# Patient Record
Sex: MALE | Race: WHITE | NOT HISPANIC OR LATINO | Employment: OTHER | URBAN - METROPOLITAN AREA
[De-identification: names, ages, dates, MRNs, and addresses within clinical notes are randomized per-mention and may not be internally consistent; named-entity substitution may affect disease eponyms.]

---

## 2017-02-13 NOTE — PROGRESS NOTES
Pre-Operative Orthopedic Assessment      Patient: Riky Moon    YOB: 1940      Age/Gender: 76 y.o. male  Medical Record Number: [id number]  Surgical Procedure Planned: RT TOTAL HIP ARTHROPLASTY     Surgeon: Riky Fernando MD    Date of Surgery Planned: 2/23/2017    Question Value Score    Patient Score   1. What is your age group? 50-65 years  66-75 years  >75 years =2  =1  =0 0   2. Gender? Male  Female =2  =1 2   3. How far on average can you walk? (a block is 200 meters) Two blocks or more (+\- rest)  1-2 blocks (+\- rest)  Housebound (most of time) =2  =1  =0 1   4. Which gait aid to you use? (more often than not) None  Single-Point Stick  Crutches/Frame =2  =1  =0 2   5. Do you use community  supports? (home help, meals on wheels, district nursing) None or one per week  Two or more per week =1  =0 1   6. Will you live with someone who can care for you after your operation? Yes  No =3  =0 0      Your Score (out of 12)  6     Key Destination at Discharge from acute care predicted by score   Scores < 6  -- extended inpatient rehabilitation   Scores 6-9 -- additional intervention to discharge directly home (Rehabilitation in home)   Scores > 9 -- directly home         Discharge Disposition/Planning:     Patient Response   Discussed the Predicted discharge disposition needed based on RAPT Assessment with the patient.    yes   Patient [facility] discharge disposition[facility]   [facility]   Out Patient Rehabilitation Facility of Choice:    n/a   Home Health Services Preferred:   undecided   Has the patient completed or been scheduled to complete pre-operative testing? Scheduled for 2/14/17   Post-Operative Care Giver Name and Phone Number:    Chitra Moon (wife) [phone]     Subacute Inpatient Rehabilitation:  Complete this section only if planning inpatient services at a Subacute Facility     Patient Response   Subacute Facility Preferred (Please list 2  facilities:   [facility] (pre registered)   Requires pre-certification for inpatient rehabilitation services?      No (Medicare primary)   Planned source of transportation to inpatient rehabilitation facility?   son    If choosing inpatient services at an Acute or Subacute Facility please list a subsequent back-up plan (in case patient fails to qualify for inpatient rehabilitation). Back-up plans should include caregiver (family member or friend) for first 24-48 post- -operatively.    yes   Home Equipment Patient Response   Does patient have a walker for home use?    yes   Does patient have a 3 in 1 commode for home use?    yes   Does patient have a shower chair for home use?    Has a walk in shower   Does patient have an elevated commode seat for home use? Have higher commodes installed   Does patient have a cane for home use?    no   Is there any other medical equipment in the home? If so,  List in comment section below Stair lift to 2nd level of their home   Pre-Operative Class Attendance Patient Response   Attended or scheduled to attend the pre-operative class within 1 year of total joint replacement? Wasn't aware of class, Provided info, plans to attend 2/14/17   Not planning to attend the pre-operative class    n/a   Has attended the pre-operative class > 1 year ago    n/a   Does not want to attend the class    n/a   Was misinformed that did not need to attend the class    n/a   Class time is not convenient    n/a   Other reasons for refusing to attend the pre-operative class (if yes, see comments below)   n/a   Patient Education  Completed   Expected time of discharge discussed yes   Encouraged to attend Pre-Operative Class    yes   Education re: Back-up plan for patients planning discharge to subacute facilities yes   Education re: Predicted Discharge Disposition based on RAPT score    no   Patient receptive and voiced understanding of information given    yes                                                                                                             Comments:    Spoke with patient Riky Moon at [phone].  Verified home address to be correct of [address] [place] [address].   Medicare primary.  He lives with his wife Chitra in a 2 level house.  Bedrooms  are upstairs.  He informs that they have a stair lift that goes to the 2nd level of their home that his wife uses- she has MS.  He stated that he has also been using the stair lift.   He stated that there are 3 steps to the entry of their home, also a ramp has been installed due to his wife having MS. He stated that she is able to manage on her own, but he has arranged for rehab for himself at [place] for once he is ready for discharge from the hospital as she wouldn't be able to help him.  He isn't currently having to use any assistive devices.  Drives.  Patient current plan is to go to [facility] at discharge (pre registered), and informs that his son may be able to provide transportation to the rehab facility.  Yu Abbasi RN                                           Signature: Yu Abbasi RN    Date:  2/13/2017

## 2017-02-14 ENCOUNTER — APPOINTMENT (OUTPATIENT)
Dept: PREADMISSION TESTING | Facility: HOSPITAL | Age: 77
End: 2017-02-14

## 2017-02-14 ENCOUNTER — HOSPITAL ENCOUNTER (OUTPATIENT)
Dept: GENERAL RADIOLOGY | Facility: HOSPITAL | Age: 77
Discharge: HOME | End: 2017-02-14
Admitting: ORTHOPAEDIC SURGERY

## 2017-02-14 ENCOUNTER — HOSPITAL ENCOUNTER (OUTPATIENT)
Dept: GENERAL RADIOLOGY | Facility: HOSPITAL | Age: 77
Discharge: HOME | End: 2017-02-14

## 2017-02-14 VITALS
WEIGHT: 315 LBS | BODY MASS INDEX: 40.43 KG/M2 | HEART RATE: 82 BPM | HEIGHT: 74 IN | SYSTOLIC BLOOD PRESSURE: 153 MMHG | OXYGEN SATURATION: 95 % | RESPIRATION RATE: 16 BRPM | TEMPERATURE: 97 F | DIASTOLIC BLOOD PRESSURE: 67 MMHG

## 2017-02-14 DIAGNOSIS — M25.551 RIGHT HIP PAIN: Primary | ICD-10-CM

## 2017-02-14 LAB
ABO GROUP BLD: NORMAL
ALBUMIN SERPL-MCNC: 4.3 G/DL (ref 3.5–5.2)
ALBUMIN/GLOB SERPL: 1.7 G/DL
ALP SERPL-CCNC: 49 U/L (ref 39–117)
ALT SERPL W P-5'-P-CCNC: 18 U/L (ref 1–41)
ANION GAP SERPL CALCULATED.3IONS-SCNC: 10.1 MMOL/L
AST SERPL-CCNC: 16 U/L (ref 1–40)
BILIRUB SERPL-MCNC: 0.4 MG/DL (ref 0.1–1.2)
BILIRUB UR QL STRIP: NEGATIVE
BLD GP AB SCN SERPL QL: NEGATIVE
BUN BLD-MCNC: 21 MG/DL (ref 8–23)
BUN/CREAT SERPL: 21.6 (ref 7–25)
CALCIUM SPEC-SCNC: 8.8 MG/DL (ref 8.6–10.5)
CHLORIDE SERPL-SCNC: 104 MMOL/L (ref 98–107)
CLARITY UR: CLEAR
CO2 SERPL-SCNC: 26.9 MMOL/L (ref 22–29)
COLOR UR: YELLOW
CREAT BLD-MCNC: 0.97 MG/DL (ref 0.76–1.27)
DEPRECATED RDW RBC AUTO: 65.1 FL (ref 37–54)
ERYTHROCYTE [DISTWIDTH] IN BLOOD BY AUTOMATED COUNT: 16.4 % (ref 11.5–14.5)
GFR SERPL CREATININE-BSD FRML MDRD: 75 ML/MIN/1.73
GLOBULIN UR ELPH-MCNC: 2.6 GM/DL
GLUCOSE BLD-MCNC: 124 MG/DL (ref 65–99)
GLUCOSE UR STRIP-MCNC: NEGATIVE MG/DL
HCT VFR BLD AUTO: 34.9 % (ref 40.4–52.2)
HGB BLD-MCNC: 11.5 G/DL (ref 13.7–17.6)
HGB UR QL STRIP.AUTO: NEGATIVE
KETONES UR QL STRIP: NEGATIVE
LEUKOCYTE ESTERASE UR QL STRIP.AUTO: NEGATIVE
MCH RBC QN AUTO: 35.8 PG (ref 27–32.7)
MCHC RBC AUTO-ENTMCNC: 33 G/DL (ref 32.6–36.4)
MCV RBC AUTO: 108.7 FL (ref 79.8–96.2)
NITRITE UR QL STRIP: NEGATIVE
PH UR STRIP.AUTO: 5.5 [PH] (ref 5–8)
PLATELET # BLD AUTO: 254 10*3/MM3 (ref 140–500)
PMV BLD AUTO: 11.2 FL (ref 6–12)
POTASSIUM BLD-SCNC: 4.4 MMOL/L (ref 3.5–5.2)
PROT SERPL-MCNC: 6.9 G/DL (ref 6–8.5)
PROT UR QL STRIP: NEGATIVE
RBC # BLD AUTO: 3.21 10*6/MM3 (ref 4.6–6)
RH BLD: NEGATIVE
SODIUM BLD-SCNC: 141 MMOL/L (ref 136–145)
SP GR UR STRIP: 1.02 (ref 1–1.03)
UROBILINOGEN UR QL STRIP: NORMAL
WBC NRBC COR # BLD: 7.78 10*3/MM3 (ref 4.5–10.7)

## 2017-02-14 PROCEDURE — 80053 COMPREHEN METABOLIC PANEL: CPT | Performed by: ORTHOPAEDIC SURGERY

## 2017-02-14 PROCEDURE — 93010 ELECTROCARDIOGRAM REPORT: CPT | Performed by: INTERNAL MEDICINE

## 2017-02-14 PROCEDURE — 85027 COMPLETE CBC AUTOMATED: CPT | Performed by: ORTHOPAEDIC SURGERY

## 2017-02-14 PROCEDURE — G8979 MOBILITY GOAL STATUS: HCPCS

## 2017-02-14 PROCEDURE — 73502 X-RAY EXAM HIP UNI 2-3 VIEWS: CPT

## 2017-02-14 PROCEDURE — 93005 ELECTROCARDIOGRAM TRACING: CPT

## 2017-02-14 PROCEDURE — G8978 MOBILITY CURRENT STATUS: HCPCS

## 2017-02-14 PROCEDURE — 36415 COLL VENOUS BLD VENIPUNCTURE: CPT

## 2017-02-14 PROCEDURE — 86901 BLOOD TYPING SEROLOGIC RH(D): CPT

## 2017-02-14 PROCEDURE — 71020 HC CHEST PA AND LATERAL: CPT

## 2017-02-14 PROCEDURE — G8980 MOBILITY D/C STATUS: HCPCS

## 2017-02-14 PROCEDURE — 97161 PT EVAL LOW COMPLEX 20 MIN: CPT

## 2017-02-14 PROCEDURE — 86850 RBC ANTIBODY SCREEN: CPT

## 2017-02-14 PROCEDURE — 81003 URINALYSIS AUTO W/O SCOPE: CPT | Performed by: ORTHOPAEDIC SURGERY

## 2017-02-14 PROCEDURE — 86900 BLOOD TYPING SEROLOGIC ABO: CPT

## 2017-02-14 RX ORDER — DICLOFENAC SODIUM 75 MG/1
75 TABLET, DELAYED RELEASE ORAL 2 TIMES DAILY
COMMUNITY
End: 2017-09-02 | Stop reason: SDUPTHER

## 2017-02-14 RX ORDER — MULTIVITAMIN
1 TABLET ORAL 2 TIMES DAILY
COMMUNITY
End: 2017-09-02 | Stop reason: SDUPTHER

## 2017-02-14 RX ORDER — HYDROCODONE BITARTRATE AND ACETAMINOPHEN 7.5; 325 MG/1; MG/1
1 TABLET ORAL EVERY 6 HOURS PRN
COMMUNITY
Start: 2016-08-30 | End: 2018-05-29

## 2017-02-14 RX ORDER — OMEPRAZOLE 20 MG/1
20 CAPSULE, DELAYED RELEASE ORAL 2 TIMES DAILY
COMMUNITY
End: 2017-09-02 | Stop reason: SDUPTHER

## 2017-02-14 RX ORDER — ZOLPIDEM TARTRATE 10 MG/1
10 TABLET ORAL NIGHTLY PRN
COMMUNITY
End: 2017-09-02 | Stop reason: SDUPTHER

## 2017-02-14 RX ORDER — FUROSEMIDE 20 MG/1
20 TABLET ORAL DAILY
COMMUNITY
End: 2017-09-02 | Stop reason: SDUPTHER

## 2017-02-14 RX ORDER — PREGABALIN 100 MG/1
150 CAPSULE ORAL 2 TIMES DAILY
COMMUNITY
End: 2017-09-02 | Stop reason: SDUPTHER

## 2017-02-14 RX ORDER — OXYBUTYNIN CHLORIDE 15 MG/1
15 TABLET, EXTENDED RELEASE ORAL DAILY
COMMUNITY
End: 2017-09-02 | Stop reason: SDUPTHER

## 2017-02-14 RX ORDER — LEVOTHYROXINE SODIUM 0.07 MG/1
75 TABLET ORAL DAILY
COMMUNITY
End: 2017-09-02 | Stop reason: SDUPTHER

## 2017-02-14 RX ORDER — PRAVASTATIN SODIUM 40 MG
40 TABLET ORAL DAILY
COMMUNITY
End: 2017-09-02 | Stop reason: SDUPTHER

## 2017-02-14 RX ORDER — CHLORHEXIDINE GLUCONATE 500 MG/1
1 CLOTH TOPICAL 2 TIMES DAILY
COMMUNITY
End: 2017-11-12 | Stop reason: HOSPADM

## 2017-02-14 RX ORDER — TRAMADOL HYDROCHLORIDE 50 MG/1
50 TABLET ORAL EVERY 6 HOURS PRN
COMMUNITY
End: 2017-10-03 | Stop reason: ALTCHOICE

## 2017-02-14 RX ORDER — LOSARTAN POTASSIUM 50 MG/1
100 TABLET ORAL DAILY
COMMUNITY
End: 2017-09-02 | Stop reason: SDUPTHER

## 2017-02-14 NOTE — PROGRESS NOTES
Physical Therapy Outpatient Preoperative Total Joint Evaluation     Patient Name: Riky Moon  : 1940  MRN: [id number]  Today's Date: 2017         Surgery Date: 17    There is no problem list on file for this patient.       Past Medical History   Diagnosis Date   • Acid reflux    • Arthritis      OSTEO   • COPD (chronic obstructive pulmonary disease)    • Diabetes mellitus    • Disease of thyroid gland    • High cholesterol    • Hypertension    • Neuropathy      BOTH FEET   • Obesity, Class III, BMI 40-49.9 (morbid obesity)    • Poor circulation of extremity      LEFT LEG   • Sleep apnea    • Vitamin D deficiency         Past Surgical History   Procedure Laterality Date   • Interstim placement Left      BLADDER CONTROL   • Cholecystectomy     • Appendectomy     • Knee arthroplasty Right    • Rotator cuff repair Right    • Nose surgery       REMOVED BLOCKAGE    • Colonoscopy w/ polypectomy       BENIGN   • Cataract extraction w/ intraocular lens implant Bilateral               TOTAL JOINT PREOP EVAL (last 72 hours)      Total Joint Preop Evaluation       17 0822                Preoperative Evaluation    Surgery THR: Right  -TV        Surgery Date 17  -TV        Previous Total Joint Yes  -TV        What type: r tkr 8 years ago  -TV        Attended Class yes  -TV        Medical History HTN;DM  -TV        Medical History Comment neuropathy in both feet; sleep apnea; copd  -TV        Prior Activity Status    All Household independent  -TV        All Community independent  -TV        Gait independent  -TV        Transfers independent  -TV         Spouse   wife has MS  -TV        DC Plans Sub Acute   [facility]  -TV        Assist at home Spouse  -TV        Home Environment 2 story  -TV        Exterior steps --   ramp  -TV        Interior steps 1 rail   14; and chair lift  -TV        Pain 0-10    Pain Level 2  -TV        Location hip and back  -TV        LE Measurements - ROM     Hip Flexion - Right --   significant swelling  in left lower leg; obesity limits hip movement bilaterally; pain with attempted hip flex, abd and rotation; strength generally 3-/5 in bith le's  -TV        UE ROM/Strength    UE ROM/Strength adequate for walker use  -TV        Other Measurements    Sensation/Circulation intact  -TV        Gait Observation no device  -TV        Equipment    Equipment Patient Has Walker;Cane;Bedside commode  -TV        ASSESSMENT    Goal Patient demonstrates good understanding of post-op P.T.;Exercises;Surgical Procedure  -TV        Goal Met? Yes  -TV        Anticipated Progress fair  -TV        Patient agrees with Plan of Treatment? Yes  -TV        Plan Will see for post op TJR protocol  -TV          User Key  (r) = Recorded By, (t) = Taken By, (c) = Cosigned By    Initials Name Effective Dates    TV Modesta Cash, PT 01/07/16 -              Time Calculation:          Therapy Charges for Today     Code Description Service Date Service Provider Modifiers Qty    39542843003 HC PT MOBILITY CURRENT 2/14/2017 Modesta Cash, PT GP, CI 1    91909035882 HC PT MOBILITY PROJECTED 2/14/2017 Modesta Cash, PT GP, CI 1    57987960005 HC PT MOBILITY DISCHARGE 2/14/2017 Modesta Cash, PT GP, CI 1    06021188987 HC PAT-TOTAL JOINT CLASS 2/14/2017 Modesta Cash, PT  1    04392014376 HC PT EVAL LOW COMPLEXITY 1 2/14/2017 Modesta Cash, PT GP 1            PT G-Codes  PT Professional Judgement Used?: Yes  Functional Limitation: Mobility: Walking and moving around  Mobility: Walking and Moving Around Current Status (): At least 1 percent but less than 20 percent impaired, limited or restricted  Mobility: Walking and Moving Around Goal Status (): At least 1 percent but less than 20 percent impaired, limited or restricted  Mobility: Walking and Moving Around Discharge Status (): At least 1 percent but less than 20 percent impaired, limited or restricted      Modesta Cash, PT  2/14/2017

## 2017-02-14 NOTE — DISCHARGE INSTRUCTIONS
Take the following medications the morning of surgery with a small sip of water.    SPIRIVA  LYRICA  OMEPRAZOLE  LOSARTAN  HYDROCODONE IF NEEDED    General Instructions:  • Do not eat or drink after midnight: includes water, mints, or gum. You may brush your teeth.  • Do not smoke, chew tobacco, or drink alcohol.  • Bring medications in original bottles, any inhalers and if applicable your C-PAP/ BI-PAP machine.  • Bring any papers given to you in the doctor’s office.  • Wear clean comfortable clothes and socks.  • Do not wear contact lenses or make-up.  Bring a case for your glasses if applicable.   • Bring crutches or walker if applicable.  • Leave all other valuables and jewelry at home.    You were given a blood bank ID arm band remember to bring it with you the day of surgery.    Preventing a Surgical Site Infection:  Shower on the morning of surgery using a fresh bar of anti-bacterial soap (such as Dial) and clean washcloth.  Dry with a clean towel and dress in clean clothing.  For 2 to 3 days before surgery, avoid shaving with a razor near where you will have surgery because the razor can irritate skin and make it easier to develop an infection  Ask your surgeon if you will be receiving antibiotics prior to surgery  Make sure you, your family, and all healthcare providers clean their hands with soap and water or an alcohol based hand  before caring for you or your wound  If at all possible, quit smoking as many days before surgery as you can.    Day of surgery: 02- arrive @ 0730 am REPORT TO THE MAIN OR   Upon arrival, a Pre-op nurse and Anesthesiologist will review your health history, obtain vital signs, and answer questions you may have.  The only belongings needed at this time will be your home medications and if applicable your C-PAP/BI-PAP machine.  If you are staying overnight your family can leave the rest of your belongings in the car and bring them to your room later.  A Pre-op nurse  will start an IV and you may receive medication in preparation for surgery, including something to help you relax.  Your family will be able to see you in the Pre-op area.  While you are in surgery your family should notify the waiting room  if they leave the waiting room area and provide a contact phone number.    Please be aware that surgery does come with discomfort.  We want to make every effort to control your discomfort so please discuss any uncontrolled symptoms with your nurse.   Your doctor will most likely have prescribed pain medications.      If you are going home after surgery you will receive individualized written care instructions before being discharged.  A responsible adult must drive you to and from the hospital on the day of your surgery and stay with you for 24 hours.    If you are staying overnight following surgery, you will be transported to your hospital room following the recovery period.  [facility] has all private rooms.    If you have any questions please call Pre-Admission Testing at [phone].  Deductibles and co-payments are collected on the day of service. Please be prepared to pay the required co-pay, deductible or deposit on the day of service as defined by your plan.    2% CHLORAHEXIDINE GLUCONATE* CLOTH  Preparing or “prepping” skin before surgery can reduce the risk of infection at the surgical site. To make the process easier, [facility] has chosen disposable cloths moistened with a rinse-free, 2% Chlorhexidine Gluconate (CHG) antiseptic solution. The steps below outline the prepping process and should be carefully followed.        Use the prep cloth on the area that is circled in the diagram             Directions Night before Surgery 02- PM PRIOR TO SURGERY (RIGHT HIP AREA)  1) Shower using a fresh bar of anti-bacterial soap (such as Dial) and clean washcloth.  Use a clean towel to completely dry your skin.  2) Do not use any  lotions, oils or creams on your skin.  3) Open the package and remove 1 cloth, wipe your skin for 30 seconds in a circular motion.  Allow to dry for 3 minutes.  4) Repeat #3 with second cloth.  5) Do not touch your eyes, ears, or mouth with the prep cloth.  6) Allow the wet prep solution to air dry.  7) Discard the prep cloth and wash your hands with soap and water.   8) Dress in clean bed clothes and sleep on fresh clean bed sheets.   9) You may experience some temporary itching after the prep.    Directions Day of Surgery 02- AM PRIOR TO SURGERY (RIGHT HIP AREA)  1) Repeat steps 1,2,3,4,5,6,7, and 9.   2) Dress in clean clothes before coming to the hospital.    BACTROBAN NASAL OINTMENT  There are many germs normally in your nose. Bactroban is an ointment that will help reduce these germs. Please follow these instructions for Bactroban use:    ____The day before surgery in the morning  [id number]_______    ____The day before surgery in the evening              [id number]_______    ____The day of surgery in the morning    [id number]_____    **Squirt ½ package of Bactroban Ointment onto a cotton applicator and apply to inside of 1st nostril.  Squirt the remaining Bactroban and apply to the inside of the other nostril.

## 2017-02-23 ENCOUNTER — ANESTHESIA EVENT (OUTPATIENT)
Dept: PERIOP | Facility: HOSPITAL | Age: 77
End: 2017-02-23

## 2017-02-23 ENCOUNTER — ANESTHESIA (OUTPATIENT)
Dept: PERIOP | Facility: HOSPITAL | Age: 77
End: 2017-02-23

## 2017-02-23 ENCOUNTER — HOSPITAL ENCOUNTER (OUTPATIENT)
Facility: HOSPITAL | Age: 77
Setting detail: SURGERY ADMIT
Discharge: HOME | End: 2017-02-23
Attending: ORTHOPAEDIC SURGERY | Admitting: ORTHOPAEDIC SURGERY

## 2017-02-23 VITALS
SYSTOLIC BLOOD PRESSURE: 145 MMHG | TEMPERATURE: 97.8 F | OXYGEN SATURATION: 95 % | DIASTOLIC BLOOD PRESSURE: 71 MMHG | WEIGHT: 315 LBS | HEIGHT: 73 IN | HEART RATE: 86 BPM | BODY MASS INDEX: 41.75 KG/M2 | RESPIRATION RATE: 16 BRPM

## 2017-02-23 PROBLEM — M16.9 OA (OSTEOARTHRITIS) OF HIP: Status: ACTIVE | Noted: 2017-02-23

## 2017-02-23 LAB — GLUCOSE BLDC GLUCOMTR-MCNC: 127 MG/DL (ref 70–130)

## 2017-02-23 PROCEDURE — 82962 GLUCOSE BLOOD TEST: CPT

## 2017-02-23 RX ORDER — MIDAZOLAM HYDROCHLORIDE 1 MG/ML
1 INJECTION INTRAMUSCULAR; INTRAVENOUS
Status: DISCONTINUED | OUTPATIENT
Start: 2017-02-23 | End: 2017-02-23 | Stop reason: HOSPADM

## 2017-02-23 RX ORDER — HYDROMORPHONE HYDROCHLORIDE 1 MG/ML
0.5 INJECTION, SOLUTION INTRAMUSCULAR; INTRAVENOUS; SUBCUTANEOUS
Status: CANCELLED | OUTPATIENT
Start: 2017-02-23

## 2017-02-23 RX ORDER — FLUMAZENIL 0.1 MG/ML
0.2 INJECTION INTRAVENOUS AS NEEDED
Status: CANCELLED | OUTPATIENT
Start: 2017-02-23

## 2017-02-23 RX ORDER — PROMETHAZINE HYDROCHLORIDE 25 MG/ML
12.5 INJECTION, SOLUTION INTRAMUSCULAR; INTRAVENOUS ONCE AS NEEDED
Status: CANCELLED | OUTPATIENT
Start: 2017-02-23

## 2017-02-23 RX ORDER — MIDAZOLAM HYDROCHLORIDE 1 MG/ML
2 INJECTION INTRAMUSCULAR; INTRAVENOUS
Status: DISCONTINUED | OUTPATIENT
Start: 2017-02-23 | End: 2017-02-23 | Stop reason: HOSPADM

## 2017-02-23 RX ORDER — PROMETHAZINE HYDROCHLORIDE 25 MG/1
12.5 TABLET ORAL ONCE AS NEEDED
Status: CANCELLED | OUTPATIENT
Start: 2017-02-23 | End: 2017-02-24

## 2017-02-23 RX ORDER — SODIUM CHLORIDE 0.9 % (FLUSH) 0.9 %
1-10 SYRINGE (ML) INJECTION AS NEEDED
Status: DISCONTINUED | OUTPATIENT
Start: 2017-02-23 | End: 2017-02-23 | Stop reason: HOSPADM

## 2017-02-23 RX ORDER — FENTANYL CITRATE 50 UG/ML
50 INJECTION, SOLUTION INTRAMUSCULAR; INTRAVENOUS
Status: CANCELLED | OUTPATIENT
Start: 2017-02-23

## 2017-02-23 RX ORDER — SODIUM CHLORIDE, SODIUM LACTATE, POTASSIUM CHLORIDE, CALCIUM CHLORIDE 600; 310; 30; 20 MG/100ML; MG/100ML; MG/100ML; MG/100ML
9 INJECTION, SOLUTION INTRAVENOUS CONTINUOUS
Status: DISCONTINUED | OUTPATIENT
Start: 2017-02-23 | End: 2017-02-23 | Stop reason: HOSPADM

## 2017-02-23 RX ORDER — CELECOXIB 200 MG/1
200 CAPSULE ORAL ONCE
Status: DISCONTINUED | OUTPATIENT
Start: 2017-02-23 | End: 2017-02-23 | Stop reason: HOSPADM

## 2017-02-23 RX ORDER — DIPHENHYDRAMINE HYDROCHLORIDE 50 MG/ML
12.5 INJECTION INTRAMUSCULAR; INTRAVENOUS
Status: CANCELLED | OUTPATIENT
Start: 2017-02-23

## 2017-02-23 RX ORDER — HYDRALAZINE HYDROCHLORIDE 20 MG/ML
5 INJECTION INTRAMUSCULAR; INTRAVENOUS
Status: CANCELLED | OUTPATIENT
Start: 2017-02-23

## 2017-02-23 RX ORDER — HYDROMORPHONE HYDROCHLORIDE 1 MG/ML
0.25 INJECTION, SOLUTION INTRAMUSCULAR; INTRAVENOUS; SUBCUTANEOUS
Status: CANCELLED | OUTPATIENT
Start: 2017-02-23

## 2017-02-23 RX ORDER — BUPIVACAINE HCL/0.9 % NACL/PF 0.1 %
3 PLASTIC BAG, INJECTION (ML) EPIDURAL ONCE
Status: DISCONTINUED | OUTPATIENT
Start: 2017-02-23 | End: 2017-02-23 | Stop reason: HOSPADM

## 2017-02-23 RX ORDER — NALOXONE HCL 0.4 MG/ML
0.2 VIAL (ML) INJECTION AS NEEDED
Status: CANCELLED | OUTPATIENT
Start: 2017-02-23

## 2017-02-23 RX ORDER — PROMETHAZINE HYDROCHLORIDE 25 MG/1
25 TABLET ORAL ONCE AS NEEDED
Status: CANCELLED | OUTPATIENT
Start: 2017-02-23

## 2017-02-23 RX ORDER — LABETALOL HYDROCHLORIDE 5 MG/ML
5 INJECTION, SOLUTION INTRAVENOUS
Status: CANCELLED | OUTPATIENT
Start: 2017-02-23

## 2017-02-23 RX ORDER — HYDROCODONE BITARTRATE AND ACETAMINOPHEN 7.5; 325 MG/1; MG/1
1 TABLET ORAL ONCE AS NEEDED
Status: CANCELLED | OUTPATIENT
Start: 2017-02-23 | End: 2017-02-24

## 2017-02-23 RX ORDER — FAMOTIDINE 10 MG/ML
20 INJECTION, SOLUTION INTRAVENOUS ONCE
Status: DISCONTINUED | OUTPATIENT
Start: 2017-02-23 | End: 2017-02-23 | Stop reason: HOSPADM

## 2017-02-23 RX ORDER — ONDANSETRON 2 MG/ML
4 INJECTION INTRAMUSCULAR; INTRAVENOUS ONCE AS NEEDED
Status: CANCELLED | OUTPATIENT
Start: 2017-02-23

## 2017-02-23 RX ORDER — ACETAMINOPHEN 500 MG
1000 TABLET ORAL ONCE
Status: DISCONTINUED | OUTPATIENT
Start: 2017-02-23 | End: 2017-02-23 | Stop reason: HOSPADM

## 2017-02-23 RX ORDER — FENTANYL CITRATE 50 UG/ML
50 INJECTION, SOLUTION INTRAMUSCULAR; INTRAVENOUS
Status: DISCONTINUED | OUTPATIENT
Start: 2017-02-23 | End: 2017-02-23 | Stop reason: HOSPADM

## 2017-02-23 RX ORDER — ALBUTEROL SULFATE 90 UG/1
1 AEROSOL, METERED RESPIRATORY (INHALATION) EVERY 4 HOURS PRN
COMMUNITY
End: 2018-06-29 | Stop reason: SDUPTHER

## 2017-02-23 RX ORDER — OXYCODONE AND ACETAMINOPHEN 7.5; 325 MG/1; MG/1
1 TABLET ORAL ONCE AS NEEDED
Status: CANCELLED | OUTPATIENT
Start: 2017-02-23 | End: 2017-02-24

## 2017-02-23 RX ORDER — PROMETHAZINE HYDROCHLORIDE 25 MG/1
25 SUPPOSITORY RECTAL ONCE AS NEEDED
Status: CANCELLED | OUTPATIENT
Start: 2017-02-23

## 2017-02-23 NOTE — ANESTHESIA PREPROCEDURE EVALUATION
Anesthesia Evaluation        Airway   Mallampati: III  Neck ROM: limited  no difficulty expected  Dental    (+) upper dentures and lower dentures    Pulmonary    (+) a smoker Former, sleep apnea on CPAP,   Cardiovascular     ECG reviewed  Rhythm: regular  Rate: normal        Neuro/Psych  GI/Hepatic/Renal/Endo    (+)  diabetes mellitus type 2,     Musculoskeletal     Abdominal    Substance History      OB/GYN          Other                                  Anesthesia Plan    ASA 4     general   (Patient has one natural remaining tooth in lower back left: all else are dentures  Overall very poor health with extensive left leg venous stasis)  intravenous induction   Anesthetic plan and risks discussed with patient.

## 2017-02-23 NOTE — H&P
History and Physical    Patient Name:  Riky Moon  YOB: 1940    Medical Records Number:  [id number]    Date of Admission:  No admission date for patient encounter.    Chief Complaint:  OA (osteoarthritis) of hip [M16.9]    Riky Moon is a 76 y.o. male who presents c/o severe right hip pain.  The pain has been on and off for many years, worsening recently to the point where the pain is becoming disabling. The pain is a constant dull ache with occasional sharp, stabbing pains.  The patient has failed conservative treatment and would like to proceed with total hip arthroplasty.    There were no vitals taken for this visit.    Past Medical History:    Past Medical History   Diagnosis Date   • Acid reflux    • Arthritis      OSTEO   • COPD (chronic obstructive pulmonary disease)    • Diabetes mellitus    • Disease of thyroid gland    • High cholesterol    • Hypertension    • Neuropathy      BOTH FEET   • Obesity, Class III, BMI 40-49.9 (morbid obesity)    • Poor circulation of extremity      LEFT LEG   • Sleep apnea    • Vitamin D deficiency        Social History:    Social History     Social History   • Marital status:      Spouse name: N/A   • Number of children: N/A   • Years of education: N/A     Occupational History   • Not on file.     Social History Main Topics   • Smoking status: Former Smoker     Packs/day: 1.50     Years: 40.00     Types: Cigarettes     Quit date: 1997   • Smokeless tobacco: Not on file   • Alcohol use Yes      Comment: HOLIDAYS    • Drug use: Yes     Special: Hydrocodone   • Sexual activity: Defer     Other Topics Concern   • Not on file     Social History Narrative   • No narrative on file       Family History:  No family history on file.    Current Medications:    Current Facility-Administered Medications:   •  ropivacaine (NAROPIN) 50 mL, Morphine (PF) 5 mg, ketorolac (TORADOL) 30 mg, EPINEPHrine (ADRENALIN) 0.3 mg in sodium chloride 0.9 % 101.8 mL, , Injection,  Once, Riky Fernando MD    Current Outpatient Prescriptions:   •  aspirin 81 MG tablet, Take 81 mg by mouth. HOLD PRIOR TO SURGERY, Disp: , Rfl:   •  Chlorhexidine Gluconate Cloth 2 % pads, Apply  topically. AS DIRECTED: PM DAY BEFORE SURGERY AM DAY OF SURGERY, Disp: , Rfl:   •  Cholecalciferol (VITAMIN D) 1000 UNITS tablet, Take 1,000 Units by mouth Daily. HOLD PRIOR TO SURGERY, Disp: , Rfl:   •  diclofenac (VOLTAREN) 75 MG EC tablet, Take 75 mg by mouth 2 (Two) Times a Day. HOLD PRIOR TO SURGERY, Disp: , Rfl:   •  furosemide (LASIX) 20 MG tablet, Take 20 mg by mouth Daily., Disp: , Rfl:   •  HYDROcodone-acetaminophen (NORCO) 7.5-325 MG per tablet, Take 1 tablet by mouth Every 6 (Six) Hours., Disp: , Rfl:   •  levothyroxine (SYNTHROID, LEVOTHROID) 75 MCG tablet, Take 75 mcg by mouth Daily., Disp: , Rfl:   •  losartan (COZAAR) 50 MG tablet, Take 100 mg by mouth Daily., Disp: , Rfl:   •  metFORMIN (GLUCOPHAGE) 850 MG tablet, Take 850 mg by mouth 2 (Two) Times a Day., Disp: , Rfl:   •  Multiple Vitamin (MULTIVITAMIN) tablet, Take 1 tablet by mouth 2 (Two) Times a Day. HOLD PRIOR TO SURGERY, Disp: , Rfl:   •  mupirocin (BACTROBAN) 2 % nasal ointment, into each nostril 2 (Two) Times a Day. AS DIRECTED: AM & PM DAY BEFORE SURGERY AM DAY OF SURGERY, Disp: , Rfl:   •  omeprazole (priLOSEC) 20 MG capsule, Take 20 mg by mouth 2 (Two) Times a Day., Disp: , Rfl:   •  oxybutynin XL (DITROPAN XL) 15 MG 24 hr tablet, Take 15 mg by mouth Daily., Disp: , Rfl:   •  pravastatin (PRAVACHOL) 40 MG tablet, Take 40 mg by mouth Daily., Disp: , Rfl:   •  pregabalin (LYRICA) 100 MG capsule, Take 150 mg by mouth 2 (Two) Times a Day., Disp: , Rfl:   •  tiotropium (SPIRIVA HANDIHALER) 18 MCG per inhalation capsule, Place 2 puffs into inhaler and inhale Daily., Disp: , Rfl:   •  traMADol (ULTRAM) 50 MG tablet, Take 50 mg by mouth Every 6 (Six) Hours As Needed for moderate pain (4-6)., Disp: , Rfl:   •  zolpidem (AMBIEN) 10 MG tablet, Take 10  mg by mouth At Night As Needed., Disp: , Rfl:     Allergies:  No Known Allergies    Review of Systems:   HEENT: Patient denies any headaches, vision changes, change in hearing, or tinnitus, Patient denies any rhinorrhea,epistaxis, sinus pain, mouth or dental problems, sore throat or hoarseness, or dysphagia  Pulmonary: Patient denies any cough, congestion, SOA, or wheezing  Cardiovascular: Patient denies any chest pain, dyspnea, palpitations, weakness, intolerance of exercise, varicosities, swelling of extremities, known murmur  Gastrointestinal:  Patient denies nausea, vomiting, diarrhea, constipation, loss  of appetite, change in appetite, dysphagia, gas, heartburn, melena, change in bowel habits, use of laxatives or other drugs to alter the function of the gastrointestinal tract.  Genital/Urinary: Patient denies dysuria, change in color of urine, change in frequency of urination, pain with urgency, incontinence, retention, or nocturia.  Musculoskeletal: Patient denies increased warmth; redness; or swelling of joints; limitation of function; deformity; crepitation: pain in a joint or an extremity, the neck, or the back, especially with movement.  Neurological: Patient denies dizziness, tremor, ataxia, difficulty in speaking, change in speech, paresthesia, loss of sensation, seizures, syncope, changes in memory.  Endocrine system: Patient denies tremors, palpitations, intolerance of heat or cold, polyuria, polydipsia, polyphagia, diaphoresis, exophthalmos, or goiter.  Psychological: Patient denies thoughts/plans or harming self or other; depression,  insomnia, night terrors, lala, memory loss, disorientation.  Skin: Patient denies any bruising, rashes, discoloration, pruritus, wounds, ulcers, decubiti, changes in the hair or nails  Hematopoietic: Patient denies history of spontaneous or excessive bleeding, epistaxis, hematuria, melena, fatigue, enlarged or tender lymph nodes, pallor, history of  anemia.        Physical Exam:  Awake, A&O x3, affect normal, no acute distress  Ambulating with a limp due to hip pain  Hip ROM is limited due to pain  No instability  Strength is 4/5 in the quad, hamstring, abduction and adduction  Cap refill is normal, Sensation intact    Card:  RR, HD Stable  Pulm:  Regular breathing, no S.O.A  Abd:  Soft, NT, ND    Lab Results (last 24 hours)     ** No results found for the last 24 hours. **          Xr Chest Pa & Lateral    Result Date: 2/14/2017  Narrative: RIGHT HIP AND PELVIS 5 VIEWS  HISTORY: 76-year-old male preop right hip surgery scheduled for February 23 company of nontraumatic right hip pain. HISTORY includes hypertension diabetes COPD and shortness of breath.  COMPARISON: (none available)  FINDINGS: 1. Prominent degenerative changes right SI joint. 2. Right hip joint space narrowing, bony sclerosis without acute traumatic nor other significant osseous abnormality. 3. Pain device with electrode projects over the right sacrum.      TWO-VIEW CHEST  HISTORY: See above  COMPARISON: 03/01/2013  FINDINGS: 1. Stable negative acute chest. 2. Emphysema spondylosis vascular calcification chronic pulmonary change.  This report was finalized on 2/14/2017 12:21 PM by Dr. Jose Shepard MD.      Xr Hip With Or Without Pelvis 2 - 3 View Right    Result Date: 2/14/2017  Narrative: RIGHT HIP AND PELVIS 5 VIEWS  HISTORY: 76-year-old male preop right hip surgery scheduled for February 23 company of nontraumatic right hip pain. HISTORY includes hypertension diabetes COPD and shortness of breath.  COMPARISON: (none available)  FINDINGS: 1. Prominent degenerative changes right SI joint. 2. Right hip joint space narrowing, bony sclerosis without acute traumatic nor other significant osseous abnormality. 3. Pain device with electrode projects over the right sacrum.      TWO-VIEW CHEST  HISTORY: See above  COMPARISON: 03/01/2013  FINDINGS: 1. Stable negative acute chest. 2. Emphysema  spondylosis vascular calcification chronic pulmonary change.  This report was finalized on 2/14/2017 12:21 PM by Dr. Jose Shepard MD.          Assessment:  End-stage Primary Right Hip Osteoarthritis    Plan:  Patient's pain is becoming disabling, despite extensive conservative treatment.  Radiographs reveal end-stage degenerative changes.  The risks of surgery, including, but not limited to, heart attack, stroke, dying, DVT, arthrofibrosis and infection were discussed.  The alternatives and benefits were also discussed.  All questions answered and the patient wishes to proceed with right total hip arthroplasty.

## 2017-02-23 NOTE — ANESTHESIA POSTPROCEDURE EVALUATION
Patient: Riky Moon    Procedure Summary     Date Anesthesia Start Anesthesia Stop Room / Location    02/23/17    [facility] / [facility]       Procedure Diagnosis Surgeon Provider    RT TOTAL HIP ARTHROPLASTY (Right Hip) No diagnosis on file. Riky Fernando MD No responsible provider of record.          Anesthesia Type: general  Last vitals  BP      Temp      Pulse     Resp      SpO2        Post Anesthesia Care and Evaluation    Patient location during evaluation: PACU  Patient participation: complete - patient participated  Level of consciousness: awake and alert  Pain management: adequate  Airway patency: patent  Anesthetic complications: No anesthetic complications    Cardiovascular status: acceptable  Respiratory status: acceptable  Hydration status: acceptable

## 2017-02-23 NOTE — PERIOPERATIVE NURSING NOTE
"SPOKE WITH DR. BROWER. MADE HIM AWARE OF VERY RED RASH UNDERNEATH ABDOMINAL FOLD RIGHT AND LEFT SIDE AS WELL AS VERY REDDENED AREAS RIGHT AND LEFT LOWER EXTREMITIES LEFT GREATER THAN RIGHT . PT HAS EXTREME SWELLING LEFT FOOT. PT STATES \"IT'S ALWAYS LIKE THAT. POOR CIRCULATION\". DR. BROWER STATED HE WOULD BE OVER TO SEE PATIENT PRIOR TO SURGERY  "

## 2017-06-07 ENCOUNTER — HOSPITAL ENCOUNTER (OUTPATIENT)
Dept: SLEEP MEDICINE | Facility: HOSPITAL | Age: 77
Discharge: HOME | End: 2017-06-07
Attending: INTERNAL MEDICINE | Admitting: INTERNAL MEDICINE

## 2017-06-07 PROCEDURE — 99213 OFFICE O/P EST LOW 20 MIN: CPT | Performed by: INTERNAL MEDICINE

## 2017-06-07 PROCEDURE — G0463 HOSPITAL OUTPT CLINIC VISIT: HCPCS

## 2017-06-11 PROBLEM — G47.33 OSA TREATED WITH BIPAP: Status: ACTIVE | Noted: 2017-06-11

## 2017-06-11 NOTE — PROGRESS NOTES
[facility] Note June 7, 2017      Patient Care Team:  Josue Yung MD as PCP - Family Medicine  Cosmo French MD as Consulting Physician (Sleep Medicine)  Oneyda Quesada Jr., MD as Consulting Physician (Vascular Surgery)    Chief Complaint:  KINDRA     Interval History:   The patient was last seen by me in June 2016.  He is stable and unchanged without new problems.  He goes to bed 11:30 PM and awakens at 8 AM.  [place] Sleepiness Scale is normal at 2.    The patient is having problems with cellulitis and swelling of his right lower extremity greater than his left.  He sees Dr. Oneyda Quesada.    Review of Systems:  Recorded on the Sleep Questionnaire.  Unremarkable except for BLANCHARD, cough, and depression.    Social History:  He does not smoke cigarettes.  No alcohol.  2 caffeinated beverages a day.    Allergies:  No known medical allergies.     Medication Review:  Reviewed.      Vital Signs:  Height 73 inches and weight is 300 pounds and he is morbidly obese with a body mass index of 40.  His weight is down 20 pounds since I last saw him.    Physical Exam:    Constitutional:  Well developed white male and appears in no apparent distress.  Awake & oriented times 3.  Normal mood with normal recent and remote memory and normal judgement.  Eyes:  Conjunctivae normal.  Oropharynx:  moist mucous membranes without exudate and a large tongue and normal size uvula that is broad-based and class III MP airway.      Results Review:  DME is [facility] and he uses a fullface mask.  Downloads between December 9, 2016-June 6, 2017 compliances 100% and average usage is 8 hours and 24 minutes and average AHI is normal without significant leak and he uses BiPAP 18/14.       Impression:   Obstructive sleep apnea adequately treated with BiPAP 16/14 with good compliance and usage and no complaints of hypersomnolence.      Plan:  Good sleep hygiene measures should be maintained.  Weight loss would be  beneficial in this patient who is obese by BMI.    The patient will continue to use his BiPAP.  A new prescription will be sent for a new auto titrating BiPAP with max IPAP of 18 and minimum EPAP 12 and max pressure support 6 and minimal pressure support 2.  All needed supplies will be obtained.    The patient will follow up in 2-3 months.      Cosmo French MD  06/11/17  10:19 AM

## 2017-09-02 ENCOUNTER — APPOINTMENT (OUTPATIENT)
Dept: GENERAL RADIOLOGY | Facility: HOSPITAL | Age: 77
End: 2017-09-02

## 2017-09-02 ENCOUNTER — HOSPITAL ENCOUNTER (OUTPATIENT)
Facility: HOSPITAL | Age: 77
Setting detail: OBSERVATION
Discharge: HOME | End: 2017-09-03
Attending: EMERGENCY MEDICINE | Admitting: INTERNAL MEDICINE

## 2017-09-02 DIAGNOSIS — R07.9 CHEST PAIN, UNSPECIFIED TYPE: Primary | ICD-10-CM

## 2017-09-02 LAB
ALBUMIN SERPL-MCNC: 4.4 G/DL (ref 3.5–5.2)
ALBUMIN/GLOB SERPL: 1.5 G/DL
ALP SERPL-CCNC: 47 U/L (ref 39–117)
ALT SERPL W P-5'-P-CCNC: 21 U/L (ref 1–41)
ANION GAP SERPL CALCULATED.3IONS-SCNC: 13.6 MMOL/L
AST SERPL-CCNC: 19 U/L (ref 1–40)
BASOPHILS # BLD AUTO: 0.04 10*3/MM3 (ref 0–0.2)
BASOPHILS NFR BLD AUTO: 0.4 % (ref 0–1.5)
BILIRUB SERPL-MCNC: 0.6 MG/DL (ref 0.1–1.2)
BUN BLD-MCNC: 17 MG/DL (ref 8–23)
BUN/CREAT SERPL: 19.3 (ref 7–25)
CALCIUM SPEC-SCNC: 9.7 MG/DL (ref 8.6–10.5)
CHLORIDE SERPL-SCNC: 102 MMOL/L (ref 98–107)
CO2 SERPL-SCNC: 23.4 MMOL/L (ref 22–29)
CREAT BLD-MCNC: 0.88 MG/DL (ref 0.76–1.27)
DEPRECATED RDW RBC AUTO: 66.2 FL (ref 37–54)
EOSINOPHIL # BLD AUTO: 0.28 10*3/MM3 (ref 0–0.7)
EOSINOPHIL NFR BLD AUTO: 2.8 % (ref 0.3–6.2)
ERYTHROCYTE [DISTWIDTH] IN BLOOD BY AUTOMATED COUNT: 17.3 % (ref 11.5–14.5)
GFR SERPL CREATININE-BSD FRML MDRD: 84 ML/MIN/1.73
GLOBULIN UR ELPH-MCNC: 3 GM/DL
GLUCOSE BLD-MCNC: 167 MG/DL (ref 65–99)
HCT VFR BLD AUTO: 36.9 % (ref 40.4–52.2)
HGB BLD-MCNC: 12.1 G/DL (ref 13.7–17.6)
HOLD SPECIMEN: NORMAL
HOLD SPECIMEN: NORMAL
IMM GRANULOCYTES # BLD: 0.05 10*3/MM3 (ref 0–0.03)
IMM GRANULOCYTES NFR BLD: 0.5 % (ref 0–0.5)
LYMPHOCYTES # BLD AUTO: 1.86 10*3/MM3 (ref 0.9–4.8)
LYMPHOCYTES NFR BLD AUTO: 18.3 % (ref 19.6–45.3)
MCH RBC QN AUTO: 35 PG (ref 27–32.7)
MCHC RBC AUTO-ENTMCNC: 32.8 G/DL (ref 32.6–36.4)
MCV RBC AUTO: 106.6 FL (ref 79.8–96.2)
MONOCYTES # BLD AUTO: 0.66 10*3/MM3 (ref 0.2–1.2)
MONOCYTES NFR BLD AUTO: 6.5 % (ref 5–12)
NEUTROPHILS # BLD AUTO: 7.29 10*3/MM3 (ref 1.9–8.1)
NEUTROPHILS NFR BLD AUTO: 71.5 % (ref 42.7–76)
NRBC BLD MANUAL-RTO: 0.5 /100 WBC (ref 0–0)
PLAT MORPH BLD: NORMAL
PLATELET # BLD AUTO: 226 10*3/MM3 (ref 140–500)
PMV BLD AUTO: 11.7 FL (ref 6–12)
POTASSIUM BLD-SCNC: 4.3 MMOL/L (ref 3.5–5.2)
PROT SERPL-MCNC: 7.4 G/DL (ref 6–8.5)
RBC # BLD AUTO: 3.46 10*6/MM3 (ref 4.6–6)
RBC MORPH BLD: NORMAL
SODIUM BLD-SCNC: 139 MMOL/L (ref 136–145)
TROPONIN T SERPL-MCNC: <0.01 NG/ML (ref 0–0.03)
WBC MORPH BLD: NORMAL
WBC NRBC COR # BLD: 10.18 10*3/MM3 (ref 4.5–10.7)
WHOLE BLOOD HOLD SPECIMEN: NORMAL
WHOLE BLOOD HOLD SPECIMEN: NORMAL

## 2017-09-02 PROCEDURE — G0378 HOSPITAL OBSERVATION PER HR: HCPCS

## 2017-09-02 PROCEDURE — 80053 COMPREHEN METABOLIC PANEL: CPT | Performed by: EMERGENCY MEDICINE

## 2017-09-02 PROCEDURE — 71020 HC CHEST PA AND LATERAL: CPT

## 2017-09-02 PROCEDURE — 93005 ELECTROCARDIOGRAM TRACING: CPT

## 2017-09-02 PROCEDURE — 84484 ASSAY OF TROPONIN QUANT: CPT | Performed by: EMERGENCY MEDICINE

## 2017-09-02 PROCEDURE — 93010 ELECTROCARDIOGRAM REPORT: CPT | Performed by: INTERNAL MEDICINE

## 2017-09-02 PROCEDURE — 85025 COMPLETE CBC W/AUTO DIFF WBC: CPT | Performed by: EMERGENCY MEDICINE

## 2017-09-02 PROCEDURE — 99284 EMERGENCY DEPT VISIT MOD MDM: CPT

## 2017-09-02 PROCEDURE — 36415 COLL VENOUS BLD VENIPUNCTURE: CPT | Performed by: EMERGENCY MEDICINE

## 2017-09-02 PROCEDURE — 85007 BL SMEAR W/DIFF WBC COUNT: CPT | Performed by: EMERGENCY MEDICINE

## 2017-09-02 PROCEDURE — 84484 ASSAY OF TROPONIN QUANT: CPT | Performed by: INTERNAL MEDICINE

## 2017-09-02 RX ORDER — ASPIRIN 325 MG
325 TABLET ORAL ONCE
Status: COMPLETED | OUTPATIENT
Start: 2017-09-02 | End: 2017-09-02

## 2017-09-02 RX ORDER — LEVOTHYROXINE SODIUM 0.07 MG/1
88 TABLET ORAL DAILY
COMMUNITY
End: 2017-10-03 | Stop reason: ALTCHOICE

## 2017-09-02 RX ORDER — OXYBUTYNIN CHLORIDE 15 MG/1
15 TABLET, EXTENDED RELEASE ORAL NIGHTLY
COMMUNITY

## 2017-09-02 RX ORDER — FUROSEMIDE 40 MG/1
40 TABLET ORAL ONCE
Status: COMPLETED | OUTPATIENT
Start: 2017-09-02 | End: 2017-09-02

## 2017-09-02 RX ORDER — LOSARTAN POTASSIUM 50 MG/1
100 TABLET ORAL EVERY MORNING
COMMUNITY
End: 2018-05-29 | Stop reason: DRUGHIGH

## 2017-09-02 RX ORDER — ZOLPIDEM TARTRATE 10 MG/1
10 TABLET ORAL NIGHTLY PRN
COMMUNITY
End: 2022-07-18

## 2017-09-02 RX ORDER — DICLOFENAC SODIUM 75 MG/1
75 TABLET, DELAYED RELEASE ORAL 2 TIMES DAILY
COMMUNITY
End: 2017-11-12 | Stop reason: HOSPADM

## 2017-09-02 RX ORDER — OMEPRAZOLE 20 MG/1
20 CAPSULE, DELAYED RELEASE ORAL 2 TIMES DAILY
COMMUNITY
End: 2017-09-03 | Stop reason: HOSPADM

## 2017-09-02 RX ORDER — PREGABALIN 100 MG/1
75 CAPSULE ORAL DAILY
COMMUNITY
End: 2017-10-03 | Stop reason: ALTCHOICE

## 2017-09-02 RX ORDER — ALUMINA, MAGNESIA, AND SIMETHICONE 2400; 2400; 240 MG/30ML; MG/30ML; MG/30ML
15 SUSPENSION ORAL EVERY 6 HOURS PRN
Status: DISCONTINUED | OUTPATIENT
Start: 2017-09-02 | End: 2017-09-03 | Stop reason: HOSPADM

## 2017-09-02 RX ORDER — LOSARTAN POTASSIUM 50 MG/1
50 TABLET ORAL ONCE
Status: COMPLETED | OUTPATIENT
Start: 2017-09-02 | End: 2017-09-02

## 2017-09-02 RX ORDER — MULTIVITAMIN
1 TABLET ORAL EVERY MORNING
COMMUNITY

## 2017-09-02 RX ORDER — ASPIRIN 325 MG
325 TABLET ORAL ONCE
Status: DISCONTINUED | OUTPATIENT
Start: 2017-09-02 | End: 2017-09-02

## 2017-09-02 RX ORDER — PRAVASTATIN SODIUM 40 MG
40 TABLET ORAL NIGHTLY
COMMUNITY
End: 2019-11-06 | Stop reason: SDUPTHER

## 2017-09-02 RX ORDER — PANTOPRAZOLE SODIUM 20 MG/1
20 TABLET, DELAYED RELEASE ORAL ONCE
Status: COMPLETED | OUTPATIENT
Start: 2017-09-02 | End: 2017-09-02

## 2017-09-02 RX ORDER — LEVOTHYROXINE SODIUM 88 UG/1
88 TABLET ORAL EVERY MORNING
COMMUNITY
End: 2019-11-06 | Stop reason: SDUPTHER

## 2017-09-02 RX ORDER — ACETAMINOPHEN 325 MG/1
650 TABLET ORAL EVERY 4 HOURS PRN
COMMUNITY
End: 2018-05-29

## 2017-09-02 RX ORDER — ZOLPIDEM TARTRATE 5 MG/1
5 TABLET ORAL ONCE
Status: DISCONTINUED | OUTPATIENT
Start: 2017-09-02 | End: 2017-09-03 | Stop reason: HOSPADM

## 2017-09-02 RX ORDER — SODIUM CHLORIDE 0.9 % (FLUSH) 0.9 %
10 SYRINGE (ML) INJECTION AS NEEDED
Status: DISCONTINUED | OUTPATIENT
Start: 2017-09-02 | End: 2017-09-03 | Stop reason: HOSPADM

## 2017-09-02 RX ORDER — FUROSEMIDE 40 MG/1
TABLET ORAL
COMMUNITY
Start: 2017-02-24 | End: 2017-11-03

## 2017-09-02 RX ADMIN — PANTOPRAZOLE SODIUM 20 MG: 20 TABLET, DELAYED RELEASE ORAL at 19:28

## 2017-09-02 RX ADMIN — ALUMINUM HYDROXIDE, MAGNESIUM HYDROXIDE, AND DIMETHICONE 15 ML: 400; 400; 40 SUSPENSION ORAL at 19:28

## 2017-09-02 RX ADMIN — ASPIRIN 325 MG: 325 TABLET ORAL at 16:26

## 2017-09-02 RX ADMIN — METFORMIN HYDROCHLORIDE 850 MG: 850 TABLET ORAL at 19:28

## 2017-09-02 RX ADMIN — LOSARTAN POTASSIUM 50 MG: 50 TABLET, FILM COATED ORAL at 20:21

## 2017-09-02 RX ADMIN — FUROSEMIDE 40 MG: 40 TABLET ORAL at 19:28

## 2017-09-02 NOTE — ED PROVIDER NOTES
EMERGENCY DEPARTMENT ENCOUNTER    CHIEF COMPLAINT  Chief Complaint: chest pressure   History given by: patient  History limited by: nothing  Room Number: 21/21  PMD: Provider Not In System  Dr. Oneyda Quesada     HPI:  Pt is a 76 y.o. male who presents complaining of dull, lower sternal chest pressure that has been intermittent for 2 days, not associated with cough. The discomfort radiates to his back, and he thinks it is worse when he lies flat. Pt also complains of exertional SOA, productive cough with yellow sputum for 1 month, increased urinary frequency, increased BM, and leg swelling (left worse than right - ongoing for years). He denies dysuria or blood in stool. Pt has an appointment to establish care with Dr. Alarcon later this month.  Pt has a h/o diabetes, high cholesterol, COPD, neuropathy, hypertension, and acid reflux.  Pt is a former smoker. Pt's father passed away from MI in his 70's.     Duration:  2 day  Timing: intermittent   Location: lower sternal chest   Radiation: does not radiate   Quality: dull pressure   Intensity/Severity: moderate   Progression: unchanged   Associated Symptoms: SOA, cough, urinary frequency, increased frequency of BM, leg swelling  Aggravating Factors: chest pain worsened by lying flat and SOA worsened by exertion  Alleviating Factors: none specified   Previous Episodes: none specified   Treatment before arrival: none specified     PAST MEDICAL HISTORY  Active Ambulatory Problems     Diagnosis Date Noted   • OA (osteoarthritis) of hip 02/23/2017   • KINDRA treated with BiPAP 16/14 06/11/2017     Resolved Ambulatory Problems     Diagnosis Date Noted   • No Resolved Ambulatory Problems     Past Medical History:   Diagnosis Date   • Acid reflux    • Arthritis    • COPD (chronic obstructive pulmonary disease)    • Diabetes mellitus    • Disease of thyroid gland    • High cholesterol    • Hypertension    • Neuropathy    • Obesity, Class III, BMI 40-49.9 (morbid obesity)    • Poor  circulation of extremity    • Sleep apnea    • Vitamin D deficiency        PAST SURGICAL HISTORY  Past Surgical History:   Procedure Laterality Date   • APPENDECTOMY     • CATARACT EXTRACTION W/ INTRAOCULAR LENS IMPLANT Bilateral    • CHOLECYSTECTOMY     • COLONOSCOPY W/ POLYPECTOMY      BENIGN   • INTERSTIM PLACEMENT Left     BLADDER CONTROL   • KNEE ARTHROPLASTY Right    • NOSE SURGERY      REMOVED BLOCKAGE    • ROTATOR CUFF REPAIR Right        FAMILY HISTORY  History reviewed. No pertinent family history.    SOCIAL HISTORY  Social History     Social History   • Marital status:      Spouse name: N/A   • Number of children: N/A   • Years of education: N/A     Occupational History   • Not on file.     Social History Main Topics   • Smoking status: Former Smoker     Packs/day: 1.50     Years: 40.00     Types: Cigarettes     Quit date: 1997   • Smokeless tobacco: Not on file   • Alcohol use Yes      Comment: HOLIDAYS    • Drug use: Yes     Special: Hydrocodone   • Sexual activity: Defer     Other Topics Concern   • Not on file     Social History Narrative       ALLERGIES  Review of patient's allergies indicates no known allergies.    REVIEW OF SYSTEMS  Review of Systems   Constitutional: Negative for chills and fever.   HENT: Negative for sore throat and trouble swallowing.    Eyes: Negative for visual disturbance.   Respiratory: Positive for cough (productive with yellow sputum) and shortness of breath (exertional).    Cardiovascular: Positive for chest pain (dull pressure) and leg swelling (left worse than right).   Gastrointestinal: Negative for abdominal pain, blood in stool, diarrhea and vomiting.        Increased frequency of BM    Endocrine: Negative.    Genitourinary: Positive for frequency. Negative for decreased urine volume and dysuria.   Musculoskeletal: Positive for back pain. Negative for neck pain.   Skin: Negative for rash.   Allergic/Immunologic: Negative.    Neurological: Negative for weakness  and numbness.   Hematological: Negative.    Psychiatric/Behavioral: Negative.    All other systems reviewed and are negative.      PHYSICAL EXAM  ED Triage Vitals   Temp Heart Rate Resp BP SpO2   09/02/17 1332 09/02/17 1332 09/02/17 1332 09/02/17 1354 09/02/17 1332   97.4 °F (36.3 °C) 92 16 161/73 97 %      Temp src Heart Rate Source Patient Position BP Location FiO2 (%)   09/02/17 1332 -- 09/02/17 1354 09/02/17 1354 --   Tympanic  Sitting Right arm        Physical Exam   Constitutional: He is oriented to person, place, and time. He appears distressed.   HENT:   Head: Normocephalic and atraumatic.   Eyes: EOM are normal.   Neck: Normal range of motion.   Cardiovascular: Normal rate, regular rhythm and normal heart sounds.    No murmur heard.  Pulses:       Posterior tibial pulses are 2+ on the right side, and 2+ on the left side.   Good arterial flow.    Pulmonary/Chest: Effort normal. No respiratory distress. He has decreased breath sounds in the right lower field and the left lower field. He has no wheezes.   Abdominal: Soft. Bowel sounds are normal. There is no tenderness. There is no rebound and no guarding.   Musculoskeletal: Normal range of motion. He exhibits edema (1+ in RLE and 2+ LLE).   Neurological: He is alert and oriented to person, place, and time.   Skin: Skin is warm and dry. Erythema: bilateral tibial areas.   Psychiatric: Affect normal.   Nursing note and vitals reviewed.      LAB RESULTS  Lab Results (last 24 hours)     Procedure Component Value Units Date/Time    CBC & Differential [[id number]] Collected:  09/02/17 1412    Specimen:  Blood Updated:  09/02/17 1450    Narrative:       The following orders were created for panel order CBC & Differential.  Procedure                               Abnormality         Status                     ---------                               -----------         ------                     Scan Slide[[id number]]                   Normal              Final result                CBC Auto Differential[[id number]]        Abnormal            Final result                 Please view results for these tests on the individual orders.    Comprehensive Metabolic Panel [[id number]]  (Abnormal) Collected:  09/02/17 1412    Specimen:  Blood Updated:  09/02/17 1451     Glucose 167 (H) mg/dL      BUN 17 mg/dL      Creatinine 0.88 mg/dL      Sodium 139 mmol/L      Potassium 4.3 mmol/L      Chloride 102 mmol/L      CO2 23.4 mmol/L      Calcium 9.7 mg/dL      Total Protein 7.4 g/dL      Albumin 4.40 g/dL      ALT (SGPT) 21 U/L      AST (SGOT) 19 U/L      Alkaline Phosphatase 47 U/L      Total Bilirubin 0.6 mg/dL      eGFR Non African Amer 84 mL/min/1.73      Globulin 3.0 gm/dL      A/G Ratio 1.5 g/dL      BUN/Creatinine Ratio 19.3     Anion Gap 13.6 mmol/L     Narrative:       The MDRD GFR formula is only valid for adults with stable renal function between ages 18 and 70.    Troponin [[id number]]  (Normal) Collected:  09/02/17 1412    Specimen:  Blood Updated:  09/02/17 1451     Troponin T <0.010 ng/mL     Narrative:       Troponin T Reference Ranges:  Less than 0.03 ng/mL:    Negative for AMI  0.03 to 0.09 ng/mL:      Indeterminant for AMI  Greater than 0.09 ng/mL: Positive for AMI    CBC Auto Differential [[id number]]  (Abnormal) Collected:  09/02/17 1412    Specimen:  Blood Updated:  09/02/17 1450     WBC 10.18 10*3/mm3      RBC 3.46 (L) 10*6/mm3      Hemoglobin 12.1 (L) g/dL      Hematocrit 36.9 (L) %      .6 (H) fL      MCH 35.0 (H) pg      MCHC 32.8 g/dL      RDW 17.3 (H) %      RDW-SD 66.2 (H) fl      MPV 11.7 fL      Platelets 226 10*3/mm3      Neutrophil % 71.5 %      Lymphocyte % 18.3 (L) %      Monocyte % 6.5 %      Eosinophil % 2.8 %      Basophil % 0.4 %      Immature Grans % 0.5 %      Neutrophils, Absolute 7.29 10*3/mm3      Lymphocytes, Absolute 1.86 10*3/mm3      Monocytes, Absolute 0.66 10*3/mm3      Eosinophils, Absolute 0.28 10*3/mm3      Basophils, Absolute 0.04 10*3/mm3       Immature Grans, Absolute 0.05 (H) 10*3/mm3      nRBC 0.5 (H) /100 WBC     Scan Slide [[id number]]  (Normal) Collected:  09/02/17 1412    Specimen:  Blood Updated:  09/02/17 1450     RBC Morphology Normal     WBC Morphology Normal     Platelet Morphology Normal          I ordered the above labs and reviewed the results    RADIOLOGY  XR Chest 2 View   Final Result   No focal pulmonary consolidation. COPD change. Follow-up as   clinical indications persist.       This report was finalized on 9/2/2017 3:20 PM by Dr. Dipak Dc MD.               I ordered the above noted radiological studies. Interpreted by radiologist. Reviewed by me in PACS.       PROCEDURES  Procedures  EKG           EKG time: 1345  Rhythm/Rate: SR with rate in 70's   P waves and IN: 1st degree AV block  QRS, axis: nonspecific IVCD  ST and T waves: nonspecific ST findings in lateral leads      Interpreted Contemporaneously by me, independently viewed  ST segments are now flattened compared to prior on 02/14/17      PROGRESS AND CONSULTS  ED Course     1401: Ordered CXR, EKG, and labs for further evaluation.     1542: Discussed unremarkable workup thus far, including unchanged EKG and negative initial troponin. I explained that he has a moderate risk heart score, however, and I recommend admission for further evaluation. Pt agrees with this plan.     1604: Discussed pt's case with Dr. Shelton (cardiology), who agrees to admit pt.     MEDICAL DECISION MAKING  Results were reviewed/discussed with the patient and they were also made aware of online access. Pt also made aware that some labs, such as cultures, will not be resulted during ER visit and follow up with PMD is necessary.     MDM  Number of Diagnoses or Management Options     Amount and/or Complexity of Data Reviewed  Clinical lab tests: ordered and reviewed  Tests in the radiology section of CPT®: ordered and reviewed  Tests in the medicine section of CPT®: ordered and reviewed  (EKG time: 1345  Rhythm/Rate: SR with rate in 70's   P waves and OK: 1st degree AV block  QRS, axis: nonspecific IVCD  ST and T waves: nonspecific ST findings in lateral leads      Interpreted Contemporaneously by me, independently viewed  ST segments are now flattened compared to prior on 02/14/17  )  Discuss the patient with other providers: yes    Patient Progress  Patient progress: stable    HEART Score  History: Slightly suspicious (+0)  ECG: Non specific repolarization disturbance (+1)  Age: Greater than or equal to 65 (+2)  Risk Factors: 3 or more risk factors OR history of atherosclerotic disease (+2)  Troponin: Normal limit or lower (+0)  Total: 5        DIAGNOSIS  Final diagnoses:   Chest pain, unspecified type       DISPOSITION  ADMISSION    Discussed treatment plan and reason for admission with pt/family and admitting physician.  Pt/family voiced understanding of the plan for admission for further testing/treatment as needed.     Latest Documented Vital Signs:  As of 4:16 PM  BP- 167/78 HR- 76 Temp- 97.4 °F (36.3 °C) (Tympanic) O2 sat- 96%    --  Documentation assistance provided by dallas Hernandez for Gloria Chance.  Information recorded by the scribe was done at my direction and has been verified and validated by me.       Amina Hernandez  09/02/17 1616       Gloria Chance MD  09/04/17 1126

## 2017-09-02 NOTE — PLAN OF CARE
Problem: Patient Care Overview (Adult)  Goal: Plan of Care Review  Outcome: Ongoing (interventions implemented as appropriate)    09/02/17 3107   Coping/Psychosocial Response Interventions   Plan Of Care Reviewed With patient;spouse   Patient Care Overview   Progress no change   Outcome Evaluation   Outcome Summary/Follow up Plan VSS, new admit to floor, will continue to monitor.

## 2017-09-03 VITALS
BODY MASS INDEX: 40.43 KG/M2 | HEART RATE: 66 BPM | SYSTOLIC BLOOD PRESSURE: 133 MMHG | HEIGHT: 74 IN | RESPIRATION RATE: 18 BRPM | OXYGEN SATURATION: 95 % | DIASTOLIC BLOOD PRESSURE: 66 MMHG | WEIGHT: 315 LBS | TEMPERATURE: 97.7 F

## 2017-09-03 LAB
GLUCOSE BLDC GLUCOMTR-MCNC: 117 MG/DL (ref 70–130)
TROPONIN T SERPL-MCNC: <0.01 NG/ML (ref 0–0.03)
TROPONIN T SERPL-MCNC: <0.01 NG/ML (ref 0–0.03)

## 2017-09-03 PROCEDURE — 93005 ELECTROCARDIOGRAM TRACING: CPT | Performed by: INTERNAL MEDICINE

## 2017-09-03 PROCEDURE — 99235 HOSP IP/OBS SAME DATE MOD 70: CPT | Performed by: INTERNAL MEDICINE

## 2017-09-03 PROCEDURE — G0378 HOSPITAL OBSERVATION PER HR: HCPCS

## 2017-09-03 PROCEDURE — 84484 ASSAY OF TROPONIN QUANT: CPT | Performed by: INTERNAL MEDICINE

## 2017-09-03 PROCEDURE — 93010 ELECTROCARDIOGRAM REPORT: CPT | Performed by: INTERNAL MEDICINE

## 2017-09-03 PROCEDURE — 82962 GLUCOSE BLOOD TEST: CPT

## 2017-09-03 RX ORDER — PANTOPRAZOLE SODIUM 40 MG/1
40 TABLET, DELAYED RELEASE ORAL DAILY
Qty: 30 TABLET | Refills: 1 | Status: SHIPPED | OUTPATIENT
Start: 2017-09-03 | End: 2017-10-03 | Stop reason: SDUPTHER

## 2017-09-03 NOTE — H&P
Patient Name: Riky Moon  :1940  76 y.o.    Date of Admission: 2017  Date of Consultation:  17  Encounter Provider: Christen Ling RN  Place of Service: [facility] CARDIOLOGY  Referring Provider: Ty Shelton MD  Patient Care Team:  Provider Not In System as PCP - General  Cosmo French MD as Consulting Physician (Sleep Medicine)  Oneyda Quesada Jr., MD as Consulting Physician (Vascular Surgery)      Chief complaint: chest pressure    History of Present Illness:    76-year-old with history of hypertension, COPD, diabetes, morbid obesity, acid reflux who presents to emergency room complaining of a dull intermittent chest pressure for 2 days prior to admission.  He also said he had some phlegm in his throat and makes him short of breath.  He was given Maalox in the emergency room with relief of his chest discomfort.  However with his multiple cardiac risk factors it was felt it by the ER physician that he needed to be admitted and ruled out.  Overnight he has done well no further discomfort and clinically is doing well.  Previous testing:  Echo 2015  Conclusions:  LVH with normal systolic LV function  LV relaxation abnormality  Mild pulmonary hypertension    Past Medical History:   Diagnosis Date   • Acid reflux    • Arthritis     OSTEO   • COPD (chronic obstructive pulmonary disease)    • Diabetes mellitus    • Disease of thyroid gland    • High cholesterol    • Hypertension    • Neuropathy     BOTH FEET   • Obesity, Class III, BMI 40-49.9 (morbid obesity)    • Poor circulation of extremity     LEFT LEG   • Sleep apnea    • Vitamin D deficiency        Past Surgical History:   Procedure Laterality Date   • APPENDECTOMY     • CATARACT EXTRACTION W/ INTRAOCULAR LENS IMPLANT Bilateral    • CHOLECYSTECTOMY     • COLONOSCOPY W/ POLYPECTOMY      BENIGN   • INTERSTIM PLACEMENT Left     BLADDER CONTROL   • KNEE ARTHROPLASTY Right    • NOSE SURGERY       REMOVED BLOCKAGE    • ROTATOR CUFF REPAIR Right          Prior to Admission medications    Medication Sig Start Date End Date Taking? Authorizing Provider   acetaminophen (TYLENOL) 325 MG tablet Take 650 mg by mouth Every 4 (Four) Hours As Needed.   Yes Historical Provider, MD   albuterol (PROVENTIL HFA;VENTOLIN HFA) 108 (90 BASE) MCG/ACT inhaler Inhale 1 puff Every 4 (Four) Hours As Needed for Wheezing.   Yes Historical Provider, MD   aspirin 81 MG tablet Take 81 mg by mouth Every Other Day.   Yes Historical Provider, MD   Cholecalciferol (VITAMIN D) 1000 units tablet Take 1,000 Units by mouth.   Yes Historical Provider, MD   furosemide (LASIX) 40 MG tablet TK 1 T PO QD 2/24/17  Yes Historical Provider, MD   HYDROcodone-acetaminophen (NORCO) 7.5-325 MG per tablet Take 1 tablet by mouth Every 6 (Six) Hours. 8/30/16  Yes Historical Provider, MD   levothyroxine (SYNTHROID, LEVOTHROID) 88 MCG tablet Take 88 mcg by mouth Daily.   Yes Historical Provider, MD   losartan (COZAAR) 50 MG tablet Take 100 mg by mouth.   Yes Historical Provider, MD   metFORMIN (GLUCOPHAGE) 850 MG tablet Take 850 mg by mouth 2 (Two) Times a Day With Meals.   Yes Historical Provider, MD   miconazole (MICOTIN) 2 % powder Apply  topically. 3/21/17  Yes Historical Provider, MD   Multiple Vitamin (MULTIVITAMIN) tablet Take 1 tablet by mouth.   Yes Historical Provider, MD   omeprazole (priLOSEC) 20 MG capsule Take 20 mg by mouth 2 (Two) Times a Day.   Yes Historical Provider, MD   oxybutynin XL (DITROPAN XL) 15 MG 24 hr tablet Take 15 mg by mouth Daily.   Yes Historical Provider, MD   pravastatin (PRAVACHOL) 40 MG tablet Take 40 mg by mouth Daily.   Yes Historical Provider, MD   pregabalin (LYRICA) 100 MG capsule Take 75 mg by mouth Daily.   Yes Historical Provider, MD   tiotropium (SPIRIVA) 18 MCG per inhalation capsule Place 18 mcg into inhaler and inhale Daily.   Yes Historical Provider, MD   zolpidem (AMBIEN) 10 MG tablet Take 5 mg by mouth At  "Night As Needed.   Yes Historical Provider, MD   Chlorhexidine Gluconate Cloth 2 % pads Apply  topically. AS DIRECTED:  PM DAY BEFORE SURGERY  AM DAY OF SURGERY    Historical Provider, MD   diclofenac (VOLTAREN) 75 MG EC tablet Take 75 mg by mouth.    Historical Provider, MD   levothyroxine (SYNTHROID, LEVOTHROID) 75 MCG tablet Take 88 mcg by mouth Daily.    Historical Provider, MD   mupirocin (BACTROBAN) 2 % nasal ointment into each nostril 2 (Two) Times a Day. AS DIRECTED:  AM & PM DAY BEFORE SURGERY  AM DAY OF SURGERY    Historical Provider, MD   traMADol (ULTRAM) 50 MG tablet Take 50 mg by mouth Every 6 (Six) Hours As Needed for moderate pain (4-6).    Historical Provider, MD       No Known Allergies    Social History     Social History   • Marital status:      Spouse name: N/A   • Number of children: N/A   • Years of education: N/A     Social History Main Topics   • Smoking status: Former Smoker     Packs/day: 1.50     Years: 40.00     Types: Cigarettes     Quit date: 1997   • Smokeless tobacco: None   • Alcohol use Yes      Comment: HOLIDAYS    • Drug use: Yes     Special: Hydrocodone      Comment: PRN use for pain   • Sexual activity: Defer     Other Topics Concern   • None     Social History Narrative       History reviewed. No pertinent family history.    REVIEW OF SYSTEMS:   All systems reviewed.  Pertinent positives identified in HPI.  All other systems are negative.      Objective:     Vitals:    09/02/17 1656 09/02/17 1928 09/03/17 0001 09/03/17 0710   BP:  130/82 122/49 123/72   BP Location:   Right arm Right arm   Patient Position:   Lying Lying   Pulse:  70 65 75   Resp:   18 18   Temp:   97.8 °F (36.6 °C) 97.9 °F (36.6 °C)   TempSrc:   Oral Oral   SpO2:    95%   Weight: (!) 322 lb (146 kg)      Height: 74\" (188 cm)        Body mass index is 41.34 kg/(m^2).    General Appearance:    Alert, cooperative, in no acute distress   Head:    Normocephalic, without obvious abnormality, atraumatic "   Eyes:            Lids and lashes normal, conjunctivae and sclerae normal, no   icterus, no pallor, corneas clear, PERRLA   Ears:    Ears appear intact with no abnormalities noted   Throat:   No oral lesions, no thrush, oral mucosa moist   Neck:   No adenopathy, supple, trachea midline, no thyromegaly, no   carotid bruit, no JVD   Back:     No kyphosis present, no scoliosis present, no skin lesions, erythema or scars, no tenderness to percussion or palpation, range of motion normal   Lungs:     Clear to auscultation,respirations regular, even and unlabored    Heart:    Regular rhythm and normal rate, normal S1 and S2, no murmur, no gallop, no rub, no click   Chest Wall:    No abnormalities observed   Abdomen:     obese   Extremities:   Left lower extremity with 1+ edema chronic dermatologic changes from stasis dermatitis    Pulses:   Pulses palpable and equal bilaterally. Normal radial, carotid, femoral, dorsalis pedis and posterior tibial pulses bilaterally. Normal abdominal aorta   Skin:  Psychiatric:   No bleeding, bruising or rash    Alert and oriented x 3, normal mood and affect   Lab Review:       Results from last 7 days  Lab Units 09/02/17  1412   SODIUM mmol/L 139   POTASSIUM mmol/L 4.3   CHLORIDE mmol/L 102   CO2 mmol/L 23.4   BUN mg/dL 17   CREATININE mg/dL 0.88   CALCIUM mg/dL 9.7   BILIRUBIN mg/dL 0.6   ALK PHOS U/L 47   ALT (SGPT) U/L 21   AST (SGOT) U/L 19   GLUCOSE mg/dL 167*       Results from last 7 days  Lab Units 09/03/17  0333 09/02/17  2349 09/02/17  1412   TROPONIN T ng/mL <0.010 <0.010 <0.010       Results from last 7 days  Lab Units 09/02/17  1412   WBC 10*3/mm3 10.18   HEMOGLOBIN g/dL 12.1*   HEMATOCRIT % 36.9*   PLATELETS 10*3/mm3 226                                I personally viewed and interpreted the patient's EKG/Telemetry data.        Assessment and Plan:       1.  Atypical chest discomfort consistent with gastroesophageal reflux disease.  EKG shows nonspecific findings and did not  change overnight his troponins are negative.  In light of that I would place him on a PPI and see how her symptoms improved.  He may ultimately need a scope however I would refer him back to his primary care doctor to set that up for GI.  I'm a set him up for an outpatient PET study next week to rule out ischemia.  Patient to be discharged home.    Christen Ling RN  09/03/17  9:45 AM  Ty Shelton MD  [facility]  9/3/2017 2:02 PM

## 2017-09-03 NOTE — PLAN OF CARE
Problem: Patient Care Overview (Adult)  Goal: Plan of Care Review  Outcome: Ongoing (interventions implemented as appropriate)    09/03/17 0510   Coping/Psychosocial Response Interventions   Plan Of Care Reviewed With patient   Patient Care Overview   Progress no change   Outcome Evaluation   Outcome Summary/Follow up Plan No acute changes overnight. Pt denies pain or discomfort. Pt changed to NPO at midnight. Plan is for cardiology to see in the AM. VSS; will continue to monitor.        Goal: Adult Individualization and Mutuality  Outcome: Ongoing (interventions implemented as appropriate)  Goal: Discharge Needs Assessment  Outcome: Ongoing (interventions implemented as appropriate)    Problem: COPD, Chronic Bronchitis/Emphysema (Adult)  Goal: Signs and Symptoms of Listed Potential Problems Will be Absent or Manageable (COPD, Chronic Bronchitis/Emphysema)  Outcome: Ongoing (interventions implemented as appropriate)

## 2017-09-05 DIAGNOSIS — R07.2 PRECORDIAL PAIN: Primary | ICD-10-CM

## 2017-09-11 ENCOUNTER — HOSPITAL ENCOUNTER (OUTPATIENT)
Dept: CARDIOLOGY | Facility: HOSPITAL | Age: 77
Discharge: HOME | End: 2017-09-11
Attending: INTERNAL MEDICINE | Admitting: INTERNAL MEDICINE

## 2017-09-11 DIAGNOSIS — R07.2 PRECORDIAL PAIN: ICD-10-CM

## 2017-09-11 LAB
BH CV NUCLEAR PRIOR STUDY: 3
BH CV STRESS BP STAGE 1: NORMAL
BH CV STRESS BP STAGE 2: NORMAL
BH CV STRESS BP STAGE 3: NORMAL
BH CV STRESS COMMENTS STAGE 1: NORMAL
BH CV STRESS COMMENTS STAGE 2: NORMAL
BH CV STRESS DOSE REGADENOSON STAGE 1: 0.4
BH CV STRESS DURATION MIN STAGE 1: 0
BH CV STRESS DURATION SEC STAGE 1: 15
BH CV STRESS HR STAGE 1: 94
BH CV STRESS PROTOCOL 1: NORMAL
BH CV STRESS RECOVERY BP: NORMAL MMHG
BH CV STRESS RECOVERY HR: 83 BPM
BH CV STRESS STAGE 1: 1
LV EF NUC BP: 71 %
MAXIMAL PREDICTED HEART RATE: 143 BPM
PERCENT MAX PREDICTED HR: 65.73 %
STRESS BASELINE BP: NORMAL MMHG
STRESS BASELINE HR: 77 BPM
STRESS PERCENT HR: 77 %
STRESS POST EXERCISE DUR SEC: 15 SEC
STRESS POST PEAK BP: NORMAL MMHG
STRESS POST PEAK HR: 94 BPM
STRESS TARGET HR: 122 BPM

## 2017-09-11 PROCEDURE — 25010000002 AMINOPHYLLINE PER 250 MG: Performed by: INTERNAL MEDICINE

## 2017-09-11 PROCEDURE — A9555 RB82 RUBIDIUM: HCPCS | Performed by: INTERNAL MEDICINE

## 2017-09-11 PROCEDURE — 78492 MYOCRD IMG PET MLT RST&STRS: CPT | Performed by: INTERNAL MEDICINE

## 2017-09-11 PROCEDURE — 93017 CV STRESS TEST TRACING ONLY: CPT

## 2017-09-11 PROCEDURE — 93016 CV STRESS TEST SUPVJ ONLY: CPT | Performed by: INTERNAL MEDICINE

## 2017-09-11 PROCEDURE — 25010000002 REGADENOSON 0.4 MG/5ML SOLUTION: Performed by: INTERNAL MEDICINE

## 2017-09-11 PROCEDURE — 0 RUBIDIUM CHLORIDE: Performed by: INTERNAL MEDICINE

## 2017-09-11 PROCEDURE — 78492 MYOCRD IMG PET MLT RST&STRS: CPT

## 2017-09-11 PROCEDURE — 93018 CV STRESS TEST I&R ONLY: CPT | Performed by: INTERNAL MEDICINE

## 2017-09-11 RX ORDER — AMINOPHYLLINE DIHYDRATE 25 MG/ML
125 INJECTION, SOLUTION INTRAVENOUS ONCE
Status: COMPLETED | OUTPATIENT
Start: 2017-09-11 | End: 2017-09-11

## 2017-09-11 RX ADMIN — RUBIDIUM CHLORIDE RB-82 1 DOSE: 150 INJECTION, SOLUTION INTRAVENOUS at 09:30

## 2017-09-11 RX ADMIN — REGADENOSON 0.4 MG: 0.08 INJECTION, SOLUTION INTRAVENOUS at 09:30

## 2017-09-11 RX ADMIN — AMINOPHYLLINE 125 MG: 25 INJECTION, SOLUTION INTRAVENOUS at 09:35

## 2017-09-11 RX ADMIN — RUBIDIUM CHLORIDE RB-82 1 DOSE: 150 INJECTION, SOLUTION INTRAVENOUS at 09:20

## 2017-09-12 ENCOUNTER — TELEPHONE (OUTPATIENT)
Dept: CARDIOLOGY | Facility: CLINIC | Age: 77
End: 2017-09-12

## 2017-09-12 NOTE — TELEPHONE ENCOUNTER
09/12/17  Riky Moon  1940    Home Phone [phone]     Mr. Moon calls for results of stress PET performed on 9/11/17.    Nataliya ELIZABETH RN

## 2017-09-14 ENCOUNTER — HOSPITAL ENCOUNTER (OUTPATIENT)
Dept: SLEEP MEDICINE | Facility: HOSPITAL | Age: 77
Discharge: HOME | End: 2017-09-14
Admitting: INTERNAL MEDICINE

## 2017-09-14 PROCEDURE — 99213 OFFICE O/P EST LOW 20 MIN: CPT | Performed by: INTERNAL MEDICINE

## 2017-09-14 PROCEDURE — G0463 HOSPITAL OUTPT CLINIC VISIT: HCPCS

## 2017-09-16 NOTE — PROGRESS NOTES
[facility] Up [facility] Note       Patient Care Team:  Josue Yung MD as PCP - General (Internal Medicine)  Cosmo French MD as Consulting Physician (Sleep Medicine)    Chief Complaint:  KINDRA     Interval History:   The patient was last seen by me in June of this year.  The patient obtained a new auto BiPAP device.  She states he is doing better with the new device.  He does have a dry mouth.  He goes to bed 11:30 PM and awakens at 8 AM.  [place] Sleepiness Scale is normal at 4.    Review of Systems:  Recorded on the Sleep Questionnaire.  Unremarkable except for BLANCHARD and DARLYN.    Social History:  He does not smoke cigarettes.  No alcohol.  2 caffeinated beverages a day.    Allergies:  No known medical allergies.     Medication Review:  His list was reviewed.      Vital Signs:  Height 73 inches and weight 320 and he is morbidly obese with a body mass index of 42-43.    Physical Exam:    Constitutional:  Well developed white male and appears in no apparent distress.  Awake & oriented times 3.  Normal mood with normal recent and remote memory and normal judgement.  Eyes:  Conjunctivae normal.  Oropharynx:  moist mucous membranes without exudate and a large tongue and normal uvula that is broad-based and class III MP airway.      Results Review:  DME is [facility] and he uses a fullface mask.  Downloads between June 28 and September 13, 2017 compliances 100% and average usage is 7 hours and 53 minutes and average AHI is normal without leak and average auto BiPAP pressures: IPAP 15.9 and EPAP 12.4.  Auto BiPAP settings: Max IPAP 18 and minimum EPAP 12 and max pressure support 6 and minimal pressure support 2.       Impression:   Obstructive sleep apnea adequately treated with auto titrating BiPAP with good compliance and usage and no complaints of hypersomnolence.      Plan:  Good sleep hygiene measures should be maintained.  Weight loss would be beneficial in this patient who is obese by BMI.  The  patient is benefiting from the treatment being provided.     The patient will continue auto BiPAP.    The patient will call for any problems and will follow up in one year.      Cosmo French MD  09/16/17  11:20 AM

## 2017-10-03 ENCOUNTER — OFFICE VISIT (OUTPATIENT)
Dept: CARDIOLOGY | Facility: CLINIC | Age: 77
End: 2017-10-03

## 2017-10-03 VITALS
BODY MASS INDEX: 39.53 KG/M2 | DIASTOLIC BLOOD PRESSURE: 70 MMHG | SYSTOLIC BLOOD PRESSURE: 142 MMHG | WEIGHT: 308 LBS | HEART RATE: 91 BPM | OXYGEN SATURATION: 98 % | HEIGHT: 74 IN

## 2017-10-03 DIAGNOSIS — R07.89 OTHER CHEST PAIN: Primary | ICD-10-CM

## 2017-10-03 PROCEDURE — 99213 OFFICE O/P EST LOW 20 MIN: CPT | Performed by: INTERNAL MEDICINE

## 2017-10-03 PROCEDURE — 93000 ELECTROCARDIOGRAM COMPLETE: CPT | Performed by: INTERNAL MEDICINE

## 2017-10-03 RX ORDER — DULOXETIN HYDROCHLORIDE 60 MG/1
60 CAPSULE, DELAYED RELEASE ORAL EVERY MORNING
COMMUNITY
End: 2019-11-06 | Stop reason: SDUPTHER

## 2017-10-03 RX ORDER — PANTOPRAZOLE SODIUM 40 MG/1
40 TABLET, DELAYED RELEASE ORAL DAILY
Qty: 30 TABLET | Refills: 1 | Status: SHIPPED | OUTPATIENT
Start: 2017-10-03 | End: 2017-11-03

## 2017-10-03 NOTE — PROGRESS NOTES
Subjective:     Encounter Date:10/03/2017      Patient ID: Riky Moon is a 76 y.o. male.    Chief Complaint:  Chest Pain    This is a chronic problem. The current episode started more than 1 month ago. The problem occurs intermittently. The problem has been rapidly improving. The pain is present in the substernal region. Associated symptoms include malaise/fatigue and shortness of breath.       76-year-old gentleman who presents today for reevaluation.  Patient was recently hospitalized with chest discomforts.  He underwent a stress test on 9/5/17 that showed no evidence of ischemia and a normal ejection fraction.  He was therefore placed on Protonix and presents today for reevaluation.  All in all he is doing better.  He says he still has occasional discomfort usually last a minute or so.  It has definitely improved since being changed to Protonix.    Review of Systems   Constitution: Positive for malaise/fatigue.   Cardiovascular: Positive for chest pain.   Respiratory: Positive for shortness of breath.    Musculoskeletal: Positive for joint pain.   Psychiatric/Behavioral: Positive for depression.         ECG 12 Lead  Date/Time: 10/3/2017 1:07 PM  Performed by: ALESSANDRO RETANA  Authorized by: ALESSANDRO RETANA   Comparison: compared with previous ECG from 9/3/2017  Similar to previous ECG  Rhythm: sinus rhythm  Clinical impression: normal ECG               Objective:     Physical Exam   Constitutional: He is oriented to person, place, and time. He appears well-developed.   HENT:   Head: Normocephalic.   Eyes: Conjunctivae are normal.   Neck: Normal range of motion.   Cardiovascular: Normal rate, regular rhythm and normal heart sounds.    Pulmonary/Chest: Breath sounds normal.   Abdominal: Soft. Bowel sounds are normal.   Musculoskeletal: Normal range of motion. He exhibits no edema.   Neurological: He is alert and oriented to person, place, and time.   Skin: Skin is warm and dry.   Psychiatric: He has a  normal mood and affect. His behavior is normal.   Vitals reviewed.      Lab Review:       Assessment:         No diagnosis found.       Plan:       1.  Chest discomfort.  Highly consistent with a GI etiology.  His EKG showed no significant changes his symptoms have improved with a proton pump inhibitor.  He still having some symptoms and probably needs to be evaluated by a GI physician.  I refilled his Protonix to bridge him to get back to see his primary care physician.  I told to follow-up in one year sooner if he has worsening issues.

## 2017-10-24 ENCOUNTER — TELEPHONE (OUTPATIENT)
Dept: CARDIOLOGY | Facility: CLINIC | Age: 77
End: 2017-10-24

## 2017-10-24 NOTE — TELEPHONE ENCOUNTER
Pt called and needs surgery clearance for a Rt total hip being done 11-09-17 by Dr. Fernando.  He said he needs to stop his Aspirin 2 wks prior an currently takes it qod.  You last saw him 10-03-17.  Please advise.    Fax to: [phone]      Thanks,  Niru

## 2017-11-03 ENCOUNTER — HOSPITAL ENCOUNTER (OUTPATIENT)
Dept: GENERAL RADIOLOGY | Facility: HOSPITAL | Age: 77
Discharge: HOME | End: 2017-11-03
Admitting: ORTHOPAEDIC SURGERY

## 2017-11-03 ENCOUNTER — HOSPITAL ENCOUNTER (OUTPATIENT)
Dept: GENERAL RADIOLOGY | Facility: HOSPITAL | Age: 77
Discharge: HOME | End: 2017-11-03

## 2017-11-03 ENCOUNTER — APPOINTMENT (OUTPATIENT)
Dept: PREADMISSION TESTING | Facility: HOSPITAL | Age: 77
End: 2017-11-03

## 2017-11-03 VITALS
HEIGHT: 74 IN | BODY MASS INDEX: 39.14 KG/M2 | WEIGHT: 305 LBS | DIASTOLIC BLOOD PRESSURE: 80 MMHG | HEART RATE: 94 BPM | SYSTOLIC BLOOD PRESSURE: 150 MMHG | TEMPERATURE: 97.8 F | OXYGEN SATURATION: 95 % | RESPIRATION RATE: 20 BRPM

## 2017-11-03 LAB
ABO GROUP BLD: NORMAL
ALBUMIN SERPL-MCNC: 4 G/DL (ref 3.5–5.2)
ALBUMIN/GLOB SERPL: 1.3 G/DL
ALP SERPL-CCNC: 45 U/L (ref 39–117)
ALT SERPL W P-5'-P-CCNC: 20 U/L (ref 1–41)
ANION GAP SERPL CALCULATED.3IONS-SCNC: 12.3 MMOL/L
AST SERPL-CCNC: 15 U/L (ref 1–40)
BILIRUB SERPL-MCNC: 0.5 MG/DL (ref 0.1–1.2)
BILIRUB UR QL STRIP: NEGATIVE
BLD GP AB SCN SERPL QL: NEGATIVE
BUN BLD-MCNC: 21 MG/DL (ref 8–23)
BUN/CREAT SERPL: 24.7 (ref 7–25)
CALCIUM SPEC-SCNC: 8.9 MG/DL (ref 8.6–10.5)
CHLORIDE SERPL-SCNC: 105 MMOL/L (ref 98–107)
CLARITY UR: CLEAR
CO2 SERPL-SCNC: 22.7 MMOL/L (ref 22–29)
COLOR UR: YELLOW
CREAT BLD-MCNC: 0.85 MG/DL (ref 0.76–1.27)
DEPRECATED RDW RBC AUTO: 67.6 FL (ref 37–54)
ERYTHROCYTE [DISTWIDTH] IN BLOOD BY AUTOMATED COUNT: 17.3 % (ref 11.5–14.5)
GFR SERPL CREATININE-BSD FRML MDRD: 87 ML/MIN/1.73
GLOBULIN UR ELPH-MCNC: 3 GM/DL
GLUCOSE BLD-MCNC: 153 MG/DL (ref 65–99)
GLUCOSE UR STRIP-MCNC: NEGATIVE MG/DL
HCT VFR BLD AUTO: 32.9 % (ref 40.4–52.2)
HGB BLD-MCNC: 10.9 G/DL (ref 13.7–17.6)
HGB UR QL STRIP.AUTO: NEGATIVE
INR PPP: 0.97 (ref 0.9–1.1)
KETONES UR QL STRIP: NEGATIVE
LEUKOCYTE ESTERASE UR QL STRIP.AUTO: NEGATIVE
MCH RBC QN AUTO: 35.7 PG (ref 27–32.7)
MCHC RBC AUTO-ENTMCNC: 33.1 G/DL (ref 32.6–36.4)
MCV RBC AUTO: 107.9 FL (ref 79.8–96.2)
NITRITE UR QL STRIP: NEGATIVE
PH UR STRIP.AUTO: 5.5 [PH] (ref 5–8)
PLATELET # BLD AUTO: 285 10*3/MM3 (ref 140–500)
PMV BLD AUTO: 10.8 FL (ref 6–12)
POTASSIUM BLD-SCNC: 4.1 MMOL/L (ref 3.5–5.2)
PROT SERPL-MCNC: 7 G/DL (ref 6–8.5)
PROT UR QL STRIP: NEGATIVE
PROTHROMBIN TIME: 12.5 SECONDS (ref 11.7–14.2)
RBC # BLD AUTO: 3.05 10*6/MM3 (ref 4.6–6)
RH BLD: NEGATIVE
SODIUM BLD-SCNC: 140 MMOL/L (ref 136–145)
SP GR UR STRIP: 1.02 (ref 1–1.03)
UROBILINOGEN UR QL STRIP: NORMAL
WBC NRBC COR # BLD: 9.24 10*3/MM3 (ref 4.5–10.7)

## 2017-11-03 PROCEDURE — 86900 BLOOD TYPING SEROLOGIC ABO: CPT | Performed by: ORTHOPAEDIC SURGERY

## 2017-11-03 PROCEDURE — 85027 COMPLETE CBC AUTOMATED: CPT | Performed by: ORTHOPAEDIC SURGERY

## 2017-11-03 PROCEDURE — 85610 PROTHROMBIN TIME: CPT | Performed by: ORTHOPAEDIC SURGERY

## 2017-11-03 PROCEDURE — 71020 HC CHEST PA AND LATERAL: CPT

## 2017-11-03 PROCEDURE — A9270 NON-COVERED ITEM OR SERVICE: HCPCS | Performed by: ORTHOPAEDIC SURGERY

## 2017-11-03 PROCEDURE — 63710000001 MUPIROCIN 2 % OINTMENT 1 G TUBE: Performed by: ORTHOPAEDIC SURGERY

## 2017-11-03 PROCEDURE — 80053 COMPREHEN METABOLIC PANEL: CPT | Performed by: ORTHOPAEDIC SURGERY

## 2017-11-03 PROCEDURE — 86901 BLOOD TYPING SEROLOGIC RH(D): CPT | Performed by: ORTHOPAEDIC SURGERY

## 2017-11-03 PROCEDURE — 81003 URINALYSIS AUTO W/O SCOPE: CPT | Performed by: ORTHOPAEDIC SURGERY

## 2017-11-03 PROCEDURE — 86850 RBC ANTIBODY SCREEN: CPT | Performed by: ORTHOPAEDIC SURGERY

## 2017-11-03 PROCEDURE — 36415 COLL VENOUS BLD VENIPUNCTURE: CPT

## 2017-11-03 PROCEDURE — 73502 X-RAY EXAM HIP UNI 2-3 VIEWS: CPT

## 2017-11-03 RX ORDER — FUROSEMIDE 40 MG/1
40 TABLET ORAL AS NEEDED
COMMUNITY
End: 2018-05-29

## 2017-11-03 RX ORDER — PANTOPRAZOLE SODIUM 40 MG/1
TABLET, DELAYED RELEASE ORAL
COMMUNITY
End: 2019-02-12 | Stop reason: ALTCHOICE

## 2017-11-03 RX ORDER — LANOLIN ALCOHOL/MO/W.PET/CERES
1000 CREAM (GRAM) TOPICAL EVERY MORNING
COMMUNITY

## 2017-11-03 ASSESSMENT — HOOS JR
HOOS JR SCORE: 55.985
HOOS JR SCORE: 11

## 2017-11-03 NOTE — DISCHARGE INSTRUCTIONS
Take the following medications the morning of surgery with a small sip of water:  Losartan, pantoprazole, inhalers        General Instructions:  • Do not eat solid food after midnight the night before surgery.  • You may drink clear liquids day of surgery but must stop at least one hour before your hospital arrival time.  • It is beneficial for you to have a clear drink that contains carbohydrates the day of surgery.  We suggest a 12 to 20 ounce bottle of Gatorade or Powerade for non-diabetic patients or a 12 to 20 ounce bottle of G2 or Powerade Zero for diabetic patients. (Pediatric patients, are not advised to drink a 12 to 20 ounce carbohydrate drink)    Clear liquids are liquids you can see through.  Nothing red in color.     Plain water                               Sports drinks  Sodas                                   Gelatin (Jell-O)  Fruit juices without pulp such as white grape juice and apple juice  Popsicles that contain no fruit or yogurt  Tea or coffee (no cream or milk added)  Gatorade / Powerade  G2 / Powerade Zero    • Infants may have breast milk up to four hours before surgery.  • Infants drinking formula may drink formula up to six hours before surgery.   • Patients who avoid smoking, chewing tobacco and alcohol for 4 weeks prior to surgery have a reduced risk of post-operative complications.  Quit smoking as many days before surgery as you can.  • Do not smoke, use chewing tobacco or drink alcohol the day of surgery.   • If applicable bring your C-PAP/ BI-PAP machine.  • Bring any papers given to you in the doctor’s office.  • Wear clean comfortable clothes and socks.  • Do not wear contact lenses or make-up.  Bring a case for your glasses.   • Bring crutches or walker if applicable.  • Remove all piercings.  Leave jewelry and any other valuables at home.  • The Pre-Admission Testing nurse will instruct you to bring medications if unable to obtain an accurate list in Pre-Admission Testing.         If you were given a blood bank ID arm band remember to bring it with you the day of surgery.    Preventing a Surgical Site Infection:  • For 2 to 3 days before surgery, avoid shaving with a razor because the razor can irritate skin and make it easier to develop an infection.  • The night prior to surgery sleep in a clean bed with clean clothing.  Do not allow pets to sleep with you.  • Shower on the morning of surgery using a fresh bar of anti-bacterial soap (such as Dial) and clean washcloth.  Dry with a clean towel and dress in clean clothing.  • Ask your surgeon if you will be receiving antibiotics prior to surgery.  • Make sure you, your family, and all healthcare providers clean their hands with soap and water or an alcohol based hand  before caring for you or your wound.    Day of surgery:  Upon arrival, a Pre-op nurse and Anesthesiologist will review your health history, obtain vital signs, and answer questions you may have.  The only belongings needed at this time will be your home medications and if applicable your C-PAP/BI-PAP machine.  If you are staying overnight your family can leave the rest of your belongings in the car and bring them to your room later.  A Pre-op nurse will start an IV and you may receive medication in preparation for surgery, including something to help you relax.  Your family will be able to see you in the Pre-op area.  While you are in surgery your family should notify the waiting room  if they leave the waiting room area and provide a contact phone number.    Please be aware that surgery does come with discomfort.  We want to make every effort to control your discomfort so please discuss any uncontrolled symptoms with your nurse.   Your doctor will most likely have prescribed pain medications.      If you are going home after surgery you will receive individualized written care instructions before being discharged.  A responsible adult must drive you to  and from the hospital on the day of your surgery and stay with you for 24 hours.    If you are staying overnight following surgery, you will be transported to your hospital room following the recovery period.  [facility] has all private rooms.    If you have any questions please call Pre-Admission Testing at [phone].  Deductibles and co-payments are collected on the day of service. Please be prepared to pay the required co-pay, deductible or deposit on the day of service as defined by your plan.    2% CHLORAHEXIDINE GLUCONATE* CLOTH  Preparing or “prepping” skin before surgery can reduce the risk of infection at the surgical site. To make the process easier, [facility] has chosen disposable cloths moistened with a rinse-free, 2% Chlorhexidine Gluconate (CHG) antiseptic solution. The steps below outline the prepping process and should be carefully followed.        Use the prep cloth on the area that is circled in the diagram             Directions Night before Surgery  1) Shower using a fresh bar of anti-bacterial soap (such as Dial) and clean washcloth.  Use a clean towel to completely dry your skin.  2) Do not use any lotions, oils or creams on your skin.  3) Open the package and remove 1 cloth, wipe your skin for 30 seconds in a circular motion.  Allow to dry for 3 minutes.  4) Repeat #3 with second cloth.  5) Do not touch your eyes, ears, or mouth with the prep cloth.  6) Allow the wet prep solution to air dry.  7) Discard the prep cloth and wash your hands with soap and water.   8) Dress in clean bed clothes and sleep on fresh clean bed sheets.   9) You may experience some temporary itching after the prep.    Directions Day of Surgery  1) Repeat steps 1,2,3,4,5,6,7, and 9.   2) Dress in clean clothes before coming to the hospital.    BACTROBAN NASAL OINTMENT  There are many germs normally in your nose. Bactroban is an ointment that will help reduce these germs. Please follow these  instructions for Bactroban use:    ____Two days before surgery in the evening Date________    ____The day before surgery in the morning  Date________    ____The day before surgery in the evening              Date________    ____The day of surgery in the morning    Date________    **Squirt ½ package of Bactroban Ointment onto a cotton applicator and apply to inside of 1st nostril.  Squirt the remaining Bactroban and apply to the inside of the other nostril.    PERIDEX- ORAL:  Use only if your surgeon has ordered  Use the night before and morning of surgery - Swish, gargle, and spit - do not swallow.

## 2017-11-09 ENCOUNTER — HOSPITAL ENCOUNTER (INPATIENT)
Facility: HOSPITAL | Age: 77
LOS: 3 days | Discharge: NURSING, REHAB OR LONG-TERM CARE | End: 2017-11-12
Attending: ORTHOPAEDIC SURGERY | Admitting: ORTHOPAEDIC SURGERY

## 2017-11-09 ENCOUNTER — ANESTHESIA (OUTPATIENT)
Dept: PERIOP | Facility: HOSPITAL | Age: 77
End: 2017-11-09

## 2017-11-09 ENCOUNTER — APPOINTMENT (OUTPATIENT)
Dept: GENERAL RADIOLOGY | Facility: HOSPITAL | Age: 77
End: 2017-11-09

## 2017-11-09 ENCOUNTER — ANESTHESIA EVENT (OUTPATIENT)
Dept: PERIOP | Facility: HOSPITAL | Age: 77
End: 2017-11-09

## 2017-11-09 DIAGNOSIS — R26.2 DIFFICULTY WALKING: Primary | ICD-10-CM

## 2017-11-09 LAB — GLUCOSE BLDC GLUCOMTR-MCNC: 125 MG/DL (ref 70–130)

## 2017-11-09 PROCEDURE — 25010000002 HYDROMORPHONE PER 4 MG: Performed by: NURSE ANESTHETIST, CERTIFIED REGISTERED

## 2017-11-09 PROCEDURE — 25010000002 EPINEPHRINE PER 0.1 MG: Performed by: ORTHOPAEDIC SURGERY

## 2017-11-09 PROCEDURE — 25010000002 ROPIVACAINE PER 1 MG: Performed by: ANESTHESIOLOGY

## 2017-11-09 PROCEDURE — 25010000002 ROPIVACAINE PER 1 MG: Performed by: ORTHOPAEDIC SURGERY

## 2017-11-09 PROCEDURE — 73501 X-RAY EXAM HIP UNI 1 VIEW: CPT

## 2017-11-09 PROCEDURE — 82962 GLUCOSE BLOOD TEST: CPT

## 2017-11-09 PROCEDURE — 94799 UNLISTED PULMONARY SVC/PX: CPT

## 2017-11-09 PROCEDURE — 25010000002 ONDANSETRON PER 1 MG: Performed by: NURSE ANESTHETIST, CERTIFIED REGISTERED

## 2017-11-09 PROCEDURE — 0SR9029 REPLACEMENT OF RIGHT HIP JOINT WITH METAL ON POLYETHYLENE SYNTHETIC SUBSTITUTE, CEMENTED, OPEN APPROACH: ICD-10-PCS | Performed by: ORTHOPAEDIC SURGERY

## 2017-11-09 PROCEDURE — 25010000002 NEOSTIGMINE PER 0.5 MG: Performed by: NURSE ANESTHETIST, CERTIFIED REGISTERED

## 2017-11-09 PROCEDURE — 94660 CPAP INITIATION&MGMT: CPT

## 2017-11-09 PROCEDURE — 25010000002 MORPHINE (PF) 10 MG/ML SOLUTION 1 ML VIAL: Performed by: ORTHOPAEDIC SURGERY

## 2017-11-09 PROCEDURE — 25010000002 PROPOFOL 10 MG/ML EMULSION: Performed by: NURSE ANESTHETIST, CERTIFIED REGISTERED

## 2017-11-09 PROCEDURE — 25010000002 FENTANYL CITRATE (PF) 100 MCG/2ML SOLUTION: Performed by: NURSE ANESTHETIST, CERTIFIED REGISTERED

## 2017-11-09 PROCEDURE — 25010000002 MIDAZOLAM PER 1 MG: Performed by: ANESTHESIOLOGY

## 2017-11-09 PROCEDURE — C1776 JOINT DEVICE (IMPLANTABLE): HCPCS | Performed by: ORTHOPAEDIC SURGERY

## 2017-11-09 PROCEDURE — 25010000002 KETOROLAC TROMETHAMINE PER 15 MG: Performed by: ORTHOPAEDIC SURGERY

## 2017-11-09 DEVICE — CAP TOTL HIP PRIMARY MEDIUM DEMAND: Type: IMPLANTABLE DEVICE | Site: ACETABULUM | Status: FUNCTIONAL

## 2017-11-09 DEVICE — IMPLANTABLE DEVICE: Type: IMPLANTABLE DEVICE | Site: TROCHANTER | Status: FUNCTIONAL

## 2017-11-09 DEVICE — SHLL 3 SPK 54MM: Type: IMPLANTABLE DEVICE | Site: ACETABULUM | Status: FUNCTIONAL

## 2017-11-09 DEVICE — LINER ACET HIWALL EPOLY PLS3 SZ24 40: Type: IMPLANTABLE DEVICE | Site: ACETABULUM | Status: FUNCTIONAL

## 2017-11-09 RX ORDER — DIAZEPAM 5 MG/1
5 TABLET ORAL 2 TIMES DAILY PRN
Status: DISCONTINUED | OUTPATIENT
Start: 2017-11-09 | End: 2017-11-12 | Stop reason: HOSPADM

## 2017-11-09 RX ORDER — LIDOCAINE HYDROCHLORIDE 20 MG/ML
INJECTION, SOLUTION INFILTRATION; PERINEURAL AS NEEDED
Status: DISCONTINUED | OUTPATIENT
Start: 2017-11-09 | End: 2017-11-09 | Stop reason: SURG

## 2017-11-09 RX ORDER — CLINDAMYCIN PHOSPHATE 900 MG/50ML
900 INJECTION INTRAVENOUS EVERY 8 HOURS
Status: COMPLETED | OUTPATIENT
Start: 2017-11-09 | End: 2017-11-10

## 2017-11-09 RX ORDER — OXYCODONE HYDROCHLORIDE AND ACETAMINOPHEN 5; 325 MG/1; MG/1
2 TABLET ORAL EVERY 4 HOURS PRN
Status: DISCONTINUED | OUTPATIENT
Start: 2017-11-09 | End: 2017-11-12 | Stop reason: HOSPADM

## 2017-11-09 RX ORDER — ACETAMINOPHEN 500 MG
1000 TABLET ORAL ONCE
Status: COMPLETED | OUTPATIENT
Start: 2017-11-09 | End: 2017-11-09

## 2017-11-09 RX ORDER — LEVOTHYROXINE SODIUM 88 UG/1
88 TABLET ORAL
Status: DISCONTINUED | OUTPATIENT
Start: 2017-11-10 | End: 2017-11-12 | Stop reason: HOSPADM

## 2017-11-09 RX ORDER — DIPHENHYDRAMINE HCL 25 MG
50 CAPSULE ORAL EVERY 6 HOURS PRN
Status: DISCONTINUED | OUTPATIENT
Start: 2017-11-09 | End: 2017-11-12 | Stop reason: HOSPADM

## 2017-11-09 RX ORDER — ROPIVACAINE HYDROCHLORIDE 2 MG/ML
INJECTION, SOLUTION EPIDURAL; INFILTRATION; PERINEURAL AS NEEDED
Status: DISCONTINUED | OUTPATIENT
Start: 2017-11-09 | End: 2017-11-09 | Stop reason: SURG

## 2017-11-09 RX ORDER — FUROSEMIDE 40 MG/1
40 TABLET ORAL DAILY
Status: DISCONTINUED | OUTPATIENT
Start: 2017-11-09 | End: 2017-11-12 | Stop reason: HOSPADM

## 2017-11-09 RX ORDER — DULOXETIN HYDROCHLORIDE 60 MG/1
60 CAPSULE, DELAYED RELEASE ORAL EVERY MORNING
Status: DISCONTINUED | OUTPATIENT
Start: 2017-11-10 | End: 2017-11-12 | Stop reason: HOSPADM

## 2017-11-09 RX ORDER — BISACODYL 10 MG
10 SUPPOSITORY, RECTAL RECTAL DAILY PRN
Status: DISCONTINUED | OUTPATIENT
Start: 2017-11-09 | End: 2017-11-12 | Stop reason: HOSPADM

## 2017-11-09 RX ORDER — FENTANYL CITRATE 50 UG/ML
50 INJECTION, SOLUTION INTRAMUSCULAR; INTRAVENOUS
Status: DISCONTINUED | OUTPATIENT
Start: 2017-11-09 | End: 2017-11-09 | Stop reason: HOSPADM

## 2017-11-09 RX ORDER — KETOROLAC TROMETHAMINE 30 MG/ML
15 INJECTION, SOLUTION INTRAMUSCULAR; INTRAVENOUS EVERY 8 HOURS PRN
Status: DISPENSED | OUTPATIENT
Start: 2017-11-09 | End: 2017-11-11

## 2017-11-09 RX ORDER — UREA 10 %
1 LOTION (ML) TOPICAL NIGHTLY PRN
Status: DISCONTINUED | OUTPATIENT
Start: 2017-11-09 | End: 2017-11-12 | Stop reason: HOSPADM

## 2017-11-09 RX ORDER — CELECOXIB 200 MG/1
200 CAPSULE ORAL ONCE
Status: COMPLETED | OUTPATIENT
Start: 2017-11-09 | End: 2017-11-09

## 2017-11-09 RX ORDER — HYDROMORPHONE HYDROCHLORIDE 1 MG/ML
0.5 INJECTION, SOLUTION INTRAMUSCULAR; INTRAVENOUS; SUBCUTANEOUS
Status: DISCONTINUED | OUTPATIENT
Start: 2017-11-09 | End: 2017-11-09 | Stop reason: HOSPADM

## 2017-11-09 RX ORDER — SENNA AND DOCUSATE SODIUM 50; 8.6 MG/1; MG/1
2 TABLET, FILM COATED ORAL 2 TIMES DAILY
Status: DISCONTINUED | OUTPATIENT
Start: 2017-11-09 | End: 2017-11-12 | Stop reason: HOSPADM

## 2017-11-09 RX ORDER — SODIUM CHLORIDE 0.9 % (FLUSH) 0.9 %
1-10 SYRINGE (ML) INJECTION AS NEEDED
Status: DISCONTINUED | OUTPATIENT
Start: 2017-11-09 | End: 2017-11-12 | Stop reason: HOSPADM

## 2017-11-09 RX ORDER — ALBUTEROL SULFATE 2.5 MG/3ML
2.5 SOLUTION RESPIRATORY (INHALATION) ONCE AS NEEDED
Status: DISCONTINUED | OUTPATIENT
Start: 2017-11-09 | End: 2017-11-09 | Stop reason: HOSPADM

## 2017-11-09 RX ORDER — HYDRALAZINE HYDROCHLORIDE 20 MG/ML
5 INJECTION INTRAMUSCULAR; INTRAVENOUS
Status: DISCONTINUED | OUTPATIENT
Start: 2017-11-09 | End: 2017-11-09 | Stop reason: HOSPADM

## 2017-11-09 RX ORDER — MIDAZOLAM HYDROCHLORIDE 1 MG/ML
2 INJECTION INTRAMUSCULAR; INTRAVENOUS
Status: DISCONTINUED | OUTPATIENT
Start: 2017-11-09 | End: 2017-11-09 | Stop reason: HOSPADM

## 2017-11-09 RX ORDER — FLUMAZENIL 0.1 MG/ML
0.2 INJECTION INTRAVENOUS AS NEEDED
Status: DISCONTINUED | OUTPATIENT
Start: 2017-11-09 | End: 2017-11-09 | Stop reason: HOSPADM

## 2017-11-09 RX ORDER — ATORVASTATIN CALCIUM 10 MG/1
10 TABLET, FILM COATED ORAL DAILY
Status: DISCONTINUED | OUTPATIENT
Start: 2017-11-09 | End: 2017-11-12 | Stop reason: HOSPADM

## 2017-11-09 RX ORDER — OXYCODONE HYDROCHLORIDE AND ACETAMINOPHEN 5; 325 MG/1; MG/1
1 TABLET ORAL EVERY 4 HOURS PRN
Status: DISCONTINUED | OUTPATIENT
Start: 2017-11-09 | End: 2017-11-09

## 2017-11-09 RX ORDER — HYDROMORPHONE HCL 110MG/55ML
PATIENT CONTROLLED ANALGESIA SYRINGE INTRAVENOUS AS NEEDED
Status: DISCONTINUED | OUTPATIENT
Start: 2017-11-09 | End: 2017-11-09 | Stop reason: SURG

## 2017-11-09 RX ORDER — ACETAMINOPHEN 325 MG/1
325 TABLET ORAL EVERY 4 HOURS PRN
Status: DISCONTINUED | OUTPATIENT
Start: 2017-11-09 | End: 2017-11-12 | Stop reason: HOSPADM

## 2017-11-09 RX ORDER — PANTOPRAZOLE SODIUM 40 MG/1
40 TABLET, DELAYED RELEASE ORAL EVERY MORNING
Status: DISCONTINUED | OUTPATIENT
Start: 2017-11-10 | End: 2017-11-12 | Stop reason: HOSPADM

## 2017-11-09 RX ORDER — ONDANSETRON 2 MG/ML
INJECTION INTRAMUSCULAR; INTRAVENOUS AS NEEDED
Status: DISCONTINUED | OUTPATIENT
Start: 2017-11-09 | End: 2017-11-09 | Stop reason: SURG

## 2017-11-09 RX ORDER — SODIUM CHLORIDE 450 MG/100ML
100 INJECTION, SOLUTION INTRAVENOUS CONTINUOUS
Status: DISCONTINUED | OUTPATIENT
Start: 2017-11-09 | End: 2017-11-12 | Stop reason: HOSPADM

## 2017-11-09 RX ORDER — MIDAZOLAM HYDROCHLORIDE 1 MG/ML
1 INJECTION INTRAMUSCULAR; INTRAVENOUS
Status: DISCONTINUED | OUTPATIENT
Start: 2017-11-09 | End: 2017-11-09 | Stop reason: HOSPADM

## 2017-11-09 RX ORDER — ONDANSETRON 2 MG/ML
4 INJECTION INTRAMUSCULAR; INTRAVENOUS ONCE AS NEEDED
Status: DISCONTINUED | OUTPATIENT
Start: 2017-11-09 | End: 2017-11-09 | Stop reason: HOSPADM

## 2017-11-09 RX ORDER — OXYCODONE HYDROCHLORIDE AND ACETAMINOPHEN 5; 325 MG/1; MG/1
1 TABLET ORAL EVERY 4 HOURS PRN
Status: DISCONTINUED | OUTPATIENT
Start: 2017-11-09 | End: 2017-11-12 | Stop reason: HOSPADM

## 2017-11-09 RX ORDER — ACETAMINOPHEN 325 MG/1
650 TABLET ORAL ONCE AS NEEDED
Status: DISCONTINUED | OUTPATIENT
Start: 2017-11-09 | End: 2017-11-09 | Stop reason: HOSPADM

## 2017-11-09 RX ORDER — FAMOTIDINE 10 MG/ML
20 INJECTION, SOLUTION INTRAVENOUS ONCE
Status: COMPLETED | OUTPATIENT
Start: 2017-11-09 | End: 2017-11-09

## 2017-11-09 RX ORDER — PROPOFOL 10 MG/ML
VIAL (ML) INTRAVENOUS AS NEEDED
Status: DISCONTINUED | OUTPATIENT
Start: 2017-11-09 | End: 2017-11-09 | Stop reason: SURG

## 2017-11-09 RX ORDER — CEFAZOLIN SODIUM IN 0.9 % NACL 3 G/100 ML
3 INTRAVENOUS SOLUTION, PIGGYBACK (ML) INTRAVENOUS ONCE
Status: COMPLETED | OUTPATIENT
Start: 2017-11-09 | End: 2017-11-09

## 2017-11-09 RX ORDER — ONDANSETRON 4 MG/1
4 TABLET, FILM COATED ORAL EVERY 6 HOURS PRN
Status: DISCONTINUED | OUTPATIENT
Start: 2017-11-09 | End: 2017-11-12 | Stop reason: HOSPADM

## 2017-11-09 RX ORDER — GLYCOPYRROLATE 0.2 MG/ML
INJECTION INTRAMUSCULAR; INTRAVENOUS AS NEEDED
Status: DISCONTINUED | OUTPATIENT
Start: 2017-11-09 | End: 2017-11-09 | Stop reason: SURG

## 2017-11-09 RX ORDER — ACETAMINOPHEN 325 MG/1
650 TABLET ORAL EVERY 4 HOURS PRN
Status: DISCONTINUED | OUTPATIENT
Start: 2017-11-09 | End: 2017-11-12 | Stop reason: HOSPADM

## 2017-11-09 RX ORDER — CLINDAMYCIN PHOSPHATE 900 MG/50ML
900 INJECTION INTRAVENOUS ONCE
Status: DISCONTINUED | OUTPATIENT
Start: 2017-11-09 | End: 2017-11-09

## 2017-11-09 RX ORDER — NALOXONE HCL 0.4 MG/ML
0.4 VIAL (ML) INJECTION
Status: DISCONTINUED | OUTPATIENT
Start: 2017-11-09 | End: 2017-11-12 | Stop reason: HOSPADM

## 2017-11-09 RX ORDER — OXYCODONE HYDROCHLORIDE AND ACETAMINOPHEN 5; 325 MG/1; MG/1
1 TABLET ORAL ONCE AS NEEDED
Status: DISCONTINUED | OUTPATIENT
Start: 2017-11-09 | End: 2017-11-09 | Stop reason: HOSPADM

## 2017-11-09 RX ORDER — ROCURONIUM BROMIDE 10 MG/ML
INJECTION, SOLUTION INTRAVENOUS AS NEEDED
Status: DISCONTINUED | OUTPATIENT
Start: 2017-11-09 | End: 2017-11-09 | Stop reason: SURG

## 2017-11-09 RX ORDER — ONDANSETRON 4 MG/1
4 TABLET, ORALLY DISINTEGRATING ORAL EVERY 6 HOURS PRN
Status: DISCONTINUED | OUTPATIENT
Start: 2017-11-09 | End: 2017-11-12 | Stop reason: HOSPADM

## 2017-11-09 RX ORDER — LOSARTAN POTASSIUM 100 MG/1
100 TABLET ORAL EVERY MORNING
Status: DISCONTINUED | OUTPATIENT
Start: 2017-11-10 | End: 2017-11-12 | Stop reason: HOSPADM

## 2017-11-09 RX ORDER — DOCUSATE SODIUM 100 MG/1
100 CAPSULE, LIQUID FILLED ORAL 2 TIMES DAILY PRN
Status: DISCONTINUED | OUTPATIENT
Start: 2017-11-09 | End: 2017-11-12 | Stop reason: HOSPADM

## 2017-11-09 RX ORDER — MORPHINE SULFATE 2 MG/ML
6 INJECTION, SOLUTION INTRAMUSCULAR; INTRAVENOUS
Status: DISCONTINUED | OUTPATIENT
Start: 2017-11-09 | End: 2017-11-12 | Stop reason: HOSPADM

## 2017-11-09 RX ORDER — SODIUM CHLORIDE, SODIUM LACTATE, POTASSIUM CHLORIDE, CALCIUM CHLORIDE 600; 310; 30; 20 MG/100ML; MG/100ML; MG/100ML; MG/100ML
9 INJECTION, SOLUTION INTRAVENOUS CONTINUOUS
Status: DISCONTINUED | OUTPATIENT
Start: 2017-11-09 | End: 2017-11-12 | Stop reason: HOSPADM

## 2017-11-09 RX ORDER — ASPIRIN 325 MG
325 TABLET, DELAYED RELEASE (ENTERIC COATED) ORAL 2 TIMES DAILY WITH MEALS
Status: DISCONTINUED | OUTPATIENT
Start: 2017-11-09 | End: 2017-11-12 | Stop reason: HOSPADM

## 2017-11-09 RX ORDER — ZOLPIDEM TARTRATE 5 MG/1
10 TABLET ORAL NIGHTLY PRN
Status: DISCONTINUED | OUTPATIENT
Start: 2017-11-09 | End: 2017-11-12 | Stop reason: HOSPADM

## 2017-11-09 RX ORDER — DIAZEPAM 5 MG/ML
5 INJECTION, SOLUTION INTRAMUSCULAR; INTRAVENOUS 2 TIMES DAILY PRN
Status: ACTIVE | OUTPATIENT
Start: 2017-11-09 | End: 2017-11-10

## 2017-11-09 RX ORDER — FENTANYL CITRATE 50 UG/ML
INJECTION, SOLUTION INTRAMUSCULAR; INTRAVENOUS AS NEEDED
Status: DISCONTINUED | OUTPATIENT
Start: 2017-11-09 | End: 2017-11-09 | Stop reason: SURG

## 2017-11-09 RX ORDER — FERROUS SULFATE 325(65) MG
325 TABLET ORAL
Status: DISCONTINUED | OUTPATIENT
Start: 2017-11-10 | End: 2017-11-12 | Stop reason: HOSPADM

## 2017-11-09 RX ORDER — ALBUTEROL SULFATE 2.5 MG/3ML
2.5 SOLUTION RESPIRATORY (INHALATION) EVERY 4 HOURS PRN
Status: DISCONTINUED | OUTPATIENT
Start: 2017-11-09 | End: 2017-11-12 | Stop reason: HOSPADM

## 2017-11-09 RX ORDER — ONDANSETRON 2 MG/ML
4 INJECTION INTRAMUSCULAR; INTRAVENOUS EVERY 6 HOURS PRN
Status: DISCONTINUED | OUTPATIENT
Start: 2017-11-09 | End: 2017-11-12 | Stop reason: HOSPADM

## 2017-11-09 RX ORDER — EPHEDRINE SULFATE 50 MG/ML
INJECTION, SOLUTION INTRAVENOUS AS NEEDED
Status: DISCONTINUED | OUTPATIENT
Start: 2017-11-09 | End: 2017-11-09 | Stop reason: SURG

## 2017-11-09 RX ORDER — TRANEXAMIC ACID 100 MG/ML
INJECTION, SOLUTION INTRAVENOUS AS NEEDED
Status: DISCONTINUED | OUTPATIENT
Start: 2017-11-09 | End: 2017-11-09 | Stop reason: SURG

## 2017-11-09 RX ORDER — NALOXONE HCL 0.4 MG/ML
0.2 VIAL (ML) INJECTION AS NEEDED
Status: DISCONTINUED | OUTPATIENT
Start: 2017-11-09 | End: 2017-11-09 | Stop reason: HOSPADM

## 2017-11-09 RX ORDER — SODIUM CHLORIDE 0.9 % (FLUSH) 0.9 %
1-10 SYRINGE (ML) INJECTION AS NEEDED
Status: DISCONTINUED | OUTPATIENT
Start: 2017-11-09 | End: 2017-11-09 | Stop reason: HOSPADM

## 2017-11-09 RX ORDER — LABETALOL HYDROCHLORIDE 5 MG/ML
5 INJECTION, SOLUTION INTRAVENOUS
Status: DISCONTINUED | OUTPATIENT
Start: 2017-11-09 | End: 2017-11-09 | Stop reason: HOSPADM

## 2017-11-09 RX ORDER — CHOLECALCIFEROL (VITAMIN D3) 125 MCG
1000 CAPSULE ORAL EVERY MORNING
Status: DISCONTINUED | OUTPATIENT
Start: 2017-11-10 | End: 2017-11-12 | Stop reason: HOSPADM

## 2017-11-09 RX ADMIN — HYDROMORPHONE HYDROCHLORIDE 0.5 MG: 1 INJECTION, SOLUTION INTRAMUSCULAR; INTRAVENOUS; SUBCUTANEOUS at 15:16

## 2017-11-09 RX ADMIN — OXYCODONE HYDROCHLORIDE AND ACETAMINOPHEN 2 TABLET: 5; 325 TABLET ORAL at 18:12

## 2017-11-09 RX ADMIN — CLINDAMYCIN PHOSPHATE 900 MG: 18 INJECTION, SOLUTION INTRAMUSCULAR; INTRAVENOUS at 18:21

## 2017-11-09 RX ADMIN — KETOROLAC TROMETHAMINE 15 MG: 30 INJECTION, SOLUTION INTRAMUSCULAR at 15:23

## 2017-11-09 RX ADMIN — GLYCOPYRROLATE 0.4 MG: 0.2 INJECTION INTRAMUSCULAR; INTRAVENOUS at 14:30

## 2017-11-09 RX ADMIN — SODIUM CHLORIDE 100 ML/HR: 4.5 INJECTION, SOLUTION INTRAVENOUS at 18:18

## 2017-11-09 RX ADMIN — LIDOCAINE HYDROCHLORIDE 60 MG: 20 INJECTION, SOLUTION INFILTRATION; PERINEURAL at 12:17

## 2017-11-09 RX ADMIN — MUPIROCIN: 20 OINTMENT TOPICAL at 18:12

## 2017-11-09 RX ADMIN — MIDAZOLAM 2 MG: 1 INJECTION INTRAMUSCULAR; INTRAVENOUS at 10:31

## 2017-11-09 RX ADMIN — EPHEDRINE SULFATE 10 MG: 50 INJECTION INTRAMUSCULAR; INTRAVENOUS; SUBCUTANEOUS at 14:08

## 2017-11-09 RX ADMIN — CELECOXIB 200 MG: 200 CAPSULE ORAL at 10:30

## 2017-11-09 RX ADMIN — SODIUM CHLORIDE, POTASSIUM CHLORIDE, SODIUM LACTATE AND CALCIUM CHLORIDE 9 ML/HR: 600; 310; 30; 20 INJECTION, SOLUTION INTRAVENOUS at 10:30

## 2017-11-09 RX ADMIN — OXYCODONE HYDROCHLORIDE AND ACETAMINOPHEN 2 TABLET: 5; 325 TABLET ORAL at 22:36

## 2017-11-09 RX ADMIN — METFORMIN HYDROCHLORIDE 850 MG: 850 TABLET ORAL at 18:12

## 2017-11-09 RX ADMIN — NEOSTIGMINE METHYLSULFATE 3 MG: 1 INJECTION INTRAMUSCULAR; INTRAVENOUS; SUBCUTANEOUS at 14:30

## 2017-11-09 RX ADMIN — FENTANYL CITRATE 50 MCG: 50 INJECTION INTRAMUSCULAR; INTRAVENOUS at 15:08

## 2017-11-09 RX ADMIN — EPHEDRINE SULFATE 10 MG: 50 INJECTION INTRAMUSCULAR; INTRAVENOUS; SUBCUTANEOUS at 13:42

## 2017-11-09 RX ADMIN — FENTANYL CITRATE 50 MCG: 50 INJECTION INTRAMUSCULAR; INTRAVENOUS at 15:32

## 2017-11-09 RX ADMIN — ACETAMINOPHEN 1000 MG: 500 TABLET ORAL at 10:30

## 2017-11-09 RX ADMIN — ATORVASTATIN CALCIUM 10 MG: 10 TABLET, FILM COATED ORAL at 21:00

## 2017-11-09 RX ADMIN — FAMOTIDINE 20 MG: 10 INJECTION INTRAVENOUS at 10:32

## 2017-11-09 RX ADMIN — FENTANYL CITRATE 25 MCG: 50 INJECTION INTRAMUSCULAR; INTRAVENOUS at 13:28

## 2017-11-09 RX ADMIN — PROPOFOL 200 MG: 10 INJECTION, EMULSION INTRAVENOUS at 12:17

## 2017-11-09 RX ADMIN — CEFAZOLIN 3 G: 1 INJECTION, POWDER, FOR SOLUTION INTRAMUSCULAR; INTRAVENOUS; PARENTERAL at 12:20

## 2017-11-09 RX ADMIN — FENTANYL CITRATE 75 MCG: 50 INJECTION INTRAMUSCULAR; INTRAVENOUS at 12:15

## 2017-11-09 RX ADMIN — MIDAZOLAM 2 MG: 1 INJECTION INTRAMUSCULAR; INTRAVENOUS at 10:58

## 2017-11-09 RX ADMIN — SODIUM CHLORIDE, POTASSIUM CHLORIDE, SODIUM LACTATE AND CALCIUM CHLORIDE: 600; 310; 30; 20 INJECTION, SOLUTION INTRAVENOUS at 13:05

## 2017-11-09 RX ADMIN — ROCURONIUM BROMIDE 40 MG: 10 INJECTION INTRAVENOUS at 12:17

## 2017-11-09 RX ADMIN — HYDROMORPHONE HYDROCHLORIDE 0.5 MG: 2 INJECTION, SOLUTION INTRAMUSCULAR; INTRAVENOUS; SUBCUTANEOUS at 14:32

## 2017-11-09 RX ADMIN — ASPIRIN 325 MG: 325 TABLET, DELAYED RELEASE ORAL at 18:12

## 2017-11-09 RX ADMIN — EPHEDRINE SULFATE 10 MG: 50 INJECTION INTRAMUSCULAR; INTRAVENOUS; SUBCUTANEOUS at 13:04

## 2017-11-09 RX ADMIN — ROPIVACAINE HYDROCHLORIDE 60 ML: 2 INJECTION, SOLUTION EPIDURAL; INFILTRATION at 10:59

## 2017-11-09 RX ADMIN — DOCUSATE SODIUM -SENNOSIDES 2 TABLET: 50; 8.6 TABLET, COATED ORAL at 18:12

## 2017-11-09 RX ADMIN — ONDANSETRON 4 MG: 2 INJECTION INTRAMUSCULAR; INTRAVENOUS at 14:10

## 2017-11-09 RX ADMIN — TRANEXAMIC ACID 1000 MG: 100 INJECTION, SOLUTION INTRAVENOUS at 12:30

## 2017-11-09 RX ADMIN — CEFAZOLIN 2 G: 1 INJECTION, POWDER, FOR SOLUTION INTRAMUSCULAR; INTRAVENOUS; PARENTERAL at 14:07

## 2017-11-09 RX ADMIN — OXYBUTYNIN CHLORIDE 15 MG: 10 TABLET, EXTENDED RELEASE ORAL at 21:00

## 2017-11-09 RX ADMIN — HYDROMORPHONE HYDROCHLORIDE 0.5 MG: 1 INJECTION, SOLUTION INTRAMUSCULAR; INTRAVENOUS; SUBCUTANEOUS at 15:28

## 2017-11-09 NOTE — PLAN OF CARE
Problem: Patient Care Overview (Adult)  Goal: Plan of Care Review  Outcome: Ongoing (interventions implemented as appropriate)    11/09/17 1004   Coping/Psychosocial Response Interventions   Plan Of Care Reviewed With patient   Patient Care Overview   Progress improving       Goal: Adult Individualization and Mutuality  Outcome: Ongoing (interventions implemented as appropriate)    11/09/17 1004   Individualization   Patient Specific Preferences PT PREFERS TO BE CALLED [facility], HAS SLEEP APNEA-USES CPAP MACHINE, SWELLING PLUS THREE IN LOWER LEGS BILAT       Goal: Discharge Needs Assessment  Outcome: Ongoing (interventions implemented as appropriate)    Problem: Perioperative Period (Adult)  Goal: Signs and Symptoms of Listed Potential Problems Will be Absent or Manageable (Perioperative Period)  Outcome: Ongoing (interventions implemented as appropriate)    11/09/17 1004   Perioperative Period   Problems Assessed (Perioperative Period) pain   Problems Present (Perioperative Period) none

## 2017-11-09 NOTE — ANESTHESIA POSTPROCEDURE EVALUATION
Patient: Riky Moon    Procedure Summary     Date Anesthesia Start Anesthesia Stop Room / Location    11/09/17 1210 1500  [facility] /  [facility]       Procedure Diagnosis Surgeon Provider    RIGHT TOTAL HIP ARTHROPLASTY (Right Hip) No diagnosis on file. MD Marah Melendrez MD          Anesthesia Type: general  Last vitals  BP   143/63 (11/09/17 1530)   Temp   36.7 °C (98.1 °F) (11/09/17 1456)   Pulse   87 (11/09/17 1530)   Resp   14 (11/09/17 1530)     SpO2   96 % (11/09/17 1530)     Post Anesthesia Care and Evaluation    Patient location during evaluation: PACU  Patient participation: complete - patient participated  Level of consciousness: awake and alert  Pain management: adequate  Airway patency: patent  Anesthetic complications: No anesthetic complications    Cardiovascular status: acceptable  Respiratory status: acceptable  Hydration status: acceptable    Comments: --------------------            11/09/17               1530     --------------------   BP:       143/63     Pulse:      87       Resp:       14       Temp:                SpO2:      96%      --------------------

## 2017-11-09 NOTE — H&P
"History and Physical    Patient Name:  Riky Moon  YOB: 1940    Medical Records Number:  [id number]    Date of Admission:  11/9/2017  9:21 AM    Chief Complaint:  OA (osteoarthritis) of hip [M16.9]    Riky Moon is a 77 y.o. male who presents c/o severe right hip pain.  The pain has been on and off for many years, worsening recently to the point where the pain is becoming disabling. The pain is a constant dull ache with occasional sharp, stabbing pains.  The patient has failed conservative treatment and would like to proceed with total hip arthroplasty.    /66  Pulse 81  Temp 97.9 °F (36.6 °C) (Oral)   Resp 16  Ht 74\" (188 cm)  Wt (!) 306 lb 8 oz (139 kg)  SpO2 96%  BMI 39.35 kg/m2    Past Medical History:    Past Medical History:   Diagnosis Date   • Acid reflux    • Arthritis     OSTEO   • Chest discomfort    • COPD (chronic obstructive pulmonary disease)    • Diabetes mellitus     type 2   • Disease of thyroid gland    • High cholesterol    • Hypertension    • Morbid obesity    • Neuropathy     BOTH FEET   • Obesity, Class III, BMI 40-49.9 (morbid obesity)    • Poor circulation of extremity     LEFT LEG   • Sleep apnea    • Vitamin D deficiency        Social History:    Social History     Social History   • Marital status:      Spouse name: N/A   • Number of children: N/A   • Years of education: N/A     Occupational History   • Not on file.     Social History Main Topics   • Smoking status: Former Smoker     Packs/day: 1.50     Years: 40.00     Types: Cigarettes     Quit date: 1997   • Smokeless tobacco: Never Used   • Alcohol use No      Comment: HOLIDAYS    • Drug use: No      Comment: PRN use for pain   • Sexual activity: Defer     Other Topics Concern   • Not on file     Social History Narrative       Family History:    Family History   Problem Relation Age of Onset   • Malig Hyperthermia Neg Hx        Current Medications:    Current Facility-Administered Medications: "   •  CeFAZolin in Sodium Chloride (ANCEF) IVPB solution 3 g, 3 g, Intravenous, Once, Riky Fernando MD  •  fentaNYL citrate (PF) (SUBLIMAZE) injection 50 mcg, 50 mcg, Intravenous, Q10 Min PRN, Marah Cueva MD  •  lactated ringers infusion, 9 mL/hr, Intravenous, Continuous, Marah Cueva MD, Last Rate: 9 mL/hr at 11/09/17 1030, 9 mL/hr at 11/09/17 1030  •  midazolam (VERSED) injection 1 mg, 1 mg, Intravenous, Q5 Min PRN **OR** midazolam (VERSED) injection 2 mg, 2 mg, Intravenous, Q5 Min PRN, Marah Cueva MD, 2 mg at 11/09/17 1058  •  ropivacaine (NAROPIN) 50 mL, Morphine (PF) 5 mg, ketorolac (TORADOL) 30 mg, EPINEPHrine PF (ADRENALIN) 0.3 mg in sodium chloride 0.9 % 101.8 mL, , Injection, Once, Riky Fernando MD  •  sodium chloride 0.9 % flush 1-10 mL, 1-10 mL, Intravenous, PRN, Marah Cueva MD    Facility-Administered Medications Ordered in Other Encounters:   •  ropivacaine (NAROPIN) 0.2 % injection, , , PRN, Belinda Schwarz MD, 60 mL at 11/09/17 1059    Allergies:  No Known Allergies    Review of Systems:   HEENT: Patient denies any headaches, vision changes, change in hearing, or tinnitus, Patient denies any rhinorrhea,epistaxis, sinus pain, mouth or dental problems, sore throat or hoarseness, or dysphagia  Pulmonary: Patient denies any cough, congestion, SOA, or wheezing  Cardiovascular: Patient denies any chest pain, dyspnea, palpitations, weakness, intolerance of exercise, varicosities, swelling of extremities, known murmur  Gastrointestinal:  Patient denies nausea, vomiting, diarrhea, constipation, loss  of appetite, change in appetite, dysphagia, gas, heartburn, melena, change in bowel habits, use of laxatives or other drugs to alter the function of the gastrointestinal tract.  Genital/Urinary: Patient denies dysuria, change in color of urine, change in frequency of urination, pain with urgency, incontinence, retention, or nocturia.  Musculoskeletal:  Patient denies increased warmth; redness; or swelling of joints; limitation of function; deformity; crepitation: pain in a joint or an extremity, the neck, or the back, especially with movement.  Neurological: Patient denies dizziness, tremor, ataxia, difficulty in speaking, change in speech, paresthesia, loss of sensation, seizures, syncope, changes in memory.  Endocrine system: Patient denies tremors, palpitations, intolerance of heat or cold, polyuria, polydipsia, polyphagia, diaphoresis, exophthalmos, or goiter.  Psychological: Patient denies thoughts/plans or harming self or other; depression,  insomnia, night terrors, lala, memory loss, disorientation.  Skin: Patient denies any bruising, rashes, discoloration, pruritus, wounds, ulcers, decubiti, changes in the hair or nails  Hematopoietic: Patient denies history of spontaneous or excessive bleeding, epistaxis, hematuria, melena, fatigue, enlarged or tender lymph nodes, pallor, history of anemia.        Physical Exam:  Awake, A&O x3, affect normal, no acute distress  Ambulating with a limp due to hip pain  Hip ROM is limited due to pain  No instability  Strength is 4/5 in the quad, hamstring, abduction and adduction  Cap refill is normal, Sensation intact    Card:  RR, HD Stable  Pulm:  Regular breathing, no S.O.A  Abd:  Soft, NT, ND    Lab Results (last 24 hours)     Procedure Component Value Units Date/Time    POC Glucose Fingerstick [[id number]]  (Normal) Collected:  11/09/17 0932    Specimen:  Blood Updated:  11/09/17 0934     Glucose 125 mg/dL     Narrative:       Meter: [id number] : [id number] Ophelia VIEIRA          Xr Chest Pa & Lateral    Result Date: 11/3/2017  Narrative: PA AND LATERAL RADIOGRAPHIC VIEWS OF THE CHEST AND A FRONTAL PROJECTION OF THE PELVIS ALONG WITH 2 RADIOGRAPHIC VIEWS OF THE RIGHT HIP.  CLINICAL HISTORY: Preop right hip surgery.  FINDINGS:  PA and lateral radiographic views of the chest demonstrate clear lungs. Cardiomediastinal  silhouette is within normal limits. The lungs are prominently inflated consistent with the history of COPD. The osseous structures are remarkable for multilevel degenerative phenomena within the thoracic spine.  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 Neurostimulator projects over the sacrum. Arthritic changes are incidentally noted within the sacroiliac joints and degenerative phenomena are also incidentally appreciated within the lower lumbar spine. No acute abnormality is identified.  This report was finalized on 11/3/2017 11:02 AM by Dr. Rex Ambriz MD.      Xr Hip With Or Without Pelvis 2 - 3 View Right    Result Date: 11/3/2017  Narrative: PA AND LATERAL RADIOGRAPHIC VIEWS OF THE CHEST AND A FRONTAL PROJECTION OF THE PELVIS ALONG WITH 2 RADIOGRAPHIC VIEWS OF THE RIGHT HIP.  CLINICAL HISTORY: Preop right hip surgery.  FINDINGS:  PA and lateral radiographic views of the chest demonstrate clear lungs. Cardiomediastinal silhouette is within normal limits. The lungs are prominently inflated consistent with the history of COPD. The osseous structures are remarkable for multilevel degenerative phenomena within the thoracic spine.  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 Neurostimulator projects over the sacrum. Arthritic changes are incidentally noted within the sacroiliac joints and degenerative phenomena are also incidentally appreciated within the lower lumbar spine. No acute abnormality is identified.  This report was finalized on 11/3/2017 11:02 AM by Dr. Rex Ambriz MD.          Assessment:  End-stage Primary Right Hip Osteoarthritis    Plan:  Patient's pain is becoming disabling, despite extensive  conservative treatment.  Radiographs reveal end-stage degenerative changes.  The risks of surgery, including, but not limited to, heart attack, stroke, dying, DVT, arthrofibrosis and infection were discussed.  The alternatives and benefits were also discussed.  All questions answered and the patient wishes to proceed with right total hip arthroplasty.

## 2017-11-09 NOTE — ANESTHESIA PROCEDURE NOTES
Airway  Urgency: elective    Airway not difficult    General Information and Staff    Patient location during procedure: OR  Anesthesiologist: LYNNETTE MITCHELL  CRNA: BIA OLIVERA    Indications and Patient Condition  Indications for airway management: airway protection    Preoxygenated: yes (pt pre-O2 with 100% O2)  Mask difficulty assessment: 2 - vent by mask + OA or adjuvant +/- NMBA (easy BMV )    Final Airway Details  Final airway type: endotracheal airway      Successful airway: ETT  Cuffed: yes   Successful intubation technique: direct laryngoscopy  Endotracheal tube insertion site: oral  Blade: Caryl  Blade size: #4  ETT size: 8.0 mm  Cormack-Lehane Classification: grade I - full view of glottis  Placement verified by: chest auscultation and capnometry   Cuff volume (mL): 7  Measured from: lips  ETT to lips (cm): 22  Number of attempts at approach: 1    Additional Comments  ATOETx1. No change in dentition.

## 2017-11-09 NOTE — ANESTHESIA PROCEDURE NOTES
Peripheral Block    Patient location during procedure: holding area  Stop time: 11/9/2017 11:04 AM  Reason for block: at surgeon's request and post-op pain management  Performed by  Anesthesiologist: MANDY BRYANT  Preanesthetic Checklist  Completed: patient identified, site marked, surgical consent, pre-op evaluation, timeout performed, IV checked, risks and benefits discussed and monitors and equipment checked  Prep:  Pt Position: supine  Sterile barriers:gloves  Prep: ChloraPrep  Patient monitoring: continuous pulse oximetry, blood pressure monitoring and EKG  Procedure  Sedation:yes  Performed under: PNB  Guidance:landmark technique  Images:still images not obtained    Laterality:right  Block Type:fascia iliaca compartment  Injection Technique:single-shot  Needle Type:Tuohy  Resistance on Injection: none  Medications  Local Injected:ropivacaine 0.2% Local Amount Injected:60mL  Post Assessment  Injection Assessment: negative aspiration for heme, no paresthesia on injection and incremental injection  Patient Tolerance:comfortable throughout block  Complications:no

## 2017-11-09 NOTE — ANESTHESIA PREPROCEDURE EVALUATION
Anesthesia Evaluation     Patient summary reviewed and Nursing notes reviewed   no history of anesthetic complications:  NPO Solid Status: > 8 hours  NPO Liquid Status: > 2 hours     Airway   Mallampati: II  TM distance: >3 FB  Neck ROM: full  possible difficult intubation  Comment: Large neck  Dental - normal exam   (+) lower dentures and upper dentures    Pulmonary - normal exam   (+) a smoker Former, COPD, sleep apnea,   Cardiovascular - normal exam    ECG reviewed    (+) hypertension, PVD, hyperlipidemia    ROS comment: Rhythm: sinus rhythm  Clinical impression: normal ECG      9/17:  · Myocardial perfusion imaging indicates a normal myocardial perfusion study with no evidence of ischemia.  · Left ventricular ejection fraction is hyperdynamic (Calculated EF > 70%).  · Impressions are consistent with a low risk study.    Neuro/Psych- negative ROS  GI/Hepatic/Renal/Endo    (+) obesity, morbid obesity, GERD, diabetes mellitus,     Musculoskeletal     Abdominal  - normal exam  (+) obese,     Bowel sounds: normal.   Substance History - negative use     OB/GYN negative ob/gyn ROS         Other   (+) arthritis                                     Anesthesia Plan    ASA 3     general     intravenous induction   Anesthetic plan and risks discussed with patient.

## 2017-11-10 LAB
ANION GAP SERPL CALCULATED.3IONS-SCNC: 10.6 MMOL/L
BUN BLD-MCNC: 22 MG/DL (ref 8–23)
BUN/CREAT SERPL: 22.4 (ref 7–25)
CALCIUM SPEC-SCNC: 8.2 MG/DL (ref 8.6–10.5)
CHLORIDE SERPL-SCNC: 101 MMOL/L (ref 98–107)
CO2 SERPL-SCNC: 24.4 MMOL/L (ref 22–29)
CREAT BLD-MCNC: 0.98 MG/DL (ref 0.76–1.27)
GFR SERPL CREATININE-BSD FRML MDRD: 74 ML/MIN/1.73
GLUCOSE BLD-MCNC: 161 MG/DL (ref 65–99)
HCT VFR BLD AUTO: 27.2 % (ref 40.4–52.2)
HGB BLD-MCNC: 8.9 G/DL (ref 13.7–17.6)
POTASSIUM BLD-SCNC: 4.8 MMOL/L (ref 3.5–5.2)
SODIUM BLD-SCNC: 136 MMOL/L (ref 136–145)

## 2017-11-10 PROCEDURE — 85018 HEMOGLOBIN: CPT | Performed by: ORTHOPAEDIC SURGERY

## 2017-11-10 PROCEDURE — 85014 HEMATOCRIT: CPT | Performed by: ORTHOPAEDIC SURGERY

## 2017-11-10 PROCEDURE — 97161 PT EVAL LOW COMPLEX 20 MIN: CPT

## 2017-11-10 PROCEDURE — 97110 THERAPEUTIC EXERCISES: CPT

## 2017-11-10 PROCEDURE — 80048 BASIC METABOLIC PNL TOTAL CA: CPT | Performed by: ORTHOPAEDIC SURGERY

## 2017-11-10 RX ADMIN — CYANOCOBALAMIN TAB 500 MCG 1000 MCG: 500 TAB at 07:01

## 2017-11-10 RX ADMIN — PANTOPRAZOLE SODIUM 40 MG: 40 TABLET, DELAYED RELEASE ORAL at 07:00

## 2017-11-10 RX ADMIN — OXYCODONE HYDROCHLORIDE AND ACETAMINOPHEN 2 TABLET: 5; 325 TABLET ORAL at 17:40

## 2017-11-10 RX ADMIN — FUROSEMIDE 40 MG: 40 TABLET ORAL at 08:58

## 2017-11-10 RX ADMIN — MUPIROCIN: 20 OINTMENT TOPICAL at 20:58

## 2017-11-10 RX ADMIN — ATORVASTATIN CALCIUM 10 MG: 10 TABLET, FILM COATED ORAL at 09:00

## 2017-11-10 RX ADMIN — TIOTROPIUM BROMIDE 1 CAPSULE: 18 CAPSULE ORAL; RESPIRATORY (INHALATION) at 07:00

## 2017-11-10 RX ADMIN — ASPIRIN 325 MG: 325 TABLET, DELAYED RELEASE ORAL at 17:29

## 2017-11-10 RX ADMIN — MUPIROCIN: 20 OINTMENT TOPICAL at 08:59

## 2017-11-10 RX ADMIN — METFORMIN HYDROCHLORIDE 850 MG: 850 TABLET ORAL at 08:58

## 2017-11-10 RX ADMIN — OXYCODONE HYDROCHLORIDE AND ACETAMINOPHEN 2 TABLET: 5; 325 TABLET ORAL at 22:35

## 2017-11-10 RX ADMIN — ASPIRIN 325 MG: 325 TABLET, DELAYED RELEASE ORAL at 08:58

## 2017-11-10 RX ADMIN — OXYCODONE HYDROCHLORIDE AND ACETAMINOPHEN 2 TABLET: 5; 325 TABLET ORAL at 10:18

## 2017-11-10 RX ADMIN — DOCUSATE SODIUM -SENNOSIDES 2 TABLET: 50; 8.6 TABLET, COATED ORAL at 17:29

## 2017-11-10 RX ADMIN — METFORMIN HYDROCHLORIDE 850 MG: 850 TABLET ORAL at 17:29

## 2017-11-10 RX ADMIN — LEVOTHYROXINE SODIUM 88 MCG: 88 TABLET ORAL at 07:00

## 2017-11-10 RX ADMIN — DULOXETINE HYDROCHLORIDE 60 MG: 60 CAPSULE, DELAYED RELEASE ORAL at 06:59

## 2017-11-10 RX ADMIN — FERROUS SULFATE TAB 325 MG (65 MG ELEMENTAL FE) 325 MG: 325 (65 FE) TAB at 08:58

## 2017-11-10 RX ADMIN — CLINDAMYCIN PHOSPHATE 900 MG: 18 INJECTION, SOLUTION INTRAMUSCULAR; INTRAVENOUS at 01:15

## 2017-11-10 RX ADMIN — OXYBUTYNIN CHLORIDE 15 MG: 10 TABLET, EXTENDED RELEASE ORAL at 20:53

## 2017-11-10 RX ADMIN — DOCUSATE SODIUM -SENNOSIDES 2 TABLET: 50; 8.6 TABLET, COATED ORAL at 08:58

## 2017-11-10 NOTE — DISCHARGE PLACEMENT REQUEST
"Rissa Moon (77 y.o. Male)     Date of Birth Social Security Number Address Home Phone MRN    1940  [address] [facility] [id number] [phone] [id number]    Jew Marital Status          Congregation        Admission Date Admission Type Admitting Provider Attending Provider Department, Room/Bed    11/9/17 Elective Rissa Fernando MD Goodin, Robert A, MD [address], P786/1    Discharge Date Discharge Disposition Discharge Destination                      Attending Provider: Rissa Fernando MD     Allergies:  No Known Allergies    Isolation:  None   Infection:  None   Code Status:  FULL    Ht:  74\" (188 cm)   Wt:  306 lb 8 oz (139 kg)    Admission Cmt:  None   Principal Problem:  None                Active Insurance as of 11/9/2017     Primary Coverage     Payor Plan Insurance Group Employer/Plan Group    MEDICARE MEDICARE A & B      Payor Plan Address Payor Plan Phone Number Effective From Effective To    [address] [phone] 10/1/2004     [place] [address]       Subscriber Name Subscriber Birth Date Member ID       RISSA MOON 1940 [id number]           Secondary Coverage     Payor Plan Insurance Group Employer/Plan Group    [id number]     Payor Plan Address Payor Plan Phone Number Effective From Effective To    [address]  12/1/2016     [place] [address]       Subscriber Name Subscriber Birth Date Member ID       RISSA MOON 1940 [id number]                 Emergency Contacts      (Rel.) Home Phone Work Phone Mobile Phone    Tati Moonith (Spouse) [phone] -- [phone]    BobTyesha rincon (Daughter) -- -- [phone]              "

## 2017-11-10 NOTE — DISCHARGE PLACEMENT REQUEST
"Rissa Moon (77 y.o. Male)     Date of Birth Social Security Number Address Home Phone MRN    1940  [address] [facility] [address] [phone] [id number]    Church Marital Status          Mosque        Admission Date Admission Type Admitting Provider Attending Provider Department, Room/Bed    11/9/17 Elective Rissa Fernando MD Goodin, Robert A, MD [address], P786/1    Discharge Date Discharge Disposition Discharge Destination                      Attending Provider: Rissa Fernando MD     Allergies:  No Known Allergies    Isolation:  None   Infection:  None   Code Status:  FULL    Ht:  74\" (188 cm)   Wt:  306 lb 8 oz (139 kg)    Admission Cmt:  None   Principal Problem:  None                Active Insurance as of 11/9/2017     Primary Coverage     Payor Plan Insurance Group Employer/Plan Group    MEDICARE MEDICARE A & B      Payor Plan Address Payor Plan Phone Number Effective From Effective To    [address] [phone] 10/1/2004     [place] [address]       Subscriber Name Subscriber Birth Date Member ID       RISSA MOON 1940 [id number]           Secondary Coverage     Payor Plan Insurance Group Employer/Plan Group    [id number]     Payor Plan Address Payor Plan Phone Number Effective From Effective To    [address]  12/1/2016     [place] [address]       Subscriber Name Subscriber Birth Date Member ID       RISSA MOON 1940 [id number]                 Emergency Contacts      (Rel.) Home Phone Work Phone Mobile Phone    Tati Moonith (Spouse) [phone] -- [phone]    BobTyesha rincon (Daughter) -- -- [phone]              "

## 2017-11-10 NOTE — PROGRESS NOTES
Discharge Planning Assessment  [facility]     Patient Name: Riky Moon  MRN: [id number]  Today's Date: 11/10/2017    Admit Date: 11/9/2017          Discharge Needs Assessment       11/10/17 1122    Living Environment    Lives With spouse    Living Arrangements house    Transportation Available car;family or friend will provide    Living Environment    Quality Of Family Relationships supportive;helpful;involved    Discharge Needs Assessment    Readmission Within The Last 30 Days no previous admission in last 30 days    Equipment Currently Used at Home none    Discharge Facility/Level Of Care Needs nursing facility, skilled            Discharge Plan       11/10/17 1124    Case Management/Social Work Plan    Plan [facility], bed ready Sunday 9/12.     Patient/Family In Agreement With Plan yes    Additional Comments S/w pt verified facesheet and d/c plan. Pt pre-registered / [facility]. Christen/Kalyan notified and can accept on 11/12. Transfer packet in chart.         Discharge Placement     Facility/Agency Request Status Selected? Address Phone Number Fax Number    [facility]    [facility] [address] [facility] [id number] [phone]        Berta Cruz RN 11/10/2017 09:49    11/10/2017   Christen following pre-reg.                              Demographic Summary     None            Functional Status       11/10/17 1123    Functional Status Current    Ambulation 3-->assistive equipment and person    Transferring 3-->assistive equipment and person    Toileting 3-->assistive equipment and person    Bathing 2-->assistive person    Dressing 2-->assistive person    Eating 0-->independent    Communication 0-->understands/communicates without difficulty    Swallowing (if score 2 or more for any item, consult Rehab Services) 0-->swallows foods/liquids without difficulty    Change in Functional Status Since Onset of Current Illness/Injury yes    Functional Status Prior     Ambulation 0-->independent    Transferring 0-->independent    Toileting 0-->independent    Bathing 0-->independent    Dressing 0-->independent    Eating 0-->independent    Communication 0-->understands/communicates without difficulty    Swallowing 0-->swallows foods/liquids without difficulty    IADL    Medications independent    Meal Preparation independent    Housekeeping independent    Laundry independent    Shopping independent    Oral Care independent    Activity Tolerance    Usual Activity Tolerance good    Current Activity Tolerance fair    Cognitive/Perceptual/Developmental    Current Mental Status/Cognitive Functioning no deficits noted    Recent Changes in Mental Status/Cognitive Functioning no changes            Psychosocial     None            Abuse/Neglect     None            Legal     None            Substance Abuse     None            Patient Forms       11/10/17 1123    Patient Forms    Provider Choice List Delivered    Delivered to Patient    Method of delivery Telephone          Berta Cruz RN

## 2017-11-10 NOTE — PLAN OF CARE
Problem: Patient Care Overview (Adult)  Goal: Plan of Care Review  Outcome: Ongoing (interventions implemented as appropriate)    11/10/17 0146   Coping/Psychosocial Response Interventions   Plan Of Care Reviewed With patient   Patient Care Overview   Progress progress toward functional goals as expected   Outcome Evaluation   Outcome Summary/Follow up Plan Pt is a post op of a rt total hip. Dressing is clean dry and intact. Pt has been up to the chair for a while this shift. Pt is still due to void., bladder scanned at 11p with 155ml noted. Pt continues with PO pain meds that provide relief. Pt educated on importance of monitoring blood sugars related to comorbidity of diabetes. Pt voiced understanding. Pt would like to go to rehab, preferably [facility].       Goal: Adult Individualization and Mutuality  Outcome: Ongoing (interventions implemented as appropriate)  Goal: Discharge Needs Assessment  Outcome: Ongoing (interventions implemented as appropriate)    Problem: Perioperative Period (Adult)  Goal: Signs and Symptoms of Listed Potential Problems Will be Absent or Manageable (Perioperative Period)  Outcome: Ongoing (interventions implemented as appropriate)    Problem: Fall Risk (Adult)  Goal: Absence of Falls  Outcome: Ongoing (interventions implemented as appropriate)    Problem: Hip Replacement, Total (Adult)  Goal: Signs and Symptoms of Listed Potential Problems Will be Absent or Manageable (Hip Replacement, Total)  Outcome: Ongoing (interventions implemented as appropriate)

## 2017-11-10 NOTE — PLAN OF CARE
Problem: Patient Care Overview (Adult)  Goal: Plan of Care Review  Outcome: Ongoing (interventions implemented as appropriate)    11/10/17 1128   Coping/Psychosocial Response Interventions   Plan Of Care Reviewed With patient   Patient Care Overview   Progress progress toward functional goals as expected   Outcome Evaluation   Outcome Summary/Follow up Plan Pt is POD# 1 R BEST, aslo with history of obesity & B foot neuropathy. His PLOF is independent w/o AD & he currently presents with pain & generalized post-op weakness affecting his functional mobility. Spouse has MS. Pt will benefit from continued skilled PT to address deficits for return to PLOF - recommend SNF rehab.         Problem: Inpatient Physical Therapy  Goal: Bed Mobility Goal LTG- PT  Outcome: Ongoing (interventions implemented as appropriate)    11/10/17 1128   Bed Mobility PT LTG   Bed Mobility PT LTG, Date Established 11/10/17   Bed Mobility PT LTG, Time to Achieve 3 days   Bed Mobility PT LTG, Activity Type supine to sit/sit to supine   Bed Mobility PT LTG, [place]idge Level minimum assist (75% patient effort)   Bed Mobility PT Goal LTG, Assist Device bed rails       Goal: Transfer Training Goal 1 LTG- PT  Outcome: Ongoing (interventions implemented as appropriate)    11/10/17 1128   Transfer Training PT LTG   Transfer Training PT LTG, Date Established 11/10/17   Transfer Training PT LTG, Time to Achieve 3 days   Transfer Training PT LTG, Activity Type sit to stand/stand to sit   Transfer Training PT LTG, Coleridge Level contact guard assist   Transfer Training PT LTG, Assist Device walker, rolling       Goal: Gait Training Goal LTG- PT  Outcome: Ongoing (interventions implemented as appropriate)    11/10/17 1128   Gait Training PT LTG   Gait Training Goal PT LTG, Date Established 11/10/17   Gait Training Goal PT LTG, Time to Achieve 3 days   Gait Training Goal PT LTG, Coleridge Level contact guard assist   Gait Training Goal PT LTG, Assist  Device walker, rolling   Gait Training Goal PT LTG, Distance to Achieve 100

## 2017-11-10 NOTE — PROGRESS NOTES
"Ortho POD 1    Patients Name:  Riky Moon  YOB: 1940  Medical Records Number:  [id number]    No complaints except pain    /61 (BP Location: Right arm, Patient Position: Lying)  Pulse 93  Temp 97.7 °F (36.5 °C) (Oral)   Resp 18  Ht 74\" (188 cm)  Wt (!) 306 lb 8 oz (139 kg)  SpO2 99%  BMI 39.35 kg/m2    RLE:  NVI, calf nontender, Sensation intact  No signs of DVT    Incision: clean, no infection    Lab Results (last 24 hours)     Procedure Component Value Units Date/Time    POC Glucose Fingerstick [[id number]]  (Normal) Collected:  11/09/17 0932    Specimen:  Blood Updated:  11/09/17 0934     Glucose 125 mg/dL     Narrative:       Meter: [id number] : [id number] Ophelia VIEIRA    Hemoglobin & Hematocrit, Blood [[id number]]  (Abnormal) Collected:  11/10/17 0449    Specimen:  Blood Updated:  11/10/17 0517     Hemoglobin 8.9 (L) g/dL      Hematocrit 27.2 (L) %     Basic Metabolic Panel [[id number]]  (Abnormal) Collected:  11/10/17 0449    Specimen:  Blood Updated:  11/10/17 0535     Glucose 161 (H) mg/dL      BUN 22 mg/dL      Creatinine 0.98 mg/dL      Sodium 136 mmol/L      Potassium 4.8 mmol/L      Chloride 101 mmol/L      CO2 24.4 mmol/L      Calcium 8.2 (L) mg/dL      eGFR Non African Amer 74 mL/min/1.73      BUN/Creatinine Ratio 22.4     Anion Gap 10.6 mmol/L     Narrative:       The MDRD GFR formula is only valid for adults with stable renal function between ages 18 and 70.          Xr Hip 1 View Without Pelvis Right (surgery Only)    Result Date: 11/9/2017  Narrative: XR HIP 1 VIEW WO PELVIS RIGHT-  INDICATIONS: Postoperative evaluation.  TECHNIQUE:     Frontal view of the right hip  COMPARISON: 11/09/2017 1348 hours  FINDINGS:   Intact appearing right hip arthroplasty hardware is seen with adjacent surgical soft tissue gas. No acute fracture is identified.       Impression:  Postsurgical changes.    This report was finalized on 11/9/2017 3:15 PM by Dr. Dipak Dc MD.      Xr " Chest Pa & Lateral    Result Date: 11/3/2017  Narrative: PA AND LATERAL RADIOGRAPHIC VIEWS OF THE CHEST AND A FRONTAL PROJECTION OF THE PELVIS ALONG WITH 2 RADIOGRAPHIC VIEWS OF THE RIGHT HIP.  CLINICAL HISTORY: Preop right hip surgery.  FINDINGS:  PA and lateral radiographic views of the chest demonstrate clear lungs. Cardiomediastinal silhouette is within normal limits. The lungs are prominently inflated consistent with the history of COPD. The osseous structures are remarkable for multilevel degenerative phenomena within the thoracic spine.  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 Neurostimulator projects over the sacrum. Arthritic changes are incidentally noted within the sacroiliac joints and degenerative phenomena are also incidentally appreciated within the lower lumbar spine. No acute abnormality is identified.  This report was finalized on 11/3/2017 11:02 AM by Dr. Rex Ambriz MD.      Xr Hip With Or Without Pelvis 1 View Right    Result Date: 11/9/2017  Narrative: XR HIP W OR WO PELVIS 1 VIEW RIGHT-  INDICATIONS: Intraoperative evaluation.  TECHNIQUE:     Frontal view of the right hip  COMPARISON: 11/03/2017  FINDINGS:  Right hip arthroplasty hardware is seen with adjacent surgical soft tissue gas. No acute fracture is identified.       Impression:  Surgical changes.    This report was finalized on 11/9/2017 2:15 PM by Dr. Dipak Dc MD.      Xr Hip With Or Without Pelvis 2 - 3 View Right    Result Date: 11/3/2017  Narrative: PA AND LATERAL RADIOGRAPHIC VIEWS OF THE CHEST AND A FRONTAL PROJECTION OF THE PELVIS ALONG WITH 2 RADIOGRAPHIC VIEWS OF THE RIGHT HIP.  CLINICAL HISTORY: Preop right hip surgery.  FINDINGS:  PA and lateral radiographic views of the chest demonstrate clear lungs. Cardiomediastinal silhouette is within normal limits. The  lungs are prominently inflated consistent with the history of COPD. The osseous structures are remarkable for multilevel degenerative phenomena within the thoracic spine.  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 Neurostimulator projects over the sacrum. Arthritic changes are incidentally noted within the sacroiliac joints and degenerative phenomena are also incidentally appreciated within the lower lumbar spine. No acute abnormality is identified.  This report was finalized on 11/3/2017 11:02 AM by Dr. Rex Ambriz MD.          S/p Right BEST  WBAT with walker  ASA for DVT prophylaxis

## 2017-11-10 NOTE — PLAN OF CARE
Problem: Patient Care Overview (Adult)  Goal: Plan of Care Review  Outcome: Ongoing (interventions implemented as appropriate)    11/09/17 2122   Coping/Psychosocial Response Interventions   Plan Of Care Reviewed With patient;family   Patient Care Overview   Progress no change   Outcome Evaluation   Outcome Summary/Follow up Plan s/p total hip. pt very sleepy upon arrival to floor, aroused easily. Noted with 4+ edema of left leg Family reports patient has neuropathy in that leg and this is normal. Uses compression pumps at home. Reviewed DM. pt takes oral meds but per family not sure that he monitors sugars. Educated on importance of monitoring and medication compliance       Goal: Adult Individualization and Mutuality  Outcome: Ongoing (interventions implemented as appropriate)  Goal: Discharge Needs Assessment  Outcome: Ongoing (interventions implemented as appropriate)    Problem: Perioperative Period (Adult)  Goal: Signs and Symptoms of Listed Potential Problems Will be Absent or Manageable (Perioperative Period)  Outcome: Ongoing (interventions implemented as appropriate)    Problem: Fall Risk (Adult)  Goal: Identify Related Risk Factors and Signs and Symptoms  Outcome: Outcome(s) achieved Date Met:  11/09/17  Goal: Absence of Falls  Outcome: Ongoing (interventions implemented as appropriate)    Problem: Hip Replacement, Total (Adult)  Goal: Signs and Symptoms of Listed Potential Problems Will be Absent or Manageable (Hip Replacement, Total)  Outcome: Ongoing (interventions implemented as appropriate)

## 2017-11-10 NOTE — SIGNIFICANT NOTE
11/10/17 0927   Rehab Treatment   Discipline physical therapist   Rehab Evaluation   Evaluation Not Performed patient unavailable for evaluation  (currently in recliner, spouse is bathing him. will check back this AM)   Recommendation   PT - Next Appointment 11/10/17

## 2017-11-10 NOTE — THERAPY EVALUATION
Acute Care - Physical Therapy Initial Evaluation  [facility]     Patient Name: Riky Moon  : 1940  MRN: [id number]  Today's Date: 11/10/2017                Admit Date: 2017     Visit Dx:    ICD-10-CM ICD-9-CM   1. Difficulty walking R26.2 719.7     Patient Active Problem List   Diagnosis   • OA (osteoarthritis) of hip   • KINDRA treated with auto BiPAP   • Chest pain     Past Medical History:   Diagnosis Date   • Acid reflux    • Arthritis     OSTEO   • Chest discomfort    • COPD (chronic obstructive pulmonary disease)    • Diabetes mellitus     type 2   • Disease of thyroid gland    • High cholesterol    • Hypertension    • Morbid obesity    • Neuropathy     BOTH FEET   • Obesity, Class III, BMI 40-49.9 (morbid obesity)    • Poor circulation of extremity     LEFT LEG   • Sleep apnea    • Vitamin D deficiency      Past Surgical History:   Procedure Laterality Date   • APPENDECTOMY     • CATARACT EXTRACTION W/ INTRAOCULAR LENS IMPLANT Bilateral    • CHOLECYSTECTOMY     • COLONOSCOPY W/ POLYPECTOMY      BENIGN   • INTERSTIM PLACEMENT Left     BLADDER CONTROL   • KNEE ARTHROPLASTY Right    • NOSE SURGERY      REMOVED BLOCKAGE    • ROTATOR CUFF REPAIR Right    • TOTAL HIP ARTHROPLASTY Right 2017    Procedure: RIGHT TOTAL HIP ARTHROPLASTY;  Surgeon: Riky Fernando MD;  Location: [facility] MAIN OR;  Service:           PT ASSESSMENT (last 72 hours)      PT Evaluation       11/10/17 1122 11/10/17 1013    Rehab Evaluation    Document Type  evaluation  -DM    Subjective Information  agree to therapy;complains of;pain  -DM    Patient Effort, Rehab Treatment  good  -DM    Symptoms Noted During/After Treatment  increased pain  -DM    General Information    Patient Profile Review  yes  -DM    General Observations  in recliner  -DM    Pertinent History Of Current Problem  POD#1 R BEST  -DM    Precautions/Limitations  fall precautions;hip precautions- right  -DM    Equipment Currently Used at Home none  -[facility] none   -DM    Plans/Goals Discussed With  patient;agreed upon  -DM    Living Environment    Lives With spouse  -[facility]     Living Arrangements house  -[facility]     Transportation Available car;family or friend will provide  -[facility]     Living Environment Comment  spouse has MS  -DM    Clinical Impression    Functional Level At Time Of Evaluation  Min/Mod A    -DM    Patient/Family Goals Statement  PLOF  -DM    Criteria for Skilled Therapeutic Interventions Met  yes;treatment indicated  -DM    Pathology/Pathophysiology Noted (Describe Specifically for Each System)  musculoskeletal  -DM    Impairments Found (describe specific impairments)  gait, locomotion, and balance  -DM    Functional Limitations in Following Categories (Describe Specific Limitations)  self-care;home management;work;community/leisure  -DM    Rehab Potential  good, to achieve stated therapy goals  -DM    Predicted Duration of Therapy Intervention (days/wks)  3 days  -DM    Pain Assessment    Pain Assessment  0-10  -DM    Pain Score  8  -DM    Post Pain Score  0  -DM    Pain Type  Acute pain;Surgical pain  -DM    Pain Location  Hip  -DM    Pain Orientation  Right  -DM    Pain Intervention(s)  Medication (See MAR)   nurse in to medicate prior to PT  -DM    Response to Interventions  tolerated  -DM    Vision Assessment/Intervention    Visual Impairment  WFL  -DM    Cognitive Assessment/Intervention    Current Cognitive/Communication Assessment  functional  -DM    Orientation Status  oriented x 4  -DM    Follows Commands/Answers Questions  100% of the time  -DM    Personal Safety  WNL/WFL  -DM    Personal Safety Interventions  fall prevention program maintained;gait belt;muscle strengthening facilitated;nonskid shoes/slippers when out of bed;supervised activity  -DM    ROM (Range of Motion)    General ROM  no range of motion deficits identified  -DM    MMT (Manual Muscle Testing)    General MMT Assessment  no strength deficits identified  -DM    Mobility Assessment/Training  "   Extremity Weight-Bearing Status  right lower extremity  -DM    Right Lower Extremity Weight-Bearing  weight-bearing as tolerated  -DM    Bed Mobility, Assessment/Treatment    Bed Mob, Supine to Sit, [place]  not tested  -DM    Bed Mob, Sit to Supine, [place]  moderate assist (50% patient effort);2 person assist required;verbal cues required;nonverbal cues required (demo/gesture)  -DM    Bed Mobility, Impairments  pain;strength decreased  -DM    Transfer Assessment/Treatment    Transfers, Sit-Stand [place]  minimum assist (75% patient effort);2 person assist required;verbal cues required;nonverbal cues required (demo/gesture)  -DM    Transfers, Stand-Sit [place]  contact guard assist;nonverbal cues required (demo/gesture);verbal cues required  -DM    Transfers, Sit-Stand-Sit, Assist Device  rolling walker  -DM    Transfer, Safety Issues  balance decreased during turns;step length decreased;weight-shifting ability decreased  -DM    Transfer, Impairments  strength decreased;impaired balance  -DM    Transfer, Comment  pt is 6'2\", difficulty standing from low recliner   -DM    Gait Assessment/Treatment    Gait, [place] Level  contact guard assist;1 person + 1 person to manage equipment;verbal cues required  -DM    Gait, Assistive Device  rolling walker  -DM    Gait, Distance (Feet)  20  -DM    Gait, Gait Pattern Analysis  3-point gait  -DM    Gait, Gait Deviations  right:;antalgic;bilateral:;jannet decreased;decreased heel strike;step length decreased;toe-to-floor clearance decreased  -DM    Gait, Safety Issues  balance decreased during turns;step length decreased;weight-shifting ability decreased  -DM    Gait, Impairments  strength decreased;impaired balance;pain  -DM    Motor Skills/Interventions    Additional Documentation  Balance Skills Training (Group)  -DM    Balance Skills Training    Sitting-Level of Assistance  Distant supervision  -DM    Sitting-Balance Support  Feet supported  -DM "    Standing-Level of Assistance  Contact guard  -DM    Static Standing Balance Support  assistive device  -DM    Gait Balance-Level of Assistance  Contact guard;x2  -DM    Gait Balance Support  assistive device  -DM    Therapy Exercises    Exercise Protocols  total hip  -DM    Total Hip Exercises  right:;10 reps;completed protocol;with assist  -DM    Positioning and Restraints    Pre-Treatment Position  sitting in chair/recliner  -DM    Post Treatment Position  bed  -DM    In Bed  notified nsg;fowlers;call light within reach;encouraged to call for assist;exit alarm on;with family/caregiver;RLE elevated   ice  -DM      11/10/17 0927 11/10/17 0146    Rehab Evaluation    Evaluation Not Performed patient unavailable for evaluation   currently in recliner, spouse is bathing him. will check back this AM  -DM     General Information    Equipment Currently Used at Home  none  -JF    Living Environment    Transportation Available  car  -      11/09/17 0955       General Information    Equipment Currently Used at Home none  -PA     Living Environment    Lives With spouse  -PA     Living Arrangements house  -PA     Home Accessibility bed and bath are not on the first floor;ramps present at home;other (see comments)   RAMP FROM INSIDE OUT, HAS CHAIR LIFT FROM 1ST FLOOR TO SECOND.  PT'S WIFE HAS MS  -PA     Stair Railings at Home inside, present on right side   [employer] ON STAIRS  -PA     Type of Financial/Environmental Concern none  -PA     Transportation Available car  -PA       User Key  (r) = Recorded By, (t) = Taken By, (c) = Cosigned By    Initials Name Provider Type    PA Mair Mclean, RN Registered Nurse    JANNA Curran, PT Physical Therapist    [place] Berta Cruz, RN Case Manager     Siri Dunbar, RN Registered Nurse          Physical Therapy Education     Title: PT OT SLP Therapies (Done)     Topic: Physical Therapy (Done)     Point: Mobility training (Done)    Learning Progress Summary    Learner  Readiness Method Response Comment Documented by Status   Patient Acceptance E,TB,D VU,NR  DM 11/10/17 1130 Done               Point: Home exercise program (Done)    Learning Progress Summary    Learner Readiness Method Response Comment Documented by Status   Patient Acceptance E,TB,D VU,NR  DM 11/10/17 1130 Done               Point: Body mechanics (Done)    Learning Progress Summary    Learner Readiness Method Response Comment Documented by Status   Patient Acceptance E,TB,D VU,NR  DM 11/10/17 1130 Done               Point: Precautions (Done)    Learning Progress Summary    Learner Readiness Method Response Comment Documented by Status   Patient Acceptance E,TB,D VU,NR  DM 11/10/17 1130 Done                      User Key     Initials Effective Dates Name Provider Type Discipline     10/06/15 -  Karin Curran, PT Physical Therapist PT                PT Recommendation and Plan  Anticipated Discharge Disposition: skilled nursing facility  Planned Therapy Interventions: balance training, bed mobility training, gait training, home exercise program, patient/family education, strengthening, transfer training  PT Frequency: 2 times/day  Plan of Care Review  Plan Of Care Reviewed With: patient  Progress: progress toward functional goals as expected  Outcome Summary/Follow up Plan: Pt is POD# 1 R BEST, aslo with history of obesity & B foot neuropathy. His PLOF is independent w/o AD & he currently presents with pain & generalized post-op weakness affecting his functional mobility. Spouse has MS. Pt will benefit from continued skilled PT to address deficits for return to PLOF - recommend SNF rehab.          IP PT Goals       11/10/17 1128          Bed Mobility PT LTG    Bed Mobility PT LTG, Date Established 11/10/17  -DM      Bed Mobility PT LTG, Time to Achieve 3 days  -DM      Bed Mobility PT LTG, Activity Type supine to sit/sit to supine  -DM      Bed Mobility PT LTG, [place] minimum assist (75% patient effort)   -DM      Bed Mobility PT Goal  LTG, Assist Device bed rails  -DM      Transfer Training PT LTG    Transfer Training PT LTG, Date Established 11/10/17  -DM      Transfer Training PT LTG, Time to Achieve 3 days  -DM      Transfer Training PT LTG, Activity Type sit to stand/stand to sit  -DM      Transfer Training PT LTG, [place] Level contact guard assist  -DM      Transfer Training PT LTG, Assist Device walker, rolling  -DM      Gait Training PT LTG    Gait Training Goal PT LTG, Date Established 11/10/17  -DM      Gait Training Goal PT LTG, Time to Achieve 3 days  -DM      Gait Training Goal PT LTG, [place] Level contact guard assist  -DM      Gait Training Goal PT LTG, Assist Device walker, rolling  -DM      Gait Training Goal PT LTG, Distance to Achieve 100  -DM        User Key  (r) = Recorded By, (t) = Taken By, (c) = Cosigned By    Initials Name Provider Type    JANNA Curran PT Physical Therapist                Outcome Measures       11/10/17 1100          How much help from another person do you currently need...    Turning from your back to your side while in flat bed without using bedrails? 2  -DM      Moving from lying on back to sitting on the side of a flat bed without bedrails? 2  -DM      Moving to and from a bed to a chair (including a wheelchair)? 3  -DM      Standing up from a chair using your arms (e.g., wheelchair, bedside chair)? 3  -DM      Climbing 3-5 steps with a railing? 1  -DM      To walk in hospital room? 3  -DM      AM-PAC 6 Clicks Score 14  -DM      Functional Assessment    Outcome Measure Options AM-PAC 6 Clicks Basic Mobility (PT)  -DM        User Key  (r) = Recorded By, (t) = Taken By, (c) = Cosigned By    Initials Name Provider Type    JANNA Curran PT Physical Therapist           Time Calculation:         PT Charges       11/10/17 1132 11/10/17 0927       Time Calculation    Start Time 1013  -DM      Stop Time 1026  -DM      Time Calculation (min) 13 min  -DM       PT Received On 11/10/17  -DM      PT - Next Appointment 11/11/17  -DM 11/10/17  -DM     PT Goal Re-Cert Due Date 11/12/17  -DM        User Key  (r) = Recorded By, (t) = Taken By, (c) = Cosigned By    Initials Name Provider Type    DM Karin Curran, PT Physical Therapist          Therapy Charges for Today     Code Description Service Date Service Provider Modifiers Qty    86470411820 HC PT EVAL LOW COMPLEXITY 2 11/10/2017 Karin Cruran, PT GP 1    03196233826 HC PT THER PROC EA 15 MIN 11/10/2017 Karin Curran, PT GP 1    51768289527 HC PT THER SUPP EA 15 MIN 11/10/2017 Karin Curran, PT GP 1          PT G-Codes  Outcome Measure Options: AM-PAC 6 Clicks Basic Mobility (PT)      Karin Curran, PT  11/10/2017

## 2017-11-10 NOTE — PROGRESS NOTES
Noted by RT - patient's nurse for last PM shift gave Spiriva at 0700.This was just discovered by RT( patient requested RT to check back for treatment earlier)-and this was reported.

## 2017-11-11 LAB
HCT VFR BLD AUTO: 27.1 % (ref 40.4–52.2)
HGB BLD-MCNC: 9 G/DL (ref 13.7–17.6)

## 2017-11-11 PROCEDURE — 85018 HEMOGLOBIN: CPT | Performed by: ORTHOPAEDIC SURGERY

## 2017-11-11 PROCEDURE — 97110 THERAPEUTIC EXERCISES: CPT

## 2017-11-11 PROCEDURE — 94799 UNLISTED PULMONARY SVC/PX: CPT

## 2017-11-11 PROCEDURE — 97150 GROUP THERAPEUTIC PROCEDURES: CPT

## 2017-11-11 PROCEDURE — 85014 HEMATOCRIT: CPT | Performed by: ORTHOPAEDIC SURGERY

## 2017-11-11 RX ORDER — OXYCODONE HYDROCHLORIDE AND ACETAMINOPHEN 5; 325 MG/1; MG/1
1-2 TABLET ORAL EVERY 4 HOURS PRN
Qty: 80 TABLET | Refills: 0 | Status: SHIPPED | OUTPATIENT
Start: 2017-11-11 | End: 2018-05-29

## 2017-11-11 RX ORDER — PSEUDOEPHEDRINE HCL 30 MG
100 TABLET ORAL 2 TIMES DAILY PRN
Qty: 40 CAPSULE | Refills: 1 | Status: SHIPPED | OUTPATIENT
Start: 2017-11-11 | End: 2018-09-28

## 2017-11-11 RX ADMIN — TIOTROPIUM BROMIDE 1 CAPSULE: 18 CAPSULE ORAL; RESPIRATORY (INHALATION) at 08:08

## 2017-11-11 RX ADMIN — DOCUSATE SODIUM -SENNOSIDES 2 TABLET: 50; 8.6 TABLET, COATED ORAL at 08:26

## 2017-11-11 RX ADMIN — OXYCODONE HYDROCHLORIDE AND ACETAMINOPHEN 2 TABLET: 5; 325 TABLET ORAL at 13:42

## 2017-11-11 RX ADMIN — CYANOCOBALAMIN TAB 500 MCG 1000 MCG: 500 TAB at 08:25

## 2017-11-11 RX ADMIN — FUROSEMIDE 40 MG: 40 TABLET ORAL at 08:26

## 2017-11-11 RX ADMIN — OXYCODONE HYDROCHLORIDE AND ACETAMINOPHEN 2 TABLET: 5; 325 TABLET ORAL at 17:10

## 2017-11-11 RX ADMIN — ATORVASTATIN CALCIUM 10 MG: 10 TABLET, FILM COATED ORAL at 08:26

## 2017-11-11 RX ADMIN — ASPIRIN 325 MG: 325 TABLET, DELAYED RELEASE ORAL at 08:26

## 2017-11-11 RX ADMIN — FERROUS SULFATE TAB 325 MG (65 MG ELEMENTAL FE) 325 MG: 325 (65 FE) TAB at 08:26

## 2017-11-11 RX ADMIN — PANTOPRAZOLE SODIUM 40 MG: 40 TABLET, DELAYED RELEASE ORAL at 07:32

## 2017-11-11 RX ADMIN — LOSARTAN POTASSIUM 100 MG: 100 TABLET ORAL at 08:26

## 2017-11-11 RX ADMIN — DULOXETINE HYDROCHLORIDE 60 MG: 60 CAPSULE, DELAYED RELEASE ORAL at 08:26

## 2017-11-11 RX ADMIN — METFORMIN HYDROCHLORIDE 850 MG: 850 TABLET ORAL at 08:26

## 2017-11-11 RX ADMIN — DOCUSATE SODIUM -SENNOSIDES 2 TABLET: 50; 8.6 TABLET, COATED ORAL at 17:12

## 2017-11-11 RX ADMIN — ASPIRIN 325 MG: 325 TABLET, DELAYED RELEASE ORAL at 17:10

## 2017-11-11 RX ADMIN — OXYCODONE HYDROCHLORIDE AND ACETAMINOPHEN 2 TABLET: 5; 325 TABLET ORAL at 04:01

## 2017-11-11 RX ADMIN — METFORMIN HYDROCHLORIDE 850 MG: 850 TABLET ORAL at 17:09

## 2017-11-11 RX ADMIN — LEVOTHYROXINE SODIUM 88 MCG: 88 TABLET ORAL at 07:32

## 2017-11-11 RX ADMIN — OXYBUTYNIN CHLORIDE 15 MG: 10 TABLET, EXTENDED RELEASE ORAL at 22:15

## 2017-11-11 RX ADMIN — OXYCODONE HYDROCHLORIDE AND ACETAMINOPHEN 2 TABLET: 5; 325 TABLET ORAL at 23:16

## 2017-11-11 RX ADMIN — OXYCODONE HYDROCHLORIDE AND ACETAMINOPHEN 2 TABLET: 5; 325 TABLET ORAL at 08:26

## 2017-11-11 NOTE — PLAN OF CARE
Problem: Patient Care Overview (Adult)  Goal: Plan of Care Review  Outcome: Ongoing (interventions implemented as appropriate)    11/11/17 1420   Coping/Psychosocial Response Interventions   Plan Of Care Reviewed With patient   Patient Care Overview   Progress improving   Outcome Evaluation   Outcome Summary/Follow up Plan improved tfers and amb eunice strong req SNU at current level of assist due to wife having MS

## 2017-11-11 NOTE — PLAN OF CARE
Problem: Patient Care Overview (Adult)  Goal: Plan of Care Review  Outcome: Ongoing (interventions implemented as appropriate)    11/11/17 1513   Coping/Psychosocial Response Interventions   Plan Of Care Reviewed With patient   Patient Care Overview   Progress improving   Outcome Evaluation   Outcome Summary/Follow up Plan pt doing well with therapy. vss. dressing c/d/i. vss. discussed with pt the importance of blood pressure monitoring, with a h/o htn; the use of CPAP at HS, discussed with pt the importance of the use of IS with h/o COPD. pain is controlled with PO pain medication. pt to be d/c'd to rehab in AM.        Goal: Adult Individualization and Mutuality  Outcome: Ongoing (interventions implemented as appropriate)  Goal: Discharge Needs Assessment  Outcome: Ongoing (interventions implemented as appropriate)    Problem: Fall Risk (Adult)  Goal: Identify Related Risk Factors and Signs and Symptoms  Outcome: Ongoing (interventions implemented as appropriate)  Goal: Absence of Falls  Outcome: Ongoing (interventions implemented as appropriate)    Problem: Hip Replacement, Total (Adult)  Goal: Signs and Symptoms of Listed Potential Problems Will be Absent or Manageable (Hip Replacement, Total)  Outcome: Ongoing (interventions implemented as appropriate)

## 2017-11-11 NOTE — THERAPY TREATMENT NOTE
Acute Care - Physical Therapy Treatment Note  [facility]     Patient Name: Riky Moon  : 1940  MRN: [id number]  Today's Date: 2017             Admit Date: 2017    Visit Dx:    ICD-10-CM ICD-9-CM   1. Difficulty walking R26.2 719.7     Patient Active Problem List   Diagnosis   • OA (osteoarthritis) of hip   • KINDRA treated with auto BiPAP   • Chest pain               Adult Rehabilitation Note       17 0935          Rehab Assessment/Intervention    Discipline physical therapy assistant  -      Document Type therapy note (daily note)  -      Subjective Information agree to therapy;complains of;fatigue;pain  -      Precautions/Limitations fall precautions;hip precautions- right  -      Recorded by [] Yu Harrison PTA      Pain Assessment    Pain Assessment 0-10  -      Pain Score 3  -      Pain Type Surgical pain  -      Pain Location Hip  -      Pain Orientation Right  -      Pain Intervention(s) Medication (See MAR);Repositioned  -      Response to Interventions jeff  -JM      Recorded by [] Yu Harrison PTA      Bed Mobility, Assessment/Treatment    Bed Mobility, Comment in chair  -      Recorded by [JM] Yu Harrison PTA      Transfer Assessment/Treatment    Transfers, Sit-Stand [place] minimum assist (75% patient effort);moderate assist (50% patient effort)  -      Transfers, Stand-Sit [place] contact guard assist;verbal cues required  -      Transfers, Sit-Stand-Sit, Assist Device rolling walker  -      Transfer, Impairments strength decreased  -JM      Recorded by [JM] Yu Harrison PTA      Gait Assessment/Treatment    Gait, [place] Level contact guard assist  -      Gait, Assistive Device rolling walker  -      Gait, Distance (Feet) 45  -      Recorded by [] Yu Harrison PTA      Therapy Exercises    Exercise Protocols total hip  -      Total Hip Exercises right:;15 reps;20 reps;completed protocol;with  assist;hip abduction  -JM      Recorded by [ANDRES] Yu Harrison PTA      Positioning and Restraints    Pre-Treatment Position sitting in chair/recliner  -      Post Treatment Position chair  -JM      In Chair reclined;call light within reach;encouraged to call for assist;notified nsg  -JM      Recorded by [ANDRES] Yu Harrison PTA        User Key  (r) = Recorded By, (t) = Taken By, (c) = Cosigned By    Initials Name Effective Dates    ANDRES Harrison PTA 02/18/16 -                 IP PT Goals       11/10/17 1128          Bed Mobility PT LTG    Bed Mobility PT LTG, Date Established 11/10/17  -DM      Bed Mobility PT LTG, Time to Achieve 3 days  -DM      Bed Mobility PT LTG, Activity Type supine to sit/sit to supine  -DM      Bed Mobility PT LTG, [place] Level minimum assist (75% patient effort)  -DM      Bed Mobility PT Goal  LTG, Assist Device bed rails  -DM      Transfer Training PT LTG    Transfer Training PT LTG, Date Established 11/10/17  -DM      Transfer Training PT LTG, Time to Achieve 3 days  -DM      Transfer Training PT LTG, Activity Type sit to stand/stand to sit  -DM      Transfer Training PT LTG, [place] Level contact guard assist  -DM      Transfer Training PT LTG, Assist Device walker, rolling  -DM      Gait Training PT LTG    Gait Training Goal PT LTG, Date Established 11/10/17  -DM      Gait Training Goal PT LTG, Time to Achieve 3 days  -DM      Gait Training Goal PT LTG, [place] Level contact guard assist  -DM      Gait Training Goal PT LTG, Assist Device walker, rolling  -DM      Gait Training Goal PT LTG, Distance to Achieve 100  -DM        User Key  (r) = Recorded By, (t) = Taken By, (c) = Cosigned By    Initials Name Provider Type    DM Karin Curran PT Physical Therapist          Physical Therapy Education     Title: PT OT SLP Therapies (Done)     Topic: Physical Therapy (Done)     Point: Mobility training (Done)    Learning Progress Summary    Learner Readiness Method  Response Comment Documented by Status   Patient Eager E,TB,D VU   11/11/17 1420 Done    Acceptance TB NR  KJ 11/11/17 1407 Active    Acceptance E,TB,D VU,NR   11/10/17 1130 Done               Point: Home exercise program (Done)    Learning Progress Summary    Learner Readiness Method Response Comment Documented by Status   Patient Eager E,TB,D VU   11/11/17 1420 Done    Acceptance TB NR  KJ 11/11/17 1407 Active    Acceptance E,TB,D VU,NR   11/10/17 1130 Done               Point: Body mechanics (Done)    Learning Progress Summary    Learner Readiness Method Response Comment Documented by Status   Patient Eager E,TB,D VU   11/11/17 1420 Done    Acceptance TB NR  KJ 11/11/17 1407 Active    Acceptance E,TB,D VU,NR   11/10/17 1130 Done               Point: Precautions (Done)    Learning Progress Summary    Learner Readiness Method Response Comment Documented by Status   Patient Eager E,TB,D VU   11/11/17 1420 Done    Acceptance TB NR  KJ 11/11/17 1407 Active    Acceptance E,TB,D VU,NR   11/10/17 1130 Done                      User Key     Initials Effective Dates Name Provider Type Discipline     10/06/15 -  Karin Curran, PT Physical Therapist PT     06/16/16 -  Maki Waddell, RN Registered Nurse Nurse     02/18/16 -  Yu Harrison PTA Physical Therapy Assistant PT                    PT Recommendation and Plan  Anticipated Discharge Disposition: skilled nursing facility  Planned Therapy Interventions: balance training, bed mobility training, gait training, home exercise program, patient/family education, strengthening, transfer training  PT Frequency: 2 times/day  Plan of Care Review  Plan Of Care Reviewed With: patient  Progress: improving  Outcome Summary/Follow up Plan: improved tfers and amb dist, eunice req SNU at current level of assist due to wife having MS          Outcome Measures       11/10/17 1100          How much help from another person do you currently need...    Turning from  your back to your side while in flat bed without using bedrails? 2  -DM      Moving from lying on back to sitting on the side of a flat bed without bedrails? 2  -DM      Moving to and from a bed to a chair (including a wheelchair)? 3  -DM      Standing up from a chair using your arms (e.g., wheelchair, bedside chair)? 3  -DM      Climbing 3-5 steps with a railing? 1  -DM      To walk in hospital room? 3  -DM      AM-PAC 6 Clicks Score 14  -DM      Functional Assessment    Outcome Measure Options AM-PAC 6 Clicks Basic Mobility (PT)  -DM        User Key  (r) = Recorded By, (t) = Taken By, (c) = Cosigned By    Initials Name Provider Type    JANNA Curran PT Physical Therapist           Time Calculation:         PT Charges       11/11/17 1429          Time Calculation    Start Time 0935  -      Stop Time 1018  -      Time Calculation (min) 43 min  -ANDRES      PT Received On 11/11/17  -ANDRES      PT - Next Appointment 11/11/17  -ANDRES        User Key  (r) = Recorded By, (t) = Taken By, (c) = Cosigned By    Initials Name Provider Type    ANDRES Harrison PTA Physical Therapy Assistant          Therapy Charges for Today     Code Description Service Date Service Provider Modifiers Qty    73773018329 HC PT THER PROC EA 15 MIN 11/11/2017 Yu Harrison PTA GP 1    56138337149 HC PT THER PROC GROUP 11/11/2017 Yu Harrison PTA GP 1          PT G-Codes  Outcome Measure Options: AM-PAC 6 Clicks Basic Mobility (PT)    Yu Harrison PTA  11/11/2017

## 2017-11-11 NOTE — PLAN OF CARE
Problem: Patient Care Overview (Adult)  Goal: Plan of Care Review  Outcome: Ongoing (interventions implemented as appropriate)    11/10/17 1800   Coping/Psychosocial Response Interventions   Plan Of Care Reviewed With patient   Patient Care Overview   Progress progress toward functional goals as expected   Outcome Evaluation   Outcome Summary/Follow up Plan Neurovascular checks intact. In and out of nasal oxygen. Pain being relieved with PO pain meds. IV fluids infusing. Up with assist of 2 and use of gait belt. Encouraged to get up in chair at intervals. Voiding without difficulty. Discussed the importance of low carb diet due to history of diabetes. Also discussed the importance of monitoring B/P. due to history of hypertenstion. Plans to go to [facility] when discharged.         Problem: Fall Risk (Adult)  Goal: Absence of Falls  Outcome: Ongoing (interventions implemented as appropriate)    Problem: Hip Replacement, Total (Adult)  Goal: Signs and Symptoms of Listed Potential Problems Will be Absent or Manageable (Hip Replacement, Total)  Outcome: Ongoing (interventions implemented as appropriate)

## 2017-11-11 NOTE — DISCHARGE SUMMARY
Discharge Summary    Patient Name:  Riky Moon  YOB: 1940  Medical Records Number:  [id number]    Date of Admission:  11/9/2017  Date of Discharge:  11/12/2017    Primary Discharge Diagnosis:  OA (osteoarthritis) of hip [M16.9]    Secondary Discharge Diagnosis:    Problem List Items Addressed This Visit     None          Procedures Performed:  Right Total Hip Arthroplasty      Hospital Course:    Riky Moon is a 77 y.o.  male who underwent successful right dina on 11/9/2017.  Riky Moon was started on Aspirin 325mg po twice daily immediately post-operatively for DVT prophylaxis.  On post-op day 1 the patients dressing was changed and their incision was clean, with no signs of infection and their calf was soft, with no signs of DVT.  The patient progressed well with physical therapy and the patients hemoglobin remained stable. On post-operative day 3 the patient was felt ready for discharge.      Total Hip Replacement Discharge Instructions:    I. ACTIVITIES:  1. Exercises:  ? Complete exercise program as taught by the hospital physical therapist 2 times per day  ? Exercise program will be advanced by the physical therapist  ? During the day be up ambulating every 2 hours (while awake) for short distances  ? Complete the ankle pump exercises at least 10 times per hour (while awake)  ? Elevate legs when in bed and for at least 30 minutes during the day.Use cold packs 20-30 minutes approximately 5 times per day. This should be done before and after completing your exercises and at any time you are experiencing pain/ stiffness in your operative extremity.      2. Activities of Daily Living:  ? No tub baths, hot tubs, or swimming pools for 4 weeks  ? May shower and let water run over the incision on post-operative day #5 if no drainage. Do not scrub or rub the incision. Simply let the water run over the incision and pat dry.    II. Restrictions  ? Continue hip precautions as taught at the  hospital  ? Your surgeon will discuss with you when you will be able to drive again.  ? Weight bearing is as tolerated  ? First week stay inside on even terrain. May go up and down stairs one stair at a time utilizing the hand rail once cleared by physical therapy to do so.  ? After one week, you may venture outside (if cleared to do so by physical therapist).    III. Precautions:  ? Everyone that comes near you should wash their hands  ? No elective dental, genital-urinary, or colon procedures or surgical procedures for 12 weeks after surgery unless absolutely necessary.  ?  If dental work or surgical procedure is deemed absolutely necessary, you will need to contact your surgeon as you will need to take antibiotics 1 hour prior to any dental work (including teeth cleanings).  ? Please discuss with your surgeon prophylactic antibiotics as the length of time this intervention will be necessary for you varies with each patient’s health history and situation.  ? Avoid sick people. If you must be around someone who is ill, they should wear a mask.  ? Avoid visits to the Emergency Room or Urgent Care unless you are having a life threatening event.   ? If ordered stockings are to be placed on in the morning and removed at night. Monitor the stockings to ensure that any swelling is not causing the stockings to become too tight. In this case, remove stockings immediately.    IV. INCISION CARE:  ? Wash your hands prior to dressing changes  ? Change the dressing as needed to keep incision clean and dry. Utilize dry gauze and paper tape. Avoid touching the side of the gauze that goes against the incision with your hands.  ? No creams or ointments to the incision  ? May remove dressing once the incision is free of drainage  ? Do not touch or pick at the incision  ? Check incision every day and notify surgeon immediately if any of the following signs or symptoms are noted:  o Increase in redness  o Increase in swelling around  the incision and of the entire extremity  o Increase in pain  o Drainage oozing from the incision  o Pulling apart of the edges of the incision  o Increase in overall body temperature (greater than 100.5 degrees)  ? Your surgeon will instruct you regarding suture or staple removal    V. Medications:   1. Anticoagulants: You will be discharged on an anticoagulant. This is a prophylactic medication that helps prevent blood clots during your post-operative period. The type and length of dosage varies based on your individual needs, procedure performed, and surgeon’s preference.  ? While taking the anticoagulant, you should avoid taking any additional aspirin, ibuprofen (Advil or Motrin), Aleve (Naprosyn) or other non-steroidal anti-inflammatory medications.   ? Notify surgeon immediately if any stephanie bleeding is noted in the urine, stool, emesis, or from the nose or the incision. Blood in the stool will often appear as black rather than red. Blood in urine may appear as pink. Blood in emesis may appear as brown/black like coffee grounds.  ? You will need to apply pressure for longer periods of time to any cuts or abrasions to stop bleeding  ? Avoid alcohol while taking anticoagulants    2. Stool Softeners: You will be at greater risk of constipation after surgery due to being less mobile and the pain medications.   ? Take stool softeners as instructed by your surgeon while on pain medications. Over the counter Colace 100 mg 1-2 capsules twice daily.   ? If stools become too loose or too frequent, please decreases the dosage or stop the stool softener.  ? If constipation occurs despite use of stool softeners, you are to continue the stool softeners and add a laxative (Milk of Magnesia 1 ounce daily as needed)  ? Drink plenty of fluids, and eat fruits and vegetables during your recovery time    3. Pain Medications utilized after surgery are narcotics and the law requires that the following information be given to all  patients that are prescribed narcotics:  ? CLASSIFICATION: Pain medications are called Opioids and are narcotics  ? LEGALITIES: It is illegal to share narcotics with others and to drive within 24 hours of taking narcotics  ? POTENTIAL SIDE EFFECTS: Potential side effects of opioids include: nausea, vomiting, itching, dizziness, drowsiness, dry mouth, constipation, and difficulty urinating.  ? POTENTIAL ADVERSE EFFECTS:   o Opioid tolerance can develop with use of pain medications and this simply means that it requires more and more of the medication to control pain; however, this is seen more in patients that use opioids for longer periods of time.  o Opioid dependence can develop with use of Opioids and this simply means that to stop the medication can cause withdrawal symptoms; however, this is seen with patients that use Opioids for longer periods of time.  o Opioid addiction can develop with use of Opioids and the incidence of this is very unlikely in patients who take the medications as ordered and stop the medications as instructed.  o Opioid overdose can be dangerous, but is unlikely when the medication is taken as ordered and stopped when ordered. It is important not to mix opioids with alcohol or with and type of sedative such as Benadryl as this can lead to over sedation and respiratory difficulty.  ? DOSAGE:   o Pain medications will need to be taken consistently for the first week to decrease pain and promote adequate pain relief and participation in physical therapy.  o After the initial surgical pain begins to resolve, you may begin to decrease the pain medication. By the end of 6-8 weeks, you should be off of pain medications.  o Refills will not be given by the office during evening hours, on weekends, or after 6-8 weeks post-op.  o To seek refills on pain medications during the initial 6 week post-operative period, you must call the office 48 hours in advance to request the refill. The office will  then notify you when to  the prescription. DO NOT wait until you are out of the medication to request a refill.    V. FOLLOW-UP VISITS:  ? You will need to follow up in the office with your surgeon in 3 weeks. Please call this number [phone]  to schedule this appointment.  If you have any concerns or suspected complications prior to your follow up visit, please call your surgeons office. Do not wait until your appointment time if you suspect complications. These will need to be addressed in the office promptly.    Discharge Medications:    1) Percocet 5/325 mg 1-2 po q 4-6 hours prn pain  2)  Enteric Coated Aspirin 325 mg po twice daily for 2 weeks, then one daily for 4 weeks    CC:Josue Yung MD:Riky Fernando MD

## 2017-11-11 NOTE — THERAPY TREATMENT NOTE
Acute Care - Physical Therapy Treatment Note  [facility]     Patient Name: Riky Moon  : 1940  MRN: [id number]  Today's Date: 2017             Admit Date: 2017    Visit Dx:    ICD-10-CM ICD-9-CM   1. Difficulty walking R26.2 719.7     Patient Active Problem List   Diagnosis   • OA (osteoarthritis) of hip   • KINDRA treated with auto BiPAP   • Chest pain               Adult Rehabilitation Note       17 1438 17 0935       Rehab Assessment/Intervention    Discipline physical therapy assistant  - physical therapy assistant  -     Document Type therapy note (daily note)  - therapy note (daily note)  -     Subjective Information agree to therapy;complains of;weakness;fatigue;pain  - agree to therapy;complains of;fatigue;pain  -JM     Precautions/Limitations fall precautions;hip precautions- right  - fall precautions;hip precautions- right  -     Recorded by [ANDRES] Yu Harrison PTA [JM] Yu Harrison PTA     Pain Assessment    Pain Assessment 0-10  -JM 0-10  -JM     Pain Score 5  -JM 3  -JM     Pain Type Surgical pain  -JM Surgical pain  -     Pain Location Hip  -JM Hip  -JM     Pain Orientation Right  -JM Right  -JM     Pain Intervention(s) Medication (See MAR);Repositioned;Ambulation/increased activity  - Medication (See MAR);Repositioned  -     Response to Interventions jeff  -JM jeff  -JM     Recorded by [ANDRES] Yu Harrison PTA [JM] Yu Harrison PTA     Bed Mobility, Assessment/Treatment    Bed Mobility, Comment in chair  -JM in chair  -JM     Recorded by [ANDRES] Yu Harrison PTA [JM] Yu Harrison PTA     Transfer Assessment/Treatment    Transfers, Sit-Stand [place] moderate assist (50% patient effort);maximum assist (25% patient effort)  - minimum assist (75% patient effort);moderate assist (50% patient effort)  -     Transfers, Stand-Sit [place] contact guard assist;verbal cues required  - contact guard assist;verbal cues required   -JM     Transfers, Sit-Stand-Sit, Assist Device rolling walker  - rolling walker  -     Transfer, Impairments strength decreased  - strength decreased  -     Transfer, Comment more pain req incr assist to stand  -      Recorded by [ANDRES] Yu Harrison PTA [JM] Yu Harrison PTA     Gait Assessment/Treatment    Gait, [place] Level contact guard assist  - contact guard assist  -     Gait, Assistive Device rolling walker  - rolling walker  -     Gait, Distance (Feet) 55  -JM 45  -JM     Gait, Gait Deviations antalgic;jannet decreased;step length decreased;forward flexed posture  -      Gait, Safety Issues step length decreased  - step length decreased;weight-shifting ability decreased  -     Gait, Impairments strength decreased  - strength decreased  -     Gait, Comment shaky, fatigued quickly  -      Recorded by [ANDRES] Yu Harrison PTA [JM] Yu Harrison PTA     Therapy Exercises    Exercise Protocols total hip  - total hip  -     Total Hip Exercises right:;completed protocol;with assist;hip abduction;25 reps  - right:;15 reps;20 reps;completed protocol;with assist;hip abduction  -     Recorded by [ANDRES] Yu Harrison PTA [JM] Yu Harrison PTA     Positioning and Restraints    Pre-Treatment Position sitting in chair/recliner  - sitting in chair/recliner  -     Post Treatment Position  chair  -JM     In Chair reclined;call light within reach;encouraged to call for assist;with family/caregiver;notified nsg  - reclined;call light within reach;encouraged to call for assist;notified nsg  -     Recorded by [ANDRES] Yu Harrison PTA [JM] Yu Harrison PTA       User Key  (r) = Recorded By, (t) = Taken By, (c) = Cosigned By    Initials Name Effective Dates    ANDRES Harrison PTA 02/18/16 -                 IP PT Goals       11/10/17 1128          Bed Mobility PT LTG    Bed Mobility PT LTG, Date Established 11/10/17  -DM      Bed Mobility PT LTG, Time to  Achieve 3 days  -DM      Bed Mobility PT LTG, Activity Type supine to sit/sit to supine  -DM      Bed Mobility PT LTG, [place] Level minimum assist (75% patient effort)  -DM      Bed Mobility PT Goal  LTG, Assist Device bed rails  -DM      Transfer Training PT LTG    Transfer Training PT LTG, Date Established 11/10/17  -DM      Transfer Training PT LTG, Time to Achieve 3 days  -DM      Transfer Training PT LTG, Activity Type sit to stand/stand to sit  -DM      Transfer Training PT LTG, [place] Level contact guard assist  -DM      Transfer Training PT LTG, Assist Device walker, rolling  -DM      Gait Training PT LTG    Gait Training Goal PT LTG, Date Established 11/10/17  -DM      Gait Training Goal PT LTG, Time to Achieve 3 days  -DM      Gait Training Goal PT LTG, [place] Level contact guard assist  -DM      Gait Training Goal PT LTG, Assist Device walker, rolling  -DM      Gait Training Goal PT LTG, Distance to Achieve 100  -DM        User Key  (r) = Recorded By, (t) = Taken By, (c) = Cosigned By    Initials Name Provider Type    JANNA Curran, PT Physical Therapist          Physical Therapy Education     Title: PT OT SLP Therapies (Done)     Topic: Physical Therapy (Done)     Point: Mobility training (Done)    Learning Progress Summary    Learner Readiness Method Response Comment Documented by Status   Patient Eager JULIAN MOLINA D VU JM 11/11/17 1420 Done    Acceptance TB NR  KJ 11/11/17 1407 Active    Acceptance JULIAN MOLINA D VU,NR  DM 11/10/17 1130 Done               Point: Home exercise program (Done)    Learning Progress Summary    Learner Readiness Method Response Comment Documented by Status   Patient Eager JULIAN MOLINA D VU JM 11/11/17 1420 Done    Acceptance TB NR  KJ 11/11/17 1407 Active    Acceptance JULIAN MOLINA D VU,NR  DM 11/10/17 1130 Done               Point: Body mechanics (Done)    Learning Progress Summary    Learner Readiness Method Response Comment Documented by Status   Patient Eager JULIAN MOLINA D VU JM  11/11/17 1420 Done    Acceptance TB NR  KJ 11/11/17 1407 Active    Acceptance E,TB,D VU,NR  DM 11/10/17 1130 Done               Point: Precautions (Done)    Learning Progress Summary    Learner Readiness Method Response Comment Documented by Status   Patient Eager E,TB,D VU   11/11/17 1420 Done    Acceptance TB NR  KJ 11/11/17 1407 Active    Acceptance E,TB,D VU,NR   11/10/17 1130 Done                      User Key     Initials Effective Dates Name Provider Type Discipline     10/06/15 -  Karin Curran, PT Physical Therapist PT     06/16/16 -  Maki Waddell, RN Registered Nurse Nurse     02/18/16 -  Yu Harrison, HEAVEN Physical Therapy Assistant PT                    PT Recommendation and Plan  Anticipated Discharge Disposition: skilled nursing facility  Planned Therapy Interventions: balance training, bed mobility training, gait training, home exercise program, patient/family education, strengthening, transfer training  PT Frequency: 2 times/day  Plan of Care Review  Plan Of Care Reviewed With: patient  Progress: improving  Outcome Summary/Follow up Plan: improved tfers and amb eunice strong [facility] at current level of assist due to wife having MS          Outcome Measures       11/10/17 1100          How much help from another person do you currently need...    Turning from your back to your side while in flat bed without using bedrails? 2  -DM      Moving from lying on back to sitting on the side of a flat bed without bedrails? 2  -DM      Moving to and from a bed to a chair (including a wheelchair)? 3  -DM      Standing up from a chair using your arms (e.g., wheelchair, bedside chair)? 3  -DM      Climbing 3-5 steps with a railing? 1  -DM      To walk in hospital room? 3  -DM      AM-PAC 6 Clicks Score 14  -DM      Functional Assessment    Outcome Measure Options AM-PAC 6 Clicks Basic Mobility (PT)  -DM        User Key  (r) = Recorded By, (t) = Taken By, (c) = Cosigned By    Initials Name Provider  Type    DM Karin Curran, PT Physical Therapist           Time Calculation:         PT Charges       11/11/17 1448 11/11/17 1429       Time Calculation    Start Time 1408  -ANDRES 0935  -     Stop Time 1437  -ANDRES 1018  -ANDRES     Time Calculation (min) 29 min  -ANDRES 43 min  -ANDRES     PT Received On 11/11/17  -ANDRES 11/11/17  -ANDRES     PT - Next Appointment 11/12/17  -ANDRES 11/11/17  -ANDRES       User Key  (r) = Recorded By, (t) = Taken By, (c) = Cosigned By    Initials Name Provider Type    ANDRES Harrison PTA Physical Therapy Assistant          Therapy Charges for Today     Code Description Service Date Service Provider Modifiers Qty    04805085737 HC PT THER PROC EA 15 MIN 11/11/2017 Yu Harrison PTA GP 1    84659853477 HC PT THER PROC GROUP 11/11/2017 Yu Harrison PTA GP 1    05003156351 HC PT THER PROC EA 15 MIN 11/11/2017 Yu Harrison PTA GP 1    79669515599 HC PT THER PROC GROUP 11/11/2017 Yu Harrison PTA GP 1          PT G-Codes  Outcome Measure Options: AM-PAC 6 Clicks Basic Mobility (PT)    Yu Harrison PTA  11/11/2017

## 2017-11-11 NOTE — PROGRESS NOTES
"Ortho POD 2    Patients Name:  Riky Moon  YOB: 1940  Medical Records Number:  [id number]    No complaints except pain    /66 (BP Location: Right arm, Patient Position: Lying)  Pulse 75  Temp 97.1 °F (36.2 °C) (Oral)   Resp 16  Ht 74\" (188 cm)  Wt (!) 306 lb 8 oz (139 kg)  SpO2 95%  BMI 39.35 kg/m2    RLE:  NVI, calf nontender, sensation intact  No signs of DVT    Incision: clean, no infection    Lab Results (last 24 hours)     Procedure Component Value Units Date/Time    Hemoglobin & Hematocrit, Blood [[id number]]  (Abnormal) Collected:  11/11/17 0548    Specimen:  Blood Updated:  11/11/17 0622     Hemoglobin 9.0 (L) g/dL      Hematocrit 27.1 (L) %           S/p Right BEST  WBAT with walker  ASA for DVT prophylaxis  Rehab tomorrow  "

## 2017-11-11 NOTE — PLAN OF CARE
Problem: Patient Care Overview (Adult)  Goal: Plan of Care Review  Outcome: Ongoing (interventions implemented as appropriate)    11/11/17 0637   Coping/Psychosocial Response Interventions   Plan Of Care Reviewed With patient   Patient Care Overview   Progress improving   Outcome Evaluation   Outcome Summary/Follow up Plan VSS, pain minimal, BRP, RA, SL, encouraged IS, educated on BP monitoring and CPAP at , plans for rehab on SUnday       Goal: Adult Individualization and Mutuality  Outcome: Ongoing (interventions implemented as appropriate)  Goal: Discharge Needs Assessment  Outcome: Ongoing (interventions implemented as appropriate)    Problem: Fall Risk (Adult)  Goal: Absence of Falls  Outcome: Ongoing (interventions implemented as appropriate)    Problem: Hip Replacement, Total (Adult)  Goal: Signs and Symptoms of Listed Potential Problems Will be Absent or Manageable (Hip Replacement, Total)  Outcome: Ongoing (interventions implemented as appropriate)

## 2017-11-12 VITALS
RESPIRATION RATE: 18 BRPM | SYSTOLIC BLOOD PRESSURE: 108 MMHG | BODY MASS INDEX: 39.34 KG/M2 | HEART RATE: 83 BPM | WEIGHT: 306.5 LBS | DIASTOLIC BLOOD PRESSURE: 57 MMHG | HEIGHT: 74 IN | TEMPERATURE: 98.2 F | OXYGEN SATURATION: 95 %

## 2017-11-12 LAB
HCT VFR BLD AUTO: 25.8 % (ref 40.4–52.2)
HGB BLD-MCNC: 8.4 G/DL (ref 13.7–17.6)

## 2017-11-12 PROCEDURE — 94799 UNLISTED PULMONARY SVC/PX: CPT

## 2017-11-12 PROCEDURE — 97110 THERAPEUTIC EXERCISES: CPT

## 2017-11-12 PROCEDURE — 97150 GROUP THERAPEUTIC PROCEDURES: CPT

## 2017-11-12 PROCEDURE — 85018 HEMOGLOBIN: CPT | Performed by: ORTHOPAEDIC SURGERY

## 2017-11-12 PROCEDURE — 85014 HEMATOCRIT: CPT | Performed by: ORTHOPAEDIC SURGERY

## 2017-11-12 RX ADMIN — DOCUSATE SODIUM -SENNOSIDES 2 TABLET: 50; 8.6 TABLET, COATED ORAL at 08:25

## 2017-11-12 RX ADMIN — LOSARTAN POTASSIUM 100 MG: 100 TABLET ORAL at 08:26

## 2017-11-12 RX ADMIN — FERROUS SULFATE TAB 325 MG (65 MG ELEMENTAL FE) 325 MG: 325 (65 FE) TAB at 08:26

## 2017-11-12 RX ADMIN — OXYCODONE HYDROCHLORIDE AND ACETAMINOPHEN 1 TABLET: 5; 325 TABLET ORAL at 08:24

## 2017-11-12 RX ADMIN — CYANOCOBALAMIN TAB 500 MCG 1000 MCG: 500 TAB at 08:26

## 2017-11-12 RX ADMIN — PANTOPRAZOLE SODIUM 40 MG: 40 TABLET, DELAYED RELEASE ORAL at 06:44

## 2017-11-12 RX ADMIN — TIOTROPIUM BROMIDE 1 CAPSULE: 18 CAPSULE ORAL; RESPIRATORY (INHALATION) at 08:00

## 2017-11-12 RX ADMIN — METFORMIN HYDROCHLORIDE 850 MG: 850 TABLET ORAL at 08:26

## 2017-11-12 RX ADMIN — ASPIRIN 325 MG: 325 TABLET, DELAYED RELEASE ORAL at 08:25

## 2017-11-12 RX ADMIN — LEVOTHYROXINE SODIUM 88 MCG: 88 TABLET ORAL at 06:44

## 2017-11-12 RX ADMIN — ATORVASTATIN CALCIUM 10 MG: 10 TABLET, FILM COATED ORAL at 09:49

## 2017-11-12 RX ADMIN — FUROSEMIDE 40 MG: 40 TABLET ORAL at 08:25

## 2017-11-12 RX ADMIN — OXYCODONE HYDROCHLORIDE AND ACETAMINOPHEN 1 TABLET: 5; 325 TABLET ORAL at 14:02

## 2017-11-12 RX ADMIN — DULOXETINE HYDROCHLORIDE 60 MG: 60 CAPSULE, DELAYED RELEASE ORAL at 08:25

## 2017-11-12 RX ADMIN — OXYCODONE HYDROCHLORIDE AND ACETAMINOPHEN 2 TABLET: 5; 325 TABLET ORAL at 04:48

## 2017-11-12 NOTE — PLAN OF CARE
Problem: Patient Care Overview (Adult)  Goal: Plan of Care Review  Outcome: Ongoing (interventions implemented as appropriate)    11/12/17 1411   Coping/Psychosocial Response Interventions   Plan Of Care Reviewed With patient   Patient Care Overview   Progress improving   Outcome Evaluation   Outcome Summary/Follow up Plan VSS. Minimal pain noted, controlled with PO pain med. Up to chair and ambulating with assist x1. Discussed blood glucose monitoring and med r/t DM. Discharging to rehab today via ambulance.          Problem: Fall Risk (Adult)  Goal: Absence of Falls  Outcome: Ongoing (interventions implemented as appropriate)    Problem: Hip Replacement, Total (Adult)  Goal: Signs and Symptoms of Listed Potential Problems Will be Absent or Manageable (Hip Replacement, Total)  Outcome: Ongoing (interventions implemented as appropriate)

## 2017-11-12 NOTE — PROGRESS NOTES
"Ortho POD 3    Patients Name:  Riky Moon  YOB: 1940  Medical Records Number:  [id number]    No complaints except post-op pain and some difficulty with ambulation    /63 (BP Location: Left arm, Patient Position: Lying)  Pulse 78  Temp 97.2 °F (36.2 °C) (Oral)   Resp 18  Ht 74\" (188 cm)  Wt (!) 306 lb 8 oz (139 kg)  SpO2 95%  BMI 39.35 kg/m2    RLE:  NVI, calf nontender, sensation intact  No signs of DVT  Incision: c/d/i      Lab Results (last 24 hours)     Procedure Component Value Units Date/Time    Hemoglobin & Hematocrit, Blood [[id number]]  (Abnormal) Collected:  11/12/17 0426    Specimen:  Blood Updated:  11/12/17 0500     Hemoglobin 8.4 (L) g/dL      Hematocrit 25.8 (L) %           S/p Right BEST  WBAT with walker  ASA for DVT prophylaxis  D/c  To Rehab today  Follow up with Dr. Fernando per d/c summary    I have examined this patient with VAISHNAVI Wells.   I have reviewed the labs, done the physical examination.  I agree with above evaluation and plan and  Note.    "

## 2017-11-12 NOTE — THERAPY TREATMENT NOTE
Acute Care - Physical Therapy Treatment Note  [facility]     Patient Name: Riky Moon  : 1940  MRN: [id number]  Today's Date: 2017             Admit Date: 2017    Visit Dx:    ICD-10-CM ICD-9-CM   1. Difficulty walking R26.2 719.7     Patient Active Problem List   Diagnosis   • OA (osteoarthritis) of hip   • KINDRA treated with auto BiPAP   • Chest pain               Adult Rehabilitation Note       17 1041 17 1438 17 0935    Rehab Assessment/Intervention    Discipline physical therapy assistant  -ANDRES physical therapy assistant  -ANDRES physical therapy assistant  -    Document Type therapy note (daily note)  -ANDRES therapy note (daily note)  - therapy note (daily note)  -    Subjective Information agree to therapy;complains of;fatigue;pain  - agree to therapy;complains of;weakness;fatigue;pain  - agree to therapy;complains of;fatigue;pain  -JM    Precautions/Limitations fall precautions;hip precautions- right  - fall precautions;hip precautions- right  - fall precautions;hip precautions- right  -    Recorded by [JM] Yu Harrison PTA [JM] Yu Harrison PTA [JM] Yu Harrison PTA    Pain Assessment    Pain Assessment 0-10  -JM 0-10  -JM 0-10  -JM    Pain Score 3  -JM 5  -JM 3  -JM    Pain Type Surgical pain  -JM Surgical pain  -JM Surgical pain  -JM    Pain Location Hip  -JM Hip  -JM Hip  -JM    Pain Orientation Right  -JM Right  -JM Right  -JM    Pain Intervention(s) Medication (See MAR);Repositioned  -JM Medication (See MAR);Repositioned;Ambulation/increased activity  - Medication (See MAR);Repositioned  -JM    Response to Interventions jeff  -JM jeff  -JM jeff  -JM    Recorded by [ANDRES] Yu Harrison PTA [JM] Yu Harrison PTA [JM] Yu Harrison PTA    Bed Mobility, Assessment/Treatment    Bed Mobility, Comment in chair  -JM in chair  -JM in chair  -JM    Recorded by [ANDRES] Yu Harrison PTA [JM] Yu Harrison PTA [JM] Yu Harrison PTA     Transfer Assessment/Treatment    Transfers, Sit-Stand Ford moderate assist (50% patient effort);minimum assist (75% patient effort)  - moderate assist (50% patient effort);maximum assist (25% patient effort)  - minimum assist (75% patient effort);moderate assist (50% patient effort)  -    Transfers, Stand-Sit Ford verbal cues required;minimum assist (75% patient effort)  - contact guard assist;verbal cues required  - contact guard assist;verbal cues required  -    Transfers, Sit-Stand-Sit, Assist Device rolling walker  -JM rolling walker  -JM rolling walker  -    Transfer, Impairments strength decreased  - strength decreased  - strength decreased  -    Transfer, Comment improved , but req 2 attempts  - more pain req incr assist to stand  -     Recorded by [JM] Yu Harrison PTA [JM] Yu Harrison PTA [JM] Yu Harrison PTA    Gait Assessment/Treatment    Gait, Ford Level contact guard assist  - contact guard assist  - contact guard assist  -    Gait, Assistive Device rolling walker  -JM rolling walker  -JM rolling walker  -    Gait, Distance (Feet) 60  -JM 55  -JM 45  -JM    Gait, Gait Deviations jannet decreased;step length decreased  - antalgic;jannet decreased;step length decreased;forward flexed posture  -     Gait, Safety Issues step length decreased  - step length decreased  - step length decreased;weight-shifting ability decreased  -    Gait, Impairments strength decreased  - strength decreased  - strength decreased  -    Gait, Comment fatigued quickly, but not as shaky today  - shaky, fatigued quickly  -     Recorded by [JM] Yu Harrison PTA [JM] Yu Harrison PTA [JM] Yu Harrison PTA    Therapy Exercises    Exercise Protocols total hip  - total hip  - total hip  -    Total Hip Exercises right:;completed protocol;30 reps  -JM right:;completed protocol;with assist;hip abduction;25 reps  -JM right:;15  reps;20 reps;completed protocol;with assist;hip abduction  -JM    Recorded by [ANDRES] Yu Harrison PTA [ANDRES] Yu Harrison PTA [ANDRES] Yu Harrison PTA    Positioning and Restraints    Pre-Treatment Position sitting in chair/recliner  -JM sitting in chair/recliner  -JM sitting in chair/recliner  -JM    Post Treatment Position   chair  -JM    In Chair reclined;call light within reach;encouraged to call for assist;notified nsg  -JM reclined;call light within reach;encouraged to call for assist;with family/caregiver;notified nsg  -JM reclined;call light within reach;encouraged to call for assist;notified nsg  -JM    Recorded by [ANDRES] Yu Harrison PTA [ANDRES] Yu Harrison PTA [ANDRES] Yu Harrison PTA      User Key  (r) = Recorded By, (t) = Taken By, (c) = Cosigned By    Initials Name Effective Dates    ANDRES Harrison PTA 02/18/16 -                 IP PT Goals       11/10/17 1128          Bed Mobility PT LTG    Bed Mobility PT LTG, Date Established 11/10/17  -DM      Bed Mobility PT LTG, Time to Achieve 3 days  -DM      Bed Mobility PT LTG, Activity Type supine to sit/sit to supine  -DM      Bed Mobility PT LTG, Webb Level minimum assist (75% patient effort)  -DM      Bed Mobility PT Goal  LTG, Assist Device bed rails  -DM      Transfer Training PT LTG    Transfer Training PT LTG, Date Established 11/10/17  -DM      Transfer Training PT LTG, Time to Achieve 3 days  -DM      Transfer Training PT LTG, Activity Type sit to stand/stand to sit  -DM      Transfer Training PT LTG, Webb Level contact guard assist  -DM      Transfer Training PT LTG, Assist Device walker, rolling  -DM      Gait Training PT LTG    Gait Training Goal PT LTG, Date Established 11/10/17  -DM      Gait Training Goal PT LTG, Time to Achieve 3 days  -DM      Gait Training Goal PT LTG, Webb Level contact guard assist  -DM      Gait Training Goal PT LTG, Assist Device walker, rolling  -DM      Gait Training Goal PT LTG,  Distance to Achieve 100  -DM        User Key  (r) = Recorded By, (t) = Taken By, (c) = Cosigned By    Initials Name Provider Type    DM Karin Curran, PT Physical Therapist          Physical Therapy Education     Title: PT OT SLP Therapies (Done)     Topic: Physical Therapy (Done)     Point: Mobility training (Done)    Learning Progress Summary    Learner Readiness Method Response Comment Documented by Status   Patient Acceptance E,TB VU hip prec educ  11/12/17 1044 Done    Eager E,TB,D VU   11/11/17 1420 Done    Acceptance TB NR  KJ 11/11/17 1407 Active    Acceptance E,TB,D VU,NR   11/10/17 1130 Done               Point: Home exercise program (Done)    Learning Progress Summary    Learner Readiness Method Response Comment Documented by Status   Patient Acceptance E,TB VU hip prec educ  11/12/17 1044 Done    Eager E,TB,D VU   11/11/17 1420 Done    Acceptance TB NR  KJ 11/11/17 1407 Active    Acceptance E,TB,D VU,NR   11/10/17 1130 Done               Point: Body mechanics (Done)    Learning Progress Summary    Learner Readiness Method Response Comment Documented by Status   Patient Acceptance E,TB VU hip prec educ  11/12/17 1044 Done    Eager E,TB,D VU   11/11/17 1420 Done    Acceptance TB NR  KJ 11/11/17 1407 Active    Acceptance E,TB,D VU,NR   11/10/17 1130 Done               Point: Precautions (Done)    Learning Progress Summary    Learner Readiness Method Response Comment Documented by Status   Patient Acceptance E,TB VU hip prec educ  11/12/17 1044 Done    Eager E,TB,D VU   11/11/17 1420 Done    Acceptance TB NR  KJ 11/11/17 1407 Active    Acceptance E,TB,D VU,NR   11/10/17 1130 Done                      User Key     Initials Effective Dates Name Provider Type Discipline     10/06/15 -  Karin Curran, PT Physical Therapist PT     06/16/16 -  Maki Waddell, RN Registered Nurse Nurse     02/18/16 -  Yu Harrison PTA Physical Therapy Assistant PT                    PT  Recommendation and Plan  Anticipated Discharge Disposition: skilled nursing facility  Planned Therapy Interventions: balance training, bed mobility training, gait training, home exercise program, patient/family education, strengthening, transfer training  PT Frequency: 2 times/day  Plan of Care Review  Plan Of Care Reviewed With: patient  Progress: improving  Outcome Summary/Follow up Plan: improved tfers and [facility] at current level of assist due to wife having MS          Outcome Measures       11/12/17 1000 11/11/17 1100 11/10/17 1100    How much help from another person do you currently need...    Turning from your back to your side while in flat bed without using bedrails? 2  -JM 2  -JM 2  -DM    Moving from lying on back to sitting on the side of a flat bed without bedrails? 2  -JM 2  -JM 2  -DM    Moving to and from a bed to a chair (including a wheelchair)? 3  -JM 3  -JM 3  -DM    Standing up from a chair using your arms (e.g., wheelchair, bedside chair)? 2  -JM 2  -JM 3  -DM    Climbing 3-5 steps with a railing? 1  -JM 1  -JM 1  -DM    To walk in hospital room? 3  -JM 3  -JM 3  -DM    AM-PAC 6 Clicks Score 13  -JM 13  -JM 14  -DM    Functional Assessment    Outcome Measure Options   AM-PAC 6 Clicks Basic Mobility (PT)  -DM      User Key  (r) = Recorded By, (t) = Taken By, (c) = Cosigned By    Initials Name Provider Type    JANNA Curran, PT Physical Therapist    ANDRES Harrison PTA Physical Therapy Assistant           Time Calculation:         PT Charges       11/12/17 1046          Time Calculation    Start Time 0910  -      Stop Time 1000  -JM      Time Calculation (min) 50 min  -JM      PT Received On 11/12/17  -        User Key  (r) = Recorded By, (t) = Taken By, (c) = Cosigned By    Initials Name Provider Type    ANDRES Harrison PTA Physical Therapy Assistant          Therapy Charges for Today     Code Description Service Date Service Provider Modifiers Qty    60523214988  HC PT THER PROC EA 15 MIN 11/11/2017 Yu Harrison, PTA GP 1    67293471789 HC PT THER PROC GROUP 11/11/2017 Yu Harrison, PTA GP 1    27204632339 HC PT THER PROC EA 15 MIN 11/11/2017 Yu Harrison, PTA GP 1    46370621961 HC PT THER PROC GROUP 11/11/2017 Yu Harrison, PTA GP 1    41224682043 HC PT THER PROC EA 15 MIN 11/12/2017 Yu Harrison, PTA GP 1    79756334914 HC PT THER PROC GROUP 11/12/2017 Yu Harrison, PTA GP 1          PT G-Codes  Outcome Measure Options: AM-PAC 6 Clicks Basic Mobility (PT)    Yu Harrison PTA  11/12/2017

## 2017-11-12 NOTE — PLAN OF CARE
Problem: Patient Care Overview (Adult)  Goal: Plan of Care Review  Outcome: Ongoing (interventions implemented as appropriate)    11/12/17 0556   Coping/Psychosocial Response Interventions   Plan Of Care Reviewed With patient   Patient Care Overview   Progress improving   Outcome Evaluation   Outcome Summary/Follow up Plan VSS, pain minimal, voiding per urinal, up with assist, RA, SL, educated on CPAP at HS, rehab today        Goal: Adult Individualization and Mutuality  Outcome: Ongoing (interventions implemented as appropriate)  Goal: Discharge Needs Assessment  Outcome: Ongoing (interventions implemented as appropriate)    Problem: Fall Risk (Adult)  Goal: Identify Related Risk Factors and Signs and Symptoms  Outcome: Outcome(s) achieved Date Met:  11/12/17  Goal: Absence of Falls  Outcome: Ongoing (interventions implemented as appropriate)    Problem: Hip Replacement, Total (Adult)  Goal: Signs and Symptoms of Listed Potential Problems Will be Absent or Manageable (Hip Replacement, Total)  Outcome: Ongoing (interventions implemented as appropriate)

## 2018-05-29 ENCOUNTER — OFFICE VISIT (OUTPATIENT)
Dept: INTERNAL MEDICINE | Facility: CLINIC | Age: 78
End: 2018-05-29

## 2018-05-29 ENCOUNTER — RESULTS ENCOUNTER (OUTPATIENT)
Dept: INTERNAL MEDICINE | Facility: CLINIC | Age: 78
End: 2018-05-29

## 2018-05-29 VITALS
HEART RATE: 87 BPM | WEIGHT: 315 LBS | BODY MASS INDEX: 40.43 KG/M2 | SYSTOLIC BLOOD PRESSURE: 124 MMHG | HEIGHT: 74 IN | DIASTOLIC BLOOD PRESSURE: 50 MMHG | OXYGEN SATURATION: 95 %

## 2018-05-29 DIAGNOSIS — E03.9 HYPOTHYROIDISM, UNSPECIFIED TYPE: ICD-10-CM

## 2018-05-29 DIAGNOSIS — G62.9 NEUROPATHY: ICD-10-CM

## 2018-05-29 DIAGNOSIS — I10 ESSENTIAL HYPERTENSION: ICD-10-CM

## 2018-05-29 DIAGNOSIS — E78.5 HYPERLIPIDEMIA, UNSPECIFIED HYPERLIPIDEMIA TYPE: ICD-10-CM

## 2018-05-29 DIAGNOSIS — E11.8 TYPE 2 DIABETES MELLITUS WITH COMPLICATION, WITHOUT LONG-TERM CURRENT USE OF INSULIN (HCC): Primary | ICD-10-CM

## 2018-05-29 DIAGNOSIS — E11.8 TYPE 2 DIABETES MELLITUS WITH COMPLICATION, WITHOUT LONG-TERM CURRENT USE OF INSULIN (HCC): ICD-10-CM

## 2018-05-29 PROBLEM — E11.9 DIABETES MELLITUS (HCC): Status: ACTIVE | Noted: 2018-05-29

## 2018-05-29 PROCEDURE — 99214 OFFICE O/P EST MOD 30 MIN: CPT | Performed by: FAMILY MEDICINE

## 2018-05-29 RX ORDER — LOSARTAN POTASSIUM 100 MG/1
1 TABLET ORAL DAILY
COMMUNITY
End: 2019-11-06 | Stop reason: SDUPTHER

## 2018-05-29 RX ORDER — ASPIRIN 81 MG/1
1 TABLET ORAL EVERY OTHER DAY
COMMUNITY
End: 2022-06-27 | Stop reason: ALTCHOICE

## 2018-05-29 RX ORDER — ASPIRIN 81 MG
1 TABLET,CHEWABLE ORAL DAILY
Qty: 1 TUBE | Refills: 3 | Status: SHIPPED | OUTPATIENT
Start: 2018-05-29 | End: 2022-06-27

## 2018-05-29 NOTE — PROGRESS NOTES
Subjective   Riky Moon is a 77 y.o. male.     Chief Complaint   Patient presents with   • New Patient Visit   • COPD   • Peripheral Neuropathy         History of Present Illness     Patient presents today with a past medical history of hyperlipidemia.  The patient is currently taken pravastatin 40 mg daily.  Patient states that he does not have any side effects of the medication.  Since the medication seems to work well for him.    Patient has a past medical history essential hypertension.  He currently takes losartan 100 mg daily.  His blood pressure today's office visit is 124/50.  Denies any side effects of the medication, states the medication seems to work well for him.    Patient has a past medical history for hypothyroidism.  Patient states that his last office visit he was on 75 µg of levothyroxine, and then bumped up to 88 µg of levothyroxine.    Patient also notes being type II diabetic.  He cannot recall his most recent hemoglobin A1c.  He states that he's currently taken metformin 850 mg twice a day.    Patient also notes that he has some neuropathy in both of his legs.  He says of the neuropathy is most likely related to his diabetes.  Patient states the neuropathy at times can make it difficult for him to walk.  He is currently taking Cymbalta    The following portions of the patient's history were reviewed and updated as appropriate: allergies, current medications, past family history, past medical history, past social history, past surgical history and problem list.    Review of Systems   Constitutional: Negative for chills and fever.   HENT: Negative for congestion, rhinorrhea, sinus pain and sore throat.    Eyes: Negative for photophobia and visual disturbance.   Respiratory: Negative for cough, chest tightness and shortness of breath.    Cardiovascular: Negative for chest pain and palpitations.   Gastrointestinal: Negative for diarrhea, nausea and vomiting.   Genitourinary: Negative for  dysuria, frequency and urgency.   Skin: Negative for rash and wound.   Neurological: Negative for dizziness and syncope.   Psychiatric/Behavioral: Negative for behavioral problems and confusion.       Objective   Physical Exam   Constitutional: He is oriented to person, place, and time. He appears well-developed and well-nourished.   HENT:   Head: Normocephalic and atraumatic.   Right Ear: External ear normal.   Left Ear: External ear normal.   Mouth/Throat: Oropharynx is clear and moist.   Eyes: EOM are normal.   Neck: Normal range of motion. Neck supple.   Cardiovascular: Normal rate, regular rhythm and normal heart sounds.    Pulmonary/Chest: Effort normal and breath sounds normal. No respiratory distress.   Musculoskeletal: Normal range of motion.   Lymphadenopathy:     He has no cervical adenopathy.   Neurological: He is alert and oriented to person, place, and time.   Skin: Skin is warm.   Psychiatric: He has a normal mood and affect. His behavior is normal.   Nursing note and vitals reviewed.      Assessment/Plan   Riky was seen today for new patient visit, copd and peripheral neuropathy.    Diagnoses and all orders for this visit:    Type 2 diabetes mellitus with complication, without long-term current use of insulin  -     Comprehensive Metabolic Panel; Future  -     Hemoglobin A1c; Future  -     Patient can continue his metformin 850 mg twice a day.    Essential hypertension  -     Comprehensive Metabolic Panel; Future  -     We will continue patient on current medicine of losartan 100 mg daily.    Hyperlipidemia, unspecified hyperlipidemia type  -     Lipid Panel With LDL / HDL Ratio; Future  -     Continue current medication of pravastatin 40 mg daily.    Hypothyroidism, unspecified type  -     Thyroid Panel With TSH; Future  -     Continue 88 µg of levothyroxine.  We will check TSH panel at today's office visit and adjust medication accordingly.    Neuropathy  -     Capsaicin 0.1 % cream; Apply 1  application topically Daily.  -     May apply capsaicin ointment on his feet to help with neuropathy.  He should continue the use of Cymbalta.          No Follow-up on file.    Dictated utilizing Dragon Voice Recognition Software

## 2018-05-31 LAB
ALBUMIN SERPL-MCNC: 4.2 G/DL (ref 3.5–5.2)
ALBUMIN/GLOB SERPL: 1.6 G/DL
ALP SERPL-CCNC: 52 U/L (ref 39–117)
ALT SERPL-CCNC: 15 U/L (ref 1–41)
AST SERPL-CCNC: 15 U/L (ref 1–40)
BILIRUB SERPL-MCNC: 0.5 MG/DL (ref 0.1–1.2)
BUN SERPL-MCNC: 18 MG/DL (ref 8–23)
BUN/CREAT SERPL: 19.1 (ref 7–25)
CALCIUM SERPL-MCNC: 9.4 MG/DL (ref 8.6–10.5)
CHLORIDE SERPL-SCNC: 103 MMOL/L (ref 98–107)
CHOLEST SERPL-MCNC: 123 MG/DL (ref 0–200)
CO2 SERPL-SCNC: 28 MMOL/L (ref 22–29)
CREAT SERPL-MCNC: 0.94 MG/DL (ref 0.76–1.27)
FT4I SERPL CALC-MCNC: 1.7 (ref 1.2–4.9)
GFR SERPLBLD CREATININE-BSD FMLA CKD-EPI: 78 ML/MIN/1.73
GFR SERPLBLD CREATININE-BSD FMLA CKD-EPI: 94 ML/MIN/1.73
GLOBULIN SER CALC-MCNC: 2.6 GM/DL
GLUCOSE SERPL-MCNC: 125 MG/DL (ref 65–99)
HBA1C MFR BLD: 6.24 % (ref 4.8–5.6)
HDLC SERPL-MCNC: 41 MG/DL (ref 40–60)
LDLC SERPL CALC-MCNC: 66 MG/DL (ref 0–100)
LDLC/HDLC SERPL: 1.61 {RATIO}
POTASSIUM SERPL-SCNC: 4.9 MMOL/L (ref 3.5–5.2)
PROT SERPL-MCNC: 6.8 G/DL (ref 6–8.5)
SODIUM SERPL-SCNC: 144 MMOL/L (ref 136–145)
T3RU NFR SERPL: 25 % (ref 24–39)
T4 SERPL-MCNC: 6.6 UG/DL (ref 4.5–12)
TRIGL SERPL-MCNC: 80 MG/DL (ref 0–150)
TSH SERPL DL<=0.005 MIU/L-ACNC: 3.22 UIU/ML (ref 0.45–4.5)
VLDLC SERPL CALC-MCNC: 16 MG/DL (ref 5–40)

## 2018-06-01 ENCOUNTER — TELEPHONE (OUTPATIENT)
Dept: INTERNAL MEDICINE | Facility: CLINIC | Age: 78
End: 2018-06-01

## 2018-06-01 NOTE — TELEPHONE ENCOUNTER
----- Message from Marco Carrillo MD sent at 6/1/2018 10:03 AM EDT -----  The labs were reviewed. Please inform patient that labs were normal.

## 2018-06-29 ENCOUNTER — OFFICE VISIT (OUTPATIENT)
Dept: INTERNAL MEDICINE | Facility: CLINIC | Age: 78
End: 2018-06-29

## 2018-06-29 VITALS
WEIGHT: 311.8 LBS | DIASTOLIC BLOOD PRESSURE: 50 MMHG | OXYGEN SATURATION: 97 % | BODY MASS INDEX: 40.02 KG/M2 | SYSTOLIC BLOOD PRESSURE: 122 MMHG | HEIGHT: 74 IN | HEART RATE: 83 BPM

## 2018-06-29 DIAGNOSIS — E11.8 TYPE 2 DIABETES MELLITUS WITH COMPLICATION, WITHOUT LONG-TERM CURRENT USE OF INSULIN (HCC): ICD-10-CM

## 2018-06-29 DIAGNOSIS — J45.909 ASTHMA, UNSPECIFIED ASTHMA SEVERITY, UNSPECIFIED WHETHER COMPLICATED, UNSPECIFIED WHETHER PERSISTENT: ICD-10-CM

## 2018-06-29 DIAGNOSIS — K59.00 CONSTIPATION, UNSPECIFIED CONSTIPATION TYPE: Primary | ICD-10-CM

## 2018-06-29 PROBLEM — K21.9 GASTROESOPHAGEAL REFLUX DISEASE: Status: ACTIVE | Noted: 2018-06-29

## 2018-06-29 PROBLEM — N32.81 OVERACTIVE BLADDER: Status: ACTIVE | Noted: 2018-06-29

## 2018-06-29 PROBLEM — J44.9 COPD (CHRONIC OBSTRUCTIVE PULMONARY DISEASE): Status: ACTIVE | Noted: 2017-03-19

## 2018-06-29 PROBLEM — L03.116 CELLULITIS OF LEFT LOWER EXTREMITY: Status: ACTIVE | Noted: 2017-03-19

## 2018-06-29 PROBLEM — E78.5 DYSLIPIDEMIA: Status: ACTIVE | Noted: 2018-06-29

## 2018-06-29 PROBLEM — G62.9 PERIPHERAL NERVE DISEASE: Status: ACTIVE | Noted: 2018-06-29

## 2018-06-29 PROCEDURE — 99214 OFFICE O/P EST MOD 30 MIN: CPT | Performed by: FAMILY MEDICINE

## 2018-06-29 RX ORDER — ALBUTEROL SULFATE 90 UG/1
1 AEROSOL, METERED RESPIRATORY (INHALATION) EVERY 4 HOURS PRN
Qty: 1 INHALER | Refills: 3 | Status: SHIPPED | OUTPATIENT
Start: 2018-06-29 | End: 2018-07-30 | Stop reason: SDUPTHER

## 2018-06-29 NOTE — PROGRESS NOTES
Subjective   Riky Moon is a 77 y.o. male.     Chief Complaint   Patient presents with   • Diabetes   • Hypertension   • Hyperlipidemia   • Hypothyroidism   • Cough   • Constipation         History of Present Illness     At today's office visit patient's concerned about constipation.  Patient states that he currently takes 2 different stool softeners daily.  Patient states that is not really helping him.  Patient notes that his constipation is daily.    Patient also notes to have diabetes type 2 for quite some time.  Patient notes that he has peripheral neuropathy associated to his diabetes type 2.  Patient's currently taken metformin 850 mg twice a day.  Patient's last hemoglobin A1c was 6.24.    Patient also notes to have underlying asthma and COPD.  Patient notes that he would like to have his albuterol refill.    The following portions of the patient's history were reviewed and updated as appropriate: allergies, current medications, past family history, past medical history, past social history, past surgical history and problem list.    Review of Systems   Constitutional: Negative for chills and fever.   HENT: Negative for congestion, rhinorrhea, sinus pain and sore throat.    Eyes: Negative for photophobia and visual disturbance.   Respiratory: Positive for cough. Negative for chest tightness and shortness of breath.    Cardiovascular: Negative for chest pain and palpitations.   Gastrointestinal: Negative for diarrhea, nausea and vomiting.   Genitourinary: Negative for dysuria, frequency and urgency.   Skin: Negative for rash and wound.   Neurological: Negative for dizziness and syncope.   Psychiatric/Behavioral: Negative for behavioral problems and confusion.       Objective   Physical Exam   Constitutional: He is oriented to person, place, and time. He appears well-developed and well-nourished.   HENT:   Head: Normocephalic and atraumatic.   Right Ear: External ear normal.   Left Ear: External ear  normal.   Mouth/Throat: Oropharynx is clear and moist.   Eyes: EOM are normal.   Neck: Normal range of motion. Neck supple.   Cardiovascular: Normal rate, regular rhythm and normal heart sounds.    Pulmonary/Chest: Effort normal and breath sounds normal. No respiratory distress.   Musculoskeletal: Normal range of motion.   Lymphadenopathy:     He has no cervical adenopathy.   Neurological: He is alert and oriented to person, place, and time.   Skin: Skin is warm.   Psychiatric: He has a normal mood and affect. His behavior is normal.   Nursing note and vitals reviewed.      Assessment/Plan   Riky was seen today for diabetes, hypertension, hyperlipidemia, hypothyroidism, cough and constipation.    Diagnoses and all orders for this visit:    Constipation, unspecified constipation type  -     linaclotide (LINZESS) 72 MCG capsule capsule; Take 1 capsule by mouth Every Morning Before Breakfast.  -     Advised the patient to stop the stool softeners.  We'll start patient on Linzess.    Type 2 diabetes mellitus with complication, without long-term current use of insulin  -     metFORMIN (GLUCOPHAGE) 850 MG tablet; Take 1 tablet by mouth 2 (Two) Times a Day With Meals.  -     exenatide er (BYDUREON BCISE) 2 MG/0.85ML auto-injector injection; Inject 0.85 mL under the skin 1 (One) Time Per Week.  -     Advised the patient that for better glycemic control we will add glp1 agonist bydureon bcise once weekly.  I discussed the patient the contraindications and side effects associated with the medication.  At this time does not appear the patient have any contraindications for this medication.  Patient states that he would like to try the medication.    Asthma, unspecified asthma severity, unspecified whether complicated, unspecified whether persistent  -     albuterol (PROVENTIL HFA;VENTOLIN HFA) 108 (90 Base) MCG/ACT inhaler; Inhale 1 puff Every 4 (Four) Hours As Needed for Wheezing.          No Follow-up on file.    Dictated  utilizing Dragon Voice Recognition Software

## 2018-07-09 RX ORDER — LANCETS
EACH MISCELLANEOUS
Qty: 100 EACH | Refills: 1 | Status: SHIPPED | OUTPATIENT
Start: 2018-07-09

## 2018-07-30 ENCOUNTER — OFFICE VISIT (OUTPATIENT)
Dept: INTERNAL MEDICINE | Facility: CLINIC | Age: 78
End: 2018-07-30

## 2018-07-30 VITALS
HEIGHT: 74 IN | BODY MASS INDEX: 40.43 KG/M2 | HEART RATE: 79 BPM | DIASTOLIC BLOOD PRESSURE: 52 MMHG | WEIGHT: 315 LBS | SYSTOLIC BLOOD PRESSURE: 116 MMHG | OXYGEN SATURATION: 97 %

## 2018-07-30 DIAGNOSIS — E11.8 TYPE 2 DIABETES MELLITUS WITH COMPLICATION, WITHOUT LONG-TERM CURRENT USE OF INSULIN (HCC): ICD-10-CM

## 2018-07-30 DIAGNOSIS — J45.909 ASTHMA, UNSPECIFIED ASTHMA SEVERITY, UNSPECIFIED WHETHER COMPLICATED, UNSPECIFIED WHETHER PERSISTENT: ICD-10-CM

## 2018-07-30 DIAGNOSIS — K59.00 CONSTIPATION, UNSPECIFIED CONSTIPATION TYPE: Primary | ICD-10-CM

## 2018-07-30 PROCEDURE — 99213 OFFICE O/P EST LOW 20 MIN: CPT | Performed by: FAMILY MEDICINE

## 2018-07-30 RX ORDER — ALBUTEROL SULFATE 90 UG/1
1 AEROSOL, METERED RESPIRATORY (INHALATION) EVERY 4 HOURS PRN
Qty: 1 INHALER | Refills: 3 | Status: SHIPPED | OUTPATIENT
Start: 2018-07-30 | End: 2021-01-19 | Stop reason: SDUPTHER

## 2018-07-30 RX ORDER — ALBUTEROL SULFATE 90 UG/1
1 AEROSOL, METERED RESPIRATORY (INHALATION) EVERY 4 HOURS PRN
Qty: 1 INHALER | Refills: 3 | Status: SHIPPED | OUTPATIENT
Start: 2018-07-30 | End: 2018-07-30 | Stop reason: SDUPTHER

## 2018-07-30 RX ORDER — LUBIPROSTONE 24 UG/1
24 CAPSULE ORAL 2 TIMES DAILY WITH MEALS
Qty: 60 CAPSULE | Refills: 3 | Status: SHIPPED | OUTPATIENT
Start: 2018-07-30 | End: 2018-10-03

## 2018-07-30 NOTE — PROGRESS NOTES
Subjective   Riky Moon is a 77 y.o. male.     Chief Complaint   Patient presents with   • Diabetes   • Constipation     follow up         History of Present Illness     Patient has been doing bydureon since the last visit. He has had 3 injections so far. No side effects of the injections. Patient is also taking metformin 850mg bid. His blood sugar this morning was 125. He denies any other side effects of the medication.    Patient is currently taking linzess 72mcg for constipation. He notes its helping, but still feels constipated.He's currently not taking any other stool softeners.       The following portions of the patient's history were reviewed and updated as appropriate: allergies, current medications, past family history, past medical history, past social history, past surgical history and problem list.    Review of Systems   Constitutional: Negative.    HENT: Negative.    Respiratory: Negative.    Cardiovascular: Negative.    Gastrointestinal: Positive for constipation. Negative for abdominal distention, abdominal pain and diarrhea.   Psychiatric/Behavioral: Negative.        Objective   Physical Exam   Constitutional: He is oriented to person, place, and time. He appears well-developed and well-nourished.   HENT:   Head: Normocephalic and atraumatic.   Eyes: EOM are normal.   Neck: Normal range of motion. Neck supple.   Cardiovascular: Normal rate, regular rhythm and normal heart sounds.    Pulmonary/Chest: Effort normal and breath sounds normal. No respiratory distress.   Neurological: He is alert and oriented to person, place, and time.   Psychiatric: He has a normal mood and affect. His behavior is normal.   Nursing note and vitals reviewed.      Assessment/Plan   Riky was seen today for diabetes and constipation.    Diagnoses and all orders for this visit:    Constipation, unspecified constipation type  -     lubiprostone (AMITIZA) 24 MCG capsule; Take 1 capsule by mouth 2 (Two) Times a Day With  Meals.    Type 2 diabetes mellitus with complication, without long-term current use of insulin (CMS/[facility])        -     We'll continue metformin 850 mg twice a day.  Patient should continue bydureon injections.           No Follow-up on file.    Dictated utilizing Dragon Voice Recognition Software

## 2018-09-12 ENCOUNTER — OFFICE VISIT (OUTPATIENT)
Dept: SLEEP MEDICINE | Facility: HOSPITAL | Age: 78
End: 2018-09-12
Attending: INTERNAL MEDICINE

## 2018-09-12 VITALS — WEIGHT: 310.2 LBS | HEIGHT: 74 IN | BODY MASS INDEX: 39.81 KG/M2

## 2018-09-12 DIAGNOSIS — G47.33 OSA TREATED WITH BIPAP: Primary | ICD-10-CM

## 2018-09-12 PROCEDURE — 99213 OFFICE O/P EST LOW 20 MIN: CPT | Performed by: INTERNAL MEDICINE

## 2018-09-12 PROCEDURE — G0463 HOSPITAL OUTPT CLINIC VISIT: HCPCS

## 2018-09-12 NOTE — PROGRESS NOTES
"[facility] Note     Chief Complaint:  KINDRA     Primary Care Physician: Marco Carrillo MD    Interval History:   The patient was last seen by me in September 2017.  He is stable and unchanged.  He goes to bed 11:30 PM and awakens at 9 AM.  [place] Sleepiness Scale normal at 3.    Review of Systems:  Recorded on the Sleep Questionnaire.  Unremarkable except for BLANCHARD and occasional cough    Social History:  2 caffeinated beverages a day  Social History     Social History   • Marital status:      Social History Main Topics   • Smoking status: Former Smoker     Packs/day: 1.00     Types: Cigarettes     Quit date: 1997   • Smokeless tobacco: Never Used      Comment: from age [age]   • Alcohol use Yes      Comment: 1/month   • Drug use: No   • Sexual activity: No     Other Topics Concern   • Not on file       Allergies:  Patient has no known allergies.     Medication Review:  His list was reviewed.      Vital Signs:    Vitals:    09/12/18 0959   Weight: (!) 141 kg (310 lb 3.2 oz)   Height: 188 cm (74\")     Body mass index is 39.83 kg/m².    Physical Exam:    Constitutional:  Well developed white male and appears in no apparent distress.  Awake & oriented times 3.  Normal mood with normal recent and remote memory and normal judgement.  Eyes:  Conjunctivae normal.  Oropharynx:  moist mucous membranes without exudate and a large tongue and normal uvula that is broad-based and class III MP airway      Results Review:  DME is [facility] and he uses a fullface mask.  Downloads between June 13 and September 10, 2018 compliance 100%.  Average usage is 9 hours and 14 minutes.  Average AHI is normal without leak.  Average AutoBiPAP pressure is IPAP of 15.7 and EPAP of 12.5.  Auto BiPAP settings: Max IPAP 18 minimum EPAP 12 and max pressure support 6 minimum pressure support 2       Impression:   Obstructive sleep apnea adequately treated with auto titrating BiPAP with good compliance and usage and no " complaints of hypersomnolence      Plan:  Good sleep hygiene measures should be maintained.  Weight loss would be beneficial in this patient who is obese by BMI.  The patient is benefiting from the treatment being provided.     The patient will continue auto BiPAP.  A new prescription will be sent to his DME.    The patient will call for any problems and will follow up in one year.      Cosmo French MD  Sleep Medicine  09/12/18  10:34 AM

## 2018-09-28 ENCOUNTER — OFFICE VISIT (OUTPATIENT)
Dept: INTERNAL MEDICINE | Facility: CLINIC | Age: 78
End: 2018-09-28

## 2018-09-28 VITALS
OXYGEN SATURATION: 97 % | DIASTOLIC BLOOD PRESSURE: 58 MMHG | WEIGHT: 313.9 LBS | HEART RATE: 73 BPM | BODY MASS INDEX: 40.28 KG/M2 | HEIGHT: 74 IN | SYSTOLIC BLOOD PRESSURE: 126 MMHG

## 2018-09-28 DIAGNOSIS — J44.9 CHRONIC OBSTRUCTIVE PULMONARY DISEASE, UNSPECIFIED COPD TYPE (HCC): ICD-10-CM

## 2018-09-28 DIAGNOSIS — Z23 NEED FOR INFLUENZA VACCINATION: ICD-10-CM

## 2018-09-28 DIAGNOSIS — E11.8 TYPE 2 DIABETES MELLITUS WITH COMPLICATION, WITHOUT LONG-TERM CURRENT USE OF INSULIN (HCC): Primary | ICD-10-CM

## 2018-09-28 DIAGNOSIS — K59.00 CONSTIPATION, UNSPECIFIED CONSTIPATION TYPE: ICD-10-CM

## 2018-09-28 LAB
ALBUMIN SERPL-MCNC: 4.4 G/DL (ref 3.5–5.2)
ALBUMIN/GLOB SERPL: 1.9 G/DL
ALP SERPL-CCNC: 47 U/L (ref 39–117)
ALT SERPL-CCNC: 18 U/L (ref 1–41)
AST SERPL-CCNC: 13 U/L (ref 1–40)
BILIRUB SERPL-MCNC: 0.6 MG/DL (ref 0.1–1.2)
BUN SERPL-MCNC: 28 MG/DL (ref 8–23)
BUN/CREAT SERPL: 32.6 (ref 7–25)
CALCIUM SERPL-MCNC: 9.2 MG/DL (ref 8.6–10.5)
CHLORIDE SERPL-SCNC: 109 MMOL/L (ref 98–107)
CO2 SERPL-SCNC: 30.1 MMOL/L (ref 22–29)
CREAT SERPL-MCNC: 0.86 MG/DL (ref 0.76–1.27)
GLOBULIN SER CALC-MCNC: 2.3 GM/DL
GLUCOSE SERPL-MCNC: 126 MG/DL (ref 65–99)
HBA1C MFR BLD: 6.02 % (ref 4.8–5.6)
POTASSIUM SERPL-SCNC: 4.9 MMOL/L (ref 3.5–5.2)
PROT SERPL-MCNC: 6.7 G/DL (ref 6–8.5)
SODIUM SERPL-SCNC: 142 MMOL/L (ref 136–145)

## 2018-09-28 PROCEDURE — G0008 ADMIN INFLUENZA VIRUS VAC: HCPCS | Performed by: FAMILY MEDICINE

## 2018-09-28 PROCEDURE — 99214 OFFICE O/P EST MOD 30 MIN: CPT | Performed by: FAMILY MEDICINE

## 2018-09-28 PROCEDURE — 90662 IIV NO PRSV INCREASED AG IM: CPT | Performed by: FAMILY MEDICINE

## 2018-09-28 NOTE — PROGRESS NOTES
Subjective   Riky Moon is a 77 y.o. male.     Chief Complaint   Patient presents with   • Constipation   • Diabetes         History of Present Illness     Patient states that his constipation has greatly improved since he started taking amitiza.  Patient denies any side effects of medication.    The patient's notes that he has COPD, and takes Spiriva and albuterol.  Patient notes that he continues to start coughing in the mornings.  Patient notes that he has a prior smoking history.  Patient notes that his cough could be productive.  He denies any signs symptoms of congestion.    Patient notes to have type 2 diabetes. Patient is currently taking the bydureon.  Is also takes metformin 850 mg twice a day.  Patient states that he notices weight loss since starting this medication.  Patient also states that the medicine seems to help control his blood sugars.  Patient notes that her blood sugars now range in the low 100s.    The following portions of the patient's history were reviewed and updated as appropriate: allergies, current medications, past family history, past medical history, past social history, past surgical history and problem list.    Review of Systems   Constitutional: Negative for chills and fever.   HENT: Negative for congestion, rhinorrhea, sinus pain and sore throat.    Eyes: Negative for photophobia and visual disturbance.   Respiratory: Positive for cough. Negative for chest tightness and shortness of breath.    Cardiovascular: Negative for chest pain and palpitations.   Gastrointestinal: Negative for diarrhea, nausea and vomiting.   Genitourinary: Negative for dysuria, frequency and urgency.   Skin: Negative for rash and wound.   Neurological: Negative for dizziness and syncope.   Psychiatric/Behavioral: Negative for behavioral problems and confusion.       Objective   Physical Exam   Constitutional: He is oriented to person, place, and time. He appears well-developed and well-nourished.    HENT:   Head: Normocephalic and atraumatic.   Right Ear: External ear normal.   Left Ear: External ear normal.   Mouth/Throat: Oropharynx is clear and moist.   Eyes: EOM are normal.   Neck: Normal range of motion. Neck supple.   Cardiovascular: Normal rate, regular rhythm and normal heart sounds.    Pulmonary/Chest: Effort normal and breath sounds normal. No respiratory distress.   Musculoskeletal: Normal range of motion.   Lymphadenopathy:     He has no cervical adenopathy.   Neurological: He is alert and oriented to person, place, and time.   Skin: Skin is warm.   Psychiatric: He has a normal mood and affect. His behavior is normal.   Nursing note and vitals reviewed.      Assessment/Plan   Riky was seen today for constipation and diabetes.    Diagnoses and all orders for this visit:    Type 2 diabetes mellitus with complication, without long-term current use of insulin (CMS/[facility])  -     Hemoglobin A1c  -     Comprehensive Metabolic Panel    Chronic obstructive pulmonary disease, unspecified COPD type (CMS/[facility])  -     umeclidinium-vilanterol (ANORO ELLIPTA) 62.5-25 MCG/INH aerosol powder  inhaler; Inhale 1 puff Daily.    Constipation, unspecified constipation type        -     Continue amitiza.     Need for influenza vaccination  -     Fluzone High Dose =>65Years          No Follow-up on file.    Dictated utilizing Dragon Voice Recognition Software

## 2018-10-02 ENCOUNTER — TELEPHONE (OUTPATIENT)
Dept: INTERNAL MEDICINE | Facility: CLINIC | Age: 78
End: 2018-10-02

## 2018-10-02 NOTE — TELEPHONE ENCOUNTER
Patient notified and voiced understanding. Patient advised to contact office if they have any questions.

## 2018-10-02 NOTE — TELEPHONE ENCOUNTER
----- Message from Marco Carrillo MD sent at 10/1/2018  8:29 AM EDT -----  The labs were reviewed. Please inform patient that labs were normal.

## 2018-10-03 ENCOUNTER — OFFICE VISIT (OUTPATIENT)
Dept: CARDIOLOGY | Facility: CLINIC | Age: 78
End: 2018-10-03

## 2018-10-03 VITALS
SYSTOLIC BLOOD PRESSURE: 132 MMHG | HEIGHT: 74 IN | HEART RATE: 74 BPM | DIASTOLIC BLOOD PRESSURE: 68 MMHG | BODY MASS INDEX: 39.53 KG/M2 | WEIGHT: 308 LBS

## 2018-10-03 DIAGNOSIS — I10 ESSENTIAL HYPERTENSION: Primary | ICD-10-CM

## 2018-10-03 DIAGNOSIS — J44.9 CHRONIC OBSTRUCTIVE PULMONARY DISEASE, UNSPECIFIED COPD TYPE (HCC): ICD-10-CM

## 2018-10-03 DIAGNOSIS — G47.33 OSA TREATED WITH BIPAP: ICD-10-CM

## 2018-10-03 DIAGNOSIS — E66.01 MORBIDLY OBESE (HCC): ICD-10-CM

## 2018-10-03 DIAGNOSIS — I73.9 PVD (PERIPHERAL VASCULAR DISEASE) (HCC): ICD-10-CM

## 2018-10-03 DIAGNOSIS — E78.5 HYPERLIPIDEMIA, UNSPECIFIED HYPERLIPIDEMIA TYPE: ICD-10-CM

## 2018-10-03 PROCEDURE — 93000 ELECTROCARDIOGRAM COMPLETE: CPT | Performed by: NURSE PRACTITIONER

## 2018-10-03 PROCEDURE — 99213 OFFICE O/P EST LOW 20 MIN: CPT | Performed by: NURSE PRACTITIONER

## 2018-10-03 NOTE — PROGRESS NOTES
Date of Office Visit: 10/03/2018  Encounter Provider: EVON Esparza  Place of Service: [facility]  Patient Name: Riky Moon  :1940    Chief Complaint   Patient presents with   • Hypertension   • Hyperlipidemia   • Follow-up   :     HPI: Riky Moon is a 77 y.o. male is a patient of Dr. Shelton. I am seeing him todayand have reviewed his record.     His past medical history is significant of hypertension, hyperlipidemia, diabetes mellitus, hypothyroidism, COPD, and morbid obesity.    In 2017 he had an echocardiogram indicated for edema.  Left ventricular size was normal, there was mild concentric hypertrophy, grade 1 diastolic dysfunction EF was 55% and no significant valvular disease.    In 2017 he complained of precordial pain and had a perfusion study which was normal and no evidence of ischemia.  EF is hyperdynamic greater than 70%, low risk.    Patient presents today for spread of atypical chest pain resolved with treatment with Protonix.  He continues to manage hypertension and diabetes which are both well controlled.  His lipid panel this year was also within normal range on current therapy.  He has shortness of breath with exertion which is his baseline and lower extremity edema which he has had for some time greater on the left with also unchanged.  He previously followed with vascular which she has not seen in a couple years.  He uses compression device occasionally.  He does not perform any structured exercise assays active as much as possible.  He has trouble walking due to neuropathy.  Overall he believes he is doing well      No Known Allergies    Past Medical History:   Diagnosis Date   • Acid reflux    • Arthritis     OSTEO   • Asthma    • Chest discomfort    • Colon polyp    • COPD (chronic obstructive pulmonary disease) (CMS/HCC)    • Depression    • Diabetes mellitus (CMS/HCC)     type 2   • Disease of thyroid gland    • High  "cholesterol    • Hypertension    • Morbid obesity (CMS/HCC)    • Neuropathy     BOTH FEET   • Obesity, Class III, BMI 40-49.9 (morbid obesity) (CMS/HCC)    • PAD (peripheral artery disease) (CMS/HCC)    • Poor circulation of extremity     LEFT LEG   • Sleep apnea    • Vitamin D deficiency        Past Surgical History:   Procedure Laterality Date   • APPENDECTOMY     • CATARACT EXTRACTION W/ INTRAOCULAR LENS IMPLANT Bilateral    • CHOLECYSTECTOMY     • COLONOSCOPY  01/01/2014   • COLONOSCOPY W/ POLYPECTOMY      BENIGN   • INTERSTIM PLACEMENT Left 08/30/2016    BLADDER CONTROL   • KNEE ARTHROPLASTY Right    • MOUTH SURGERY  06/26/2018   • NOSE SURGERY      REMOVED BLOCKAGE    • ROTATOR CUFF REPAIR Right    • TOTAL HIP ARTHROPLASTY Right 11/9/2017    Procedure: RIGHT TOTAL HIP ARTHROPLASTY;  Surgeon: Riky Fernando MD;  Location: [facility];  Service:          Family and social history reviewed.     Review of Systems   Constitution: Positive for malaise/fatigue.   Cardiovascular: Positive for dyspnea on exertion and leg swelling.   Musculoskeletal: Positive for arthritis and joint pain.   Neurological:        Neuropathy     All other systems were reviewed and are negative          Objective:     Vitals:    10/03/18 1007   BP: 132/68   BP Location: Left arm   Patient Position: Sitting   Cuff Size: Large Adult   Pulse: 74   Weight: (!) 140 kg (308 lb)   Height: 188 cm (74\")     Body mass index is 39.54 kg/m².    PHYSICAL EXAM:  Physical Exam   Constitutional: He is oriented to person, place, and time. He appears well-developed and well-nourished. No distress.   obese   HENT:   Head: Normocephalic.   Eyes: Conjunctivae are normal.   glasses   Neck: Normal range of motion. No JVD present.   Cardiovascular: Normal rate, regular rhythm, normal heart sounds and intact distal pulses.    No murmur heard.  Pulses:       Carotid pulses are 2+ on the right side, and 2+ on the left side.       Radial pulses are 2+ on the " right side, and 2+ on the left side.        Posterior tibial pulses are 2+ on the right side, and 2+ on the left side.   Pulmonary/Chest: Effort normal and breath sounds normal. No respiratory distress. He has no wheezes. He has no rhonchi. He has no rales. He exhibits no tenderness.   Abdominal: Soft. Bowel sounds are normal. He exhibits no distension.   Musculoskeletal: Normal range of motion. He exhibits edema (L>R 1+ tibial, and ankles).   Neurological: He is alert and oriented to person, place, and time.   Skin: Skin is warm, dry and intact. No rash noted. He is not diaphoretic. No cyanosis.   Venous skin colore changes BLE   Psychiatric: He has a normal mood and affect. His behavior is normal. Judgment and thought content normal.         ECG 12 Lead  Date/Time: 10/3/2018 10:46 AM  Performed by: LUKE CHATMAN  Authorized by: LUKE CHATMAN   Comparison: compared with previous ECG from 10/3/2017  Similar to previous ECG  Rhythm: sinus rhythm  Ectopy: atrial premature contractions  Rate: normal  Conduction: 1st degree  QRS axis: normal  Clinical impression: abnormal ECG            Current Outpatient Prescriptions   Medication Sig Dispense Refill   • albuterol (PROVENTIL HFA;VENTOLIN HFA) 108 (90 Base) MCG/ACT inhaler Inhale 1 puff Every 4 (Four) Hours As Needed for Wheezing. 1 inhaler 3   • Ascorbic Acid (VITAMIN C PO) Take 1 tablet by mouth Daily.     • aspirin 81 MG EC tablet Take 1 tablet by mouth Every Other Day.     • Capsaicin 0.1 % cream Apply 1 application topically Daily. 1 tube 3   • Cholecalciferol (VITAMIN D) 1000 units tablet Take 1,000 Units by mouth Every Morning.     • DULoxetine (CYMBALTA) 60 MG capsule Take 60 mg by mouth Every Morning.     • exenatide er (BYDUREON BCISE) 2 MG/0.85ML auto-injector injection Inject 0.85 mL under the skin 1 (One) Time Per Week. 4 pen 6   • glucose blood (LAINEY CONTOUR TEST) test strip Use as instructed to test glucose 100 each 1   • levothyroxine (SYNTHROID,  LEVOTHROID) 88 MCG tablet Take 88 mcg by mouth Every Morning.     • losartan (COZAAR) 100 MG tablet Take 1 tablet by mouth Daily.     • metFORMIN (GLUCOPHAGE) 850 MG tablet Take 1 tablet by mouth 2 (Two) Times a Day With Meals. 30 tablet 6   • MICROLET LANCETS misc Use daily as directed to test glucose 100 each 1   • Multiple Vitamin (MULTIVITAMIN) tablet Take 1 tablet by mouth Every Morning.     • oxybutynin XL (DITROPAN XL) 15 MG 24 hr tablet Take 15 mg by mouth Every Night.     • pantoprazole (PROTONIX) 40 MG EC tablet Take 2 tablets by mouth every morning     • pravastatin (PRAVACHOL) 40 MG tablet Take 40 mg by mouth Every Night.     • umeclidinium-vilanterol (ANORO ELLIPTA) 62.5-25 MCG/INH aerosol powder  inhaler Inhale 1 puff Daily. 1 each 6   • vitamin B-12 (CYANOCOBALAMIN) 1000 MCG tablet Take 1,000 mcg by mouth Every Morning.     • zolpidem (AMBIEN) 10 MG tablet Take 10 mg by mouth At Night As Needed.       No current facility-administered medications for this visit.      Assessment:       Diagnosis Plan   1. Essential hypertension     2. Morbidly obese (CMS/[facility])     3. Hyperlipidemia, unspecified hyperlipidemia type     4. KINDRA treated with auto BiPAP     5. Chronic obstructive pulmonary disease, unspecified COPD type (CMS/[facility])     6. PVD (peripheral vascular disease) (CMS/[facility])          Orders Placed This Encounter   Procedures   • ECG 12 Lead     This order was created via procedure documentation         Plan:         1.  Hypertension well controlled continue the same  2.  Hyperlipidemia last lipid panel in May 2018.  Total cholesterol 123, triglycerides 80, HDL 41, LDL 66 on current regimen continue the same  3.  Diabetes mellitus uncontrolled hemoglobin A1c 9/28/2018 6.02.  4.  Obstructive sleep apnea on BiPAP followed by Dr. French  5.  COPD  6.  Morbid obesity advised wieght loss  7.  PVD previously followed by Dr. Oneyda Quesada with vascular  8.  History of atypical chest pain normal perfusion stress  test in 09/2017.  Symptoms resolved with treatment with Protonix.      Follow up in one year or PRN    Patient was instructed to call the office if new symptoms develop or report to nearest ER if heart attack or stroke is suspected.        It has been a pleasure to participate in this patient's care.      Thank you,  EVON Esparza      **Mike Disclaimer:**  Much of this encounter note is an electronic transcription/translation of spoken language to printed text. The electronic translation of spoken language may permit erroneous, or at times, nonsensical words or phrases to be inadvertently transcribed. Although I have reviewed the note for such errors, some may still exist.

## 2018-10-29 ENCOUNTER — OFFICE VISIT (OUTPATIENT)
Dept: INTERNAL MEDICINE | Facility: CLINIC | Age: 78
End: 2018-10-29

## 2018-10-29 VITALS
BODY MASS INDEX: 39.31 KG/M2 | OXYGEN SATURATION: 98 % | HEART RATE: 87 BPM | HEIGHT: 74 IN | WEIGHT: 306.3 LBS | SYSTOLIC BLOOD PRESSURE: 130 MMHG | DIASTOLIC BLOOD PRESSURE: 58 MMHG

## 2018-10-29 DIAGNOSIS — Z00.00 MEDICARE ANNUAL WELLNESS VISIT, SUBSEQUENT: Primary | ICD-10-CM

## 2018-10-29 DIAGNOSIS — E11.8 TYPE 2 DIABETES MELLITUS WITH COMPLICATION, WITHOUT LONG-TERM CURRENT USE OF INSULIN (HCC): ICD-10-CM

## 2018-10-29 DIAGNOSIS — Z23 NEED FOR PNEUMOCOCCAL VACCINATION: ICD-10-CM

## 2018-10-29 PROCEDURE — 90732 PPSV23 VACC 2 YRS+ SUBQ/IM: CPT | Performed by: FAMILY MEDICINE

## 2018-10-29 PROCEDURE — G0009 ADMIN PNEUMOCOCCAL VACCINE: HCPCS | Performed by: FAMILY MEDICINE

## 2018-10-29 PROCEDURE — G0439 PPPS, SUBSEQ VISIT: HCPCS | Performed by: FAMILY MEDICINE

## 2018-10-29 NOTE — PROGRESS NOTES
QUICK REFERENCE INFORMATION:  The ABCs of the Annual Wellness Visit    Subsequent Medicare Wellness Visit    HEALTH RISK ASSESSMENT    1940    Recent Hospitalizations:  No hospitalization(s) within the last year..        Current Medical Providers:  Patient Care Team:  Marco Carrillo MD as PCP - General (Family Medicine)  Josue Yung MD as PCP - Claims Attributed  Cosmo French MD as Consulting Physician (Sleep Medicine)  Ty Shelton MD as Consulting Physician (Cardiology)        Smoking Status:  History   Smoking Status   • Former Smoker   • Packs/day: 1.50   • Years: 40.00   • Types: Cigarettes   • Quit date: 1997   Smokeless Tobacco   • Never Used     Comment: from age [age]       Alcohol Consumption:  History   Alcohol Use No     Comment: HOLIDAYS  caffeine -2 cups coffee daily       Depression Screen:   PHQ-2/PHQ-9 Depression Screening 10/29/2018   Little interest or pleasure in doing things 0   Feeling down, depressed, or hopeless 0   Trouble falling or staying asleep, or sleeping too much 0   Feeling tired or having little energy 1   Poor appetite or overeating 0   Feeling bad about yourself - or that you are a failure or have let yourself or your family down 0   Trouble concentrating on things, such as reading the newspaper or watching television 0   Moving or speaking so slowly that other people could have noticed. Or the opposite - being so fidgety or restless that you have been moving around a lot more than usual 0   Thoughts that you would be better off dead, or of hurting yourself in some way 0   Total Score 1   If you checked off any problems, how difficult have these problems made it for you to do your work, take care of things at home, or get along with other people? Not difficult at all       Health Habits and Functional and Cognitive Screening:  Functional & Cognitive Status 10/29/2018   Do you have difficulty preparing food and eating? No   Do you have difficulty  bathing yourself, getting dressed or grooming yourself? No   Do you have difficulty using the toilet? No   Do you have difficulty moving around from place to place? Yes   Do you have trouble with steps or getting out of a bed or a chair? No   In the past year have you fallen or experienced a near fall? No   Current Diet Well Balanced Diet   Dental Exam Up to date   Eye Exam Up to date   Exercise (times per week) 4 times per week   Current Exercise Activities Include Housecleaning   Do you need help using the phone?  No   Are you deaf or do you have serious difficulty hearing?  No   Do you need help with transportation? No   Do you need help shopping? No   Do you need help preparing meals?  No   Do you need help with housework?  No   Do you need help with laundry? No   Do you need help taking your medications? No   Do you need help managing money? No   Do you ever drive or ride in a car without wearing a seat belt? No   Have you felt unusual stress, anger or loneliness in the last month? No   Who do you live with? Spouse   If you need help, do you have trouble finding someone available to you? No   Have you been bothered in the last four weeks by sexual problems? No   Do you have difficulty concentrating, remembering or making decisions? No           Does the patient have evidence of cognitive impairment? No    Aspirin use counseling: Taking ASA appropriately as indicated      Recent Lab Results:  CMP:  Lab Results   Component Value Date     (H) 09/28/2018    BUN 28 (H) 09/28/2018    CREATININE 0.86 09/28/2018    EGFRIFNONA 86 09/28/2018    EGFRIFAFRI 104 09/28/2018    BCR 32.6 (H) 09/28/2018     09/28/2018    K 4.9 09/28/2018    CO2 30.1 (H) 09/28/2018    CALCIUM 9.2 09/28/2018    PROTENTOTREF 6.7 09/28/2018    ALBUMIN 4.40 09/28/2018    LABGLOBREF 2.3 09/28/2018    LABIL2 1.9 09/28/2018    BILITOT 0.6 09/28/2018    ALKPHOS 47 09/28/2018    AST 13 09/28/2018    ALT 18 09/28/2018     Lipid Panel:  Lab  Results   Component Value Date    TRIG 80 05/30/2018    HDL 41 05/30/2018    VLDL 16 05/30/2018    LDLHDL 1.61 05/30/2018     HbA1c:  Lab Results   Component Value Date    HGBA1C 6.02 (H) 09/28/2018       Visual Acuity:  No exam data present    Age-appropriate Screening Schedule:  Refer to the list below for future screening recommendations based on patient's age, sex and/or medical conditions. Orders for these recommended tests are listed in the plan section. The patient has been provided with a written plan.    Health Maintenance   Topic Date Due   • URINE MICROALBUMIN  1940   • TDAP/TD VACCINES (1 - Tdap) 10/06/1958   • ZOSTER VACCINE (2 of 3) 02/26/2013   • PNEUMOCOCCAL VACCINES (65+ LOW/MEDIUM RISK) (2 of 2 - PPSV23) 01/25/2017   • HEMOGLOBIN A1C  03/28/2019   • LIPID PANEL  05/30/2019   • INFLUENZA VACCINE  Completed        Subjective   History of Present Illness    Riky Moon is a 78 y.o. male who presents for an Subsequent Wellness Visit.    The following portions of the patient's history were reviewed and updated as appropriate: allergies, current medications, past family history, past medical history, past social history, past surgical history and problem list.    Outpatient Medications Prior to Visit   Medication Sig Dispense Refill   • albuterol (PROVENTIL HFA;VENTOLIN HFA) 108 (90 Base) MCG/ACT inhaler Inhale 1 puff Every 4 (Four) Hours As Needed for Wheezing. 1 inhaler 3   • Ascorbic Acid (VITAMIN C PO) Take 1 tablet by mouth Daily.     • aspirin 81 MG EC tablet Take 1 tablet by mouth Every Other Day.     • Capsaicin 0.1 % cream Apply 1 application topically Daily. 1 tube 3   • Cholecalciferol (VITAMIN D) 1000 units tablet Take 1,000 Units by mouth Every Morning.     • DULoxetine (CYMBALTA) 60 MG capsule Take 60 mg by mouth Every Morning.     • exenatide er (BYDUREON BCISE) 2 MG/0.85ML auto-injector injection Inject 0.85 mL under the skin 1 (One) Time Per Week. 4 pen 6   • glucose blood (LAINEY  CONTOUR TEST) test strip Use as instructed to test glucose 100 each 1   • levothyroxine (SYNTHROID, LEVOTHROID) 88 MCG tablet Take 88 mcg by mouth Every Morning.     • losartan (COZAAR) 100 MG tablet Take 1 tablet by mouth Daily.     • metFORMIN (GLUCOPHAGE) 850 MG tablet Take 1 tablet by mouth 2 (Two) Times a Day With Meals. 30 tablet 6   • MICROLET LANCETS misc Use daily as directed to test glucose 100 each 1   • Multiple Vitamin (MULTIVITAMIN) tablet Take 1 tablet by mouth Every Morning.     • oxybutynin XL (DITROPAN XL) 15 MG 24 hr tablet Take 15 mg by mouth Every Night.     • pantoprazole (PROTONIX) 40 MG EC tablet Take 2 tablets by mouth every morning     • pravastatin (PRAVACHOL) 40 MG tablet Take 40 mg by mouth Every Night.     • umeclidinium-vilanterol (ANORO ELLIPTA) 62.5-25 MCG/INH aerosol powder  inhaler Inhale 1 puff Daily. 1 each 6   • vitamin B-12 (CYANOCOBALAMIN) 1000 MCG tablet Take 1,000 mcg by mouth Every Morning.     • zolpidem (AMBIEN) 10 MG tablet Take 10 mg by mouth At Night As Needed.       No facility-administered medications prior to visit.        Patient Active Problem List   Diagnosis   • OA (osteoarthritis) of hip   • KINDRA treated with auto BiPAP   • Chest pain   • Type 2 diabetes mellitus (CMS/HCC)   • Hypertension   • Hyperlipidemia   • Hypothyroidism   • Asthma   • Overactive bladder   • CAP (community acquired pneumonia)   • Cellulitis of left lower extremity   • COPD (chronic obstructive pulmonary disease) (CMS/HCC)   • Dyslipidemia   • Gastroesophageal reflux disease   • Leukocytosis   • Morbidly obese (CMS/HCC)   • Peripheral nerve disease   • Constipation       Advance Care Planning:  has an advance directive - a copy has been provided and is in file    Identification of Risk Factors:  Risk factors include: weight , unhealthy diet, inactivity and polypharmacy.    Review of Systems    Compared to one year ago, the patient feels his physical health is the same.  Compared to one year  "ago, the patient feels his mental health is the same.    Objective     Physical Exam    Vitals:    10/29/18 1332   BP: 130/58   Pulse: 87   SpO2: 98%   Weight: (!) 139 kg (306 lb 4.8 oz)   Height: 188 cm (74\")   PainSc: 0-No pain       Patient's Body mass index is 39.33 kg/m². BMI is above normal parameters. Recommendations include: educational material, exercise counseling and nutrition counseling.      Assessment/Plan   Patient Self-Management and Personalized Health Advice  The patient has been provided with information about: diet, exercise, weight management, prevention of cardiac or vascular disease and fall prevention and preventive services including:   · Diabetes screening, see lab orders, Exercise counseling provided, Fall Risk assessment done, Pneumococcal vaccine , TdaP vaccine.    Visit Diagnoses:    ICD-10-CM ICD-9-CM   1. Medicare annual wellness visit, subsequent Z00.00 V70.0   2. Need for pneumococcal vaccination Z23 V03.82   3. Type 2 diabetes mellitus with complication, without long-term current use of insulin (CMS/[facility]) E11.8 250.90       No orders of the defined types were placed in this encounter.      Outpatient Encounter Prescriptions as of 10/29/2018   Medication Sig Dispense Refill   • albuterol (PROVENTIL HFA;VENTOLIN HFA) 108 (90 Base) MCG/ACT inhaler Inhale 1 puff Every 4 (Four) Hours As Needed for Wheezing. 1 inhaler 3   • Ascorbic Acid (VITAMIN C PO) Take 1 tablet by mouth Daily.     • aspirin 81 MG EC tablet Take 1 tablet by mouth Every Other Day.     • Capsaicin 0.1 % cream Apply 1 application topically Daily. 1 tube 3   • Cholecalciferol (VITAMIN D) 1000 units tablet Take 1,000 Units by mouth Every Morning.     • DULoxetine (CYMBALTA) 60 MG capsule Take 60 mg by mouth Every Morning.     • exenatide er (BYDUREON BCISE) 2 MG/0.85ML auto-injector injection Inject 0.85 mL under the skin 1 (One) Time Per Week. 4 pen 6   • glucose blood (LAINEY CONTOUR TEST) test strip Use as instructed to " test glucose 100 each 1   • levothyroxine (SYNTHROID, LEVOTHROID) 88 MCG tablet Take 88 mcg by mouth Every Morning.     • losartan (COZAAR) 100 MG tablet Take 1 tablet by mouth Daily.     • metFORMIN (GLUCOPHAGE) 850 MG tablet Take 1 tablet by mouth 2 (Two) Times a Day With Meals. 30 tablet 6   • MICROLET LANCETS misc Use daily as directed to test glucose 100 each 1   • Multiple Vitamin (MULTIVITAMIN) tablet Take 1 tablet by mouth Every Morning.     • oxybutynin XL (DITROPAN XL) 15 MG 24 hr tablet Take 15 mg by mouth Every Night.     • pantoprazole (PROTONIX) 40 MG EC tablet Take 2 tablets by mouth every morning     • pravastatin (PRAVACHOL) 40 MG tablet Take 40 mg by mouth Every Night.     • umeclidinium-vilanterol (ANORO ELLIPTA) 62.5-25 MCG/INH aerosol powder  inhaler Inhale 1 puff Daily. 1 each 6   • vitamin B-12 (CYANOCOBALAMIN) 1000 MCG tablet Take 1,000 mcg by mouth Every Morning.     • zolpidem (AMBIEN) 10 MG tablet Take 10 mg by mouth At Night As Needed.       No facility-administered encounter medications on file as of 10/29/2018.        Reviewed use of high risk medication in the elderly: yes  Reviewed for potential of harmful drug interactions in the elderly: yes    Follow Up:  No Follow-up on file.     An After Visit Summary and PPPS with all of these plans were given to the patient.

## 2018-10-30 LAB
ALBUMIN SERPL-MCNC: 4.3 G/DL (ref 3.5–5.2)
ALBUMIN/CREAT UR: 24.9 MG/G CREAT (ref 0–30)
ALBUMIN/GLOB SERPL: 1.7 G/DL
ALP SERPL-CCNC: 57 U/L (ref 39–117)
ALT SERPL-CCNC: 22 U/L (ref 1–41)
AST SERPL-CCNC: 15 U/L (ref 1–40)
BASOPHILS # BLD AUTO: 0.04 10*3/MM3 (ref 0–0.2)
BASOPHILS NFR BLD AUTO: 0.3 % (ref 0–1.5)
BILIRUB SERPL-MCNC: 0.5 MG/DL (ref 0.1–1.2)
BUN SERPL-MCNC: 23 MG/DL (ref 8–23)
BUN/CREAT SERPL: 24.2 (ref 7–25)
CALCIUM SERPL-MCNC: 9.2 MG/DL (ref 8.6–10.5)
CHLORIDE SERPL-SCNC: 102 MMOL/L (ref 98–107)
CO2 SERPL-SCNC: 30 MMOL/L (ref 22–29)
CREAT SERPL-MCNC: 0.95 MG/DL (ref 0.76–1.27)
CREAT UR-MCNC: 140.9 MG/DL
DIFFERENTIAL COMMENT: NORMAL
EOSINOPHIL # BLD AUTO: 0.16 10*3/MM3 (ref 0–0.7)
EOSINOPHIL NFR BLD AUTO: 1.2 % (ref 0.3–6.2)
ERYTHROCYTE [DISTWIDTH] IN BLOOD BY AUTOMATED COUNT: 17.8 % (ref 11.5–14.5)
GLOBULIN SER CALC-MCNC: 2.5 GM/DL
GLUCOSE SERPL-MCNC: 135 MG/DL (ref 65–99)
HBA1C MFR BLD: 5.83 % (ref 4.8–5.6)
HCT VFR BLD AUTO: 38.5 % (ref 40.4–52.2)
HGB BLD-MCNC: 12.8 G/DL (ref 13.7–17.6)
IMM GRANULOCYTES # BLD: 0.06 10*3/MM3 (ref 0–0.03)
IMM GRANULOCYTES NFR BLD: 0.4 % (ref 0–0.5)
LYMPHOCYTES # BLD AUTO: 2.01 10*3/MM3 (ref 0.9–4.8)
LYMPHOCYTES NFR BLD AUTO: 15 % (ref 19.6–45.3)
MCH RBC QN AUTO: 36.3 PG (ref 27–32.7)
MCHC RBC AUTO-ENTMCNC: 33.2 G/DL (ref 32.6–36.4)
MCV RBC AUTO: 109.1 FL (ref 79.8–96.2)
MICROALBUMIN UR-MCNC: 35.1 UG/ML
MONOCYTES # BLD AUTO: 0.94 10*3/MM3 (ref 0.2–1.2)
MONOCYTES NFR BLD AUTO: 7 % (ref 5–12)
NEUTROPHILS # BLD AUTO: 10.28 10*3/MM3 (ref 1.9–8.1)
NEUTROPHILS NFR BLD AUTO: 76.5 % (ref 42.7–76)
NRBC BLD AUTO-RTO: 0.4 /100 WBC (ref 0–0)
PLATELET # BLD AUTO: 264 10*3/MM3 (ref 140–500)
PLATELET BLD QL SMEAR: NORMAL
POTASSIUM SERPL-SCNC: 4.7 MMOL/L (ref 3.5–5.2)
PROT SERPL-MCNC: 6.8 G/DL (ref 6–8.5)
RBC # BLD AUTO: 3.53 10*6/MM3 (ref 4.6–6)
RBC MORPH BLD: NORMAL
SODIUM SERPL-SCNC: 143 MMOL/L (ref 136–145)
WBC # BLD AUTO: 13.43 10*3/MM3 (ref 4.5–10.7)

## 2018-11-20 ENCOUNTER — TELEPHONE (OUTPATIENT)
Dept: INTERNAL MEDICINE | Facility: CLINIC | Age: 78
End: 2018-11-20

## 2018-11-20 NOTE — TELEPHONE ENCOUNTER
----- Message from Marco Carrillo MD sent at 11/15/2018  5:09 PM EST -----  Please inform the patient of the following abnormal results.  Improving hba1c. Continue current tx.

## 2019-02-08 ENCOUNTER — TELEPHONE (OUTPATIENT)
Dept: INTERNAL MEDICINE | Facility: CLINIC | Age: 79
End: 2019-02-08

## 2019-02-08 NOTE — TELEPHONE ENCOUNTER
Sarika faxed our office stating that Bydureon BCise is on back order and they need a prescription for an alternative medication. Please advise.

## 2019-02-11 NOTE — TELEPHONE ENCOUNTER
Pharmacy notified to change to regular Bydureon pens since they are available per Dr. Carrillo. LVM- patient notified that pens will look different this month.

## 2019-02-12 ENCOUNTER — OFFICE VISIT (OUTPATIENT)
Dept: INTERNAL MEDICINE | Facility: CLINIC | Age: 79
End: 2019-02-12

## 2019-02-12 VITALS
DIASTOLIC BLOOD PRESSURE: 64 MMHG | OXYGEN SATURATION: 94 % | SYSTOLIC BLOOD PRESSURE: 120 MMHG | HEART RATE: 83 BPM | BODY MASS INDEX: 40.43 KG/M2 | WEIGHT: 315 LBS | HEIGHT: 74 IN

## 2019-02-12 DIAGNOSIS — E11.8 TYPE 2 DIABETES MELLITUS WITH COMPLICATION, WITHOUT LONG-TERM CURRENT USE OF INSULIN (HCC): ICD-10-CM

## 2019-02-12 DIAGNOSIS — J44.9 CHRONIC OBSTRUCTIVE PULMONARY DISEASE, UNSPECIFIED COPD TYPE (HCC): ICD-10-CM

## 2019-02-12 DIAGNOSIS — R01.1 NEWLY RECOGNIZED HEART MURMUR: ICD-10-CM

## 2019-02-12 DIAGNOSIS — R13.12 OROPHARYNGEAL DYSPHAGIA: Primary | ICD-10-CM

## 2019-02-12 PROCEDURE — 99214 OFFICE O/P EST MOD 30 MIN: CPT | Performed by: FAMILY MEDICINE

## 2019-02-12 RX ORDER — OMEPRAZOLE 40 MG/1
40 CAPSULE, DELAYED RELEASE ORAL 2 TIMES DAILY
COMMUNITY
End: 2022-07-18

## 2019-02-12 NOTE — PROGRESS NOTES
Subjective   Riky Moon is a 78 y.o. male.     Chief Complaint   Patient presents with   • Diabetes   • Hypertension   • Hyperlipidemia   • Hypothyroidism         History of Present Illness     Patient notes that he feels as if he has gained some weight after he is not taking the Bydureon for a few weeks, as he has noted that the medication has been on back order.  Patient hoping to obtain some samples.  Patient does have type 2 diabetes, and notes that he takes metformin 850 mg twice daily as well as Bydureon weekly.    Patient also notes to have had an episode of oropharyngeal dysphasia.  Patient notes that there was a piece of chicken that was stuck last week, and EMS was called.  Patient did end up coughing the piece of chicken up.  Patient states that he has had multiple episodes where he feels as of the esophagus has slow motility when swallowing foods.    Patient also notes that he does have a history of COPD, and notes that his oxygen level is down to 92 on ambulation.  Patient is currently taking an Anoro inhaler.  Patient notes that he is unable to take this inhaler due to [facility].    [facility] notes [facility], and noted that he was told that he had a new onset murmur.  Patient states that he would like to get this evaluated.    The following portions of the patient's history were reviewed and updated as appropriate: allergies, current medications, past family history, past medical history, past social history, past surgical history and problem list.    Review of Systems   Constitutional: Negative for chills and fever.   HENT: Negative for congestion, rhinorrhea, sinus pain and sore throat.    Eyes: Negative for photophobia and visual disturbance.   Respiratory: Negative for cough, chest tightness and shortness of breath.    Cardiovascular: Negative for chest pain and palpitations.   Gastrointestinal: Negative for diarrhea, nausea and vomiting.    Genitourinary: Negative for dysuria, frequency and urgency.   Skin: Negative for rash and wound.   Neurological: Negative for dizziness and syncope.   Psychiatric/Behavioral: Negative for behavioral problems and confusion.       Objective   Physical Exam   Constitutional: He is oriented to person, place, and time. He appears well-developed and well-nourished.   HENT:   Head: Normocephalic and atraumatic.   Right Ear: External ear normal.   Left Ear: External ear normal.   Mouth/Throat: Oropharynx is clear and moist.   Eyes: EOM are normal.   Neck: Normal range of motion. Neck supple.   Cardiovascular: Normal rate, regular rhythm and normal heart sounds.   Pulmonary/Chest: Effort normal and breath sounds normal. No respiratory distress.   Musculoskeletal: Normal range of motion.   Lymphadenopathy:     He has no cervical adenopathy.   Neurological: He is alert and oriented to person, place, and time.   Skin: Skin is warm.   Psychiatric: He has a normal mood and affect. His behavior is normal.   Nursing note and vitals reviewed.      Assessment/Plan   Riky was seen today for diabetes, hypertension, hyperlipidemia and hypothyroidism.    Diagnoses and all orders for this visit:    Oropharyngeal dysphagia  -     Ambulatory Referral to Speech Therapy    Chronic obstructive pulmonary disease, unspecified COPD type (CMS/[facility])  -     tiotropium bromide-olodaterol (STIOLTO RESPIMAT) 2.5-2.5 MCG/ACT aerosol solution inhaler; Inhale 2 puffs Daily.  -     We will switch anoro to stioloto.     Type 2 diabetes mellitus with complication, without long-term current use of insulin (CMS/[facility])  -     Hemoglobin A1c  -     Comprehensive Metabolic Panel  -     Patient with Bydureon samples at today's office visit.  Patient is to continue Metformin.    Newly recognized heart murmur  -     Adult Transthoracic Echo Complete W/ Cont if Necessary Per Protocol  -     At today's office visit I did not hear a murmur on physical exam.  However  patient states that he has had been told about a new onset murmur.  Patient would like to get an echo to get this further evaluated.          No Follow-up on file.    Dictated utilizing Dragon Voice Recognition Software

## 2019-02-12 NOTE — PROGRESS NOTES
Patient was instructed and demonstrated how to successfully perform a Bydureon injection. Patient given samples and information regarding medication storage and injection techniques. Patient voiced understanding.

## 2019-02-13 LAB
ALBUMIN SERPL-MCNC: 4.1 G/DL (ref 3.5–4.8)
ALBUMIN/GLOB SERPL: 1.7 {RATIO} (ref 1.2–2.2)
ALP SERPL-CCNC: 52 IU/L (ref 39–117)
ALT SERPL-CCNC: 16 IU/L (ref 0–44)
AST SERPL-CCNC: 16 IU/L (ref 0–40)
BILIRUB SERPL-MCNC: 0.5 MG/DL (ref 0–1.2)
BUN SERPL-MCNC: 22 MG/DL (ref 8–27)
BUN/CREAT SERPL: 21 (ref 10–24)
CALCIUM SERPL-MCNC: 8.9 MG/DL (ref 8.6–10.2)
CHLORIDE SERPL-SCNC: 106 MMOL/L (ref 96–106)
CO2 SERPL-SCNC: 23 MMOL/L (ref 20–29)
CREAT SERPL-MCNC: 1.04 MG/DL (ref 0.76–1.27)
GLOBULIN SER CALC-MCNC: 2.4 G/DL (ref 1.5–4.5)
GLUCOSE SERPL-MCNC: 127 MG/DL (ref 65–99)
HBA1C MFR BLD: 6 % (ref 4.8–5.6)
POTASSIUM SERPL-SCNC: 4.8 MMOL/L (ref 3.5–5.2)
PROT SERPL-MCNC: 6.5 G/DL (ref 6–8.5)
SODIUM SERPL-SCNC: 144 MMOL/L (ref 134–144)

## 2019-02-15 DIAGNOSIS — R13.12 OROPHARYNGEAL DYSPHAGIA: Primary | ICD-10-CM

## 2019-02-19 ENCOUNTER — APPOINTMENT (OUTPATIENT)
Dept: CARDIOLOGY | Facility: HOSPITAL | Age: 79
End: 2019-02-19

## 2019-02-22 ENCOUNTER — TELEPHONE (OUTPATIENT)
Dept: INTERNAL MEDICINE | Facility: CLINIC | Age: 79
End: 2019-02-22

## 2019-02-22 NOTE — TELEPHONE ENCOUNTER
----- Message from Marco Carrillo MD sent at 2/13/2019  9:01 AM EST -----  The labs were reviewed. Please inform patient that labs were normal.

## 2019-03-07 ENCOUNTER — HOSPITAL ENCOUNTER (OUTPATIENT)
Dept: GENERAL RADIOLOGY | Facility: HOSPITAL | Age: 79
Discharge: HOME | End: 2019-03-07
Admitting: FAMILY MEDICINE

## 2019-03-07 DIAGNOSIS — R13.12 OROPHARYNGEAL DYSPHAGIA: ICD-10-CM

## 2019-03-07 PROCEDURE — 74230 X-RAY XM SWLNG FUNCJ C+: CPT

## 2019-03-07 PROCEDURE — 63710000001 BARIUM SULFATE 40 % SUSPENSION: Performed by: FAMILY MEDICINE

## 2019-03-07 PROCEDURE — A9270 NON-COVERED ITEM OR SERVICE: HCPCS | Performed by: FAMILY MEDICINE

## 2019-03-07 PROCEDURE — 92611 MOTION FLUOROSCOPY/SWALLOW: CPT

## 2019-03-07 PROCEDURE — 63710000001 BARIUM SULFATE 98 % RECONSTITUTED SUSPENSION: Performed by: FAMILY MEDICINE

## 2019-03-07 PROCEDURE — 63710000001 BARIUM SULFATE 96 % RECONSTITUTED SUSPENSION: Performed by: FAMILY MEDICINE

## 2019-03-07 RX ADMIN — BARIUM SULFATE 50 ML: 400 SUSPENSION ORAL at 14:04

## 2019-03-07 RX ADMIN — BARIUM SULFATE 65 ML: 960 POWDER, FOR SUSPENSION ORAL at 14:04

## 2019-03-07 RX ADMIN — BARIUM SULFATE 4 ML: 980 POWDER, FOR SUSPENSION ORAL at 14:04

## 2019-03-07 NOTE — MBS/VFSS/FEES
Outpatient Speech Language Pathology   Adult Swallow Initial Evaluation  [facility]     Patient Name: Riky Moon  : 1940  MRN: [id number]  Today's Date: 3/7/2019         Visit Date: 2019   Patient Active Problem List   Diagnosis   • OA (osteoarthritis) of hip   • KINDRA treated with auto BiPAP   • Chest pain   • Diabetes mellitus (CMS/HCC)   • Hypertension   • Hyperlipidemia   • Hypothyroidism   • Asthma   • Overactive bladder   • CAP (community acquired pneumonia)   • Cellulitis of left lower extremity   • COPD (chronic obstructive pulmonary disease) (CMS/[facility])   • Dyslipidemia   • Gastroesophageal reflux disease   • Leukocytosis   • Morbidly obese (CMS/[facility])   • Peripheral nerve disease   • Constipation   • Oropharyngeal dysphagia        Past Medical History:   Diagnosis Date   • Acid reflux    • Arthritis     OSTEO   • Asthma    • Chest discomfort    • Colon polyp    • COPD (chronic obstructive pulmonary disease) (CMS/HCC)    • Depression    • Diabetes mellitus (CMS/HCC)     type 2   • Disease of thyroid gland    • High cholesterol    • Hypertension    • Morbid obesity (CMS/[facility])    • Neuropathy     BOTH FEET   • Obesity, Class III, BMI 40-49.9 (morbid obesity) (CMS/HCC)    • PAD (peripheral artery disease) (CMS/[facility])    • Poor circulation of extremity     LEFT LEG   • Sleep apnea    • Vitamin D deficiency         Past Surgical History:   Procedure Laterality Date   • APPENDECTOMY     • CATARACT EXTRACTION W/ INTRAOCULAR LENS IMPLANT Bilateral    • CHOLECYSTECTOMY     • COLONOSCOPY  2014   • COLONOSCOPY W/ POLYPECTOMY      BENIGN   • INTERSTIM PLACEMENT Left 2016    BLADDER CONTROL   • KNEE ARTHROPLASTY Right    • MOUTH SURGERY  2018   • NOSE SURGERY      REMOVED BLOCKAGE    • ROTATOR CUFF REPAIR Right    • TOTAL HIP ARTHROPLASTY Right 2017    Procedure: RIGHT TOTAL HIP ARTHROPLASTY;  Surgeon: Riky Fernando MD;  Location: [facility] OR;  Service:          Visit Dx:      "ICD-10-CM ICD-9-CM   1. Oropharyngeal dysphagia R13.12 787.22           SLP Adult Swallow Evaluation - 03/07/19 1300        Rehab Evaluation    Document Type  evaluation   -    Subjective Information  no complaints   -    Patient Observations  alert;cooperative   -    Patient Effort  excellent   -       General Information    Patient Profile Reviewed  yes   -    Pertinent History Of Current Problem  \"Trouble swallowing sometimes with rice or cornbread\", \"Choked on chicken\"-pt pointed to larynx, and \"I keep clearing my throat\".   -    Current Method of Nutrition  regular textures;thin liquids   -    Precautions/Limitations, Vision  WFL with corrective lenses   -    Precautions/Limitations, Hearing  WFL;for purposes of eval   -    Prior Level of Function-Communication  WFL   -    Prior Level of Function-Swallowing  no diet consistency restrictions   -    Plans/Goals Discussed with  patient;agreed upon   -    Barriers to Rehab  none identified   -       Pain Assessment    Additional Documentation  Pain Scale: Numbers Pre/Post-Treatment (Group)   -       Pain Scale: Numbers Pre/Post-Treatment    Pain Scale: Numbers, Pretreatment  0/10 - no pain   -    Pain Scale: Numbers, Post-Treatment  0/10 - no pain   -       Oral Motor and Function    Dentition Assessment  upper dentures/partial in place;lower dentures/partial in place   -       Oral Musculature and Cranial Nerve Assessment    Oral Motor General Assessment  WFL   -       MBS/VFSS Interpretation    VFSS Summary  Pt presents with min to mild oropharyngeal dysphagia characterized by swallow delay, lingual weakness and reduced pharyngeal stripping wave. Osteophytes observed and appeared to impact pharyngeal stripping wave and upper esophageal sphincter opening. Spill to valleculae before the swallow with nectar via spoon without penetration. Normal swallow initiation with nectar via straw without penetration. Normal swallow initiation " with thins via cup without penetration during the swallow. The pt's shoulders obstructed SLP's view of the larynx at rest. Pt often produced a throat clear with trace-mild residue present at pyriforms, SLP suspects posterior penetration. No aspiration observed. Spill to pyriforms before the swallow with thins via straw with deep, transient penetration during the swallow. Spill to valleculae before the swallow with puree. Mild pharyngeal residue post swallow, patient cleared with spontaneous second swallow. Spill to pyriforms before the swallow with mixed mech soft without penetration. Moderate pharyngeal residue after the initial swallow with regular despite liquid wash. With second swallow, patient exhibited mild residue at valleculae and posterior pharyngeal wall. Pt did not sensate when asked, but produced a third swallow and cleared residue to trace at pyriforms. With a cued liquid wash, patient cleared the remaining residue. Esophageal scan revealed distal backflow, but eventually cleared.   [facility]       SLP Communication to Radiology    Summary Statement  Pt presents with mild oropharyngeal dysphagia characterized by swallow delay, lingual weakness and reduced pharyngeal stripping wave. Transient penetration with thins. No penetration with nectar, puree, or mech soft. Pharyngeal residue with regular, but patient able to clear with cues.    -       Clinical Impression    SLP Swallowing Diagnosis  mild;oral dysfunction;pharyngeal dysfunction   -    Functional Impact  risk of aspiration/pneumonia   -    Rehab Potential/Prognosis, Swallowing  good, to achieve stated therapy goals   -    Swallow Criteria for Skilled Therapeutic Interventions Met  demonstrates skilled criteria   -       Recommendations    Therapy Frequency (Swallow)  evaluation only   -    Predicted Duration Therapy Intervention (Days)  other (see comments) HEP   -    SLP Diet Recommendation  regular textures;thin liquids;other (see  comments) AVOID DRY, STICKY FOODS. SELECT SLICK FOODS.   -    Recommended Precautions and Strategies  upright posture during/after eating;small bites of food and sips of liquid;no straw;multiple swallows per bite of food;multiple swallows per sip of liquid   -    SLP Rec. for Method of Medication Administration  meds whole;with thin liquids;with pudding or applesauce;as tolerated   -    Monitor for Signs of Aspiration  yes;notify SLP if any concerns   -    Anticipated Dischage Disposition  home;other (see comments) WITH HOME EXERCISE PROGRAM    -      User Key  (r) = Recorded By, (t) = Taken By, (c) = Cosigned By    Initials Name Provider Type     Lissy Swan MS SHARONA CCC-SLP Speech and Language Pathologist                        OP SLP Education     Row Name 03/07/19 2289       Education    Barriers to Learning  No barriers identified  -    Education Provided  Described results of evaluation  -    Assessed  Learning motivation;Learning preferences;Learning readiness  -    Learning Motivation  Strong  -    Learning Method  Explanation;Demonstration;Written materials  -    Teaching Response  Verbalized understanding  -      User Key  (r) = Recorded By, (t) = Taken By, (c) = Cosigned By    Initials Name Effective Dates     Sosa Lissy MS SHARONA CCC-SLP 03/07/18 -               OP SLP Assessment/Plan - 03/07/19 1639        SLP Assessment    Functional Problems  Swallowing   -    Clinical Impression: Swallowing  Minimal:;Mild:   -    Prognosis  Good (comment)   -    Patient/caregiver participated in establishment of treatment plan and goals  Yes   -    Patient would benefit from skilled therapy intervention  Yes   -       SLP Plan    Plan Comments  SLP provided patient with home exercise program to be complete 3 times a day for 6-8 weeks. If improvement noted, patient was instructed to complete exercises once a day. If no improvement noted, patient was instructed to contact MD.    -       User Key  (r) = Recorded By, (t) = Taken By, (c) = Cosigned By    Initials Name Provider Type    Lissy Redmond MS CCC-SLP Speech and Language Pathologist              SLP Outcome Measures (last 72 hours)      SLP Outcome Measures     Row Name 03/07/19 1600             SLP Outcome Measures    Outcome Measure Used?  Adult NOMS  -         Adult FCM Scores    FCM Chosen  Swallowing  -      Swallowing FCM Score  6  -SH        User Key  (r) = Recorded By, (t) = Taken By, (c) = Cosigned By    Initials Name Effective Dates    Lissy Redmond MS CCC-SLP 03/07/18 -                Time Calculation:   SLP Start Time: 1300    Therapy Charges for Today     Code Description Service Date Service Provider Modifiers Qty    [id number] HC ST MOTION FLUORO EVAL SWALLOW 4 3/7/2019 Lissy Swan MS CCC-SLP GN 1                   Lissy Swan MS CCC-SLP  3/7/2019

## 2019-03-12 ENCOUNTER — HOSPITAL ENCOUNTER (OUTPATIENT)
Dept: CARDIOLOGY | Facility: HOSPITAL | Age: 79
Discharge: HOME | End: 2019-03-12
Admitting: FAMILY MEDICINE

## 2019-03-12 VITALS — HEART RATE: 81 BPM | BODY MASS INDEX: 39.78 KG/M2 | WEIGHT: 310 LBS | HEIGHT: 74 IN

## 2019-03-12 LAB
BH CV ECHO MEAS - ACS: 2 CM
BH CV ECHO MEAS - AO MAX PG (FULL): 15.2 MMHG
BH CV ECHO MEAS - AO MAX PG: 20.3 MMHG
BH CV ECHO MEAS - AO MEAN PG (FULL): 10 MMHG
BH CV ECHO MEAS - AO MEAN PG: 13 MMHG
BH CV ECHO MEAS - AO ROOT AREA (BSA CORRECTED): 1.4
BH CV ECHO MEAS - AO ROOT AREA: 10.2 CM^2
BH CV ECHO MEAS - AO ROOT DIAM: 3.6 CM
BH CV ECHO MEAS - AO V2 MAX: 225 CM/SEC
BH CV ECHO MEAS - AO V2 MEAN: 173 CM/SEC
BH CV ECHO MEAS - AO V2 VTI: 50.5 CM
BH CV ECHO MEAS - AVA(I,A): 1.8 CM^2
BH CV ECHO MEAS - AVA(I,D): 1.8 CM^2
BH CV ECHO MEAS - AVA(V,A): 1.7 CM^2
BH CV ECHO MEAS - AVA(V,D): 1.7 CM^2
BH CV ECHO MEAS - BSA(HAYCOCK): 2.8 M^2
BH CV ECHO MEAS - BSA: 2.6 M^2
BH CV ECHO MEAS - BZI_BMI: 39.8 KILOGRAMS/M^2
BH CV ECHO MEAS - BZI_METRIC_HEIGHT: 188 CM
BH CV ECHO MEAS - BZI_METRIC_WEIGHT: 140.6 KG
BH CV ECHO MEAS - CI(CUBED): 4.7 L/MIN/M^2
BH CV ECHO MEAS - CI(MOD-SP2): 1.3 L/MIN/M^2
BH CV ECHO MEAS - CI(MOD-SP4): 1.5 L/MIN/M^2
BH CV ECHO MEAS - CI(TEICH): 3.2 L/MIN/M^2
BH CV ECHO MEAS - CO(CUBED): 12.2 L/MIN
BH CV ECHO MEAS - CO(MOD-SP2): 3.5 L/MIN
BH CV ECHO MEAS - CO(MOD-SP4): 4.1 L/MIN
BH CV ECHO MEAS - CO(TEICH): 8.4 L/MIN
BH CV ECHO MEAS - EDV(MOD-SP2): 76.9 ML
BH CV ECHO MEAS - EDV(MOD-SP4): 93.4 ML
BH CV ECHO MEAS - EDV(TEICH): 201.9 ML
BH CV ECHO MEAS - EF(CUBED): 66.6 %
BH CV ECHO MEAS - EF(MOD-BP): 61 %
BH CV ECHO MEAS - EF(MOD-SP2): 62.3 %
BH CV ECHO MEAS - EF(MOD-SP4): 59.4 %
BH CV ECHO MEAS - EF(TEICH): 57 %
BH CV ECHO MEAS - ESV(MOD-SP2): 29 ML
BH CV ECHO MEAS - ESV(MOD-SP4): 37.9 ML
BH CV ECHO MEAS - ESV(TEICH): 86.8 ML
BH CV ECHO MEAS - FS: 30.6 %
BH CV ECHO MEAS - IVS/LVPW: 1
BH CV ECHO MEAS - IVSD: 1.3 CM
BH CV ECHO MEAS - LAT PEAK E' VEL: 9 CM/SEC
BH CV ECHO MEAS - LV DIASTOLIC VOL/BSA (35-75): 35.7 ML/M^2
BH CV ECHO MEAS - LV MASS(C)D: 396.1 GRAMS
BH CV ECHO MEAS - LV MASS(C)DI: 151.3 GRAMS/M^2
BH CV ECHO MEAS - LV MAX PG: 5 MMHG
BH CV ECHO MEAS - LV MEAN PG: 3 MMHG
BH CV ECHO MEAS - LV SYSTOLIC VOL/BSA (12-30): 14.5 ML/M^2
BH CV ECHO MEAS - LV V1 MAX: 112 CM/SEC
BH CV ECHO MEAS - LV V1 MEAN: 80 CM/SEC
BH CV ECHO MEAS - LV V1 VTI: 25.8 CM
BH CV ECHO MEAS - LVIDD: 6.3 CM
BH CV ECHO MEAS - LVIDS: 4.4 CM
BH CV ECHO MEAS - LVLD AP2: 6.8 CM
BH CV ECHO MEAS - LVLD AP4: 7.4 CM
BH CV ECHO MEAS - LVLS AP2: 5.5 CM
BH CV ECHO MEAS - LVLS AP4: 6.4 CM
BH CV ECHO MEAS - LVOT AREA (M): 3.5 CM^2
BH CV ECHO MEAS - LVOT AREA: 3.5 CM^2
BH CV ECHO MEAS - LVOT DIAM: 2.1 CM
BH CV ECHO MEAS - LVPWD: 1.3 CM
BH CV ECHO MEAS - MED PEAK E' VEL: 8 CM/SEC
BH CV ECHO MEAS - MM HR: 73 BPM
BH CV ECHO MEAS - MM R-R INT: 0.82 SEC
BH CV ECHO MEAS - MV A MAX VEL: 117 CM/SEC
BH CV ECHO MEAS - MV DEC SLOPE: 328 CM/SEC^2
BH CV ECHO MEAS - MV DEC TIME: 0.31 SEC
BH CV ECHO MEAS - MV E MAX VEL: 87 CM/SEC
BH CV ECHO MEAS - MV E/A: 0.74
BH CV ECHO MEAS - MV MAX PG: 5 MMHG
BH CV ECHO MEAS - MV MEAN PG: 3 MMHG
BH CV ECHO MEAS - MV P1/2T MAX VEL: 110 CM/SEC
BH CV ECHO MEAS - MV P1/2T: 98.2 MSEC
BH CV ECHO MEAS - MV V2 MAX: 112 CM/SEC
BH CV ECHO MEAS - MV V2 MEAN: 80.1 CM/SEC
BH CV ECHO MEAS - MV V2 VTI: 35.5 CM
BH CV ECHO MEAS - MVA P1/2T LCG: 2 CM^2
BH CV ECHO MEAS - MVA(P1/2T): 2.2 CM^2
BH CV ECHO MEAS - MVA(VTI): 2.5 CM^2
BH CV ECHO MEAS - PA MAX PG: 9.5 MMHG
BH CV ECHO MEAS - PA V2 MAX: 154 CM/SEC
BH CV ECHO MEAS - PULM A REVS DUR: 0.13 SEC
BH CV ECHO MEAS - PULM A REVS VEL: 32.3 CM/SEC
BH CV ECHO MEAS - PULM DIAS VEL: 34.4 CM/SEC
BH CV ECHO MEAS - PULM S/D: 1.3
BH CV ECHO MEAS - PULM SYS VEL: 44.8 CM/SEC
BH CV ECHO MEAS - RAP SYSTOLE: 3 MMHG
BH CV ECHO MEAS - RVOT AREA: 4.2 CM^2
BH CV ECHO MEAS - RVOT DIAM: 2.3 CM
BH CV ECHO MEAS - RVSP: 36 MMHG
BH CV ECHO MEAS - SI(AO): 196.3 ML/M^2
BH CV ECHO MEAS - SI(CUBED): 63.9 ML/M^2
BH CV ECHO MEAS - SI(LVOT): 34.1 ML/M^2
BH CV ECHO MEAS - SI(MOD-SP2): 18.3 ML/M^2
BH CV ECHO MEAS - SI(MOD-SP4): 21.2 ML/M^2
BH CV ECHO MEAS - SI(TEICH): 44 ML/M^2
BH CV ECHO MEAS - SUP REN AO DIAM: 2.3 CM
BH CV ECHO MEAS - SV(AO): 514 ML
BH CV ECHO MEAS - SV(CUBED): 167.2 ML
BH CV ECHO MEAS - SV(LVOT): 89.4 ML
BH CV ECHO MEAS - SV(MOD-SP2): 47.9 ML
BH CV ECHO MEAS - SV(MOD-SP4): 55.5 ML
BH CV ECHO MEAS - SV(TEICH): 115.2 ML
BH CV ECHO MEAS - TAPSE (>1.6): 2.5 CM2
BH CV ECHO MEAS - TR MAX VEL: 288 CM/SEC
BH CV ECHO MEASUREMENTS AVERAGE E/E' RATIO: 10.24
BH CV XLRA - RV BASE: 3.8 CM
BH CV XLRA - TDI S': 17 CM/SEC
LEFT ATRIUM VOLUME INDEX: 26 ML/M2
LEFT ATRIUM VOLUME: 68 CM3
LV EF 2D ECHO EST: 61 %
MAXIMAL PREDICTED HEART RATE: 141 BPM
STRESS TARGET HR: 120 BPM

## 2019-03-12 PROCEDURE — 93306 TTE W/DOPPLER COMPLETE: CPT | Performed by: INTERNAL MEDICINE

## 2019-03-12 PROCEDURE — 93306 TTE W/DOPPLER COMPLETE: CPT

## 2019-03-14 NOTE — PROGRESS NOTES
Please inform the patient of the following abnormal results.  Patient is to use thicker consistencies.

## 2019-03-19 ENCOUNTER — TELEPHONE (OUTPATIENT)
Dept: INTERNAL MEDICINE | Facility: CLINIC | Age: 79
End: 2019-03-19

## 2019-03-19 NOTE — TELEPHONE ENCOUNTER
----- Message from Marco Carrillo MD sent at 3/13/2019 11:39 PM EDT -----  Please inform the patient of the following abnormal results.  Patient is to use thicker consistencies.

## 2019-03-19 NOTE — TELEPHONE ENCOUNTER
Patient notified and voiced understanding. Patient advised to contact office if they have any questions. Patient was advised to do home exercises by Speech. If patient sees no improvement we will refer back to speech per Dr. Carrillo.

## 2019-03-19 NOTE — TELEPHONE ENCOUNTER
----- Message from Marco Carrillo MD sent at 3/13/2019 11:09 PM EDT -----  Please inform the patient of the following abnormal results.  Has tricuspid regurgitation. Will continue to monitor.

## 2019-04-09 ENCOUNTER — OFFICE VISIT (OUTPATIENT)
Dept: INTERNAL MEDICINE | Facility: CLINIC | Age: 79
End: 2019-04-09

## 2019-04-09 VITALS
HEIGHT: 74 IN | OXYGEN SATURATION: 96 % | DIASTOLIC BLOOD PRESSURE: 54 MMHG | SYSTOLIC BLOOD PRESSURE: 138 MMHG | WEIGHT: 309 LBS | BODY MASS INDEX: 39.66 KG/M2 | HEART RATE: 93 BPM

## 2019-04-09 DIAGNOSIS — J45.909 ASTHMA, UNSPECIFIED ASTHMA SEVERITY, UNSPECIFIED WHETHER COMPLICATED, UNSPECIFIED WHETHER PERSISTENT: Primary | ICD-10-CM

## 2019-04-09 DIAGNOSIS — J40 BRONCHITIS: ICD-10-CM

## 2019-04-09 PROCEDURE — 99214 OFFICE O/P EST MOD 30 MIN: CPT | Performed by: FAMILY MEDICINE

## 2019-04-09 RX ORDER — AMOXICILLIN 500 MG/1
500 CAPSULE ORAL 2 TIMES DAILY
Qty: 14 CAPSULE | Refills: 0 | Status: SHIPPED | OUTPATIENT
Start: 2019-04-09 | End: 2019-04-16

## 2019-04-09 RX ORDER — PREDNISONE 20 MG/1
20 TABLET ORAL DAILY
Qty: 10 TABLET | Refills: 0 | Status: SHIPPED | OUTPATIENT
Start: 2019-04-09 | End: 2019-04-14

## 2019-04-09 NOTE — PROGRESS NOTES
Subjective   Riky Moon is a 78 y.o. male.     Chief Complaint   Patient presents with   • Cough     productive   • URI   • Shortness of Breath         History of Present Illness     Patient with past medical history for asthma.  Patient states that over the last couple weeks he has been short of breath, has had a cough.  Patient notes that he is also been wheezing.  Patient states currently taking albuterol inhaler, and using stiolto.  Patient states that he is not getting any better.  Patient states that his cough keeps him up at night.  Patient also using his albuterol inhaler.  Denies any fevers.    The following portions of the patient's history were reviewed and updated as appropriate: allergies, current medications, past family history, past medical history, past social history, past surgical history and problem list.    Review of Systems   Constitutional: Negative for chills and fever.   HENT: Positive for congestion. Negative for rhinorrhea, sinus pain and sore throat.    Eyes: Negative for photophobia and visual disturbance.   Respiratory: Positive for cough, shortness of breath and wheezing. Negative for chest tightness.    Cardiovascular: Negative for chest pain and palpitations.   Gastrointestinal: Negative for diarrhea, nausea and vomiting.   Genitourinary: Negative for dysuria, frequency and urgency.   Skin: Negative for rash and wound.   Neurological: Negative for dizziness and syncope.   Psychiatric/Behavioral: Negative for behavioral problems and confusion.       Objective   Physical Exam   Constitutional: He is oriented to person, place, and time. He appears well-developed and well-nourished.   HENT:   Head: Normocephalic and atraumatic.   Right Ear: External ear normal.   Left Ear: External ear normal.   Eyes: EOM are normal.   Neck: Normal range of motion. Neck supple.   Cardiovascular: Normal rate, regular rhythm and normal heart sounds.   Pulmonary/Chest: Effort normal and breath sounds  normal. No respiratory distress.   Musculoskeletal: Normal range of motion.   Lymphadenopathy:     He has no cervical adenopathy.   Neurological: He is alert and oriented to person, place, and time.   Skin: Skin is warm.   Psychiatric: He has a normal mood and affect. His behavior is normal.   Nursing note and vitals reviewed.      Assessment/Plan   Riky was seen today for cough, uri and shortness of breath.    Diagnoses and all orders for this visit:    Asthma, unspecified asthma severity, unspecified whether complicated, unspecified whether persistent  -     Fluticasone-Umeclidin-Vilant (TRELEGY ELLIPTA) 100-62.5-25 MCG/INH aerosol powder ; Inhale 1 each Daily.  -     predniSONE (DELTASONE) 20 MG tablet; Take 1 tablet by mouth Daily for 5 days.  -     Patient should continue the use of albuterol inhaler.    Bronchitis  -     Fluticasone-Umeclidin-Vilant (TRELEGY ELLIPTA) 100-62.5-25 MCG/INH aerosol powder ; Inhale 1 each Daily.  -     predniSONE (DELTASONE) 20 MG tablet; Take 1 tablet by mouth Daily for 5 days.  -     Codeine Polt-Chlorphen Polt ER 14.7-2.8 MG/5ML Suspension Extended Release; Take 10 mL by mouth 2 (Two) Times a Day As Needed (COUGH).  -     amoxicillin (AMOXIL) 500 MG capsule; Take 1 capsule by mouth 2 (Two) Times a Day for 7 days.          No Follow-up on file.    Dictated utilizing Dragon Voice Recognition Software

## 2019-04-22 ENCOUNTER — OFFICE VISIT (OUTPATIENT)
Dept: INTERNAL MEDICINE | Facility: CLINIC | Age: 79
End: 2019-04-22

## 2019-04-22 VITALS
SYSTOLIC BLOOD PRESSURE: 140 MMHG | RESPIRATION RATE: 18 BRPM | DIASTOLIC BLOOD PRESSURE: 62 MMHG | WEIGHT: 308.8 LBS | HEART RATE: 80 BPM | OXYGEN SATURATION: 99 % | BODY MASS INDEX: 39.65 KG/M2

## 2019-04-22 DIAGNOSIS — J44.9 CHRONIC OBSTRUCTIVE PULMONARY DISEASE, UNSPECIFIED COPD TYPE (HCC): ICD-10-CM

## 2019-04-22 DIAGNOSIS — E03.9 HYPOTHYROIDISM, UNSPECIFIED TYPE: ICD-10-CM

## 2019-04-22 DIAGNOSIS — E11.8 TYPE 2 DIABETES MELLITUS WITH COMPLICATION, WITHOUT LONG-TERM CURRENT USE OF INSULIN (HCC): Primary | ICD-10-CM

## 2019-04-22 DIAGNOSIS — E66.01 MORBIDLY OBESE (HCC): ICD-10-CM

## 2019-04-22 PROCEDURE — 99214 OFFICE O/P EST MOD 30 MIN: CPT | Performed by: FAMILY MEDICINE

## 2019-04-22 NOTE — PROGRESS NOTES
Subjective   Riky Moon is a 78 y.o. male.     Chief Complaint   Patient presents with   • Follow-up     for asthma/bronchitis   • Follow-up     FOR diabetes         History of Present Illness     Patient past medical history of COPD.  Patient has been currently using his trelogy inhaler which has helped him with bronchitis.  Patient was on anoro inhaler prior to this.  Patient states that he is on much better at this time.    Continues to be morbidly obese.  Patient states that his by bydureon is helping him lose some weight.  He is continuing to monitor his diet and trying to exercise.    Patient's past medical history for hypothyroidism.  Is currently on levothyroxine 88 mcg daily.  He denies any side effects of the medication.  He says that the medicine seems to be working well for him.    Patient also has type 2 diabetes.  Patient has been currently controlled on metformin 850 mg twice daily.  Bydureon weekly.  He states that his fasting blood glucose levels are well controlled.    The following portions of the patient's history were reviewed and updated as appropriate: allergies, current medications, past family history, past medical history, past social history, past surgical history and problem list.    Review of Systems   Constitutional: Negative for chills and fever.   HENT: Negative for congestion, rhinorrhea, sinus pain and sore throat.    Eyes: Negative for photophobia and visual disturbance.   Respiratory: Negative for cough, chest tightness and shortness of breath.    Cardiovascular: Negative for chest pain and palpitations.   Gastrointestinal: Negative for diarrhea, nausea and vomiting.   Genitourinary: Negative for dysuria, frequency and urgency.   Skin: Negative for rash and wound.   Neurological: Negative for dizziness and syncope.   Psychiatric/Behavioral: Negative for behavioral problems and confusion.       Objective   Physical Exam   Constitutional: He is oriented to person, place, and  time. He appears well-developed and well-nourished.   HENT:   Head: Normocephalic and atraumatic.   Right Ear: External ear normal.   Left Ear: External ear normal.   Eyes: EOM are normal.   Neck: Normal range of motion. Neck supple.   Cardiovascular: Normal rate, regular rhythm and normal heart sounds.   Pulmonary/Chest: Effort normal and breath sounds normal. No respiratory distress.   Musculoskeletal: Normal range of motion.   Lymphadenopathy:     He has no cervical adenopathy.   Neurological: He is alert and oriented to person, place, and time.   Skin: Skin is warm.   Psychiatric: He has a normal mood and affect. His behavior is normal.   Nursing note and vitals reviewed.      Assessment/Plan   Riky was seen today for follow-up and follow-up.    Diagnoses and all orders for this visit:    Type 2 diabetes mellitus with complication, without long-term current use of insulin (CMS/[facility])  -     Hemoglobin A1c  -     Comprehensive Metabolic Panel  -     Continue the use of metformin and Bydureon.    Morbidly obese (CMS/[facility])        -     Patient to continue to exercise and diet.    Hypothyroidism, unspecified type  -     Thyroid Panel With TSH  -     Continue levothyroxine 88 mg daily.    Chronic obstructive pulmonary disease, unspecified COPD type (CMS/[facility])  -     umeclidinium-vilanterol (ANORO ELLIPTA) 62.5-25 MCG/INH aerosol powder  inhaler; Inhale 1 puff Daily.  -     We will stop Trelegy.  Restart Anoro.          No Follow-up on file.    Dictated utilizing Dragon Voice Recognition Software

## 2019-04-23 LAB
ALBUMIN SERPL-MCNC: 4.3 G/DL (ref 3.5–5.2)
ALBUMIN/GLOB SERPL: 1.7 G/DL
ALP SERPL-CCNC: 52 U/L (ref 39–117)
ALT SERPL-CCNC: 16 U/L (ref 1–41)
AST SERPL-CCNC: 20 U/L (ref 1–40)
BILIRUB SERPL-MCNC: 0.7 MG/DL (ref 0.2–1.2)
BUN SERPL-MCNC: 19 MG/DL (ref 8–23)
BUN/CREAT SERPL: 19.8 (ref 7–25)
CALCIUM SERPL-MCNC: 9.3 MG/DL (ref 8.6–10.5)
CHLORIDE SERPL-SCNC: 105 MMOL/L (ref 98–107)
CO2 SERPL-SCNC: 23.7 MMOL/L (ref 22–29)
CREAT SERPL-MCNC: 0.96 MG/DL (ref 0.76–1.27)
FT4I SERPL CALC-MCNC: 1.7 (ref 1.2–4.9)
GLOBULIN SER CALC-MCNC: 2.5 GM/DL
GLUCOSE SERPL-MCNC: 112 MG/DL (ref 65–99)
HBA1C MFR BLD: 6 % (ref 4.8–5.6)
POTASSIUM SERPL-SCNC: 4.8 MMOL/L (ref 3.5–5.2)
PROT SERPL-MCNC: 6.8 G/DL (ref 6–8.5)
SODIUM SERPL-SCNC: 142 MMOL/L (ref 136–145)
T3RU NFR SERPL: 24 % (ref 24–39)
T4 SERPL-MCNC: 6.9 UG/DL (ref 4.5–12)
TSH SERPL DL<=0.005 MIU/L-ACNC: 2.71 UIU/ML (ref 0.45–4.5)

## 2019-04-23 NOTE — PROGRESS NOTES
The labs were reviewed. Please inform patient that labs were normal.  Currently stable.  Patient should continue medications at current doses.

## 2019-04-24 ENCOUNTER — TELEPHONE (OUTPATIENT)
Dept: INTERNAL MEDICINE | Facility: CLINIC | Age: 79
End: 2019-04-24

## 2019-04-24 NOTE — TELEPHONE ENCOUNTER
LM that labs were normal and to make no changes in his medicines. Told him to call the office if he had any questions.

## 2019-09-18 ENCOUNTER — APPOINTMENT (OUTPATIENT)
Dept: SLEEP MEDICINE | Facility: HOSPITAL | Age: 79
End: 2019-09-18
Attending: INTERNAL MEDICINE

## 2019-10-04 ENCOUNTER — OFFICE VISIT (OUTPATIENT)
Dept: CARDIOLOGY | Facility: CLINIC | Age: 79
End: 2019-10-04

## 2019-10-04 VITALS
DIASTOLIC BLOOD PRESSURE: 80 MMHG | HEART RATE: 80 BPM | BODY MASS INDEX: 39.66 KG/M2 | WEIGHT: 309 LBS | SYSTOLIC BLOOD PRESSURE: 164 MMHG | HEIGHT: 74 IN

## 2019-10-04 DIAGNOSIS — I10 ESSENTIAL HYPERTENSION: Primary | ICD-10-CM

## 2019-10-04 PROCEDURE — 93000 ELECTROCARDIOGRAM COMPLETE: CPT | Performed by: INTERNAL MEDICINE

## 2019-10-04 PROCEDURE — 99214 OFFICE O/P EST MOD 30 MIN: CPT | Performed by: INTERNAL MEDICINE

## 2019-10-09 NOTE — PROGRESS NOTES
Subjective:     Encounter Date:10/04/2019      Patient ID: Riky Moon is a 79 y.o. male.    Chief Complaint:  Hypertension   This is a chronic problem. The problem is controlled. Associated symptoms include chest pain, malaise/fatigue and shortness of breath.       78-year-old gentleman who presents today for evaluation.  Patient is a history of hypertension whom as well as atypical chest discomfort.  He presents today complaining of some discomfort in his left shoulder as well as a stiff neck.  He says the pain shoots up his neck and is sore all over the area it lasts about a minute.  He can rub his neck and it does help some.  He also says he feels unsteady walking which is relatively new to him.  He does have a bladder stimulator present which was noted on his ECG.    Review of Systems   Constitution: Positive for malaise/fatigue.   Cardiovascular: Positive for chest pain.   Respiratory: Positive for cough and shortness of breath.    Musculoskeletal: Positive for joint pain.   All other systems reviewed and are negative.        ECG 12 Lead  Date/Time: 10/4/2019 11:42 AM  Performed by: Ty Shelton MD  Authorized by: Ty Shelton MD   Comparison: compared with previous ECG from 10/3/2018  Similar to previous ECG  Rhythm: sinus rhythm    Clinical impression: normal ECG               Objective:     Physical Exam   Constitutional: He is oriented to person, place, and time. He appears well-developed.   HENT:   Head: Normocephalic.   Eyes: Conjunctivae are normal.   Neck: Normal range of motion.   Cardiovascular: Normal rate, regular rhythm and normal heart sounds.   Pulmonary/Chest: Breath sounds normal.   Abdominal: Soft. Bowel sounds are normal.   Musculoskeletal: Normal range of motion. He exhibits no edema.   Neurological: He is alert and oriented to person, place, and time.   Skin: Skin is warm and dry.   Psychiatric: He has a normal mood and affect. His behavior is normal.   Vitals  reviewed.      Lab Review:       Assessment:          Diagnosis Plan   1. Essential hypertension            Plan:       1.  Hypertension patient's blood pressure was elevated today he says this is unusual told to continue to follow make sure does remain elevated.  2.  Shoulder and neck discomfort.  To me it sounds classic muscle skeletal follow clinically his ECG today was unremarkable.  3.  Follow-up 6 to 12 months sooner course if he has issues that are new.

## 2019-10-17 ENCOUNTER — OFFICE VISIT (OUTPATIENT)
Dept: SLEEP MEDICINE | Facility: HOSPITAL | Age: 79
End: 2019-10-17
Attending: INTERNAL MEDICINE

## 2019-10-17 VITALS — WEIGHT: 315 LBS | HEIGHT: 74 IN | BODY MASS INDEX: 40.43 KG/M2

## 2019-10-17 DIAGNOSIS — G47.33 OSA TREATED WITH BIPAP: Primary | ICD-10-CM

## 2019-10-17 DIAGNOSIS — E66.01 CLASS 3 SEVERE OBESITY DUE TO EXCESS CALORIES WITH SERIOUS COMORBIDITY AND BODY MASS INDEX (BMI) OF 40.0 TO 44.9 IN ADULT (HCC): ICD-10-CM

## 2019-10-17 PROCEDURE — 99213 OFFICE O/P EST LOW 20 MIN: CPT | Performed by: INTERNAL MEDICINE

## 2019-10-17 PROCEDURE — G0463 HOSPITAL OUTPT CLINIC VISIT: HCPCS

## 2019-10-17 NOTE — PROGRESS NOTES
"Follow Up [facility] Note     Chief Complaint:  KINDRA     Primary Care Physician: Marco Carrillo MD    Interval History:   I last saw the patient in 2018.  He is stable and unchanged without new complaints.  He goes to bed at 11:30 PM and awakens at 9:30 AM.  Occasionally he will use the restroom.  [place] Sleepiness Scale is normal at 3    Review of Systems:    A complete review of systems was done and all were negative with the exception of some BLANCHARD    Social History:    Social History     Socioeconomic History   • Marital status:      Spouse name: Not on file   • Number of children: Not on file   • Years of education: Not on file   • Highest education level: Not on file   Tobacco Use   • Smoking status: Former Smoker     Packs/day: 1.50     Years: 40.00     Pack years: 60.00     Types: Cigarettes     Last attempt to quit:      Years since quittin.8   • Smokeless tobacco: Never Used   • Tobacco comment: from age [age]   Substance and Sexual Activity   • Alcohol use: No     Comment: HOLIDAYS  caffeine -2 cups coffee daily   • Drug use: No     Comment: PRN use for pain   • Sexual activity: Defer       Allergies:  Patient has no known allergies.     Medication Review:  Reviewed.      Vital Signs:    Vitals:    10/17/19 0900   Weight: (!) 145 kg (319 lb)   Height: 188 cm (74\")     Body mass index is 40.96 kg/m².    Physical Exam:    Constitutional:  Well developed 79 y.o. male that appears in no apparent distress.  Awake & oriented times 3.  Normal mood with normal recent and remote memory and normal judgement.  Eyes:  Conjunctivae normal.  Oropharynx:  moist mucous membranes without exudate and a large tongue and normal uvula that is broad-based and class III MP airway     Results Review:  DME is [facility] and he uses a full facemask.  Downloads between  and 10/16/2019 compliance 100%.  Average usage is 9 hours and 10 minutes.  Average AHI is normal with a leak of 1 hour and " 52 minutes.  Average AutoBiPAP pressure is IPAP of 15.1 and EPAP of 12.6 and his auto BiPAP settings: Max IPAP 18 minimum EPAP 12 max pressure support 6 minimum pressure support to.       Impression:   Obstructive sleep apnea adequately treated with auto BiPAP with good compliance and usage and no complaints of hypersomnolence.    Plan:  Good sleep hygiene measures should be maintained.  Weight loss would be beneficial in this patient who is morbidly obese by BMI.  The patient is benefiting from the treatment being provided.     The patient will continue auto BiPAP and a new prescription will be sent to his DME    The patient will call for any problems and will follow up in 1 year.      Cosmo French MD  Sleep Medicine  10/17/19  10:06 AM

## 2019-10-26 PROBLEM — E66.813 CLASS 3 SEVERE OBESITY DUE TO EXCESS CALORIES WITH SERIOUS COMORBIDITY AND BODY MASS INDEX (BMI) OF 40.0 TO 44.9 IN ADULT: Status: ACTIVE | Noted: 2019-10-26

## 2019-10-26 PROBLEM — E66.01 CLASS 3 SEVERE OBESITY DUE TO EXCESS CALORIES WITH SERIOUS COMORBIDITY AND BODY MASS INDEX (BMI) OF 40.0 TO 44.9 IN ADULT (HCC): Status: ACTIVE | Noted: 2019-10-26

## 2019-11-06 ENCOUNTER — OFFICE VISIT (OUTPATIENT)
Dept: INTERNAL MEDICINE | Facility: CLINIC | Age: 79
End: 2019-11-06

## 2019-11-06 ENCOUNTER — APPOINTMENT (OUTPATIENT)
Dept: LAB | Facility: HOSPITAL | Age: 79
End: 2019-11-06

## 2019-11-06 VITALS
BODY MASS INDEX: 40.43 KG/M2 | HEART RATE: 87 BPM | SYSTOLIC BLOOD PRESSURE: 124 MMHG | TEMPERATURE: 98.4 F | HEIGHT: 74 IN | WEIGHT: 315 LBS | RESPIRATION RATE: 15 BRPM | DIASTOLIC BLOOD PRESSURE: 84 MMHG | OXYGEN SATURATION: 99 %

## 2019-11-06 DIAGNOSIS — E11.9 TYPE 2 DIABETES MELLITUS WITHOUT COMPLICATION, WITHOUT LONG-TERM CURRENT USE OF INSULIN (HCC): ICD-10-CM

## 2019-11-06 DIAGNOSIS — E11.8 TYPE 2 DIABETES MELLITUS WITH COMPLICATION, WITHOUT LONG-TERM CURRENT USE OF INSULIN (HCC): ICD-10-CM

## 2019-11-06 DIAGNOSIS — E78.5 HYPERLIPIDEMIA, UNSPECIFIED HYPERLIPIDEMIA TYPE: Primary | ICD-10-CM

## 2019-11-06 DIAGNOSIS — I10 ESSENTIAL HYPERTENSION: Primary | ICD-10-CM

## 2019-11-06 DIAGNOSIS — M79.89 LEFT LEG SWELLING: ICD-10-CM

## 2019-11-06 DIAGNOSIS — E78.5 HYPERLIPIDEMIA, UNSPECIFIED HYPERLIPIDEMIA TYPE: ICD-10-CM

## 2019-11-06 DIAGNOSIS — I10 ESSENTIAL HYPERTENSION: ICD-10-CM

## 2019-11-06 DIAGNOSIS — E03.9 HYPOTHYROIDISM, UNSPECIFIED TYPE: ICD-10-CM

## 2019-11-06 LAB
ALBUMIN SERPL-MCNC: 4.1 G/DL (ref 3.5–5.2)
ALBUMIN/GLOB SERPL: 1.4 G/DL
ALP SERPL-CCNC: 53 U/L (ref 39–117)
ALT SERPL W P-5'-P-CCNC: 12 U/L (ref 1–41)
ANION GAP SERPL CALCULATED.3IONS-SCNC: 12.1 MMOL/L (ref 5–15)
AST SERPL-CCNC: 14 U/L (ref 1–40)
BASOPHILS # BLD AUTO: 0.07 10*3/MM3 (ref 0–0.2)
BASOPHILS NFR BLD AUTO: 0.7 % (ref 0–1.5)
BILIRUB SERPL-MCNC: 0.4 MG/DL (ref 0.2–1.2)
BUN BLD-MCNC: 18 MG/DL (ref 8–23)
BUN/CREAT SERPL: 19.8 (ref 7–25)
CALCIUM SPEC-SCNC: 9.1 MG/DL (ref 8.6–10.5)
CHLORIDE SERPL-SCNC: 101 MMOL/L (ref 98–107)
CHOLEST SERPL-MCNC: 130 MG/DL (ref 0–200)
CO2 SERPL-SCNC: 24.9 MMOL/L (ref 22–29)
CREAT BLD-MCNC: 0.91 MG/DL (ref 0.76–1.27)
DEPRECATED RDW RBC AUTO: 59.7 FL (ref 37–54)
DIFFERENTIAL METHOD BLD: ABNORMAL
EOSINOPHIL # BLD AUTO: 0.21 10*3/MM3 (ref 0–0.4)
EOSINOPHIL NFR BLD AUTO: 2.2 % (ref 0.3–6.2)
ERYTHROCYTE [DISTWIDTH] IN BLOOD BY AUTOMATED COUNT: 15.6 % (ref 12.3–15.4)
GFR SERPL CREATININE-BSD FRML MDRD: 80 ML/MIN/1.73
GLOBULIN UR ELPH-MCNC: 3 GM/DL
GLUCOSE BLD-MCNC: 178 MG/DL (ref 65–99)
HBA1C MFR BLD: 6.6 % (ref 4.8–5.6)
HCT VFR BLD AUTO: 34 % (ref 37.5–51)
HDLC SERPL-MCNC: 38 MG/DL (ref 40–60)
HGB BLD-MCNC: 11.9 G/DL (ref 13–17.7)
IMM GRANULOCYTES # BLD AUTO: 0.05 10*3/MM3 (ref 0–0.05)
IMM GRANULOCYTES NFR BLD AUTO: 0.5 % (ref 0–0.5)
LDLC SERPL CALC-MCNC: 69 MG/DL (ref 0–100)
LDLC/HDLC SERPL: 1.82 {RATIO}
LYMPHOCYTES # BLD AUTO: 1.63 10*3/MM3 (ref 0.7–3.1)
LYMPHOCYTES NFR BLD AUTO: 17.2 % (ref 19.6–45.3)
MCH RBC QN AUTO: 37 PG (ref 26.6–33)
MCHC RBC AUTO-ENTMCNC: 35 G/DL (ref 31.5–35.7)
MCV RBC AUTO: 105.6 FL (ref 79–97)
MONOCYTES # BLD AUTO: 0.71 10*3/MM3 (ref 0.1–0.9)
MONOCYTES NFR BLD AUTO: 7.5 % (ref 5–12)
NEUTROPHILS # BLD AUTO: 6.79 10*3/MM3 (ref 1.7–7)
NEUTROPHILS NFR BLD AUTO: 71.9 % (ref 42.7–76)
NRBC BLD AUTO-RTO: 0.2 /100 WBC (ref 0–0.2)
PLATELET # BLD AUTO: 204 10*3/MM3 (ref 140–450)
PMV BLD AUTO: 11.4 FL (ref 6–12)
POTASSIUM BLD-SCNC: 4.3 MMOL/L (ref 3.5–5.2)
PROT SERPL-MCNC: 7.1 G/DL (ref 6–8.5)
RBC # BLD AUTO: 3.22 10*6/MM3 (ref 4.14–5.8)
SODIUM BLD-SCNC: 138 MMOL/L (ref 136–145)
TRIGL SERPL-MCNC: 114 MG/DL (ref 0–150)
VLDLC SERPL-MCNC: 22.8 MG/DL (ref 5–40)
WBC # BLD AUTO: 9.46 10*3/MM3 (ref 3.4–10.8)
WBC NRBC COR # BLD: 9.46 10*3/MM3 (ref 3.4–10.8)

## 2019-11-06 PROCEDURE — 83036 HEMOGLOBIN GLYCOSYLATED A1C: CPT | Performed by: FAMILY MEDICINE

## 2019-11-06 PROCEDURE — 80061 LIPID PANEL: CPT | Performed by: FAMILY MEDICINE

## 2019-11-06 PROCEDURE — 80053 COMPREHEN METABOLIC PANEL: CPT | Performed by: FAMILY MEDICINE

## 2019-11-06 PROCEDURE — 85025 COMPLETE CBC W/AUTO DIFF WBC: CPT | Performed by: FAMILY MEDICINE

## 2019-11-06 PROCEDURE — 99214 OFFICE O/P EST MOD 30 MIN: CPT | Performed by: FAMILY MEDICINE

## 2019-11-06 PROCEDURE — 36415 COLL VENOUS BLD VENIPUNCTURE: CPT | Performed by: FAMILY MEDICINE

## 2019-11-06 RX ORDER — PRAVASTATIN SODIUM 40 MG
40 TABLET ORAL NIGHTLY
Qty: 90 TABLET | Refills: 3 | Status: SHIPPED | OUTPATIENT
Start: 2019-11-06

## 2019-11-06 RX ORDER — DULOXETIN HYDROCHLORIDE 60 MG/1
60 CAPSULE, DELAYED RELEASE ORAL EVERY MORNING
Qty: 90 CAPSULE | Refills: 3 | Status: SHIPPED | OUTPATIENT
Start: 2019-11-06

## 2019-11-06 RX ORDER — LEVOTHYROXINE SODIUM 88 UG/1
88 TABLET ORAL EVERY MORNING
Qty: 90 TABLET | Refills: 3 | Status: SHIPPED | OUTPATIENT
Start: 2019-11-06 | End: 2021-01-19

## 2019-11-06 RX ORDER — LOSARTAN POTASSIUM 100 MG/1
100 TABLET ORAL DAILY
Qty: 90 TABLET | Refills: 3 | Status: SHIPPED | OUTPATIENT
Start: 2019-11-06

## 2019-11-07 ENCOUNTER — RESULTS ENCOUNTER (OUTPATIENT)
Dept: INTERNAL MEDICINE | Facility: CLINIC | Age: 79
End: 2019-11-07

## 2019-11-07 DIAGNOSIS — E11.8 TYPE 2 DIABETES MELLITUS WITH COMPLICATION, WITHOUT LONG-TERM CURRENT USE OF INSULIN (HCC): ICD-10-CM

## 2019-11-07 DIAGNOSIS — E78.5 HYPERLIPIDEMIA, UNSPECIFIED HYPERLIPIDEMIA TYPE: ICD-10-CM

## 2019-11-07 DIAGNOSIS — I10 ESSENTIAL HYPERTENSION: ICD-10-CM

## 2019-11-07 NOTE — PROGRESS NOTES
Please inform the patient of the following abnormal results.  Worsening hba1c, I have started him on trulicity in the office. Patient needs to see me in 3 mos.

## 2019-11-11 NOTE — PROGRESS NOTES
Subjective   Riky Moon is a 79 y.o. male.     Chief Complaint   Patient presents with   • 6 month f/u Type 2 diabetes mellitus with complication, with   • 6 month f/u Asthma, unspecified asthma severity, unspecified         History of Present Illness     Patient's past medical history for essential hypertension.  His blood pressure today's office is well controlled at 124/84.  He is currently taking losartan 100 mg daily.  He denies any side effects of the medication.    Patient has history of type 2 diabetes.  Patient's current taking Metformin 850 mg twice a day.  He does note that his fasting blood glucose levels are running higher than with it typically have been in the past.  States that sometimes does get up to the 150s.    Patient also notes to have hyperlipidemia.  Is currently taking pravastatin 40 mg daily.  He denies any side effects of the medication.    Has a past medical history of hypothyroidism.  Is currently on levothyroxine 80 mcg daily.  Patient denies any side effects of the medication.    Patient notes that he continues to have left leg swelling.  Patient states that he has tried compression stockings, but notes that it is very difficult to have his put on.  Patient states that he has a history of having lymphedema.    The following portions of the patient's history were reviewed and updated as appropriate: allergies, current medications, past family history, past medical history, past social history, past surgical history and problem list.    Review of Systems   Constitutional: Negative for chills and fever.   HENT: Negative for congestion, rhinorrhea, sinus pain and sore throat.    Eyes: Negative for photophobia and visual disturbance.   Respiratory: Negative for cough, chest tightness and shortness of breath.    Cardiovascular: Positive for leg swelling. Negative for chest pain and palpitations.   Gastrointestinal: Negative for diarrhea, nausea and vomiting.   Genitourinary: Negative  for dysuria, frequency and urgency.   Skin: Negative for rash and wound.   Neurological: Negative for dizziness and syncope.   Psychiatric/Behavioral: Negative for behavioral problems and confusion.       Objective   Physical Exam   Constitutional: He is oriented to person, place, and time. He appears well-developed and well-nourished.   HENT:   Head: Normocephalic and atraumatic.   Right Ear: External ear normal.   Left Ear: External ear normal.   Eyes: EOM are normal.   Neck: Normal range of motion. Neck supple.   Cardiovascular: Normal rate, regular rhythm and normal heart sounds.   Pulmonary/Chest: Effort normal and breath sounds normal. No respiratory distress.   Musculoskeletal: Normal range of motion.   Lymphadenopathy:     He has no cervical adenopathy.   Neurological: He is alert and oriented to person, place, and time.   Skin: Skin is warm.   Left leg swelling.  Not erythematic.  No pain.   Psychiatric: He has a normal mood and affect. His behavior is normal.   Nursing note and vitals reviewed.      Assessment/Plan   Riky was seen today for 6 month f/u type 2 diabetes mellitus with complication, with and 6 month f/u asthma, unspecified asthma severity, unspecified.    Diagnoses and all orders for this visit:    Essential hypertension  -     Comprehensive Metabolic Panel  -     CBC & Differential  -     losartan (COZAAR) 100 MG tablet; Take 1 tablet by mouth Daily.  -     CBC Auto Differential  -     Manual Differential; Future  -     Continue losartan 100 mg daily.    Type 2 diabetes mellitus without complication, without long-term current use of insulin (CMS/[facility])  -     Hemoglobin A1c    Type 2 diabetes mellitus with complication, without long-term current use of insulin (CMS/[facility])  -     metFORMIN (GLUCOPHAGE) 850 MG tablet; Take 1 tablet by mouth 2 (Two) Times a Day With Meals.  -     Dulaglutide (TRULICITY) 0.75 MG/0.5ML solution pen-injector; Inject 0.75 mg under the skin into the appropriate area as  directed 1 (One) Time Per Week.  -     We will continue metformin, will start patient on Trulicity 0.75 mg weekly.    Hyperlipidemia, unspecified hyperlipidemia type  -     pravastatin (PRAVACHOL) 40 MG tablet; Take 1 tablet by mouth Every Night.  -     Lipid Panel With LDL / HDL Ratio  -     Continue patient on pravastatin 40 mg daily.    Left leg swelling  -     Ambulatory Referral to Lymphedema Clinic  -     Comprehensive Metabolic Panel  -     CBC & Differential  -     CBC Auto Differential  -     Manual Differential; Future  -     I discussed with patient at today's office visit that his left leg is most likely swollen due to lymphedema.  Patient was strongly benefit from wearing compression stockings.  However his physical habitus makes it difficult for him to be able to do this, and also difficult for his wife.  I have recommended him going to the lymphedema clinic to see if this would help him.    Hypothyroidism, unspecified type  -     levothyroxine (SYNTHROID, LEVOTHROID) 88 MCG tablet; Take 1 tablet by mouth Every Morning.  -     Stable, will continue patient on levothyroxine 80 mcg daily.    Other orders  -     DULoxetine (CYMBALTA) 60 MG capsule; Take 1 capsule by mouth Every Morning.          No Follow-up on file.    Dictated utilizing Dragon Voice Recognition Software

## 2019-11-21 ENCOUNTER — HOSPITAL ENCOUNTER (OUTPATIENT)
Dept: PHYSICAL THERAPY | Facility: HOSPITAL | Age: 79
Setting detail: THERAPIES SERIES
Discharge: HOME | End: 2019-11-21

## 2019-11-21 DIAGNOSIS — I89.0 LYMPHEDEMA: Primary | ICD-10-CM

## 2019-11-21 PROCEDURE — 97162 PT EVAL MOD COMPLEX 30 MIN: CPT

## 2019-11-21 NOTE — THERAPY EVALUATION
Physical Therapy Lymphedema Initial Evaluation  [facility]     Patient Name: Riky Moon  : 1940  MRN: [id number]  Today's Date: 2019      Visit Date: 2019    Visit Dx:    ICD-10-CM ICD-9-CM   1. Lymphedema I89.0 457.1       Patient Active Problem List   Diagnosis   • OA (osteoarthritis) of hip   • KINDRA treated with auto BiPAP   • Chest pain   • Diabetes mellitus (CMS/[facility])   • Hypertension   • Hyperlipidemia   • Hypothyroidism   • Asthma   • Overactive bladder   • CAP (community acquired pneumonia)   • Cellulitis of left lower extremity   • COPD (chronic obstructive pulmonary disease) (CMS/[facility])   • Dyslipidemia   • Gastroesophageal reflux disease   • Leukocytosis   • Peripheral nerve disease   • Constipation   • Oropharyngeal dysphagia   • Bronchitis   • Class 3 severe obesity due to excess calories with serious comorbidity and body mass index (BMI) of 40.0 to 44.9 in adult (CMS/[facility])   • Left leg swelling        Past Medical History:   Diagnosis Date   • Acid reflux    • Arthritis     OSTEO   • Asthma    • Chest discomfort    • Colon polyp    • COPD (chronic obstructive pulmonary disease) (CMS/[facility])    • Depression    • Diabetes mellitus (CMS/[facility])     type 2   • Disease of thyroid gland    • High cholesterol    • Hypertension    • Morbid obesity (CMS/HCC)    • Neuropathy     BOTH FEET   • Obesity, Class III, BMI 40-49.9 (morbid obesity) (CMS/HCC)    • PAD (peripheral artery disease) (CMS/[facility])    • Poor circulation of extremity     LEFT LEG   • Sleep apnea    • Vitamin D deficiency         Past Surgical History:   Procedure Laterality Date   • APPENDECTOMY     • CATARACT EXTRACTION W/ INTRAOCULAR LENS IMPLANT Bilateral    • CHOLECYSTECTOMY     • COLONOSCOPY  2014   • COLONOSCOPY W/ POLYPECTOMY      BENIGN   • INTERSTIM PLACEMENT Left 2016    BLADDER CONTROL   • KNEE ARTHROPLASTY Right    • MOUTH SURGERY  2018   • NOSE SURGERY      REMOVED BLOCKAGE    • ROTATOR CUFF REPAIR  Right    • TOTAL HIP ARTHROPLASTY Right 11/9/2017    Procedure: RIGHT TOTAL HIP ARTHROPLASTY;  Surgeon: Riky Fernando MD;  Location: [facility] OR;  Service:        Visit Dx:    ICD-10-CM ICD-9-CM   1. Lymphedema I89.0 457.1       Patient History     Row Name 11/21/19 1300             History    Chief Complaint  Swelling;Tightness lower legs,vianey left  -KD      Date Current Problem(s) Began  11/07/19 worse recently  -KD      Brief Description of Current Complaint  States he's had some swelling in legs,vianey left for several years. Saw a vascular surgeon a few years ago who told him he has valve issues in the veins in his legs. He has a compression pum which he has not used recently. Also has a velcro device at home.  -KD      Patient/Caregiver Goals  Decrease swelling  -KD      How has patient tried to help current problem?  Has worn velcro compression and used compression pump in the past.  -KD         Pain     Pain Location  Foot feet--neuropathy  -KD      Pain at Present  3 feet  -KD      Pain at Best  0  -KD      Pain at Worst  3  -KD      Difficulties with ADL's?  Quite a bit of difficulty with prolonged walking, going up and down stairs, prolonged standing; moderate difficulty with his usual hobbies, getting shoes and socks on, picking something up off the floor, getting in and out of car; min difficulty with home tasks, prolonged sitting, and rolling over in bed.  -KD         Fall Risk Assessment    Any falls in the past year:  Yes  -KD      Number of falls reported in the last 12 months  1  -KD      Factors that contributed to the fall:  Tripped  -KD         Services    Are you currently receiving Home Health services  No  -KD         Daily Activities    Primary Language  English  -KD      How does patient learn best?  Listening  -KD      Teaching needs identified  Home Exercise Program;Management of Condition  -KD      Patient is concerned about/has problems with  Difficulty with self care (i.e. bathing,  dressing, toileting:;Performing home management (household chores, shopping, care of dependents);Walking  -KD      Does patient have problems with the following?  None  -KD      Barriers to learning  None  -KD      Pt Participated in POC and Goals  Yes  -KD         Safety    Are you being hurt, hit, or frightened by anyone at home or in your life?  No  -KD      Are you being neglected by a caregiver  No  -KD        User Key  (r) = Recorded By, (t) = Taken By, (c) = Cosigned By    Initials Name Provider Type    KD Jessica Horta, PT Physical Therapist          Lymphedema     Row Name 11/21/19 1400             Subjective Pain    Able to rate subjective pain?  yes  -KD      Pre-Treatment Pain Level  3  -KD      Post-Treatment Pain Level  3  -KD      Subjective Pain Comment  burning sensation bottoms of feet--neuropathy  -KD         Subjective Comments    Subjective Comments  Pt states he lives with wife who is able to help with bandaging, has increased swelling in legs (vianey left) with prolonged standing/walking, States he sees a podriatist regularly to have toenails trimmed. States he sleeps with legs at level of heart. Has some difficulty reaching feet due to back issues.  -KD         Lymphedema Assessment    Lymphedema Classification  RLE:;LLE:;stage 2 (Spontaneously Irreversible)  -KD      Infections or Cellulitis?  yes 2016  -KD      Infection/Cellulitis Treatment  hospitalization, had legs wrapped at home for a while (had open areas)  -KD         Posture/Observations    Observations  Edema;Erythema B LE's  L>R  -KD      Posture/Observations Comments  Amb independently  -KD         General ROM    GENERAL ROM COMMENTS  Gen LE WFL  -KD         MMT (Manual Muscle Testing)    General MMT Comments  Gen LE at least 4/5  -KD         Lymphedema Edema Assessment    Ptting Edema Category  By grade out of 4  -KD      Pitting Edema  -- R LE min 1+/4, L LE mod 2+/4  -KD         Skin Changes/Observations    Location/Assessment   Lower Extremity  -KD      Lower Extremity Conditions  bilateral:;intact;clean;dry;scaly  -KD      Lower Extremity Color/Pigment  bilateral:;erythema  -KD         Lymphedema Sensation    Lymphedema Sensation Reports  RLE:;LLE:;tingling plantar aspects of feet burn  -KD      Lymphedema Sensation Tests  light touch  -KD      Lymphedema Light Touch  WNL  -KD         Lymphedema Measurements    Measurement Type(s)  Circumferential  -KD      Circumferential Areas  Lower extremities  -KD         BLE Circumferential (cm)    Measurement Location 1  Knee (popliteal crease)  -KD      Left 1  43.5 cm  -KD      Right 1  45 cm  -KD      Measurement Location 2  10cm below knee  -KD      Left 2  42.7 cm  -KD      Right 2  42.5 cm  -KD      Measurement Location 3  20cm below knee  -KD      Left 3  39 cm  -KD      Right 3  37 cm  -KD      Measurement Location 4  30cm below knee  -KD      Left 4  33 cm  -KD      Right 4  28.5 cm  -KD      Measurement Location 5  Ankle  -KD      Left 5  34 cm  -KD      Right 5  30 cm  -KD      Measurement Location 6  Midfoot  -KD      Left 6  26.7 cm  -KD      Right 6  25.5 cm  -KD      LLE Circumferential Total  218.9 cm  -KD      RLE Circumferential Total  208.5 cm  -KD        User Key  (r) = Recorded By, (t) = Taken By, (c) = Cosigned By    Initials Name Provider Type    Jessica Tamayo, PT Physical Therapist                          Therapy Education  Education Details: Reviewed Healthy Habits for mPatients at Risk for Lymphedema; condition and treatment protocol  Given: Symptoms/condition management  Program: New  How Provided: Verbal, Written  Provided to: Patient, Caregiver(wife)  Level of Understanding: Verbalized      OP Exercises     Row Name 11/21/19 1400             Subjective Comments    Subjective Comments  Pt states he lives with wife who is able to help with bandaging, has increased swelling in legs (vianey left) with prolonged standing/walking, States he sees a podriatist regularly to  have toenails trimmed. States he sleeps with legs at level of heart. Has some difficulty reaching feet due to back issues.  -KD         Subjective Pain    Able to rate subjective pain?  yes  -KD      Pre-Treatment Pain Level  3  -KD      Post-Treatment Pain Level  3  -KD      Subjective Pain Comment  burning sensation bottoms of feet--neuropathy  -KD        User Key  (r) = Recorded By, (t) = Taken By, (c) = Cosigned By    Initials Name Provider Type    Jessica Tamayo, PT Physical Therapist                      PT OP Goals     Row Name 11/21/19 1400          PT Short Term Goals    STG Date to Achieve  12/05/19  -KD     STG 1  Pt to demonstrate awareness of condition and precautions for improved prevention, management, care of symptoms, and ease of transition to self-care of condition.  -KD     STG 1 Progress  New  -KD     STG 2  Patient/family independent with self-wrapping techniques of compression bandages as indicated for improved self-management of condition.  -KD     STG 2 Progress  New  -KD     STG 3  Patient demonstrate decreased net edema of  B LE's >/=5-10cm for decreased edema symptoms, decreased risk of infection, and improved skin care.  -KD     STG 3 Progress  New  -KD        Long Term Goals    LTG Date to Achieve  12/21/19  -KD     LTG 1  Patient/family independent with self-care techniques for self-management of condition.  -KD     LTG 1 Progress  New  -KD     LTG 2  Patient demonstrate decreased net edema of L LE >/=10-20cm for decreased edema symptoms, decreased risk of infection, and improved skin care.  -KD     LTG 2 Progress  New  -KD     LTG 3  Patient/family independent with compression garments as indicated for self-management of condition.  -KD     LTG 3 Progress  New  -KD        Time Calculation    PT Goal Re-Cert Due Date  02/19/20  -KD       User Key  (r) = Recorded By, (t) = Taken By, (c) = Cosigned By    Initials Name Provider Type    Jessica Tamayo, CJ Physical Therapist           PT Assessment/Plan     Row Name 11/21/19 1430          PT Assessment    Functional Limitations  Limitation in home management;Performance in leisure activities;Performance in self-care ADL  -KD     Impairments  Impaired lymphatic circulation;Impaired venous circulation;Edema  -KD     Assessment Comments  Pt presented to the Lymphedema Clinic with mild 1+ edema of the right lower leg/ft and mod 2+ edema of the left lower leg/ft. Both lower legs are discolored, dry/scaly, intact,clean. He reports a burning sensation on bottoms of both feet due to neuropathy, otherwise sensation is intact. Gen LE ROM/strength is functional, some limitation in functional mobility due to body habitus and back pain. He has difficulty with some daily activites and home management tasks. Lives with wife who states she's able to help with compression bandaging at home. He has a velcro compression device for his left leg and a compression pump. Feel that he would be a good candidate for Complete Decompressive Therapy to reduce the size of his leg(s), improve skin condition, reduce risk of infection (vianey since he has had one episode of cellulitis), and teach pt/family home management skills.  -KD     Please refer to paper survey for additional self-reported information  Yes  -KD     Rehab Potential  Good  -KD     Patient/caregiver participated in establishment of treatment plan and goals  Yes  -KD     Patient would benefit from skilled therapy intervention  Yes  -KD        PT Plan    PT Frequency  5x/week  -KD     Predicted Duration of Therapy Intervention (Therapy Eval)  4 weeks  -KD     Planned CPT's?  PT EVAL MOD COMPLELITY: 70875;PT RE-EVAL: 46066;PT MANUAL THERAPY EA 15 MIN: 94172;PT SELF CARE/HOME MGMT/TRAIN EA 15: 92444  -KD     Physical Therapy Interventions (Optional Details)  bandaging;home exercise program;manual lymphatic drainage;patient/family education;other (see comments) skin care  -KD     PT Plan Comments  See pt per PT  Plan.  -JAROCHO       User Key  (r) = Recorded By, (t) = Taken By, (c) = Cosigned By    Initials Name Provider Type    Jessica Tamayo, PT Physical Therapist                       Time Calculation:   Start Time: 1300  Stop Time: 1343  Time Calculation (min): 43 min   Therapy Charges for Today     Code Description Service Date Service Provider Modifiers Qty    [id number]  PT EVAL MOD COMPLEXITY 3 11/21/2019 Jessica Horta, PT GP 1                    Jessica Horta, PT  11/21/2019

## 2020-01-10 LAB
ALBUMIN SERPL-MCNC: 4 G/DL (ref 3.5–5.2)
ALBUMIN/GLOB SERPL: 1.7 G/DL
ALP SERPL-CCNC: 47 U/L (ref 39–117)
ALT SERPL-CCNC: 14 U/L (ref 1–41)
AST SERPL-CCNC: 16 U/L (ref 1–40)
BASOPHILS # BLD AUTO: ABNORMAL 10*3/UL
BILIRUB SERPL-MCNC: 0.5 MG/DL (ref 0.2–1.2)
BUN SERPL-MCNC: 18 MG/DL (ref 8–23)
BUN/CREAT SERPL: 17.8 (ref 7–25)
CALCIUM SERPL-MCNC: 8.6 MG/DL (ref 8.6–10.5)
CHLORIDE SERPL-SCNC: 105 MMOL/L (ref 98–107)
CHOLEST SERPL-MCNC: 100 MG/DL (ref 0–200)
CO2 SERPL-SCNC: 28.4 MMOL/L (ref 22–29)
CREAT SERPL-MCNC: 1.01 MG/DL (ref 0.76–1.27)
DIFFERENTIAL COMMENT: NORMAL
EOSINOPHIL # BLD AUTO: ABNORMAL 10*3/UL
EOSINOPHIL # BLD MANUAL: 0.28 10*3/MM3 (ref 0–0.4)
EOSINOPHIL NFR BLD AUTO: ABNORMAL %
EOSINOPHIL NFR BLD MANUAL: 4 % (ref 0.3–6.2)
ERYTHROCYTE [DISTWIDTH] IN BLOOD BY AUTOMATED COUNT: 15.9 % (ref 12.3–15.4)
GLOBULIN SER CALC-MCNC: 2.3 GM/DL
GLUCOSE SERPL-MCNC: 109 MG/DL (ref 65–99)
HBA1C MFR BLD: 6.4 % (ref 4.8–5.6)
HCT VFR BLD AUTO: 31.8 % (ref 37.5–51)
HDLC SERPL-MCNC: 42 MG/DL (ref 40–60)
HGB BLD-MCNC: 10.8 G/DL (ref 13–17.7)
LDLC SERPL CALC-MCNC: 49 MG/DL (ref 0–100)
LDLC/HDLC SERPL: 1.17 {RATIO}
LYMPHOCYTES # BLD AUTO: ABNORMAL 10*3/UL
LYMPHOCYTES # BLD MANUAL: 1.49 10*3/MM3 (ref 0.7–3.1)
LYMPHOCYTES NFR BLD AUTO: ABNORMAL %
LYMPHOCYTES NFR BLD MANUAL: 21 % (ref 19.6–45.3)
MCH RBC QN AUTO: 36.2 PG (ref 26.6–33)
MCHC RBC AUTO-ENTMCNC: 34 G/DL (ref 31.5–35.7)
MCV RBC AUTO: 106.7 FL (ref 79–97)
MONOCYTES # BLD MANUAL: 0.5 10*3/MM3 (ref 0.1–0.9)
MONOCYTES NFR BLD AUTO: ABNORMAL %
MONOCYTES NFR BLD MANUAL: 7 % (ref 5–12)
NEUTROPHILS # BLD MANUAL: 4.83 10*3/MM3 (ref 1.7–7)
NEUTROPHILS NFR BLD AUTO: ABNORMAL %
NEUTROPHILS NFR BLD MANUAL: 68 % (ref 42.7–76)
PLATELET # BLD AUTO: 200 10*3/MM3 (ref 140–450)
PLATELET BLD QL SMEAR: NORMAL
POTASSIUM SERPL-SCNC: 4.6 MMOL/L (ref 3.5–5.2)
PROT SERPL-MCNC: 6.3 G/DL (ref 6–8.5)
RBC # BLD AUTO: 2.98 10*6/MM3 (ref 4.14–5.8)
RBC MORPH BLD: NORMAL
SODIUM SERPL-SCNC: 144 MMOL/L (ref 136–145)
TRIGL SERPL-MCNC: 45 MG/DL (ref 0–150)
VLDLC SERPL CALC-MCNC: 9 MG/DL
WBC # BLD AUTO: 7.1 10*3/MM3 (ref 3.4–10.8)

## 2020-01-14 DIAGNOSIS — D53.9 MACROCYTIC ANEMIA: Primary | ICD-10-CM

## 2020-01-15 ENCOUNTER — RESULTS ENCOUNTER (OUTPATIENT)
Dept: INTERNAL MEDICINE | Facility: CLINIC | Age: 80
End: 2020-01-15

## 2020-01-15 DIAGNOSIS — D53.9 MACROCYTIC ANEMIA: ICD-10-CM

## 2020-01-17 ENCOUNTER — OFFICE VISIT (OUTPATIENT)
Dept: INTERNAL MEDICINE | Facility: CLINIC | Age: 80
End: 2020-01-17

## 2020-01-17 ENCOUNTER — RESULTS ENCOUNTER (OUTPATIENT)
Dept: INTERNAL MEDICINE | Facility: CLINIC | Age: 80
End: 2020-01-17

## 2020-01-17 VITALS
HEIGHT: 74 IN | BODY MASS INDEX: 40.03 KG/M2 | OXYGEN SATURATION: 94 % | SYSTOLIC BLOOD PRESSURE: 144 MMHG | WEIGHT: 311.9 LBS | HEART RATE: 88 BPM | DIASTOLIC BLOOD PRESSURE: 50 MMHG

## 2020-01-17 DIAGNOSIS — E11.9 TYPE 2 DIABETES MELLITUS WITHOUT COMPLICATION, WITHOUT LONG-TERM CURRENT USE OF INSULIN (HCC): ICD-10-CM

## 2020-01-17 DIAGNOSIS — Z00.00 MEDICARE ANNUAL WELLNESS VISIT, SUBSEQUENT: Primary | ICD-10-CM

## 2020-01-17 DIAGNOSIS — I10 ESSENTIAL HYPERTENSION: ICD-10-CM

## 2020-01-17 DIAGNOSIS — E78.5 HYPERLIPIDEMIA, UNSPECIFIED HYPERLIPIDEMIA TYPE: ICD-10-CM

## 2020-01-17 DIAGNOSIS — D53.9 MACROCYTIC ANEMIA: ICD-10-CM

## 2020-01-17 PROCEDURE — G0439 PPPS, SUBSEQ VISIT: HCPCS | Performed by: FAMILY MEDICINE

## 2020-01-17 PROCEDURE — 99214 OFFICE O/P EST MOD 30 MIN: CPT | Performed by: FAMILY MEDICINE

## 2020-01-17 RX ORDER — DULOXETIN HYDROCHLORIDE 60 MG/1
CAPSULE, DELAYED RELEASE ORAL
COMMUNITY
End: 2020-01-17

## 2020-01-17 RX ORDER — DICLOFENAC SODIUM 75 MG/1
TABLET, DELAYED RELEASE ORAL
COMMUNITY
End: 2020-01-30

## 2020-01-17 RX ORDER — PANTOPRAZOLE SODIUM 40 MG/1
TABLET, DELAYED RELEASE ORAL
COMMUNITY
End: 2020-01-17 | Stop reason: SDUPTHER

## 2020-01-17 NOTE — PROGRESS NOTES
The ABCs of the Annual Wellness Visit  Subsequent Medicare Wellness Visit    Chief Complaint   Patient presents with   • Medicare Wellness-subsequent       Subjective   History of Present Illness:  Riky Mono is a 79 y.o. male who presents for a Subsequent Medicare Wellness Visit.    Patient has type II diabetic.  Patient is currently taking metformin 850 mg twice a day.  Is also doing Trulicity 0.75 mg weekly.  Patient's most recent hemoglobin A1c has come back at 6.4.  Patient states that he is checking his fasting blood glucose levels, and occasionally his blood glucose levels are elevated at 130.  Typically they usually are below 120.  The patient does not have any side effects of the medication.    Patient has a past medical history of having hyperlipidemia.  He is currently taking Pravachol 40 mg daily.  He denies any side effects of the medication.  His most recent lipid panel was within normal limits.    Patient also has essential hypertension.  His blood pressure at today's office visit is 144/50.  He is currently taking losartan 100 mg daily.  Patient denies any side effects of the medication.    Patient was also found to have macrocytic anemia.  His hemoglobin was 10.8, and his MCV was 106.  I discussed with him that this is considered anemia, and that he will need to have further evaluation done with vitamin B12 and folic acid.    HEALTH RISK ASSESSMENT    Recent Hospitalizations:  No hospitalization(s) within the last year.    Current Medical Providers:  Patient Care Team:  Marco Carrillo MD as PCP - General (Family Medicine)  Marco Carrillo MD as PCP - Claims Attributed  Cosmo French MD as Consulting Physician (Sleep Medicine)  Ty Shelton MD as Consulting Physician (Cardiology)    Smoking Status:  Social History     Tobacco Use   Smoking Status Former Smoker   • Packs/day: 1.50   • Years: 40.00   • Pack years: 60.00   • Types: Cigarettes   • Last attempt to quit: 1997   •  Years since quittin.0   Smokeless Tobacco Never Used   Tobacco Comment    from age [age]       Alcohol Consumption:  Social History     Substance and Sexual Activity   Alcohol Use No    Comment: HOLIDAYS  caffeine -2 cups coffee daily       Depression Screen:   PHQ-2/PHQ-9 Depression Screening 2020   Little interest or pleasure in doing things 0   Feeling down, depressed, or hopeless 1   Trouble falling or staying asleep, or sleeping too much 0   Feeling tired or having little energy 0   Poor appetite or overeating 0   Feeling bad about yourself - or that you are a failure or have let yourself or your family down 0   Trouble concentrating on things, such as reading the newspaper or watching television 0   Moving or speaking so slowly that other people could have noticed. Or the opposite - being so fidgety or restless that you have been moving around a lot more than usual 0   Thoughts that you would be better off dead, or of hurting yourself in some way 0   Total Score 1   If you checked off any problems, how difficult have these problems made it for you to do your work, take care of things at home, or get along with other people? -       Fall Risk Screen:  [facility].    Health Habits and Functional and Cognitive Screening:  Functional & Cognitive Status 2020   Do you have difficulty preparing food and eating? No   Do you have difficulty bathing yourself, getting dressed or grooming yourself? No   Do you have difficulty using the toilet? No   Do you have difficulty moving around from place to place? No   Do you have trouble with steps or getting out of a bed or a chair? No   Current Diet Well Balanced Diet   Dental Exam Up to date   Eye Exam Up to date   Exercise (times per week) 0 times per week   Current Exercise Activities Include None   Do you need help using the phone?  No   Are you deaf or do you have serious difficulty hearing?  No   Do you need help with  transportation? No   Do you need help shopping? No   Do you need help preparing meals?  No   Do you need help with housework?  No   Do you need help with laundry? No   Do you need help taking your medications? No   Do you need help managing money? No   Do you ever drive or ride in a car without wearing a seat belt? No   Have you felt unusual stress, anger or loneliness in the last month? No   Who do you live with? Spouse   If you need help, do you have trouble finding someone available to you? No   Have you been bothered in the last four weeks by sexual problems? No   Do you have difficulty concentrating, remembering or making decisions? No         Does the patient have evidence of cognitive impairment? No    Asprin use counseling:Taking ASA appropriately as indicated    Age-appropriate Screening Schedule:  Refer to the list below for future screening recommendations based on patient's age, sex and/or medical conditions. Orders for these recommended tests are listed in the plan section. The patient has been provided with a written plan.    Health Maintenance   Topic Date Due   • TDAP/TD VACCINES (1 - Tdap) 10/06/1951   • ZOSTER VACCINE (2 of 3) 02/26/2013   • URINE MICROALBUMIN  10/29/2019   • DIABETIC EYE EXAM  05/13/2020   • HEMOGLOBIN A1C  07/09/2020   • LIPID PANEL  01/09/2021   • INFLUENZA VACCINE  Completed          The following portions of the patient's history were reviewed and updated as appropriate: allergies, current medications, past family history, past medical history, past social history, past surgical history and problem list.    Outpatient Medications Prior to Visit   Medication Sig Dispense Refill   • albuterol (PROVENTIL HFA;VENTOLIN HFA) 108 (90 Base) MCG/ACT inhaler Inhale 1 puff Every 4 (Four) Hours As Needed for Wheezing. 1 inhaler 3   • Ascorbic Acid (VITAMIN C PO) Take 1 tablet by mouth Daily.     • aspirin 81 MG EC tablet Take 1 tablet by mouth Every Other Day.     • Capsaicin 0.1 % cream  Apply 1 application topically Daily. 1 tube 3   • Cholecalciferol (VITAMIN D) 1000 units tablet Take 1,000 Units by mouth Every Morning.     • diclofenac (VOLTAREN) 75 MG EC tablet diclofenac sodium 75 mg tablet,delayed release     • Dulaglutide (TRULICITY) 0.75 MG/0.5ML solution pen-injector Inject 0.75 mg under the skin into the appropriate area as directed 1 (One) Time Per Week. 4 pen 9   • DULoxetine (CYMBALTA) 60 MG capsule Take 1 capsule by mouth Every Morning. 90 capsule 3   • glucose blood (LAINEY CONTOUR TEST) test strip Use as instructed to test glucose 100 each 1   • levothyroxine (SYNTHROID, LEVOTHROID) 88 MCG tablet Take 1 tablet by mouth Every Morning. 90 tablet 3   • losartan (COZAAR) 100 MG tablet Take 1 tablet by mouth Daily. 90 tablet 3   • metFORMIN (GLUCOPHAGE) 850 MG tablet Take 1 tablet by mouth 2 (Two) Times a Day With Meals. 30 tablet 6   • MICROLET LANCETS misc Use daily as directed to test glucose 100 each 1   • Multiple Vitamin (MULTIVITAMIN) tablet Take 1 tablet by mouth Every Morning.     • omeprazole (priLOSEC) 40 MG capsule Take 40 mg by mouth 2 (Two) Times a Day.     • oxybutynin XL (DITROPAN XL) 15 MG 24 hr tablet Take 15 mg by mouth Every Night.     • pravastatin (PRAVACHOL) 40 MG tablet Take 1 tablet by mouth Every Night. 90 tablet 3   • Tiotropium Bromide Monohydrate (SPIRIVA RESPIMAT) 1.25 MCG/ACT aerosol solution inhaler Spiriva Respimat   1 puff daily     • vitamin B-12 (CYANOCOBALAMIN) 1000 MCG tablet Take 1,000 mcg by mouth Every Morning.     • zolpidem (AMBIEN) 10 MG tablet Take 10 mg by mouth At Night As Needed.     • ALBUTEROL IN albuterol     • DULoxetine (CYMBALTA) 60 MG capsule duloxetine 60 mg capsule,delayed release     • pantoprazole (PROTONIX) 40 MG EC tablet pantoprazole 40 mg tablet,delayed release     • umeclidinium-vilanterol (ANORO ELLIPTA) 62.5-25 MCG/INH aerosol powder  inhaler Inhale 1 puff Daily. 1 each 9     No facility-administered medications prior to  "visit.        Patient Active Problem List   Diagnosis   • OA (osteoarthritis) of hip   • KINDRA treated with auto BiPAP   • Chest pain   • Diabetes mellitus (CMS/[facility])   • Hypertension   • Hyperlipidemia   • Hypothyroidism   • Asthma   • Overactive bladder   • CAP (community acquired pneumonia)   • Cellulitis of left lower extremity   • COPD (chronic obstructive pulmonary disease) (CMS/[facility])   • Dyslipidemia   • Gastroesophageal reflux disease   • Leukocytosis   • Peripheral nerve disease   • Constipation   • Oropharyngeal dysphagia   • Bronchitis   • Class 3 severe obesity due to excess calories with serious comorbidity and body mass index (BMI) of 40.0 to 44.9 in adult (CMS/[facility])   • Left leg swelling       Advanced Care Planning:  power of  for healthcare on file    Review of Systems   Constitutional: Negative for chills and fever.   HENT: Negative for congestion, rhinorrhea, sinus pain and sore throat.    Eyes: Negative for photophobia and visual disturbance.   Respiratory: Negative for cough, chest tightness and shortness of breath.    Cardiovascular: Negative for chest pain and palpitations.   Gastrointestinal: Negative for diarrhea, nausea and vomiting.   Genitourinary: Negative for dysuria, frequency and urgency.   Skin: Negative for rash and wound.   Neurological: Negative for dizziness and syncope.   Psychiatric/Behavioral: Negative for behavioral problems and confusion.       Compared to one year ago, the patient feels his physical health is the same.  Compared to one year ago, the patient feels his mental health is the same.    Reviewed chart for potential of high risk medication in the elderly: yes  Reviewed chart for potential of harmful drug interactions in the elderly:yes    Objective         Vitals:    01/17/20 1115   BP: 144/50   BP Location: Left arm   Pulse: 88   SpO2: 94%   Weight: (!) 141 kg (311 lb 14.4 oz)   Height: 188 cm (74.02\")       Body mass index is 40.03 kg/m².  Discussed the " patient's BMI with him. The BMI is above average; BMI management plan is completed.    Physical Exam   Constitutional: He is oriented to person, place, and time. He appears well-developed and well-nourished.   HENT:   Head: Normocephalic and atraumatic.   Right Ear: External ear normal.   Left Ear: External ear normal.   Eyes: EOM are normal.   Neck: Normal range of motion. Neck supple.   Cardiovascular: Normal rate, regular rhythm and normal heart sounds.   Pulmonary/Chest: Effort normal and breath sounds normal. No respiratory distress.   Musculoskeletal: Normal range of motion.   Lymphadenopathy:     He has no cervical adenopathy.   Neurological: He is alert and oriented to person, place, and time.   Skin: Skin is warm.   Psychiatric: He has a normal mood and affect. His behavior is normal.   Nursing note and vitals reviewed.      Lab Results   Component Value Date     (H) 01/09/2020    CHLPL 100 01/09/2020    TRIG 45 01/09/2020    TRIG 114 11/06/2019    HDL 42 01/09/2020    HDL 38 (L) 11/06/2019    LDL 49 01/09/2020    LDL 69 11/06/2019    VLDL 9 01/09/2020    VLDL 22.8 11/06/2019    HGBA1C 6.40 (H) 01/09/2020    HGBA1C 6.60 (H) 11/06/2019        Assessment/Plan   Medicare Risks and Personalized Health Plan  CMS Preventative Services Quick Reference  Cardiovascular risk  Diabetic Lab Screening   Fall Risk  Obesity/Overweight     The above risks/problems have been discussed with the patient.  Pertinent information has been shared with the patient in the After Visit Summary.  Follow up plans and orders are seen below in the Assessment/Plan Section.    Diagnoses and all orders for this visit:    1. Medicare annual wellness visit, subsequent (Primary)    2. Macrocytic anemia  -     Methylmalonic Acid, Serum  -     Vitamin B12  -     Folate  -     We will need to follow-up with hematology.  Will check vitamin B12 labs as well as folate levels.    3. Hyperlipidemia, unspecified hyperlipidemia type  -     Lipid  Panel With LDL / HDL Ratio; Future  -     Continue pravastatin 40 mg daily.    4. Essential hypertension  -     CBC & Differential; Future  -     Comprehensive Metabolic Panel; Future  -     Currently stable, continue losartan 100 mg daily.    5. Type 2 diabetes mellitus without complication, without long-term current use of insulin (CMS/[facility])  -     Microalbumin / Creatinine Urine Ratio - Urine, Clean Catch; Future  -     Hemoglobin A1c; Future  -     Continue Metformin and Trulicity.      Follow Up:  No follow-ups on file.     An After Visit Summary and PPPS were given to the patient.

## 2020-01-18 ENCOUNTER — RESULTS ENCOUNTER (OUTPATIENT)
Dept: INTERNAL MEDICINE | Facility: CLINIC | Age: 80
End: 2020-01-18

## 2020-01-18 DIAGNOSIS — E78.5 HYPERLIPIDEMIA, UNSPECIFIED HYPERLIPIDEMIA TYPE: ICD-10-CM

## 2020-01-18 DIAGNOSIS — I10 ESSENTIAL HYPERTENSION: ICD-10-CM

## 2020-01-18 DIAGNOSIS — E11.9 TYPE 2 DIABETES MELLITUS WITHOUT COMPLICATION, WITHOUT LONG-TERM CURRENT USE OF INSULIN (HCC): ICD-10-CM

## 2020-01-23 LAB
FOLATE SERPL-MCNC: >20 NG/ML (ref 4.78–24.2)
Lab: NORMAL
METHYLMALONATE SERPL-SCNC: 207 NMOL/L (ref 0–378)
VIT B12 SERPL-MCNC: 1446 PG/ML (ref 211–946)

## 2020-01-30 ENCOUNTER — LAB (OUTPATIENT)
Dept: LAB | Facility: HOSPITAL | Age: 80
End: 2020-01-30

## 2020-01-30 ENCOUNTER — CONSULT (OUTPATIENT)
Dept: ONCOLOGY | Facility: CLINIC | Age: 80
End: 2020-01-30

## 2020-01-30 ENCOUNTER — APPOINTMENT (OUTPATIENT)
Dept: LAB | Facility: HOSPITAL | Age: 80
End: 2020-01-30

## 2020-01-30 VITALS
OXYGEN SATURATION: 93 % | RESPIRATION RATE: 22 BRPM | HEART RATE: 95 BPM | WEIGHT: 315 LBS | SYSTOLIC BLOOD PRESSURE: 184 MMHG | HEIGHT: 71 IN | BODY MASS INDEX: 44.1 KG/M2 | DIASTOLIC BLOOD PRESSURE: 81 MMHG | TEMPERATURE: 97.9 F

## 2020-01-30 DIAGNOSIS — D53.9 MACROCYTIC ANEMIA: Primary | ICD-10-CM

## 2020-01-30 DIAGNOSIS — D72.829 LEUKOCYTOSIS, UNSPECIFIED TYPE: ICD-10-CM

## 2020-01-30 LAB
BASOPHILS # BLD AUTO: 0.1 10*3/MM3 (ref 0–0.2)
BASOPHILS NFR BLD AUTO: 0.9 % (ref 0–1.5)
DEPRECATED RDW RBC AUTO: 68.1 FL (ref 37–54)
EOSINOPHIL # BLD AUTO: 0.22 10*3/MM3 (ref 0–0.4)
EOSINOPHIL NFR BLD AUTO: 2 % (ref 0.3–6.2)
ERYTHROCYTE [DISTWIDTH] IN BLOOD BY AUTOMATED COUNT: 17.3 % (ref 12.3–15.4)
HCT VFR BLD AUTO: 33.4 % (ref 37.5–51)
HGB BLD-MCNC: 11.6 G/DL (ref 13–17.7)
IMM GRANULOCYTES # BLD AUTO: 0.08 10*3/MM3 (ref 0–0.05)
IMM GRANULOCYTES NFR BLD AUTO: 0.7 % (ref 0–0.5)
LYMPHOCYTES # BLD AUTO: 2.01 10*3/MM3 (ref 0.7–3.1)
LYMPHOCYTES NFR BLD AUTO: 18.1 % (ref 19.6–45.3)
MCH RBC QN AUTO: 37.1 PG (ref 26.6–33)
MCHC RBC AUTO-ENTMCNC: 34.7 G/DL (ref 31.5–35.7)
MCV RBC AUTO: 106.7 FL (ref 79–97)
MONOCYTES # BLD AUTO: 0.79 10*3/MM3 (ref 0.1–0.9)
MONOCYTES NFR BLD AUTO: 7.1 % (ref 5–12)
NEUTROPHILS # BLD AUTO: 7.93 10*3/MM3 (ref 1.7–7)
NEUTROPHILS NFR BLD AUTO: 71.2 % (ref 42.7–76)
NRBC BLD AUTO-RTO: 0.4 /100 WBC (ref 0–0.2)
PLATELET # BLD AUTO: 224 10*3/MM3 (ref 140–450)
PMV BLD AUTO: 10.9 FL (ref 6–12)
RBC # BLD AUTO: 3.13 10*6/MM3 (ref 4.14–5.8)
WBC NRBC COR # BLD: 11.13 10*3/MM3 (ref 3.4–10.8)

## 2020-01-30 PROCEDURE — 85025 COMPLETE CBC W/AUTO DIFF WBC: CPT

## 2020-01-30 PROCEDURE — 83615 LACTATE (LD) (LDH) ENZYME: CPT | Performed by: INTERNAL MEDICINE

## 2020-01-30 PROCEDURE — 36415 COLL VENOUS BLD VENIPUNCTURE: CPT | Performed by: INTERNAL MEDICINE

## 2020-01-30 PROCEDURE — 82728 ASSAY OF FERRITIN: CPT | Performed by: INTERNAL MEDICINE

## 2020-01-30 PROCEDURE — 83540 ASSAY OF IRON: CPT | Performed by: INTERNAL MEDICINE

## 2020-01-30 PROCEDURE — 99204 OFFICE O/P NEW MOD 45 MIN: CPT | Performed by: INTERNAL MEDICINE

## 2020-01-30 PROCEDURE — 36416 COLLJ CAPILLARY BLOOD SPEC: CPT

## 2020-01-30 PROCEDURE — 84466 ASSAY OF TRANSFERRIN: CPT | Performed by: INTERNAL MEDICINE

## 2020-01-30 RX ORDER — PANTOPRAZOLE SODIUM 40 MG/1
40 TABLET, DELAYED RELEASE ORAL DAILY
COMMUNITY
End: 2022-06-21

## 2020-01-30 RX ORDER — PREGABALIN 150 MG/1
150 CAPSULE ORAL 2 TIMES DAILY
COMMUNITY

## 2020-01-30 NOTE — PROGRESS NOTES
Subjective     REASON FOR CONSULTATION:  Macrocystic Anemia  Provide an opinion on any further workup or treatment                             REQUESTING PHYSICIAN:  Marco Carrillo MD    RECORDS OBTAINED:  Records of the patients history including those obtained from the referring provider were reviewed and summarized in detail.    HISTORY OF PRESENT ILLNESS:  The patient is a 79 y.o. year old male who is here for an opinion about the above issue.  He was referred to us by Dr. Carrillo for macrocytic anemia.  She does feel fatigued, which has lasted for more than a year.  He does have shortness of breath and some cough.  He denies any chest pain.  He does have joint pains.  He denies any blood in the stools.    His brother had stomach cancer.      Patient's labs from 01/30/20 are show WBC 11.13, hemoglobin 11.6, .7, and absolute neutrophil 7.93.  More labs ordered today.    Past Medical History:   Diagnosis Date   • Acid reflux    • Arthritis     OSTEO   • Asthma    • Chest discomfort    • Colon polyp    • COPD (chronic obstructive pulmonary disease) (CMS/HCC)    • Depression    • Diabetes mellitus (CMS/HCC)     type 2   • Disease of thyroid gland    • Emphysema lung (CMS/HCC)    • High cholesterol    • Hypertension    • Morbid obesity (CMS/HCC)    • Neuropathy     BOTH FEET   • Obesity, Class III, BMI 40-49.9 (morbid obesity) (CMS/HCC)    • PAD (peripheral artery disease) (CMS/HCC)    • Poor circulation of extremity     LEFT LEG   • Sleep apnea    • Vitamin D deficiency         Past Surgical History:   Procedure Laterality Date   • APPENDECTOMY     • CATARACT EXTRACTION W/ INTRAOCULAR LENS IMPLANT Bilateral    • CHOLECYSTECTOMY     • COLONOSCOPY  01/01/2014   • COLONOSCOPY W/ POLYPECTOMY      BENIGN   • HERNIA REPAIR     • INTERSTIM PLACEMENT Left 08/30/2016    BLADDER CONTROL   • KNEE ARTHROPLASTY Right    • MOUTH SURGERY  06/26/2018   • NOSE SURGERY      REMOVED BLOCKAGE    • ROTATOR CUFF REPAIR Right     • TOTAL HIP ARTHROPLASTY Right 11/9/2017    Procedure: RIGHT TOTAL HIP ARTHROPLASTY;  Surgeon: Riky Fernando MD;  Location[facility] MAIN OR;  Service:         Current Outpatient Medications on File Prior to Visit   Medication Sig Dispense Refill   • albuterol (PROVENTIL HFA;VENTOLIN HFA) 108 (90 Base) MCG/ACT inhaler Inhale 1 puff Every 4 (Four) Hours As Needed for Wheezing. 1 inhaler 3   • Ascorbic Acid (VITAMIN C PO) Take 1 tablet by mouth Daily.     • aspirin 81 MG EC tablet Take 1 tablet by mouth Every Other Day.     • Capsaicin 0.1 % cream Apply 1 application topically Daily. 1 tube 3   • Cholecalciferol (VITAMIN D) 1000 units tablet Take 1,000 Units by mouth Every Morning.     • Dulaglutide (TRULICITY) 0.75 MG/0.5ML solution pen-injector Inject 0.75 mg under the skin into the appropriate area as directed 1 (One) Time Per Week. 4 pen 9   • DULoxetine (CYMBALTA) 60 MG capsule Take 1 capsule by mouth Every Morning. 90 capsule 3   • glucose blood (LAINEY CONTOUR TEST) test strip Use as instructed to test glucose 100 each 1   • levothyroxine (SYNTHROID, LEVOTHROID) 88 MCG tablet Take 1 tablet by mouth Every Morning. 90 tablet 3   • losartan (COZAAR) 100 MG tablet Take 1 tablet by mouth Daily. 90 tablet 3   • metFORMIN (GLUCOPHAGE) 850 MG tablet Take 1 tablet by mouth 2 (Two) Times a Day With Meals. 30 tablet 6   • MICROLET LANCETS misc Use daily as directed to test glucose 100 each 1   • Multiple Vitamin (MULTIVITAMIN) tablet Take 1 tablet by mouth Every Morning.     • omeprazole (priLOSEC) 40 MG capsule Take 40 mg by mouth 2 (Two) Times a Day.     • oxybutynin XL (DITROPAN XL) 15 MG 24 hr tablet Take 15 mg by mouth Every Night.     • pantoprazole (PROTONIX) 40 MG EC tablet Take 40 mg by mouth Daily.     • pravastatin (PRAVACHOL) 40 MG tablet Take 1 tablet by mouth Every Night. 90 tablet 3   • pregabalin (LYRICA) 150 MG capsule Take 150 mg by mouth 2 (Two) Times a Day.     • umeclidinium-vilanterol (ANORO  ELLIPTA) 62.5-25 MCG/INH aerosol powder  inhaler Inhale 1 puff Daily.     • vitamin B-12 (CYANOCOBALAMIN) 1000 MCG tablet Take 1,000 mcg by mouth Every Morning.     • zolpidem (AMBIEN) 10 MG tablet Take 10 mg by mouth At Night As Needed.     • diclofenac (VOLTAREN) 75 MG EC tablet diclofenac sodium 75 mg tablet,delayed release     • Tiotropium Bromide Monohydrate (SPIRIVA RESPIMAT) 1.25 MCG/ACT aerosol solution inhaler Spiriva Respimat   1 puff daily       No current facility-administered medications on file prior to visit.         ALLERGIES:  No Known Allergies     Social History     Socioeconomic History   • Marital status:      Spouse name: Chitra   • Number of children: Not on file   • Years of education: Not on file   • Highest education level: Not on file   Occupational History     Employer: RETIRED   Social Needs   • Financial resource strain: Not hard at all   • Food insecurity:     Worry: Never true     Inability: Never true   • Transportation needs:     Medical: No     Non-medical: No   Tobacco Use   • Smoking status: Former Smoker     Packs/day: 1.50     Years: 40.00     Pack years: 60.00     Types: Cigarettes     Last attempt to quit:      Years since quittin.0   • Smokeless tobacco: Never Used   • Tobacco comment: from age [age]   Substance and Sexual Activity   • Alcohol use: Yes     Comment: HOLIDAYS  caffeine -2 cups coffee daily   • Drug use: No     Comment: PRN use for pain   • Sexual activity: Defer   Lifestyle   • Physical activity:     Days per week: 0 days     Minutes per session: 0 min   • Stress: Only a little   Relationships   • Social connections:     Talks on phone: Patient refused     Gets together: Patient refused     Attends Jainism service: Patient refused     Active member of club or organization: Patient refused     Attends meetings of clubs or organizations: Patient refused     Relationship status: Patient refused        Family History   Problem Relation Age of  "Onset   • Stroke Mother    • Hypertension Mother    • Heart attack Father    • Alcohol abuse Father    • Stomach cancer Brother    • Stroke Brother    • Alcohol abuse Brother    • Hypertension Daughter    • Diabetes Daughter    • Malig Hyperthermia Neg Hx         Review of Systems   Constitutional: Negative for appetite change, chills, diaphoresis, fatigue, fever and unexpected weight change.   HENT: Negative for hearing loss, sore throat and trouble swallowing.    Respiratory: Negative for cough, chest tightness, shortness of breath and wheezing.    Cardiovascular: Negative for chest pain, palpitations and leg swelling.   Gastrointestinal: Negative for abdominal distention, abdominal pain, constipation, diarrhea, nausea and vomiting.   Genitourinary: Negative for dysuria, frequency, hematuria and urgency.   Musculoskeletal: Negative for joint swelling.        No muscle weakness.   Skin: Negative for rash and wound.   Neurological: Negative for seizures, syncope, speech difficulty, weakness, numbness and headaches.   Hematological: Negative for adenopathy. Does not bruise/bleed easily.   Psychiatric/Behavioral: Negative for behavioral problems, confusion and suicidal ideas.        Objective      Vitals:    01/30/20 1557   BP: (!) 184/81   Pulse: 95   Resp: 22   Temp: 97.9 °F (36.6 °C)   TempSrc: Oral   SpO2: 93%   Weight: (!) 144 kg (316 lb 14.4 oz)   Height: 181.5 cm (71.46\")  Comment: New    PainSc: 0-No pain     Current Status 1/30/2020   ECOG score 0       Physical Exam   GENERAL:  Well-developed, well-nourished in no acute distress.   SKIN:  Warm, dry without rashes, purpura or petechiae.  NECK:  Supple with good range of motion; no thyromegaly or masses, no JVD.  LYMPHATICS:  No cervical, supraclavicular, axillary or inguinal adenopathy.  CHEST:  Lungs clear to auscultation. Good airflow.  CARDIAC:  Regular rate and rhythm without murmurs, rubs or gallops. Normal S1,S2.  ABDOMEN:  Soft, nontender with no " hepatosplenomegaly or masses.  EXTREMITIES:  No clubbing, cyanosis.  Bilateral lower extremity edema.  NEUROLOGICAL:  Cranial Nerves II-XII grossly intact.  No focal neurological deficits.  PSYCHIATRIC:  Normal affect and mood.      RECENT LABS:  Hematology WBC   Date Value Ref Range Status   01/30/2020 11.13 (H) 3.40 - 10.80 10*3/mm3 Final   01/09/2020 7.10 3.40 - 10.80 10*3/mm3 Final     RBC   Date Value Ref Range Status   01/30/2020 3.13 (L) 4.14 - 5.80 10*6/mm3 Final   01/09/2020 2.98 (L) 4.14 - 5.80 10*6/mm3 Final     Hemoglobin   Date Value Ref Range Status   01/30/2020 11.6 (L) 13.0 - 17.7 g/dL Final     Hematocrit   Date Value Ref Range Status   01/30/2020 33.4 (L) 37.5 - 51.0 % Final     Platelets   Date Value Ref Range Status   01/30/2020 224 140 - 450 10*3/mm3 Final          Assessment/Plan   1. Macrocytic Anemia, patient has been anemic for quite some time.  He does feel fatigued.  He has had a B12 and a folate level drawn.  They have been normal.  I will review his peripheral smear.  He denies active GI bleeding.  He does not have any renal insufficiency as a cause for his anemia.  Certainly at his age we need to consider whether he has a issue with his bone marrow that is causing him to be anemic like underlying myelodysplasia.  However in the interim we will check his TOBY, C-reactive protein, ESR, SPEP quantitative immunoglobulins and serum immunofixation and flow cytometry.  We will check his iron studies.  And an LDH.  We will also obtain stools for occult blood.    2.  History of leukocytosis in the past for which she was followed by Dr. Dalal at [facility], hematologist who felt that it was related to bronchitis as his white count resolved normally.    PLAN:  1. Patient to go back to lab today for more labs  2. Follow up with NP in 1 week with labs.  Nurse practitioner to discuss with me.  3. We may need to consider bone marrow aspiration and biopsy if all labs are negative.  4. If he is  iron deficient we will need to refer him to GI for colonoscopy EGD.  5. Follow up with [facility] in 6 weeks with labs.    I, Sameera Sims, acted as scribe for Chio Gilliam MD  in documenting the service or procedure. To the best of my knowledge, I recorded what was dictated by MD Chio Alcaraz MD  01/30/20       Cc:  MD Ty Pillai MD Kenneth Anderson, MD   <<-----Click on this checkbox to enter Procedure Ultrasound guidance  10/05/2018    Active  [place]  Retrieval, oocyte, transvaginal approach  10/05/2018    Active  [place]

## 2020-01-31 LAB
ALBUMIN SERPL-MCNC: 3.6 G/DL (ref 2.9–4.4)
ALBUMIN/GLOB SERPL: 1.4 {RATIO} (ref 0.7–1.7)
ALPHA1 GLOB FLD ELPH-MCNC: 0.2 G/DL (ref 0–0.4)
ALPHA2 GLOB SERPL ELPH-MCNC: 0.6 G/DL (ref 0.4–1)
B-GLOBULIN SERPL ELPH-MCNC: 1.1 G/DL (ref 0.7–1.3)
CENTROMERE B AB SER-ACNC: <0.2 AI (ref 0–0.9)
CHROMATIN AB SERPL-ACNC: <0.2 AI (ref 0–0.9)
DSDNA AB SER-ACNC: <1 IU/ML (ref 0–9)
ENA JO1 AB SER-ACNC: <0.2 AI (ref 0–0.9)
ENA RNP AB SER-ACNC: <0.2 AI (ref 0–0.9)
ENA SCL70 AB SER-ACNC: <0.2 AI (ref 0–0.9)
ENA SM AB SER-ACNC: <0.2 AI (ref 0–0.9)
ENA SS-A AB SER-ACNC: <0.2 AI (ref 0–0.9)
ENA SS-B AB SER-ACNC: <0.2 AI (ref 0–0.9)
ERYTHROCYTE [SEDIMENTATION RATE] IN BLOOD BY WESTERGREN METHOD: 28 MM/HR (ref 0–30)
FERRITIN SERPL-MCNC: 246.6 NG/ML (ref 30–400)
FOLATE BLD-MCNC: 455 NG/ML
FOLATE RBC-MCNC: 1338 NG/ML
GAMMA GLOB SERPL ELPH-MCNC: 0.8 G/DL (ref 0.4–1.8)
GLOBULIN SER CALC-MCNC: 2.7 G/DL (ref 2.2–3.9)
HCT VFR BLD AUTO: 34 % (ref 37.5–51)
IGA SERPL-MCNC: 310 MG/DL (ref 61–437)
IGG SERPL-MCNC: 804 MG/DL (ref 700–1600)
IGM SERPL-MCNC: 83 MG/DL (ref 15–143)
INTERPRETATION SERPL IEP-IMP: ABNORMAL
IRON 24H UR-MRATE: 79 MCG/DL (ref 59–158)
IRON SATN MFR SERPL: 25 % (ref 14–48)
KAPPA LC SERPL-MCNC: 24.6 MG/L (ref 3.3–19.4)
KAPPA LC/LAMBDA SER: 0.78 {RATIO} (ref 0.26–1.65)
LAMBDA LC FREE SERPL-MCNC: 31.5 MG/L (ref 5.7–26.3)
LDH SERPL-CCNC: 161 U/L (ref 99–259)
Lab: ABNORMAL
Lab: NORMAL
M-SPIKE: ABNORMAL G/DL
PROT SERPL-MCNC: 6.3 G/DL (ref 6–8.5)
TIBC SERPL-MCNC: 312 MCG/DL (ref 249–505)
TRANSFERRIN SERPL-MCNC: 223 MG/DL (ref 200–360)
VIT B12 SERPL-MCNC: 1346 PG/ML (ref 232–1245)

## 2020-02-04 ENCOUNTER — APPOINTMENT (OUTPATIENT)
Dept: LAB | Facility: HOSPITAL | Age: 80
End: 2020-02-04

## 2020-02-04 LAB
COLLECT DATE SP2 STL: NORMAL
COLLECT DATE SP3 STL: NORMAL
COLLECT DATE STL: NORMAL
HEMOCCULT STL QL: NEGATIVE
Lab: 12
Lab: 12

## 2020-02-04 PROCEDURE — 82272 OCCULT BLD FECES 1-3 TESTS: CPT

## 2020-02-06 ENCOUNTER — LAB (OUTPATIENT)
Dept: LAB | Facility: HOSPITAL | Age: 80
End: 2020-02-06

## 2020-02-06 ENCOUNTER — OFFICE VISIT (OUTPATIENT)
Dept: ONCOLOGY | Facility: CLINIC | Age: 80
End: 2020-02-06

## 2020-02-06 VITALS
HEIGHT: 71 IN | DIASTOLIC BLOOD PRESSURE: 67 MMHG | SYSTOLIC BLOOD PRESSURE: 161 MMHG | RESPIRATION RATE: 16 BRPM | WEIGHT: 314 LBS | HEART RATE: 92 BPM | TEMPERATURE: 98 F | BODY MASS INDEX: 43.96 KG/M2 | OXYGEN SATURATION: 93 %

## 2020-02-06 DIAGNOSIS — D53.9 MACROCYTIC ANEMIA: ICD-10-CM

## 2020-02-06 DIAGNOSIS — D53.9 MACROCYTIC ANEMIA: Primary | ICD-10-CM

## 2020-02-06 LAB
ALBUMIN SERPL-MCNC: 4.1 G/DL (ref 3.5–5.2)
ALBUMIN/GLOB SERPL: 1.6 G/DL (ref 1.1–2.4)
ALP SERPL-CCNC: 50 U/L (ref 38–116)
ALT SERPL W P-5'-P-CCNC: 15 U/L (ref 0–41)
ANION GAP SERPL CALCULATED.3IONS-SCNC: 11.7 MMOL/L (ref 5–15)
AST SERPL-CCNC: 17 U/L (ref 0–40)
BASOPHILS # BLD AUTO: 0.06 10*3/MM3 (ref 0–0.2)
BASOPHILS NFR BLD AUTO: 0.5 % (ref 0–1.5)
BILIRUB SERPL-MCNC: 0.5 MG/DL (ref 0.2–1.2)
BUN BLD-MCNC: 20 MG/DL (ref 6–20)
BUN/CREAT SERPL: 21.1 (ref 7.3–30)
CALCIUM SPEC-SCNC: 9 MG/DL (ref 8.5–10.2)
CHLORIDE SERPL-SCNC: 104 MMOL/L (ref 98–107)
CO2 SERPL-SCNC: 25.3 MMOL/L (ref 22–29)
CREAT BLD-MCNC: 0.95 MG/DL (ref 0.7–1.3)
CRP SERPL-MCNC: 0.45 MG/DL (ref 0–0.5)
DEPRECATED RDW RBC AUTO: 71.1 FL (ref 37–54)
EOSINOPHIL # BLD AUTO: 0.16 10*3/MM3 (ref 0–0.4)
EOSINOPHIL NFR BLD AUTO: 1.4 % (ref 0.3–6.2)
ERYTHROCYTE [DISTWIDTH] IN BLOOD BY AUTOMATED COUNT: 17.3 % (ref 12.3–15.4)
GFR SERPL CREATININE-BSD FRML MDRD: 76 ML/MIN/1.73
GLOBULIN UR ELPH-MCNC: 2.6 GM/DL (ref 1.8–3.5)
GLUCOSE BLD-MCNC: 136 MG/DL (ref 74–124)
HCT VFR BLD AUTO: 34.1 % (ref 37.5–51)
HGB BLD-MCNC: 11.5 G/DL (ref 13–17.7)
IMM GRANULOCYTES # BLD AUTO: 0.06 10*3/MM3 (ref 0–0.05)
IMM GRANULOCYTES NFR BLD AUTO: 0.5 % (ref 0–0.5)
LYMPHOCYTES # BLD AUTO: 1.6 10*3/MM3 (ref 0.7–3.1)
LYMPHOCYTES NFR BLD AUTO: 13.8 % (ref 19.6–45.3)
MCH RBC QN AUTO: 37.6 PG (ref 26.6–33)
MCHC RBC AUTO-ENTMCNC: 33.7 G/DL (ref 31.5–35.7)
MCV RBC AUTO: 111.4 FL (ref 79–97)
MONOCYTES # BLD AUTO: 0.85 10*3/MM3 (ref 0.1–0.9)
MONOCYTES NFR BLD AUTO: 7.3 % (ref 5–12)
NEUTROPHILS # BLD AUTO: 8.9 10*3/MM3 (ref 1.7–7)
NEUTROPHILS NFR BLD AUTO: 76.5 % (ref 42.7–76)
NRBC BLD AUTO-RTO: 0.2 /100 WBC (ref 0–0.2)
PLATELET # BLD AUTO: 220 10*3/MM3 (ref 140–450)
PMV BLD AUTO: 10.6 FL (ref 6–12)
POTASSIUM BLD-SCNC: 4.3 MMOL/L (ref 3.5–4.7)
PROT SERPL-MCNC: 6.7 G/DL (ref 6.3–8)
RBC # BLD AUTO: 3.06 10*6/MM3 (ref 4.14–5.8)
SODIUM BLD-SCNC: 141 MMOL/L (ref 134–145)
WBC NRBC COR # BLD: 11.63 10*3/MM3 (ref 3.4–10.8)

## 2020-02-06 PROCEDURE — 85025 COMPLETE CBC W/AUTO DIFF WBC: CPT

## 2020-02-06 PROCEDURE — 99213 OFFICE O/P EST LOW 20 MIN: CPT | Performed by: NURSE PRACTITIONER

## 2020-02-06 PROCEDURE — 80053 COMPREHEN METABOLIC PANEL: CPT

## 2020-02-06 PROCEDURE — 86140 C-REACTIVE PROTEIN: CPT | Performed by: INTERNAL MEDICINE

## 2020-02-06 PROCEDURE — 36415 COLL VENOUS BLD VENIPUNCTURE: CPT

## 2020-02-06 NOTE — PROGRESS NOTES
Subjective     REASON FOR CONSULTATION:    Macrocystic Anemia      HISTORY OF PRESENT ILLNESS:  The patient is a 79 y.o. year old male gentleman with the above medical history here today for lab review.  He reports feeling well with no new symptoms or concerns.  His CBC is stable.  His white count remains slightly elevated 11.63 and hemoglobin slightly decreased at 11.5.  He denies dark tarry stool.  He has brought 3 occult stool cards since his last visit all of which were negative.  He denies noticeable bleeding or bruising.  His energy level is good.  He denies skin changes, itching, swelling.  Nuys weight loss, fevers or chills.  His vital signs are stable.    Past Medical History:   Diagnosis Date   • Acid reflux    • Anemia    • Arthritis     OSTEO   • Asthma    • Chest discomfort    • Colon polyp    • COPD (chronic obstructive pulmonary disease) (CMS/HCC)    • Depression    • Diabetes mellitus (CMS/HCC)     type 2   • Disease of thyroid gland    • Emphysema lung (CMS/HCC)    • High cholesterol    • Hypertension    • Morbid obesity (CMS/HCC)    • Neuropathy     BOTH FEET   • Obesity, Class III, BMI 40-49.9 (morbid obesity) (CMS/HCC)    • PAD (peripheral artery disease) (CMS/HCC)    • Poor circulation of extremity     LEFT LEG   • Sleep apnea    • Vitamin D deficiency         Past Surgical History:   Procedure Laterality Date   • APPENDECTOMY     • CATARACT EXTRACTION W/ INTRAOCULAR LENS IMPLANT Bilateral    • CHOLECYSTECTOMY     • COLONOSCOPY  01/01/2014   • COLONOSCOPY W/ POLYPECTOMY      BENIGN   • HERNIA REPAIR     • INTERSTIM PLACEMENT Left 08/30/2016    BLADDER CONTROL   • KNEE ARTHROPLASTY Right    • MOUTH SURGERY  06/26/2018   • NOSE SURGERY      REMOVED BLOCKAGE    • ROTATOR CUFF REPAIR Right    • TOTAL HIP ARTHROPLASTY Right 11/9/2017    Procedure: RIGHT TOTAL HIP ARTHROPLASTY;  Surgeon: Riky Fernando MD;  Location: [facility];  Service:         Current Outpatient Medications on File Prior to  Visit   Medication Sig Dispense Refill   • albuterol (PROVENTIL HFA;VENTOLIN HFA) 108 (90 Base) MCG/ACT inhaler Inhale 1 puff Every 4 (Four) Hours As Needed for Wheezing. 1 inhaler 3   • Ascorbic Acid (VITAMIN C PO) Take 1 tablet by mouth Daily.     • aspirin 81 MG EC tablet Take 1 tablet by mouth Every Other Day.     • Capsaicin 0.1 % cream Apply 1 application topically Daily. 1 tube 3   • Cholecalciferol (VITAMIN D) 1000 units tablet Take 1,000 Units by mouth Every Morning.     • Dulaglutide (TRULICITY) 0.75 MG/0.5ML solution pen-injector Inject 0.75 mg under the skin into the appropriate area as directed 1 (One) Time Per Week. 4 pen 9   • DULoxetine (CYMBALTA) 60 MG capsule Take 1 capsule by mouth Every Morning. 90 capsule 3   • glucose blood (LAINEY CONTOUR TEST) test strip Use as instructed to test glucose 100 each 1   • levothyroxine (SYNTHROID, LEVOTHROID) 88 MCG tablet Take 1 tablet by mouth Every Morning. 90 tablet 3   • losartan (COZAAR) 100 MG tablet Take 1 tablet by mouth Daily. 90 tablet 3   • metFORMIN (GLUCOPHAGE) 850 MG tablet Take 1 tablet by mouth 2 (Two) Times a Day With Meals. 30 tablet 6   • MICROLET LANCETS misc Use daily as directed to test glucose 100 each 1   • Multiple Vitamin (MULTIVITAMIN) tablet Take 1 tablet by mouth Every Morning.     • omeprazole (priLOSEC) 40 MG capsule Take 40 mg by mouth 2 (Two) Times a Day.     • oxybutynin XL (DITROPAN XL) 15 MG 24 hr tablet Take 15 mg by mouth Every Night.     • pantoprazole (PROTONIX) 40 MG EC tablet Take 40 mg by mouth Daily.     • pravastatin (PRAVACHOL) 40 MG tablet Take 1 tablet by mouth Every Night. 90 tablet 3   • pregabalin (LYRICA) 150 MG capsule Take 150 mg by mouth 2 (Two) Times a Day.     • Tiotropium Bromide Monohydrate (SPIRIVA RESPIMAT) 1.25 MCG/ACT aerosol solution inhaler Spiriva Respimat   1 puff daily     • umeclidinium-vilanterol (ANORO ELLIPTA) 62.5-25 MCG/INH aerosol powder  inhaler Inhale 1 puff Daily.     • vitamin B-12  (CYANOCOBALAMIN) 1000 MCG tablet Take 1,000 mcg by mouth Every Morning.     • zolpidem (AMBIEN) 10 MG tablet Take 10 mg by mouth At Night As Needed.       No current facility-administered medications on file prior to visit.         ALLERGIES:  No Known Allergies     Social History     Socioeconomic History   • Marital status:      Spouse name: Chitra   • Number of children: Not on file   • Years of education: high school   • Highest education level: Not on file   Occupational History     Employer: RETIRED   Social Needs   • Financial resource strain: Not hard at all   • Food insecurity:     Worry: Never true     Inability: Never true   • Transportation needs:     Medical: No     Non-medical: No   Tobacco Use   • Smoking status: Former Smoker     Packs/day: 1.50     Years: 40.00     Pack years: 60.00     Types: Cigarettes     Last attempt to quit:      Years since quittin.1   • Smokeless tobacco: Never Used   • Tobacco comment: from age [age]   Substance and Sexual Activity   • Alcohol use: Yes     Comment: HOLIDAYS  caffeine -2 cups coffee daily   • Drug use: No     Comment: PRN use for pain   • Sexual activity: Defer   Lifestyle   • Physical activity:     Days per week: 0 days     Minutes per session: 0 min   • Stress: Only a little   Relationships   • Social connections:     Talks on phone: Patient refused     Gets together: Patient refused     Attends Congregational service: Patient refused     Active member of club or organization: Patient refused     Attends meetings of clubs or organizations: Patient refused     Relationship status: Patient refused        Family History   Problem Relation Age of Onset   • Stroke Mother    • Hypertension Mother    • Heart attack Father    • Alcohol abuse Father    • Heart disease Father    • Stomach cancer Brother    • Stroke Brother    • Alcohol abuse Brother    • Hypertension Daughter    • Diabetes Daughter    • Diabetes Sister    • Malig Hyperthermia Neg Hx          Review of Systems   Constitutional: Negative for appetite change, chills, diaphoresis, fatigue, fever and unexpected weight change.   HENT: Negative for hearing loss, sore throat and trouble swallowing.    Respiratory: Negative for cough, chest tightness, shortness of breath and wheezing.    Cardiovascular: Negative for chest pain, palpitations and leg swelling.   Gastrointestinal: Negative for abdominal distention, abdominal pain, constipation, diarrhea, nausea and vomiting.   Genitourinary: Negative for dysuria, frequency, hematuria and urgency.   Musculoskeletal: Negative for joint swelling.   Skin: Negative for rash and wound.   Neurological: Negative for seizures, syncope, speech difficulty, weakness, numbness and headaches.   Hematological: Negative for adenopathy. Does not bruise/bleed easily.   Psychiatric/Behavioral: Negative for behavioral problems, confusion and suicidal ideas.        Objective      There were no vitals filed for this visit.  Current Status 1/30/2020   ECOG score 0       Physical Exam   GENERAL:  Well-developed, well-nourished in no acute distress.   SKIN:  Warm, dry without rashes, purpura or petechiae.  NECK:  Supple with good range of motion; no thyromegaly or masses, no JVD.  LYMPHATICS:  No cervical, supraclavicular, axillary or inguinal adenopathy.  CHEST:  Lungs clear to auscultation. Good airflow.  CARDIAC:  Regular rate and rhythm without murmurs, rubs or gallops. Normal S1,S2.  ABDOMEN:  Soft, nontender with no hepatosplenomegaly or masses.  EXTREMITIES:  No clubbing, cyanosis.  Bilateral lower extremity edema.  NEUROLOGICAL:  Cranial Nerves II-XII grossly intact.  No focal neurological deficits.  PSYCHIATRIC:  Normal affect and mood.      RECENT LABS:  Hematology WBC   Date Value Ref Range Status   01/30/2020 11.13 (H) 3.40 - 10.80 10*3/mm3 Final   01/09/2020 7.10 3.40 - 10.80 10*3/mm3 Final     RBC   Date Value Ref Range Status   01/30/2020 3.13 (L) 4.14 - 5.80 10*6/mm3  Final   01/09/2020 2.98 (L) 4.14 - 5.80 10*6/mm3 Final     Hemoglobin   Date Value Ref Range Status   01/30/2020 11.6 (L) 13.0 - 17.7 g/dL Final     Hematocrit   Date Value Ref Range Status   01/30/2020 34.0 (L) 37.5 - 51.0 % Final   01/30/2020 33.4 (L) 37.5 - 51.0 % Final     Platelets   Date Value Ref Range Status   01/30/2020 224 140 - 450 10*3/mm3 Final          Assessment/Plan   1. Macrocytic Anemia.  Hemoglobin is stable in the office today.  His iron profile and ferritin were within normal limits.  His occult stool cards were negative.        PLAN:  I have reviewed the patient's labs with troponin today.  She would like to go forward with Delmar 2, BCR able, and flow cytometry.  The patient would like to return to the office in the next several weeks to obtain this lab work as he has already been stuck once today.  I told him this would be fine as Dr. Gilliam wishes to see him back in 4 months.  I have asked the patient to call the office with any new or worsening symptoms before the scheduled visits.

## 2020-02-07 LAB — REF LAB TEST METHOD: NORMAL

## 2020-03-10 ENCOUNTER — LAB (OUTPATIENT)
Dept: LAB | Facility: HOSPITAL | Age: 80
End: 2020-03-10

## 2020-03-10 DIAGNOSIS — D53.9 MACROCYTIC ANEMIA: ICD-10-CM

## 2020-03-10 LAB
ALBUMIN SERPL-MCNC: 4.2 G/DL (ref 3.5–5.2)
ALBUMIN/GLOB SERPL: 1.6 G/DL (ref 1.1–2.4)
ALP SERPL-CCNC: 48 U/L (ref 38–116)
ALT SERPL W P-5'-P-CCNC: 12 U/L (ref 0–41)
ANION GAP SERPL CALCULATED.3IONS-SCNC: 12.9 MMOL/L (ref 5–15)
AST SERPL-CCNC: 15 U/L (ref 0–40)
BASOPHILS # BLD AUTO: 0.07 10*3/MM3 (ref 0–0.2)
BASOPHILS NFR BLD AUTO: 0.8 % (ref 0–1.5)
BILIRUB SERPL-MCNC: 0.7 MG/DL (ref 0.2–1.2)
BUN BLD-MCNC: 16 MG/DL (ref 6–20)
BUN/CREAT SERPL: 18 (ref 7.3–30)
CALCIUM SPEC-SCNC: 9 MG/DL (ref 8.5–10.2)
CHLORIDE SERPL-SCNC: 104 MMOL/L (ref 98–107)
CO2 SERPL-SCNC: 24.1 MMOL/L (ref 22–29)
CREAT BLD-MCNC: 0.89 MG/DL (ref 0.7–1.3)
DEPRECATED RDW RBC AUTO: 67.1 FL (ref 37–54)
EOSINOPHIL # BLD AUTO: 0.14 10*3/MM3 (ref 0–0.4)
EOSINOPHIL NFR BLD AUTO: 1.5 % (ref 0.3–6.2)
ERYTHROCYTE [DISTWIDTH] IN BLOOD BY AUTOMATED COUNT: 16.5 % (ref 12.3–15.4)
GFR SERPL CREATININE-BSD FRML MDRD: 82 ML/MIN/1.73
GLOBULIN UR ELPH-MCNC: 2.6 GM/DL (ref 1.8–3.5)
GLUCOSE BLD-MCNC: 136 MG/DL (ref 74–124)
HCT VFR BLD AUTO: 34.9 % (ref 37.5–51)
HGB BLD-MCNC: 11.9 G/DL (ref 13–17.7)
IMM GRANULOCYTES # BLD AUTO: 0.02 10*3/MM3 (ref 0–0.05)
IMM GRANULOCYTES NFR BLD AUTO: 0.2 % (ref 0–0.5)
LYMPHOCYTES # BLD AUTO: 1.68 10*3/MM3 (ref 0.7–3.1)
LYMPHOCYTES NFR BLD AUTO: 18.5 % (ref 19.6–45.3)
MCH RBC QN AUTO: 37.4 PG (ref 26.6–33)
MCHC RBC AUTO-ENTMCNC: 34.1 G/DL (ref 31.5–35.7)
MCV RBC AUTO: 109.7 FL (ref 79–97)
MONOCYTES # BLD AUTO: 0.73 10*3/MM3 (ref 0.1–0.9)
MONOCYTES NFR BLD AUTO: 8 % (ref 5–12)
NEUTROPHILS # BLD AUTO: 6.43 10*3/MM3 (ref 1.7–7)
NEUTROPHILS NFR BLD AUTO: 71 % (ref 42.7–76)
NRBC BLD AUTO-RTO: 0 /100 WBC (ref 0–0.2)
PLATELET # BLD AUTO: 230 10*3/MM3 (ref 140–450)
PMV BLD AUTO: 10.6 FL (ref 6–12)
POTASSIUM BLD-SCNC: 4.2 MMOL/L (ref 3.5–4.7)
PROT SERPL-MCNC: 6.8 G/DL (ref 6.3–8)
RBC # BLD AUTO: 3.18 10*6/MM3 (ref 4.14–5.8)
SODIUM BLD-SCNC: 141 MMOL/L (ref 134–145)
WBC NRBC COR # BLD: 9.07 10*3/MM3 (ref 3.4–10.8)

## 2020-03-10 PROCEDURE — 88182 CELL MARKER STUDY: CPT

## 2020-03-10 PROCEDURE — 36415 COLL VENOUS BLD VENIPUNCTURE: CPT

## 2020-03-10 PROCEDURE — 80053 COMPREHEN METABOLIC PANEL: CPT

## 2020-03-10 PROCEDURE — 88185 FLOWCYTOMETRY/TC ADD-ON: CPT

## 2020-03-10 PROCEDURE — 85025 COMPLETE CBC W/AUTO DIFF WBC: CPT

## 2020-03-10 PROCEDURE — 88184 FLOWCYTOMETRY/ TC 1 MARKER: CPT

## 2020-03-16 LAB
REF LAB TEST METHOD: NORMAL
REF LAB TEST METHOD: NORMAL

## 2020-03-18 LAB — REF LAB TEST METHOD: NORMAL

## 2020-05-21 ENCOUNTER — APPOINTMENT (OUTPATIENT)
Dept: LAB | Facility: HOSPITAL | Age: 80
End: 2020-05-21

## 2020-05-21 DIAGNOSIS — E78.5 DYSLIPIDEMIA: ICD-10-CM

## 2020-05-21 DIAGNOSIS — D53.9 MACROCYTIC ANEMIA: ICD-10-CM

## 2020-05-21 DIAGNOSIS — E78.5 HYPERLIPIDEMIA, UNSPECIFIED HYPERLIPIDEMIA TYPE: Primary | ICD-10-CM

## 2020-05-21 DIAGNOSIS — E11.9 TYPE 2 DIABETES MELLITUS WITHOUT COMPLICATION, WITHOUT LONG-TERM CURRENT USE OF INSULIN (HCC): ICD-10-CM

## 2020-05-21 LAB
ALBUMIN SERPL-MCNC: 4.2 G/DL (ref 3.5–5.2)
ALBUMIN/GLOB SERPL: 1.8 G/DL
ALP SERPL-CCNC: 41 U/L (ref 39–117)
ALT SERPL W P-5'-P-CCNC: 12 U/L (ref 1–41)
ANION GAP SERPL CALCULATED.3IONS-SCNC: 10.9 MMOL/L (ref 5–15)
AST SERPL-CCNC: 12 U/L (ref 1–40)
BILIRUB SERPL-MCNC: 0.5 MG/DL (ref 0.2–1.2)
BUN BLD-MCNC: 22 MG/DL (ref 8–23)
BUN/CREAT SERPL: 21.6 (ref 7–25)
CALCIUM SPEC-SCNC: 8.9 MG/DL (ref 8.6–10.5)
CHLORIDE SERPL-SCNC: 104 MMOL/L (ref 98–107)
CHOLEST SERPL-MCNC: 111 MG/DL (ref 0–200)
CO2 SERPL-SCNC: 25.1 MMOL/L (ref 22–29)
CREAT BLD-MCNC: 1.02 MG/DL (ref 0.76–1.27)
DEPRECATED RDW RBC AUTO: 59.8 FL (ref 37–54)
ERYTHROCYTE [DISTWIDTH] IN BLOOD BY AUTOMATED COUNT: 15.6 % (ref 12.3–15.4)
GFR SERPL CREATININE-BSD FRML MDRD: 70 ML/MIN/1.73
GLOBULIN UR ELPH-MCNC: 2.3 GM/DL
GLUCOSE BLD-MCNC: 146 MG/DL (ref 65–99)
HBA1C MFR BLD: 5.5 % (ref 4.8–5.6)
HCT VFR BLD AUTO: 34.2 % (ref 37.5–51)
HDLC SERPL QL: 2.92
HDLC SERPL-MCNC: 38 MG/DL (ref 40–60)
HGB BLD-MCNC: 11.6 G/DL (ref 13–17.7)
LDLC SERPL CALC-MCNC: 60 MG/DL (ref 0–100)
MCH RBC QN AUTO: 36.1 PG (ref 26.6–33)
MCHC RBC AUTO-ENTMCNC: 33.9 G/DL (ref 31.5–35.7)
MCV RBC AUTO: 106.5 FL (ref 79–97)
PLATELET # BLD AUTO: 195 10*3/MM3 (ref 140–450)
PMV BLD AUTO: 11.3 FL (ref 6–12)
POTASSIUM BLD-SCNC: 4.7 MMOL/L (ref 3.5–5.2)
PROT SERPL-MCNC: 6.5 G/DL (ref 6–8.5)
RBC # BLD AUTO: 3.21 10*6/MM3 (ref 4.14–5.8)
SODIUM BLD-SCNC: 140 MMOL/L (ref 136–145)
TRIGL SERPL-MCNC: 64 MG/DL (ref 0–150)
VLDLC SERPL-MCNC: 12.8 MG/DL (ref 5–40)
WBC NRBC COR # BLD: 7.53 10*3/MM3 (ref 3.4–10.8)

## 2020-05-21 PROCEDURE — 85027 COMPLETE CBC AUTOMATED: CPT | Performed by: FAMILY MEDICINE

## 2020-05-21 PROCEDURE — 80053 COMPREHEN METABOLIC PANEL: CPT | Performed by: FAMILY MEDICINE

## 2020-05-21 PROCEDURE — 36415 COLL VENOUS BLD VENIPUNCTURE: CPT | Performed by: FAMILY MEDICINE

## 2020-05-21 PROCEDURE — 80061 LIPID PANEL: CPT | Performed by: FAMILY MEDICINE

## 2020-05-21 PROCEDURE — 83036 HEMOGLOBIN GLYCOSYLATED A1C: CPT | Performed by: FAMILY MEDICINE

## 2020-05-28 ENCOUNTER — LAB (OUTPATIENT)
Dept: LAB | Facility: HOSPITAL | Age: 80
End: 2020-05-28

## 2020-05-28 ENCOUNTER — OFFICE VISIT (OUTPATIENT)
Dept: ONCOLOGY | Facility: CLINIC | Age: 80
End: 2020-05-28

## 2020-05-28 VITALS
RESPIRATION RATE: 18 BRPM | DIASTOLIC BLOOD PRESSURE: 64 MMHG | WEIGHT: 315 LBS | TEMPERATURE: 97.1 F | BODY MASS INDEX: 44.1 KG/M2 | SYSTOLIC BLOOD PRESSURE: 154 MMHG | HEIGHT: 71 IN | OXYGEN SATURATION: 92 % | HEART RATE: 92 BPM

## 2020-05-28 DIAGNOSIS — D53.9 MACROCYTIC ANEMIA: Primary | ICD-10-CM

## 2020-05-28 DIAGNOSIS — D64.9 ANEMIA, UNSPECIFIED TYPE: Primary | ICD-10-CM

## 2020-05-28 LAB
BASOPHILS # BLD AUTO: 0.09 10*3/MM3 (ref 0–0.2)
BASOPHILS NFR BLD AUTO: 0.9 % (ref 0–1.5)
DEPRECATED RDW RBC AUTO: 65 FL (ref 37–54)
EOSINOPHIL # BLD AUTO: 0.22 10*3/MM3 (ref 0–0.4)
EOSINOPHIL NFR BLD AUTO: 2.3 % (ref 0.3–6.2)
ERYTHROCYTE [DISTWIDTH] IN BLOOD BY AUTOMATED COUNT: 16.8 % (ref 12.3–15.4)
HCT VFR BLD AUTO: 35.3 % (ref 37.5–51)
HGB BLD-MCNC: 12.2 G/DL (ref 13–17.7)
IMM GRANULOCYTES # BLD AUTO: 0.07 10*3/MM3 (ref 0–0.05)
IMM GRANULOCYTES NFR BLD AUTO: 0.7 % (ref 0–0.5)
LYMPHOCYTES # BLD AUTO: 1.68 10*3/MM3 (ref 0.7–3.1)
LYMPHOCYTES NFR BLD AUTO: 17.4 % (ref 19.6–45.3)
MCH RBC QN AUTO: 37.2 PG (ref 26.6–33)
MCHC RBC AUTO-ENTMCNC: 34.6 G/DL (ref 31.5–35.7)
MCV RBC AUTO: 107.6 FL (ref 79–97)
MONOCYTES # BLD AUTO: 0.8 10*3/MM3 (ref 0.1–0.9)
MONOCYTES NFR BLD AUTO: 8.3 % (ref 5–12)
NEUTROPHILS # BLD AUTO: 6.78 10*3/MM3 (ref 1.7–7)
NEUTROPHILS NFR BLD AUTO: 70.4 % (ref 42.7–76)
NRBC BLD AUTO-RTO: 0.3 /100 WBC (ref 0–0.2)
PLATELET # BLD AUTO: 179 10*3/MM3 (ref 140–450)
PMV BLD AUTO: 11.8 FL (ref 6–12)
RBC # BLD AUTO: 3.28 10*6/MM3 (ref 4.14–5.8)
WBC NRBC COR # BLD: 9.64 10*3/MM3 (ref 3.4–10.8)

## 2020-05-28 PROCEDURE — 36415 COLL VENOUS BLD VENIPUNCTURE: CPT

## 2020-05-28 PROCEDURE — 99213 OFFICE O/P EST LOW 20 MIN: CPT | Performed by: INTERNAL MEDICINE

## 2020-05-28 PROCEDURE — 85025 COMPLETE CBC W/AUTO DIFF WBC: CPT

## 2020-05-28 NOTE — PROGRESS NOTES
Subjective     REASON FOR CONSULTATION:    1. Macrocystic Anemia, all of the anemia work-up is negative.  He does have an elevated MCV at 107.  Denies use of much alcohol.  B12 and RBC folate are normal.  All of the anemia work-up has been negative as evidenced on the hematological history.  · Discussed that in future if his hemoglobin were to drop consistently low then we could consider doing a bone marrow.  But since he has been stable and his hemoglobin has actually improved, patient will follow up with his primary care physician      HISTORY OF PRESENT ILLNESS: Patient is 79-year-old gentleman with history of chronic mild anemia with MCV of 107.  We did a complete anemia work-up and he is negative.  He denies use of much alcohol and he has no evidence of vitamin B12 or folate deficiency.  At his age certainly myelodysplasia is a consideration but we would not worry about treating it unless his hemoglobin drops significantly and he requires transfusions.  He does not have any anemia of chronic kidney disease as his creatinine is normal.  Patient is not too keen to get a bone marrow aspiration and biopsy at this time and certainly we would not do any kind of treatment options based on that at this time.    He otherwise feels well.  He tells me he sees his primary care physician Dr. Carrillo every 3 months and gets labs.  If it drops significantly below 10 consistently he could be referred and we can consider bone marrow at that point.  But he is very stable at this time.  He has history of hypothyroidism and his MCV may be elevated secondary to that as well.          Hematology history:  The patient is a 79 y.o. year old male gentleman with the above medical history here today for lab review.  He reports feeling well with no new symptoms or concerns.  His CBC is stable.  His white count remains slightly elevated 11.63 and hemoglobin slightly decreased at 11.5.  He denies dark tarry stool.  He has brought 3 occult  stool cards since his last visit all of which were negative.  He denies noticeable bleeding or bruising.  His energy level is good.  He denies skin changes, itching, swelling.  Nuys weight loss, fevers or chills.  His vital signs are stable.     Patient had a complete anemia work-up.  His serum protein electrophoresis was negative.  The kappa lambda ratio was normal at 0.78.His flow cytometry done January 30, 2020 was negative.  Ferritin was 246 with iron profile showing a percent saturation of 25 with TIBC of 312, , B12 of 1346, ESR of 28, RBC folate was normal, TOBY negative.  Stool occult blood x3 was negative.  C-reactive protein was normal.    He also had elevated white count and a Delmar 2 mutation was done which was negative and BCR able was negative.    Patient underwent video esophagogram March 2019 by his primary care physician which showed that he has got mild oropharyngeal dysphagia with delayed swallowing and there is transient penetration of liquids.    Past Medical History:   Diagnosis Date   • Acid reflux    • Anemia    • Arthritis     OSTEO   • Asthma    • Chest discomfort    • Colon polyp    • COPD (chronic obstructive pulmonary disease) (CMS/HCC)    • Depression    • Diabetes mellitus (CMS/HCC)     type 2   • Disease of thyroid gland    • Emphysema lung (CMS/HCC)    • High cholesterol    • Hypertension    • Morbid obesity (CMS/HCC)    • Neuropathy     BOTH FEET   • Obesity, Class III, BMI 40-49.9 (morbid obesity) (CMS/HCC)    • PAD (peripheral artery disease) (CMS/HCC)    • Poor circulation of extremity     LEFT LEG   • Sleep apnea    • Vitamin D deficiency         Past Surgical History:   Procedure Laterality Date   • APPENDECTOMY     • CATARACT EXTRACTION W/ INTRAOCULAR LENS IMPLANT Bilateral    • CHOLECYSTECTOMY     • COLONOSCOPY  01/01/2014   • COLONOSCOPY W/ POLYPECTOMY      BENIGN   • HERNIA REPAIR     • INTERSTIM PLACEMENT Left 08/30/2016    BLADDER CONTROL   • KNEE ARTHROPLASTY Right     • MOUTH SURGERY  06/26/2018   • NOSE SURGERY      REMOVED BLOCKAGE    • ROTATOR CUFF REPAIR Right    • TOTAL HIP ARTHROPLASTY Right 11/9/2017    Procedure: RIGHT TOTAL HIP ARTHROPLASTY;  Surgeon: Riky Fernando MD;  Location: [facility];  Service:         Current Outpatient Medications on File Prior to Visit   Medication Sig Dispense Refill   • albuterol (PROVENTIL HFA;VENTOLIN HFA) 108 (90 Base) MCG/ACT inhaler Inhale 1 puff Every 4 (Four) Hours As Needed for Wheezing. 1 inhaler 3   • Ascorbic Acid (VITAMIN C PO) Take 1 tablet by mouth Daily.     • aspirin 81 MG EC tablet Take 1 tablet by mouth Every Other Day.     • Capsaicin 0.1 % cream Apply 1 application topically Daily. 1 tube 3   • Cholecalciferol (VITAMIN D) 1000 units tablet Take 1,000 Units by mouth Every Morning.     • Dulaglutide (TRULICITY) 0.75 MG/0.5ML solution pen-injector Inject 0.75 mg under the skin into the appropriate area as directed 1 (One) Time Per Week. 4 pen 9   • DULoxetine (CYMBALTA) 60 MG capsule Take 1 capsule by mouth Every Morning. 90 capsule 3   • glucose blood (LAINEY CONTOUR TEST) test strip Use as instructed to test glucose 100 each 1   • levothyroxine (SYNTHROID, LEVOTHROID) 88 MCG tablet Take 1 tablet by mouth Every Morning. 90 tablet 3   • losartan (COZAAR) 100 MG tablet Take 1 tablet by mouth Daily. 90 tablet 3   • metFORMIN (GLUCOPHAGE) 850 MG tablet Take 1 tablet by mouth 2 (Two) Times a Day With Meals. 30 tablet 6   • MICROLET LANCETS misc Use daily as directed to test glucose 100 each 1   • Multiple Vitamin (MULTIVITAMIN) tablet Take 1 tablet by mouth Every Morning.     • omeprazole (priLOSEC) 40 MG capsule Take 40 mg by mouth 2 (Two) Times a Day.     • oxybutynin XL (DITROPAN XL) 15 MG 24 hr tablet Take 15 mg by mouth Every Night.     • pantoprazole (PROTONIX) 40 MG EC tablet Take 40 mg by mouth Daily.     • pravastatin (PRAVACHOL) 40 MG tablet Take 1 tablet by mouth Every Night. 90 tablet 3   • pregabalin (LYRICA)  150 MG capsule Take 150 mg by mouth 2 (Two) Times a Day.     • Tiotropium Bromide Monohydrate (SPIRIVA RESPIMAT) 1.25 MCG/ACT aerosol solution inhaler Spiriva Respimat   1 puff daily     • umeclidinium-vilanterol (ANORO ELLIPTA) 62.5-25 MCG/INH aerosol powder  inhaler Inhale 1 puff Daily.     • vitamin B-12 (CYANOCOBALAMIN) 1000 MCG tablet Take 1,000 mcg by mouth Every Morning.     • zolpidem (AMBIEN) 10 MG tablet Take 10 mg by mouth At Night As Needed.       No current facility-administered medications on file prior to visit.         ALLERGIES:  No Known Allergies     Social History     Socioeconomic History   • Marital status:      Spouse name: Chitra   • Number of children: Not on file   • Years of education: high school   • Highest education level: Not on file   Occupational History     Employer: RETIRED   Social Needs   • Financial resource strain: Not hard at all   • Food insecurity:     Worry: Never true     Inability: Never true   • Transportation needs:     Medical: No     Non-medical: No   Tobacco Use   • Smoking status: Former Smoker     Packs/day: 1.50     Years: 40.00     Pack years: 60.00     Types: Cigarettes     Last attempt to quit:      Years since quittin.4   • Smokeless tobacco: Never Used   • Tobacco comment: from age [age]   Substance and Sexual Activity   • Alcohol use: Yes     Comment: HOLIDAYS  caffeine -2 cups coffee daily   • Drug use: No     Comment: PRN use for pain   • Sexual activity: Defer   Lifestyle   • Physical activity:     Days per week: 0 days     Minutes per session: 0 min   • Stress: Only a little   Relationships   • Social connections:     Talks on phone: Patient refused     Gets together: Patient refused     Attends Mandaeism service: Patient refused     Active member of club or organization: Patient refused     Attends meetings of clubs or organizations: Patient refused     Relationship status: Patient refused        Family History   Problem Relation Age of  "Onset   • Stroke Mother    • Hypertension Mother    • Heart attack Father    • Alcohol abuse Father    • Heart disease Father    • Stomach cancer Brother    • Stroke Brother    • Alcohol abuse Brother    • Hypertension Daughter    • Diabetes Daughter    • Diabetes Sister    • Malig Hyperthermia Neg Hx         Review of Systems   Constitutional: Positive for fatigue (05/28/20-New). Negative for appetite change, chills, diaphoresis, fever and unexpected weight change.   HENT: Negative for hearing loss, sore throat and trouble swallowing.    Respiratory: Negative for cough, chest tightness, shortness of breath and wheezing.    Cardiovascular: Negative for chest pain, palpitations and leg swelling.   Gastrointestinal: Negative for abdominal distention, abdominal pain, constipation, diarrhea, nausea and vomiting.   Genitourinary: Negative for dysuria, frequency, hematuria and urgency.   Musculoskeletal: Positive for back pain (05/28/20-Unchanged). Negative for joint swelling.        No muscle weakness.   Skin: Negative for rash and wound.   Neurological: Negative for seizures, syncope, speech difficulty, weakness, numbness and headaches.   Hematological: Negative for adenopathy. Does not bruise/bleed easily.   Psychiatric/Behavioral: Negative for behavioral problems, confusion and suicidal ideas.   May 28, 2020, review of system updated and unchanged    Objective      Vitals:    05/28/20 1107   BP: 154/64   Pulse: 92   Resp: 18   Temp: 97.1 °F (36.2 °C)   TempSrc: Oral   SpO2: 92%   Weight: (!) 145 kg (319 lb 1.6 oz)   Height: 181.5 cm (71.46\")   PainSc: 0-No pain     Current Status 5/28/2020   ECOG score 0       Physical Exam     CONSTITUTIONAL:  Vital signs reviewed.  No distress, looks comfortable.  EYES:  Conjunctiva and lids unremarkable.  PERRLA  EARS,NOSE,MOUTH,THROAT:  Ears and nose appear unremarkable.  Lips, teeth, gums appear unremarkable.  RESPIRATORY:  Normal respiratory effort.  Lungs clear to auscultation " bilaterally.  CARDIOVASCULAR:  Normal S1, S2.  No murmurs rubs or gallops.  No significant lower extremity edema.  GASTROINTESTINAL: Abdomen appears unremarkable.  Nontender.  No hepatomegaly.  No splenomegaly.  LYMPHATIC:  No cervical, supraclavicular, axillary lymphadenopathy.  SKIN:  Warm.  No rashes.  PSYCHIATRIC:  Normal judgment and insight.  Normal mood and affect.      RECENT LABS:  Hematology WBC   Date Value Ref Range Status   05/28/2020 9.64 3.40 - 10.80 10*3/mm3 Final   01/09/2020 7.10 3.40 - 10.80 10*3/mm3 Final     RBC   Date Value Ref Range Status   05/28/2020 3.28 (L) 4.14 - 5.80 10*6/mm3 Final   01/09/2020 2.98 (L) 4.14 - 5.80 10*6/mm3 Final     Hemoglobin   Date Value Ref Range Status   05/28/2020 12.2 (L) 13.0 - 17.7 g/dL Final     Hematocrit   Date Value Ref Range Status   05/28/2020 35.3 (L) 37.5 - 51.0 % Final     Platelets   Date Value Ref Range Status   05/28/2020 179 140 - 450 10*3/mm3 Final          Assessment/Plan   1. Macrocytic Anemia.  Hemoglobin is stable in the office today.  His iron profile and ferritin were within normal limits.  His occult stool cards were negative.  All of his anemia work-up is negative.  He does not show evidence of B12 or folate deficiency.  It is possible that this is hypothyroidism may be causing macrocytosis.  However discussed with him that in future if his hemoglobin drops below 10 he can be referred back to us at which point we can consider doing a bone marrow aspiration biopsy.  At this time would not offer any treatment based on a bone marrow aspiration biopsy.        PLAN:  1. Patient has chronic mild anemia, likely anemia of chronic  Disease given his history of diabetes.  However certainly an underlying bone marrow disorder like myelodysplasia is a consideration at this age but we would not treat him based on his hemoglobin of 12.3.  He certainly can be followed by his primary care physician Dr. Reagan  on a three-month basis and if his hemoglobin  drops below 10 he could be referred back at which time we can consider doing a bone marrow aspiration biopsy.  At this time we will follow with observation.  2. Will release him from our office.  3. We will be happy to see him in future if needed.  Certainly can refer back patient if hemoglobin consistently drops below 10    Chio Gilliam MD     Cc: Dr.Suraj Reagan

## 2020-05-29 NOTE — PROGRESS NOTES
Please inform the patient of the following abnormal results.  Continues to have macrocytic anemia. Would like to see the patient In the next 2 weeks.

## 2020-06-02 ENCOUNTER — LAB (OUTPATIENT)
Dept: LAB | Facility: HOSPITAL | Age: 80
End: 2020-06-02

## 2020-06-02 ENCOUNTER — OFFICE VISIT (OUTPATIENT)
Dept: INTERNAL MEDICINE | Facility: CLINIC | Age: 80
End: 2020-06-02

## 2020-06-02 VITALS
OXYGEN SATURATION: 98 % | SYSTOLIC BLOOD PRESSURE: 152 MMHG | WEIGHT: 315 LBS | HEIGHT: 71 IN | RESPIRATION RATE: 18 BRPM | DIASTOLIC BLOOD PRESSURE: 72 MMHG | TEMPERATURE: 97.4 F | BODY MASS INDEX: 44.1 KG/M2 | HEART RATE: 93 BPM

## 2020-06-02 DIAGNOSIS — I10 ESSENTIAL HYPERTENSION: ICD-10-CM

## 2020-06-02 DIAGNOSIS — E03.9 HYPOTHYROIDISM, UNSPECIFIED TYPE: ICD-10-CM

## 2020-06-02 DIAGNOSIS — E11.8 TYPE 2 DIABETES MELLITUS WITH COMPLICATION, WITHOUT LONG-TERM CURRENT USE OF INSULIN (HCC): ICD-10-CM

## 2020-06-02 DIAGNOSIS — D64.9 ANEMIA, UNSPECIFIED TYPE: ICD-10-CM

## 2020-06-02 DIAGNOSIS — I10 ESSENTIAL HYPERTENSION: Primary | ICD-10-CM

## 2020-06-02 LAB
ALBUMIN SERPL-MCNC: 4.2 G/DL (ref 3.5–5.2)
ALBUMIN/GLOB SERPL: 1.7 G/DL
ALP SERPL-CCNC: 45 U/L (ref 39–117)
ALT SERPL W P-5'-P-CCNC: 14 U/L (ref 1–41)
ANION GAP SERPL CALCULATED.3IONS-SCNC: 8.6 MMOL/L (ref 5–15)
AST SERPL-CCNC: 17 U/L (ref 1–40)
BASOPHILS # BLD AUTO: 0.09 10*3/MM3 (ref 0–0.2)
BASOPHILS NFR BLD AUTO: 1.2 % (ref 0–1.5)
BILIRUB SERPL-MCNC: 0.4 MG/DL (ref 0.2–1.2)
BUN BLD-MCNC: 24 MG/DL (ref 8–23)
BUN/CREAT SERPL: 25 (ref 7–25)
CALCIUM SPEC-SCNC: 9.1 MG/DL (ref 8.6–10.5)
CHLORIDE SERPL-SCNC: 104 MMOL/L (ref 98–107)
CO2 SERPL-SCNC: 26.4 MMOL/L (ref 22–29)
CREAT BLD-MCNC: 0.96 MG/DL (ref 0.76–1.27)
DEPRECATED RDW RBC AUTO: 61.3 FL (ref 37–54)
EOSINOPHIL # BLD AUTO: 0.27 10*3/MM3 (ref 0–0.4)
EOSINOPHIL NFR BLD AUTO: 3.6 % (ref 0.3–6.2)
ERYTHROCYTE [DISTWIDTH] IN BLOOD BY AUTOMATED COUNT: 15.8 % (ref 12.3–15.4)
GFR SERPL CREATININE-BSD FRML MDRD: 75 ML/MIN/1.73
GLOBULIN UR ELPH-MCNC: 2.5 GM/DL
GLUCOSE BLD-MCNC: 134 MG/DL (ref 65–99)
HBA1C MFR BLD: 6.98 % (ref 4.8–5.6)
HCT VFR BLD AUTO: 32.4 % (ref 37.5–51)
HGB BLD-MCNC: 11.4 G/DL (ref 13–17.7)
LYMPHOCYTES # BLD AUTO: 1.42 10*3/MM3 (ref 0.7–3.1)
LYMPHOCYTES NFR BLD AUTO: 18.7 % (ref 19.6–45.3)
MCH RBC QN AUTO: 37.6 PG (ref 26.6–33)
MCHC RBC AUTO-ENTMCNC: 35.2 G/DL (ref 31.5–35.7)
MCV RBC AUTO: 106.9 FL (ref 79–97)
MONOCYTES # BLD AUTO: 0.69 10*3/MM3 (ref 0.1–0.9)
MONOCYTES NFR BLD AUTO: 9.1 % (ref 5–12)
NEUTROPHILS # BLD AUTO: 5.07 10*3/MM3 (ref 1.7–7)
NEUTROPHILS NFR BLD AUTO: 66.6 % (ref 42.7–76)
PLATELET # BLD AUTO: 212 10*3/MM3 (ref 140–450)
PMV BLD AUTO: 11.3 FL (ref 6–12)
POTASSIUM BLD-SCNC: 4.7 MMOL/L (ref 3.5–5.2)
PROT SERPL-MCNC: 6.7 G/DL (ref 6–8.5)
RBC # BLD AUTO: 3.03 10*6/MM3 (ref 4.14–5.8)
SODIUM BLD-SCNC: 139 MMOL/L (ref 136–145)
T-UPTAKE NFR SERPL: 1.14 TBI (ref 0.8–1.3)
T4 SERPL-MCNC: 6.46 MCG/DL (ref 4.5–11.7)
TSH SERPL DL<=0.05 MIU/L-ACNC: 3.81 UIU/ML (ref 0.27–4.2)
WBC NRBC COR # BLD: 7.6 10*3/MM3 (ref 3.4–10.8)

## 2020-06-02 PROCEDURE — 99214 OFFICE O/P EST MOD 30 MIN: CPT | Performed by: FAMILY MEDICINE

## 2020-06-02 PROCEDURE — 80053 COMPREHEN METABOLIC PANEL: CPT | Performed by: FAMILY MEDICINE

## 2020-06-02 PROCEDURE — 84479 ASSAY OF THYROID (T3 OR T4): CPT | Performed by: FAMILY MEDICINE

## 2020-06-02 PROCEDURE — 84443 ASSAY THYROID STIM HORMONE: CPT | Performed by: FAMILY MEDICINE

## 2020-06-02 PROCEDURE — 84436 ASSAY OF TOTAL THYROXINE: CPT | Performed by: FAMILY MEDICINE

## 2020-06-02 PROCEDURE — 36415 COLL VENOUS BLD VENIPUNCTURE: CPT

## 2020-06-02 PROCEDURE — 85025 COMPLETE CBC W/AUTO DIFF WBC: CPT | Performed by: FAMILY MEDICINE

## 2020-06-02 PROCEDURE — 83036 HEMOGLOBIN GLYCOSYLATED A1C: CPT | Performed by: FAMILY MEDICINE

## 2020-06-02 RX ORDER — AMLODIPINE BESYLATE 5 MG/1
5 TABLET ORAL DAILY
Qty: 90 TABLET | Refills: 3 | Status: SHIPPED | OUTPATIENT
Start: 2020-06-02 | End: 2021-06-18 | Stop reason: SDUPTHER

## 2020-06-02 NOTE — PROGRESS NOTES
Subjective   Riky Moon is a 79 y.o. male.     Chief Complaint   Patient presents with   • Hypertension         History of Present Illness     Patient continues to have a history of having anemia.  His most recent hemoglobin was over 12.  Patient recently saw a heme-onc, and was determined that he may have some underlying myelodysplasia which has not been ruled out.  However they decided to continue to observe patient, and if he has symptoms and has a hemoglobin of below 10, they may consider getting a biopsy at that time.  At the current time were to continue to monitor patient.  He states that he is doing well, and does not have any side effects.  States that occasionally he feels tired but nothing out of the ordinary.    Patient also has essential hypertension.  He is currently taking losartan 100 mg daily.  His blood pressure at today's office visit 152/72.  He denies any side effects of the medication.    Patient also has type 2 diabetes.  He is currently taking Trulicity 0.5 mg weekly.  He is also on metformin 850 twice daily.  Patient denies any side effects of the medication.  He does state that he had ran out of the Trulicity here recently.  Patient's hemoglobin A1c is within normal limits.    Patient also has hypothyroidism.  Currently levothyroxine 88 mcg daily.  He denies any side effects of the medication.    The following portions of the patient's history were reviewed and updated as appropriate: allergies, current medications, past family history, past medical history, past social history, past surgical history and problem list.    Review of Systems   Constitutional: Negative for chills and fever.   HENT: Negative for congestion, rhinorrhea, sinus pain and sore throat.    Eyes: Negative for photophobia and visual disturbance.   Respiratory: Negative for cough, chest tightness and shortness of breath.    Cardiovascular: Negative for chest pain and palpitations.   Gastrointestinal: Negative for  diarrhea, nausea and vomiting.   Genitourinary: Negative for dysuria, frequency and urgency.   Skin: Negative for rash and wound.   Neurological: Negative for dizziness and syncope.   Psychiatric/Behavioral: Negative for behavioral problems and confusion.       Objective   Physical Exam   Constitutional: He is oriented to person, place, and time. He appears well-developed and well-nourished.   HENT:   Head: Normocephalic and atraumatic.   Right Ear: External ear normal.   Left Ear: External ear normal.   Eyes: EOM are normal.   Neck: Normal range of motion. Neck supple.   Cardiovascular: Normal rate, regular rhythm and normal heart sounds.   Pulmonary/Chest: Effort normal and breath sounds normal. No respiratory distress.   Musculoskeletal: Normal range of motion.   Lymphadenopathy:     He has no cervical adenopathy.   Neurological: He is alert and oriented to person, place, and time.   Skin: Skin is warm.   Psychiatric: He has a normal mood and affect. His behavior is normal.   Nursing note and vitals reviewed.      Vitals:    06/02/20 1110   BP: 152/72   Pulse: 93   Resp: 18   Temp: 97.4 °F (36.3 °C)   SpO2: 98%     Body mass index is 44.1 kg/m².      Assessment/Plan   Riky was seen today for hypertension.    Diagnoses and all orders for this visit:    Essential hypertension  -     amLODIPine (NORVASC) 5 MG tablet; Take 1 tablet by mouth Daily.  -     CBC & Differential; Future  -     Comprehensive Metabolic Panel; Future  -     We will continue losartan 100 mg nightly, take amlodipine 5 mg in the morning.    Anemia, unspecified type  -     CBC & Differential; Future  -     Continue to monitor his CBC every 3 months.  If his signs and symptoms were to worsen, he may need to go back to hematology for further evaluation.    Hypothyroidism, unspecified type  -     Thyroid Panel With TSH  -     Thyroid Panel With TSH; Future  -     Continue levothyroxine 88 mcg daily.    Type 2 diabetes mellitus with complication,  without long-term current use of insulin (CMS/[facility])  -     Dulaglutide (Trulicity) 0.75 MG/0.5ML solution pen-injector; Inject 0.75 mg under the skin into the appropriate area as directed 1 (One) Time Per Week.  -     Hemoglobin A1c; Future  -     Continue metformin and Trulicity.          No follow-ups on file.    Dictated utilizing Dragon Voice Recognition Software

## 2020-06-04 NOTE — PROGRESS NOTES
Please inform the patient of the following abnormal results.  Hemoglobin A1c has jumped point and a half in 2 weeks, I do not believe the labs are accurate for this.  He has stopped taking Trulicity, I do want him to take the Trulicity.  His hemoglobin also dropped one-point and almost a week.  If he has any signs and symptoms of GI bleed he needs to go to the emergency room.  However this could be related to his anemia of chronic disease that he has which he is seeing hematology.  If it falls below 10, he will need to see them.  We will continue to monitor patient.  I would like the patient to see me in 1 month.

## 2020-09-18 ENCOUNTER — LAB (OUTPATIENT)
Dept: LAB | Facility: HOSPITAL | Age: 80
End: 2020-09-18

## 2020-09-18 ENCOUNTER — OFFICE VISIT (OUTPATIENT)
Dept: INTERNAL MEDICINE | Facility: CLINIC | Age: 80
End: 2020-09-18

## 2020-09-18 VITALS
HEIGHT: 71 IN | OXYGEN SATURATION: 93 % | DIASTOLIC BLOOD PRESSURE: 70 MMHG | TEMPERATURE: 97.2 F | BODY MASS INDEX: 44.1 KG/M2 | HEART RATE: 94 BPM | WEIGHT: 315 LBS | RESPIRATION RATE: 16 BRPM | SYSTOLIC BLOOD PRESSURE: 158 MMHG

## 2020-09-18 DIAGNOSIS — I10 ESSENTIAL HYPERTENSION: ICD-10-CM

## 2020-09-18 DIAGNOSIS — E78.5 HYPERLIPIDEMIA, UNSPECIFIED HYPERLIPIDEMIA TYPE: ICD-10-CM

## 2020-09-18 DIAGNOSIS — E11.9 TYPE 2 DIABETES MELLITUS WITHOUT COMPLICATION, WITHOUT LONG-TERM CURRENT USE OF INSULIN (HCC): ICD-10-CM

## 2020-09-18 DIAGNOSIS — E03.9 HYPOTHYROIDISM, UNSPECIFIED TYPE: ICD-10-CM

## 2020-09-18 DIAGNOSIS — R13.10 DYSPHAGIA, UNSPECIFIED TYPE: Primary | ICD-10-CM

## 2020-09-18 DIAGNOSIS — E11.8 TYPE 2 DIABETES MELLITUS WITH COMPLICATION, WITHOUT LONG-TERM CURRENT USE OF INSULIN (HCC): ICD-10-CM

## 2020-09-18 DIAGNOSIS — Z23 FLU VACCINE NEED: ICD-10-CM

## 2020-09-18 LAB
ALBUMIN SERPL-MCNC: 4.2 G/DL (ref 3.5–5.2)
ALBUMIN/GLOB SERPL: 1.6 G/DL
ALP SERPL-CCNC: 50 U/L (ref 39–117)
ALT SERPL W P-5'-P-CCNC: 20 U/L (ref 1–41)
ANION GAP SERPL CALCULATED.3IONS-SCNC: 9.1 MMOL/L (ref 5–15)
ANISOCYTOSIS BLD QL: ABNORMAL
AST SERPL-CCNC: 19 U/L (ref 1–40)
BASOPHILS # BLD MANUAL: 0.18 10*3/MM3 (ref 0–0.2)
BASOPHILS NFR BLD AUTO: 2 % (ref 0–1.5)
BILIRUB SERPL-MCNC: 0.6 MG/DL (ref 0–1.2)
BUN SERPL-MCNC: 17 MG/DL (ref 8–23)
BUN/CREAT SERPL: 17.5 (ref 7–25)
CALCIUM SPEC-SCNC: 9.1 MG/DL (ref 8.6–10.5)
CHLORIDE SERPL-SCNC: 103 MMOL/L (ref 98–107)
CHOLEST SERPL-MCNC: 133 MG/DL (ref 0–200)
CO2 SERPL-SCNC: 27.9 MMOL/L (ref 22–29)
CREAT SERPL-MCNC: 0.97 MG/DL (ref 0.76–1.27)
DEPRECATED RDW RBC AUTO: 65.1 FL (ref 37–54)
EOSINOPHIL # BLD MANUAL: 0.18 10*3/MM3 (ref 0–0.4)
EOSINOPHIL NFR BLD MANUAL: 2 % (ref 0.3–6.2)
ERYTHROCYTE [DISTWIDTH] IN BLOOD BY AUTOMATED COUNT: 16.1 % (ref 12.3–15.4)
GFR SERPL CREATININE-BSD FRML MDRD: 74 ML/MIN/1.73
GLOBULIN UR ELPH-MCNC: 2.6 GM/DL
GLUCOSE SERPL-MCNC: 124 MG/DL (ref 65–99)
HBA1C MFR BLD: 6.89 % (ref 4.8–5.6)
HCT VFR BLD AUTO: 35.4 % (ref 37.5–51)
HDLC SERPL-MCNC: 39 MG/DL (ref 40–60)
HGB BLD-MCNC: 11.7 G/DL (ref 13–17.7)
LDLC SERPL CALC-MCNC: 75 MG/DL (ref 0–100)
LDLC/HDLC SERPL: 1.93 {RATIO}
LYMPHOCYTES # BLD MANUAL: 1.45 10*3/MM3 (ref 0.7–3.1)
LYMPHOCYTES NFR BLD MANUAL: 16.3 % (ref 19.6–45.3)
LYMPHOCYTES NFR BLD MANUAL: 6.1 % (ref 5–12)
MACROCYTES BLD QL SMEAR: ABNORMAL
MCH RBC QN AUTO: 36.6 PG (ref 26.6–33)
MCHC RBC AUTO-ENTMCNC: 33.1 G/DL (ref 31.5–35.7)
MCV RBC AUTO: 110.6 FL (ref 79–97)
MONOCYTES # BLD AUTO: 0.54 10*3/MM3 (ref 0.1–0.9)
NEUTROPHILS # BLD AUTO: 6.52 10*3/MM3 (ref 1.7–7)
NEUTROPHILS NFR BLD MANUAL: 73.5 % (ref 42.7–76)
PLAT MORPH BLD: NORMAL
PLATELET # BLD AUTO: 224 10*3/MM3 (ref 140–450)
PMV BLD AUTO: 11.3 FL (ref 6–12)
POLYCHROMASIA BLD QL SMEAR: ABNORMAL
POTASSIUM SERPL-SCNC: 5.1 MMOL/L (ref 3.5–5.2)
PROT SERPL-MCNC: 6.8 G/DL (ref 6–8.5)
RBC # BLD AUTO: 3.2 10*6/MM3 (ref 4.14–5.8)
SODIUM SERPL-SCNC: 140 MMOL/L (ref 136–145)
TRIGL SERPL-MCNC: 93 MG/DL (ref 0–150)
VLDLC SERPL-MCNC: 18.6 MG/DL (ref 5–40)
WBC # BLD AUTO: 8.87 10*3/MM3 (ref 3.4–10.8)
WBC MORPH BLD: NORMAL

## 2020-09-18 PROCEDURE — 99214 OFFICE O/P EST MOD 30 MIN: CPT | Performed by: FAMILY MEDICINE

## 2020-09-18 PROCEDURE — 85007 BL SMEAR W/DIFF WBC COUNT: CPT | Performed by: FAMILY MEDICINE

## 2020-09-18 PROCEDURE — 84436 ASSAY OF TOTAL THYROXINE: CPT | Performed by: FAMILY MEDICINE

## 2020-09-18 PROCEDURE — 83036 HEMOGLOBIN GLYCOSYLATED A1C: CPT | Performed by: FAMILY MEDICINE

## 2020-09-18 PROCEDURE — G0008 ADMIN INFLUENZA VIRUS VAC: HCPCS | Performed by: FAMILY MEDICINE

## 2020-09-18 PROCEDURE — 84443 ASSAY THYROID STIM HORMONE: CPT | Performed by: FAMILY MEDICINE

## 2020-09-18 PROCEDURE — 80061 LIPID PANEL: CPT | Performed by: FAMILY MEDICINE

## 2020-09-18 PROCEDURE — 90694 VACC AIIV4 NO PRSRV 0.5ML IM: CPT | Performed by: FAMILY MEDICINE

## 2020-09-18 PROCEDURE — 84479 ASSAY OF THYROID (T3 OR T4): CPT | Performed by: FAMILY MEDICINE

## 2020-09-18 PROCEDURE — 80053 COMPREHEN METABOLIC PANEL: CPT | Performed by: FAMILY MEDICINE

## 2020-09-18 PROCEDURE — 85025 COMPLETE CBC W/AUTO DIFF WBC: CPT | Performed by: FAMILY MEDICINE

## 2020-09-18 PROCEDURE — 36415 COLL VENOUS BLD VENIPUNCTURE: CPT | Performed by: FAMILY MEDICINE

## 2020-09-18 RX ORDER — DULAGLUTIDE 0.75 MG/.5ML
1 INJECTION, SOLUTION SUBCUTANEOUS WEEKLY
Qty: 4 PEN | Refills: 9 | Status: SHIPPED | OUTPATIENT
Start: 2020-09-18 | End: 2022-07-18

## 2020-09-18 NOTE — PROGRESS NOTES
Subjective   Riky Moon is a 79 y.o. male.     Chief Complaint   Patient presents with   • Hypertension         History of Present Illness     Patient with past medical history for essential hypertension.  Patient is currently on losartan 100 mg daily.  Patient also taking amlodipine 5 mg daily.  Patient denies any side effects of the medications.    Patient also has hyperlipidemia.  Patient is currently taking pravastatin 40 mg daily.  Patient denies any side effects of the medication.    Patient does have type 2 diabetes.  Patient is currently taking metformin 850 mg twice daily.  Patient was also doing Trulicity 0.75 mg weekly, until he ran out of samples, and due to cost he was unable to get them.  He is currently working on finances to perhaps be able to afford the medication.    Patient also notes that he recently has had some trouble swallowing.  Patient states that he feels as if he is choking.  He states that it can happen with his food or sometimes without the food.  Patient states that he would not mind being evaluated by speech therapy.    Patient also has hypothyroidism.  Patient is currently on levothyroxine 88 mcg daily.  Patient denies any side effects of the medication.    The following portions of the patient's history were reviewed and updated as appropriate: allergies, current medications, past family history, past medical history, past social history, past surgical history and problem list.    Review of Systems   Constitutional: Negative for chills and fever.   HENT: Negative for congestion, rhinorrhea, sinus pain and sore throat.    Eyes: Negative for photophobia and visual disturbance.   Respiratory: Negative for cough, chest tightness and shortness of breath.    Cardiovascular: Negative for chest pain and palpitations.   Gastrointestinal: Negative for diarrhea, nausea and vomiting.   Genitourinary: Negative for dysuria, frequency and urgency.   Skin: Negative for rash and wound.    Neurological: Negative for dizziness and syncope.   Psychiatric/Behavioral: Negative for behavioral problems and confusion.       Objective   Physical Exam  Vitals signs and nursing note reviewed.   Constitutional:       Appearance: He is well-developed.   HENT:      Head: Normocephalic and atraumatic.   Neck:      Musculoskeletal: Normal range of motion and neck supple.   Neurological:      Mental Status: He is alert and oriented to person, place, and time.   Psychiatric:         Behavior: Behavior normal.         Vitals:    09/18/20 1113   BP: 158/70   Pulse: 94   Resp: 16   Temp: 97.2 °F (36.2 °C)   SpO2: 93%     Body mass index is 44.71 kg/m².      Assessment/Plan   Riky was seen today for hypertension.    Diagnoses and all orders for this visit:    Dysphagia, unspecified type  -     Ambulatory Referral to Speech Therapy  -     FL Video Swallow With Speech; Future  -     We will get a swallow study as well as refer to speech.    Hyperlipidemia, unspecified hyperlipidemia type  -     Lipid Panel With LDL / HDL Ratio  -     Continue pravastatin.    Essential hypertension  -     Comprehensive Metabolic Panel  -     CBC & Differential  -     Continue losartan and amlodipine.    Type 2 diabetes mellitus without complication, without long-term current use of insulin (CMS/[facility])  -     Hemoglobin A1c    Flu vaccine need  -     Fluad Quad 65+ yrs ([id number])    Type 2 diabetes mellitus with complication, without long-term current use of insulin (CMS/[facility])  -     Dulaglutide (Trulicity) 0.75 MG/0.5ML solution pen-injector; Inject 0.75 mg under the skin into the appropriate area as directed 1 (One) Time Per Week.  -     metFORMIN (GLUCOPHAGE) 850 MG tablet; Take 1 tablet by mouth 2 (Two) Times a Day With Meals.  -     We will continue metformin and Trulicity.          No follow-ups on file.    Dictated utilizing Dragon Voice Recognition Software

## 2020-09-19 LAB
T-UPTAKE NFR SERPL: 1.19 TBI (ref 0.8–1.3)
T4 SERPL-MCNC: 6.52 MCG/DL (ref 4.5–11.7)
TSH SERPL DL<=0.05 MIU/L-ACNC: 3.79 UIU/ML (ref 0.27–4.2)

## 2020-09-23 DIAGNOSIS — D53.9 MACROCYTIC ANEMIA: ICD-10-CM

## 2020-09-23 DIAGNOSIS — E11.9 TYPE 2 DIABETES MELLITUS WITHOUT COMPLICATION, WITHOUT LONG-TERM CURRENT USE OF INSULIN (HCC): Primary | ICD-10-CM

## 2020-09-23 RX ORDER — EMPAGLIFLOZIN 10 MG/1
1 TABLET, FILM COATED ORAL DAILY
Qty: 30 TABLET | Refills: 11 | Status: SHIPPED | OUTPATIENT
Start: 2020-09-23

## 2020-09-24 ENCOUNTER — TRANSCRIBE ORDERS (OUTPATIENT)
Dept: ADMINISTRATIVE | Facility: HOSPITAL | Age: 80
End: 2020-09-24

## 2020-09-24 DIAGNOSIS — Z01.818 OTHER SPECIFIED PRE-OPERATIVE EXAMINATION: Primary | ICD-10-CM

## 2020-09-24 NOTE — PROGRESS NOTES
Please inform the patient of the following abnormal results.  Macrocytic anemia. Will need to check folate and b12 levels.  Worsening hba1c, start patient on jardiance 10mg daily.

## 2020-09-26 ENCOUNTER — LAB (OUTPATIENT)
Dept: LAB | Facility: HOSPITAL | Age: 80
End: 2020-09-26

## 2020-09-26 DIAGNOSIS — Z01.818 OTHER SPECIFIED PRE-OPERATIVE EXAMINATION: ICD-10-CM

## 2020-09-26 PROCEDURE — C9803 HOPD COVID-19 SPEC COLLECT: HCPCS

## 2020-09-26 PROCEDURE — U0004 COV-19 TEST NON-CDC HGH THRU: HCPCS

## 2020-09-28 LAB — SARS-COV-2 RNA RESP QL NAA+PROBE: NOT DETECTED

## 2020-09-29 ENCOUNTER — HOSPITAL ENCOUNTER (OUTPATIENT)
Dept: GENERAL RADIOLOGY | Facility: HOSPITAL | Age: 80
Discharge: HOME | End: 2020-09-29
Admitting: FAMILY MEDICINE

## 2020-09-29 DIAGNOSIS — R13.10 DYSPHAGIA, UNSPECIFIED TYPE: ICD-10-CM

## 2020-09-29 PROCEDURE — 92611 MOTION FLUOROSCOPY/SWALLOW: CPT

## 2020-09-29 PROCEDURE — 63710000001 BARIUM SULFATE 98 % RECONSTITUTED SUSPENSION: Performed by: FAMILY MEDICINE

## 2020-09-29 PROCEDURE — A9270 NON-COVERED ITEM OR SERVICE: HCPCS | Performed by: FAMILY MEDICINE

## 2020-09-29 PROCEDURE — 63710000001 BARIUM SULFATE 40 % SUSPENSION: Performed by: FAMILY MEDICINE

## 2020-09-29 PROCEDURE — 74230 X-RAY XM SWLNG FUNCJ C+: CPT

## 2020-09-29 PROCEDURE — 63710000001 BARIUM SULFATE 40 % RECONSTITUTED SUSPENSION: Performed by: FAMILY MEDICINE

## 2020-09-29 RX ADMIN — BARIUM SULFATE 4 ML: 980 POWDER, FOR SUSPENSION ORAL at 13:37

## 2020-09-29 RX ADMIN — BARIUM SULFATE 55 ML: 0.81 POWDER, FOR SUSPENSION ORAL at 13:37

## 2020-09-29 RX ADMIN — BARIUM SULFATE 50 ML: 400 SUSPENSION ORAL at 13:37

## 2020-09-29 NOTE — MBS/VFSS/FEES
Speech Language Pathology   MBS FEES / Discharge Summary  [facility]       Patient Name: Riky Moon  : 1940  MRN: [id number]    Today's Date: 2020      Visit Date: 2020     Visit Dx:     ICD-10-CM ICD-9-CM   1. Dysphagia, unspecified type  R13.10 787.20       Patient Active Problem List   Diagnosis   • OA (osteoarthritis) of hip   • KINDRA treated with auto BiPAP   • Chest pain   • Diabetes mellitus (CMS/HCC)   • Hypertension   • Hyperlipidemia   • Hypothyroidism   • Asthma   • Overactive bladder   • CAP (community acquired pneumonia)   • Cellulitis of left lower extremity   • COPD (chronic obstructive pulmonary disease) (CMS/HCC)   • Dyslipidemia   • Gastroesophageal reflux disease   • Leukocytosis   • Peripheral nerve disease   • Constipation   • Oropharyngeal dysphagia   • Bronchitis   • Class 3 severe obesity due to excess calories with serious comorbidity and body mass index (BMI) of 40.0 to 44.9 in adult (CMS/HCC)   • Left leg swelling   • Anemia   • Leukocytosis        Past Medical History:   Diagnosis Date   • Acid reflux    • Anemia    • Arthritis     OSTEO   • Asthma    • Chest discomfort    • Colon polyp    • COPD (chronic obstructive pulmonary disease) (CMS/HCC)    • Depression    • Diabetes mellitus (CMS/HCC)     type 2   • Disease of thyroid gland    • Emphysema lung (CMS/HCC)    • High cholesterol    • Hypertension    • Morbid obesity (CMS/HCC)    • Neuropathy     BOTH FEET   • Obesity, Class III, BMI 40-49.9 (morbid obesity) (CMS/HCC)    • PAD (peripheral artery disease) (CMS/HCC)    • Poor circulation of extremity     LEFT LEG   • Sleep apnea    • Vitamin D deficiency         Past Surgical History:   Procedure Laterality Date   • APPENDECTOMY     • CATARACT EXTRACTION W/ INTRAOCULAR LENS IMPLANT Bilateral    • CHOLECYSTECTOMY     • COLONOSCOPY  2014   • COLONOSCOPY W/ POLYPECTOMY      BENIGN   • HERNIA REPAIR     • INTERSTIM PLACEMENT Left 2016    BLADDER CONTROL    • KNEE ARTHROPLASTY Right    • MOUTH SURGERY  06/26/2018   • NOSE SURGERY      REMOVED BLOCKAGE    • ROTATOR CUFF REPAIR Right    • TOTAL HIP ARTHROPLASTY Right 11/9/2017    Procedure: RIGHT TOTAL HIP ARTHROPLASTY;  Surgeon: Riky Fernando MD;  Location: [facility] OR;  Service:                  SLP Adult Swallow Evaluation     Row Name 09/29/20 1345       Rehab Evaluation    Document Type  evaluation  -OC    Subjective Information  no complaints  -OC    Patient Observations  alert;cooperative;agree to therapy  -OC    Patient Effort  excellent  -OC       General Information    Patient Profile Reviewed  yes  -OC    Pertinent History Of Current Problem  choking intermittently, reports passing out X1 secondary to choking on ericka  -OC    Current Method of Nutrition  regular textures;thin liquids  -OC    Precautions/Limitations, Vision  WFL with corrective lenses  -OC    Precautions/Limitations, Hearing  WFL;for purposes of eval  -OC    Prior Level of Function-Communication  WFL  -OC    Prior Level of Function-Swallowing  no diet consistency restrictions  -OC    Plans/Goals Discussed with  patient;agreed upon  -OC    Barriers to Rehab  none identified  -OC    Patient's Goals for Discharge  patient did not state  -OC       Pain Scale: Numbers Pre/Post-Treatment    Pretreatment Pain Rating  0/10 - no pain  -OC    Posttreatment Pain Rating  0/10 - no pain  -OC       Oral Motor Structure and Function    Dentition Assessment  upper dentures/partial in place;lower dentures/partial in place  -OC       Oral Musculature and Cranial Nerve Assessment    Oral Motor General Assessment  WFL  -OC       MBS/VFSS Interpretation    VFSS Summary  Patient presents with age appropriate/functional swallow. Patient demonstrates tongue pumping, decreased tongue base retraction and pharyngeal constriction, and mildly reduced hyolaryngeal elevation/excursion. Adequate laryngeal vestibule closure. No penetration or aspiration with thin  via cup/straw, nectar thick, puree, mechanical soft, and regular textures. Tongue pumping with dry solid. Mild tongue base/vallecula residue, able to clear with double swallow independently.   -OC       SLP Communication to Radiology    Summary Statement  Patient presents with age appropriate/functional swallow. Patient demonstrates tongue pumping, decreased tongue base retraction and pharyngeal constriction, and mildly reduced hyolaryngeal elevation/excursion. Adequate laryngeal vestibule closure. No penetration or aspiration with thin via cup/straw, nectar thick, puree, mechanical soft, and regular textures. Tongue pumping with dry solid. Mild tongue base/vallecula residue, able to clear with double swallow independently.   -OC       Clinical Impression    SLP Swallowing Diagnosis  functional oral phase;functional pharyngeal phase;other (see comments)  -OC    Functional Impact  risk of aspiration/pneumonia  -OC    Rehab Potential/Prognosis, Swallowing  good, to achieve stated therapy goals  -OC    Swallow Criteria for Skilled Therapeutic Interventions Met  baseline status  -OC       Recommendations    Therapy Frequency (Swallow)  evaluation only  -OC    SLP Diet Recommendation  regular textures;thin liquids  -OC    Recommended Precautions and Strategies  small bites of food and sips of liquid;upright posture during/after eating;alternate between small bites of food and sips of liquid  -OC    SLP Rec. for Method of Medication Administration  meds whole;with thin liquids  -OC    Monitor for Signs of Aspiration  yes;notify SLP if any concerns  -OC    Anticipated Discharge Disposition (SLP)  home  -OC      User Key  (r) = Recorded By, (t) = Taken By, (c) = Cosigned By    Initials Name Provider Type    OC Carl, ADIS Munoz,CCC-SLP Speech and Language Pathologist                         OP SLP Education     Row Name 09/29/20 1400       Education    Barriers to Learning  No barriers identified  -OC    Education Provided   Described results of evaluation;Patient expressed understanding of evaluation  -OC    Assessed  Learning needs;Learning motivation;Learning preferences;Learning readiness  -OC    Learning Motivation  Strong  -OC    Learning Method  Explanation  -OC    Teaching Response  Verbalized understanding  -OC      User Key  (r) = Recorded By, (t) = Taken By, (c) = Cosigned By    Initials Name Effective Dates    Tammy Price MA, CCC-SLP 06/08/18 -               OP SLP Assessment/Plan - 09/29/20 1358        SLP Assessment    Functional Problems  Swallowing   -OC    Clinical Impression: Swallowing  WNL   -OC    Functional Problems Comment  Age appropriate swallow function   -OC    Clinical Impression Comments  Age appropriate swallow function   -OC    Please refer to paper survey for additional self-reported information  No   -OC    Please refer to items scanned into chart for additional diagnostic informaiton and handouts as provided by clinician  No   -OC    SLP Diagnosis  functional/age appropriate swallow function   -OC    Prognosis  Good (comment)   -OC    Patient/caregiver participated in establishment of treatment plan and goals  Yes   -OC    Patient would benefit from skilled therapy intervention  No   -OC       SLP Plan    Frequency  NA   -OC    Duration  NA   -OC    Plan Comments  f/u PCP   -OC      User Key  (r) = Recorded By, (t) = Taken By, (c) = Cosigned By    Initials Name Provider Type    OC Tammy Henry MA, CCC-SLP Speech and Language Pathologist              SLP Outcome Measures (last 72 hours)      SLP Outcome Measures     Row Name 09/29/20 1400             SLP Outcome Measures    Outcome Measure Used?  Adult NOMS  -OC         Adult FCM Scores    FCM Chosen  Swallowing  -OC      Swallowing FCM Score  6  -OC        User Key  (r) = Recorded By, (t) = Taken By, (c) = Cosigned By    Initials Name Effective Dates    Tammy Price MA, CCC-SLP 06/08/18 -                          Time Calculation:        Therapy  Charges for Today     Code Description Service Date Service Provider Modifiers Qty    [id number] HC ST MOTION FLUORO EVAL SWALLOW 3 9/29/2020 Tammy Henry MA,CCC-SLP GN 1                  Tammy Henry MA,CCC-SLP  9/29/2020

## 2020-10-06 ENCOUNTER — LAB (OUTPATIENT)
Dept: LAB | Facility: HOSPITAL | Age: 80
End: 2020-10-06

## 2020-10-06 LAB
FOLATE SERPL-MCNC: >20 NG/ML (ref 4.78–24.2)
VIT B12 BLD-MCNC: 1526 PG/ML (ref 211–946)

## 2020-10-06 PROCEDURE — 82607 VITAMIN B-12: CPT | Performed by: FAMILY MEDICINE

## 2020-10-06 PROCEDURE — 83921 ORGANIC ACID SINGLE QUANT: CPT | Performed by: FAMILY MEDICINE

## 2020-10-06 PROCEDURE — 82746 ASSAY OF FOLIC ACID SERUM: CPT | Performed by: FAMILY MEDICINE

## 2020-10-06 PROCEDURE — 36415 COLL VENOUS BLD VENIPUNCTURE: CPT | Performed by: FAMILY MEDICINE

## 2020-10-09 LAB
Lab: NORMAL
METHYLMALONATE SERPL-SCNC: 250 NMOL/L (ref 0–378)

## 2020-10-22 ENCOUNTER — TELEPHONE (OUTPATIENT)
Dept: SLEEP MEDICINE | Facility: HOSPITAL | Age: 80
End: 2020-10-22

## 2020-10-22 ENCOUNTER — OFFICE VISIT (OUTPATIENT)
Dept: SLEEP MEDICINE | Facility: HOSPITAL | Age: 80
End: 2020-10-22

## 2020-10-22 VITALS — HEIGHT: 72 IN | WEIGHT: 315 LBS | BODY MASS INDEX: 42.66 KG/M2

## 2020-10-22 DIAGNOSIS — G47.33 OSA TREATED WITH BIPAP: Primary | ICD-10-CM

## 2020-10-22 DIAGNOSIS — E66.01 CLASS 3 SEVERE OBESITY DUE TO EXCESS CALORIES WITH SERIOUS COMORBIDITY AND BODY MASS INDEX (BMI) OF 40.0 TO 44.9 IN ADULT (HCC): ICD-10-CM

## 2020-10-22 PROCEDURE — 99213 OFFICE O/P EST LOW 20 MIN: CPT | Performed by: INTERNAL MEDICINE

## 2020-10-22 PROCEDURE — G0463 HOSPITAL OUTPT CLINIC VISIT: HCPCS

## 2020-11-02 ENCOUNTER — DOCUMENTATION (OUTPATIENT)
Dept: PHYSICAL THERAPY | Facility: HOSPITAL | Age: 80
End: 2020-11-02

## 2020-11-02 DIAGNOSIS — I89.0 LYMPHEDEMA: Primary | ICD-10-CM

## 2020-11-02 NOTE — THERAPY DISCHARGE NOTE
Outpatient Physical Therapy Discharge Summary         Patient Name: Riky Moon  : 1940  MRN: [id number]    Today's Date: 2020    Visit Dx:    ICD-10-CM ICD-9-CM   1. Lymphedema  I89.0 457.1       PT OP Goals     Row Name 20 1200          PT Short Term Goals    STG Date to Achieve  19  -KD     STG 1  Pt to demonstrate awareness of condition and precautions for improved prevention, management, care of symptoms, and ease of transition to self-care of condition.  -KD     STG 1 Progress  Not Met  -KD     STG 1 Progress Comments  Pt did not return for therapy.  -KD     STG 2  Patient/family independent with self-wrapping techniques of compression bandages as indicated for improved self-management of condition.  -KD     STG 2 Progress  Not Met  -KD     STG 2 Progress Comments  Pt did not return for therapy.  -KD     STG 3  Patient demonstrate decreased net edema of  B LE's >/=5-10cm for decreased edema symptoms, decreased risk of infection, and improved skin care.  -KD     STG 3 Progress  Not Met  -KD     STG 3 Progress Comments  Pt did not return for therapy.  -KD        Long Term Goals    LTG Date to Achieve  19  -KD     LTG 1  Patient/family independent with self-care techniques for self-management of condition.  -KD     LTG 1 Progress  Not Met  -KD     LTG 1 Progress Comments  Pt did not return for therapy.  -KD     LTG 2  Patient demonstrate decreased net edema of L LE >/=10-20cm for decreased edema symptoms, decreased risk of infection, and improved skin care.  -KD     LTG 2 Progress  Not Met  -KD     LTG 2 Progress Comments  Pt did not return for therapy.  -KD     LTG 3  Patient/family independent with compression garments as indicated for self-management of condition.  -KD     LTG 3 Progress  Not Met  -KD     LTG 3 Progress Comments  Pt did not return for therapy.  -KD       User Key  (r) = Recorded By, (t) = Taken By, (c) = Cosigned By    Initials Name Provider Type    KD  Jessica Horta, PT Physical Therapist          OP PT Discharge Summary  Date of Discharge: 11/02/20(Pt did not return for therapy.)  Reason for Discharge: other (comment)  Outcomes Achieved: Other(Pt did not return for therapy.)  Discharge Destination: Unknown  Discharge Instructions/Additional Comments: Pt did not return for therapy.      Time Calculation:                    Jessica Horta, CJ  11/2/2020

## 2021-01-19 ENCOUNTER — OFFICE VISIT (OUTPATIENT)
Dept: INTERNAL MEDICINE | Facility: CLINIC | Age: 81
End: 2021-01-19

## 2021-01-19 VITALS
HEART RATE: 94 BPM | DIASTOLIC BLOOD PRESSURE: 64 MMHG | RESPIRATION RATE: 16 BRPM | OXYGEN SATURATION: 95 % | SYSTOLIC BLOOD PRESSURE: 130 MMHG | BODY MASS INDEX: 43.47 KG/M2 | TEMPERATURE: 98.6 F | HEIGHT: 71 IN | WEIGHT: 310.5 LBS

## 2021-01-19 DIAGNOSIS — E03.9 HYPOTHYROIDISM, UNSPECIFIED TYPE: ICD-10-CM

## 2021-01-19 DIAGNOSIS — E11.9 TYPE 2 DIABETES MELLITUS WITHOUT COMPLICATION, WITHOUT LONG-TERM CURRENT USE OF INSULIN (HCC): Primary | ICD-10-CM

## 2021-01-19 DIAGNOSIS — E78.5 HYPERLIPIDEMIA, UNSPECIFIED HYPERLIPIDEMIA TYPE: ICD-10-CM

## 2021-01-19 DIAGNOSIS — J45.909 ASTHMA, UNSPECIFIED ASTHMA SEVERITY, UNSPECIFIED WHETHER COMPLICATED, UNSPECIFIED WHETHER PERSISTENT: ICD-10-CM

## 2021-01-19 DIAGNOSIS — I10 ESSENTIAL HYPERTENSION: ICD-10-CM

## 2021-01-19 PROCEDURE — 99214 OFFICE O/P EST MOD 30 MIN: CPT | Performed by: FAMILY MEDICINE

## 2021-01-19 RX ORDER — ALBUTEROL SULFATE 90 UG/1
1 AEROSOL, METERED RESPIRATORY (INHALATION) EVERY 4 HOURS PRN
Qty: 18 G | Refills: 6 | Status: SHIPPED | OUTPATIENT
Start: 2021-01-19

## 2021-01-19 RX ORDER — LEVOTHYROXINE SODIUM 0.1 MG/1
100 TABLET ORAL DAILY
Qty: 90 TABLET | Refills: 3 | Status: SHIPPED | OUTPATIENT
Start: 2021-01-19 | End: 2022-05-23

## 2021-01-19 NOTE — PROGRESS NOTES
"Chief Complaint  Hypertension    Subjective          Riky Moon presents to [facility] for   History of Present Illness     Patient presented today's office visit with a past medical history for hypothyroidism.  Patient states that [facility].  Patient states that he was recently found to have an abnormal thyroid labs, and his 88 mcg of levothyroxine was bumped up to 100 mcg, this occurred about perhaps a week ago.  Patient states that he is still on the 88 mcg dose, as he has not had to 100 mcg dose filled yet.    Patient has a history of having type 2 diabetes.  Patient states that he is still taking Trulicity 0.75 mg weekly along with Jardiance 10 mg daily.  He is also doing Metformin 850 mg twice a day.  Patient denies any side effects of the medication.  He does not recall what his most recent hemoglobin A1c is.    Patient has a past medical history for having hypertension.  Patient is currently taking losartan 100 mg daily along with amlodipine 5 mg daily.  Patient denies any side effects of the medication.    Patient also has a past medical history of having COPD.  Patient states that he is seeing a pulmonologist here in couple weeks.  Patient states that he is out of his albuterol inhaler, and would like to get a refill on this.  He is still using the Anoro inhaler.    Patient has hyperlipidemia.  Patient is currently taking pravastatin 40 mg daily.  Patient denies any side effects of the medication.    Objective   Vital Signs:   /64   Pulse 94   Temp 98.6 °F (37 °C) (Infrared)   Resp 16   Ht 181.6 cm (71.5\")   Wt (!) 141 kg (310 lb 8 oz)   SpO2 95%   BMI 42.71 kg/m²     Physical Exam   Result Review :     Common labs    Common Labsle 5/28/20 6/2/20 6/2/20 6/2/20 9/18/20 9/18/20 9/18/20 9/18/20     1212 1212 1212 1151 1151 1151 1151   BUN    24 (A)    17   Creatinine    0.96    0.97   eGFR Non African Am    75    74   Sodium "    139    140   Potassium    4.7    5.1   Chloride    104    103   Calcium    9.1    9.1   Albumin    4.20    4.20   Total Bilirubin    0.4    0.6   Alkaline Phosphatase    45    50   AST (SGOT)    17    19   ALT (SGPT)    14    20   WBC 9.64  7.60  8.87      Hemoglobin 12.2 (A)  11.4 (A)  11.7 (A)      Hematocrit 35.3 (A)  32.4 (A)  35.4 (A)      Platelets 179  212  224      Triglycerides       93    HDL Cholesterol       39 (A)    LDL Cholesterol        75    Hemoglobin A1C  6.98 (A)    6.89 (A)     (A) Abnormal value                      Assessment and Plan    Problem List Items Addressed This Visit        Cardiac and Vasculature    Hypertension    Relevant Orders    Comprehensive Metabolic Panel    CBC & Differential    Hyperlipidemia    Relevant Orders    Lipid Panel With LDL / HDL Ratio       Endocrine and Metabolic    Diabetes mellitus (CMS/HCC) - Primary    Overview     type 2         Relevant Orders    Hemoglobin A1c    Hypothyroidism    Relevant Medications    levothyroxine (Synthroid) 100 MCG tablet    Other Relevant Orders    Thyroid Panel With TSH       Pulmonary and Pneumonias    Asthma    Relevant Medications    albuterol sulfate  (90 Base) MCG/ACT inhaler        For patient's hypothyroidism.  We will continue him on levothyroxine 100 mcg daily.  Will like to recheck his thyroid panel.  For patient's hyperlipidemia, we will continue patient on pravastatin 40 mg daily.  Patient has been stable on his medication.  For patient's type 2 diabetes, he should still continue taking Trulicity and Jardiance.  He can still do the Metformin 850 twice a day.      Follow Up   No follow-ups on file.  Patient was given instructions and counseling regarding his condition or for health maintenance advice. Please see specific information pulled into the AVS if appropriate.

## 2021-01-29 ENCOUNTER — LAB (OUTPATIENT)
Dept: LAB | Facility: HOSPITAL | Age: 81
End: 2021-01-29

## 2021-01-29 LAB
ALBUMIN SERPL-MCNC: 4 G/DL (ref 3.5–5.2)
ALBUMIN/GLOB SERPL: 1.5 G/DL
ALP SERPL-CCNC: 48 U/L (ref 39–117)
ALT SERPL W P-5'-P-CCNC: 14 U/L (ref 1–41)
ANION GAP SERPL CALCULATED.3IONS-SCNC: 8.7 MMOL/L (ref 5–15)
ANISOCYTOSIS BLD QL: ABNORMAL
AST SERPL-CCNC: 14 U/L (ref 1–40)
BASOPHILS # BLD MANUAL: 0.19 10*3/MM3 (ref 0–0.2)
BASOPHILS NFR BLD AUTO: 2 % (ref 0–1.5)
BILIRUB SERPL-MCNC: 0.4 MG/DL (ref 0–1.2)
BUN SERPL-MCNC: 17 MG/DL (ref 8–23)
BUN/CREAT SERPL: 16.2 (ref 7–25)
CALCIUM SPEC-SCNC: 9.5 MG/DL (ref 8.6–10.5)
CHLORIDE SERPL-SCNC: 105 MMOL/L (ref 98–107)
CHOLEST SERPL-MCNC: 112 MG/DL (ref 0–200)
CO2 SERPL-SCNC: 29.3 MMOL/L (ref 22–29)
CREAT SERPL-MCNC: 1.05 MG/DL (ref 0.76–1.27)
DEPRECATED RDW RBC AUTO: 62.9 FL (ref 37–54)
EOSINOPHIL # BLD MANUAL: 0.1 10*3/MM3 (ref 0–0.4)
EOSINOPHIL NFR BLD MANUAL: 1 % (ref 0.3–6.2)
ERYTHROCYTE [DISTWIDTH] IN BLOOD BY AUTOMATED COUNT: 15.8 % (ref 12.3–15.4)
GFR SERPL CREATININE-BSD FRML MDRD: 68 ML/MIN/1.73
GLOBULIN UR ELPH-MCNC: 2.6 GM/DL
GLUCOSE SERPL-MCNC: 173 MG/DL (ref 65–99)
HBA1C MFR BLD: 6.6 % (ref 4.8–5.6)
HCT VFR BLD AUTO: 36.1 % (ref 37.5–51)
HDLC SERPL-MCNC: 42 MG/DL (ref 40–60)
HGB BLD-MCNC: 12.3 G/DL (ref 13–17.7)
LDLC SERPL CALC-MCNC: 54 MG/DL (ref 0–100)
LDLC/HDLC SERPL: 1.3 {RATIO}
LYMPHOCYTES # BLD MANUAL: 3.3 10*3/MM3 (ref 0.7–3.1)
LYMPHOCYTES NFR BLD MANUAL: 3.1 % (ref 5–12)
LYMPHOCYTES NFR BLD MANUAL: 34.7 % (ref 19.6–45.3)
MCH RBC QN AUTO: 37.6 PG (ref 26.6–33)
MCHC RBC AUTO-ENTMCNC: 34.1 G/DL (ref 31.5–35.7)
MCV RBC AUTO: 110.4 FL (ref 79–97)
MONOCYTES # BLD AUTO: 0.3 10*3/MM3 (ref 0.1–0.9)
NEUTROPHILS # BLD AUTO: 5.64 10*3/MM3 (ref 1.7–7)
NEUTROPHILS NFR BLD MANUAL: 59.2 % (ref 42.7–76)
PLAT MORPH BLD: NORMAL
PLATELET # BLD AUTO: 203 10*3/MM3 (ref 140–450)
PMV BLD AUTO: 11.3 FL (ref 6–12)
POTASSIUM SERPL-SCNC: 4.5 MMOL/L (ref 3.5–5.2)
PROT SERPL-MCNC: 6.6 G/DL (ref 6–8.5)
RBC # BLD AUTO: 3.27 10*6/MM3 (ref 4.14–5.8)
SODIUM SERPL-SCNC: 143 MMOL/L (ref 136–145)
T-UPTAKE NFR SERPL: 1.14 TBI (ref 0.8–1.3)
T4 SERPL-MCNC: 5.92 MCG/DL (ref 4.5–11.7)
TRIGL SERPL-MCNC: 77 MG/DL (ref 0–150)
TSH SERPL DL<=0.05 MIU/L-ACNC: 2.56 UIU/ML (ref 0.27–4.2)
VLDLC SERPL-MCNC: 16 MG/DL (ref 5–40)
WBC # BLD AUTO: 9.52 10*3/MM3 (ref 3.4–10.8)
WBC MORPH BLD: NORMAL

## 2021-01-29 PROCEDURE — 84479 ASSAY OF THYROID (T3 OR T4): CPT | Performed by: FAMILY MEDICINE

## 2021-01-29 PROCEDURE — 36415 COLL VENOUS BLD VENIPUNCTURE: CPT | Performed by: FAMILY MEDICINE

## 2021-01-29 PROCEDURE — 80053 COMPREHEN METABOLIC PANEL: CPT | Performed by: FAMILY MEDICINE

## 2021-01-29 PROCEDURE — 84443 ASSAY THYROID STIM HORMONE: CPT | Performed by: FAMILY MEDICINE

## 2021-01-29 PROCEDURE — 85025 COMPLETE CBC W/AUTO DIFF WBC: CPT | Performed by: FAMILY MEDICINE

## 2021-01-29 PROCEDURE — 85007 BL SMEAR W/DIFF WBC COUNT: CPT | Performed by: FAMILY MEDICINE

## 2021-01-29 PROCEDURE — 80061 LIPID PANEL: CPT | Performed by: FAMILY MEDICINE

## 2021-01-29 PROCEDURE — 84436 ASSAY OF TOTAL THYROXINE: CPT | Performed by: FAMILY MEDICINE

## 2021-01-29 PROCEDURE — 83036 HEMOGLOBIN GLYCOSYLATED A1C: CPT | Performed by: FAMILY MEDICINE

## 2021-04-13 ENCOUNTER — OFFICE VISIT (OUTPATIENT)
Dept: INTERNAL MEDICINE | Facility: CLINIC | Age: 81
End: 2021-04-13

## 2021-04-13 VITALS
HEIGHT: 72 IN | BODY MASS INDEX: 42.66 KG/M2 | SYSTOLIC BLOOD PRESSURE: 148 MMHG | WEIGHT: 315 LBS | DIASTOLIC BLOOD PRESSURE: 66 MMHG | TEMPERATURE: 97.3 F | HEART RATE: 88 BPM | OXYGEN SATURATION: 94 % | RESPIRATION RATE: 20 BRPM

## 2021-04-13 DIAGNOSIS — S91.001A ANKLE WOUND, RIGHT, INITIAL ENCOUNTER: ICD-10-CM

## 2021-04-13 DIAGNOSIS — S41.102A WOUND OF LEFT UPPER EXTREMITY, INITIAL ENCOUNTER: Primary | ICD-10-CM

## 2021-04-13 PROCEDURE — 99214 OFFICE O/P EST MOD 30 MIN: CPT | Performed by: FAMILY MEDICINE

## 2021-04-13 RX ORDER — CEPHALEXIN 500 MG/1
500 CAPSULE ORAL 2 TIMES DAILY
Qty: 14 CAPSULE | Refills: 0 | Status: SHIPPED | OUTPATIENT
Start: 2021-04-13 | End: 2021-04-20

## 2021-04-13 NOTE — PROGRESS NOTES
"Chief Complaint  Arm Pain    Subjective          Riky Moon presents to [facility]  History of Present Illness    Patient presents at today's office visit with some bruises.  His only blood thinner that he is currently taking is baby aspirin.  He does have a history of diabetes.  He states that he was working in the yard, and he had a multiple bumps and scratches along with many branches.  He states his left forearm appears to be have some ecchymosis, and there is an area where a wound had created and it had blistered over.  He states the area did bleed.  He does have some mild ecchymosis on the right arm.  He does have a wound also on the right lower extremity, which seems to have some swelling around the ankles as well.  He does have a chronic history of having lymphedema, but these wounds appear to be slow healing.  This is been going on for almost 2 weeks.    Objective   Vital Signs:   /66   Pulse 88   Temp 97.3 °F (36.3 °C) (Infrared)   Resp 20   Ht 181.6 cm (71.5\")   Wt (!) 143 kg (316 lb)   SpO2 94%   BMI 43.46 kg/m²     Physical Exam  Vitals and nursing note reviewed.   Constitutional:       Appearance: He is well-developed.   HENT:      Head: Normocephalic and atraumatic.   Musculoskeletal:      Cervical back: Normal range of motion and neck supple.   Skin:     Comments: Mild bruise and redness located on left forearm.  Some mild redness located on right ankle.  Each site does have a scabbed over wound.   Neurological:      Mental Status: He is alert and oriented to person, place, and time.   Psychiatric:         Behavior: Behavior normal.        Result Review :                 Assessment and Plan    Diagnoses and all orders for this visit:    1. Wound of left upper extremity, initial encounter (Primary)  -     cephalexin (Keflex) 500 MG capsule; Take 1 capsule by mouth 2 (Two) Times a Day for 7 days.  Dispense: 14 capsule; Refill: 0  -     Ambulatory Referral " to Wound Clinic    2. Ankle wound, right, initial encounter  -     cephalexin (Keflex) 500 MG capsule; Take 1 capsule by mouth 2 (Two) Times a Day for 7 days.  Dispense: 14 capsule; Refill: 0  -     Ambulatory Referral to Wound Clinic      At today's office visit I have discussed with patient that these are most likely normal wounds.  He has a history of having diabetes, so his healing may be little slower than usual.  However his diabetes is under control.  I would like him to continue his meds as normal.  Would like to him to apply bacitracin to some of these wounds, particularly once it open.  He is to apply some gauze on them.  I will also refer him to the wound clinic.  And also start him on Keflex.      Follow Up   No follow-ups on file.  Patient was given instructions and counseling regarding his condition or for health maintenance advice. Please see specific information pulled into the AVS if appropriate.

## 2021-04-21 ENCOUNTER — TELEPHONE (OUTPATIENT)
Dept: CARDIOLOGY | Facility: CLINIC | Age: 81
End: 2021-04-21

## 2021-04-21 NOTE — TELEPHONE ENCOUNTER
I have not seen him recently and he would need to be seen   I suggest [facility]   I wont sign this

## 2021-04-21 NOTE — TELEPHONE ENCOUNTER
[facility] called and will be sending a fax regarding patient who will be undergoing genetic testing in the upcoming future.    Marissa

## 2021-04-21 NOTE — TELEPHONE ENCOUNTER
Fax received from the [facility] saying this pt is requesting your consent to take a hereditary cardiology test.  The fax contains a 2 pg requisition form and a pre-written letter of medical necessity for you to sign.     I have placed this on your desk to review.    ThanksNiru 10/04/2019, no future appt has been scheduled.

## 2021-04-23 NOTE — TELEPHONE ENCOUNTER
"I spoke with pt and let him know per Dr. Shelton, he will not do this and the reason is he just doesn't feel qualified to go over all the genes and interpret what it all means.  Pt said he fully understood and hadn't really decided if he really even wanted to do it-but had received a call from the [facility] [place] [facility].  I told him he could always call the [facility] if he wanted to pursue this and he declined saying \" I'm staying with Dr. Shelton.\"  Pt will call bk on Monday to make f/u appt./prt  "

## 2021-04-28 ENCOUNTER — TELEPHONE (OUTPATIENT)
Dept: CARDIOLOGY | Facility: CLINIC | Age: 81
End: 2021-04-28

## 2021-04-28 NOTE — TELEPHONE ENCOUNTER
[facility] medical facility called regarding a fax on this patient?   I didn't see anything pertaining to this in the chart.    The phone # is [phone].    Thank you,    Analisa Bo, CMA

## 2021-05-25 ENCOUNTER — OFFICE VISIT (OUTPATIENT)
Dept: INTERNAL MEDICINE | Facility: CLINIC | Age: 81
End: 2021-05-25

## 2021-05-25 ENCOUNTER — LAB (OUTPATIENT)
Dept: LAB | Facility: HOSPITAL | Age: 81
End: 2021-05-25

## 2021-05-25 VITALS
RESPIRATION RATE: 16 BRPM | DIASTOLIC BLOOD PRESSURE: 72 MMHG | OXYGEN SATURATION: 93 % | HEART RATE: 85 BPM | BODY MASS INDEX: 42.66 KG/M2 | SYSTOLIC BLOOD PRESSURE: 150 MMHG | TEMPERATURE: 98 F | WEIGHT: 315 LBS | HEIGHT: 72 IN

## 2021-05-25 DIAGNOSIS — I89.0 LYMPHEDEMA: ICD-10-CM

## 2021-05-25 DIAGNOSIS — I10 ESSENTIAL HYPERTENSION: ICD-10-CM

## 2021-05-25 DIAGNOSIS — E03.9 HYPOTHYROIDISM, UNSPECIFIED TYPE: ICD-10-CM

## 2021-05-25 DIAGNOSIS — E78.5 HYPERLIPIDEMIA, UNSPECIFIED HYPERLIPIDEMIA TYPE: ICD-10-CM

## 2021-05-25 DIAGNOSIS — E11.9 TYPE 2 DIABETES MELLITUS WITHOUT COMPLICATION, WITHOUT LONG-TERM CURRENT USE OF INSULIN (HCC): Primary | ICD-10-CM

## 2021-05-25 LAB
ALBUMIN SERPL-MCNC: 4.1 G/DL (ref 3.5–5.2)
ALBUMIN/GLOB SERPL: 1.5 G/DL
ALP SERPL-CCNC: 46 U/L (ref 39–117)
ALT SERPL W P-5'-P-CCNC: 12 U/L (ref 1–41)
ANION GAP SERPL CALCULATED.3IONS-SCNC: 9.2 MMOL/L (ref 5–15)
AST SERPL-CCNC: 20 U/L (ref 1–40)
BASOPHILS # BLD AUTO: 0.09 10*3/MM3 (ref 0–0.2)
BASOPHILS NFR BLD AUTO: 1 % (ref 0–1.5)
BILIRUB SERPL-MCNC: 0.3 MG/DL (ref 0–1.2)
BUN SERPL-MCNC: 16 MG/DL (ref 8–23)
BUN/CREAT SERPL: 17.2 (ref 7–25)
CALCIUM SPEC-SCNC: 9.2 MG/DL (ref 8.6–10.5)
CHLORIDE SERPL-SCNC: 106 MMOL/L (ref 98–107)
CHOLEST SERPL-MCNC: 113 MG/DL (ref 0–200)
CO2 SERPL-SCNC: 24.8 MMOL/L (ref 22–29)
CREAT SERPL-MCNC: 0.93 MG/DL (ref 0.76–1.27)
DEPRECATED RDW RBC AUTO: 60.6 FL (ref 37–54)
EOSINOPHIL # BLD AUTO: 0.23 10*3/MM3 (ref 0–0.4)
EOSINOPHIL NFR BLD AUTO: 2.6 % (ref 0.3–6.2)
ERYTHROCYTE [DISTWIDTH] IN BLOOD BY AUTOMATED COUNT: 15.6 % (ref 12.3–15.4)
GFR SERPL CREATININE-BSD FRML MDRD: 78 ML/MIN/1.73
GLOBULIN UR ELPH-MCNC: 2.7 GM/DL
GLUCOSE SERPL-MCNC: 131 MG/DL (ref 65–99)
HBA1C MFR BLD: 6.63 % (ref 4.8–5.6)
HCT VFR BLD AUTO: 35.6 % (ref 37.5–51)
HDLC SERPL-MCNC: 38 MG/DL (ref 40–60)
HGB BLD-MCNC: 12.4 G/DL (ref 13–17.7)
LDLC SERPL CALC-MCNC: 58 MG/DL (ref 0–100)
LDLC/HDLC SERPL: 1.52 {RATIO}
LYMPHOCYTES # BLD AUTO: 1.5 10*3/MM3 (ref 0.7–3.1)
LYMPHOCYTES NFR BLD AUTO: 17.1 % (ref 19.6–45.3)
MCH RBC QN AUTO: 37.5 PG (ref 26.6–33)
MCHC RBC AUTO-ENTMCNC: 34.8 G/DL (ref 31.5–35.7)
MCV RBC AUTO: 107.6 FL (ref 79–97)
MONOCYTES # BLD AUTO: 0.7 10*3/MM3 (ref 0.1–0.9)
MONOCYTES NFR BLD AUTO: 8 % (ref 5–12)
NEUTROPHILS NFR BLD AUTO: 6.17 10*3/MM3 (ref 1.7–7)
NEUTROPHILS NFR BLD AUTO: 70.6 % (ref 42.7–76)
PLATELET # BLD AUTO: 213 10*3/MM3 (ref 140–450)
PMV BLD AUTO: 11.7 FL (ref 6–12)
POTASSIUM SERPL-SCNC: 4.6 MMOL/L (ref 3.5–5.2)
PROT SERPL-MCNC: 6.8 G/DL (ref 6–8.5)
RBC # BLD AUTO: 3.31 10*6/MM3 (ref 4.14–5.8)
SODIUM SERPL-SCNC: 140 MMOL/L (ref 136–145)
T-UPTAKE NFR SERPL: 1.14 TBI (ref 0.8–1.3)
T4 SERPL-MCNC: 6.25 MCG/DL (ref 4.5–11.7)
TRIGL SERPL-MCNC: 87 MG/DL (ref 0–150)
TSH SERPL DL<=0.05 MIU/L-ACNC: 2.92 UIU/ML (ref 0.27–4.2)
VLDLC SERPL-MCNC: 17 MG/DL (ref 5–40)
WBC # BLD AUTO: 8.75 10*3/MM3 (ref 3.4–10.8)

## 2021-05-25 PROCEDURE — 36415 COLL VENOUS BLD VENIPUNCTURE: CPT | Performed by: FAMILY MEDICINE

## 2021-05-25 PROCEDURE — 99214 OFFICE O/P EST MOD 30 MIN: CPT | Performed by: FAMILY MEDICINE

## 2021-05-25 PROCEDURE — 84443 ASSAY THYROID STIM HORMONE: CPT | Performed by: FAMILY MEDICINE

## 2021-05-25 PROCEDURE — 80053 COMPREHEN METABOLIC PANEL: CPT | Performed by: FAMILY MEDICINE

## 2021-05-25 PROCEDURE — 84436 ASSAY OF TOTAL THYROXINE: CPT | Performed by: FAMILY MEDICINE

## 2021-05-25 PROCEDURE — 84479 ASSAY OF THYROID (T3 OR T4): CPT | Performed by: FAMILY MEDICINE

## 2021-05-25 PROCEDURE — 83036 HEMOGLOBIN GLYCOSYLATED A1C: CPT | Performed by: FAMILY MEDICINE

## 2021-05-25 PROCEDURE — 85025 COMPLETE CBC W/AUTO DIFF WBC: CPT | Performed by: FAMILY MEDICINE

## 2021-05-25 PROCEDURE — 80061 LIPID PANEL: CPT | Performed by: FAMILY MEDICINE

## 2021-05-25 RX ORDER — ATENOLOL 25 MG/1
25 TABLET ORAL DAILY
Qty: 90 TABLET | Refills: 2 | Status: SHIPPED | OUTPATIENT
Start: 2021-05-25 | End: 2021-06-09

## 2021-05-25 NOTE — PROGRESS NOTES
"Chief Complaint  Follow-up    Subjective          Riky Moon presents to [facility]  History of Present Illness    Patient presents at today's office visit with a past medical history of having hypothyroidism.  Is currently taking levothyroxine 100 mcg daily.  He denies any side effects of the medicine.    Patient also has a past medical history of having essential hypertension.  Blood pressure is 150/72.  Patient is is currently taking amlodipine 5 mg daily.  He is also taking losartan 100 mg daily.  He does not have any side effects of the medicine.    Patient also has a history of having hyperlipidemia.  Currently taking pravastatin 40 mg daily.  Patient denies any side effects of the medication.    Patient also has a history of having lymphedema.  Patient states that his swelling of his left leg is got so much he has unable to put the compression stockings on his left leg.  He denies any trauma to the area.  He does keep his legs elevated when he is on his recliner.    Patient also has type 2 diabetes.  Is current taking Trulicity 0.75 mg weekly.  He is also taking Metformin 850 mg twice a day.  Patient is also taking Jardiance 10 mg daily.  He does not have any side effects of the medication.     Objective   Vital Signs:   /72   Pulse 85   Temp 98 °F (36.7 °C)   Resp 16   Ht 181.6 cm (71.5\")   Wt (!) 144 kg (316 lb 9.6 oz)   SpO2 93%   BMI 43.54 kg/m²     Physical Exam  Vitals and nursing note reviewed.   Constitutional:       Appearance: He is well-developed.   HENT:      Head: Normocephalic and atraumatic.   Musculoskeletal:      Cervical back: Normal range of motion and neck supple.   Skin:     Comments: Left leg has some lymphedema by the ankles.  There is about 2+ edema.   Neurological:      Mental Status: He is alert and oriented to person, place, and time.   Psychiatric:         Behavior: Behavior normal.        Result Review :                 Assessment and " Plan    Diagnoses and all orders for this visit:    1. Type 2 diabetes mellitus without complication, without long-term current use of insulin (CMS/[facility]) (Primary)  -     Hemoglobin A1c    2. Essential hypertension  -     CBC & Differential  -     Comprehensive Metabolic Panel    3. Hyperlipidemia, unspecified hyperlipidemia type  -     Lipid Panel With LDL / HDL Ratio    4. Hypothyroidism, unspecified type  -     Thyroid Panel With TSH    5. Lymphedema  -     Ambulatory Referral to Lymphedema Clinic      At todays office visit for his essential hypertension I would like him to continue taking the losartan 100 mg nightly and amlodipine 5 mg in the morning.  I would like to add atenolol 25 mg daily for him to take.  I like to recheck her blood pressure in 2 weeks.  For his lymphedema, I have given him a Unna boot that he can apply when he goes at home.  I discussed with him on how to properly apply this.  He is aware.  And we will also like to refer him to the lymphedema clinic.  For his hyperlipidemia, is currently stable, continue pravastatin 40 mg daily.  For the hypothyroidism, is currently stable, continue levothyroxine 100 mcg daily.  As for his type 2 diabetes, will check his hemoglobin A1c at today's visit.  Continue taking Metformin, Jardiance, and Trulicity.      Follow Up   No follow-ups on file.  Patient was given instructions and counseling regarding his condition or for health maintenance advice. Please see specific information pulled into the AVS if appropriate.

## 2021-05-26 NOTE — PROGRESS NOTES
Please inform the patient of the following abnormal results. Labs are stable, continue current medication.

## 2021-06-09 ENCOUNTER — OFFICE VISIT (OUTPATIENT)
Dept: INTERNAL MEDICINE | Facility: CLINIC | Age: 81
End: 2021-06-09

## 2021-06-09 VITALS
DIASTOLIC BLOOD PRESSURE: 64 MMHG | HEART RATE: 89 BPM | WEIGHT: 315 LBS | RESPIRATION RATE: 18 BRPM | BODY MASS INDEX: 43.6 KG/M2 | SYSTOLIC BLOOD PRESSURE: 148 MMHG | OXYGEN SATURATION: 95 %

## 2021-06-09 DIAGNOSIS — I10 ESSENTIAL HYPERTENSION: ICD-10-CM

## 2021-06-09 DIAGNOSIS — Z00.00 HEALTHCARE MAINTENANCE: ICD-10-CM

## 2021-06-09 DIAGNOSIS — Z00.00 MEDICARE ANNUAL WELLNESS VISIT, SUBSEQUENT: Primary | ICD-10-CM

## 2021-06-09 PROCEDURE — G0439 PPPS, SUBSEQ VISIT: HCPCS | Performed by: FAMILY MEDICINE

## 2021-06-09 PROCEDURE — 99214 OFFICE O/P EST MOD 30 MIN: CPT | Performed by: FAMILY MEDICINE

## 2021-06-09 RX ORDER — ATENOLOL 50 MG/1
50 TABLET ORAL DAILY
Qty: 90 TABLET | Refills: 3 | Status: SHIPPED | OUTPATIENT
Start: 2021-06-09 | End: 2022-06-07

## 2021-06-09 NOTE — PROGRESS NOTES
Chief Complaint  Hypertension    Subjective          Riky Moon presents to [facility]  History of Present Illness    Patient presents at today's office visit with a past medical history of having essential hypertension.  His blood pressure continues to elevated at 148/64.  This is decreased a bit from the last office visit of 150/72.  He is currently taking amlodipine 5 mg in the morning, atenolol 25 mg in the morning, and losartan 100 mg at night.  He denies any side effects of the medicine.  I discussed with him at today's office visit that I would like to increase atenolol to 50 mg daily.    Objective   Vital Signs:   /64   Pulse 89   Resp 18   Wt (!) 144 kg (317 lb)   SpO2 95%   BMI 43.60 kg/m²     Physical Exam  Vitals and nursing note reviewed.   Constitutional:       Appearance: He is well-developed.   HENT:      Head: Normocephalic and atraumatic.   Musculoskeletal:      Cervical back: Normal range of motion and neck supple.   Neurological:      Mental Status: He is alert and oriented to person, place, and time.   Psychiatric:         Behavior: Behavior normal.        Result Review :     Common labs    Common Labsle 9/18/20 9/18/20 9/18/20 9/18/20 1/29/21 1/29/21 1/29/21 1/29/21 5/25/21 5/25/21 5/25/21 5/25/21    1151 1151 1151 1151 1343 1343 1343 1343 0954 0954 0954 0954   Glucose    124 (A)  173 (A)      131 (A)   BUN    17  17      16   Creatinine    0.97  1.05      0.93   eGFR Non  Am    74  68      78   Sodium    140  143      140   Potassium    5.1  4.5      4.6   Chloride    103  105      106   Calcium    9.1  9.5      9.2   Albumin    4.20  4.00      4.10   Total Bilirubin    0.6  0.4      0.3   Alkaline Phosphatase    50  48      46   AST (SGOT)    19  14      20   ALT (SGPT)    20  14      12   WBC 8.87       9.52  8.75     Hemoglobin 11.7 (A)       12.3 (A)  12.4 (A)     Hematocrit 35.4 (A)       36.1 (A)  35.6 (A)     Platelets 224       203   213     Total Cholesterol   133  112      113    Triglycerides   93  77      87    HDL Cholesterol   39 (A)  42      38 (A)    LDL Cholesterol    75  54      58    Hemoglobin A1C  6.89 (A)     6.60 (A)  6.63 (A)      (A) Abnormal value                      Assessment and Plan    Diagnoses and all orders for this visit:    1. Medicare annual wellness visit, subsequent (Primary)    2. Healthcare maintenance  -     CBC & Differential  -     Comprehensive Metabolic Panel  -     Hemoglobin A1c  -     Thyroid Panel With TSH  -     Lipid Panel With LDL / HDL Ratio    3. Essential hypertension  -     atenolol (TENORMIN) 50 MG tablet; Take 1 tablet by mouth Daily.  Dispense: 90 tablet; Refill: 3  -     We will increase atenolol to 50 mg daily.        Follow Up   No follow-ups on file.  Patient was given instructions and counseling regarding his condition or for health maintenance advice. Please see specific information pulled into the AVS if appropriate.

## 2021-06-09 NOTE — PROGRESS NOTES
The ABCs of the Annual Wellness Visit  Subsequent Medicare Wellness Visit    Chief Complaint   Patient presents with   • Hypertension       Subjective   History of Present Illness:  Riky Moon is a 80 y.o. male who presents for a Subsequent Medicare Wellness Visit.    HEALTH RISK ASSESSMENT    Recent Hospitalizations:  No hospitalization(s) within the last year.    Current Medical Providers:  Patient Care Team:  Marco Carrillo MD as PCP - General (Family Medicine)  Cosmo French MD as Consulting Physician (Sleep Medicine)  Ty Shelton MD as Consulting Physician (Cardiology)  Marco Carrillo MD as Referring Physician (Family Medicine)  Chio Gilliam MD as Consulting Physician (Hematology and Oncology)    Smoking Status:  Social History     Tobacco Use   Smoking Status Former Smoker   • Packs/day: 1.50   • Years: 40.00   • Pack years: 60.00   • Types: Cigarettes   • Quit date:    • Years since quittin.4   Smokeless Tobacco Never Used   Tobacco Comment    from age [age]       Alcohol Consumption:  Social History     Substance and Sexual Activity   Alcohol Use Yes    Comment: HOLIDAYS  caffeine -2 cups coffee daily       Depression Screen:   PHQ-2/PHQ-9 Depression Screening 2020   Little interest or pleasure in doing things 0   Feeling down, depressed, or hopeless 1   Trouble falling or staying asleep, or sleeping too much 0   Feeling tired or having little energy 0   Poor appetite or overeating 0   Feeling bad about yourself - or that you are a failure or have let yourself or your family down 0   Trouble concentrating on things, such as reading the newspaper or watching television 0   Moving or speaking so slowly that other people could have noticed. Or the opposite - being so fidgety or restless that you have been moving around a lot more than usual 0   Thoughts that you would be better off dead, or of hurting yourself in some way 0   Total Score 1   If you checked off any  problems, how difficult have these problems made it for you to do your work, take care of things at home, or get along with other people? -       Fall Risk Screen:  ANNA Fall Risk Assessment has not been completed.    Health Habits and Functional and Cognitive Screening:  Functional & Cognitive Status 1/17/2020   Do you have difficulty preparing food and eating? No   Do you have difficulty bathing yourself, getting dressed or grooming yourself? No   Do you have difficulty using the toilet? No   Do you have difficulty moving around from place to place? No   Do you have trouble with steps or getting out of a bed or a chair? No   Current Diet Well Balanced Diet   Dental Exam Up to date   Eye Exam Up to date   Exercise (times per week) 0 times per week   Current Exercise Activities Include None   Do you need help using the phone?  No   Are you deaf or do you have serious difficulty hearing?  No   Do you need help with transportation? No   Do you need help shopping? No   Do you need help preparing meals?  No   Do you need help with housework?  No   Do you need help with laundry? No   Do you need help taking your medications? No   Do you need help managing money? No   Do you ever drive or ride in a car without wearing a seat belt? No   Have you felt unusual stress, anger or loneliness in the last month? No   Who do you live with? Spouse   If you need help, do you have trouble finding someone available to you? No   Have you been bothered in the last four weeks by sexual problems? No   Do you have difficulty concentrating, remembering or making decisions? No         Does the patient have evidence of cognitive impairment? No    Asprin use counseling:Taking ASA appropriately as indicated    Age-appropriate Screening Schedule:  Refer to the list below for future screening recommendations based on patient's age, sex and/or medical conditions. Orders for these recommended tests are listed in the plan section. The patient has  been provided with a written plan.    Health Maintenance   Topic Date Due   • TDAP/TD VACCINES (1 - Tdap) Never done   • URINE MICROALBUMIN  10/29/2019   • ZOSTER VACCINE (3 of 3) 12/11/2019   • DIABETIC EYE EXAM  05/13/2020   • INFLUENZA VACCINE  08/01/2021   • HEMOGLOBIN A1C  11/25/2021   • LIPID PANEL  05/25/2022          The following portions of the patient's history were reviewed and updated as appropriate: allergies, current medications, past family history, past medical history, past social history, past surgical history and problem list.    Outpatient Medications Prior to Visit   Medication Sig Dispense Refill   • albuterol sulfate  (90 Base) MCG/ACT inhaler Inhale 1 puff Every 4 (Four) Hours As Needed for Wheezing. 18 g 6   • amLODIPine (NORVASC) 5 MG tablet Take 1 tablet by mouth Daily. 90 tablet 3   • Ascorbic Acid (VITAMIN C PO) Take 1 tablet by mouth Daily.     • aspirin 81 MG EC tablet Take 1 tablet by mouth Every Other Day.     • Capsaicin 0.1 % cream Apply 1 application topically Daily. 1 tube 3   • Cholecalciferol (VITAMIN D) 1000 units tablet Take 1,000 Units by mouth Every Morning.     • Dulaglutide (Trulicity) 0.75 MG/0.5ML solution pen-injector Inject 0.75 mg under the skin into the appropriate area as directed 1 (One) Time Per Week. 4 pen 9   • DULoxetine (CYMBALTA) 60 MG capsule Take 1 capsule by mouth Every Morning. 90 capsule 3   • Empagliflozin (Jardiance) 10 MG tablet Take 10 mg by mouth Daily. 30 tablet 11   • glucose blood (LAINEY CONTOUR TEST) test strip Use as instructed to test glucose 100 each 1   • levothyroxine (Synthroid) 100 MCG tablet Take 1 tablet by mouth Daily. 90 tablet 3   • losartan (COZAAR) 100 MG tablet Take 1 tablet by mouth Daily. 90 tablet 3   • metFORMIN (GLUCOPHAGE) 850 MG tablet Take 1 tablet by mouth 2 (Two) Times a Day With Meals. 30 tablet 6   • MICROLET LANCETS misc Use daily as directed to test glucose 100 each 1   • Multiple Vitamin (MULTIVITAMIN)  tablet Take 1 tablet by mouth Every Morning.     • omeprazole (priLOSEC) 40 MG capsule Take 40 mg by mouth 2 (Two) Times a Day.     • oxybutynin XL (DITROPAN XL) 15 MG 24 hr tablet Take 15 mg by mouth Every Night.     • pantoprazole (PROTONIX) 40 MG EC tablet Take 40 mg by mouth Daily.     • pravastatin (PRAVACHOL) 40 MG tablet Take 1 tablet by mouth Every Night. 90 tablet 3   • pregabalin (LYRICA) 150 MG capsule Take 150 mg by mouth 2 (Two) Times a Day.     • Tiotropium Bromide Monohydrate (SPIRIVA RESPIMAT) 1.25 MCG/ACT aerosol solution inhaler Spiriva Respimat   1 puff daily     • umeclidinium-vilanterol (ANORO ELLIPTA) 62.5-25 MCG/INH aerosol powder  inhaler Inhale 1 puff Daily.     • vitamin B-12 (CYANOCOBALAMIN) 1000 MCG tablet Take 1,000 mcg by mouth Every Morning.     • zolpidem (AMBIEN) 10 MG tablet Take 10 mg by mouth At Night As Needed.     • atenolol (Tenormin) 25 MG tablet Take 1 tablet by mouth Daily. 90 tablet 2     No facility-administered medications prior to visit.       Patient Active Problem List   Diagnosis   • OA (osteoarthritis) of hip   • KINDRA treated with auto BiPAP   • Chest pain   • Diabetes mellitus (CMS/[facility])   • Hypertension   • Hyperlipidemia   • Hypothyroidism   • Asthma   • Overactive bladder   • CAP (community acquired pneumonia)   • Cellulitis of left lower extremity   • COPD (chronic obstructive pulmonary disease) (CMS/[facility])   • Dyslipidemia   • Gastroesophageal reflux disease   • Leukocytosis   • Peripheral nerve disease   • Constipation   • Oropharyngeal dysphagia   • Bronchitis   • Class 3 severe obesity due to excess calories with serious comorbidity and body mass index (BMI) of 40.0 to 44.9 in adult (CMS/HCC)   • Left leg swelling   • Anemia   • Leukocytosis       Advanced Care Planning:  ACP discussion was held with the patient during this visit. Patient has an advance directive in EMR which is still valid.     Review of Systems    Compared to one year ago, the patient feels  his physical health is the same.  Compared to one year ago, the patient feels his mental health is the same.    Reviewed chart for potential of high risk medication in the elderly: yes  Reviewed chart for potential of harmful drug interactions in the elderly:yes    Objective         Vitals:    06/09/21 1239   BP: 148/64   Pulse: 89   Resp: 18   SpO2: 95%   Weight: (!) 144 kg (317 lb)       Body mass index is 43.6 kg/m².  Discussed the patient's BMI with him. The BMI is above average; BMI management plan is completed.    Physical Exam    Lab Results   Component Value Date    TRIG 87 05/25/2021    HDL 38 (L) 05/25/2021    LDL 58 05/25/2021    VLDL 17 05/25/2021    HGBA1C 6.63 (H) 05/25/2021        Assessment/Plan   Medicare Risks and Personalized Health Plan  CMS Preventative Services Quick Reference  Cardiovascular risk  Obesity/Overweight     The above risks/problems have been discussed with the patient.  Pertinent information has been shared with the patient in the After Visit Summary.  Follow up plans and orders are seen below in the Assessment/Plan Section.    Diagnoses and all orders for this visit:    1. Medicare annual wellness visit, subsequent (Primary)    2. Healthcare maintenance  -     CBC & Differential  -     Comprehensive Metabolic Panel  -     Hemoglobin A1c  -     Thyroid Panel With TSH  -     Lipid Panel With LDL / HDL Ratio    3. Essential hypertension  -     atenolol (TENORMIN) 50 MG tablet; Take 1 tablet by mouth Daily.  Dispense: 90 tablet; Refill: 3      Follow Up:  No follow-ups on file.     An After Visit Summary and PPPS were given to the patient.

## 2021-06-18 DIAGNOSIS — I10 ESSENTIAL HYPERTENSION: ICD-10-CM

## 2021-06-18 RX ORDER — AMLODIPINE BESYLATE 5 MG/1
5 TABLET ORAL DAILY
Qty: 90 TABLET | Refills: 3 | Status: SHIPPED | OUTPATIENT
Start: 2021-06-18 | End: 2022-06-07

## 2021-06-23 ENCOUNTER — HOSPITAL ENCOUNTER (OUTPATIENT)
Dept: GENERAL RADIOLOGY | Facility: HOSPITAL | Age: 81
Discharge: HOME | End: 2021-06-23
Admitting: FAMILY MEDICINE

## 2021-06-23 ENCOUNTER — OFFICE VISIT (OUTPATIENT)
Dept: INTERNAL MEDICINE | Facility: CLINIC | Age: 81
End: 2021-06-23

## 2021-06-23 VITALS
HEART RATE: 88 BPM | DIASTOLIC BLOOD PRESSURE: 60 MMHG | SYSTOLIC BLOOD PRESSURE: 128 MMHG | OXYGEN SATURATION: 96 % | WEIGHT: 315 LBS | BODY MASS INDEX: 44.01 KG/M2 | RESPIRATION RATE: 18 BRPM

## 2021-06-23 DIAGNOSIS — I10 ESSENTIAL HYPERTENSION: Primary | ICD-10-CM

## 2021-06-23 DIAGNOSIS — M25.562 ACUTE PAIN OF LEFT KNEE: ICD-10-CM

## 2021-06-23 PROCEDURE — 73560 X-RAY EXAM OF KNEE 1 OR 2: CPT

## 2021-06-23 PROCEDURE — 99214 OFFICE O/P EST MOD 30 MIN: CPT | Performed by: FAMILY MEDICINE

## 2021-06-24 NOTE — PROGRESS NOTES
Chief Complaint  Diabetes    Subjective          Riky Moon presents to [facility]  History of Present Illness    Patient notes to have some pain left knee has been going on for several days.  Is currently not taking anything for the pain.  He denies any trauma to the area.  He is concerned he may have some underlying arthritis.    Patient also has essential hypertension.  Patient is current taking amlodipine 5 mg daily.  Patient also taking losartan 100 mg daily.  He is also taking atenolol 50 mg daily.  His blood pressure is finally under better control at 120/60.  He states that he does feel better on this regimen.  He denies any side effects of the medicine.    Objective   Vital Signs:   /60   Pulse 88   Resp 18   Wt (!) 145 kg (320 lb)   SpO2 96%   BMI 44.01 kg/m²     Physical Exam  Vitals and nursing note reviewed.   Constitutional:       Appearance: He is well-developed.   HENT:      Head: Normocephalic and atraumatic.   Musculoskeletal:      Cervical back: Normal range of motion and neck supple.   Neurological:      Mental Status: He is alert and oriented to person, place, and time.   Psychiatric:         Behavior: Behavior normal.        Result Review :                 Assessment and Plan    Diagnoses and all orders for this visit:    1. Essential hypertension (Primary)        -      We will continue patient on atenolol, amlodipine, and losartan.  Patient appears to be doing fairly well at today's visit.  Blood pressure is under control.    2. Acute pain of left knee  -     Patient can take OTC Tylenol.  We will get a knee x-ray today's visit.  -     XR Knee 1 or 2 View Left        Follow Up   No follow-ups on file.  Patient was given instructions and counseling regarding his condition or for health maintenance advice. Please see specific information pulled into the AVS if appropriate.

## 2021-06-28 NOTE — PROGRESS NOTES
X-ray of the left knee demonstrate extensive vascular  calcification.  If patient has not seen a vascular surgery, we should refer for consult.  Also there is severe loss of joint space involving the medial  Compartment, indicating osteoarthritic pain.  There is no evidence of fracture or of a suprapatellar effusion.

## 2021-07-09 ENCOUNTER — TELEPHONE (OUTPATIENT)
Dept: INTERNAL MEDICINE | Facility: CLINIC | Age: 81
End: 2021-07-09

## 2021-07-28 ENCOUNTER — HOSPITAL ENCOUNTER (OUTPATIENT)
Dept: PHYSICAL THERAPY | Facility: HOSPITAL | Age: 81
Setting detail: THERAPIES SERIES
Discharge: HOME | End: 2021-07-28

## 2021-07-28 DIAGNOSIS — I89.0 LYMPHEDEMA: Primary | ICD-10-CM

## 2021-07-28 PROCEDURE — 97161 PT EVAL LOW COMPLEX 20 MIN: CPT

## 2021-07-28 NOTE — THERAPY EVALUATION
Physical Therapy Lymphedema Initial Evaluation  [facility]     Patient Name: Riky Moon  : 1940  MRN: [id number]  Today's Date: 2021      Visit Date: 2021    Visit Dx:    ICD-10-CM ICD-9-CM   1. Lymphedema  I89.0 457.1       Patient Active Problem List   Diagnosis   • OA (osteoarthritis) of hip   • KINDRA treated with auto BiPAP   • Chest pain   • Diabetes mellitus (CMS/HCC)   • Hypertension   • Hyperlipidemia   • Hypothyroidism   • Asthma   • Overactive bladder   • CAP (community acquired pneumonia)   • Cellulitis of left lower extremity   • COPD (chronic obstructive pulmonary disease) (CMS/HCC)   • Dyslipidemia   • Gastroesophageal reflux disease   • Leukocytosis   • Peripheral nerve disease   • Constipation   • Oropharyngeal dysphagia   • Bronchitis   • Class 3 severe obesity due to excess calories with serious comorbidity and body mass index (BMI) of 40.0 to 44.9 in adult (CMS/HCC)   • Left leg swelling   • Anemia   • Leukocytosis        Past Medical History:   Diagnosis Date   • Acid reflux    • Anemia    • Arthritis     OSTEO   • Asthma    • Chest discomfort    • Colon polyp    • COPD (chronic obstructive pulmonary disease) (CMS/HCC)    • Depression    • Diabetes mellitus (CMS/HCC)     type 2   • Disease of thyroid gland    • Emphysema lung (CMS/HCC)    • High cholesterol    • Hypertension    • Morbid obesity (CMS/HCC)    • Neuropathy     BOTH FEET   • Obesity, Class III, BMI 40-49.9 (morbid obesity) (CMS/HCC)    • PAD (peripheral artery disease) (CMS/HCC)    • Poor circulation of extremity     LEFT LEG   • Sleep apnea    • Vitamin D deficiency         Past Surgical History:   Procedure Laterality Date   • APPENDECTOMY     • CATARACT EXTRACTION W/ INTRAOCULAR LENS IMPLANT Bilateral    • CHOLECYSTECTOMY     • COLONOSCOPY  2014   • COLONOSCOPY W/ POLYPECTOMY      BENIGN   • HERNIA REPAIR     • INTERSTIM PLACEMENT Left 2016    BLADDER CONTROL   • KNEE ARTHROPLASTY Right    •  MOUTH SURGERY  06/26/2018   • NOSE SURGERY      REMOVED BLOCKAGE    • ROTATOR CUFF REPAIR Right    • TOTAL HIP ARTHROPLASTY Right 11/9/2017    Procedure: RIGHT TOTAL HIP ARTHROPLASTY;  Surgeon: Riky Fernando MD;  Location: [facility];  Service:        Visit Dx:    ICD-10-CM ICD-9-CM   1. Lymphedema  I89.0 457.1       Patient History     Row Name 07/28/21 1400             History    Chief Complaint  Swelling  -PC      Date Current Problem(s) Began  -- approx 2-3 y rs ago  -PC      Brief Description of Current Complaint  States he has had swelling at least 2-3 yrs. Saw a vascular dr several yrs ago who told him he had problems with the valves in his veins and told him to wear compression stockings. States his swelling has graduall worsened since he had hip replacement 3 yrs ago. States he was admitted to hospital once with cellulitis, and recently went to immediate care and got antibiotics for another infection in his leg. The right leg is much worse. He has a compression pump at home which he hasn't used very regularly, but it does help temporarily. He has worn compression sock, but can't get them on now. States his wife has MS and early Alzheimers, so she has difficulty getting the compression socks on as well.   -PC      Patient/Caregiver Goals  Decrease swelling  -PC      How has patient tried to help current problem?  compression pump, compression stockings.  -PC         Pain     Pain Location  Foot  -PC      Pain at Present  0  -PC      Pain at Best  0  -PC      Pain at Worst  8  -PC      What Performance Factors Make the Current Problem(s) WORSE?  walking, being up  -PC      What Performance Factors Make the Current Problem(s) BETTER?  elevation, compression pump temporarily  -PC      Difficulties with ADL's?  difficulty with prolonged walking, stairs, getting socks and shoes on, getting in/out of car  -PC         Fall Risk Assessment    Any falls in the past year:  Yes  -PC      Number of falls reported  in the last 12 months  1  -PC      Factors that contributed to the fall:  Other (comment) fell off a high stool  -PC         Services    Are you currently receiving Home Health services  No  -PC         Daily Activities    Primary Language  English  -PC      How does patient learn best?  Listening  -PC      Teaching needs identified  Management of Condition;Home Exercise Program  -PC      Patient is concerned about/has problems with  Climbing Stairs;Difficulty with self care (i.e. bathing, dressing, toileting:;Performing home management (household chores, shopping, care of dependents);Walking  -PC      Does patient have problems with the following?  None  -PC      Barriers to learning  None  -PC      Functional Status  dressing;mobility issues preventing performance of daily activities  -PC      Pt Participated in POC and Goals  Yes  -PC         Safety    Are you being hurt, hit, or frightened by anyone at home or in your life?  No  -PC      Are you being neglected by a caregiver  No  -PC        User Key  (r) = Recorded By, (t) = Taken By, (c) = Cosigned By    Initials Name Provider Type    Niru Horn, PT Physical Therapist          Lymphedema     Row Name 07/28/21 1800             Subjective Pain    Able to rate subjective pain?  yes  -PC      Pre-Treatment Pain Level  0  -PC      Post-Treatment Pain Level  0  -PC         Subjective Comments    Subjective Comments  States he will start using his compression pump every day if it will help.   -PC         Lymphedema Assessment    Lymphedema Classification  RLE:;LLE:;stage 2 (Spontaneously Irreversible);secondary  -PC      Infections or Cellulitis?  yes  -PC      Infection/Cellulitis Treatment  hospitalized 1 time, recently took antibiotics.   -PC         Physical Concerns    The amount of pain associated with my lymphedema is:  2  -PC      The amount of limb heaviness associated with my lymphedema is:  2  -PC      The amount of skin tightness associated with  "my lymphedema is:  2  -PC      The size of my swollen limb(s) seems:  3  -PC      Lymphedema affects the movement of my swollen limb(s):  3  -PC      The strength in my swollen limb(s) is:  3  -PC         Psychosocial Concerns    Lymphedema affects my body image (i.e., \"how I think I look\").  2  -PC      Lymphedema affects my socializing with others.  2  -PC      Lymphedema affects my intimate relations with spouse or partner (rate 0 if not applicable  2  -PC      Lymphedema \"gets me down\" (i.e., depression, frustration, or anger)  1  -PC      I must rely on others for help due to my lymphedema.  3  -PC      I know what to do to manage my lymphedema  1  -PC         Functional Concerns    Lymphedema affects my ability to perform self-care activities (i.e. eating, dressing, hygiene)  2  -PC      Lymphedema affects my ability to perform routine home or work-related activities.  3  -PC      Lymphedema affects my performance of preferred leisure activities.  3  -PC      Lymphedema affects proper fit of clothing/shoes  3  -PC      Lymphedema affects my sleep  2  -PC         Posture/Observations    Posture/Observations Comments  Ambulates into clinic independently   -PC         General ROM    GENERAL ROM COMMENTS  LE's WFL  -PC         Lymphedema Edema Assessment    Ptting Edema Category  By grade out of 4  -PC      Pitting Edema  + 3/4  -PC      Stemmer Sign  bilateral:;negative  -PC      [place] Hump  left:;positive  -PC      Edema Assessment Comment  Mod edema left foot to knee, min edema right foot, ankle, and calf  -PC         Skin Changes/Observations    Location/Assessment  Lower Extremity  -PC      Skin Observations Comment  Skin is red and dry.  -PC         Lymphedema Sensation    Lymphedema Sensation Reports  RLE:;LLE:;numbness;tingling  -PC      Lymphedema Sensation Tests  light touch  -PC      Lymphedema Light Touch  WNL  -PC         Lymphedema Measurements    Measurement Type(s)  Circumferential  -PC      " Circumferential Areas  Lower extremities  -PC         BLE Circumferential (cm)    Measurement Location 1  Knee (popliteal crease)  -PC      Left 1  43.5 cm  -PC      Right 1  43 cm  -PC      Measurement Location 2  10cm below knee  -PC      Left 2  46.7 cm  -PC      Right 2  43.2 cm  -PC      Measurement Location 3  20cm below knee  -PC      Left 3  49.6 cm  -PC      Right 3  41.5 cm  -PC      Measurement Location 4  30cm below knee  -PC      Left 4  41.5 cm  -PC      Right 4  30 cm  -PC      Measurement Location 5  Ankle  -PC      Left 5  37 cm  -PC      Right 5  26.5 cm  -PC      Measurement Location 6  Midfoot  -PC      Left 6  28.8 cm  -PC      Right 6  27.5 cm  -PC      LLE Circumferential Total  247.1 cm  -PC      RLE Circumferential Total  211.7 cm  -PC         Lymphedema Life Impact Scale Totals    A.  Total Q1 - Q17 (Do not include Q18)  39  -PC      B.  Total number of questions answered (Q1-Q17)  17  -PC      C. Divide A by B  2.29  -PC      D. Multiple C by 25  57.25  -PC        User Key  (r) = Recorded By, (t) = Taken By, (c) = Cosigned By    Initials Name Provider Type    Niru Horn, PT Physical Therapist                          Therapy Education  Education Details: Reviewed lymphedema treatment and plan of care. Advised pt to use compression pump, elevate legs, use lotion, do exercises, and wear compression stockings or velcro device if he still has them and they fit.  Given: Edema management, Symptoms/condition management  Program: New  How Provided: Verbal  Provided to: Patient, Caregiver  Level of Understanding: Verbalized      OP Exercises     Row Name 07/28/21 1800             Subjective Comments    Subjective Comments  States he will start using his compression pump every day if it will help.   -PC         Subjective Pain    Able to rate subjective pain?  yes  -PC      Pre-Treatment Pain Level  0  -PC      Post-Treatment Pain Level  0  -PC        User Key  (r) = Recorded By, (t) = Taken  By, (c) = Cosigned By    Initials Name Provider Type    Niru Horn, PT Physical Therapist                      PT OP Goals     Row Name 07/28/21 1800          PT Short Term Goals    STG Date to Achieve  08/04/21  -PC     STG 1  Pt to demonstrate awareness of condition and precautions for improved prevention, management, care of symptoms, and ease of transition to self-care of condition.  -PC     STG 1 Progress  New  -PC     STG 2  Patient/family independent with self-wrapping techniques of compression bandages as indicated for improved self-management of condition.  -PC     STG 2 Progress  New  -PC     STG 3  Patient demonstrate decreased net edema of  B LE's >/=5-10cm for decreased edema symptoms, decreased risk of infection, and improved skin care.  -PC     STG 3 Progress  New  -PC        Long Term Goals    LTG Date to Achieve  08/27/21  -PC     LTG 1  Patient/family independent with self-care techniques for self-management of condition.  -PC     LTG 1 Progress  New  -PC     LTG 2  Patient demonstrate decreased net edema of L LE >/=10-20cm for decreased edema symptoms, decreased risk of infection, and improved skin care.  -PC     LTG 2 Progress  New  -PC     LTG 3  Patient/family independent with compression garments as indicated for self-management of condition.  -PC     LTG 3 Progress  New  -PC        Time Calculation    PT Goal Re-Cert Due Date  10/28/21  -PC       User Key  (r) = Recorded By, (t) = Taken By, (c) = Cosigned By    Initials Name Provider Type    Niru Horn, PT Physical Therapist          PT Assessment/Plan     Row Name 07/28/21 1819          PT Assessment    Functional Limitations  Limitation in home management;Performance in leisure activities;Performance in self-care ADL  -PC     Impairments  Edema;Endurance;Impaired lymphatic circulation  -PC     Assessment Comments  Pt is an 80 yr old male who presents with mod+ edema left foot to knee and min edema right foot, ankle, and  calf. He has 3+ pitting, red and dry skin, and limited functional activity. He has difficulty with prolonged walking, going up and down stairs, getting shoes and socks on, and getting in/out of car. His wife has MS and early Alzheimers, so she can't help him very much. He may do better with velcro compression instead of stockings for ease of application. He is a good candidate for treatment.  -PC     Please refer to paper survey for additional self-reported information  Yes  -PC     Rehab Potential  Good  -PC     Patient/caregiver participated in establishment of treatment plan and goals  Yes  -PC     Patient would benefit from skilled therapy intervention  Yes  -PC        PT Plan    PT Frequency  5x/week  -PC     Predicted Duration of Therapy Intervention (PT)  4 weeks  -PC     Planned CPT's?  PT EVAL LOW COMPLEXITY: 34937;PT MANUAL THERAPY EA 15 MIN: 00700;PT SELF CARE/HOME MGMT/TRAIN EA 15: 12631  -PC     Physical Therapy Interventions (Optional Details)  bandaging;home exercise program;manual lymphatic drainage;patient/family education  -PC     PT Plan Comments  See pt for complete decongestive therapy.  -PC       User Key  (r) = Recorded By, (t) = Taken By, (c) = Cosigned By    Initials Name Provider Type    PC Niru Draper, PT Physical Therapist                       Time Calculation:   Start Time: 1420  Stop Time: 1515  Time Calculation (min): 55 min  Untimed Charges  PT Eval/Re-eval Minutes: 55  Total Minutes  Untimed Charges Total Minutes: 55   Total Minutes: 55   Therapy Charges for Today     Code Description Service Date Service Provider Modifiers Qty    [id number]  PT EVAL LOW COMPLEXITY 4 7/28/2021 Niru Draper, PT GP 1                    Niru Draper, PT  7/28/2021

## 2021-08-11 ENCOUNTER — APPOINTMENT (OUTPATIENT)
Dept: PHYSICAL THERAPY | Facility: HOSPITAL | Age: 81
End: 2021-08-11

## 2021-08-23 ENCOUNTER — OFFICE VISIT (OUTPATIENT)
Dept: INTERNAL MEDICINE | Facility: CLINIC | Age: 81
End: 2021-08-23

## 2021-08-23 ENCOUNTER — LAB (OUTPATIENT)
Dept: LAB | Facility: HOSPITAL | Age: 81
End: 2021-08-23

## 2021-08-23 VITALS
HEART RATE: 56 BPM | OXYGEN SATURATION: 96 % | WEIGHT: 313.9 LBS | HEIGHT: 72 IN | BODY MASS INDEX: 42.52 KG/M2 | DIASTOLIC BLOOD PRESSURE: 60 MMHG | SYSTOLIC BLOOD PRESSURE: 118 MMHG

## 2021-08-23 DIAGNOSIS — E03.9 HYPOTHYROIDISM, UNSPECIFIED TYPE: ICD-10-CM

## 2021-08-23 DIAGNOSIS — E11.9 TYPE 2 DIABETES MELLITUS WITHOUT COMPLICATION, WITHOUT LONG-TERM CURRENT USE OF INSULIN (HCC): ICD-10-CM

## 2021-08-23 DIAGNOSIS — I10 ESSENTIAL HYPERTENSION: Primary | ICD-10-CM

## 2021-08-23 DIAGNOSIS — E78.5 HYPERLIPIDEMIA, UNSPECIFIED HYPERLIPIDEMIA TYPE: ICD-10-CM

## 2021-08-23 LAB
ALBUMIN SERPL-MCNC: 4.3 G/DL (ref 3.5–5.2)
ALBUMIN/GLOB SERPL: 1.9 G/DL
ALP SERPL-CCNC: 53 U/L (ref 39–117)
ALT SERPL W P-5'-P-CCNC: 15 U/L (ref 1–41)
ANION GAP SERPL CALCULATED.3IONS-SCNC: 9.6 MMOL/L (ref 5–15)
AST SERPL-CCNC: 14 U/L (ref 1–40)
BILIRUB SERPL-MCNC: 0.6 MG/DL (ref 0–1.2)
BUN SERPL-MCNC: 20 MG/DL (ref 8–23)
BUN/CREAT SERPL: 17.7 (ref 7–25)
CALCIUM SPEC-SCNC: 9.1 MG/DL (ref 8.6–10.5)
CHLORIDE SERPL-SCNC: 105 MMOL/L (ref 98–107)
CHOLEST SERPL-MCNC: 118 MG/DL (ref 0–200)
CO2 SERPL-SCNC: 28.4 MMOL/L (ref 22–29)
CREAT SERPL-MCNC: 1.13 MG/DL (ref 0.76–1.27)
GFR SERPL CREATININE-BSD FRML MDRD: 62 ML/MIN/1.73
GLOBULIN UR ELPH-MCNC: 2.3 GM/DL
GLUCOSE SERPL-MCNC: 123 MG/DL (ref 65–99)
HBA1C MFR BLD: 6.2 % (ref 4.8–5.6)
HDLC SERPL-MCNC: 41 MG/DL (ref 40–60)
LDLC SERPL CALC-MCNC: 59 MG/DL (ref 0–100)
LDLC/HDLC SERPL: 1.42 {RATIO}
POTASSIUM SERPL-SCNC: 4.9 MMOL/L (ref 3.5–5.2)
PROT SERPL-MCNC: 6.6 G/DL (ref 6–8.5)
SODIUM SERPL-SCNC: 143 MMOL/L (ref 136–145)
T-UPTAKE NFR SERPL: 1.07 TBI (ref 0.8–1.3)
T4 SERPL-MCNC: 6.8 MCG/DL (ref 4.5–11.7)
TRIGL SERPL-MCNC: 93 MG/DL (ref 0–150)
TSH SERPL DL<=0.05 MIU/L-ACNC: 3.96 UIU/ML (ref 0.27–4.2)
VLDLC SERPL-MCNC: 18 MG/DL (ref 5–40)

## 2021-08-23 PROCEDURE — 85025 COMPLETE CBC W/AUTO DIFF WBC: CPT | Performed by: FAMILY MEDICINE

## 2021-08-23 PROCEDURE — 36415 COLL VENOUS BLD VENIPUNCTURE: CPT | Performed by: FAMILY MEDICINE

## 2021-08-23 PROCEDURE — 84443 ASSAY THYROID STIM HORMONE: CPT | Performed by: FAMILY MEDICINE

## 2021-08-23 PROCEDURE — 99214 OFFICE O/P EST MOD 30 MIN: CPT | Performed by: FAMILY MEDICINE

## 2021-08-23 PROCEDURE — 80053 COMPREHEN METABOLIC PANEL: CPT | Performed by: FAMILY MEDICINE

## 2021-08-23 PROCEDURE — 84436 ASSAY OF TOTAL THYROXINE: CPT | Performed by: FAMILY MEDICINE

## 2021-08-23 PROCEDURE — 80061 LIPID PANEL: CPT | Performed by: FAMILY MEDICINE

## 2021-08-23 PROCEDURE — 83036 HEMOGLOBIN GLYCOSYLATED A1C: CPT | Performed by: FAMILY MEDICINE

## 2021-08-23 PROCEDURE — 84479 ASSAY OF THYROID (T3 OR T4): CPT | Performed by: FAMILY MEDICINE

## 2021-08-23 NOTE — PROGRESS NOTES
"Chief Complaint  follow up to hypertension and follow up to diabetes    Subjective          Riky Moon presents to [facility]  History of Present Illness    Patient presents at today's office visit with a past medical history of having essential hypertension.  Patient's blood pressure at today's office visit is 118/60.  He is currently taking atenolol 50 mg daily.  He is also on amlodipine 5 mg daily.  He is also taking losartan 100 mg daily.  He does not have any side effects of these medications.    Patient also has a past medical history of having hypothyroidism.  He is currently taking levothyroxine 100 mcg daily.  He denies any side effects of the medication.    Patient is a past medical history of having hyperlipidemia.  Is currently on pravastatin 40 mg daily.  Patient denies any side effects of the medicine.    Patient also has type 2 diabetes.  He is taking Jardiance 10 mg daily.  He is also taking Metformin 850 mg twice a day.  He denies any side effects of these medications.  Patient states that he stopped taking the Trulicity about 2 to 3 months ago.    Objective   Vital Signs:   /60 (BP Location: Left arm, Patient Position: Sitting, Cuff Size: Large Adult)   Pulse 56   Ht 181.6 cm (71.5\")   Wt (!) 142 kg (313 lb 14.4 oz)   SpO2 96%   BMI 43.17 kg/m²     Physical Exam  Vitals and nursing note reviewed.   Constitutional:       Appearance: He is well-developed.   HENT:      Head: Normocephalic and atraumatic.   Musculoskeletal:      Cervical back: Normal range of motion and neck supple.   Neurological:      Mental Status: He is alert and oriented to person, place, and time.   Psychiatric:         Behavior: Behavior normal.        Result Review :{Labs  Result Review  Imaging  Med Tab  Media  Procedures :23}                 Assessment and Plan    Diagnoses and all orders for this visit:    1. Essential hypertension (Primary)  -     CBC & Differential  -     " Comprehensive Metabolic Panel    2. Type 2 diabetes mellitus without complication, without long-term current use of insulin (CMS/[facility])  -     Hemoglobin A1c    3. Hypothyroidism, unspecified type  -     Thyroid Panel With TSH    4. Hyperlipidemia, unspecified hyperlipidemia type  -     Lipid Panel With LDL / HDL Ratio      For patient's hyperlipidemia, continue taking pravastatin 40 mg daily.  For his type 2 diabetes, continue taking Jardiance and Metformin.  For the hypothyroidism, continue taking levothyroxine.  And for his essential hypertension is currently stable, continue current blood pressure medicines.  We will check several labs at today's visit.        Follow Up   No follow-ups on file.  Patient was given instructions and counseling regarding his condition or for health maintenance advice. Please see specific information pulled into the AVS if appropriate.

## 2021-08-24 LAB
BASOPHILS # BLD AUTO: 0.1 10*3/MM3 (ref 0–0.2)
BASOPHILS NFR BLD AUTO: 0.9 % (ref 0–1.5)
DEPRECATED RDW RBC AUTO: 61 FL (ref 37–54)
EOSINOPHIL # BLD AUTO: 0.2 10*3/MM3 (ref 0–0.4)
EOSINOPHIL NFR BLD AUTO: 1.7 % (ref 0.3–6.2)
ERYTHROCYTE [DISTWIDTH] IN BLOOD BY AUTOMATED COUNT: 16.1 % (ref 12.3–15.4)
HCT VFR BLD AUTO: 36.2 % (ref 37.5–51)
HGB BLD-MCNC: 12.6 G/DL (ref 13–17.7)
IMM GRANULOCYTES # BLD AUTO: 0.07 10*3/MM3 (ref 0–0.05)
IMM GRANULOCYTES NFR BLD AUTO: 0.6 % (ref 0–0.5)
LYMPHOCYTES # BLD AUTO: 1.54 10*3/MM3 (ref 0.7–3.1)
LYMPHOCYTES NFR BLD AUTO: 13.4 % (ref 19.6–45.3)
MCH RBC QN AUTO: 36.8 PG (ref 26.6–33)
MCHC RBC AUTO-ENTMCNC: 34.8 G/DL (ref 31.5–35.7)
MCV RBC AUTO: 105.8 FL (ref 79–97)
MONOCYTES # BLD AUTO: 0.9 10*3/MM3 (ref 0.1–0.9)
MONOCYTES NFR BLD AUTO: 7.8 % (ref 5–12)
NEUTROPHILS NFR BLD AUTO: 75.6 % (ref 42.7–76)
NEUTROPHILS NFR BLD AUTO: 8.7 10*3/MM3 (ref 1.7–7)
NRBC BLD AUTO-RTO: 0.4 /100 WBC (ref 0–0.2)
PLATELET # BLD AUTO: 187 10*3/MM3 (ref 140–450)
PMV BLD AUTO: 12.3 FL (ref 6–12)
RBC # BLD AUTO: 3.42 10*6/MM3 (ref 4.14–5.8)
WBC # BLD AUTO: 11.51 10*3/MM3 (ref 3.4–10.8)

## 2021-09-10 ENCOUNTER — IMMUNIZATION (OUTPATIENT)
Dept: VACCINE CLINIC | Facility: HOSPITAL | Age: 81
End: 2021-09-10

## 2021-09-10 PROCEDURE — 0001A: CPT | Performed by: INTERNAL MEDICINE

## 2021-09-10 PROCEDURE — 91300 HC SARSCOV02 VAC 30MCG/0.3ML IM: CPT | Performed by: INTERNAL MEDICINE

## 2021-10-21 ENCOUNTER — APPOINTMENT (OUTPATIENT)
Dept: SLEEP MEDICINE | Facility: HOSPITAL | Age: 81
End: 2021-10-21

## 2021-10-26 ENCOUNTER — OFFICE VISIT (OUTPATIENT)
Dept: INTERNAL MEDICINE | Facility: CLINIC | Age: 81
End: 2021-10-26

## 2021-10-26 ENCOUNTER — LAB (OUTPATIENT)
Dept: LAB | Facility: HOSPITAL | Age: 81
End: 2021-10-26

## 2021-10-26 VITALS
BODY MASS INDEX: 42.66 KG/M2 | WEIGHT: 315 LBS | SYSTOLIC BLOOD PRESSURE: 123 MMHG | DIASTOLIC BLOOD PRESSURE: 50 MMHG | HEART RATE: 68 BPM | OXYGEN SATURATION: 97 % | HEIGHT: 72 IN

## 2021-10-26 DIAGNOSIS — I10 ESSENTIAL HYPERTENSION: ICD-10-CM

## 2021-10-26 DIAGNOSIS — E78.5 HYPERLIPIDEMIA, UNSPECIFIED HYPERLIPIDEMIA TYPE: ICD-10-CM

## 2021-10-26 DIAGNOSIS — Z23 NEEDS FLU SHOT: ICD-10-CM

## 2021-10-26 DIAGNOSIS — E11.9 TYPE 2 DIABETES MELLITUS WITHOUT COMPLICATION, WITHOUT LONG-TERM CURRENT USE OF INSULIN (HCC): ICD-10-CM

## 2021-10-26 DIAGNOSIS — E03.9 HYPOTHYROIDISM, UNSPECIFIED TYPE: ICD-10-CM

## 2021-10-26 DIAGNOSIS — E11.9 TYPE 2 DIABETES MELLITUS WITHOUT COMPLICATION, WITHOUT LONG-TERM CURRENT USE OF INSULIN (HCC): Primary | ICD-10-CM

## 2021-10-26 LAB
ALBUMIN SERPL-MCNC: 4.4 G/DL (ref 3.5–5.2)
ALBUMIN UR-MCNC: 2.6 MG/DL
ALBUMIN/GLOB SERPL: 1.6 G/DL
ALP SERPL-CCNC: 54 U/L (ref 39–117)
ALT SERPL W P-5'-P-CCNC: 14 U/L (ref 1–41)
ANION GAP SERPL CALCULATED.3IONS-SCNC: 8.2 MMOL/L (ref 5–15)
AST SERPL-CCNC: 16 U/L (ref 1–40)
BASOPHILS # BLD AUTO: 0.1 10*3/MM3 (ref 0–0.2)
BASOPHILS NFR BLD AUTO: 1 % (ref 0–1.5)
BILIRUB SERPL-MCNC: 0.7 MG/DL (ref 0–1.2)
BUN SERPL-MCNC: 23 MG/DL (ref 8–23)
BUN/CREAT SERPL: 22.8 (ref 7–25)
CALCIUM SPEC-SCNC: 9.4 MG/DL (ref 8.6–10.5)
CHLORIDE SERPL-SCNC: 104 MMOL/L (ref 98–107)
CHOLEST SERPL-MCNC: 136 MG/DL (ref 0–200)
CO2 SERPL-SCNC: 28.8 MMOL/L (ref 22–29)
CREAT SERPL-MCNC: 1.01 MG/DL (ref 0.76–1.27)
CREAT UR-MCNC: 45.8 MG/DL
DEPRECATED RDW RBC AUTO: 73.2 FL (ref 37–54)
EOSINOPHIL # BLD AUTO: 0.19 10*3/MM3 (ref 0–0.4)
EOSINOPHIL NFR BLD AUTO: 1.8 % (ref 0.3–6.2)
ERYTHROCYTE [DISTWIDTH] IN BLOOD BY AUTOMATED COUNT: 17.1 % (ref 12.3–15.4)
GFR SERPL CREATININE-BSD FRML MDRD: 71 ML/MIN/1.73
GLOBULIN UR ELPH-MCNC: 2.7 GM/DL
GLUCOSE SERPL-MCNC: 135 MG/DL (ref 65–99)
HBA1C MFR BLD: 6.52 % (ref 4.8–5.6)
HCT VFR BLD AUTO: 40.2 % (ref 37.5–51)
HDLC SERPL-MCNC: 38 MG/DL (ref 40–60)
HGB BLD-MCNC: 12.9 G/DL (ref 13–17.7)
LDLC SERPL CALC-MCNC: 78 MG/DL (ref 0–100)
LDLC/HDLC SERPL: 2 {RATIO}
LYMPHOCYTES # BLD AUTO: 1.75 10*3/MM3 (ref 0.7–3.1)
LYMPHOCYTES NFR BLD AUTO: 16.7 % (ref 19.6–45.3)
MCH RBC QN AUTO: 36.8 PG (ref 26.6–33)
MCHC RBC AUTO-ENTMCNC: 32.1 G/DL (ref 31.5–35.7)
MCV RBC AUTO: 114.5 FL (ref 79–97)
MICROALBUMIN/CREAT UR: 56.8 MG/G
MONOCYTES # BLD AUTO: 0.88 10*3/MM3 (ref 0.1–0.9)
MONOCYTES NFR BLD AUTO: 8.4 % (ref 5–12)
NEUTROPHILS NFR BLD AUTO: 7.5 10*3/MM3 (ref 1.7–7)
NEUTROPHILS NFR BLD AUTO: 71.6 % (ref 42.7–76)
PLATELET # BLD AUTO: 191 10*3/MM3 (ref 140–450)
PMV BLD AUTO: 11.6 FL (ref 6–12)
POTASSIUM SERPL-SCNC: 4.6 MMOL/L (ref 3.5–5.2)
PROT SERPL-MCNC: 7.1 G/DL (ref 6–8.5)
RBC # BLD AUTO: 3.51 10*6/MM3 (ref 4.14–5.8)
SODIUM SERPL-SCNC: 141 MMOL/L (ref 136–145)
T-UPTAKE NFR SERPL: 1.16 TBI (ref 0.8–1.3)
T4 SERPL-MCNC: 6.51 MCG/DL (ref 4.5–11.7)
TRIGL SERPL-MCNC: 110 MG/DL (ref 0–150)
TSH SERPL DL<=0.05 MIU/L-ACNC: 3.72 UIU/ML (ref 0.27–4.2)
VLDLC SERPL-MCNC: 20 MG/DL (ref 5–40)
WBC # BLD AUTO: 10.47 10*3/MM3 (ref 3.4–10.8)

## 2021-10-26 PROCEDURE — 84479 ASSAY OF THYROID (T3 OR T4): CPT | Performed by: FAMILY MEDICINE

## 2021-10-26 PROCEDURE — 84443 ASSAY THYROID STIM HORMONE: CPT | Performed by: FAMILY MEDICINE

## 2021-10-26 PROCEDURE — 84436 ASSAY OF TOTAL THYROXINE: CPT | Performed by: FAMILY MEDICINE

## 2021-10-26 PROCEDURE — G0008 ADMIN INFLUENZA VIRUS VAC: HCPCS | Performed by: FAMILY MEDICINE

## 2021-10-26 PROCEDURE — 85025 COMPLETE CBC W/AUTO DIFF WBC: CPT | Performed by: FAMILY MEDICINE

## 2021-10-26 PROCEDURE — 99214 OFFICE O/P EST MOD 30 MIN: CPT | Performed by: FAMILY MEDICINE

## 2021-10-26 PROCEDURE — 80053 COMPREHEN METABOLIC PANEL: CPT | Performed by: FAMILY MEDICINE

## 2021-10-26 PROCEDURE — 80061 LIPID PANEL: CPT | Performed by: FAMILY MEDICINE

## 2021-10-26 PROCEDURE — 83036 HEMOGLOBIN GLYCOSYLATED A1C: CPT | Performed by: FAMILY MEDICINE

## 2021-10-26 PROCEDURE — 82746 ASSAY OF FOLIC ACID SERUM: CPT | Performed by: FAMILY MEDICINE

## 2021-10-26 PROCEDURE — 90662 IIV NO PRSV INCREASED AG IM: CPT | Performed by: FAMILY MEDICINE

## 2021-10-26 PROCEDURE — 82570 ASSAY OF URINE CREATININE: CPT

## 2021-10-26 PROCEDURE — 36415 COLL VENOUS BLD VENIPUNCTURE: CPT | Performed by: FAMILY MEDICINE

## 2021-10-26 PROCEDURE — 82607 VITAMIN B-12: CPT | Performed by: FAMILY MEDICINE

## 2021-10-26 PROCEDURE — 82043 UR ALBUMIN QUANTITATIVE: CPT

## 2021-10-26 NOTE — PROGRESS NOTES
"Chief Complaint  follow up to  hypertension    Subjective          Riky Moon presents to [facility]  History of Present Illness    Patient has a history of having type 2 diabetes.  He is currently taking Metformin 850 mg twice a day.  Patient is also taking Trulicity 0.75 mg weekly.  Patient is also taking Jardiance 10 mg daily.  He denies any side effects of the medication.    Patient has history of having hypothyroidism.  Currently on levothyroxine 100 mcg daily.  He denies any side effects of the medication.    Patient has a history of having essential hypertension.  He is current taking amlodipine 5 mg daily.  He is also taking losartan 100 mg daily.  He is also taking atenolol 50 mg daily.  He denies any side effects of the medication.  His blood pressure today's office visit 123/50.    Patient also has a history having hyperlipidemia and is currently taking pravastatin 40 mg daily.  He denies any side effects of the medicine.        Objective   Vital Signs:   /50 (BP Location: Left arm, Patient Position: Sitting, Cuff Size: Large Adult)   Pulse 68   Ht 181.6 cm (71.5\")   Wt (!) 143 kg (315 lb 11.2 oz)   SpO2 97%   BMI 43.42 kg/m²     Physical Exam  Vitals and nursing note reviewed.   Constitutional:       Appearance: He is well-developed.   HENT:      Head: Normocephalic and atraumatic.   Musculoskeletal:      Cervical back: Normal range of motion and neck supple.   Neurological:      Mental Status: He is alert and oriented to person, place, and time.   Psychiatric:         Behavior: Behavior normal.        Result Review :                 Assessment and Plan    Diagnoses and all orders for this visit:    1. Type 2 diabetes mellitus without complication, without long-term current use of insulin ([facility]) (Primary)  -     Microalbumin / Creatinine Urine Ratio - Urine, Clean Catch; Standing  -     Comprehensive Metabolic Panel; Standing  -     Hemoglobin A1c; Standing  -  "    Check labs, continue current diabetic medicines.    2. Hypothyroidism, unspecified type  -     Thyroid Panel With TSH; Standing  -     We will continue levothyroxine 100 mcg daily.    3. Essential hypertension  -     CBC & Differential; Standing  -     Comprehensive Metabolic Panel; Standing  -     Stable, continue current medication.    4. Hyperlipidemia, unspecified hyperlipidemia type  -     Comprehensive Metabolic Panel; Standing  -     Lipid Panel With LDL / HDL Ratio; Standing  -     Stable, will continue pravastatin.        Follow Up   No follow-ups on file.  Patient was given instructions and counseling regarding his condition or for health maintenance advice. Please see specific information pulled into the AVS if appropriate.

## 2021-10-28 ENCOUNTER — OFFICE VISIT (OUTPATIENT)
Dept: SLEEP MEDICINE | Facility: HOSPITAL | Age: 81
End: 2021-10-28

## 2021-10-28 VITALS
OXYGEN SATURATION: 91 % | HEART RATE: 75 BPM | SYSTOLIC BLOOD PRESSURE: 158 MMHG | HEIGHT: 71 IN | DIASTOLIC BLOOD PRESSURE: 78 MMHG | BODY MASS INDEX: 43.96 KG/M2 | WEIGHT: 314 LBS

## 2021-10-28 DIAGNOSIS — D53.9 MACROCYTIC ANEMIA: Primary | ICD-10-CM

## 2021-10-28 DIAGNOSIS — G47.21 CIRCADIAN RHYTHM SLEEP DISORDER, DELAYED SLEEP PHASE TYPE: ICD-10-CM

## 2021-10-28 DIAGNOSIS — F51.04 PSYCHOPHYSIOLOGICAL INSOMNIA: ICD-10-CM

## 2021-10-28 DIAGNOSIS — E66.01 CLASS 3 SEVERE OBESITY DUE TO EXCESS CALORIES WITH SERIOUS COMORBIDITY AND BODY MASS INDEX (BMI) OF 40.0 TO 44.9 IN ADULT (HCC): ICD-10-CM

## 2021-10-28 DIAGNOSIS — G47.33 OSA TREATED WITH BIPAP: Primary | ICD-10-CM

## 2021-10-28 LAB
FOLATE SERPL-MCNC: 19.2 NG/ML (ref 4.78–24.2)
VIT B12 BLD-MCNC: 1413 PG/ML (ref 211–946)

## 2021-10-28 PROCEDURE — G0463 HOSPITAL OUTPT CLINIC VISIT: HCPCS

## 2021-10-28 PROCEDURE — 99214 OFFICE O/P EST MOD 30 MIN: CPT | Performed by: INTERNAL MEDICINE

## 2021-10-28 NOTE — PROGRESS NOTES
"Follow Up Sleep Disorders Center Note     Chief Complaint:  KINDRA     Primary Care Physician: Marco Carrillo MD    Interval History:   The patient is a 81 y.o. male  who I last saw 10/22/2020 and that note was reviewed.  The patient reports that he is no longer taking his Ambien.  [facility]  The patient goes to bed at midnight and awakens at 9:30 AM.  He will use the restroom during that time.    Self-administered Durand Sleepiness Scale test results: 2  0-5 Lower normal daytime sleepiness  6-10 Higher normal daytime sleepiness  11-12 Mild, 13-15 Moderate, & 16-24 Severe excessive daytime sleepiness    Review of Systems:    A complete review of systems was done and all were negative with the exception of some shortness of breath with exertion    Social History:    Social History     Socioeconomic History   • Marital status:      Spouse name: Chitra   • Years of education: high school   Tobacco Use   • Smoking status: Former Smoker     Packs/day: 1.50     Years: 40.00     Pack years: 60.00     Types: Cigarettes     Quit date:      Years since quittin.8   • Smokeless tobacco: Never Used   • Tobacco comment: from age [age]   Substance and Sexual Activity   • Alcohol use: Yes     Comment: HOLIDAYS  caffeine -2 cups coffee daily   • Drug use: No     Comment: PRN use for pain   • Sexual activity: Defer       Allergies:  Patient has no known allergies.     Medication Review:  Reviewed.      Vital Signs:    Vitals:    10/28/21 1104   BP: 158/78   Pulse: 75   SpO2: 91%   Weight: (!) 142 kg (314 lb)   Height: 180.3 cm (71\")     Body mass index is 43.79 kg/m².    Physical Exam:    Constitutional:  Well developed 81 y.o. male that appears in no apparent distress.  Awake & oriented times 3.  Normal mood with normal recent and remote memory and normal judgement.  Eyes:  Conjunctivae normal.  Oropharynx: Previously, moist mucous membranes without exudate and a large " tongue and normal uvula that is broad-based and class III Mallampati airway, patient is wearing a facemask.     Downloaded PAP Data Reviewed For Compliance:  DME is [facility] and he uses a fullface mask.  Downloads between 7/30 and 10/27/2021 compliance 100%.  Average usage is 9 hours and 29 minutes.  Average AHI is normal without leak.  Average auto BiPAP pressure is IPAP of 15 and EPAP of 13 and his auto BiPAP settings: Max IPAP 18 minimum EPAP 12 max pressure support 6 and minimum pressure support of 2.    I have reviewed the above results and compared them with the patient's last downloads and reviewed with the patient.    Impression:   Obstructive sleep apnea adequately treated with auto BiPAP. The patient appears to be at goal with good compliance and usage. The patient has no complaints of hypersomnolence.  Circadian rhythm sleep disorder, delayed sleep phase type  Psychophysiologic insomnia    Plan:  Good sleep hygiene measures should be maintained.  Weight loss would be beneficial in this patient who is morbidly obese by BMI.      After evaluating the patient and assessing results available, the patient is benefiting from the treatment being provided.     The patient will continue auto BiPAP.  After clinical evaluation and review of downloads, I recommend no changes to the patient's pressures.  A new prescription will be sent to the patient's DME.    Guerita recall discussed.  The patient needs to register his device and he does not use an ozone .    Prior to prescribing Ambien to improve use of auto BiPAP, patient has problems with sleep onset that is most likely related to circadian rhythm sleep disorder, delayed sleep phase type.  Therefore, the patient will take melatonin with his evening meal and will titrate up to a maximum dose of 20 mg.  The patient will call me in 3 to 4 weeks and let me know how he is doing and at that time, if no better, consider readding Ambien.  I did review his Watson  report.    I answered all of the patient's questions.  The patient will call for any problems and will follow up in 1 year.      Cosmo French MD  Sleep Medicine  10/28/21  11:27 EDT

## 2021-10-31 PROBLEM — G47.21 CIRCADIAN RHYTHM SLEEP DISORDER, DELAYED SLEEP PHASE TYPE: Status: ACTIVE | Noted: 2021-10-31

## 2022-01-24 ENCOUNTER — TELEPHONE (OUTPATIENT)
Dept: INTERNAL MEDICINE | Facility: CLINIC | Age: 82
End: 2022-01-24

## 2022-01-24 NOTE — TELEPHONE ENCOUNTER
Hub staff attempted to follow warm transfer process and was unsuccessful     Caller: Riky Moon    Relationship to patient: Self    Best call back number:     Patient is needing: PATIENT IS RETURNING A CALL TO THE OFFICE.  I TRIED TO WARM TRANSFER BUT GOT NO ANSWER.

## 2022-01-26 ENCOUNTER — OFFICE VISIT (OUTPATIENT)
Dept: INTERNAL MEDICINE | Facility: CLINIC | Age: 82
End: 2022-01-26

## 2022-01-26 VITALS
HEIGHT: 71 IN | TEMPERATURE: 98 F | OXYGEN SATURATION: 94 % | HEART RATE: 72 BPM | WEIGHT: 309.4 LBS | DIASTOLIC BLOOD PRESSURE: 59 MMHG | RESPIRATION RATE: 16 BRPM | BODY MASS INDEX: 43.31 KG/M2 | SYSTOLIC BLOOD PRESSURE: 145 MMHG

## 2022-01-26 DIAGNOSIS — E78.5 HYPERLIPIDEMIA, UNSPECIFIED HYPERLIPIDEMIA TYPE: ICD-10-CM

## 2022-01-26 DIAGNOSIS — E03.9 HYPOTHYROIDISM, UNSPECIFIED TYPE: ICD-10-CM

## 2022-01-26 DIAGNOSIS — E11.9 TYPE 2 DIABETES MELLITUS WITHOUT COMPLICATION, WITHOUT LONG-TERM CURRENT USE OF INSULIN: Primary | ICD-10-CM

## 2022-01-26 DIAGNOSIS — I10 ESSENTIAL HYPERTENSION: ICD-10-CM

## 2022-01-26 PROCEDURE — 99214 OFFICE O/P EST MOD 30 MIN: CPT | Performed by: FAMILY MEDICINE

## 2022-04-28 ENCOUNTER — DOCUMENTATION (OUTPATIENT)
Dept: PHYSICAL THERAPY | Facility: HOSPITAL | Age: 82
End: 2022-04-28

## 2022-04-28 DIAGNOSIS — I89.0 LYMPHEDEMA: Primary | ICD-10-CM

## 2022-04-28 NOTE — THERAPY DISCHARGE NOTE
Outpatient Physical Therapy Discharge Summary         Patient Name: Riky Moon  : 1940  MRN: [id number]    Today's Date: 2022    Visit Dx:    ICD-10-CM ICD-9-CM   1. Lymphedema  I89.0 457.1        PT OP Goals     Row Name 22 1000          PT Short Term Goals    STG Date to Achieve 21  -KD     STG 1 Pt to demonstrate awareness of condition and precautions for improved prevention, management, care of symptoms, and ease of transition to self-care of condition.  -KD     STG 1 Progress Not Met  -KD     STG 1 Progress Comments Pt did not return for therapy, change in medical status  -KD     STG 2 Patient/family independent with self-wrapping techniques of compression bandages as indicated for improved self-management of condition.  -KD     STG 2 Progress Not Met  -KD     STG 2 Progress Comments Pt did not return for therapy, change in medical status  -KD     STG 3 Patient demonstrate decreased net edema of  B LE's >/=5-10cm for decreased edema symptoms, decreased risk of infection, and improved skin care.  -KD     STG 3 Progress Not Met  -KD     STG 3 Progress Comments Pt did not return for therapy, change in medical status  -KD            Long Term Goals    LTG Date to Achieve 21  -KD     LTG 1 Patient/family independent with self-care techniques for self-management of condition.  -KD     LTG 1 Progress Not Met  -KD     LTG 1 Progress Comments Pt did not return for therapy, change in medical status  -KD     LTG 2 Patient demonstrate decreased net edema of L LE >/=10-20cm for decreased edema symptoms, decreased risk of infection, and improved skin care.  -KD     LTG 2 Progress Not Met  -KD     LTG 2 Progress Comments Pt did not return for therapy, change in medical status  -KD     LTG 3 Patient/family independent with compression garments as indicated for self-management of condition.  -KD     LTG 3 Progress Not Met  -KD     LTG 3 Progress Comments Pt did not return for therapy,  change in medical status  -KD           User Key  (r) = Recorded By, (t) = Taken By, (c) = Cosigned By    Initials Name Provider Type    Jessica Tamayo, PT Physical Therapist                OP PT Discharge Summary  Reason for Discharge: other (comment) (Pt did not return for therapy, change in medical status)  Outcomes Achieved: Other (Pt did not return for therapy, change in medical status)  Discharge Destination: Unknown  Discharge Instructions/Additional Comments: Pt did not return for therapy, change in medical status.      Time Calculation:                    Jessica Horta, CJ  4/28/2022

## 2022-05-11 ENCOUNTER — HOME HEALTH ADMISSION (OUTPATIENT)
Dept: HOME HEALTH SERVICES | Facility: HOME HEALTHCARE | Age: 82
End: 2022-05-11

## 2022-05-11 ENCOUNTER — OFFICE VISIT (OUTPATIENT)
Dept: INTERNAL MEDICINE | Facility: CLINIC | Age: 82
End: 2022-05-11

## 2022-05-11 VITALS
BODY MASS INDEX: 44.1 KG/M2 | WEIGHT: 315 LBS | OXYGEN SATURATION: 91 % | RESPIRATION RATE: 16 BRPM | HEART RATE: 72 BPM | SYSTOLIC BLOOD PRESSURE: 140 MMHG | TEMPERATURE: 98 F | DIASTOLIC BLOOD PRESSURE: 60 MMHG | HEIGHT: 71 IN

## 2022-05-11 DIAGNOSIS — R06.02 SHORTNESS OF BREATH ON EXERTION: ICD-10-CM

## 2022-05-11 DIAGNOSIS — J42 CHRONIC BRONCHITIS, UNSPECIFIED CHRONIC BRONCHITIS TYPE: ICD-10-CM

## 2022-05-11 DIAGNOSIS — M79.89 LEFT LEG SWELLING: Primary | ICD-10-CM

## 2022-05-11 DIAGNOSIS — R09.89 OTHER SPECIFIED SYMPTOMS AND SIGNS INVOLVING THE CIRCULATORY AND RESPIRATORY SYSTEMS: ICD-10-CM

## 2022-05-11 PROCEDURE — 99214 OFFICE O/P EST MOD 30 MIN: CPT | Performed by: FAMILY MEDICINE

## 2022-05-11 NOTE — PROGRESS NOTES
"Chief Complaint  Follow up to diabetes 2  (No strength/ leg pain )    Subjective          Riky Moon presents to [facility]  History of Present Illness    The patient is an 81-year-old male who presents today for follow-up on diabetes mellitus type 2, along with some concern about no strength in his legs.    The patient reports that on 05/08/2022, he was walking up a ramp into a restaurant and felt so short of breath that he almost fell. He states that he has an occasional coughing spell and brings up clear phlegm. He states that he does have COPD and has an inhaler, but has not been using it. When asked why, the patient states that he is more concerned about his wife, who has multiple sclerosis and Alzheimer's disease and her medications are expensive.     The patient reports that his left leg is swollen, and he is concerned that it may be infected. He states that sometime last year he went to an urgent care center for the same issue and was prescribed an antibiotic that cleared it up. He reports that he cannot get compression socks on, and asked if there is a service that would come to his home to put the compression stockings on. The patient states that he has felt like he has not had any strength for the last 2 months, and has gained some weight. He relays that he sits around a lot because he does not have any energy.     The patient reports that he has had constipation.    The patient confirms that he is a .      Review of Systems was performed, and pertinent findings are noted in the HPI.    Objective   Vital Signs:  /60   Pulse 72   Temp 98 °F (36.7 °C)   Resp 16   Ht 180.3 cm (71\")   Wt (!) 148 kg (326 lb)   SpO2 91%   BMI 45.47 kg/m²           Physical Exam  Vitals and nursing note reviewed.   Constitutional:       General: He is not in acute distress.     Appearance: He is well-developed. He is not diaphoretic.   HENT:      Head: Normocephalic and " atraumatic.      Nose: Nose normal.   Eyes:      General:         Right eye: No discharge.         Left eye: No discharge.      Conjunctiva/sclera: Conjunctivae normal.   Neck:      Thyroid: No thyromegaly.      Trachea: No tracheal deviation.   Cardiovascular:      Rate and Rhythm: Normal rate and regular rhythm.      Heart sounds: Normal heart sounds.   Pulmonary:      Effort: Pulmonary effort is normal. No respiratory distress.      Breath sounds: Normal breath sounds. No stridor. No wheezing.   Chest:      Chest wall: No tenderness.   Abdominal:      General: Bowel sounds are normal. There is no distension.      Palpations: Abdomen is soft.      Tenderness: There is no abdominal tenderness.   Musculoskeletal:         General: Normal range of motion.      Cervical back: Normal range of motion.      Comments: Left lower leg edema.   Lymphadenopathy:      Cervical: No cervical adenopathy.   Skin:     General: Skin is warm and dry.   Neurological:      Mental Status: He is alert and oriented to person, place, and time.      Motor: No abnormal muscle tone.      Coordination: Coordination normal.   Psychiatric:         Behavior: Behavior normal.         Thought Content: Thought content normal.         Judgment: Judgment normal.             Assessment and Plan    Diagnoses and all orders for this visit:    1. Left leg swelling (Primary)  -     US Venous Doppler Lower Extremity Left (duplex)  -     Ambulatory Referral to Lymphedema Clinic  -     Ambulatory Referral to Vascular Surgery  -     Ambulatory Referral to Home Health  -     Doppler Ankle Brachial Index Single Level CAR    2. Shortness of breath on exertion  -     Ambulatory Referral to Cardiology  -     XR Chest 2 View    3. Chronic bronchitis, unspecified chronic bronchitis type (HCC)  -     Fluticasone-Umeclidin-Vilant (Trelegy Ellipta) 100-62.5-25 MCG/INH inhaler; Inhale 1 puff Daily.  Dispense: 1 each; Refill: 12    4. Other specified symptoms and signs  involving the circulatory and respiratory systems   -     Doppler Ankle Brachial Index Single Level CAR      1. Left leg swelling  - Home Health referral for compression stocking application.  - Referral to Lymphedema Clinic.  - Referral to Vascular surgery.  - Left leg Doppler Ankle Brachial Index study.    2. Shortness of breath on exertion  - Referral to Cardiology.  - Chest x-ray.    3. Chronic bronchitis  - Trelegy Ellipta 100-62.5-25 mcg/inh inhaler, inhale 1 puff daily. It was stressed that the patient should use this every day.    Return in 3 weeks.       Follow Up   No follow-ups on file.  Patient was given instructions and counseling regarding his condition or for health maintenance advice. Please see specific information pulled into the AVS if appropriate.     Transcribed from ambient dictation for Marco Carrillo MD by Paulina Rosas.  05/11/22   10:18 EDT    Patient verbalized consent to the visit recording.

## 2022-05-16 ENCOUNTER — HOSPITAL ENCOUNTER (OUTPATIENT)
Dept: PHYSICAL THERAPY | Facility: HOSPITAL | Age: 82
Setting detail: THERAPIES SERIES
Discharge: HOME | End: 2022-05-16

## 2022-05-16 DIAGNOSIS — I89.0 LYMPHEDEMA: Primary | ICD-10-CM

## 2022-05-16 PROCEDURE — 97163 PT EVAL HIGH COMPLEX 45 MIN: CPT

## 2022-05-16 NOTE — THERAPY EVALUATION
Physical Therapy Lymphedema Initial Evaluation  [facility]     Patient Name: Riky Moon  : 1940  MRN: [id number]  Today's Date: 2022      Visit Date: 2022    Visit Dx:    ICD-10-CM ICD-9-CM   1. Lymphedema  I89.0 457.1       Patient Active Problem List   Diagnosis   • OA (osteoarthritis) of hip   • KINDRA treated with auto BiPAP   • Chest pain   • Diabetes mellitus ([facility])   • Hypertension   • Hyperlipidemia   • Hypothyroidism   • Asthma   • Overactive bladder   • CAP (community acquired pneumonia)   • Cellulitis of left lower extremity   • COPD (chronic obstructive pulmonary disease) ([facility])   • Dyslipidemia   • Gastroesophageal reflux disease   • Leukocytosis   • Peripheral nerve disease   • Constipation   • Oropharyngeal dysphagia   • Bronchitis   • Class 3 severe obesity due to excess calories with serious comorbidity and body mass index (BMI) of 40.0 to 44.9 in adult ([facility])   • Left leg swelling   • Anemia   • Leukocytosis   • Circadian rhythm sleep disorder, delayed sleep phase type        Past Medical History:   Diagnosis Date   • Acid reflux    • Anemia    • Arthritis     OSTEO   • Asthma    • Chest discomfort    • Colon polyp    • COPD (chronic obstructive pulmonary disease) ([facility])    • Depression    • Diabetes mellitus ([facility])     type 2   • Disease of thyroid gland    • Emphysema lung ([facility])    • High cholesterol    • Hypertension    • Morbid obesity ([facility])    • Neuropathy     BOTH FEET   • Obesity, Class III, BMI 40-49.9 (morbid obesity) ([facility])    • PAD (peripheral artery disease) ([facility])    • Poor circulation of extremity     LEFT LEG   • Sleep apnea    • Vitamin D deficiency         Past Surgical History:   Procedure Laterality Date   • APPENDECTOMY     • CATARACT EXTRACTION W/ INTRAOCULAR LENS IMPLANT Bilateral    • CHOLECYSTECTOMY     • COLONOSCOPY  2014   • COLONOSCOPY W/ POLYPECTOMY      BENIGN   • HERNIA REPAIR     • INTERSTIM PLACEMENT Left 2016    BLADDER CONTROL   •  KNEE ARTHROPLASTY Right    • MOUTH SURGERY  06/26/2018   • NOSE SURGERY      REMOVED BLOCKAGE    • ROTATOR CUFF REPAIR Right    • TOTAL HIP ARTHROPLASTY Right 11/9/2017    Procedure: RIGHT TOTAL HIP ARTHROPLASTY;  Surgeon: Riky Fernando MD;  Location: [facility] OR;  Service:        Visit Dx:    ICD-10-CM ICD-9-CM   1. Lymphedema  I89.0 457.1        Patient History     Row Name 05/16/22 1500             History    Chief Complaint Swelling  -KA      Date Current Problem(s) Began --  3-4 years ago  -KA      Brief Description of Current Complaint Pt reports that he began having trouble with neuropathy in feet as well as pain and swelling after having his R hip replacement 3-4 years ago.  Swelling has gotten progressively worse over time.  He has tried compression stockings but is unable to alex and wife cannot help due to having MS and alzheimer's disease.  His wife helps him put on his socks and shoes, will be getting an epidural in back next month.  Cannot remember when, but he has had cellulitis with resulting open wounds that had to be treated.  Has some form of pump at home, but hasn't used it since Christmas.  2 known episodes of cellulitis, concerned it may be getting infected again.  Is scheduled to get a doppler performed tomorrow.  -KA      Patient/Caregiver Goals Decrease swelling;Relieve pain;Improve mobility  -KA      How has patient tried to help current problem? compression pump, compression stockings.  -KA              Pain     Difficulties with ADL's? Difficulty with dressing, walking, household chores  -KA              Fall Risk Assessment    Any falls in the past year: Yes  -KA      Number of falls reported in the last 12 months 1  -KA      Factors that contributed to the fall: Other (comment)  Missed a step coming off porch  -KA      Does patient have a fear of falling Yes (comment)  -KA              Services    Are you currently receiving Home Health services No  -KA      Do you plan to receive  Home Health services in the near future No  -KA              Daily Activities    Primary Language English  -KA      How does patient learn best? Listening  -KA      Teaching needs identified Management of Condition;Home Exercise Program  -KA      Patient is concerned about/has problems with Climbing Stairs;Difficulty with self care (i.e. bathing, dressing, toileting:;Performing home management (household chores, shopping, care of dependents);Walking  -KA      Does patient have problems with the following? Depression  -KA      Barriers to learning None  -KA      Functional Status dressing;mobility issues preventing performance of daily activities  -KA      Pt Participated in POC and Goals Yes  -KA              Safety    Are you being hurt, hit, or frightened by anyone at home or in your life? No  -KA      Are you being neglected by a caregiver No  -KA      Have you had any of the following issues with Depression  -KA            User Key  (r) = Recorded By, (t) = Taken By, (c) = Cosigned By    Initials Name Provider Type    Svetlana Couch, PT Physical Therapist                 Lymphedema     Row Name 05/16/22 1600             Subjective Pain    Able to rate subjective pain? yes  -KA      Pre-Treatment Pain Level 0  -KA      Post-Treatment Pain Level 0  -KA              Subjective Comments    Subjective Comments Reports swelling has gotten progressively worse, multiple episodes of lymphedema, unable to wear compression garments  -KA              Lymphedema Assessment    Lymphedema Classification RLE:;LLE:;stage 2 (Spontaneously Irreversible);secondary  -KA      Infections or Cellulitis? yes  -KA      Infection/Cellulitis Treatment Twice that he knows of, antibiotics/hospitalization/wound care  -KA      Lymphedema Precautions asthma;other (comment)  Doppler to rule out DVT, check RITO  -KA      Lymphedema Assessment Comments Moderate-severe lymphedema LLE from foot to knee, mild-moderate on RLE from foot to knee   "-KA              LLIS - Physical Concerns    The amount of pain associated with my lymphedema is: 3  -KA      The amount of limb heaviness associated with my lymphedema is: 3  -KA      The amount of skin tightness associated with my lymphedema is: 3  -KA      The size of my swollen limb(s) seems: 3  -KA      Lymphedema affects the movement of my swollen limb(s): 3  -KA      The strength in my swollen limb(s) is: 3  -KA              LLIS - Psychosocial Concerns    Lymphedema affects my body image (i.e., \"how I think I look\"). 4  -KA      Lymphedema affects my socializing with others. 3  -KA      Lymphedema \"gets me down\" (i.e., depression, frustration, or anger) 3  -KA      I must rely on others for help due to my lymphedema. 3  -KA      I know what to do to manage my lymphedema 2  -KA              LLIS - Functional Concerns    Lymphedema affects my ability to perform self-care activities (i.e. eating, dressing, hygiene) 3  -KA      Lymphedema affects my ability to perform routine home or work-related activities. 3  -KA      Lymphedema affects my performance of preferred leisure activities. 3  -KA      Lymphedema affects proper fit of clothing/shoes 3  -KA      Lymphedema affects my sleep 2  -KA              Posture/Observations    Posture/Observations Comments Forward head, rounded shoulders, excessive anterior pelvic tilt; Ambulates into clinic slowly with antalgic pattern, R lateral lean, genu varus L knee  -KA              General ROM    GENERAL ROM COMMENTS BUE ROM WFLs; Limited with L hip flexion, mild limitations with dorsiflexion bilaterally; Can only reach to level of mid calf when trying to bend to reach feet  -KA              MMT (Manual Muscle Testing)    General MMT Comments BLE strength grossly 3-/5 to 3/5  -KA              Lymphedema Edema Assessment    Ptting Edema Category By grade out of 4  -KA      Pitting Edema + 3/4;+ 4/4  -KA      Stemmer Sign bilateral:;positive  -KA      [place] Hump " bilateral:;positive  -KA      Edema Assessment Comment Mild edema R foot to knee, firm in areas but still with some pitting; LLE with moderate to severe pitting edema from foot to knee  -KA              Skin Changes/Observations    Location/Assessment Lower Extremity  -KA      Lower Extremity Conditions bilateral:;dry;shiny;hairless;other (comment)  Mild weeping from scratch L anterior shin, cut on R sole of foot  -KA      Lower Extremity Color/Pigment bilateral:;red  -KA      Skin Observations Comment Pt had redness on B shins and lower calves, scratch on anterior L shin with minimal weeping, but none after leg was cleaned, cut on R sole of foot; no warmth to either leg, saw MD last week who was not concerned about cellulitis.  -KA              Lymphedema Sensation    Lymphedema Sensation Reports RLE:;LLE:;numbness;tingling  -KA      Lymphedema Sensation Tests light touch  -KA      Lymphedema Light Touch RLE:;LLE:;mild impairment  -KA              Lymphedema Measurements    Measurement Type(s) Circumferential  -KA      Circumferential Areas Lower extremities  -KA              BLE Circumferential (cm)    Measurement Location 1 Knee (popliteal crease)  -KA      Left 1 44.8 cm  -KA      Right 1 43.4 cm  -KA      Measurement Location 2 10cm below knee  -KA      Left 2 54.4 cm  -KA      Right 2 49.5 cm  -KA      Measurement Location 3 20cm below knee  -KA      Left 3 47 cm  -KA      Right 3 48.8 cm  -KA      Measurement Location 4 30cm below knee  -KA      Left 4 40.2 cm  -KA      Right 4 27.5 cm  -KA      Measurement Location 5 Ankle  -KA      Left 5 38.2 cm  -KA      Right 5 28.2 cm  -KA      Measurement Location 6 Midfoot  -KA      Left 6 32.2 cm  -KA      Right 6 28 cm  -KA      LLE Circumferential Total 256.8 cm  -KA      RLE Circumferential Total 225.4 cm  -KA              Lymphedema Life Impact Scale Totals    A.  Total Q1 - Q17 (Do not include Q18) 47  -KA      B.  Total number of questions answered (Q1-Q17) 16   -KA      C. Divide A by B 2.94  -KA      D. Multiple C by 25 73.5  -KA            User Key  (r) = Recorded By, (t) = Taken By, (c) = Cosigned By    Initials Name Provider Type    Svetlana Couch, PT Physical Therapist                                Therapy Education  Education Details: Reviewed signs and symptoms of cellulitis, pt advised to monitor scratch on L anterior shin and cut on bottom of R foot and go to urgent care if he has signs and symptoms of cellulitis.  Advised to use Eucerin and aquaphor to keep legs moisturized and to perform ankle pumps and continue to use pump until he can be seen for therapy.  Given: Edema management, Symptoms/condition management  Program: New  How Provided: Verbal  Provided to: Patient  Level of Understanding: Verbalized       OP Exercises     Row Name 05/16/22 1600             Subjective Comments    Subjective Comments Reports swelling has gotten progressively worse, multiple episodes of lymphedema, unable to wear compression garments  -KA              Subjective Pain    Able to rate subjective pain? yes  -KA      Pre-Treatment Pain Level 0  -KA      Post-Treatment Pain Level 0  -KA            User Key  (r) = Recorded By, (t) = Taken By, (c) = Cosigned By    Initials Name Provider Type    Svetlana Couch, PT Physical Therapist                             PT OP Goals     Row Name 05/16/22 1600          PT Short Term Goals    STG Date to Achieve 05/30/22  -KA     STG 1 Patient to demonstrate awareness of condition and precautions for improved prevention, management, care of symptoms, and ease of transition to self care of condition.  -KA     STG 1 Progress New  -KA     STG 2 Patient demonstrates decreased net edema of Left Lower Extremity>/= 10-15 cm for decreased edema symptoms, decreased risk of infection, and improved skin care.  -KA     STG 2 Progress New  -KA     STG 3 Skin on lower extremities to be intact, no weeping, to decrease risk of infection.  -KA     STG  3 Progress New  -            Long Term Goals    LTG Date to Achieve 06/27/22  -     LTG 1 Patient/family independent with self-care techniques for self-management of condition.  -     LTG 1 Progress New  -     LTG 2 Patient demonstrates decreased net edema of Left Lower Extremity>/= 15-30 cm for decreased edema symptoms, decreased risk of infection, and improved skin care.  -     LTG 2 Progress New  -     LTG 3 Patient/family independent with compression garments as indicated for self-management of condition.  -     LTG 3 Progress New  -            Time Calculation    PT Goal Re-Cert Due Date 08/14/22  -           User Key  (r) = Recorded By, (t) = Taken By, (c) = Cosigned By    Initials Name Provider Type    Svetlana Couch, PT Physical Therapist                 PT Assessment/Plan     Row Name 05/16/22 1633          PT Assessment    Functional Limitations Limitation in home management;Performance in leisure activities;Performance in self-care ADL  -     Impairments Balance;Edema;Endurance;Gait;Impaired lymphatic circulation;Joint mobility;Muscle strength;Posture;Sensation  -     Assessment Comments 81 y.o. male who presents with bilateral Lower Extremity lymphedema, significantly worse on LLE.  Presentation is consistent with secondary Stage 2. Lymphedema is present from feet  to knees and is characterized by redness, dry and shiny skin, +Stemmer's sign, + [place] Hump, minimal weeping L anterior shin.  Impairments include gait deviations, decreased mobility, weakness, decreased skin integrity, edema.  Based on presentation today, pt is at high risk for cellulitis and wounds.  Pt is limited with ability to dress, bathe, walk, and perform household chores due to lymphedema.  Score on Lymphedema Life Impact Scale is 73.5.  Patient is a fair candidate for complete decongestive therapy to reduce infection risk, improve skin condition, and promote self-management of condition.  Following  education, patient agrees to participate in Phase I (skilled care) and Phase II (self-management) of CDT.  Potential barriers include poor mobility which will make donning long term devices difficult (will need help from wife) and prevent self-bandaging as well as complicated medical history (will need to have an appropriate RITO, no open wounds, and no DVT to proceed).  -KA     Rehab Potential Fair  -KA     Patient/caregiver participated in establishment of treatment plan and goals Yes  -KA     Patient would benefit from skilled therapy intervention Yes  -KA            PT Plan    PT Frequency 5x/week  -KA     Predicted Duration of Therapy Intervention (PT) 4-6 weeks  -KA     Planned CPT's? PT EVAL HIGH COMPLEXITY: 28853;PT RE-EVAL: 82519;PT THER PROC EA 15 MIN: 02662;PT THER ACT EA 15 MIN: 22105;PT MANUAL THERAPY EA 15 MIN: 32014;PT NEUROMUSC RE-EDUCATION EA 15 MIN: 01363;PT GAIT TRAINING EA 15 MIN: 19398;PT SELF CARE/HOME MGMT/TRAIN EA 15: 47811  -     Physical Therapy Interventions (Optional Details) bandaging;home exercise program;manual lymphatic drainage;manual therapy techniques;patient/family education  -     PT Plan Comments Check results of RITO and doppler, proceed with CDT if/when appropriate.  -           User Key  (r) = Recorded By, (t) = Taken By, (c) = Cosigned By    Initials Name Provider Type    Svetlana Couch, PT Physical Therapist                             Time Calculation:   Start Time: 1525  Stop Time: 1610  Time Calculation (min): 45 min   Therapy Charges for Today     Code Description Service Date Service Provider Modifiers Qty    [id number]  PT EVAL HIGH COMPLEXITY 3 5/16/2022 Svetlana French, PT GP 1                    Svetlana French, PT  5/16/2022

## 2022-05-17 ENCOUNTER — HOSPITAL ENCOUNTER (OUTPATIENT)
Dept: CARDIOLOGY | Facility: HOSPITAL | Age: 82
Discharge: HOME | End: 2022-05-17

## 2022-05-17 ENCOUNTER — LAB (OUTPATIENT)
Dept: LAB | Facility: HOSPITAL | Age: 82
End: 2022-05-17

## 2022-05-17 LAB
ALBUMIN SERPL-MCNC: 4.3 G/DL (ref 3.5–5.2)
ALBUMIN/GLOB SERPL: 2 G/DL
ALP SERPL-CCNC: 58 U/L (ref 39–117)
ALT SERPL W P-5'-P-CCNC: 13 U/L (ref 1–41)
ANION GAP SERPL CALCULATED.3IONS-SCNC: 10 MMOL/L (ref 5–15)
ANISOCYTOSIS BLD QL: ABNORMAL
AST SERPL-CCNC: 17 U/L (ref 1–40)
BASOPHILS # BLD MANUAL: 0.22 10*3/MM3 (ref 0–0.2)
BASOPHILS NFR BLD MANUAL: 2 % (ref 0–1.5)
BH CV LOWER ARTERIAL LEFT ABI RATIO: 1.15
BH CV LOWER ARTERIAL LEFT DORSALIS PEDIS SYS MAX: 136
BH CV LOWER ARTERIAL LEFT GREAT TOE SYS MAX: 109
BH CV LOWER ARTERIAL LEFT POST TIBIAL SYS MAX: 159
BH CV LOWER ARTERIAL LEFT TBI RATIO: 0.79
BH CV LOWER ARTERIAL RIGHT ABI RATIO: 1.25
BH CV LOWER ARTERIAL RIGHT DORSALIS PEDIS SYS MAX: 159
BH CV LOWER ARTERIAL RIGHT GREAT TOE SYS MAX: 89
BH CV LOWER ARTERIAL RIGHT POST TIBIAL SYS MAX: 173
BH CV LOWER ARTERIAL RIGHT TBI RATIO: 0.64
BILIRUB SERPL-MCNC: 0.9 MG/DL (ref 0–1.2)
BUN SERPL-MCNC: 17 MG/DL (ref 8–23)
BUN/CREAT SERPL: 14.7 (ref 7–25)
CALCIUM SPEC-SCNC: 9.6 MG/DL (ref 8.6–10.5)
CHLORIDE SERPL-SCNC: 102 MMOL/L (ref 98–107)
CHOLEST SERPL-MCNC: 117 MG/DL (ref 0–200)
CO2 SERPL-SCNC: 25 MMOL/L (ref 22–29)
CREAT SERPL-MCNC: 1.16 MG/DL (ref 0.76–1.27)
DEPRECATED RDW RBC AUTO: 68.3 FL (ref 37–54)
EGFRCR SERPLBLD CKD-EPI 2021: 62.9 ML/MIN/1.73
ERYTHROCYTE [DISTWIDTH] IN BLOOD BY AUTOMATED COUNT: 16.6 % (ref 12.3–15.4)
GLOBULIN UR ELPH-MCNC: 2.2 GM/DL
GLUCOSE SERPL-MCNC: 129 MG/DL (ref 65–99)
HBA1C MFR BLD: 6.7 % (ref 4.8–5.6)
HCT VFR BLD AUTO: 38.6 % (ref 37.5–51)
HDLC SERPL-MCNC: 35 MG/DL (ref 40–60)
HGB BLD-MCNC: 13.1 G/DL (ref 13–17.7)
LDLC SERPL CALC-MCNC: 65 MG/DL (ref 0–100)
LDLC/HDLC SERPL: 1.87 {RATIO}
LYMPHOCYTES # BLD MANUAL: 1.85 10*3/MM3 (ref 0.7–3.1)
LYMPHOCYTES NFR BLD MANUAL: 3 % (ref 5–12)
MACROCYTES BLD QL SMEAR: ABNORMAL
MAXIMAL PREDICTED HEART RATE: 138 BPM
MCH RBC QN AUTO: 38.2 PG (ref 26.6–33)
MCHC RBC AUTO-ENTMCNC: 33.9 G/DL (ref 31.5–35.7)
MCV RBC AUTO: 112.5 FL (ref 79–97)
MONOCYTES # BLD: 0.33 10*3/MM3 (ref 0.1–0.9)
NEUTROPHILS # BLD AUTO: 8.5 10*3/MM3 (ref 1.7–7)
NEUTROPHILS NFR BLD MANUAL: 78 % (ref 42.7–76)
NRBC BLD AUTO-RTO: 0.5 /100 WBC (ref 0–0.2)
NRBC SPEC MANUAL: 1 /100 WBC (ref 0–0.2)
PLAT MORPH BLD: NORMAL
PLATELET # BLD AUTO: 202 10*3/MM3 (ref 140–450)
PMV BLD AUTO: 11.6 FL (ref 6–12)
POLYCHROMASIA BLD QL SMEAR: ABNORMAL
POTASSIUM SERPL-SCNC: 5 MMOL/L (ref 3.5–5.2)
PROT SERPL-MCNC: 6.5 G/DL (ref 6–8.5)
RBC # BLD AUTO: 3.43 10*6/MM3 (ref 4.14–5.8)
SODIUM SERPL-SCNC: 137 MMOL/L (ref 136–145)
STRESS TARGET HR: 117 BPM
T-UPTAKE NFR SERPL: 1.07 TBI (ref 0.8–1.3)
T4 SERPL-MCNC: 6.88 MCG/DL (ref 4.5–11.7)
TRIGL SERPL-MCNC: 83 MG/DL (ref 0–150)
TSH SERPL DL<=0.05 MIU/L-ACNC: 4.6 UIU/ML (ref 0.27–4.2)
UPPER ARTERIAL LEFT ARM BRACHIAL SYS MAX: 120 MMHG
UPPER ARTERIAL RIGHT ARM BRACHIAL SYS MAX: 138 MMHG
VARIANT LYMPHS NFR BLD MANUAL: 1 % (ref 0–5)
VARIANT LYMPHS NFR BLD MANUAL: 16 % (ref 19.6–45.3)
VLDLC SERPL-MCNC: 17 MG/DL (ref 5–40)
WBC MORPH BLD: NORMAL
WBC NRBC COR # BLD: 10.9 10*3/MM3 (ref 3.4–10.8)

## 2022-05-17 PROCEDURE — 85007 BL SMEAR W/DIFF WBC COUNT: CPT | Performed by: FAMILY MEDICINE

## 2022-05-17 PROCEDURE — 80053 COMPREHEN METABOLIC PANEL: CPT | Performed by: FAMILY MEDICINE

## 2022-05-17 PROCEDURE — 84443 ASSAY THYROID STIM HORMONE: CPT | Performed by: FAMILY MEDICINE

## 2022-05-17 PROCEDURE — 36415 COLL VENOUS BLD VENIPUNCTURE: CPT | Performed by: FAMILY MEDICINE

## 2022-05-17 PROCEDURE — 85025 COMPLETE CBC W/AUTO DIFF WBC: CPT | Performed by: FAMILY MEDICINE

## 2022-05-17 PROCEDURE — 83036 HEMOGLOBIN GLYCOSYLATED A1C: CPT | Performed by: FAMILY MEDICINE

## 2022-05-17 PROCEDURE — 93922 UPR/L XTREMITY ART 2 LEVELS: CPT

## 2022-05-17 PROCEDURE — 80061 LIPID PANEL: CPT | Performed by: FAMILY MEDICINE

## 2022-05-17 PROCEDURE — 84479 ASSAY OF THYROID (T3 OR T4): CPT | Performed by: FAMILY MEDICINE

## 2022-05-17 PROCEDURE — 84436 ASSAY OF TOTAL THYROXINE: CPT | Performed by: FAMILY MEDICINE

## 2022-05-18 ENCOUNTER — HOSPITAL ENCOUNTER (OUTPATIENT)
Dept: GENERAL RADIOLOGY | Facility: HOSPITAL | Age: 82
Discharge: HOME | End: 2022-05-18
Admitting: FAMILY MEDICINE

## 2022-05-18 PROCEDURE — 71046 X-RAY EXAM CHEST 2 VIEWS: CPT

## 2022-05-23 DIAGNOSIS — D53.9 MACROCYTIC ANEMIA: Primary | ICD-10-CM

## 2022-05-23 DIAGNOSIS — R06.02 SHORTNESS OF BREATH ON EXERTION: ICD-10-CM

## 2022-05-23 RX ORDER — LEVOTHYROXINE SODIUM 112 UG/1
112 TABLET ORAL DAILY
Qty: 90 TABLET | Refills: 2 | Status: SHIPPED | OUTPATIENT
Start: 2022-05-23 | End: 2022-08-20

## 2022-05-23 NOTE — PROGRESS NOTES
Please inform the patient of the following abnormal results.     CONCLUSION: Trace bilateral pleural effusions with cardiomegaly.    He would benefit from seeing pulmonology.

## 2022-05-23 NOTE — PROGRESS NOTES
·Right Conclusion: The right RITO is normal. Normal digital pressures.  ·Left Conclusion: The left RITO is normal. Normal digital pressures

## 2022-06-02 ENCOUNTER — HOSPITAL ENCOUNTER (OUTPATIENT)
Dept: PHYSICAL THERAPY | Facility: HOSPITAL | Age: 82
Setting detail: THERAPIES SERIES
Discharge: HOME | End: 2022-06-02

## 2022-06-02 DIAGNOSIS — I89.0 LYMPHEDEMA: Primary | ICD-10-CM

## 2022-06-02 PROCEDURE — 97140 MANUAL THERAPY 1/> REGIONS: CPT

## 2022-06-02 NOTE — THERAPY TREATMENT NOTE
Outpatient Physical Therapy Lymphedema Treatment Note  [facility]     Patient Name: Riky Moon  : 1940  MRN: [id number]  Today's Date: 2022        Visit Date: 2022    Visit Dx:    ICD-10-CM ICD-9-CM   1. Lymphedema  I89.0 457.1       Patient Active Problem List   Diagnosis   • OA (osteoarthritis) of hip   • KINDRA treated with auto BiPAP   • Chest pain   • Diabetes mellitus ([facility])   • Hypertension   • Hyperlipidemia   • Hypothyroidism   • Asthma   • Overactive bladder   • CAP (community acquired pneumonia)   • Cellulitis of left lower extremity   • COPD (chronic obstructive pulmonary disease) ([facility])   • Dyslipidemia   • Gastroesophageal reflux disease   • Leukocytosis   • Peripheral nerve disease   • Constipation   • Oropharyngeal dysphagia   • Bronchitis   • Class 3 severe obesity due to excess calories with serious comorbidity and body mass index (BMI) of 40.0 to 44.9 in adult ([facility])   • Left leg swelling   • Anemia   • Leukocytosis   • Circadian rhythm sleep disorder, delayed sleep phase type         Lymphedema     Row Name 22 1000 22 0900          Subjective Pain    Able to rate subjective pain? -- yes  -KA     Pre-Treatment Pain Level -- 3  -KA            Subjective Comments    Subjective Comments -- Pt reports that since his initial evaluation, he had to go to the emergency room because his legs were weeping.  He reports they gave him antibiotics.  No DVT identified on doppler. RITO normal BLE.  -            Lymphedema Assessment    Lymphedema Classification -- RLE:;LLE:;stage 2 (Spontaneously Irreversible);secondary  -KA     Infections or Cellulitis? -- yes  -     Infection/Cellulitis Treatment -- Went to ER last week, been on antibiotics for past week, twice previously  -KA     Lymphedema Precautions -- asthma  -     Lymphedema Assessment Comments -- Moderate-severe lymphedema LLE from foot to knee, mild-moderate on RLE from foot to knee  -KA            Lymphedema Edema  Assessment    [place] Sign bilateral:;positive  -KA --     [place] Hump bilateral:;positive  -KA --            Skin Changes/Observations    Location/Assessment -- Lower Extremity  -KA     Lower Extremity Conditions -- bilateral:;dry;shiny;hairless;other (comment)  Moderate weeping with small shallow wound L anterior shin; scabbing R anterior shin  -KA     Lower Extremity Color/Pigment -- bilateral:;red  -KA     Skin Observations Comment -- Recently to ER for pain/weeping, currently on antibiotics which pt says has been helping with leg pain  -KA            Lymphedema Measurements    Measurement Type(s) -- Circumferential  -KA     Circumferential Areas -- Lower extremities  -KA            BLE Circumferential (cm)    Measurement Location 1 Knee (popliteal crease)  -KA --     Left 1 44.2 cm  -KA --     Right 1 43.7 cm  -KA --     Measurement Location 2 10cm below knee  -KA --     Left 2 54.5 cm  -KA --     Right 2 49.2 cm  -KA --     Measurement Location 3 20cm below knee  -KA --     Left 3 48 cm  -KA --     Right 3 37.4 cm  -KA --     Measurement Location 4 30cm below knee  -KA --     Left 4 40.8 cm  -KA --     Right 4 26.9 cm  -KA --     Measurement Location 5 Ankle  -KA --     Left 5 39.5 cm  -KA --     Right 5 27.7 cm  -KA --     Measurement Location 6 Midfoot  -KA --     Left 6 34.5 cm  -KA --     Right 6 27.8 cm  -KA --     LLE Circumferential Total 261.5 cm  -KA --     RLE Circumferential Total 212.7 cm  -KA --            Compression/Skin Care    Compression/Skin Care skin care;wrapping location;bandaging  -KA --     Skin Care washed/dried;moisturizing lotion applied  Neosporin to small open wound LLE, Aquaphor to dry shiny anterior shin, Eucerin to remainder of BLE below knees  -KA --     Wrapping Location lower extremity  -KA --     Wrapping Location LE bilateral:;foot to knee  -KA --     Wrapping Comments Prior to bandaging, Neosporin applied to small open area L shin, covered with non-adherent pad then ABD  pad  -KA --     Bandage Layers cotton liner;padding/fluff layer;dense foam;short-stretch bandages (comment size/quantity)  -KA --     Bandaging Comments LLE: TG9, Artiflex x 2, Rosidal soft ankle to knee, Rosidal K 1-8cm, 2-10 cm, 1-12 cm, Darco shoe; RLE: TG9, Artiflex x 2, Rosidal K 1-8 cm, 1-10 cm, 1-12 cm  -JOHNIE --     Bandaging Technique circumferential/spiral;light compression  -JOHNIE --           User Key  (r) = Recorded By, (t) = Taken By, (c) = Cosigned By    Initials Name Provider Type    Svetlana Couch, PT Physical Therapist                               PT Assessment/Plan     Row Name 06/02/22 1330          PT Assessment    Assessment Comments Pt presents for first treatment session following initial evaluation.  Since evaluated, he has had to go to the emergency room for weeping/pain in LLE that sounds like cellulitis, has been on antibiotics for the past week with pain improving, but still has weeping and one small shallow open area of skin on anterior LLE.  Today's session focused on skin care and compression bandaging from foot to knee, will continue this focus until weeping is resolved.  Pt instructed to leave bandages on until appointment tomorrow, can have wife help remove if he has increased pain.  Bandaging applied with light compression, spiral technique, extra padding and one extra short stretch bandage to LLE.  -JOHNIE            PT Plan    PT Plan Comments Assess response to bandaging, modify as needed; continue focus on skin care.  -JOHNIE           User Key  (r) = Recorded By, (t) = Taken By, (c) = Cosigned By    Initials Name Provider Type    Svetlana Couch, PT Physical Therapist                    OP Exercises     Row Name 06/02/22 1386 06/02/22 0900          Subjective Comments    Subjective Comments -- Pt reports that since his initial evaluation, he had to go to the emergency room because his legs were weeping.  He reports they gave him antibiotics.  No DVT identified on doppler. RITO  normal BLE.  -KA            Subjective Pain    Able to rate subjective pain? -- yes  -KA     Pre-Treatment Pain Level -- 3  -KA            Total Minutes    31177 - PT Manual Therapy Minutes 60  -KA --           User Key  (r) = Recorded By, (t) = Taken By, (c) = Cosigned By    Initials Name Provider Type    Svetlana Couch, PT Physical Therapist                        Manual Rx (last 36 hours)     Manual Treatments     Row Name 06/02/22 1334             Total Minutes    10170 - PT Manual Therapy Minutes 60  -KA            User Key  (r) = Recorded By, (t) = Taken By, (c) = Cosigned By    Initials Name Provider Type    Svetlana Couch, PT Physical Therapist                 PT OP Goals     Row Name 06/02/22 1300          PT Short Term Goals    STG Date to Achieve 06/16/22  -KA     STG 1 Patient to demonstrate awareness of condition and precautions for improved prevention, management, care of symptoms, and ease of transition to self care of condition.  -KA     STG 1 Progress New  -KA     STG 2 Patient demonstrates decreased net edema of Left Lower Extremity>/= 10-15 cm for decreased edema symptoms, decreased risk of infection, and improved skin care.  -KA     STG 2 Progress New  -KA     STG 3 Skin on lower extremities to be intact, no weeping, to decrease risk of infection.  -KA     STG 3 Progress New  -KA            Long Term Goals    LTG Date to Achieve 07/14/22  -KA     LTG 1 Patient/family independent with self-care techniques for self-management of condition.  -KA     LTG 1 Progress New  -KA     LTG 2 Patient demonstrates decreased net edema of Left Lower Extremity>/= 15-30 cm for decreased edema symptoms, decreased risk of infection, and improved skin care.  -KA     LTG 2 Progress New  -KA     LTG 3 Patient/family independent with compression garments as indicated for self-management of condition.  -KA     LTG 3 Progress New  -KA            Time Calculation    PT Goal Re-Cert Due Date 08/14/22  -KA            User Key  (r) = Recorded By, (t) = Taken By, (c) = Cosigned By    Initials Name Provider Type    Svetlana Couch, PT Physical Therapist                Therapy Education  Education Details: Discussed bandaging technique and wound care for LLE, will need further review.  Pt instructed to leave bandages intact until next visit.  If he experiences pain, he can remove bandaging but needs to bring all materials back to clinic with him.  Given: Bandaging/dressing change  Program: New  How Provided: Verbal, Demonstration  Provided to: Patient  Level of Understanding: Verbalized              Time Calculation:   Start Time: 0915  Stop Time: 1015  Time Calculation (min): 60 min  Timed Charges  54489 - PT Manual Therapy Minutes: 60  Total Minutes  Timed Charges Total Minutes: 60   Total Minutes: 60   Therapy Charges for Today     Code Description Service Date Service Provider Modifiers Qty    24293673400  PT MANUAL THERAPY EA 15 MIN 6/2/2022 Svetlana French, PT GP 4                    Svetlana French, PT  6/2/2022

## 2022-06-03 ENCOUNTER — HOSPITAL ENCOUNTER (OUTPATIENT)
Dept: PHYSICAL THERAPY | Facility: HOSPITAL | Age: 82
Setting detail: THERAPIES SERIES
Discharge: HOME | End: 2022-06-03

## 2022-06-03 DIAGNOSIS — I89.0 LYMPHEDEMA: Primary | ICD-10-CM

## 2022-06-03 PROCEDURE — 97140 MANUAL THERAPY 1/> REGIONS: CPT

## 2022-06-03 PROCEDURE — 97535 SELF CARE MNGMENT TRAINING: CPT

## 2022-06-03 NOTE — THERAPY TREATMENT NOTE
Outpatient Physical Therapy Lymphedema Treatment Note  [facility]     Patient Name: Riky Moon  : 1940  MRN: [id number]  Today's Date: 6/3/2022        Visit Date: 2022    Visit Dx:    ICD-10-CM ICD-9-CM   1. Lymphedema  I89.0 457.1       Patient Active Problem List   Diagnosis   • OA (osteoarthritis) of hip   • KINDRA treated with auto BiPAP   • Chest pain   • Diabetes mellitus ([facility])   • Hypertension   • Hyperlipidemia   • Hypothyroidism   • Asthma   • Overactive bladder   • CAP (community acquired pneumonia)   • Cellulitis of left lower extremity   • COPD (chronic obstructive pulmonary disease) ([facility])   • Dyslipidemia   • Gastroesophageal reflux disease   • Leukocytosis   • Peripheral nerve disease   • Constipation   • Oropharyngeal dysphagia   • Bronchitis   • Class 3 severe obesity due to excess calories with serious comorbidity and body mass index (BMI) of 40.0 to 44.9 in adult ([facility])   • Left leg swelling   • Anemia   • Leukocytosis   • Circadian rhythm sleep disorder, delayed sleep phase type         Lymphedema     Row Name 22 1300             Subjective Pain    Able to rate subjective pain? yes  -KA      Pre-Treatment Pain Level 0  -KA              Subjective Comments    Subjective Comments Pt reports no issues with bandaging, just that the bulkiness made driving and walking more challenging.  His wife came with him and will come with him to learn bandaging.  -KA              Skin Changes/Observations    Skin Observations Comment Minimal drainage on non-adherent pad, one small shallow wound still present but not as deep as yesterday, one other raw area noted near wound  -KA              Compression/Skin Care    Compression/Skin Care skin care;wrapping location;bandaging;remove bandages  -KA      Skin Care washed/dried;moisturizing lotion applied  -KA      Wrapping Location lower extremity  -KA      Wrapping Location LE bilateral:;foot to knee  -KA      Wrapping Comments Prior to  bandaging, Neosporin applied to small open area L shin, covered with non-adherent pad then ABD pad  -KA      Bandage Layers cotton liner;padding/fluff layer;dense foam;short-stretch bandages (comment size/quantity)  -      Bandaging Comments LLE: TG9, Artiflex x 2, Rosidal soft ankle to knee, Rosidal K 1-8cm, 2-10 cm, 1-12 cm, Darco shoe; RLE: TG9, Artiflex x 2, Rosidal K 1-8 cm, 1-10 cm, 1-12 cm  -JOHNIE      Bandaging Technique circumferential/spiral;light compression  -JOHNIE      Remove Bandages Pt assisted with removal of bandaging.  Showed wife how to remove and re-roll.  -JOHNIE            User Key  (r) = Recorded By, (t) = Taken By, (c) = Cosigned By    Initials Name Provider Type    Svetlana Couch, PT Physical Therapist                               PT Assessment/Plan     Row Name 06/03/22 1319          PT Assessment    Assessment Comments Pt responded well to bandaging, continued to perform skin care and bandaging in same manner due to good response.  Pt's wife educated this visit on how to wash stockinette and remove/apply bandaging; however, she will need further training before attempting at home and will wait until pt no longer has weeping from LLE. He has difficulty tolerating positioning for bandaging, did best today sitting EOB with bed elevated and LLE propped on stool; this may be a barrier to wife bandaging at home even though she is willing to learn techniques. Continue with skin care and bandaging, add MLD when appropriate.  -JOHNIE            PT Plan    PT Plan Comments Continue with bandaging and pt/family education, continue focus on skin care, add MLD when appropriate.  -JOHNIE           User Key  (r) = Recorded By, (t) = Taken By, (c) = Cosigned By    Initials Name Provider Type    Svetlana Couch, PT Physical Therapist                    OP Exercises     Row Name 06/03/22 1323 06/03/22 1300          Subjective Comments    Subjective Comments -- Pt reports no issues with bandaging, just that the  bulkiness made driving and walking more challenging.  His wife came with him and will come with him to learn bandaging.  -KA            Subjective Pain    Able to rate subjective pain? -- yes  -KA     Pre-Treatment Pain Level -- 0  -KA            Total Minutes    81262 - PT Manual Therapy Minutes 40  -KA --           User Key  (r) = Recorded By, (t) = Taken By, (c) = Cosigned By    Initials Name Provider Type    Svetlana Couch, CJ Physical Therapist                        Manual Rx (last 36 hours)     Manual Treatments     Row Name 06/03/22 1323             Total Minutes    76417 - PT Manual Therapy Minutes 40  -KA            User Key  (r) = Recorded By, (t) = Taken By, (c) = Cosigned By    Initials Name Provider Type    Svetlana Couch PT Physical Therapist                    Therapy Education  Education Details: Pt/wife educated on how to hand-wash stockinette and hang to dry.  Wife trained in how to remove and apply compression bandaging, will need further training before attempting at home, will likely not have wife bandage until LLE has ceased to weep.  Given: Bandaging/dressing change  Program: New  How Provided: Verbal, Demonstration  Provided to: Patient, Other (comment) (Wife)  Level of Understanding: Verbalized  05350 - PT Self Care/Mgmt Minutes: 15              Time Calculation:   Start Time: 0920  Stop Time: 1015  Time Calculation (min): 55 min  Timed Charges  28508 - PT Manual Therapy Minutes: 40  27567 - PT Self Care/Mgmt Minutes: 15  Total Minutes  Timed Charges Total Minutes: 40   Total Minutes: 40   Therapy Charges for Today     Code Description Service Date Service Provider Modifiers Qty    33245917834 HC PT MANUAL THERAPY EA 15 MIN 6/3/2022 Svetlana French, PT GP 3    88196460233 HC PT SELF CARE/MGMT/TRAIN EA 15 MIN 6/3/2022 Svetlana French, PT GP 1                    Svetlana French PT  6/3/2022

## 2022-06-06 ENCOUNTER — HOSPITAL ENCOUNTER (OUTPATIENT)
Dept: PHYSICAL THERAPY | Facility: HOSPITAL | Age: 82
Setting detail: THERAPIES SERIES
Discharge: HOME | End: 2022-06-06

## 2022-06-06 DIAGNOSIS — I89.0 LYMPHEDEMA: Primary | ICD-10-CM

## 2022-06-06 PROCEDURE — 97140 MANUAL THERAPY 1/> REGIONS: CPT

## 2022-06-06 NOTE — THERAPY TREATMENT NOTE
Outpatient Physical Therapy Lymphedema Treatment Note  [facility]     Patient Name: Riky Moon  : 1940  MRN: [id number]  Today's Date: 2022        Visit Date: 2022    Visit Dx:    ICD-10-CM ICD-9-CM   1. Lymphedema  I89.0 457.1       Patient Active Problem List   Diagnosis   • OA (osteoarthritis) of hip   • KINDRA treated with auto BiPAP   • Chest pain   • Diabetes mellitus ([facility])   • Hypertension   • Hyperlipidemia   • Hypothyroidism   • Asthma   • Overactive bladder   • CAP (community acquired pneumonia)   • Cellulitis of left lower extremity   • COPD (chronic obstructive pulmonary disease) ([facility])   • Dyslipidemia   • Gastroesophageal reflux disease   • Leukocytosis   • Peripheral nerve disease   • Constipation   • Oropharyngeal dysphagia   • Bronchitis   • Class 3 severe obesity due to excess calories with serious comorbidity and body mass index (BMI) of 40.0 to 44.9 in adult ([facility])   • Left leg swelling   • Anemia   • Leukocytosis   • Circadian rhythm sleep disorder, delayed sleep phase type         Lymphedema     Row Name 22 1100             Subjective Pain    Able to rate subjective pain? yes  -KA      Pre-Treatment Pain Level 0  -KA              Subjective Comments    Subjective Comments Pt reports no pain upon arrival, reports it is already easier to raise L leg.  -KA              Skin Changes/Observations    Skin Observations Comment Moderate weeping L anterior lower leg; no obvious wounds but several raw areas/some maceration where skin is trying to break down  -KA              Lymphedema Measurements    Measurement Type(s) Circumferential  -KA      Circumferential Areas Lower extremities  -KA              BLE Circumferential (cm)    Measurement Location 1 Knee (popliteal crease)  -KA      Left 1 44.7 cm  -KA      Right 1 44.5 cm  -KA      Measurement Location 2 10cm below knee  -KA      Left 2 53.9 cm  -KA      Right 2 49.3 cm  -KA      Measurement Location 3 20cm below knee   -KA      Left 3 47 cm  -KA      Right 3 36.8 cm  -KA      Measurement Location 4 30cm below knee  -KA      Left 4 39.6 cm  -KA      Right 4 25.3 cm  -KA      Measurement Location 5 Ankle  -KA      Left 5 37 cm  -KA      Right 5 26.2 cm  -KA      Measurement Location 6 Midfoot  -KA      Left 6 32.4 cm  -KA      Right 6 26 cm  -KA      LLE Circumferential Total 254.6 cm  -KA      RLE Circumferential Total 208.1 cm  -KA              Compression/Skin Care    Compression/Skin Care skin care;wrapping location;bandaging;remove bandages  -KA      Skin Care washed/dried;moisturizing lotion applied  Aquaphor to raw areas L anterior shin, Eucerin to remainder of LLE and RLE  -KA      Wrapping Location lower extremity  -KA      Wrapping Location LE bilateral:;foot to knee  -KA      Bandage Layers cotton liner;padding/fluff layer;dense foam;short-stretch bandages (comment size/quantity)  -KA      Bandaging Comments LLE: ABD pads x 2, TG9, Artiflex x 2, Rosidal soft ankle to knee, Rosidal K 1-8cm, 2-10 cm, 1-12 cm, Darco shoe; RLE: TG9, Artiflex x 2, Rosidal K 1-8 cm, 1-10 cm, 1-12 cm  -KA      Bandaging Technique circumferential/spiral;light compression  -KA      Remove Bandages Pt assisted with removal of bandages due to concerns about skin breakdown.  -KA            User Key  (r) = Recorded By, (t) = Taken By, (c) = Cosigned By    Initials Name Provider Type    Svetlana Couch, PT Physical Therapist                               PT Assessment/Plan     Row Name 06/06/22 9096          PT Assessment    Assessment Comments Pt responded well to bandaging; however, bandages loosened over the weekend and slid down his legs.  Girth measurements decreased from last week.  Increased weeping noted today with some maceration of skin, will continue to monitor and transfer to wound care clinic if needed.  -KA            PT Plan    PT Plan Comments Continue focus on bandaging, skin care, pt/family education.  Add MLD when appropriate.   -KA           User Key  (r) = Recorded By, (t) = Taken By, (c) = Cosigned By    Initials Name Provider Type    Svetlana Couch, CJ Physical Therapist                    OP Exercises     Row Name 06/06/22 1648 06/06/22 1100          Subjective Comments    Subjective Comments -- Pt reports no pain upon arrival, reports it is already easier to raise L leg.  -KA            Subjective Pain    Able to rate subjective pain? -- yes  -KA     Pre-Treatment Pain Level -- 0  -KA            Total Minutes    18904 - PT Manual Therapy Minutes 55  -KA --           User Key  (r) = Recorded By, (t) = Taken By, (c) = Cosigned By    Initials Name Provider Type    Svetlana Couch PT Physical Therapist                        Manual Rx (last 36 hours)     Manual Treatments     Row Name 06/06/22 1648             Total Minutes    31841 - PT Manual Therapy Minutes 55  -KA            User Key  (r) = Recorded By, (t) = Taken By, (c) = Cosigned By    Initials Name Provider Type    Svetlana Couch PT Physical Therapist                    Therapy Education  Education Details: Pt instructed to continue to wear bandaging into clinic until skin condition improves/weeping stops.  May need to transfer to wound care if weeping persists and causes further skin breakdown.  Provided with washing instructions for short stretch bandaging, bring clean liners to clinic tomorrow, bring clean bandages back on 6/9.  Given: Bandaging/dressing change, Symptoms/condition management  Program: New  How Provided: Verbal  Provided to: Patient, Other (comment) (Wife)  Level of Understanding: Verbalized              Time Calculation:   Start Time: 1130  Stop Time: 1225  Time Calculation (min): 55 min  Timed Charges  91749 - PT Manual Therapy Minutes: 55  Total Minutes  Timed Charges Total Minutes: 55   Total Minutes: 55   Therapy Charges for Today     Code Description Service Date Service Provider Modifiers Qty    [id number] HC PT MANUAL THERAPY EA 15  MIN 6/6/2022 Svetlana French, PT GP 4                    Svetlana French, PT  6/6/2022

## 2022-06-07 ENCOUNTER — HOSPITAL ENCOUNTER (OUTPATIENT)
Dept: PHYSICAL THERAPY | Facility: HOSPITAL | Age: 82
Setting detail: THERAPIES SERIES
Discharge: HOME | End: 2022-06-07

## 2022-06-07 DIAGNOSIS — I89.0 LYMPHEDEMA: Primary | ICD-10-CM

## 2022-06-07 DIAGNOSIS — I10 ESSENTIAL HYPERTENSION: ICD-10-CM

## 2022-06-07 PROCEDURE — 97140 MANUAL THERAPY 1/> REGIONS: CPT

## 2022-06-07 RX ORDER — ATENOLOL 50 MG/1
50 TABLET ORAL DAILY
Qty: 90 TABLET | Refills: 3 | Status: SHIPPED | OUTPATIENT
Start: 2022-06-07 | End: 2022-06-21

## 2022-06-07 RX ORDER — AMLODIPINE BESYLATE 5 MG/1
5 TABLET ORAL DAILY
Qty: 90 TABLET | Refills: 3 | Status: SHIPPED | OUTPATIENT
Start: 2022-06-07

## 2022-06-07 NOTE — THERAPY TREATMENT NOTE
Outpatient Physical Therapy Lymphedema Treatment Note  [facility]     Patient Name: Riky Moon  : 1940  MRN: [id number]  Today's Date: 2022        Visit Date: 2022    Visit Dx:    ICD-10-CM ICD-9-CM   1. Lymphedema  I89.0 457.1       Patient Active Problem List   Diagnosis   • OA (osteoarthritis) of hip   • KINDRA treated with auto BiPAP   • Chest pain   • Diabetes mellitus ([facility])   • Hypertension   • Hyperlipidemia   • Hypothyroidism   • Asthma   • Overactive bladder   • CAP (community acquired pneumonia)   • Cellulitis of left lower extremity   • COPD (chronic obstructive pulmonary disease) ([facility])   • Dyslipidemia   • Gastroesophageal reflux disease   • Leukocytosis   • Peripheral nerve disease   • Constipation   • Oropharyngeal dysphagia   • Bronchitis   • Class 3 severe obesity due to excess calories with serious comorbidity and body mass index (BMI) of 40.0 to 44.9 in adult ([facility])   • Left leg swelling   • Anemia   • Leukocytosis   • Circadian rhythm sleep disorder, delayed sleep phase type         Lymphedema     Row Name 22 1200             Subjective Pain    Able to rate subjective pain? yes  -KD      Pre-Treatment Pain Level 0  -KD      Post-Treatment Pain Level 0  -KD      Subjective Pain Comment burning sensation bottoms of feet--neuropathy  -KD              Subjective Comments    Subjective Comments States he has a velcro device that he can bring in for us to look at for LTC.  -KD              Skin Changes/Observations    Skin Observations Comment Moderate weeping L anterior lower leg; no obvious wounds but several raw areas/some maceration where skin is trying to break down  -KD              Compression/Skin Care    Compression/Skin Care skin care;wrapping location;bandaging;remove bandages  -KD      Skin Care washed/dried;moisturizing lotion applied  Aquaphor to raw areas L anterior shin, Eucerin to remainder of LLE and RLE  -KD      Wrapping Location lower extremity  -KD       Wrapping Location LE bilateral:;foot to knee  -KD      Bandage Layers cotton liner;padding/fluff layer;dense foam;short-stretch bandages (comment size/quantity)  -KD      Bandaging Comments LLE: ABD pads x 2/ 1 roll Kerlix, TG9, Artiflex x 2, Rosidal soft ankle to knee, Rosidal K 1-8cm, 2-10 cm, 1-12 cm, Darco shoe; RLE: TG9, Artiflex x 2, Rosidal K 1-8 cm, 1-10 cm, 1-12 cm  -KD      Bandaging Technique circumferential/spiral;light compression  -KD      Remove Bandages Removed bandages  -KD            User Key  (r) = Recorded By, (t) = Taken By, (c) = Cosigned By    Initials Name Provider Type    Jessica Tamayo, PT Physical Therapist                               PT Assessment/Plan     Row Name 06/07/22 1235          PT Assessment    Assessment Comments Pt still with mod weeping of LLE. Discussed possibly switching to wound care if weeping does not decrease soon. Also discussed pt possibly wearing his velcro device on the RLE while being treated for the LLE if he does switch--he is to bring it in to see if it will fit.  -KD            PT Plan    PT Plan Comments Cont therapy, cont to assess drainage and modify treatment as indicated.  -KD           User Key  (r) = Recorded By, (t) = Taken By, (c) = Cosigned By    Initials Name Provider Type    Jessica Tamayo, PT Physical Therapist                    OP Exercises     Row Name 06/07/22 1239 06/07/22 1200          Subjective Comments    Subjective Comments -- States he has a velcro device that he can bring in for us to look at for LTC.  -KD            Subjective Pain    Able to rate subjective pain? -- yes  -KD     Pre-Treatment Pain Level -- 0  -KD     Post-Treatment Pain Level -- 0  -KD     Subjective Pain Comment -- burning sensation bottoms of feet--neuropathy  -KD            Total Minutes    97808 - PT Manual Therapy Minutes 53  -KD --           User Key  (r) = Recorded By, (t) = Taken By, (c) = Cosigned By    Initials Name Provider Type    JAROCHO Horta  Jessica, CJ Physical Therapist                        Manual Rx (last 36 hours)     Manual Treatments     Row Name 06/07/22 1239             Total Minutes    95842 - PT Manual Therapy Minutes 53  -KD            User Key  (r) = Recorded By, (t) = Taken By, (c) = Cosigned By    Initials Name Provider Type    Jessica Tamayo PT Physical Therapist                    Therapy Education  Education Details: Discussed bringing in velcro device to see if it will fit right leg, vianey if he needs to switch to wound care for left leg then he can wear velcro on the right while getting treatment as long as it fits.  Given: Edema management  Program: New  How Provided: Verbal  Provided to: Patient  Level of Understanding: Verbalized              Time Calculation:   Start Time: 1120  Stop Time: 1213  Time Calculation (min): 53 min  Total Timed Code Minutes- PT: 53 minute(s)  Timed Charges  29168 - PT Manual Therapy Minutes: 53  Total Minutes  Timed Charges Total Minutes: 53   Total Minutes: 53   Therapy Charges for Today     Code Description Service Date Service Provider Modifiers Qty    97537159910 HC PT MANUAL THERAPY EA 15 MIN 6/7/2022 Jessica Horta, PT GP 4                    Jessica Horta PT  6/7/2022

## 2022-06-08 ENCOUNTER — HOSPITAL ENCOUNTER (OUTPATIENT)
Dept: PHYSICAL THERAPY | Facility: HOSPITAL | Age: 82
Setting detail: THERAPIES SERIES
Discharge: HOME | End: 2022-06-08

## 2022-06-08 DIAGNOSIS — I89.0 LYMPHEDEMA: Primary | ICD-10-CM

## 2022-06-08 PROCEDURE — 97140 MANUAL THERAPY 1/> REGIONS: CPT

## 2022-06-08 NOTE — THERAPY TREATMENT NOTE
Outpatient Physical Therapy Lymphedema Treatment Note  [facility]     Patient Name: Riky Moon  : 1940  MRN: [id number]  Today's Date: 2022        Visit Date: 2022    Visit Dx:    ICD-10-CM ICD-9-CM   1. Lymphedema  I89.0 457.1       Patient Active Problem List   Diagnosis   • OA (osteoarthritis) of hip   • KINDRA treated with auto BiPAP   • Chest pain   • Diabetes mellitus ([facility])   • Hypertension   • Hyperlipidemia   • Hypothyroidism   • Asthma   • Overactive bladder   • CAP (community acquired pneumonia)   • Cellulitis of left lower extremity   • COPD (chronic obstructive pulmonary disease) ([facility])   • Dyslipidemia   • Gastroesophageal reflux disease   • Leukocytosis   • Peripheral nerve disease   • Constipation   • Oropharyngeal dysphagia   • Bronchitis   • Class 3 severe obesity due to excess calories with serious comorbidity and body mass index (BMI) of 40.0 to 44.9 in adult ([facility])   • Left leg swelling   • Anemia   • Leukocytosis   • Circadian rhythm sleep disorder, delayed sleep phase type         Lymphedema     Row Name 22 1200             Subjective Pain    Able to rate subjective pain? yes  -KA      Pre-Treatment Pain Level 0  -KA      Post-Treatment Pain Level 0  -KA              Subjective Comments    Subjective Comments Pt reports bandages stayed up well, but that he has difficulty walking in hospital shoes.  -KA              Compression/Skin Care    Compression/Skin Care skin care;wrapping location;bandaging;remove bandages  -KA      Skin Care washed/dried;moisturizing lotion applied;other (comment)  Zinc oxide applied to raw areas L anterior leg, Eucerin to remaining L leg and RLE  -KA      Wrapping Location lower extremity  -KA      Wrapping Location LE bilateral:;foot to knee  -KA      Bandage Layers cotton liner;padding/fluff layer;dense foam;short-stretch bandages (comment size/quantity)  -KA      Bandaging Comments LLE: ABD pads x 2/ 1 roll Kerlix, TG9, Artiflex x 2,  Rosidal soft ankle to knee, Rosidal K 1-8cm, 2-10 cm, 1-12 cm, Darco shoe; RLE: TG9, Artiflex x 2, Rosidal K 1-8 cm, 1-10 cm, 1-12 cm  -JOHNIE      Bandaging Technique circumferential/spiral;light compression  -JOHNIE      Remove Bandages Removed bandages  -JOHNIE            User Key  (r) = Recorded By, (t) = Taken By, (c) = Cosigned By    Initials Name Provider Type    Svetlana Couch, PT Physical Therapist                               PT Assessment/Plan     Row Name 06/08/22 1212          PT Assessment    Assessment Comments Pt continues to have weeping L anterior shin, added zinc oxide to skin to see if this will help.  Continued with compression bandaging, same pattern due to good response. Focus is still on skin care and bandaging at this time due to weeping.  -KA            PT Plan    PT Plan Comments Assess response to zinc oxide, continue with skin care and bandaging.  -JOHNIE           User Key  (r) = Recorded By, (t) = Taken By, (c) = Cosigned By    Initials Name Provider Type    Svetlana Couch, PT Physical Therapist                    OP Exercises     Row Name 06/08/22 1401 06/08/22 1200          Subjective Comments    Subjective Comments -- Pt reports bandages stayed up well, but that he has difficulty walking in hospital shoes.  -JOHNIE            Subjective Pain    Able to rate subjective pain? -- yes  -KA     Pre-Treatment Pain Level -- 0  -KA     Post-Treatment Pain Level -- 0  -KA            Total Minutes    63146 - PT Manual Therapy Minutes 60  -KA --           User Key  (r) = Recorded By, (t) = Taken By, (c) = Cosigned By    Initials Name Provider Type    Svetlana Couch, PT Physical Therapist                        Manual Rx (last 36 hours)     Manual Treatments     Row Name 06/08/22 1401             Total Minutes    08460 - PT Manual Therapy Minutes 60  -KA            User Key  (r) = Recorded By, (t) = Taken By, (c) = Cosigned By    Initials Name Provider Type    Svetlana Couch, PT Physical  Therapist                    Therapy Education  Education Details: Discussed addition of zinc oxide to L shin  Given: Symptoms/condition management  Program: New  How Provided: Verbal  Provided to: Patient  Level of Understanding: Verbalized              Time Calculation:   Start Time: 1130  Stop Time: 1230  Time Calculation (min): 60 min  Timed Charges  24448 - PT Manual Therapy Minutes: 60  Total Minutes  Timed Charges Total Minutes: 60   Total Minutes: 60   Therapy Charges for Today     Code Description Service Date Service Provider Modifiers Qty    76379701350 HC PT MANUAL THERAPY EA 15 MIN 6/8/2022 Svetlana French, PT GP 4                    Svetlana French, PT  6/8/2022

## 2022-06-09 ENCOUNTER — HOSPITAL ENCOUNTER (OUTPATIENT)
Dept: PHYSICAL THERAPY | Facility: HOSPITAL | Age: 82
Setting detail: THERAPIES SERIES
Discharge: HOME | End: 2022-06-09

## 2022-06-09 DIAGNOSIS — I89.0 LYMPHEDEMA: Primary | ICD-10-CM

## 2022-06-09 PROCEDURE — 97140 MANUAL THERAPY 1/> REGIONS: CPT

## 2022-06-09 NOTE — THERAPY TREATMENT NOTE
Outpatient Physical Therapy Lymphedema Treatment Note  [facility]     Patient Name: Riky Moon  : 1940  MRN: [id number]  Today's Date: 2022        Visit Date: 2022    Visit Dx:    ICD-10-CM ICD-9-CM   1. Lymphedema  I89.0 457.1       Patient Active Problem List   Diagnosis   • OA (osteoarthritis) of hip   • KINDRA treated with auto BiPAP   • Chest pain   • Diabetes mellitus ([facility])   • Hypertension   • Hyperlipidemia   • Hypothyroidism   • Asthma   • Overactive bladder   • CAP (community acquired pneumonia)   • Cellulitis of left lower extremity   • COPD (chronic obstructive pulmonary disease) ([facility])   • Dyslipidemia   • Gastroesophageal reflux disease   • Leukocytosis   • Peripheral nerve disease   • Constipation   • Oropharyngeal dysphagia   • Bronchitis   • Class 3 severe obesity due to excess calories with serious comorbidity and body mass index (BMI) of 40.0 to 44.9 in adult ([facility])   • Left leg swelling   • Anemia   • Leukocytosis   • Circadian rhythm sleep disorder, delayed sleep phase type         Lymphedema     Row Name 22 1200             Subjective Pain    Able to rate subjective pain? yes  -KD      Pre-Treatment Pain Level 0  -KD      Post-Treatment Pain Level 0  -KD              Subjective Comments    Subjective Comments Hopes the zinc oxide helps with the drainage. Sees his doctor 6/15.  -KD              Lymphedema Edema Assessment    Edema Assessment Comment Dorsum of left foot quite edematous  -KD              Skin Changes/Observations    Skin Observations Comment Not quite as much draiange noted on pads today when removed.  -KD              Compression/Skin Care    Compression/Skin Care skin care;wrapping location;bandaging;remove bandages  -KD      Skin Care washed/dried;moisturizing lotion applied;other (comment)  Zinc oxide applied to raw areas L anterior leg, Eucerin to remaining L leg and RLE  -KD      Wrapping Location lower extremity  -KD      Wrapping Location LE  bilateral:;foot to knee  -KD      Bandage Layers cotton liner;padding/fluff layer;dense foam;short-stretch bandages (comment size/quantity)  -KD      Bandaging Comments LLE: ABD pads x 2/ 1 roll Kerlix, TG9, piece of channelled foam (Komprex II) on dorsum of foot, Artiflex x 2, Rosidal soft ankle to knee, Rosidal K 1-8cm, 2-10 cm, 1-12 cm, Darco shoe; RLE: TG9, Artiflex x 2, Rosidal K 1-8 cm, 1-10 cm, 1-12 cm  -KD      Bandaging Technique circumferential/spiral;light compression  -KD      Remove Bandages Removed bandages  -KD            User Key  (r) = Recorded By, (t) = Taken By, (c) = Cosigned By    Initials Name Provider Type    Jessica Tamayo, PT Physical Therapist                               PT Assessment/Plan     Row Name 06/09/22 1228          PT Assessment    Assessment Comments There did not seem to be quite as much drainage today from left lower leg, so continued to apply zinc oxide. Added Komprex II to bandaging today to address edema on dorum of foot. Clean bandages were applied today.  -KD            PT Plan    PT Plan Comments Cont therapy, cont to assess effect of zinc oxide on left leg skin, assess addition of channelled foam on dorsum of left foot.  -KD           User Key  (r) = Recorded By, (t) = Taken By, (c) = Cosigned By    Initials Name Provider Type    Jessica Tamayo, PT Physical Therapist                    OP Exercises     Row Name 06/09/22 1231 06/09/22 1200          Subjective Comments    Subjective Comments -- Hopes the zinc oxide helps with the drainage. Sees his doctor 6/15.  -KD            Subjective Pain    Able to rate subjective pain? -- yes  -KD     Pre-Treatment Pain Level -- 0  -KD     Post-Treatment Pain Level -- 0  -KD            Total Minutes    41158 - PT Manual Therapy Minutes 54  -KD --           User Key  (r) = Recorded By, (t) = Taken By, (c) = Cosigned By    Initials Name Provider Type    Jessica Tamayo, PT Physical Therapist                        Manual  Rx (last 36 hours)     Manual Treatments     Row Name 06/09/22 1231             Total Minutes    18246 - PT Manual Therapy Minutes 54  -KD            User Key  (r) = Recorded By, (t) = Taken By, (c) = Cosigned By    Initials Name Provider Type    Jessica Tamayo, PT Physical Therapist                    Therapy Education  Education Details: Discussed change in bandaging regimen.  Given: Bandaging/dressing change  Program: New  How Provided: Verbal, Demonstration  Provided to: Patient  Level of Understanding: Verbalized              Time Calculation:   Start Time: 1115  Stop Time: 1209  Time Calculation (min): 54 min  Total Timed Code Minutes- PT: 54 minute(s)  Timed Charges  73321 - PT Manual Therapy Minutes: 54  Total Minutes  Timed Charges Total Minutes: 54   Total Minutes: 54   Therapy Charges for Today     Code Description Service Date Service Provider Modifiers Qty    38605998524 HC PT MANUAL THERAPY EA 15 MIN 6/9/2022 Jessica Horta, PT GP 4                    Jessica Horta, PT  6/9/2022

## 2022-06-10 ENCOUNTER — HOSPITAL ENCOUNTER (OUTPATIENT)
Dept: PHYSICAL THERAPY | Facility: HOSPITAL | Age: 82
Setting detail: THERAPIES SERIES
Discharge: HOME | End: 2022-06-10

## 2022-06-10 DIAGNOSIS — I89.0 LYMPHEDEMA: Primary | ICD-10-CM

## 2022-06-10 PROCEDURE — 97140 MANUAL THERAPY 1/> REGIONS: CPT

## 2022-06-10 NOTE — THERAPY TREATMENT NOTE
Outpatient Physical Therapy Lymphedema Treatment Note  [facility]     Patient Name: Riky Moon  : 1940  MRN: [id number]  Today's Date: 6/10/2022        Visit Date: 06/10/2022    Visit Dx:    ICD-10-CM ICD-9-CM   1. Lymphedema  I89.0 457.1       Patient Active Problem List   Diagnosis   • OA (osteoarthritis) of hip   • KINDRA treated with auto BiPAP   • Chest pain   • Diabetes mellitus ([facility])   • Hypertension   • Hyperlipidemia   • Hypothyroidism   • Asthma   • Overactive bladder   • CAP (community acquired pneumonia)   • Cellulitis of left lower extremity   • COPD (chronic obstructive pulmonary disease) ([facility])   • Dyslipidemia   • Gastroesophageal reflux disease   • Leukocytosis   • Peripheral nerve disease   • Constipation   • Oropharyngeal dysphagia   • Bronchitis   • Class 3 severe obesity due to excess calories with serious comorbidity and body mass index (BMI) of 40.0 to 44.9 in adult ([facility])   • Left leg swelling   • Anemia   • Leukocytosis   • Circadian rhythm sleep disorder, delayed sleep phase type         Lymphedema     Row Name 06/10/22 1500             Subjective Pain    Able to rate subjective pain? yes  -KA      Pre-Treatment Pain Level 0  -KA      Post-Treatment Pain Level 0  -KA              Subjective Comments    Subjective Comments Pt reports he is still doing well with bandaging but is hoping to be able to take a shower again soon.  -KA              Skin Changes/Observations    Skin Observations Comment Mild-moderate weeping, raw areas with healthy red tissue no depth  -KA              Compression/Skin Care    Compression/Skin Care skin care;wrapping location;bandaging;remove bandages  -KA      Skin Care washed/dried;moisturizing lotion applied;other (comment)  Zinc oxide applied to raw areas L anterior leg, Eucerin to remaining L leg and RLE  -KA      Wrapping Location lower extremity  -KA      Wrapping Location LE bilateral:;foot to knee  -KA      Bandage Layers cotton  liner;padding/fluff layer;dense foam;short-stretch bandages (comment size/quantity)  -JOHNIE      Bandaging Comments LLE: ABD pads x 2/ 1 roll Kerlix, TG9, piece of channelled foam (Komprex II) on dorsum of foot, Artiflex x 2, Rosidal soft ankle to knee, Rosidal K 1-8cm, 2-10 cm, 1-12 cm, Darco shoe; RLE: TG9, Artiflex x 2, Rosidal K 1-8 cm, 1-10 cm, 1-12 cm  -JOHNIE      Bandaging Technique circumferential/spiral;light compression  -JOHNIE      Remove Bandages Removed bandages  -JOHNIE            User Key  (r) = Recorded By, (t) = Taken By, (c) = Cosigned By    Initials Name Provider Type    Svetlana Couch, PT Physical Therapist                               PT Assessment/Plan     Row Name 06/10/22 1527          PT Assessment    Assessment Comments Zinc oxide seems to be improving skin condition on LLE.  Overall, improved appearance of BLE related to edema, will formally reassess girth measurements next visit.  Continued with zinc oxide and compression bandaging as performed last visit.  -JOHNIE            PT Plan    PT Plan Comments Continue skin care and bandaging, reassess girth measurements.  -JOHNIE           User Key  (r) = Recorded By, (t) = Taken By, (c) = Cosigned By    Initials Name Provider Type    Svetlana Couch, PT Physical Therapist                    OP Exercises     Row Name 06/10/22 1528 06/10/22 1500          Subjective Comments    Subjective Comments -- Pt reports he is still doing well with bandaging but is hoping to be able to take a shower again soon.  -JOHNIE            Subjective Pain    Able to rate subjective pain? -- yes  -KA     Pre-Treatment Pain Level -- 0  -KA     Post-Treatment Pain Level -- 0  -KA            Total Minutes    58938 - PT Manual Therapy Minutes 55  -KA --           User Key  (r) = Recorded By, (t) = Taken By, (c) = Cosigned By    Initials Name Provider Type    Svetlana Couch, PT Physical Therapist                        Manual Rx (last 36 hours)     Manual Treatments     Row  Name 06/10/22 1528             Total Minutes    21780 - PT Manual Therapy Minutes 55  -KA            User Key  (r) = Recorded By, (t) = Taken By, (c) = Cosigned By    Initials Name Provider Type    Svetlana Couch, PT Physical Therapist                    Therapy Education  Education Details: Pt instructed in how to roll bandages using bandage roller.  Asked to leave bandaging intact until next visit as long as he tolerates it well.  Given: Bandaging/dressing change  Program: New  How Provided: Verbal  Provided to: Patient  Level of Understanding: Verbalized              Time Calculation:   Start Time: 1130  Stop Time: 1225  Time Calculation (min): 55 min  Timed Charges  14073 - PT Manual Therapy Minutes: 55  Total Minutes  Timed Charges Total Minutes: 55   Total Minutes: 55   Therapy Charges for Today     Code Description Service Date Service Provider Modifiers Qty    [id number]  PT MANUAL THERAPY EA 15 MIN 6/10/2022 Svetlana French, PT GP 4                    Svetlana French, PT  6/10/2022

## 2022-06-13 ENCOUNTER — HOSPITAL ENCOUNTER (OUTPATIENT)
Dept: PHYSICAL THERAPY | Facility: HOSPITAL | Age: 82
Setting detail: THERAPIES SERIES
Discharge: HOME | End: 2022-06-13

## 2022-06-13 DIAGNOSIS — I89.0 LYMPHEDEMA: Primary | ICD-10-CM

## 2022-06-13 PROCEDURE — 97140 MANUAL THERAPY 1/> REGIONS: CPT

## 2022-06-13 NOTE — THERAPY PROGRESS REPORT/RE-CERT
"    Outpatient Physical Therapy Lymphedema Progress Note  [facility]     Patient Name: Riky Moon  : 1940  MRN: [id number]  Today's Date: 2022        Visit Date: 2022    Visit Dx:    ICD-10-CM ICD-9-CM   1. Lymphedema  I89.0 457.1       Patient Active Problem List   Diagnosis   • OA (osteoarthritis) of hip   • KINDRA treated with auto BiPAP   • Chest pain   • Diabetes mellitus ([facility])   • Hypertension   • Hyperlipidemia   • Hypothyroidism   • Asthma   • Overactive bladder   • CAP (community acquired pneumonia)   • Cellulitis of left lower extremity   • COPD (chronic obstructive pulmonary disease) ([facility])   • Dyslipidemia   • Gastroesophageal reflux disease   • Leukocytosis   • Peripheral nerve disease   • Constipation   • Oropharyngeal dysphagia   • Bronchitis   • Class 3 severe obesity due to excess calories with serious comorbidity and body mass index (BMI) of 40.0 to 44.9 in adult ([facility])   • Left leg swelling   • Anemia   • Leukocytosis   • Circadian rhythm sleep disorder, delayed sleep phase type         Lymphedema     Row Name 22 1300 22 1100          Subjective Pain    Able to rate subjective pain? -- yes  -KA     Pre-Treatment Pain Level -- 0  -KA            Subjective Comments    Subjective Comments -- Pt reports \"I hope the zinc is helping.\"  -KA            LLIS - Physical Concerns    The amount of pain associated with my lymphedema is: 1  -KA --     The amount of limb heaviness associated with my lymphedema is: 1  -KA --     The amount of skin tightness associated with my lymphedema is: 1  -KA --     The size of my swollen limb(s) seems: 2  -KA --     Lymphedema affects the movement of my swollen limb(s): 2  -KA --     The strength in my swollen limb(s) is: 1  -KA --            LLIS - Psychosocial Concerns    Lymphedema affects my body image (i.e., \"how I think I look\"). 2  -KA --     Lymphedema affects my socializing with others. 1  -KA --     Lymphedema affects my intimate " "relations with spouse or partner (rate 0 if not applicable 0  -KA --     Lymphedema \"gets me down\" (i.e., depression, frustration, or anger) 2  -KA --     I must rely on others for help due to my lymphedema. 2  -KA --     I know what to do to manage my lymphedema 1  -KA --            LLIS - Functional Concerns    Lymphedema affects my ability to perform self-care activities (i.e. eating, dressing, hygiene) 1  -KA --     Lymphedema affects my ability to perform routine home or work-related activities. 2  -KA --     Lymphedema affects my performance of preferred leisure activities. 2  -KA --     Lymphedema affects proper fit of clothing/shoes 2  -KA --     Lymphedema affects my sleep 1  -KA --            Skin Changes/Observations    Skin Observations Comment -- No open wounds, Minimal weeping L anterior lower leg  -KA            Lymphedema Measurements    Measurement Type(s) -- Circumferential  -KA     Circumferential Areas -- Lower extremities  -KA            BLE Circumferential (cm)    Measurement Location 1 -- Knee (popliteal crease)  -KA     Left 1 -- 44 cm  -KA     Right 1 -- 44 cm  -KA     Measurement Location 2 -- 10cm below knee  -KA     Left 2 -- 50.4 cm  -KA     Right 2 -- 48.5 cm  -KA     Measurement Location 3 -- 20cm below knee  -KA     Left 3 -- 45 cm  -KA     Right 3 -- 38 cm  -KA     Measurement Location 4 -- 30cm below knee  -KA     Left 4 -- 37.6 cm  -KA     Right 4 -- 25.7 cm  -KA     Measurement Location 5 -- Ankle  -KA     Left 5 -- 36.5 cm  -KA     Right 5 -- 25.7 cm  -KA     Measurement Location 6 -- Midfoot  -KA     Left 6 -- 33.5 cm  -KA     Right 6 -- 26.2 cm  -KA     LLE Circumferential Total -- 247 cm  -KA     RLE Circumferential Total -- 208.1 cm  -KA            Compression/Skin Care    Compression/Skin Care -- skin care;wrapping location;bandaging;remove bandages  -KA     Skin Care -- washed/dried;moisturizing lotion applied  Zinc oxide applied to raw areas L anterior leg, Eucerin to " remaining L leg and RLE  -KA     Wrapping Location -- lower extremity  -KA     Wrapping Location LE -- bilateral:;foot to knee  -KA     Bandage Layers -- cotton liner;padding/fluff layer;dense foam;short-stretch bandages (comment size/quantity)  -KA     Bandaging Comments -- LLE: ABD pads x 2/ 1 roll Kerlix, TG9, piece of channelled foam (Komprex II) on dorsum of foot, Artiflex x 2, Rosidal soft ankle to knee, Rosidal K 1-8cm, 2-10 cm, 1-12 cm, Darco shoe; RLE: TG9, Artiflex x 2, Rosidal K 1-8 cm, 1-10 cm, 1-12 cm  -KA     Bandaging Technique -- circumferential/spiral;light compression  -KA     Remove Bandages -- Removed bandages  -KA            Lymphedema Life Impact Scale Totals    A.  Total Q1 - Q17 (Do not include Q18) 24  -KA --     B.  Total number of questions answered (Q1-Q17) 17  -KA --     C. Divide A by B 1.41  -KA --     D. Multiple C by 25 35.25  -KA --           User Key  (r) = Recorded By, (t) = Taken By, (c) = Cosigned By    Initials Name Provider Type    Svetlana Couch, PT Physical Therapist                               PT Assessment/Plan     Row Name 06/13/22 1311 06/13/22 1308       PT Assessment    Functional Limitations -- Limitation in home management;Performance in leisure activities;Performance in self-care ADL  -    Impairments -- Balance;Edema;Endurance;Gait;Impaired lymphatic circulation;Joint mobility;Muscle strength;Posture;Sensation  -KA    Assessment Comments Pt is making fair progress towards functional g oals, progress has been slower than expected due to recent bout of cellulitis and weeping of LLE which has required more time spent on skin care before proceeding to MLD.  Minimal weeping today and LLE girth measurements decreased by 14.5 cm since starting therapy on 6/2/22.  He has met 1 STG and is making progress towards all remaining goals.  Score on Lymphedema Life Impact scale has decreased from 73.5 to 35.25 indicating decreased disability related to lymphedema.  He is  "also noticing improvements in ability to walk and perform car transfers.  Continued with use of Zinc oxide, focus still on improving skin integrity and decreasing edema through compression bandaging, will add MLD when appropriate.  Pt remains appropriate for skilled physical therapy at this time to reduce edema, improve skin integrity, and reduce risk of wounds/cellulitis.  -KA --    Rehab Potential Fair  -KA --    Patient/caregiver participated in establishment of treatment plan and goals Yes  -KA --    Patient would benefit from skilled therapy intervention Yes  -KA --       PT Plan    PT Frequency 5x/week  -KA --    Predicted Duration of Therapy Intervention (PT) 4-6 weeks  -KA --    Planned CPT's? PT RE-EVAL: 05147;PT THER PROC EA 15 MIN: 41180;PT THER ACT EA 15 MIN: 95232;PT MANUAL THERAPY EA 15 MIN: 69866;PT NEUROMUSC RE-EDUCATION EA 15 MIN: 27464;PT GAIT TRAINING EA 15 MIN: 89995;PT SELF CARE/HOME MGMT/TRAIN EA 15: 65865  -KA --    Physical Therapy Interventions (Optional Details) bandaging;home exercise program;manual lymphatic drainage;manual therapy techniques;patient/family education  -KA --    PT Plan Comments Continue skin care and bandaging with use of Zinc oxide, add MLD when appropriate.  -KA --          User Key  (r) = Recorded By, (t) = Taken By, (c) = Cosigned By    Initials Name Provider Type    Svetlana Couch, PT Physical Therapist                    OP Exercises     Row Name 06/13/22 1315 06/13/22 1100          Subjective Comments    Subjective Comments -- Pt reports \"I hope the zinc is helping.\"  -KA            Subjective Pain    Able to rate subjective pain? -- yes  -KA     Pre-Treatment Pain Level -- 0  -KA            Total Minutes    30746 - PT Manual Therapy Minutes 60  -KA --           User Key  (r) = Recorded By, (t) = Taken By, (c) = Cosigned By    Initials Name Provider Type    Svetlana Couch, PT Physical Therapist                        Manual Rx (last 36 hours)     Manual " Treatments     Row Name 06/13/22 1315             Total Minutes    42291 - PT Manual Therapy Minutes 60  -KA            User Key  (r) = Recorded By, (t) = Taken By, (c) = Cosigned By    Initials Name Provider Type    Svetlana Couch, PT Physical Therapist                 PT OP Goals     Row Name 06/13/22 1200          PT Short Term Goals    STG Date to Achieve 06/16/22  -KA     STG 1 Patient to demonstrate awareness of condition and precautions for improved prevention, management, care of symptoms, and ease of transition to self care of condition.  -KA     STG 1 Progress Ongoing  -KA     STG 2 Patient demonstrates decreased net edema of Left Lower Extremity>/= 10-15 cm for decreased edema symptoms, decreased risk of infection, and improved skin care.  -KA     STG 2 Progress Met  -KA     STG 2 Progress Comments Net decreased edema 14.5 cm LLE  -KA     STG 3 Skin on lower extremities to be intact, no weeping, to decrease risk of infection.  -KA     STG 3 Progress Ongoing  -KA     STG 3 Progress Comments Minimal weeping today, improving with use of zinc oxide  -KA            Long Term Goals    LTG Date to Achieve 07/14/22  -KA     LTG 1 Patient/family independent with self-care techniques for self-management of condition.  -KA     LTG 1 Progress New  -KA     LTG 2 Patient demonstrates decreased net edema of Left Lower Extremity>/= 15-30 cm for decreased edema symptoms, decreased risk of infection, and improved skin care.  -KA     LTG 2 Progress Partially Met  -KA     LTG 2 Progress Comments Net decreased edema 14.5 cm LLE  -KA     LTG 3 Patient/family independent with compression garments as indicated for self-management of condition.  -KA     LTG 3 Progress New  -            Time Calculation    PT Goal Re-Cert Due Date 08/14/22  -KA           User Key  (r) = Recorded By, (t) = Taken By, (c) = Cosigned By    Initials Name Provider Type    Svetlana Couch, PT Physical Therapist                Therapy  Education  Education Details: Discussed girth measurements.  Will start having pt remove bandages and shower before treatment when weeping resolves.  Given: Bandaging/dressing change, Edema management  Program: New  How Provided: Verbal  Provided to: Patient  Level of Understanding: Verbalized              Time Calculation:   Start Time: 1130  Stop Time: 1230  Time Calculation (min): 60 min  Timed Charges  59809 - PT Manual Therapy Minutes: 60  Total Minutes  Timed Charges Total Minutes: 60   Total Minutes: 60   Therapy Charges for Today     Code Description Service Date Service Provider Modifiers Qty    16734586821 HC PT MANUAL THERAPY EA 15 MIN 6/13/2022 Svetlana French, PT GP 4                    Svetlana French, PT  6/13/2022

## 2022-06-14 ENCOUNTER — HOSPITAL ENCOUNTER (OUTPATIENT)
Dept: PHYSICAL THERAPY | Facility: HOSPITAL | Age: 82
Setting detail: THERAPIES SERIES
Discharge: HOME | End: 2022-06-14

## 2022-06-14 DIAGNOSIS — I89.0 LYMPHEDEMA: Primary | ICD-10-CM

## 2022-06-14 PROCEDURE — 97140 MANUAL THERAPY 1/> REGIONS: CPT

## 2022-06-14 NOTE — THERAPY TREATMENT NOTE
Outpatient Physical Therapy Lymphedema Treatment Note  [facility]     Patient Name: Riky Moon  : 1940  MRN: [id number]  Today's Date: 2022        Visit Date: 2022    Visit Dx:    ICD-10-CM ICD-9-CM   1. Lymphedema  I89.0 457.1       Patient Active Problem List   Diagnosis   • OA (osteoarthritis) of hip   • KINDRA treated with auto BiPAP   • Chest pain   • Diabetes mellitus ([facility])   • Hypertension   • Hyperlipidemia   • Hypothyroidism   • Asthma   • Overactive bladder   • CAP (community acquired pneumonia)   • Cellulitis of left lower extremity   • COPD (chronic obstructive pulmonary disease) ([facility])   • Dyslipidemia   • Gastroesophageal reflux disease   • Leukocytosis   • Peripheral nerve disease   • Constipation   • Oropharyngeal dysphagia   • Bronchitis   • Class 3 severe obesity due to excess calories with serious comorbidity and body mass index (BMI) of 40.0 to 44.9 in adult ([facility])   • Left leg swelling   • Anemia   • Leukocytosis   • Circadian rhythm sleep disorder, delayed sleep phase type         Lymphedema     Row Name 22 1200             Subjective Pain    Able to rate subjective pain? yes  -KD      Pre-Treatment Pain Level 0  -KD              Subjective Comments    Subjective Comments States he can walk back to the treatment room without feeling as tired now.  -KD              Skin Changes/Observations    Skin Observations Comment Min drainage noted on abd pad, no active weeping noted during treatment.  -KD              Compression/Skin Care    Compression/Skin Care skin care;wrapping location;bandaging;remove bandages  -KD      Skin Care washed/dried;moisturizing lotion applied  Zinc oxide applied to raw areas L anterior leg, Eucerin to remaining L leg and RLE, HCC to rashy areas prox lower legs just below knees  -KD      Wrapping Location lower extremity  -KD      Wrapping Location LE bilateral:;foot to knee  -KD      Bandage Layers cotton liner;padding/fluff layer;dense  foam;short-stretch bandages (comment size/quantity)  -KD      Bandaging Comments LLE: ABD pads x 1/ 1 roll Kerlix, TG9, piece of channelled foam (Komprex II) on dorsum of foot, Artiflex x 2, Rosidal soft ankle to knee, Rosidal K 1-8cm, 2-10 cm, 1-12 cm, Darco shoe; RLE: TG9, Artiflex x 2, Rosidal K 1-8 cm, 1-10 cm, 1-12 cm  -KD      Bandaging Technique circumferential/spiral;light compression  -KD      Compression/Skin Care Comments Used 1 less abd pad today in dressing on  left leg. Tried pt's velcro device on right leg today with good fit.  -KD            User Key  (r) = Recorded By, (t) = Taken By, (c) = Cosigned By    Initials Name Provider Type    Jessica Tamayo, PT Physical Therapist                               PT Assessment/Plan     Row Name 06/14/22 1231          PT Assessment    Assessment Comments Less drainage today from left leg--no noted weeping during treatment--placed 1 abd pad over open area instead of 2 today. Noted 2 smqall areas of red rash ant prox lower legs just below knees so applied HCC to those areas.  -KD            PT Plan    PT Plan Comments Cont therapy on 6/16/22. Monitor skin issues.  -KD           User Key  (r) = Recorded By, (t) = Taken By, (c) = Cosigned By    Initials Name Provider Type    Jessica Tamayo, PT Physical Therapist                    OP Exercises     Row Name 06/14/22 1236 06/14/22 1200          Subjective Comments    Subjective Comments -- States he can walk back to the treatment room without feeling as tired now.  -KD            Subjective Pain    Able to rate subjective pain? -- yes  -KD     Pre-Treatment Pain Level -- 0  -KD            Total Minutes    80973 - PT Manual Therapy Minutes 53  -KD --           User Key  (r) = Recorded By, (t) = Taken By, (c) = Cosigned By    Initials Name Provider Type    Jessica Tamayo, PT Physical Therapist                        Manual Rx (last 36 hours)     Manual Treatments     Row Name 06/14/22 1236             Total  Minutes    62027 - PT Manual Therapy Minutes 53  -KD            User Key  (r) = Recorded By, (t) = Taken By, (c) = Cosigned By    Initials Name Provider Type    Jessica Tamayo, CJ Physical Therapist                    Therapy Education  Education Details: Discussed progress with drainage from left leg--decreased abd pads by 1 today. Discussed adding HCC to rashy areas.  Given: Bandaging/dressing change, Edema management  Program: New  How Provided: Verbal, Demonstration  Provided to: Patient  Level of Understanding: Verbalized              Time Calculation:   Start Time: 1118  Stop Time: 1211  Time Calculation (min): 53 min  Total Timed Code Minutes- PT: 53 minute(s)  Timed Charges  93860 - PT Manual Therapy Minutes: 53  Total Minutes  Timed Charges Total Minutes: 53   Total Minutes: 53   Therapy Charges for Today     Code Description Service Date Service Provider Modifiers Qty    89717486223 HC PT MANUAL THERAPY EA 15 MIN 6/14/2022 Jessica Horta, PT GP 4                    Jessica Horta PT  6/14/2022

## 2022-06-15 ENCOUNTER — OFFICE VISIT (OUTPATIENT)
Dept: INTERNAL MEDICINE | Facility: CLINIC | Age: 82
End: 2022-06-15

## 2022-06-15 ENCOUNTER — LAB (OUTPATIENT)
Dept: LAB | Facility: HOSPITAL | Age: 82
End: 2022-06-15

## 2022-06-15 ENCOUNTER — APPOINTMENT (OUTPATIENT)
Dept: PHYSICAL THERAPY | Facility: HOSPITAL | Age: 82
End: 2022-06-15

## 2022-06-15 VITALS
OXYGEN SATURATION: 94 % | WEIGHT: 315 LBS | HEART RATE: 56 BPM | BODY MASS INDEX: 44.1 KG/M2 | DIASTOLIC BLOOD PRESSURE: 50 MMHG | HEIGHT: 71 IN | SYSTOLIC BLOOD PRESSURE: 126 MMHG

## 2022-06-15 DIAGNOSIS — E11.9 TYPE 2 DIABETES MELLITUS WITHOUT COMPLICATION, WITHOUT LONG-TERM CURRENT USE OF INSULIN: ICD-10-CM

## 2022-06-15 DIAGNOSIS — I89.0 LYMPHEDEMA: ICD-10-CM

## 2022-06-15 DIAGNOSIS — E03.9 HYPOTHYROIDISM, UNSPECIFIED TYPE: ICD-10-CM

## 2022-06-15 DIAGNOSIS — Z00.00 HEALTHCARE MAINTENANCE: ICD-10-CM

## 2022-06-15 DIAGNOSIS — Z00.00 MEDICARE ANNUAL WELLNESS VISIT, SUBSEQUENT: Primary | ICD-10-CM

## 2022-06-15 DIAGNOSIS — Z12.5 SCREENING FOR PROSTATE CANCER: ICD-10-CM

## 2022-06-15 LAB
ALBUMIN SERPL-MCNC: 4.3 G/DL (ref 3.5–5.2)
ALBUMIN/GLOB SERPL: 2 G/DL
ALP SERPL-CCNC: 51 U/L (ref 39–117)
ALT SERPL W P-5'-P-CCNC: 13 U/L (ref 1–41)
ANION GAP SERPL CALCULATED.3IONS-SCNC: 12 MMOL/L (ref 5–15)
AST SERPL-CCNC: 14 U/L (ref 1–40)
BASOPHILS # BLD AUTO: 0.08 10*3/MM3 (ref 0–0.2)
BASOPHILS NFR BLD AUTO: 0.8 % (ref 0–1.5)
BILIRUB SERPL-MCNC: 0.8 MG/DL (ref 0–1.2)
BUN SERPL-MCNC: 20 MG/DL (ref 8–23)
BUN/CREAT SERPL: 15.7 (ref 7–25)
CALCIUM SPEC-SCNC: 9.1 MG/DL (ref 8.6–10.5)
CHLORIDE SERPL-SCNC: 105 MMOL/L (ref 98–107)
CHOLEST SERPL-MCNC: 103 MG/DL (ref 0–200)
CO2 SERPL-SCNC: 25 MMOL/L (ref 22–29)
CREAT SERPL-MCNC: 1.27 MG/DL (ref 0.76–1.27)
DEPRECATED RDW RBC AUTO: 66.3 FL (ref 37–54)
EGFRCR SERPLBLD CKD-EPI 2021: 56.4 ML/MIN/1.73
EOSINOPHIL # BLD AUTO: 0.11 10*3/MM3 (ref 0–0.4)
EOSINOPHIL NFR BLD AUTO: 1.1 % (ref 0.3–6.2)
ERYTHROCYTE [DISTWIDTH] IN BLOOD BY AUTOMATED COUNT: 16.3 % (ref 12.3–15.4)
GLOBULIN UR ELPH-MCNC: 2.2 GM/DL
GLUCOSE SERPL-MCNC: 144 MG/DL (ref 65–99)
HBA1C MFR BLD: 6.5 % (ref 4.8–5.6)
HCT VFR BLD AUTO: 38.3 % (ref 37.5–51)
HDLC SERPL-MCNC: 35 MG/DL (ref 40–60)
HGB BLD-MCNC: 12.8 G/DL (ref 13–17.7)
LDLC SERPL CALC-MCNC: 52 MG/DL (ref 0–100)
LDLC/HDLC SERPL: 1.5 {RATIO}
LYMPHOCYTES # BLD AUTO: 1.65 10*3/MM3 (ref 0.7–3.1)
LYMPHOCYTES NFR BLD AUTO: 17.2 % (ref 19.6–45.3)
MCH RBC QN AUTO: 37.4 PG (ref 26.6–33)
MCHC RBC AUTO-ENTMCNC: 33.4 G/DL (ref 31.5–35.7)
MCV RBC AUTO: 112 FL (ref 79–97)
MONOCYTES # BLD AUTO: 0.8 10*3/MM3 (ref 0.1–0.9)
MONOCYTES NFR BLD AUTO: 8.3 % (ref 5–12)
NEUTROPHILS NFR BLD AUTO: 6.88 10*3/MM3 (ref 1.7–7)
NEUTROPHILS NFR BLD AUTO: 71.8 % (ref 42.7–76)
PLATELET # BLD AUTO: 228 10*3/MM3 (ref 140–450)
PMV BLD AUTO: 11.3 FL (ref 6–12)
POTASSIUM SERPL-SCNC: 4.5 MMOL/L (ref 3.5–5.2)
PROT SERPL-MCNC: 6.5 G/DL (ref 6–8.5)
PSA SERPL-MCNC: 0.23 NG/ML (ref 0–4)
RBC # BLD AUTO: 3.42 10*6/MM3 (ref 4.14–5.8)
SODIUM SERPL-SCNC: 142 MMOL/L (ref 136–145)
T-UPTAKE NFR SERPL: 1.11 TBI (ref 0.8–1.3)
T4 SERPL-MCNC: 6.53 MCG/DL (ref 4.5–11.7)
TRIGL SERPL-MCNC: 77 MG/DL (ref 0–150)
TSH SERPL DL<=0.05 MIU/L-ACNC: 4.52 UIU/ML (ref 0.27–4.2)
VLDLC SERPL-MCNC: 16 MG/DL (ref 5–40)
WBC NRBC COR # BLD: 9.6 10*3/MM3 (ref 3.4–10.8)

## 2022-06-15 PROCEDURE — 85025 COMPLETE CBC W/AUTO DIFF WBC: CPT | Performed by: FAMILY MEDICINE

## 2022-06-15 PROCEDURE — 80053 COMPREHEN METABOLIC PANEL: CPT | Performed by: FAMILY MEDICINE

## 2022-06-15 PROCEDURE — 84436 ASSAY OF TOTAL THYROXINE: CPT | Performed by: FAMILY MEDICINE

## 2022-06-15 PROCEDURE — 80061 LIPID PANEL: CPT | Performed by: FAMILY MEDICINE

## 2022-06-15 PROCEDURE — 84479 ASSAY OF THYROID (T3 OR T4): CPT | Performed by: FAMILY MEDICINE

## 2022-06-15 PROCEDURE — G0439 PPPS, SUBSEQ VISIT: HCPCS | Performed by: FAMILY MEDICINE

## 2022-06-15 PROCEDURE — 1159F MED LIST DOCD IN RCRD: CPT | Performed by: FAMILY MEDICINE

## 2022-06-15 PROCEDURE — 36415 COLL VENOUS BLD VENIPUNCTURE: CPT | Performed by: FAMILY MEDICINE

## 2022-06-15 PROCEDURE — 99214 OFFICE O/P EST MOD 30 MIN: CPT | Performed by: FAMILY MEDICINE

## 2022-06-15 PROCEDURE — 84443 ASSAY THYROID STIM HORMONE: CPT | Performed by: FAMILY MEDICINE

## 2022-06-15 PROCEDURE — 1125F AMNT PAIN NOTED PAIN PRSNT: CPT | Performed by: FAMILY MEDICINE

## 2022-06-15 PROCEDURE — 1170F FXNL STATUS ASSESSED: CPT | Performed by: FAMILY MEDICINE

## 2022-06-15 PROCEDURE — 83036 HEMOGLOBIN GLYCOSYLATED A1C: CPT | Performed by: FAMILY MEDICINE

## 2022-06-15 PROCEDURE — G0103 PSA SCREENING: HCPCS | Performed by: FAMILY MEDICINE

## 2022-06-16 ENCOUNTER — HOSPITAL ENCOUNTER (OUTPATIENT)
Dept: PHYSICAL THERAPY | Facility: HOSPITAL | Age: 82
Setting detail: THERAPIES SERIES
Discharge: HOME | End: 2022-06-16

## 2022-06-16 DIAGNOSIS — I89.0 LYMPHEDEMA: Primary | ICD-10-CM

## 2022-06-16 PROCEDURE — 97140 MANUAL THERAPY 1/> REGIONS: CPT

## 2022-06-16 NOTE — THERAPY TREATMENT NOTE
Outpatient Physical Therapy Lymphedema Treatment Note  [facility]     Patient Name: Riky Moon  : 1940  MRN: [id number]  Today's Date: 2022        Visit Date: 2022    Visit Dx:    ICD-10-CM ICD-9-CM   1. Lymphedema  I89.0 457.1       Patient Active Problem List   Diagnosis   • OA (osteoarthritis) of hip   • KINDRA treated with auto BiPAP   • Chest pain   • Diabetes mellitus ([facility])   • Hypertension   • Hyperlipidemia   • Hypothyroidism   • Asthma   • Overactive bladder   • CAP (community acquired pneumonia)   • Cellulitis of left lower extremity   • COPD (chronic obstructive pulmonary disease) ([facility])   • Dyslipidemia   • Gastroesophageal reflux disease   • Leukocytosis   • Peripheral nerve disease   • Constipation   • Oropharyngeal dysphagia   • Bronchitis   • Class 3 severe obesity due to excess calories with serious comorbidity and body mass index (BMI) of 40.0 to 44.9 in adult ([facility])   • Left leg swelling   • Anemia   • Leukocytosis   • Circadian rhythm sleep disorder, delayed sleep phase type         Lymphedema     Row Name 22 1100             Subjective Pain    Able to rate subjective pain? yes  -KD      Pre-Treatment Pain Level 0  -KD              Subjective Comments    Subjective Comments Saw MD yesterday, sending him to cardiologist for shortness of air when walking (which pt states has improved since his legs have gone down making it easier to walk), also to specialist to have anemia checked.  -KD              Skin Changes/Observations    Skin Observations Comment Min drainage on pad. Mild rash ant prox legs just below knees.  -KD              Compression/Skin Care    Compression/Skin Care skin care;wrapping location;bandaging;remove bandages  -KD      Skin Care washed/dried;moisturizing lotion applied  Zinc oxide applied to raw areas L anterior leg, Eucerin to remaining L leg and RLE, HCC to rashy areas prox lower legs just below knees  -KD      Wrapping Location lower extremity   -KD      Wrapping Location LE bilateral:;foot to knee  -KD      Bandage Layers cotton liner;padding/fluff layer;dense foam;short-stretch bandages (comment size/quantity)  -KD      Bandaging Comments LLE: ABD pads x 1/ 1 roll Kerlix, TG9, piece of channelled foam (Komprex II) on dorsum of foot, Artiflex x 2, Rosidal soft ankle to knee, Rosidal K 1-8cm, 2-10 cm, 1-12 cm, Darco shoe; RLE: TG9, Artiflex x 2, Rosidal K 1-8 cm, 1-10 cm, 1-12 cm  -KD      Bandaging Technique circumferential/spiral;light compression  -KD            User Key  (r) = Recorded By, (t) = Taken By, (c) = Cosigned By    Initials Name Provider Type    Jessica Tamayo, PT Physical Therapist                               PT Assessment/Plan     Row Name 06/16/22 1211          PT Assessment    Assessment Comments Min drainage left leg-- applied 1 abd pad and 1 roll of Kerlix; min rash ant prox lower legs--applied HCC. Plan to switch compression to velcro device on right once measurements plateau. Discussed velcro compression wear.  -KD            PT Plan    Predicted Duration of Therapy Intervention (OT) Cont therapy, begin MLD as drainage decreases.  -KD           User Key  (r) = Recorded By, (t) = Taken By, (c) = Cosigned By    Initials Name Provider Type    Jessica Tamayo, PT Physical Therapist                    OP Exercises     Row Name 06/16/22 1214 06/16/22 1100          Subjective Comments    Subjective Comments -- Saw MD yesterday, sending him to cardiologist for shortness of air when walking (which pt states has improved since his legs have gone down making it easier to walk), also to specialist to have anemia checked.  -KD            Subjective Pain    Able to rate subjective pain? -- yes  -KD     Pre-Treatment Pain Level -- 0  -KD            Total Minutes    22632 - PT Manual Therapy Minutes 54  -KD --           User Key  (r) = Recorded By, (t) = Taken By, (c) = Cosigned By    Initials Name Provider Type    Jessica Tamayo, PT  Physical Therapist                        Manual Rx (last 36 hours)     Manual Treatments     Row Name 06/16/22 1214             Total Minutes    32903 - PT Manual Therapy Minutes 54  -KD            User Key  (r) = Recorded By, (t) = Taken By, (c) = Cosigned By    Initials Name Provider Type    Jessica Tamayo, PT Physical Therapist                    Therapy Education  Education Details: Discussed switching to velcro device on right leg once measurements plateau.  Given: Edema management  Program: New  How Provided: Verbal  Provided to: Patient  Level of Understanding: Verbalized              Time Calculation:   Start Time: 1115  Stop Time: 1209  Time Calculation (min): 54 min  Total Timed Code Minutes- PT: 54 minute(s)  Timed Charges  98569 - PT Manual Therapy Minutes: 54  Total Minutes  Timed Charges Total Minutes: 54   Total Minutes: 54   Therapy Charges for Today     Code Description Service Date Service Provider Modifiers Qty    59567933610 HC PT MANUAL THERAPY EA 15 MIN 6/16/2022 Jessica Horta, PT KX, GP 4                    Jessica Horta PT  6/16/2022

## 2022-06-17 ENCOUNTER — HOSPITAL ENCOUNTER (OUTPATIENT)
Dept: PHYSICAL THERAPY | Facility: HOSPITAL | Age: 82
Setting detail: THERAPIES SERIES
Discharge: HOME | End: 2022-06-17

## 2022-06-17 DIAGNOSIS — I89.0 LYMPHEDEMA: Primary | ICD-10-CM

## 2022-06-17 PROCEDURE — 97140 MANUAL THERAPY 1/> REGIONS: CPT

## 2022-06-17 NOTE — THERAPY PROGRESS REPORT/RE-CERT
Outpatient Physical Therapy Lymphedema Progress Note  [facility]     Patient Name: Riky Moon  : 1940  MRN: [id number]  Today's Date: 2022        Visit Date: 2022    Visit Dx:    ICD-10-CM ICD-9-CM   1. Lymphedema  I89.0 457.1       Patient Active Problem List   Diagnosis   • OA (osteoarthritis) of hip   • KINDRA treated with auto BiPAP   • Chest pain   • Diabetes mellitus ([facility])   • Hypertension   • Hyperlipidemia   • Hypothyroidism   • Asthma   • Overactive bladder   • CAP (community acquired pneumonia)   • Cellulitis of left lower extremity   • COPD (chronic obstructive pulmonary disease) ([facility])   • Dyslipidemia   • Gastroesophageal reflux disease   • Leukocytosis   • Peripheral nerve disease   • Constipation   • Oropharyngeal dysphagia   • Bronchitis   • Class 3 severe obesity due to excess calories with serious comorbidity and body mass index (BMI) of 40.0 to 44.9 in adult ([facility])   • Left leg swelling   • Anemia   • Leukocytosis   • Circadian rhythm sleep disorder, delayed sleep phase type         Lymphedema     Row Name 22 1100             Subjective Pain    Able to rate subjective pain? yes  -KA      Pre-Treatment Pain Level 0  -KA              Subjective Comments    Subjective Comments Pt reports bandaging did well from last visit.  -KA              Skin Changes/Observations    Skin Observations Comment Min drainage on pad. Mild rash ant prox legs just below knees.  -KA              Lymphedema Measurements    Measurement Type(s) Circumferential  -KA      Circumferential Areas Lower extremities  -KA              BLE Circumferential (cm)    Measurement Location 1 Knee (popliteal crease)  -KA      Left 1 43 cm  -KA      Right 1 42.6 cm  -KA      Measurement Location 2 10cm below knee  -KA      Left 2 47.5 cm  -KA      Right 2 47.1 cm  -KA      Measurement Location 3 20cm below knee  -KA      Left 3 43.7 cm  -KA      Right 3 35.8 cm  -KA      Measurement Location 4 30cm below knee   -KA      Left 4 37.2 cm  -KA      Right 4 25.1 cm  -KA      Measurement Location 5 Ankle  -KA      Left 5 37 cm  -KA      Right 5 25.1 cm  -KA      Measurement Location 6 Midfoot  -KA      Left 6 33 cm  -KA      Right 6 26.2 cm  -KA      LLE Circumferential Total 241.4 cm  -KA      RLE Circumferential Total 201.9 cm  -KA              Compression/Skin Care    Compression/Skin Care skin care;wrapping location;bandaging;compression garment;remove bandages  -KA      Skin Care other (comment);washed/dried;moisturizing lotion applied  Thin layer of zinc oxide applied to L anterior shin, Mix of HCC/Eucerin applied to remainder of LLE and RLE  -KA      Wrapping Location lower extremity  -KA      Wrapping Location LE bilateral:;foot to knee  -KA      Bandage Layers cotton liner;padding/fluff layer;dense foam;short-stretch bandages (comment size/quantity)  -KA      Bandaging Comments LLE: ABD pads x 1/ 1 roll Kerlix, TG9, piece of channelled foam (Komprex II) on dorsum of foot, Artiflex x 2, Rosidal soft ankle to knee, Rosidal K 1-8cm, 2-10 cm, 1-12 cm, Darco shoe  -KA      Bandaging Technique circumferential/spiral;light compression  -      Compression Garment Comments Compreflex garment applied to RLE today (foot and lower leg pieces)  -      Remove Bandages Removed bandaging  -            User Key  (r) = Recorded By, (t) = Taken By, (c) = Cosigned By    Initials Name Provider Type    Svetlana Couch, PT Physical Therapist                               PT Assessment/Plan     Row Name 06/17/22 3548          PT Assessment    Assessment Comments Pt continues to have minimal weeping L anterior leg, continued with skin care and compression bandaging to LLE.  Reassessed girth measurements; pt has seen decreased net edema of 11.7 cm on RLE and 20.1 cm on LLE since starting therapy. Applied compreflex velcro compression to RLE to see if this will adequately contain edema, pt to wear over the weekend, reassess on Monday.  Begin MLD when LLE is no longer weeping.  -KA            PT Plan    PT Plan Comments Assess response to Compreflex for RLE, continue compression bandaging LLE.  Begin MLD when weeping resolves.  -KA           User Key  (r) = Recorded By, (t) = Taken By, (c) = Cosigned By    Initials Name Provider Type    Svetlana Couch, PT Physical Therapist                    OP Exercises     Row Name 06/17/22 1323 06/17/22 1100          Subjective Comments    Subjective Comments -- Pt reports bandaging did well from last visit.  -JOHNEI            Subjective Pain    Able to rate subjective pain? -- yes  -KA     Pre-Treatment Pain Level -- 0  -KA            Total Minutes    85626 - PT Manual Therapy Minutes 61  -KA --           User Key  (r) = Recorded By, (t) = Taken By, (c) = Cosigned By    Initials Name Provider Type    Svetlana Couch, PT Physical Therapist                        Manual Rx (last 36 hours)     Manual Treatments     Row Name 06/17/22 1323             Total Minutes    60788 - PT Manual Therapy Minutes 61  -KA            User Key  (r) = Recorded By, (t) = Taken By, (c) = Cosigned By    Initials Name Provider Type    Svetlana Couch, PT Physical Therapist                 PT OP Goals     Row Name 06/17/22 1300          PT Short Term Goals    STG Date to Achieve 06/16/22  -KA     STG 1 Patient to demonstrate awareness of condition and precautions for improved prevention, management, care of symptoms, and ease of transition to self care of condition.  -KA     STG 1 Progress Ongoing  -KA     STG 2 Patient demonstrates decreased net edema of Left Lower Extremity>/= 10-15 cm for decreased edema symptoms, decreased risk of infection, and improved skin care.  -KA     STG 2 Progress Met  -KA     STG 3 Skin on lower extremities to be intact, no weeping, to decrease risk of infection.  -KA     STG 3 Progress Progressing  -KA     STG 3 Progress Comments One small spot of drainage on pad today, no active weeping  during session  -            Long Term Goals    LTG Date to Achieve 07/14/22  -     LTG 1 Patient/family independent with self-care techniques for self-management of condition.  -KA     LTG 1 Progress New  -KA     LTG 2 Patient demonstrates decreased net edema of Left Lower Extremity>/= 15-30 cm for decreased edema symptoms, decreased risk of infection, and improved skin care.  -KA     LTG 2 Progress Met;Ongoing  -KA     LTG 2 Progress Comments Net decreased edema 20.1 on LLE  -     LTG 3 Patient/family independent with compression garments as indicated for self-management of condition.  -KA     LTG 3 Progress Progressing  -KA     LTG 3 Progress Comments Instructed  pt in application of compreflex velcro device for RLE today, LLE still actively decongesting and not appropriate for long term device yet.  -            Time Calculation    PT Goal Re-Cert Due Date 08/14/22  -           User Key  (r) = Recorded By, (t) = Taken By, (c) = Cosigned By    Initials Name Provider Type    Svetlana Couch, PT Physical Therapist                Therapy Education  Education Details: Discussed girth measurements.  Pt asking if he can use velcro device on RLE over weekend to see if they will sufficiently contain swelling on RLE, will trial over weekend.  Will likely be able to begin MLD to LLE next week and will have pt start removing bandages and showering prior to session when weeping has stopped.  Given: Edema management  Program: New, Reinforced  How Provided: Verbal  Provided to: Patient  Level of Understanding: Verbalized              Time Calculation:   Start Time: 1135  Stop Time: 1236  Time Calculation (min): 61 min  Timed Charges  79440 - PT Manual Therapy Minutes: 61  Total Minutes  Timed Charges Total Minutes: 61   Total Minutes: 61   Therapy Charges for Today     Code Description Service Date Service Provider Modifiers Qty    61073558848 HC PT MANUAL THERAPY EA 15 MIN 6/17/2022 Svetlana French, PT GP, KX  4                    Svetlana French, PT  6/17/2022

## 2022-06-20 ENCOUNTER — HOSPITAL ENCOUNTER (OUTPATIENT)
Dept: PHYSICAL THERAPY | Facility: HOSPITAL | Age: 82
Setting detail: THERAPIES SERIES
Discharge: HOME | End: 2022-06-20

## 2022-06-20 PROCEDURE — 97140 MANUAL THERAPY 1/> REGIONS: CPT

## 2022-06-20 PROCEDURE — 97110 THERAPEUTIC EXERCISES: CPT

## 2022-06-20 NOTE — THERAPY TREATMENT NOTE
"    Outpatient Physical Therapy Lymphedema Treatment Note  [facility]     Patient Name: Riky Moon  : 1940  MRN: [id number]  Today's Date: 2022        Visit Date: 2022    Visit Dx:  No diagnosis found.    Patient Active Problem List   Diagnosis   • OA (osteoarthritis) of hip   • KINDRA treated with auto BiPAP   • Chest pain   • Diabetes mellitus ([facility])   • Hypertension   • Hyperlipidemia   • Hypothyroidism   • Asthma   • Overactive bladder   • CAP (community acquired pneumonia)   • Cellulitis of left lower extremity   • COPD (chronic obstructive pulmonary disease) ([facility])   • Dyslipidemia   • Gastroesophageal reflux disease   • Leukocytosis   • Peripheral nerve disease   • Constipation   • Oropharyngeal dysphagia   • Bronchitis   • Class 3 severe obesity due to excess calories with serious comorbidity and body mass index (BMI) of 40.0 to 44.9 in adult ([facility])   • Left leg swelling   • Anemia   • Leukocytosis   • Circadian rhythm sleep disorder, delayed sleep phase type         Lymphedema     Row Name 22 1200             Subjective Pain    Able to rate subjective pain? yes  -KA      Pre-Treatment Pain Level 0  -KA              Subjective Comments    Subjective Comments Pt reports \"I don't know if my wife is getting the velcro on tight enough or not.\"  -KA              Skin Changes/Observations    Skin Observations Comment Dry flaky skin BLE, One small spot of drainage on LLE, no active drainage during session or after skin was cleaned  -KA              Compression/Skin Care    Compression/Skin Care skin care;wrapping location;bandaging;compression garment;remove bandages  -KA      Skin Care washed/dried;moisturizing lotion applied  Eucerin to BLE  -KA      Wrapping Location lower extremity  -KA      Wrapping Location LE bilateral:;foot to knee  -KA      Bandage Layers cotton liner;padding/fluff layer;dense foam;short-stretch bandages (comment size/quantity)  -KA      Bandaging Comments LLE: ABD " pads x 1/ 1 roll Kerlix, TG9, piece of channelled foam (Komprex II) on dorsum of foot, Artiflex x 2, Rosidal soft ankle to knee, Rosidal K 1-8cm, 2-10 cm, 1-12 cm, Darco shoe  -JOHNIE      Bandaging Technique circumferential/spiral;light compression  -JOHNIE      Compression Garment Comments Compreflex garment applied to RLE today (foot and lower leg pieces)  -JOHNIE      Remove Bandages Removed bandaging  -JOHNIE            User Key  (r) = Recorded By, (t) = Taken By, (c) = Cosigned By    Initials Name Provider Type    Svetlana Couch, PT Physical Therapist                               PT Assessment/Plan     Row Name 06/20/22 1308          PT Assessment    Assessment Comments Only one spot of drainage (dried) on ABD pad today and no active drainage during today's session, so held zinc oxide and will have patient remove bandages and shower before next visit.  Continued to bandage LLE, but RLE was maintained well with compreflex so this was reapplied.  Anticipate being able to start MLD next visit.  -JOHNIE            PT Plan    PT Plan Comments Assess LLE skin integrity.  Resume zinc oxide if weeping, otherwise begin MLD.  Continue with Compreflex to RLE, bandaging to LLE.  Pt instructed in decongestive exercise this visit, instructed to perform daily with bandaging/velcro on.  -JOHNIE           User Key  (r) = Recorded By, (t) = Taken By, (c) = Cosigned By    Initials Name Provider Type    Svetlana Couch, PT Physical Therapist                    OP Exercises     Row Name 06/20/22 1318 06/20/22 1314 06/20/22 1300       Total Minutes    93247 - PT Therapeutic Exercise Minutes 8  -KA -- 8  -KA    09173 - PT Manual Therapy Minutes 47  -KA 47  -KA --       Exercise 1    Exercise Name 1 -- -- Diaphragmatic breathing  -JOHNIE    Cueing 1 -- -- Verbal;Demo  -JOHNIE    Reps 1 -- -- 10  -KA       Exercise 2    Exercise Name 2 -- -- Ankle pumps  -JOHNIE    Cueing 2 -- -- Verbal;Demo  -JOHNIE    Reps 2 -- -- 20  -KA       Exercise 3    Exercise Name 3 --  "-- LAQ  -KA    Cueing 3 -- -- Verbal;Demo  -KA    Sets 3 -- -- 2  -KA    Reps 3 -- -- 10  -KA       Exercise 4    Exercise Name 4 -- -- Seated march  -KA    Cueing 4 -- -- Verbal;Demo  -KA    Sets 4 -- -- 2  -KA    Reps 4 -- -- 5  -KA    Row Name 06/20/22 1200             Subjective Comments    Subjective Comments Pt reports \"I don't know if my wife is getting the velcro on tight enough or not.\"  -KA              Subjective Pain    Able to rate subjective pain? yes  -KA      Pre-Treatment Pain Level 0  -KA            User Key  (r) = Recorded By, (t) = Taken By, (c) = Cosigned By    Initials Name Provider Type    Svetlana Couch, CJ Physical Therapist                        Manual Rx (last 36 hours)     Manual Treatments     Row Name 06/20/22 1318 06/20/22 1314          Total Minutes    52068 - PT Manual Therapy Minutes 47  -KA 47  -KA           User Key  (r) = Recorded By, (t) = Taken By, (c) = Cosigned By    Initials Name Provider Type    Svetlana Couch, CJ Physical Therapist                    Therapy Education  Education Details: Pt instructed in decongestive exercise this visit, provided with written HEP handouts and performed in clinic with guidance from therapist.  Pt instructed to remove bandaging on LLE tomorrow morning and shower prior to treatment, instructed not to agressively scrub skin.  Will need to bring Artiflex and Rosidal soft back to the clinic with him. Will begin MLD if no further weeping.  Given: HEP, Symptoms/condition management, Edema management, Bandaging/dressing change  Program: New  How Provided: Verbal, Demonstration, Written  Provided to: Patient  Level of Understanding: Verbalized, Demonstrated              Time Calculation:   Start Time: 1130  Stop Time: 1225  Time Calculation (min): 55 min  Timed Charges  49071 - PT Therapeutic Exercise Minutes: 8  77314 - PT Manual Therapy Minutes: 47  Total Minutes  Timed Charges Total Minutes: 55   Total Minutes: 55   Therapy Charges " for Today     Code Description Service Date Service Provider Modifiers Qty    75491086184  PT MANUAL THERAPY EA 15 MIN 6/20/2022 Svetlana French, PT GP, KX 3    27734166173 HC PT THER PROC EA 15 MIN 6/20/2022 Svetlana French, PT GP, KX 1                    Svetlana French, PT  6/20/2022

## 2022-06-21 ENCOUNTER — OFFICE VISIT (OUTPATIENT)
Dept: CARDIOLOGY | Facility: CLINIC | Age: 82
End: 2022-06-21

## 2022-06-21 ENCOUNTER — HOSPITAL ENCOUNTER (OUTPATIENT)
Dept: PHYSICAL THERAPY | Facility: HOSPITAL | Age: 82
Setting detail: THERAPIES SERIES
Discharge: HOME | End: 2022-06-21

## 2022-06-21 VITALS
BODY MASS INDEX: 40.43 KG/M2 | WEIGHT: 315 LBS | HEIGHT: 74 IN | HEART RATE: 39 BPM | SYSTOLIC BLOOD PRESSURE: 106 MMHG | OXYGEN SATURATION: 96 % | DIASTOLIC BLOOD PRESSURE: 62 MMHG

## 2022-06-21 DIAGNOSIS — I10 ESSENTIAL HYPERTENSION: ICD-10-CM

## 2022-06-21 DIAGNOSIS — I48.92 ATRIAL FLUTTER WITH CONTROLLED RESPONSE: Primary | ICD-10-CM

## 2022-06-21 DIAGNOSIS — G47.33 OSA TREATED WITH BIPAP: ICD-10-CM

## 2022-06-21 DIAGNOSIS — I89.0 LYMPHEDEMA: Primary | ICD-10-CM

## 2022-06-21 DIAGNOSIS — R06.09 DOE (DYSPNEA ON EXERTION): ICD-10-CM

## 2022-06-21 DIAGNOSIS — R60.0 PEDAL EDEMA: ICD-10-CM

## 2022-06-21 PROCEDURE — 97140 MANUAL THERAPY 1/> REGIONS: CPT

## 2022-06-21 PROCEDURE — 99214 OFFICE O/P EST MOD 30 MIN: CPT | Performed by: NURSE PRACTITIONER

## 2022-06-21 PROCEDURE — 93000 ELECTROCARDIOGRAM COMPLETE: CPT | Performed by: NURSE PRACTITIONER

## 2022-06-21 RX ORDER — ATENOLOL 25 MG/1
25 TABLET ORAL DAILY
Qty: 90 TABLET | Refills: 3 | Status: SHIPPED | OUTPATIENT
Start: 2022-06-21

## 2022-06-21 RX ORDER — WARFARIN SODIUM 5 MG/1
5 TABLET ORAL
Qty: 60 TABLET | Refills: 11 | Status: SHIPPED | OUTPATIENT
Start: 2022-06-21 | End: 2022-08-19 | Stop reason: SDUPTHER

## 2022-06-21 NOTE — THERAPY TREATMENT NOTE
Outpatient Physical Therapy Lymphedema Treatment Note  [facility]     Patient Name: Riky Moon  : 1940  MRN: [id number]  Today's Date: 2022        Visit Date: 2022    Visit Dx:    ICD-10-CM ICD-9-CM   1. Lymphedema  I89.0 457.1       Patient Active Problem List   Diagnosis   • OA (osteoarthritis) of hip   • KINDRA treated with auto BiPAP   • Chest pain   • Diabetes mellitus ([facility])   • Hypertension   • Hyperlipidemia   • Hypothyroidism   • Asthma   • Overactive bladder   • CAP (community acquired pneumonia)   • Cellulitis of left lower extremity   • COPD (chronic obstructive pulmonary disease) ([facility])   • Dyslipidemia   • Gastroesophageal reflux disease   • Leukocytosis   • Peripheral nerve disease   • Constipation   • Oropharyngeal dysphagia   • Bronchitis   • Class 3 severe obesity due to excess calories with serious comorbidity and body mass index (BMI) of 40.0 to 44.9 in adult ([facility])   • Left leg swelling   • Anemia   • Leukocytosis   • Circadian rhythm sleep disorder, delayed sleep phase type         Lymphedema     Row Name 22 1200             Subjective Pain    Able to rate subjective pain? yes  -KD      Pre-Treatment Pain Level 0  -KD              Subjective Comments    Subjective Comments Pt states he took bandages/velcro device off about 9am and showered.  -KD              Manual Lymphatic Drainage    Manual Lymphatic Drainage Comments Short neck, B axilla, B IA, L inguinals, L LE  -KD              Compression/Skin Care    Compression/Skin Care skin care;wrapping location;bandaging;compression garment;remove bandages  -KD      Skin Care washed/dried;moisturizing lotion applied  Eucerin to BLE  -KD      Wrapping Location lower extremity  -KD      Wrapping Location LE bilateral:;foot to knee  -KD      Bandage Layers cotton liner;padding/fluff layer;dense foam;short-stretch bandages (comment size/quantity)  -KD      Bandaging Comments LLE: ABD pad x /  roll Kerlix, TG9, piece of  channelled foam (Komprex II) on dorsum of foot, Artiflex x 2, Rosidal soft ankle to knee, Rosidal K 1-8cm, 2-10 cm, 1-12 cm, Darco shoe  -KD      Bandaging Technique circumferential/spiral;light compression  -KD      Compression Garment Comments Compreflex garment applied to RLE today (lower leg piece)  -KD      Compression/Skin Care Comments Pt requested to see how right foot does without velcro foot piece today  -KD            User Key  (r) = Recorded By, (t) = Taken By, (c) = Cosigned By    Initials Name Provider Type    Jessica Tamayo, PT Physical Therapist                               PT Assessment/Plan     Row Name 06/21/22 1226          PT Assessment    Assessment Comments Began full MLD today with pt to include LLE. Pt asked to see how his right foot does without the velcro foot piece, so will need to monitor. No drainage noted at all tody (none noted by pt when he removed abd pad, none noted at all during treatment0, skin intact. Did cont to apply abd pad today just to protect fragile skin.  -KD            PT Plan    PT Plan Comments Cont therapy, monitor right foot edema next session, cont to monitor skin integrity vianey for left lower leg.  -KD           User Key  (r) = Recorded By, (t) = Taken By, (c) = Cosigned By    Initials Name Provider Type    Jessica Tamayo, PT Physical Therapist                    OP Exercises     Row Name 06/21/22 1231 06/21/22 1200          Subjective Comments    Subjective Comments -- Pt states he took bandages/velcro device off about 9am and showered.  -KD            Subjective Pain    Able to rate subjective pain? -- yes  -KD     Pre-Treatment Pain Level -- 0  -KD            Total Minutes    59515 - PT Manual Therapy Minutes 55  -KD --           User Key  (r) = Recorded By, (t) = Taken By, (c) = Cosigned By    Initials Name Provider Type    Jessica Tamayo, PT Physical Therapist                        Manual Rx (last 36 hours)     Manual Treatments     Row Name  06/21/22 1231             Total Minutes    40283 - PT Manual Therapy Minutes 55  -KD            User Key  (r) = Recorded By, (t) = Taken By, (c) = Cosigned By    Initials Name Provider Type    Jessica Tamayo PT Physical Therapist                    Therapy Education  Education Details: Reviewed pt's measurements per his request. Also discussed what supplies to return with tomorrow; compression changes to right (no velcro foot piece today).  Given: Edema management  Program: New, Reinforced  How Provided: Verbal, Demonstration  Provided to: Patient  Level of Understanding: Verbalized              Time Calculation:   Start Time: 1116  Stop Time: 1211  Time Calculation (min): 55 min  Total Timed Code Minutes- PT: 55 minute(s)  Timed Charges  52451 - PT Manual Therapy Minutes: 55  Total Minutes  Timed Charges Total Minutes: 55   Total Minutes: 55   Therapy Charges for Today     Code Description Service Date Service Provider Modifiers Qty    59274278263 HC PT MANUAL THERAPY EA 15 MIN 6/21/2022 Jessica Horta, PT KX, GP 4                    Jessica Horta PT  6/21/2022

## 2022-06-21 NOTE — PROGRESS NOTES
"    CARDIOLOGY        Patient Name: Riky Moon  :1940  Age: 81 y.o.  Primary Cardiologist: Ty Shelton MD  Encounter Provider:  EVON Oneill    Date of Service: 22          CHIEF COMPLAINT / REASON FOR OFFICE VISIT     Hypertension (Overdue f/u)      HISTORY OF PRESENT ILLNESS       HPI  Riky Moon is a 81 y.o. male who presents today for routine evaluation.  Patient last evaluated in 2019.     Pt has a  history significant for hypertension, hyperlipidemia, KINDRA on BiPAP, diabetes.    Patient states that he returns to clinic today for concerns with shortness of breath.  He states that this has been going on for several months.  He reports shortness of breath when he walks to the end of his driveway.  He also follows the lymphedema clinic for lymphedema.  He has been going to the clinic for 3 weeks for the severe lower extremity edema.  He has not noted that his HR has been running low. He denies chest pain, palpitations, lightheadedness.  He does note some generalized fatigue.  He sleeps with BiPap.       The following portions of the patient's history were reviewed and updated as appropriate: allergies, current medications, past family history, past medical history, past social history, past surgical history and problem list.      VITAL SIGNS     Visit Vitals  /62 (BP Location: Left arm, Patient Position: Sitting, Cuff Size: Large Adult)   Pulse (!) 39   Ht 188 cm (74\")   Wt (!) 146 kg (322 lb 12.8 oz)   SpO2 96%   BMI 41.45 kg/m²         Wt Readings from Last 3 Encounters:   22 (!) 146 kg (322 lb 12.8 oz)   06/15/22 (!) 147 kg (325 lb 1.6 oz)   22 (!) 148 kg (326 lb)     Body mass index is 41.45 kg/m².      REVIEW OF SYSTEMS   Review of Systems   Constitutional: Positive for malaise/fatigue. Negative for chills, fever, weight gain and weight loss.   Cardiovascular: Positive for dyspnea on exertion and leg swelling.   Respiratory: Positive for shortness of " breath. Negative for cough, snoring and wheezing.    Hematologic/Lymphatic: Negative for bleeding problem. Does not bruise/bleed easily.   Skin: Negative for color change.   Musculoskeletal: Negative for falls, joint pain and myalgias.   Gastrointestinal: Negative for melena.   Genitourinary: Negative for hematuria.   Neurological: Negative for excessive daytime sleepiness.   Psychiatric/Behavioral: Negative for depression. The patient is not nervous/anxious.            PHYSICAL EXAMINATION     Constitutional:       Appearance: Normal appearance. Well-developed.   Eyes:      Conjunctiva/sclera: Conjunctivae normal.   Neck:      Vascular: No carotid bruit.   Pulmonary:      Effort: Pulmonary effort is normal.      Breath sounds: Normal breath sounds.   Cardiovascular:      Bradycardia present. Regular rhythm. Normal S1. Normal S2.      Murmurs: There is no murmur.      No gallop. No click. No rub.   Edema:     Peripheral edema present.     Pretibial: 2+ edema of the left pretibial area and 3+ edema of the right pretibial area.     Ankle: 2+ edema of the left ankle and 3+ edema of the right ankle.     Feet: 2+ edema of the left foot and 3+ edema of the right foot.  Musculoskeletal: Normal range of motion. Skin:     General: Skin is warm and dry.   Neurological:      Mental Status: Alert and oriented to person, place, and time.      GCS: GCS eye subscore is 4. GCS verbal subscore is 5. GCS motor subscore is 6.   Psychiatric:         Speech: Speech normal.         Behavior: Behavior normal.         Thought Content: Thought content normal.         Judgment: Judgment normal.           REVIEWED DATA       ECG 12 Lead    Date/Time: 6/21/2022 1:30 PM  Performed by: Roseline Silva APRN  Authorized by: Roseline Silva APRN   Comparison: compared with previous ECG from 10/4/2019  Rhythm: atrial flutter  Rate: bradycardic  BPM: 39  Conduction: conduction normal  QRS axis: normal    Clinical impression: abnormal  EKG            Cardiac Procedures:  1. Myocardial perfusion PET stress test 9/11/2017.  Normal myocardial perfusion study without evidence of ischemia.  Impressions consistent with low risk study.  2. Echocardiogram 3/12/2019.  LVEF 61%.  Grade 1 diastolic dysfunction.  Calculated RVSP 36 mmHg.    Lipid Panel    Lipid Panel 10/26/21 5/17/22 6/15/22   Total Cholesterol 136 117 103   Triglycerides 110 83 77   HDL Cholesterol 38 (A) 35 (A) 35 (A)   VLDL Cholesterol 20 17 16   LDL Cholesterol  78 65 52   LDL/HDL Ratio 2.00 1.87 1.50   (A) Abnormal value            BUN   Date Value Ref Range Status   06/15/2022 20 8 - 23 mg/dL Final     Creatinine   Date Value Ref Range Status   06/15/2022 1.27 0.76 - 1.27 mg/dL Final     Potassium   Date Value Ref Range Status   06/15/2022 4.5 3.5 - 5.2 mmol/L Final     ALT (SGPT)   Date Value Ref Range Status   06/15/2022 13 1 - 41 U/L Final     AST (SGOT)   Date Value Ref Range Status   06/15/2022 14 1 - 40 U/L Final           ASSESSMENT & PLAN      Diagnosis Plan   1. Atrial flutter with controlled response (HCC)  Adult Transthoracic Echo Complete W/ Cont if Necessary Per Protocol    Holter Monitor - 24 Hour   2. Essential hypertension  atenolol (TENORMIN) 25 MG tablet   3. BLANCHARD (dyspnea on exertion)  Adult Transthoracic Echo Complete W/ Cont if Necessary Per Protocol    Holter Monitor - 24 Hour   4. Pedal edema     5. KINDRA treated with auto BiPAP           SUMMARY/DISCUSSION  1. Atrial flutter with controlled response.  Patient presents with concern for shortness of breath and dyspnea with exertion.  He also has significant lower extremity edema and has been following the lymphedema clinic for 3 weeks.  ECG today reveals new onset atrial flutter with heart rate that is actually bradycardic.  Heart rate is in the 40s.  I have recommended decreasing atenolol from 50 mg/day to 25 mg/day.  I have also had discussion with patient about anticoagulation.  He does not have any  contraindications.  Given patient's weight I do not think that he is a good candidate for DOAC and I recommended patient to be placed on Coumadin.  Referral to anticoagulation monitoring clinic.  Patient will also get echocardiogram to further assess symptoms of shortness of breath and rule out cardiomyopathy or structural heart disease.  We will also place Holter monitor to assess patient's average heart rate.  2. Hypertension.  Blood pressure controlled at 106/62.  Decrease atenolol as documented above.  Continue amlodipine 5 mg/day, losartan 100 mg/day.  3. BLANCHARD.  As above for #1.  4. Pedal edema.  As above for #1.  Continue lymphedema clinic.  Legs and bilateral wraps.  5. KINDRA with BiPAP.  6. Follow-up to be arranged after above-mentioned testing.        MEDICATIONS         Discharge Medications          Accurate as of June 21, 2022  1:43 PM. If you have any questions, ask your nurse or doctor.            Continue These Medications      Instructions Start Date   albuterol sulfate  (90 Base) MCG/ACT inhaler  Commonly known as: PROVENTIL HFA;VENTOLIN HFA;PROAIR HFA   1 puff, Inhalation, Every 4 Hours PRN      amLODIPine 5 MG tablet  Commonly known as: NORVASC   5 mg, Oral, Daily      aspirin 81 MG EC tablet   1 tablet, Oral, Every Other Day      atenolol 50 MG tablet  Commonly known as: TENORMIN   50 mg, Oral, Daily      Capsaicin 0.1 % cream   1 application, Apply externally, Daily      cholecalciferol 25 MCG (1000 UT) tablet  Commonly known as: VITAMIN D3   1,000 Units, Oral, Every Morning      DULoxetine 60 MG capsule  Commonly known as: CYMBALTA   60 mg, Oral, Every Morning      glucose blood test strip  Commonly known as: Heidi Contour Test   Use as instructed to test glucose      Jardiance 10 MG tablet tablet  Generic drug: empagliflozin   10 mg, Oral, Daily      levothyroxine 112 MCG tablet  Commonly known as: Synthroid   112 mcg, Oral, Daily      losartan 100 MG tablet  Commonly known as: COZAAR    100 mg, Oral, Daily      metFORMIN 850 MG tablet  Commonly known as: GLUCOPHAGE   850 mg, Oral, 2 Times Daily With Meals      Microlet Lancets misc   Use daily as directed to test glucose      multivitamin tablet tablet  Generic drug: multivitamin   1 tablet, Oral, Every Morning      omeprazole 40 MG capsule  Commonly known as: priLOSEC   40 mg, Oral, 2 Times Daily      oxybutynin XL 15 MG 24 hr tablet  Commonly known as: DITROPAN XL   15 mg, Oral, Nightly      pantoprazole 40 MG EC tablet  Commonly known as: PROTONIX   40 mg, Oral, Daily      pravastatin 40 MG tablet  Commonly known as: PRAVACHOL   40 mg, Oral, Nightly      pregabalin 150 MG capsule  Commonly known as: LYRICA   150 mg, Oral, 2 Times Daily      Tiotropium Bromide Monohydrate 1.25 MCG/ACT aerosol solution inhaler  Commonly known as: SPIRIVA RESPIMAT   Spiriva Respimat   1 puff daily      Trelegy Ellipta 100-62.5-25 MCG/INH inhaler  Generic drug: Fluticasone-Umeclidin-Vilant   1 puff, Inhalation, Daily - RT      Trulicity 0.75 MG/0.5ML solution pen-injector  Generic drug: Dulaglutide   0.75 mg, Subcutaneous, Weekly      vitamin B-12 1000 MCG tablet  Commonly known as: CYANOCOBALAMIN   1,000 mcg, Oral, Every Morning      VITAMIN C PO   1 tablet, Oral, Daily      zolpidem 10 MG tablet  Commonly known as: AMBIEN   10 mg, Oral, Nightly PRN                 **Dragon Disclaimer:   Much of this encounter note is an electronic transcription/translation of spoken language to printed text. The electronic translation of spoken language may permit erroneous, or at times, nonsensical words or phrases to be inadvertently transcribed. Although I have reviewed the note for such errors, some may still exist.

## 2022-06-22 ENCOUNTER — HOSPITAL ENCOUNTER (OUTPATIENT)
Dept: PHYSICAL THERAPY | Facility: HOSPITAL | Age: 82
Setting detail: THERAPIES SERIES
Discharge: HOME | End: 2022-06-22

## 2022-06-22 ENCOUNTER — TELEPHONE (OUTPATIENT)
Dept: PHARMACY | Facility: HOSPITAL | Age: 82
End: 2022-06-22

## 2022-06-22 DIAGNOSIS — I89.0 LYMPHEDEMA: Primary | ICD-10-CM

## 2022-06-22 PROCEDURE — 97140 MANUAL THERAPY 1/> REGIONS: CPT

## 2022-06-22 NOTE — TELEPHONE ENCOUNTER
Received referral for warfarin management. Spoke with Mr. Moon by phone. He reports intention to  warfarin 5-mg tablet prescription today and initiate it this evening. He is agreeable to INR check in clinic on Mon, 6/27/22.

## 2022-06-22 NOTE — THERAPY TREATMENT NOTE
Outpatient Physical Therapy Lymphedema Treatment Note  [facility]     Patient Name: Riky Moon  : 1940  MRN: [id number]  Today's Date: 2022        Visit Date: 2022    Visit Dx:    ICD-10-CM ICD-9-CM   1. Lymphedema  I89.0 457.1       Patient Active Problem List   Diagnosis   • OA (osteoarthritis) of hip   • KINDRA treated with auto BiPAP   • Chest pain   • Diabetes mellitus ([facility])   • Hypertension   • Hyperlipidemia   • Hypothyroidism   • Asthma   • Overactive bladder   • CAP (community acquired pneumonia)   • Cellulitis of left lower extremity   • COPD (chronic obstructive pulmonary disease) ([facility])   • Dyslipidemia   • Gastroesophageal reflux disease   • Leukocytosis   • Peripheral nerve disease   • Constipation   • Oropharyngeal dysphagia   • Bronchitis   • Class 3 severe obesity due to excess calories with serious comorbidity and body mass index (BMI) of 40.0 to 44.9 in adult ([facility])   • Left leg swelling   • Anemia   • Leukocytosis   • Circadian rhythm sleep disorder, delayed sleep phase type   • Atrial flutter with controlled response ([facility])         Lymphedema     Row Name 22 1500             Subjective Pain    Able to rate subjective pain? yes  -KA      Pre-Treatment Pain Level 0  -KA              Subjective Comments    Subjective Comments No pain upon arrival.  -KA              Skin Changes/Observations    Skin Observations Comment Dry flaky skin BLE, red/purple coloration where skin is healing, no active weeping and no drainage on pad  -KA              Manual Lymphatic Drainage    Manual Lymphatic Drainage Comments Short neck, B axilla, B IA, diaphragmatic breathing, L inguinals, LLE  -KA              Compression/Skin Care    Compression/Skin Care skin care;wrapping location;bandaging;compression garment  -KA      Skin Care moisturizing lotion applied  Eucerin  -KA      Wrapping Location lower extremity  -KA      Wrapping Location LE left:;foot to knee  -KA      Bandage Layers  cotton liner;padding/fluff layer;dense foam;short-stretch bandages (comment size/quantity)  -      Bandaging Comments LLE: ABD pad x 1/ 1 roll Kerlix, TG9, piece of channelled foam (Komprex II) on dorsum of foot, Artiflex x 2, Rosidal soft ankle to knee, Rosidal K 1-8cm, 2-10 cm, 1-12 cm, Darco shoe  -KA      Bandaging Technique circumferential/spiral;light compression  -      Compression Garment Comments Compreflex garment applied to RLE today (lower leg piece and foot piece)  -            User Key  (r) = Recorded By, (t) = Taken By, (c) = Cosigned By    Initials Name Provider Type    Svetlana Couch, PT Physical Therapist                               PT Assessment/Plan     Row Name 06/22/22 1528          PT Assessment    Assessment Comments Continued MLD to LLE.  No drainage LLE, but skin is still fragile and discolored.  Added foot piece back to compression system on RLE, may be able to trade foot piece out for tennis shoe but does need something to maintain swelling in right foot.  -JOHNIE            PT Plan    PT Plan Comments Reassess girth measurements 6/24, continue MLD and bandaging, monitor skin integrity especially LLE.  -JOHNIE           User Key  (r) = Recorded By, (t) = Taken By, (c) = Cosigned By    Initials Name Provider Type    Svetlana Couch, PT Physical Therapist                    OP Exercises     Row Name 06/22/22 1531 06/22/22 1500          Subjective Comments    Subjective Comments -- No pain upon arrival.  -JOHNIE            Subjective Pain    Able to rate subjective pain? -- yes  -JOHNIE     Pre-Treatment Pain Level -- 0  -KA            Total Minutes    14090 - PT Manual Therapy Minutes 53  -KA --           User Key  (r) = Recorded By, (t) = Taken By, (c) = Cosigned By    Initials Name Provider Type    Svetlana Couch, PT Physical Therapist                        Manual Rx (last 36 hours)     Manual Treatments     Row Name 06/22/22 1531             Total Minutes    37496 - PT Manual  Therapy Minutes 53  -KA            User Key  (r) = Recorded By, (t) = Taken By, (c) = Cosigned By    Initials Name Provider Type    Svetlana Couch, PT Physical Therapist                    Therapy Education  Education Details: Swelling increased in R foot, pt advised that he will need to either wear footpiece or tennis shoe to maintain compression until he orders new garments with compression built into foot.  Discussed plan of care for rest of this week and weekend including bandage changes.  Given: Edema management, Bandaging/dressing change  Program: New  How Provided: Verbal  Provided to: Patient  Level of Understanding: Verbalized              Time Calculation:   Start Time: 1133  Stop Time: 1226  Time Calculation (min): 53 min  Timed Charges  40602 - PT Manual Therapy Minutes: 53  Total Minutes  Timed Charges Total Minutes: 53   Total Minutes: 53   Therapy Charges for Today     Code Description Service Date Service Provider Modifiers Qty    33768093923  PT MANUAL THERAPY EA 15 MIN 6/22/2022 Svetlana French, PT GP, KX 4                    Svetlana French PT  6/22/2022

## 2022-06-23 ENCOUNTER — HOSPITAL ENCOUNTER (OUTPATIENT)
Dept: PHYSICAL THERAPY | Facility: HOSPITAL | Age: 82
Setting detail: THERAPIES SERIES
Discharge: HOME | End: 2022-06-23

## 2022-06-23 DIAGNOSIS — I89.0 LYMPHEDEMA: Primary | ICD-10-CM

## 2022-06-23 PROCEDURE — 97140 MANUAL THERAPY 1/> REGIONS: CPT

## 2022-06-23 NOTE — THERAPY TREATMENT NOTE
Outpatient Physical Therapy Lymphedema Treatment Note  [facility]     Patient Name: Riky Moon  : 1940  MRN: [id number]  Today's Date: 2022        Visit Date: 2022    Visit Dx:    ICD-10-CM ICD-9-CM   1. Lymphedema  I89.0 457.1       Patient Active Problem List   Diagnosis   • OA (osteoarthritis) of hip   • KINDRA treated with auto BiPAP   • Chest pain   • Diabetes mellitus ([facility])   • Hypertension   • Hyperlipidemia   • Hypothyroidism   • Asthma   • Overactive bladder   • CAP (community acquired pneumonia)   • Cellulitis of left lower extremity   • COPD (chronic obstructive pulmonary disease) ([facility])   • Dyslipidemia   • Gastroesophageal reflux disease   • Leukocytosis   • Peripheral nerve disease   • Constipation   • Oropharyngeal dysphagia   • Bronchitis   • Class 3 severe obesity due to excess calories with serious comorbidity and body mass index (BMI) of 40.0 to 44.9 in adult ([facility])   • Left leg swelling   • Anemia   • Leukocytosis   • Circadian rhythm sleep disorder, delayed sleep phase type   • Atrial flutter with controlled response ([facility])         Lymphedema     Row Name 22 1200             Subjective Pain    Able to rate subjective pain? yes  -KD      Pre-Treatment Pain Level 0  -KD              Subjective Comments    Subjective Comments States he saw the cardiologist , has an atrial flutter--having more testing done, starting Coumadin.  -KD              Lymphedema Edema Assessment    Edema Assessment Comment Softer edema dorsum of left foot  -KD              Skin Changes/Observations    Skin Observations Comment Skin still flaky, but improving  -KD              Manual Lymphatic Drainage    Manual Lymphatic Drainage Comments Short neck, B axilla, B IA, diaphragmatic breathing, L inguinals, LLE  -KD              Compression/Skin Care    Compression/Skin Care skin care;wrapping location;bandaging;compression garment  -KD      Skin Care moisturizing lotion applied  Eucerin  -KD       Wrapping Location lower extremity  -KD      Wrapping Location LE left:;foot to knee  -KD      Bandage Layers cotton liner;padding/fluff layer;dense foam;short-stretch bandages (comment size/quantity)  -KD      Bandaging Comments LLE: ABD pad x 1/ 1 roll Kerlix, TG9, piece of channelled foam (Komprex II) on dorsum of foot, Artiflex x 2, Rosidal soft ankle to knee, Rosidal K 1-8cm, 2-10 cm, 1-12 cm, Darco shoe  -KD      Bandaging Technique circumferential/spiral;light compression  -KD      Compression Garment Comments Compreflex garment applied to RLE today (lower leg piece and foot piece)  -KD            User Key  (r) = Recorded By, (t) = Taken By, (c) = Cosigned By    Initials Name Provider Type    Jessica Tamayo, PT Physical Therapist                               PT Assessment/Plan     Row Name 06/23/22 1229          PT Assessment    Assessment Comments Skin intact, no weeping/drainage. Edema on dorsum of left foot is softening.  -KD            PT Plan    PT Plan Comments Cont therapy, getting measured on 6/24.  -KD           User Key  (r) = Recorded By, (t) = Taken By, (c) = Cosigned By    Initials Name Provider Type    Jessica Tamayo, PT Physical Therapist                    OP Exercises     Row Name 06/23/22 1232 06/23/22 1200          Subjective Comments    Subjective Comments -- States he saw the cardiologist 6/21, has an atrial flutter--having more testing done, starting Coumadin.  -KD            Subjective Pain    Able to rate subjective pain? -- yes  -KD     Pre-Treatment Pain Level -- 0  -KD            Total Minutes    88952 - PT Manual Therapy Minutes 53  -KD --           User Key  (r) = Recorded By, (t) = Taken By, (c) = Cosigned By    Initials Name Provider Type    Jessica Tamayo, PT Physical Therapist                        Manual Rx (last 36 hours)     Manual Treatments     Row Name 06/23/22 1232             Total Minutes    47373 - PT Manual Therapy Minutes 53  -KD            User Key   (r) = Recorded By, (t) = Taken By, (c) = Cosigned By    Initials Name Provider Type    Jessica Tamayo, PT Physical Therapist                    Therapy Education  Education Details: Discussed therapy progression  Given: Edema management  Program: New, Reinforced  How Provided: Verbal  Provided to: Patient  Level of Understanding: Verbalized              Time Calculation:   Start Time: 1116  Stop Time: 1209  Time Calculation (min): 53 min  Total Timed Code Minutes- PT: 53 minute(s)  Timed Charges  36620 - PT Manual Therapy Minutes: 53  Total Minutes  Timed Charges Total Minutes: 53   Total Minutes: 53   Therapy Charges for Today     Code Description Service Date Service Provider Modifiers Qty    [id number] HC PT MANUAL THERAPY EA 15 MIN 6/23/2022 Jessica Horta, PT KX, GP 4                    Jessica Horta, PT  6/23/2022

## 2022-06-24 ENCOUNTER — HOSPITAL ENCOUNTER (OUTPATIENT)
Dept: PHYSICAL THERAPY | Facility: HOSPITAL | Age: 82
Setting detail: THERAPIES SERIES
Discharge: HOME | End: 2022-06-24

## 2022-06-24 DIAGNOSIS — I89.0 LYMPHEDEMA: Primary | ICD-10-CM

## 2022-06-24 PROCEDURE — 97140 MANUAL THERAPY 1/> REGIONS: CPT

## 2022-06-24 PROCEDURE — 97535 SELF CARE MNGMENT TRAINING: CPT

## 2022-06-24 NOTE — THERAPY PROGRESS REPORT/RE-CERT
"    Outpatient Physical Therapy Lymphedema Progress Note  [facility]     Patient Name: Riky Moon  : 1940  MRN: [id number]  Today's Date: 2022        Visit Date: 2022    Visit Dx:    ICD-10-CM ICD-9-CM   1. Lymphedema  I89.0 457.1       Patient Active Problem List   Diagnosis   • OA (osteoarthritis) of hip   • KINDRA treated with auto BiPAP   • Chest pain   • Diabetes mellitus ([facility])   • Hypertension   • Hyperlipidemia   • Hypothyroidism   • Asthma   • Overactive bladder   • CAP (community acquired pneumonia)   • Cellulitis of left lower extremity   • COPD (chronic obstructive pulmonary disease) ([facility])   • Dyslipidemia   • Gastroesophageal reflux disease   • Leukocytosis   • Peripheral nerve disease   • Constipation   • Oropharyngeal dysphagia   • Bronchitis   • Class 3 severe obesity due to excess calories with serious comorbidity and body mass index (BMI) of 40.0 to 44.9 in adult ([facility])   • Left leg swelling   • Anemia   • Leukocytosis   • Circadian rhythm sleep disorder, delayed sleep phase type   • Atrial flutter with controlled response ([facility])         Lymphedema     Row Name 22 1100             Subjective Pain    Able to rate subjective pain? yes  -KA      Pre-Treatment Pain Level 0  -KA              Subjective Comments    Subjective Comments Pt reports that he did not see any drainage on the pad this morning.  -KA              LLIS - Physical Concerns    The amount of pain associated with my lymphedema is: 1  -KA      The amount of limb heaviness associated with my lymphedema is: 1  -KA      The amount of skin tightness associated with my lymphedema is: 1  -KA      The size of my swollen limb(s) seems: 2  -KA      Lymphedema affects the movement of my swollen limb(s): 2  -KA      The strength in my swollen limb(s) is: 1  -KA              LLIS - Psychosocial Concerns    Lymphedema affects my body image (i.e., \"how I think I look\"). 1  -KA      Lymphedema affects my socializing with " "others. 1  -KA      Lymphedema affects my intimate relations with spouse or partner (rate 0 if not applicable 0  -KA      Lymphedema \"gets me down\" (i.e., depression, frustration, or anger) 1  -KA      I must rely on others for help due to my lymphedema. 1  -KA      I know what to do to manage my lymphedema 0  -KA              LLIS - Functional Concerns    Lymphedema affects my ability to perform self-care activities (i.e. eating, dressing, hygiene) 1  -KA      Lymphedema affects my ability to perform routine home or work-related activities. 1  -KA      Lymphedema affects my performance of preferred leisure activities. 2  -KA      Lymphedema affects proper fit of clothing/shoes 2  -KA      Lymphedema affects my sleep 0  -KA              Posture/Observations    Posture/Observations Comments Forward head, rounded shoulders, excessive anterior pelvic tilt; Ambulates into clinic slowly with antalgic pattern, R lateral lean, genu varus L knee  -KA              General ROM    GENERAL ROM COMMENTS BUE ROM WFLs; Limited with L hip flexion, mild limitations with dorsiflexion bilaterally; Can only reach to level of mid calf when trying to bend to reach feet  -KA              MMT (Manual Muscle Testing)    General MMT Comments Grossly 3+/5 to 4-/5 BLE  -KA              Lymphedema Edema Assessment    Edema Assessment Comment Softer edema dorsum of left foot, still has moderate edema LLE, minimal on RLE  -KA              Skin Changes/Observations    Skin Observations Comment Flaky skin BLE, redness on middle third of both shins  -KA              Lymphedema Measurements    Measurement Type(s) Circumferential  -KA      Circumferential Areas Lower extremities  -KA              BLE Circumferential (cm)    Measurement Location 1 Knee (popliteal crease)  -KA      Left 1 41.8 cm  -KA      Right 1 41.8 cm  -KA      Measurement Location 2 10cm below knee  -KA      Left 2 47.4 cm  -KA      Right 2 44.2 cm  -KA      Measurement Location 3 " 20cm below knee  -KA      Left 3 43.8 cm  -KA      Right 3 36 cm  -KA      Measurement Location 4 30cm below knee  -KA      Left 4 37.5 cm  -KA      Right 4 25.8 cm  -KA      Measurement Location 5 Ankle  -KA      Left 5 36.9 cm  -KA      Right 5 26.4 cm  -KA      Measurement Location 6 Midfoot  -KA      Left 6 33 cm  -KA      Right 6 27 cm  -KA      LLE Circumferential Total 240.4 cm  -KA      RLE Circumferential Total 201.2 cm  -KA              Manual Lymphatic Drainage    Manual Lymphatic Drainage Comments Pt instructed in self-MLD today  -KA              Compression/Skin Care    Compression/Skin Care skin care;wrapping location;bandaging;compression garment  -KA      Skin Care moisturizing lotion applied  -KA      Wrapping Location lower extremity  -KA      Wrapping Location LE left:;foot to knee  -KA      Bandage Layers cotton liner;padding/fluff layer;dense foam;short-stretch bandages (comment size/quantity)  -KA      Bandaging Comments LLE: ABD pad x 1/ 1 roll Kerlix, TG9, piece of channelled foam (Komprex II) on dorsum of foot, Artiflex x 2, Rosidal soft ankle to knee, Rosidal K 1-8cm, 2-10 cm, 1-12 cm, Darco shoe  -KA      Bandaging Technique circumferential/spiral;light compression  -KA      Compression Garment Comments Compreflex garment applied to RLE today (lower leg piece and foot piece)  -KA              Lymphedema Life Impact Scale Totals    A.  Total Q1 - Q17 (Do not include Q18) 18  -KA      B.  Total number of questions answered (Q1-Q17) 17  -KA      C. Divide A by B 1.06  -KA      D. Multiple C by 25 26.5  -KA            User Key  (r) = Recorded By, (t) = Taken By, (c) = Cosigned By    Initials Name Provider Type    Svetlana Couch, PT Physical Therapist                               PT Assessment/Plan     Row Name 06/24/22 3496          PT Assessment    Functional Limitations Limitation in home management;Performance in leisure activities;Performance in self-care ADL  -KA     Impairments  Balance;Edema;Endurance;Gait;Impaired lymphatic circulation;Joint mobility;Muscle strength;Posture;Sensation  -KA     Assessment Comments Pt has made excellent progress with CDT.  He has met 3/3 STG and 1/3 LTG and is making progress towards the remaining two.  Score on the Lymphedema Life Impact scale has reduced from 73.5 at initial evaluation to 26.5 today indicating reduced disability related to lymphedema.  Net decreased edema of 21.1 cm LLE and 11.5 cm RLE since starting treatment.  No open wounds or weeping on LLE and have been able to begin MLD to LLE starting this week.  Pt remains appropriate for skilled physical therapy to reduce swelling in LLE, will order long term compression when he has reached plateau in girth measurements.  -KA     Rehab Potential Good  -KA     Patient/caregiver participated in establishment of treatment plan and goals Yes  -KA     Patient would benefit from skilled therapy intervention Yes  -KA            PT Plan    PT Frequency 5x/week  -KA     Predicted Duration of Therapy Intervention (PT) 4-6 weeks  -KA     PT Plan Comments Continue MLD, monitor skin LLE, order compression garments when girth measurements plateau on LLE.  -JOHNIE           User Key  (r) = Recorded By, (t) = Taken By, (c) = Cosigned By    Initials Name Provider Type    Svetlana Couch, PT Physical Therapist                    OP Exercises     Row Name 06/24/22 1700 06/24/22 1100          Subjective Comments    Subjective Comments -- Pt reports that he did not see any drainage on the pad this morning.  -KA            Subjective Pain    Able to rate subjective pain? -- yes  -KA     Pre-Treatment Pain Level -- 0  -KA            Total Minutes    81497 - PT Manual Therapy Minutes 23  -KA --           User Key  (r) = Recorded By, (t) = Taken By, (c) = Cosigned By    Initials Name Provider Type    Svetlana Couch, PT Physical Therapist                        Manual Rx (last 36 hours)     Manual Treatments     Row  Name 06/24/22 1700             Total Minutes    71060 - PT Manual Therapy Minutes 23  -KA            User Key  (r) = Recorded By, (t) = Taken By, (c) = Cosigned By    Initials Name Provider Type    Svetlana Couch, PT Physical Therapist                 PT OP Goals     Row Name 06/24/22 1600          PT Short Term Goals    STG Date to Achieve 06/16/22  -KA     STG 1 Patient to demonstrate awareness of condition and precautions for improved prevention, management, care of symptoms, and ease of transition to self care of condition.  -KA     STG 1 Progress Met  -KA     STG 2 Patient demonstrates decreased net edema of Left Lower Extremity>/= 10-15 cm for decreased edema symptoms, decreased risk of infection, and improved skin care.  -KA     STG 2 Progress Met  -KA     STG 3 Skin on lower extremities to be intact, no weeping, to decrease risk of infection.  -KA     STG 3 Progress Met  -KA            Long Term Goals    LTG Date to Achieve 07/14/22  -KA     LTG 1 Patient/family independent with self-care techniques for self-management of condition.  -KA     LTG 1 Progress Progressing  -KA     LTG 2 Patient demonstrates decreased net edema of Left Lower Extremity>/= 15-30 cm for decreased edema symptoms, decreased risk of infection, and improved skin care.  -KA     LTG 2 Progress Met;Ongoing  -KA     LTG 3 Patient/family independent with compression garments as indicated for self-management of condition.  -KA     LTG 3 Progress Progressing;Partially Met  -KA     LTG 3 Progress Comments Wife is able to apply compreflex to RLE, LLE not yet appropriate for long term compression devices  -KA            Time Calculation    PT Goal Re-Cert Due Date 08/14/22  -           User Key  (r) = Recorded By, (t) = Taken By, (c) = Cosigned By    Initials Name Provider Type    Svetlana Couch, CJ Physical Therapist                Therapy Education  Education Details: Pt instructed to keep bandaging on LLE until Monday morning,  then remove and shower before treatment.  He can change liner on RLE daily, but needs to wear velcro devices from the time he gets up until he goes to sleep.  Discussed girth measurements, will reassess next week.  Will order compression garments when he reaches plateau in progress with girth measurements. Needs to bring in clean bandages on Monday.  Instructed in self-MLD this visit, performed in clinic, pt to attempt at least once over weekend, provided with written handout.  Given: Edema management, Bandaging/dressing change, HEP, Symptoms/condition management  Program: New, Reinforced  How Provided: Verbal, Demonstration, Written  Provided to: Patient  Level of Understanding: Verbalized, Demonstrated  14191 - PT Self Care/Mgmt Minutes: 30              Time Calculation:   Start Time: 1140  Stop Time: 1233  Time Calculation (min): 53 min  Timed Charges  78724 - PT Manual Therapy Minutes: 23  95620 - PT Self Care/Mgmt Minutes: 30  Total Minutes  Timed Charges Total Minutes: 23   Total Minutes: 23   Therapy Charges for Today     Code Description Service Date Service Provider Modifiers Qty    17706134765 HC PT MANUAL THERAPY EA 15 MIN 6/24/2022 Svetlana French, PT GP, KX 2    71220656959 HC PT SELF CARE/MGMT/TRAIN EA 15 MIN 6/24/2022 Svetlana French, PT GP, KX 2                    Svetlana French, PT  6/24/2022

## 2022-06-27 ENCOUNTER — ANTICOAGULATION VISIT (OUTPATIENT)
Dept: PHARMACY | Facility: HOSPITAL | Age: 82
End: 2022-06-27

## 2022-06-27 ENCOUNTER — HOSPITAL ENCOUNTER (OUTPATIENT)
Dept: PHYSICAL THERAPY | Facility: HOSPITAL | Age: 82
Setting detail: THERAPIES SERIES
Discharge: HOME | End: 2022-06-27

## 2022-06-27 DIAGNOSIS — I48.92 ATRIAL FLUTTER WITH CONTROLLED RESPONSE: Primary | ICD-10-CM

## 2022-06-27 DIAGNOSIS — I89.0 LYMPHEDEMA: Primary | ICD-10-CM

## 2022-06-27 LAB
INR PPP: 1.1 (ref 0.91–1.09)
PROTHROMBIN TIME: 12.9 SECONDS (ref 10–13.8)

## 2022-06-27 PROCEDURE — 97140 MANUAL THERAPY 1/> REGIONS: CPT

## 2022-06-27 PROCEDURE — 85610 PROTHROMBIN TIME: CPT

## 2022-06-27 PROCEDURE — G0463 HOSPITAL OUTPT CLINIC VISIT: HCPCS

## 2022-06-27 PROCEDURE — 36416 COLLJ CAPILLARY BLOOD SPEC: CPT

## 2022-06-27 NOTE — THERAPY TREATMENT NOTE
Outpatient Physical Therapy Lymphedema Treatment Note  [facility]     Patient Name: Riky Moon  : 1940  MRN: [id number]  Today's Date: 2022        Visit Date: 2022    Visit Dx:    ICD-10-CM ICD-9-CM   1. Lymphedema  I89.0 457.1       Patient Active Problem List   Diagnosis   • OA (osteoarthritis) of hip   • KINDRA treated with auto BiPAP   • Chest pain   • Diabetes mellitus ([facility])   • Hypertension   • Hyperlipidemia   • Hypothyroidism   • Asthma   • Overactive bladder   • CAP (community acquired pneumonia)   • Cellulitis of left lower extremity   • COPD (chronic obstructive pulmonary disease) ([facility])   • Dyslipidemia   • Gastroesophageal reflux disease   • Leukocytosis   • Peripheral nerve disease   • Constipation   • Oropharyngeal dysphagia   • Bronchitis   • Class 3 severe obesity due to excess calories with serious comorbidity and body mass index (BMI) of 40.0 to 44.9 in adult ([facility])   • Left leg swelling   • Anemia   • Leukocytosis   • Circadian rhythm sleep disorder, delayed sleep phase type   • Atrial flutter with controlled response ([facility])         Lymphedema     Row Name 22 1200             Subjective Pain    Able to rate subjective pain? yes  -KA      Pre-Treatment Pain Level 0  -KA              Subjective Comments    Subjective Comments Pt reports that his bandages on his foot came off over weekend, was reapplied but came back off overnight last night.  -KA              Skin Changes/Observations    Skin Observations Comment Flaky skin BLE, redness on middle third of both shins  -KA              Manual Lymphatic Drainage    Manual Lymphatic Drainage Comments Short neck, B axilla, B IA, diaphragmatic breathing, L inguinals, LLE  -KA              Compression/Skin Care    Compression/Skin Care skin care;wrapping location;bandaging;compression garment  -KA      Skin Care moisturizing lotion applied  Aquaphor to dry flaky areas, then Eucerin to BLE below knees  -KA      Wrapping  Location lower extremity  -KA      Wrapping Location LE left:;foot to knee  -KA      Bandage Layers cotton liner;padding/fluff layer;dense foam;short-stretch bandages (comment size/quantity)  -KA      Bandaging Comments LLE: TG9, Artiflex foot to knee, Rosidal soft ankle to knee, Rosidal K 1-8cm, 2-10 cm, 1-12 cm, Darco shoe  -KA      Bandaging Technique circumferential/spiral;light compression  -KA      Compression Garment Comments Compreflex garment applied to RLE today (lower leg piece and foot piece)  -            User Key  (r) = Recorded By, (t) = Taken By, (c) = Cosigned By    Initials Name Provider Type    Svetlana Couch, PT Physical Therapist                               PT Assessment/Plan     Row Name 06/27/22 1331          PT Assessment    Assessment Comments Pt with dry flaky skin B lower legs, covered areas with Aquaphor then Eucerin prior to bandaging, no longer needed ABD pad or Kerlix during bandaging due to no weeping in past week.  Continued MLD to LLE.  Pt wants to order matching velcro for BLE when appropriate.  -KA            PT Plan    PT Plan Comments Continue MLD, monitor skin condition and use Eucerin/Aquaphor for flaky areas.  Pt to wear bandages into clinic on 6/29 for girth measurements to be reassessed as soon as they are removed.  -JOHNIE           User Key  (r) = Recorded By, (t) = Taken By, (c) = Cosigned By    Initials Name Provider Type    Svetlana Couch, PT Physical Therapist                    OP Exercises     Row Name 06/27/22 0621 06/27/22 1200          Subjective Comments    Subjective Comments -- Pt reports that his bandages on his foot came off over weekend, was reapplied but came back off overnight last night.  -KA            Subjective Pain    Able to rate subjective pain? -- yes  -KA     Pre-Treatment Pain Level -- 0  -KA            Total Minutes    33705 - PT Manual Therapy Minutes 55  -KA --           User Key  (r) = Recorded By, (t) = Taken By, (c) = Cosigned  By    Initials Name Provider Type    Svetlana Couch PT Physical Therapist                        Manual Rx (last 36 hours)     Manual Treatments     Row Name 06/27/22 1334             Total Minutes    99704 - PT Manual Therapy Minutes 55  -KA            User Key  (r) = Recorded By, (t) = Taken By, (c) = Cosigned By    Initials Name Provider Type    Svetlana Couch PT Physical Therapist                    Therapy Education  Education Details: Pt will need to bring clean bandages in on Wednesday, 6/29.  Asked to wear bandages to session on 6/29 as well so therapist can remove just prior to checking girth measurements.  Working towards ordering velcro compression and need to know if girth measurements are the same or have plateaued.  Given: Edema management, Bandaging/dressing change, HEP, Symptoms/condition management  Program: New  How Provided: Verbal  Provided to: Patient  Level of Understanding: Verbalized              Time Calculation:   Start Time: 1130  Stop Time: 1225  Time Calculation (min): 55 min  Timed Charges  16146 - PT Manual Therapy Minutes: 55  Total Minutes  Timed Charges Total Minutes: 55   Total Minutes: 55   Therapy Charges for Today     Code Description Service Date Service Provider Modifiers Qty    41330549776 HC PT MANUAL THERAPY EA 15 MIN 6/27/2022 Svetlana French, PT GP, KX 4                    Svetlana French PT  6/27/2022

## 2022-06-27 NOTE — PROGRESS NOTES
Anticoagulation Clinic Progress Note  Anticoagulation Summary  As of 2022    INR goal:  2.0-3.0   TTR:  --   INR used for dosin.1 (2022)   Warfarin maintenance plan:  No maintenance plan   Weekly warfarin total:  0 mg   Plan last modified:  Alfonso Gonzalez, Pharmacy Intern (2022)   Next INR check:  2022   Priority:  Maintenance   Target end date:      Indications    Atrial flutter with controlled response ([facility]) [I48.92]             Anticoagulation Episode Summary     INR check location:      Preferred lab:      Send INR reminders to:  [facility] [place]    Comments:  Starting warfarin 5 mg daily 22. 1st INR check 22.      Anticoagulation Care Providers     Provider Role Specialty Phone number    Roseline Silva APRN Referring Cardiology [phone]          Clinic Interview:  Patient Findings     Negatives:  Signs/symptoms of thrombosis, Signs/symptoms of bleeding,   Laboratory test error suspected, Change in health, Change in alcohol use,   Change in activity, Upcoming invasive procedure, Emergency department   visit, Upcoming dental procedure, Missed doses, Extra doses, Change in   medications, Change in diet/appetite, Hospital admission, Bruising, Other   complaints    Comments:  Initial visit - patient endorses enjoying salads with dinner   consistently.       Clinical Outcomes     Negatives:  Major bleeding event, Thromboembolic event,   Anticoagulation-related hospital admission, Anticoagulation-related ED   visit, Anticoagulation-related fatality    Comments:  Initial visit - patient endorses enjoying salads with dinner   consistently.         Education:  Riky Moon is a new start in the [facility]. We discussed the followin) Warfarin's indication, mechanism, and dosing  2) Enforced the importance of taking warfarin as instructed and at the same time every day, preferably in the evening so that we can make dose adjustments more  easily following subsequent clinic visits  3) What he should do about a missed dose; pts can take missed doses within about 12 hours of their usual scheduled dose, but he was instructed on the importance of not doubling up on doses unless told to do so by the [facility]  4) Explained possible side effects of warfarin therapy, including increased risk of bleeding, s/sx of bleeding and s/sx of any additional clots/PE/CVA.   5) Discussed monitoring of warfarin, the INR, goal INR range, and the frequency of monitoring  6) Reviewed drug/food/tobacco/EtOH interactions and provided written information covering these topics in more detail, explaining that green, leafy vegetables interact most heavily with warfarin  7) Instructed the pt not to take or discontinue any medications without informing his physician/pharmacist and reminded him to inform us of any dietary changes, as well  8) Explained that he would be coming into the clinic more frequently in these first few weeks of therapy as we try to adjust his dose and achieve a therapeutic INR x 2 consecutive readings. Once that is achieved, patient will follow up in clinic every 4 weeks, on average.    He stated no problems with transportation or scheduling clinic appts in this manner. he expressed understanding of the information provided and has no additional questions at this time.    Riky ELISABETH Moon was presented with a copy of the Patients Rights and Responsibilities. he expressed verbal consent and agreement to receive care in the [facility] under the current collaborative care agreement with [facility].       INR History:  Anticoagulation Monitoring 6/27/2022   INR 1.1   INR Date 6/27/2022   INR Goal 2.0-3.0   Last Week Total 0 mg   Next Week Total 35 mg   Sun -   Mon 10 mg (6/27)   Tue 10 mg (6/28)   Wed 7.5 mg (6/29)   Thu 7.5 mg (6/30)   Fri -   Sat -   Visit Report -       Plan:  1. INR is Subtherapeutic today- see  above in Anticoagulation Summary.   Will instruct Riky Moon to Increase their warfarin regimen- see above in Anticoagulation Summary. Instructed patient to increased tonight and tomorrow's dose up to 10 mg and then take 7.5 mg on Wednesday and Thursday.   2. Follow up in 4 days  3. Refills not assessed at this visit. Patient will return in the next few days for f/u.   4. New Start - provided appropriate counseling concerning warfarin use, side effects, drug interactions, food interactions, s/sx of bleeding, stroke, and DVT/PE. Encouraged patient to reach out if he has any questions/concerns.  5. Verbal and written information provided. Patient expresses understanding and has no further questions at this time.    Alfonso Gonzalez, Pharmacy Intern

## 2022-06-28 ENCOUNTER — HOSPITAL ENCOUNTER (OUTPATIENT)
Dept: PHYSICAL THERAPY | Facility: HOSPITAL | Age: 82
Setting detail: THERAPIES SERIES
Discharge: HOME | End: 2022-06-28

## 2022-06-28 DIAGNOSIS — I89.0 LYMPHEDEMA: Primary | ICD-10-CM

## 2022-06-28 PROCEDURE — 97140 MANUAL THERAPY 1/> REGIONS: CPT

## 2022-06-28 NOTE — THERAPY TREATMENT NOTE
Outpatient Physical Therapy Lymphedema Treatment Note  [facility]     Patient Name: Riky Moon  : 1940  MRN: [id number]  Today's Date: 2022        Visit Date: 2022    Visit Dx:    ICD-10-CM ICD-9-CM   1. Lymphedema  I89.0 457.1       Patient Active Problem List   Diagnosis   • OA (osteoarthritis) of hip   • KINDRA treated with auto BiPAP   • Chest pain   • Diabetes mellitus ([facility])   • Hypertension   • Hyperlipidemia   • Hypothyroidism   • Asthma   • Overactive bladder   • CAP (community acquired pneumonia)   • Cellulitis of left lower extremity   • COPD (chronic obstructive pulmonary disease) ([facility])   • Dyslipidemia   • Gastroesophageal reflux disease   • Leukocytosis   • Peripheral nerve disease   • Constipation   • Oropharyngeal dysphagia   • Bronchitis   • Class 3 severe obesity due to excess calories with serious comorbidity and body mass index (BMI) of 40.0 to 44.9 in adult ([facility])   • Left leg swelling   • Anemia   • Leukocytosis   • Circadian rhythm sleep disorder, delayed sleep phase type   • Atrial flutter with controlled response ([facility])         Lymphedema     Row Name 22 1200             Subjective Pain    Able to rate subjective pain? yes  -KD      Pre-Treatment Pain Level 0  -KD              Subjective Comments    Subjective Comments Nothing new today.  -KD              Manual Lymphatic Drainage    Manual Lymphatic Drainage Comments Short neck, B axilla, B IA, diaphragmatic breathing, L inguinals, LLE  -KD              Compression/Skin Care    Compression/Skin Care skin care;wrapping location;bandaging;compression garment  -KD      Skin Care moisturizing lotion applied  Aquaphor to dry flaky areas, then Eucerin to BLE below knees  -KD      Wrapping Location lower extremity  -KD      Wrapping Location LE left:;foot to knee  -KD      Bandage Layers cotton liner;padding/fluff layer;dense foam;short-stretch bandages (comment size/quantity)  -KD      Bandaging Comments LLE: TG9,  Artiflex foot to knee, Rosidal soft ankle to knee, Rosidal K 1-8cm, 2-10 cm, 1-12 cm, Darco shoe  -KD      Bandaging Technique circumferential/spiral;light compression  -KD      Compression Garment Comments Compreflex garment applied to RLE today (lower leg piece and foot piece)  -KD            User Key  (r) = Recorded By, (t) = Taken By, (c) = Cosigned By    Initials Name Provider Type    Jessica Tamayo, PT Physical Therapist                               PT Assessment/Plan     Row Name 06/28/22 1301          PT Assessment    Assessment Comments Skin continuing to improve. Noting softening of left leg.  -KD            PT Plan    PT Plan Comments Cont therapy, pt getting measured tomorrow.  -KD           User Key  (r) = Recorded By, (t) = Taken By, (c) = Cosigned By    Initials Name Provider Type    Jessica Tamayo, CJ Physical Therapist                    OP Exercises     Row Name 06/28/22 1303 06/28/22 1200          Subjective Comments    Subjective Comments -- Nothing new today.  -KD            Subjective Pain    Able to rate subjective pain? -- yes  -KD     Pre-Treatment Pain Level -- 0  -KD            Total Minutes    18015 - PT Manual Therapy Minutes 50  -KD --           User Key  (r) = Recorded By, (t) = Taken By, (c) = Cosigned By    Initials Name Provider Type    Jessica Tamayo, CJ Physical Therapist                        Manual Rx (last 36 hours)     Manual Treatments     Row Name 06/28/22 1303             Total Minutes    52234 - PT Manual Therapy Minutes 50  -KD            User Key  (r) = Recorded By, (t) = Taken By, (c) = Cosigned By    Initials Name Provider Type    Jessica Tamayo, CJ Physical Therapist                    Therapy Education  Education Details: Reminded pt to come in with bandages on tomorrow.  Given: Edema management, Bandaging/dressing change  Program: Reinforced  How Provided: Verbal  Provided to: Patient  Level of Understanding: Verbalized              Time  Calculation:   Start Time: 1118  Stop Time: 1208  Time Calculation (min): 50 min  Total Timed Code Minutes- PT: 50 minute(s)  Timed Charges  08100 - PT Manual Therapy Minutes: 50  Total Minutes  Timed Charges Total Minutes: 50   Total Minutes: 50   Therapy Charges for Today     Code Description Service Date Service Provider Modifiers Qty    42607407948 HC PT MANUAL THERAPY EA 15 MIN 6/28/2022 Jessica Horta, PT KX, GP 3                    Jessica Horta, PT  6/28/2022

## 2022-06-29 ENCOUNTER — HOSPITAL ENCOUNTER (OUTPATIENT)
Dept: PHYSICAL THERAPY | Facility: HOSPITAL | Age: 82
Setting detail: THERAPIES SERIES
Discharge: HOME | End: 2022-06-29

## 2022-06-29 DIAGNOSIS — I89.0 LYMPHEDEMA: Primary | ICD-10-CM

## 2022-06-29 PROCEDURE — 97535 SELF CARE MNGMENT TRAINING: CPT

## 2022-06-29 PROCEDURE — 97140 MANUAL THERAPY 1/> REGIONS: CPT

## 2022-06-29 NOTE — THERAPY TREATMENT NOTE
Outpatient Physical Therapy Lymphedema Treatment Note  [facility]     Patient Name: Riky Moon  : 1940  MRN: [id number]  Today's Date: 2022        Visit Date: 2022    Visit Dx:    ICD-10-CM ICD-9-CM   1. Lymphedema  I89.0 457.1       Patient Active Problem List   Diagnosis   • OA (osteoarthritis) of hip   • KINDRA treated with auto BiPAP   • Chest pain   • Diabetes mellitus ([facility])   • Hypertension   • Hyperlipidemia   • Hypothyroidism   • Asthma   • Overactive bladder   • CAP (community acquired pneumonia)   • Cellulitis of left lower extremity   • COPD (chronic obstructive pulmonary disease) ([facility])   • Dyslipidemia   • Gastroesophageal reflux disease   • Leukocytosis   • Peripheral nerve disease   • Constipation   • Oropharyngeal dysphagia   • Bronchitis   • Class 3 severe obesity due to excess calories with serious comorbidity and body mass index (BMI) of 40.0 to 44.9 in adult ([facility])   • Left leg swelling   • Anemia   • Leukocytosis   • Circadian rhythm sleep disorder, delayed sleep phase type   • Atrial flutter with controlled response ([facility])         Lymphedema     Row Name 22 1100             Subjective Pain    Able to rate subjective pain? yes  -KA      Pre-Treatment Pain Level 0  -KA              Subjective Comments    Subjective Comments Pt reports that he had some pain in upper L shin yesterday after bandaging, it finally eased after he sat in recliner for awhile.  -KA              Skin Changes/Observations    Skin Observations Comment Redness persists, no flakiness LLE  -KA              Lymphedema Measurements    Measurement Type(s) Circumferential  -KA      Circumferential Areas Lower extremities  -KA              BLE Circumferential (cm)    Measurement Location 1 Knee (popliteal crease)  -KA      Left 1 42.9 cm  -KA      Right 1 44 cm  -KA      Measurement Location 2 10cm below knee  -KA      Left 2 45 cm  -KA      Right 2 42.8 cm  -KA      Measurement Location 3 20cm below  knee  -KA      Left 3 40.4 cm  -KA      Right 3 35.4 cm  -KA      Measurement Location 4 30cm below knee  -KA      Left 4 36 cm  -KA      Right 4 26.4 cm  -KA      Measurement Location 5 Ankle  -KA      Left 5 36 cm  -KA      Right 5 27.2 cm  -KA      Measurement Location 6 Midfoot  -KA      Left 6 32.2 cm  -KA      Right 6 27 cm  -KA      LLE Circumferential Total 232.5 cm  -KA      RLE Circumferential Total 202.8 cm  -KA              Compression/Skin Care    Compression/Skin Care skin care;wrapping location;bandaging;compression garment  -KA      Skin Care moisturizing lotion applied  -KA      Wrapping Location lower extremity  -KA      Wrapping Location LE left:;foot to knee  -KA      Bandage Layers cotton liner;padding/fluff layer;dense foam;short-stretch bandages (comment size/quantity)  -KA      Bandaging Comments LLE: TG9, Artiflex foot to knee, Rosidal soft ankle to knee, Rosidal K 1-8cm, 2-10 cm, 1-12 cm, Darco shoe  -      Bandaging Technique circumferential/spiral;light compression  -KA      Compression Garment Comments Compreflex garment applied to RLE today (lower leg piece and foot piece)  -            User Key  (r) = Recorded By, (t) = Taken By, (c) = Cosigned By    Initials Name Provider Type    Svetlana Couch, PT Physical Therapist                               PT Assessment/Plan     Row Name 06/29/22 1244          PT Assessment    Assessment Comments Skin condition continues to improve.  LLE still actively reducing, has now lost net 29 cm on LLE since starting therapy.  Pt provided with information to order compression garments as RLE has plateaued and LLE is in top part of range for velcro compression size with room to continue reduction.  Will continue  with MLD and bandaging, assess fit of velcro compression when it arrives.  -KA            PT Plan    PT Plan Comments Continue MLD and compression bandaging, assess fit of velcro devices when they arrive.  -           User Key  (r) =  Recorded By, (t) = Taken By, (c) = Cosigned By    Initials Name Provider Type    Svetlana Couch, PT Physical Therapist                    OP Exercises     Row Name 06/29/22 1248 06/29/22 1100          Subjective Comments    Subjective Comments -- Pt reports that he had some pain in upper L shin yesterday after bandaging, it finally eased after he sat in recliner for awhile.  -KA            Subjective Pain    Able to rate subjective pain? -- yes  -KA     Pre-Treatment Pain Level -- 0  -KA            Total Minutes    24474 - PT Manual Therapy Minutes 47  -KA --           User Key  (r) = Recorded By, (t) = Taken By, (c) = Cosigned By    Initials Name Provider Type    Svetlana Couch, PT Physical Therapist                        Manual Rx (last 36 hours)     Manual Treatments     Row Name 06/29/22 1248             Total Minutes    98792 - PT Manual Therapy Minutes 47  -KA            User Key  (r) = Recorded By, (t) = Taken By, (c) = Cosigned By    Initials Name Provider Type    Svetlana Couch, PT Physical Therapist                 PT OP Goals     Row Name 06/29/22 1200          PT Short Term Goals    STG Date to Achieve 06/16/22  -KA     STG 1 Patient to demonstrate awareness of condition and precautions for improved prevention, management, care of symptoms, and ease of transition to self care of condition.  -KA     STG 1 Progress Met  -KA     STG 2 Patient demonstrates decreased net edema of Left Lower Extremity>/= 10-15 cm for decreased edema symptoms, decreased risk of infection, and improved skin care.  -KA     STG 2 Progress Met  -     STG 3 Skin on lower extremities to be intact, no weeping, to decrease risk of infection.  -KA     STG 3 Progress Met  -            Long Term Goals    LTG Date to Achieve 07/14/22  -KA     LTG 1 Patient/family independent with self-care techniques for self-management of condition.  -KA     LTG 1 Progress Progressing  -KA     LTG 2 Patient demonstrates decreased  net edema of Left Lower Extremity>/= 15-30 cm for decreased edema symptoms, decreased risk of infection, and improved skin care.  -     LTG 2 Progress Met;Ongoing  -     LTG 3 Patient/family independent with compression garments as indicated for self-management of condition.  -KA     LTG 3 Progress Progressing;Partially Met  -     LTG 3 Progress Comments Pt given information to order Juxtalite HD for BLE, will train for donning/doffing when they arrive  -JOHNIE            Time Calculation    PT Goal Re-Cert Due Date 08/14/22  -JOHNIE           User Key  (r) = Recorded By, (t) = Taken By, (c) = Cosigned By    Initials Name Provider Type    Svetlana Couch, PT Physical Therapist                Therapy Education  Education Details: Discussed girth measurements.  Recommend Juxtalite HD in size Large long for RLE and Juxtalite HD in size Extra Large Long for LLE, pt provided with information to order these and also additional compression undersocks.  Instructed to bring long term compression to clinic when he receives it as well as his wife as she will need training to assist with donning/doffing.  Plan for holiday weekend next week will be for pt to wear bandaging as long as possible.  If he does have to remove it early, he will wear Compreflex velcro device on LLE until he can get rebandaged as he is concerned about wife's ability to wrap due to memory issues.  Given: Edema management, Symptoms/condition management, Bandaging/dressing change  Program: New  How Provided: Verbal, Written  Provided to: Patient  Level of Understanding: Verbalized  89564 - PT Self Care/Mgmt Minutes: 10              Time Calculation:   Start Time: 1130  Stop Time: 1227  Time Calculation (min): 57 min  Timed Charges  81530 - PT Manual Therapy Minutes: 47  13173 - PT Self Care/Mgmt Minutes: 10  Total Minutes  Timed Charges Total Minutes: 57   Total Minutes: 57   Therapy Charges for Today     Code Description Service Date Service Provider  Modifiers Qty    32169890894 HC PT MANUAL THERAPY EA 15 MIN 6/29/2022 Svetlana French, PT GP, KX 3    06992259305 HC PT SELF CARE/MGMT/TRAIN EA 15 MIN 6/29/2022 Svetlana French, PT GP, KX 1                    Svetlana French, PT  6/29/2022

## 2022-06-29 NOTE — PROGRESS NOTES
I have supervised and reviewed the notes, assessments, and/or procedures performed by our Pharmacy Intern. The documented assessment and plan were developed cooperatively, and the plan was implemented in my presence. I concur with the documentation of this patient encounter.    Heron Delaney, PharmD

## 2022-06-30 ENCOUNTER — TELEPHONE (OUTPATIENT)
Dept: PHARMACY | Facility: HOSPITAL | Age: 82
End: 2022-06-30

## 2022-06-30 ENCOUNTER — HOSPITAL ENCOUNTER (OUTPATIENT)
Dept: PHYSICAL THERAPY | Facility: HOSPITAL | Age: 82
Setting detail: THERAPIES SERIES
Discharge: HOME | End: 2022-06-30

## 2022-06-30 DIAGNOSIS — I89.0 LYMPHEDEMA: Primary | ICD-10-CM

## 2022-06-30 PROCEDURE — 97140 MANUAL THERAPY 1/> REGIONS: CPT

## 2022-06-30 NOTE — TELEPHONE ENCOUNTER
Mr. Moon called to report he woke up this morning with his right wrist and right elbow itching. He denies any bumps at the sites; however, he notes redness. He wonders if it may be related to warfarin; however, based on the presentation in such an isolated position, it appears to be related to a non-medication cause. He is agreeable to continue to monitor, and he will contact our clinic with any other concerns. He is scheduled for INR check in clinic tomorrow.

## 2022-07-01 ENCOUNTER — HOSPITAL ENCOUNTER (OUTPATIENT)
Dept: PHYSICAL THERAPY | Facility: HOSPITAL | Age: 82
Setting detail: THERAPIES SERIES
Discharge: HOME | End: 2022-07-01

## 2022-07-01 ENCOUNTER — ANTICOAGULATION VISIT (OUTPATIENT)
Dept: PHARMACY | Facility: HOSPITAL | Age: 82
End: 2022-07-01

## 2022-07-01 DIAGNOSIS — I89.0 LYMPHEDEMA: Primary | ICD-10-CM

## 2022-07-01 DIAGNOSIS — I48.92 ATRIAL FLUTTER WITH CONTROLLED RESPONSE: Primary | ICD-10-CM

## 2022-07-01 LAB
INR PPP: 1.5 (ref 0.91–1.09)
PROTHROMBIN TIME: 18.5 SECONDS (ref 10–13.8)

## 2022-07-01 PROCEDURE — 97140 MANUAL THERAPY 1/> REGIONS: CPT

## 2022-07-01 PROCEDURE — 36416 COLLJ CAPILLARY BLOOD SPEC: CPT

## 2022-07-01 PROCEDURE — 85610 PROTHROMBIN TIME: CPT

## 2022-07-01 PROCEDURE — G0463 HOSPITAL OUTPT CLINIC VISIT: HCPCS

## 2022-07-01 NOTE — PROGRESS NOTES
Anticoagulation Clinic Progress Note    Anticoagulation Summary  As of 2022    INR goal:  2.0-3.0   TTR:  --   INR used for dosin.5 (2022)   Warfarin maintenance plan:  No maintenance plan   Plan last modified:  Alfonso Gonzalez, Pharmacy Intern (2022)   Next INR check:  2022   Priority:  Maintenance   Target end date:      Indications    Atrial flutter with controlled response (HCC) [I48.92]             Anticoagulation Episode Summary     INR check location:      Preferred lab:      Send INR reminders to:   [facility] [place]    Comments:        Anticoagulation Care Providers     Provider Role Specialty Phone number    RosaliaRoseline hendricks, EVON Referring Cardiology [phone]          Clinic Interview:  Patient Findings     Negatives:  Signs/symptoms of thrombosis, Signs/symptoms of bleeding,   Laboratory test error suspected, Change in health, Change in alcohol use,   Change in activity, Upcoming invasive procedure, Emergency department   visit, Upcoming dental procedure, Missed doses, Extra doses, Change in   medications, Change in diet/appetite, Hospital admission, Bruising, Other   complaints    Comments:  Has a large reddish patch on his right forearm.  States it   itched a little yesterday but not any longer.  Denies any pain or heat in   the area.        Clinical Outcomes     Negatives:  Major bleeding event, Thromboembolic event,   Anticoagulation-related hospital admission, Anticoagulation-related ED   visit, Anticoagulation-related fatality    Comments:  Has a large reddish patch on his right forearm.  States it   itched a little yesterday but not any longer.  Denies any pain or heat in   the area.          INR History:  Anticoagulation Monitoring 2022   INR 1.1 1.5   INR Date 2022   INR Goal 2.0-3.0 2.0-3.0   Last Week Total 25 mg 50 mg   Next Week Total 35 mg 40 mg   Sun - 10 mg (7/3)   Mon 10 mg () 7.5 mg ()   Tue 10 mg () 7.5  mg (7/5)   Wed 7.5 mg (6/29) -   Thu 7.5 mg (6/30) -   Fri - 7.5 mg (7/1)   Sat - 7.5 mg (7/2)   Visit Report - -       Plan:  1. INR is Subtherapeutic today- see above in Anticoagulation Summary.  Will instruct Riky BARBER Red to Change their warfarin regimen- see above in Anticoagulation Summary.  Take 10mg on Sunday and 7.5mg on all other days until next INR.  2. Follow up in 1 week  3. Patient declines warfarin refills.  4. Verbal and written information provided. Patient expresses understanding and has no further questions at this time.    Yu Rader, PharmD

## 2022-07-01 NOTE — THERAPY TREATMENT NOTE
"    Outpatient Physical Therapy Lymphedema Treatment Note  [facility]     Patient Name: Riky Moon  : 1940  MRN: [id number]  Today's Date: 2022        Visit Date: 2022    Visit Dx:    ICD-10-CM ICD-9-CM   1. Lymphedema  I89.0 457.1       Patient Active Problem List   Diagnosis   • OA (osteoarthritis) of hip   • KINDRA treated with auto BiPAP   • Chest pain   • Diabetes mellitus ([facility])   • Hypertension   • Hyperlipidemia   • Hypothyroidism   • Asthma   • Overactive bladder   • CAP (community acquired pneumonia)   • Cellulitis of left lower extremity   • COPD (chronic obstructive pulmonary disease) ([facility])   • Dyslipidemia   • Gastroesophageal reflux disease   • Leukocytosis   • Peripheral nerve disease   • Constipation   • Oropharyngeal dysphagia   • Bronchitis   • Class 3 severe obesity due to excess calories with serious comorbidity and body mass index (BMI) of 40.0 to 44.9 in adult ([facility])   • Left leg swelling   • Anemia   • Leukocytosis   • Circadian rhythm sleep disorder, delayed sleep phase type   • Atrial flutter with controlled response ([facility])         Lymphedema     Row Name 22 1500             Subjective Pain    Able to rate subjective pain? yes  -KA      Pre-Treatment Pain Level 0  -KA              Subjective Comments    Subjective Comments Pt reports \"I got my own bandages off this morning.\"  -KA              Skin Changes/Observations    Skin Observations Comment Redness persists, but improving BLE.  Mild flakiness BLE.  -KA              Manual Lymphatic Drainage    Manual Lymphatic Drainage Comments Short neck, B axilla, B IA, diaphragmatic breathing, L inguinals, LLE  -KA              Compression/Skin Care    Compression/Skin Care skin care;wrapping location;bandaging;compression garment  -KA      Skin Care moisturizing lotion applied  Eucerin to leg, Aquaphor to flaky areas  -KA      Wrapping Location lower extremity  -KA      Wrapping Location LE left:;foot to knee  -KA      " "Bandage Layers cotton liner;padding/fluff layer;dense foam;short-stretch bandages (comment size/quantity)  -      Bandaging Comments LLE: TG9, Artiflex foot to knee, Rosidal soft ankle to knee, Rosidal K 1-8cm, 2-10 cm, 1-12 cm, Darco shoe  -      Bandaging Technique circumferential/spiral;light compression  -      Compression Garment Comments Compreflex garment applied to RLE today (lower leg piece and foot piece)  -            User Key  (r) = Recorded By, (t) = Taken By, (c) = Cosigned By    Initials Name Provider Type    Svetlana Couch, PT Physical Therapist                               PT Assessment/Plan     Row Name 07/01/22 1540          PT Assessment    Assessment Comments Pt with slight increase in flakiness BLE, resumed use of aquaphor to these areas.  MLD to LLE, continued with compression bandaging.  Will transition to long term compression devices when they arrive.  -JOHNIE            PT Plan    PT Plan Comments Continue CDT, begin donning/doffing training with LTC when it arrives.  -JOHNIE           User Key  (r) = Recorded By, (t) = Taken By, (c) = Cosigned By    Initials Name Provider Type    Svetlana Couch, PT Physical Therapist                    OP Exercises     Row Name 07/01/22 1542 07/01/22 1500          Subjective Comments    Subjective Comments -- Pt reports \"I got my own bandages off this morning.\"  -KA            Subjective Pain    Able to rate subjective pain? -- yes  -KA     Pre-Treatment Pain Level -- 0  -KA            Total Minutes    77411 - PT Manual Therapy Minutes 54  -KA --           User Key  (r) = Recorded By, (t) = Taken By, (c) = Cosigned By    Initials Name Provider Type    Svetlana Couch, PT Physical Therapist                        Manual Rx (last 36 hours)     Manual Treatments     Row Name 07/01/22 1542             Total Minutes    85722 - PT Manual Therapy Minutes 54  -KA            User Key  (r) = Recorded By, (t) = Taken By, (c) = Cosigned By    " Initials Name Provider Type    Svetlana Couch, PT Physical Therapist                    Therapy Education  Education Details: Pt instructed to leave bandages intact as long as possible.  If he needs to remove before morning of 7/5, needs to apply compreflex to LLE to maintain edema.  Given: Bandaging/dressing change, Edema management  Program: Reinforced  How Provided: Verbal  Provided to: Patient  Level of Understanding: Verbalized              Time Calculation:   Start Time: 1133  Stop Time: 1227  Time Calculation (min): 54 min  Timed Charges  39153 - PT Manual Therapy Minutes: 54  Total Minutes  Timed Charges Total Minutes: 54   Total Minutes: 54   Therapy Charges for Today     Code Description Service Date Service Provider Modifiers Qty    42188552679 HC PT MANUAL THERAPY EA 15 MIN 7/1/2022 Svetlana French, PT GP, KX 4                    Svetlana French PT  7/1/2022

## 2022-07-05 ENCOUNTER — HOSPITAL ENCOUNTER (OUTPATIENT)
Dept: PHYSICAL THERAPY | Facility: HOSPITAL | Age: 82
Setting detail: THERAPIES SERIES
Discharge: HOME | End: 2022-07-05

## 2022-07-05 DIAGNOSIS — I89.0 LYMPHEDEMA: Primary | ICD-10-CM

## 2022-07-05 PROCEDURE — 97140 MANUAL THERAPY 1/> REGIONS: CPT

## 2022-07-05 NOTE — THERAPY TREATMENT NOTE
Outpatient Physical Therapy Lymphedema Treatment Note  [facility]     Patient Name: Riky Moon  : 1940  MRN: [id number]  Today's Date: 2022        Visit Date: 2022    Visit Dx:    ICD-10-CM ICD-9-CM   1. Lymphedema  I89.0 457.1       Patient Active Problem List   Diagnosis   • OA (osteoarthritis) of hip   • KINDRA treated with auto BiPAP   • Chest pain   • Diabetes mellitus ([facility])   • Hypertension   • Hyperlipidemia   • Hypothyroidism   • Asthma   • Overactive bladder   • CAP (community acquired pneumonia)   • Cellulitis of left lower extremity   • COPD (chronic obstructive pulmonary disease) ([facility])   • Dyslipidemia   • Gastroesophageal reflux disease   • Leukocytosis   • Peripheral nerve disease   • Constipation   • Oropharyngeal dysphagia   • Bronchitis   • Class 3 severe obesity due to excess calories with serious comorbidity and body mass index (BMI) of 40.0 to 44.9 in adult ([facility])   • Left leg swelling   • Anemia   • Leukocytosis   • Circadian rhythm sleep disorder, delayed sleep phase type   • Atrial flutter with controlled response ([facility])         Lymphedema     Row Name 22 1200             Subjective Pain    Able to rate subjective pain? yes  -KD      Pre-Treatment Pain Level 0  -KD              Subjective Comments    Subjective Comments States he got his velcro devices. Was able to keep his bandages on until this morning--they came loose, but was able to re-tape them.  -KD              Manual Lymphatic Drainage    Manual Lymphatic Drainage Comments Short neck, B axilla, B IA, diaphragmatic breathing, L inguinals, LLE  -KD              Compression/Skin Care    Compression Garment Comments Applied Juxtalite HD velcro compression devices (size lg long on right, x-lg long on left). Set at 30-40mmHg.  -KD      Compression/Skin Care Comments Pt to wear until tomorrow morning, then remove, shower, and return with devices.  -KD            User Key  (r) = Recorded By, (t) = Taken By, (c) =  Cosigned By    Initials Name Provider Type    Jessica Tamayo, CJ Physical Therapist                               PT Assessment/Plan     Row Name 07/05/22 1230          PT Assessment    Assessment Comments Applied pt's new Juxtalite HD velcro compresion devices, used donning gloves to assist with donning liners (pt to get a pair for home use). Instructed him in how to apply appropriate amount of compression (30-40mmHg). Pt is not sure how well his wife will be able to assist with donning so is planning to bring her in with him for instruction with therapist.  -KD            PT Plan    PT Plan Comments Continue to assess pt/spouse ability to apply compression properly.  -KD           User Key  (r) = Recorded By, (t) = Taken By, (c) = Cosigned By    Initials Name Provider Type    Jessica Tamayo PT Physical Therapist                    OP Exercises     Row Name 07/05/22 1236 07/05/22 1200          Subjective Comments    Subjective Comments -- States he got his velcro devices. Was able to keep his bandages on until this morning--they came loose, but was able to re-tape them.  -KD            Subjective Pain    Able to rate subjective pain? -- yes  -KD     Pre-Treatment Pain Level -- 0  -KD            Total Minutes    94800 - PT Manual Therapy Minutes 57  -KD --           User Key  (r) = Recorded By, (t) = Taken By, (c) = Cosigned By    Initials Name Provider Type    Jessica Tamayo, CJ Physical Therapist                        Manual Rx (last 36 hours)     Manual Treatments     Row Name 07/05/22 1236             Total Minutes    30700 - PT Manual Therapy Minutes 57  -KD            User Key  (r) = Recorded By, (t) = Taken By, (c) = Cosigned By    Initials Name Provider Type    Jessica Tamayo, CJ Physical Therapist                    Therapy Education  Education Details: Instructed in application of Juxtalite HD devices. Used donning(gardening) gloves to help with donning liners. Instructed pt to wear  regular shoes once home (instead of cast shoe on left). Pt to wear until morning, remove, shower and return with supplies and wife for instruction.  Given: Edema management  Program: New  How Provided: Verbal, Demonstration  Provided to: Patient  Level of Understanding: Verbalized              Time Calculation:   Start Time: 1115  Stop Time: 1212  Time Calculation (min): 57 min  Total Timed Code Minutes- PT: 57 minute(s)  Timed Charges  92540 - PT Manual Therapy Minutes: 57  Total Minutes  Timed Charges Total Minutes: 57   Total Minutes: 57   Therapy Charges for Today     Code Description Service Date Service Provider Modifiers Qty    57938870682 HC PT MANUAL THERAPY EA 15 MIN 7/5/2022 Jessica Horta, PT KX, GP 4                    Jessica Horta, PT  7/5/2022

## 2022-07-06 ENCOUNTER — HOSPITAL ENCOUNTER (OUTPATIENT)
Dept: PHYSICAL THERAPY | Facility: HOSPITAL | Age: 82
Setting detail: THERAPIES SERIES
Discharge: HOME | End: 2022-07-06

## 2022-07-06 ENCOUNTER — ANTICOAGULATION VISIT (OUTPATIENT)
Dept: PHARMACY | Facility: HOSPITAL | Age: 82
End: 2022-07-06

## 2022-07-06 DIAGNOSIS — I48.92 ATRIAL FLUTTER WITH CONTROLLED RESPONSE: Primary | ICD-10-CM

## 2022-07-06 DIAGNOSIS — I89.0 LYMPHEDEMA: Primary | ICD-10-CM

## 2022-07-06 LAB
INR PPP: 2.2 (ref 0.91–1.09)
PROTHROMBIN TIME: 26.1 SECONDS (ref 10–13.8)

## 2022-07-06 PROCEDURE — 85610 PROTHROMBIN TIME: CPT

## 2022-07-06 PROCEDURE — 97140 MANUAL THERAPY 1/> REGIONS: CPT

## 2022-07-06 PROCEDURE — 36416 COLLJ CAPILLARY BLOOD SPEC: CPT

## 2022-07-06 NOTE — THERAPY TREATMENT NOTE
Outpatient Physical Therapy Lymphedema Treatment Note  [facility]     Patient Name: Riky Moon  : 1940  MRN: [id number]  Today's Date: 2022        Visit Date: 2022    Visit Dx:    ICD-10-CM ICD-9-CM   1. Lymphedema  I89.0 457.1       Patient Active Problem List   Diagnosis   • OA (osteoarthritis) of hip   • KINDRA treated with auto BiPAP   • Chest pain   • Diabetes mellitus ([facility])   • Hypertension   • Hyperlipidemia   • Hypothyroidism   • Asthma   • Overactive bladder   • CAP (community acquired pneumonia)   • Cellulitis of left lower extremity   • COPD (chronic obstructive pulmonary disease) ([facility])   • Dyslipidemia   • Gastroesophageal reflux disease   • Leukocytosis   • Peripheral nerve disease   • Constipation   • Oropharyngeal dysphagia   • Bronchitis   • Class 3 severe obesity due to excess calories with serious comorbidity and body mass index (BMI) of 40.0 to 44.9 in adult ([facility])   • Left leg swelling   • Anemia   • Leukocytosis   • Circadian rhythm sleep disorder, delayed sleep phase type   • Atrial flutter with controlled response ([facility])         Lymphedema     Row Name 22 1200             Subjective Pain    Able to rate subjective pain? yes  -KA      Pre-Treatment Pain Level 0  -KA              Subjective Comments    Subjective Comments Pt reports that he had pain with velcro device/stocking by yesterday afternoon. Ended up removing stocking and reapplying velcro devices, but has no idea whether or not it was on the right compression.  Reports that he thinks his wife can come with him on Friday for training to get correct compression.  -KA              Skin Changes/Observations    Skin Observations Comment Redness persists, but improving BLE.  Mild flakiness BLE.  -KA              Lymphedema Measurements    Measurement Type(s) Circumferential  -KA      Circumferential Areas Lower extremities  -KA              BLE Circumferential (cm)    Measurement Location 1 Knee (popliteal  crease)  -KA      Left 1 43.4 cm  -KA      Right 1 42.9 cm  -KA      Measurement Location 2 10cm below knee  -KA      Left 2 48.5 cm  -KA      Right 2 44.2 cm  -KA      Measurement Location 3 20cm below knee  -KA      Left 3 42.9 cm  -KA      Right 3 33.8 cm  -KA      Measurement Location 4 30cm below knee  -KA      Left 4 36.8 cm  -KA      Right 4 25.4 cm  -KA      Measurement Location 5 Ankle  -KA      Left 5 38 cm  -KA      Right 5 26.8 cm  -KA      Measurement Location 6 Midfoot  -KA      Left 6 28 cm  -KA      Right 6 26.9 cm  -KA      LLE Circumferential Total 237.6 cm  -KA      RLE Circumferential Total 200 cm  -KA              Manual Lymphatic Drainage    Manual Lymphatic Drainage Comments No MLD today  -KA              Compression/Skin Care    Compression/Skin Care skin care;wrapping location;bandaging;compression garment  -KA      Skin Care moisturizing lotion applied  Eucerin to leg, Aquaphor to flaky areas  -KA      Wrapping Location lower extremity  -KA      Wrapping Location LE left:;foot to knee  -KA      Bandage Layers cotton liner;padding/fluff layer;dense foam;short-stretch bandages (comment size/quantity)  -KA      Bandaging Comments LLE: TG9, Artiflex foot to knee, Rosidal soft ankle to knee, Rosidal K 1-8cm, 2-10 cm, 1-12 cm, Darco shoe  -KA      Bandaging Technique circumferential/spiral;light compression  -KA      Compression Garment Comments Applied Juxtalite HD liner to LLE but ultimately decided to bandage instead due to snug fit around ankle; Applied Juxtalite HD compression system to RLE, 30-40 mmHg  -KA            User Key  (r) = Recorded By, (t) = Taken By, (c) = Cosigned By    Initials Name Provider Type    Svetlana Couch, PT Physical Therapist                               PT Assessment/Plan     Row Name 07/06/22 9095          PT Assessment    Assessment Comments Pt had pain with application of Juxtalite HD system to LLE and had to remove after 4-5 hours, no issues with RLE,  believes the issue is that the compressive liner may be too tight at the ankle.  Girth measurements in RLE decreased, but girth measurements in LLE increased since last assessment, particularly in the ankle and calf.  Tried Juxtalite liner on LLE and seemed snug around the ankle, opted to bandage LLE today to try to reduce leg back to previous measurements.  Will attempt Juxtalite HD on LLE again next visit if ankle girth has decreased, otherwise may need to bandage for several consecutive days for reduction before trying again.  -KA            PT Plan    PT Plan Comments Assess ankle girth LLE and reattempt Juxtalite HD vs. bandaging for reduction.  Train wife for application of velcro devices.  -JOHNIE           User Key  (r) = Recorded By, (t) = Taken By, (c) = Cosigned By    Initials Name Provider Type    Svetlana Couch, PT Physical Therapist                    OP Exercises     Row Name 07/06/22 1239 07/06/22 1200          Subjective Comments    Subjective Comments -- Pt reports that he had pain with velcro device/stocking by yesterday afternoon. Ended up removing stocking and reapplying velcro devices, but has no idea whether or not it was on the right compression.  Reports that he thinks his wife can come with him on Friday for training to get correct compression.  -JOHNIE            Subjective Pain    Able to rate subjective pain? -- yes  -KA     Pre-Treatment Pain Level -- 0  -KA            Total Minutes    73211 - PT Manual Therapy Minutes 53  -KA --           User Key  (r) = Recorded By, (t) = Taken By, (c) = Cosigned By    Initials Name Provider Type    Svetlana Couch, PT Physical Therapist                        Manual Rx (last 36 hours)     Manual Treatments     Row Name 07/06/22 1239             Total Minutes    53097 - PT Manual Therapy Minutes 53  -KA            User Key  (r) = Recorded By, (t) = Taken By, (c) = Cosigned By    Initials Name Provider Type    Svetlana Couch, PT Physical  Therapist                 PT OP Goals     Row Name 07/06/22 1200          PT Short Term Goals    STG Date to Achieve 06/16/22  -KA     STG 1 Patient to demonstrate awareness of condition and precautions for improved prevention, management, care of symptoms, and ease of transition to self care of condition.  -KA     STG 1 Progress Met  -KA     STG 2 Patient demonstrates decreased net edema of Left Lower Extremity>/= 10-15 cm for decreased edema symptoms, decreased risk of infection, and improved skin care.  -KA     STG 2 Progress Met  -KA     STG 3 Skin on lower extremities to be intact, no weeping, to decrease risk of infection.  -KA     STG 3 Progress Met  -KA            Long Term Goals    LTG Date to Achieve 07/14/22  -KA     LTG 1 Patient/family independent with self-care techniques for self-management of condition.  -KA     LTG 1 Progress Met  -KA     LTG 2 Patient demonstrates decreased net edema of Left Lower Extremity>/= 15-30 cm for decreased edema symptoms, decreased risk of infection, and improved skin care.  -KA     LTG 2 Progress Met  -KA     LTG 3 Patient/family independent with compression garments as indicated for self-management of condition.  -KA     LTG 3 Progress Progressing;Partially Met  -KA     LTG 3 Progress Comments Pt's wife needs training for donning of liner with compression in foot and how to apply correct compression using Juxtalite HD; Pt's wife has been able to apply Compreflex leg piece previously  -JOHNIE           User Key  (r) = Recorded By, (t) = Taken By, (c) = Cosigned By    Initials Name Provider Type    Svetlana Couch, PT Physical Therapist                Therapy Education  Education Details: Discussed reasoning for reapplying bandages to LLE, if ankle girth decreases, may try Juxtalite HD again tomorrow, but if still up, will bandage for another day.  If necessary, can try Juxtalite HD leg piece only with long sock and tennis shoe to maintain compression on foot (Compreflex  foot piece when not wearing shoe); however, this is not ideal because it will leave only ankle without compression.  Discussed need to bring wife in for training ASAP.  Given: Bandaging/dressing change, Edema management  Program: New  How Provided: Verbal, Demonstration  Provided to: Patient  Level of Understanding: Verbalized              Time Calculation:   Start Time: 1130  Stop Time: 1223  Time Calculation (min): 53 min  Timed Charges  82226 - PT Manual Therapy Minutes: 53  Total Minutes  Timed Charges Total Minutes: 53   Total Minutes: 53   Therapy Charges for Today     Code Description Service Date Service Provider Modifiers Qty    87239382768 HC PT MANUAL THERAPY EA 15 MIN 7/6/2022 Svetlana French, PT GP, KX 4                    Svetlana French, PT  7/6/2022

## 2022-07-06 NOTE — PROGRESS NOTES
Anticoagulation Clinic Progress Note    Anticoagulation Summary  As of 2022    INR goal:  2.0-3.0   TTR:  26.5 % (5 d)   INR used for dosin.2 (2022)   Warfarin maintenance plan:  10 mg every Sun; 7.5 mg all other days   Weekly warfarin total:  55 mg   Plan last modified:  Yu Rader, PharmD (2022)   Next INR check:  2022   Priority:  Maintenance   Target end date:      Indications    Atrial flutter with controlled response ([facility]) [I48.92]             Anticoagulation Episode Summary     INR check location:      Preferred lab:      Send INR reminders to:  [facility]    Comments:        Anticoagulation Care Providers     Provider Role Specialty Phone number    Roseline Silva APRN Referring Cardiology [phone]          Clinic Interview:  Patient Findings     Negatives:  Signs/symptoms of thrombosis, Signs/symptoms of bleeding,   Laboratory test error suspected, Change in health, Change in alcohol use,   Change in activity, Upcoming invasive procedure, Emergency department   visit, Upcoming dental procedure, Missed doses, Extra doses, Change in   medications, Change in diet/appetite, Hospital admission, Bruising, Other   complaints      Clinical Outcomes     Negatives:  Major bleeding event, Thromboembolic event,   Anticoagulation-related hospital admission, Anticoagulation-related ED   visit, Anticoagulation-related fatality        INR History:  Anticoagulation Monitoring 2022   INR 1.1 1.5 2.2   INR Date 2022   INR Goal 2.0-3.0 2.0-3.0 2.0-3.0   Last Week Total 25 mg 50 mg 55 mg   Next Week Total 35 mg 40 mg 55 mg   Sun - 10 mg (7/3) 10 mg   Mon 10 mg () 7.5 mg () 7.5 mg   Tue 10 mg () 7.5 mg () 7.5 mg   Wed 7.5 mg () - 7.5 mg   Thu 7.5 mg () - 7.5 mg   Fri - 7.5 mg () 7.5 mg   Sat - 7.5 mg () 7.5 mg   Visit Report - - -   Some recent data might be hidden       Plan:  1. INR is Therapeutic  today- see above in Anticoagulation Summary.  Will instruct Riky BARBER Moon to Continue their warfarin regimen- see above in Anticoagulation Summary.  2. Follow up in 1 week  3. Patient declines warfarin refills.  4. Verbal and written information provided. Patient expresses understanding and has no further questions at this time.    Yu Rader, PharmD

## 2022-07-07 ENCOUNTER — HOSPITAL ENCOUNTER (OUTPATIENT)
Dept: PHYSICAL THERAPY | Facility: HOSPITAL | Age: 82
Setting detail: THERAPIES SERIES
Discharge: HOME | End: 2022-07-07

## 2022-07-07 DIAGNOSIS — I89.0 LYMPHEDEMA: Primary | ICD-10-CM

## 2022-07-07 PROCEDURE — 97140 MANUAL THERAPY 1/> REGIONS: CPT

## 2022-07-07 NOTE — THERAPY TREATMENT NOTE
Outpatient Physical Therapy Lymphedema Treatment Note  [facility]     Patient Name: Riky Moon  : 1940  MRN: [id number]  Today's Date: 2022        Visit Date: 2022    Visit Dx:    ICD-10-CM ICD-9-CM   1. Lymphedema  I89.0 457.1       Patient Active Problem List   Diagnosis   • OA (osteoarthritis) of hip   • KINDRA treated with auto BiPAP   • Chest pain   • Diabetes mellitus ([facility])   • Hypertension   • Hyperlipidemia   • Hypothyroidism   • Asthma   • Overactive bladder   • CAP (community acquired pneumonia)   • Cellulitis of left lower extremity   • COPD (chronic obstructive pulmonary disease) ([facility])   • Dyslipidemia   • Gastroesophageal reflux disease   • Leukocytosis   • Peripheral nerve disease   • Constipation   • Oropharyngeal dysphagia   • Bronchitis   • Class 3 severe obesity due to excess calories with serious comorbidity and body mass index (BMI) of 40.0 to 44.9 in adult ([facility])   • Left leg swelling   • Anemia   • Leukocytosis   • Circadian rhythm sleep disorder, delayed sleep phase type   • Atrial flutter with controlled response ([facility])         Lymphedema     Row Name 22 1200             Subjective Pain    Able to rate subjective pain? yes  -KD      Pre-Treatment Pain Level 0  -KD              Subjective Comments    Subjective Comments States he would like to try the velcro device today on the left-- if it bothers him again he will replace the liner with a sock from another device system.  -KD              Manual Lymphatic Drainage    Manual Lymphatic Drainage Comments Short neck, B axilla, B IA, diaphragmatic breathing, L inguinal, LLE  -KD              Compression/Skin Care    Compression Garment Comments Applied Juxtalite HD velcro compression system to both legs.  -KD      Compression/Skin Care Comments Liner somewhat tight on left ankle, but comfortable to pt.  -KD            User Key  (r) = Recorded By, (t) = Taken By, (c) = Cosigned By    Initials Name Provider Type    KD  Jessica Horta, CJ Physical Therapist                               PT Assessment/Plan     Row Name 07/07/22 1224          PT Assessment    Assessment Comments Compression system liner is somewhat tight on the left, but comfortable to pt while in clinic. He has a long sock that came with a different compression system that he can switch to using if he needs to later, but it does not have the same foot compression He also has a compression foot piece that he can wear at home. May need to look at anklet compression garment to use in conjunction with compression sock so that he can wear a regular shoe when out.  -KD            PT Plan    PT Plan Comments See in clinic tomorrow, further assess LTC.  -KD           User Key  (r) = Recorded By, (t) = Taken By, (c) = Cosigned By    Initials Name Provider Type    Jessica Tamayo PT Physical Therapist                    OP Exercises     Row Name 07/07/22 1230 07/07/22 1200          Subjective Comments    Subjective Comments -- States he would like to try the velcro device today on the left-- if it bothers him again he will replace the liner with a sock from another device system.  -KD            Subjective Pain    Able to rate subjective pain? -- yes  -KD     Pre-Treatment Pain Level -- 0  -KD            Total Minutes    74128 - PT Manual Therapy Minutes 50  -KD --           User Key  (r) = Recorded By, (t) = Taken By, (c) = Cosigned By    Initials Name Provider Type    Jessica Tamayo, CJ Physical Therapist                        Manual Rx (last 36 hours)     Manual Treatments     Row Name 07/07/22 1230             Total Minutes    38892 - PT Manual Therapy Minutes 50  -KD            User Key  (r) = Recorded By, (t) = Taken By, (c) = Cosigned By    Initials Name Provider Type    Jessica Tamayo, CJ Physical Therapist                    Therapy Education  Education Details: Discussed switching sock/liner on left if it's uncomforatble.  Given: Edema  management  Program: New  How Provided: Verbal, Demonstration  Provided to: Patient  Level of Understanding: Verbalized              Time Calculation:   Start Time: 1114  Stop Time: 1204  Time Calculation (min): 50 min  Total Timed Code Minutes- PT: 50 minute(s)  Timed Charges  24433 - PT Manual Therapy Minutes: 50  Total Minutes  Timed Charges Total Minutes: 50   Total Minutes: 50   Therapy Charges for Today     Code Description Service Date Service Provider Modifiers Qty    01466806545 HC PT MANUAL THERAPY EA 15 MIN 7/7/2022 Jessica Horta, PT KX, GP 3                    Jessica Horta, PT  7/7/2022      Dr Cheung

## 2022-07-08 ENCOUNTER — HOSPITAL ENCOUNTER (OUTPATIENT)
Dept: PHYSICAL THERAPY | Facility: HOSPITAL | Age: 82
Setting detail: THERAPIES SERIES
Discharge: HOME | End: 2022-07-08

## 2022-07-08 ENCOUNTER — HOSPITAL ENCOUNTER (OUTPATIENT)
Dept: CARDIOLOGY | Facility: HOSPITAL | Age: 82
Discharge: HOME | End: 2022-07-08
Admitting: NURSE PRACTITIONER

## 2022-07-08 VITALS
WEIGHT: 315 LBS | DIASTOLIC BLOOD PRESSURE: 70 MMHG | HEIGHT: 74 IN | SYSTOLIC BLOOD PRESSURE: 140 MMHG | BODY MASS INDEX: 40.43 KG/M2 | OXYGEN SATURATION: 94 % | HEART RATE: 61 BPM

## 2022-07-08 DIAGNOSIS — I48.92 ATRIAL FLUTTER WITH CONTROLLED RESPONSE: ICD-10-CM

## 2022-07-08 DIAGNOSIS — R06.09 DOE (DYSPNEA ON EXERTION): ICD-10-CM

## 2022-07-08 DIAGNOSIS — I89.0 LYMPHEDEMA: Primary | ICD-10-CM

## 2022-07-08 LAB
AORTIC ARCH: 2.8 CM
AORTIC DIMENSIONLESS INDEX: 0.3 (DI)
ASCENDING AORTA: 3.1 CM
BH CV ECHO MEAS - ACS: 1.26 CM
BH CV ECHO MEAS - AO MAX PG: 26.6 MMHG
BH CV ECHO MEAS - AO MEAN PG: 17.5 MMHG
BH CV ECHO MEAS - AO ROOT DIAM: 3.5 CM
BH CV ECHO MEAS - AO V2 MAX: 257.9 CM/SEC
BH CV ECHO MEAS - AO V2 VTI: 67.1 CM
BH CV ECHO MEAS - AVA(I,D): 1.57 CM2
BH CV ECHO MEAS - EDV(CUBED): 98.5 ML
BH CV ECHO MEAS - EDV(MOD-SP2): 148 ML
BH CV ECHO MEAS - EDV(MOD-SP4): 174 ML
BH CV ECHO MEAS - EF(MOD-BP): 69.3 %
BH CV ECHO MEAS - EF(MOD-SP2): 66.2 %
BH CV ECHO MEAS - EF(MOD-SP4): 71.8 %
BH CV ECHO MEAS - ESV(CUBED): 28.3 ML
BH CV ECHO MEAS - ESV(MOD-SP2): 50 ML
BH CV ECHO MEAS - ESV(MOD-SP4): 49 ML
BH CV ECHO MEAS - FS: 34 %
BH CV ECHO MEAS - IVS/LVPW: 1 CM
BH CV ECHO MEAS - IVSD: 1.31 CM
BH CV ECHO MEAS - LAT PEAK E' VEL: 10.9 CM/SEC
BH CV ECHO MEAS - LV DIASTOLIC VOL/BSA (35-75): 65.4 CM2
BH CV ECHO MEAS - LV MASS(C)D: 234.1 GRAMS
BH CV ECHO MEAS - LV MAX PG: 3.7 MMHG
BH CV ECHO MEAS - LV MEAN PG: 2.15 MMHG
BH CV ECHO MEAS - LV SYSTOLIC VOL/BSA (12-30): 18.4 CM2
BH CV ECHO MEAS - LV V1 MAX: 95.7 CM/SEC
BH CV ECHO MEAS - LV V1 VTI: 22.5 CM
BH CV ECHO MEAS - LVIDD: 4.6 CM
BH CV ECHO MEAS - LVIDS: 3 CM
BH CV ECHO MEAS - LVOT AREA: 4.7 CM2
BH CV ECHO MEAS - LVOT DIAM: 2.44 CM
BH CV ECHO MEAS - LVPWD: 1.31 CM
BH CV ECHO MEAS - MED PEAK E' VEL: 8.8 CM/SEC
BH CV ECHO MEAS - MV A DUR: 0.12 SEC
BH CV ECHO MEAS - MV A MAX VEL: 65.5 CM/SEC
BH CV ECHO MEAS - MV DEC SLOPE: 616.8 CM/SEC2
BH CV ECHO MEAS - MV DEC TIME: 0.21 MSEC
BH CV ECHO MEAS - MV E MAX VEL: 133 CM/SEC
BH CV ECHO MEAS - MV E/A: 2.03
BH CV ECHO MEAS - MV MAX PG: 7.4 MMHG
BH CV ECHO MEAS - MV MEAN PG: 2.9 MMHG
BH CV ECHO MEAS - MV P1/2T: 62.4 MSEC
BH CV ECHO MEAS - MV V2 VTI: 30.9 CM
BH CV ECHO MEAS - MVA(P1/2T): 3.5 CM2
BH CV ECHO MEAS - MVA(VTI): 3.4 CM2
BH CV ECHO MEAS - PA ACC TIME: 0.06 SEC
BH CV ECHO MEAS - PA PR(ACCEL): 52.9 MMHG
BH CV ECHO MEAS - PA V2 MAX: 103.2 CM/SEC
BH CV ECHO MEAS - PULM DIAS VEL: 24.1 CM/SEC
BH CV ECHO MEAS - PULM S/D: 1.1
BH CV ECHO MEAS - PULM SYS VEL: 26.5 CM/SEC
BH CV ECHO MEAS - QP/QS: 0.69
BH CV ECHO MEAS - RAP SYSTOLE: 3 MMHG
BH CV ECHO MEAS - RV MAX PG: 2.9 MMHG
BH CV ECHO MEAS - RV V1 MAX: 85.7 CM/SEC
BH CV ECHO MEAS - RV V1 VTI: 17.2 CM
BH CV ECHO MEAS - RVOT DIAM: 2.33 CM
BH CV ECHO MEAS - RVSP: 38.3 MMHG
BH CV ECHO MEAS - SI(MOD-SP2): 36.8 ML/M2
BH CV ECHO MEAS - SI(MOD-SP4): 47 ML/M2
BH CV ECHO MEAS - SUP REN AO DIAM: 2.2 CM
BH CV ECHO MEAS - SV(LVOT): 105.3 ML
BH CV ECHO MEAS - SV(MOD-SP2): 98 ML
BH CV ECHO MEAS - SV(MOD-SP4): 125 ML
BH CV ECHO MEAS - SV(RVOT): 73.2 ML
BH CV ECHO MEAS - TAPSE (>1.6): 2.2 CM
BH CV ECHO MEAS - TR MAX PG: 35.3 MMHG
BH CV ECHO MEAS - TR MAX VEL: 297.3 CM/SEC
BH CV ECHO MEASUREMENTS AVERAGE E/E' RATIO: 13.5
BH CV XLRA - RV BASE: 3.7 CM
BH CV XLRA - RV LENGTH: 8.7 CM
BH CV XLRA - RV MID: 4.1 CM
BH CV XLRA - TDI S': 12.1 CM/SEC
LEFT ATRIUM VOLUME INDEX: 33.5 ML/M2
LV EF 2D ECHO EST: 69 %
MAXIMAL PREDICTED HEART RATE: 138 BPM
SINUS: 2.9 CM
STJ: 3.1 CM
STRESS TARGET HR: 117 BPM

## 2022-07-08 PROCEDURE — 93306 TTE W/DOPPLER COMPLETE: CPT | Performed by: INTERNAL MEDICINE

## 2022-07-08 PROCEDURE — 97535 SELF CARE MNGMENT TRAINING: CPT

## 2022-07-08 PROCEDURE — 25010000002 PERFLUTREN (DEFINITY) 8.476 MG IN SODIUM CHLORIDE (PF) 0.9 % 10 ML INJECTION: Performed by: NURSE PRACTITIONER

## 2022-07-08 PROCEDURE — 93306 TTE W/DOPPLER COMPLETE: CPT

## 2022-07-08 RX ADMIN — PERFLUTREN 1.5 ML: 6.52 INJECTION, SUSPENSION INTRAVENOUS at 14:00

## 2022-07-08 NOTE — THERAPY DISCHARGE NOTE
Outpatient Physical Therapy Lymphedema Progress Note/Discharge Summary  [facility]     Patient Name: Riky Moon  : 1940  MRN: [id number]  Today's Date: 2022      Visit Date: 2022    Visit Dx:    ICD-10-CM ICD-9-CM   1. Lymphedema  I89.0 457.1       Patient Active Problem List   Diagnosis   • OA (osteoarthritis) of hip   • KINDRA treated with auto BiPAP   • Chest pain   • Diabetes mellitus ([facility])   • Hypertension   • Hyperlipidemia   • Hypothyroidism   • Asthma   • Overactive bladder   • CAP (community acquired pneumonia)   • Cellulitis of left lower extremity   • COPD (chronic obstructive pulmonary disease) ([facility])   • Dyslipidemia   • Gastroesophageal reflux disease   • Leukocytosis   • Peripheral nerve disease   • Constipation   • Oropharyngeal dysphagia   • Bronchitis   • Class 3 severe obesity due to excess calories with serious comorbidity and body mass index (BMI) of 40.0 to 44.9 in adult ([facility])   • Left leg swelling   • Anemia   • Leukocytosis   • Circadian rhythm sleep disorder, delayed sleep phase type   • Atrial flutter with controlled response ([facility])         Lymphedema     Row Name 22 1700             Subjective Pain    Able to rate subjective pain? yes  -KA      Pre-Treatment Pain Level 0  -KA              Subjective Comments    Subjective Comments No pain with velcro devices, reports that he brought his granddaughter to learn how to apply them because he doesn't know if his wife is going to be able.  -KA              LLIS - Physical Concerns    The amount of pain associated with my lymphedema is: 0  -KA      The amount of limb heaviness associated with my lymphedema is: 1  -KA      The amount of skin tightness associated with my lymphedema is: 1  -KA      The size of my swollen limb(s) seems: 1  -KA      Lymphedema affects the movement of my swollen limb(s): 1  -KA      The strength in my swollen limb(s) is: 1  -KA              LLIS - Psychosocial Concerns    Lymphedema  "affects my body image (i.e., \"how I think I look\"). 1  -KA      Lymphedema affects my socializing with others. 1  -KA      Lymphedema affects my intimate relations with spouse or partner (rate 0 if not applicable 0  -KA      Lymphedema \"gets me down\" (i.e., depression, frustration, or anger) 1  -KA      I must rely on others for help due to my lymphedema. 1  -KA      I know what to do to manage my lymphedema 0  -KA              LLIS - Functional Concerns    Lymphedema affects my ability to perform self-care activities (i.e. eating, dressing, hygiene) 1  -KA      Lymphedema affects my ability to perform routine home or work-related activities. 1  -KA      Lymphedema affects my performance of preferred leisure activities. 1  -KA      Lymphedema affects proper fit of clothing/shoes 2  -KA      Lymphedema affects my sleep 0  -KA              Skin Changes/Observations    Skin Observations Comment Red coloration B anterior shins, but improved.  No flaky skin noted today.  -KA              Lymphedema Measurements    Measurement Type(s) Circumferential  -KA      Circumferential Areas Lower extremities  -KA              BLE Circumferential (cm)    Measurement Location 1 Knee (popliteal crease)  -KA      Left 1 42.2 cm  -KA      Right 1 42 cm  -KA      Measurement Location 2 10cm below knee  -KA      Left 2 45.2 cm  -KA      Right 2 45.2 cm  -KA      Measurement Location 3 20cm below knee  -KA      Left 3 42.1 cm  -KA      Right 3 33.3 cm  -KA      Measurement Location 4 30cm below knee  -KA      Left 4 37 cm  -KA      Right 4 24.9 cm  -KA      Measurement Location 5 Ankle  -KA      Left 5 36.3 cm  -KA      Right 5 26.5 cm  -KA      Measurement Location 6 Midfoot  -KA      Left 6 27.7 cm  -KA      Right 6 26.5 cm  -KA      LLE Circumferential Total 230.5 cm  -KA      RLE Circumferential Total 198.4 cm  -KA              Compression/Skin Care    Compression Garment Comments Applied Juxtalite HD system to BLE; trained " granddaughter in application and had her video technique for training purposes for other family members  -              Lymphedema Life Impact Scale Totals    A.  Total Q1 - Q17 (Do not include Q18) 14  -KA      B.  Total number of questions answered (Q1-Q17) 17  -KA      C. Divide A by B 0.82  -KA      D. Multiple C by 25 20.5  -KA            User Key  (r) = Recorded By, (t) = Taken By, (c) = Cosigned By    Initials Name Provider Type    Svetlana Couch, PT Physical Therapist                               PT Assessment/Plan     Row Name 07/08/22 1725          PT Assessment    Functional Limitations Limitation in home management;Performance in leisure activities;Performance in self-care ADL  -     Impairments Balance;Edema;Endurance;Gait;Impaired lymphatic circulation;Joint mobility;Muscle strength;Posture;Sensation  -KA     Assessment Comments Pt has met all functional goals, no pain with compression applied yesterday in clinic, girth measurements decresed within appropriate ranges for devices.  Granddaughter came into clinic for training in application of velcro devices, able to apply without difficulty following training and videoed technique so that she can instruct her mother and grandmother how to perform so daily compression can be applied.  LLIS score decreased to 20.5 compared to 73.5 at initial evaluation and pt has seen net decreased edema of 31 cm on LLE and 14.3 cm on RLE since starting therapy.  Pt to discharge to independent self management (with family assistance) at this time.  -KA            PT Plan    PT Plan Comments Discharge to independent self management with assistance of daughter and granddaughter. Discussed modifications to velcro as needed.  -JOHNIE           User Key  (r) = Recorded By, (t) = Taken By, (c) = Cosigned By    Initials Name Provider Type    Svetlana Couch, PT Physical Therapist                      OP Exercises     Row Name 07/08/22 1700             Subjective  Comments    Subjective Comments No pain with velcro devices, reports that he brought his granddaughter to learn how to apply them because he doesn't know if his wife is going to be able.  -KA              Subjective Pain    Able to rate subjective pain? yes  -KA      Pre-Treatment Pain Level 0  -KA            User Key  (r) = Recorded By, (t) = Taken By, (c) = Cosigned By    Initials Name Provider Type    Svetlana Couch, PT Physical Therapist                               PT OP Goals     Row Name 07/08/22 1700          PT Short Term Goals    STG Date to Achieve 06/16/22  -KA     STG 1 Patient to demonstrate awareness of condition and precautions for improved prevention, management, care of symptoms, and ease of transition to self care of condition.  -KA     STG 1 Progress Met  -KA     STG 2 Patient demonstrates decreased net edema of Left Lower Extremity>/= 10-15 cm for decreased edema symptoms, decreased risk of infection, and improved skin care.  -KA     STG 2 Progress Met  -KA     STG 3 Skin on lower extremities to be intact, no weeping, to decrease risk of infection.  -KA     STG 3 Progress Met  -KA            Long Term Goals    LTG Date to Achieve 07/14/22  -KA     LTG 1 Patient/family independent with self-care techniques for self-management of condition.  -KA     LTG 1 Progress Met  -KA     LTG 2 Patient demonstrates decreased net edema of Left Lower Extremity>/= 15-30 cm for decreased edema symptoms, decreased risk of infection, and improved skin care.  -KA     LTG 2 Progress Met  -KA     LTG 3 Patient/family independent with compression garments as indicated for self-management of condition.  -KA     LTG 3 Progress Met  -KA     LTG 3 Progress Comments Granddaughter came to clinic, able to apply correctly following training.  -JOHNIE           User Key  (r) = Recorded By, (t) = Taken By, (c) = Cosigned By    Initials Name Provider Type    Svetlana Couch, PT Physical Therapist                Therapy  Education  Education Details: Trained granddaughter how to don/doff Juxtalite HD system and obtain correct compression.  She videoed technique in order to aid in training of other family members.  Discussed options for compression if daughter/granddaughter are unable to assist with donning daily including wearing velcro devices multiple days or using Compreflex velcro device with liner and shoe which wife is confident in applying.  Given: Edema management  Program: New, Reinforced  How Provided: Verbal, Demonstration, Written (video)  Provided to: Patient, Other (comment) (granddaughter)  Level of Understanding: Teach back education performed, Verbalized, Demonstrated  90689 - PT Self Care/Mgmt Minutes: 40              Time Calculation:   Start Time: 1136  Stop Time: 1220  Time Calculation (min): 44 min  Timed Charges  03881 - PT Self Care/Mgmt Minutes: 40  Total Minutes  Timed Charges Total Minutes: 40   Total Minutes: 40     Therapy Charges for Today     Code Description Service Date Service Provider Modifiers Qty    [id number]  PT SELF CARE/MGMT/TRAIN EA 15 MIN 7/8/2022 Svetlana French, PT GP, KX 3                 OP PT Discharge Summary  Date of Discharge: 07/08/22  Reason for Discharge: All goals achieved  Outcomes Achieved: Able to achieve all goals within established timeline  Discharge Destination: Home with home program, Home with caregiver assist  Discharge Instructions/Additional Comments: Pt's granddaughter and daughter to help apply daily compression with Juxtalite HD, replace compression in 6 months.      Svetlana French, PT  7/8/2022

## 2022-07-11 ENCOUNTER — APPOINTMENT (OUTPATIENT)
Dept: PHYSICAL THERAPY | Facility: HOSPITAL | Age: 82
End: 2022-07-11

## 2022-07-13 ENCOUNTER — ANTICOAGULATION VISIT (OUTPATIENT)
Dept: PHARMACY | Facility: HOSPITAL | Age: 82
End: 2022-07-13

## 2022-07-13 DIAGNOSIS — I48.92 ATRIAL FLUTTER WITH CONTROLLED RESPONSE: Primary | ICD-10-CM

## 2022-07-13 LAB
INR PPP: 2.7 (ref 0.91–1.09)
PROTHROMBIN TIME: 32.5 SECONDS (ref 10–13.8)

## 2022-07-13 PROCEDURE — 85610 PROTHROMBIN TIME: CPT

## 2022-07-13 PROCEDURE — 36416 COLLJ CAPILLARY BLOOD SPEC: CPT

## 2022-07-13 NOTE — PROGRESS NOTES
Anticoagulation Clinic Progress Note    Anticoagulation Summary  As of 2022    INR goal:  2.0-3.0   TTR:  67.5 % (1.7 wk)   INR used for dosin.7 (2022)   Warfarin maintenance plan:  10 mg every Sun; 7.5 mg all other days   Weekly warfarin total:  55 mg   No change documented:  Yu Rader PharmD   Plan last modified:  Yu Rader PharmD (2022)   Next INR check:  2022   Priority:  Maintenance   Target end date:      Indications    Atrial flutter with controlled response (HCC) [I48.92]             Anticoagulation Episode Summary     INR check location:      Preferred lab:      Send INR reminders to:   ROGER BURDICK [facility]    Comments:        Anticoagulation Care Providers     Provider Role Specialty Phone number    Roseline Silva APRN Referring Cardiology [phone]          Clinic Interview:  Patient Findings     Negatives:  Signs/symptoms of thrombosis, Signs/symptoms of bleeding,   Laboratory test error suspected, Change in health, Change in alcohol use,   Change in activity, Upcoming invasive procedure, Emergency department   visit, Upcoming dental procedure, Missed doses, Extra doses, Change in   medications, Change in diet/appetite, Hospital admission, Bruising, Other   complaints    Comments:  Reports no changes to diet or meds. Upcoming procedure for   epidural injection on .       Clinical Outcomes     Negatives:  Major bleeding event, Thromboembolic event,   Anticoagulation-related hospital admission, Anticoagulation-related ED   visit, Anticoagulation-related fatality    Comments:  Reports no changes to diet or meds. Upcoming procedure for   epidural injection on .         INR History:  Anticoagulation Monitoring 2022   INR 1.5 2.2 2.7   INR Date 2022   INR Goal 2.0-3.0 2.0-3.0 2.0-3.0   Trend - - Same   Last Week Total 50 mg 55 mg 55 mg   Next Week Total 40 mg 55 mg 55 mg   Sun 10 mg (7/3) 10 mg 10 mg    Mon 7.5 mg (7/4) 7.5 mg 7.5 mg   Tue 7.5 mg (7/5) 7.5 mg 7.5 mg   Wed - 7.5 mg 7.5 mg   Thu - 7.5 mg 7.5 mg   Fri 7.5 mg (7/1) 7.5 mg 7.5 mg   Sat 7.5 mg (7/2) 7.5 mg 7.5 mg   Visit Report - - -   Some recent data might be hidden       Plan:  1. INR is Therapeutic today- see above in Anticoagulation Summary.  Will instruct Riky Moon to Continue their warfarin regimen- see above in Anticoagulation Summary.  2. Follow up in 2 weeks  3. Patient declines warfarin refills.  4. Verbal and written information provided. Patient expresses understanding and has no further questions at this time.    Yu Rader, PharmD

## 2022-07-18 ENCOUNTER — LAB (OUTPATIENT)
Dept: LAB | Facility: HOSPITAL | Age: 82
End: 2022-07-18

## 2022-07-18 ENCOUNTER — CONSULT (OUTPATIENT)
Dept: ONCOLOGY | Facility: CLINIC | Age: 82
End: 2022-07-18

## 2022-07-18 VITALS
BODY MASS INDEX: 44.1 KG/M2 | HEIGHT: 71 IN | TEMPERATURE: 97.5 F | DIASTOLIC BLOOD PRESSURE: 70 MMHG | HEART RATE: 69 BPM | OXYGEN SATURATION: 90 % | RESPIRATION RATE: 20 BRPM | SYSTOLIC BLOOD PRESSURE: 149 MMHG | WEIGHT: 315 LBS

## 2022-07-18 DIAGNOSIS — D72.829 LEUKOCYTOSIS, UNSPECIFIED TYPE: ICD-10-CM

## 2022-07-18 DIAGNOSIS — D50.0 IRON DEFICIENCY ANEMIA DUE TO CHRONIC BLOOD LOSS: Primary | ICD-10-CM

## 2022-07-18 DIAGNOSIS — D53.9 MACROCYTIC ANEMIA: Primary | ICD-10-CM

## 2022-07-18 DIAGNOSIS — D50.0 IRON DEFICIENCY ANEMIA DUE TO CHRONIC BLOOD LOSS: ICD-10-CM

## 2022-07-18 LAB
ALBUMIN SERPL-MCNC: 4.3 G/DL (ref 3.5–5.2)
ALBUMIN/GLOB SERPL: 1.5 G/DL (ref 1.1–2.4)
ALP SERPL-CCNC: 60 U/L (ref 38–116)
ALT SERPL W P-5'-P-CCNC: 14 U/L (ref 0–41)
ANION GAP SERPL CALCULATED.3IONS-SCNC: 11.9 MMOL/L (ref 5–15)
AST SERPL-CCNC: 16 U/L (ref 0–40)
BASOPHILS # BLD AUTO: 0.11 10*3/MM3 (ref 0–0.2)
BASOPHILS NFR BLD AUTO: 1 % (ref 0–1.5)
BILIRUB SERPL-MCNC: 0.6 MG/DL (ref 0.2–1.2)
BUN SERPL-MCNC: 18 MG/DL (ref 6–20)
BUN/CREAT SERPL: 14.3 (ref 7.3–30)
CALCIUM SPEC-SCNC: 9.1 MG/DL (ref 8.5–10.2)
CHLORIDE SERPL-SCNC: 105 MMOL/L (ref 98–107)
CHROMATIN AB SERPL-ACNC: <10 IU/ML (ref 0–14)
CO2 SERPL-SCNC: 24.1 MMOL/L (ref 22–29)
CREAT SERPL-MCNC: 1.26 MG/DL (ref 0.7–1.3)
CRP SERPL-MCNC: 0.66 MG/DL (ref 0–0.5)
DEPRECATED RDW RBC AUTO: 67.6 FL (ref 37–54)
EGFRCR SERPLBLD CKD-EPI 2021: 56.9 ML/MIN/1.73
EOSINOPHIL # BLD AUTO: 0.12 10*3/MM3 (ref 0–0.4)
EOSINOPHIL NFR BLD AUTO: 1.1 % (ref 0.3–6.2)
ERYTHROCYTE [DISTWIDTH] IN BLOOD BY AUTOMATED COUNT: 16.9 % (ref 12.3–15.4)
ERYTHROCYTE [SEDIMENTATION RATE] IN BLOOD: 19 MM/HR (ref 0–20)
FERRITIN SERPL-MCNC: 186.1 NG/ML (ref 30–400)
GLOBULIN UR ELPH-MCNC: 2.9 GM/DL (ref 1.8–3.5)
GLUCOSE SERPL-MCNC: 105 MG/DL (ref 74–124)
HCT VFR BLD AUTO: 40.1 % (ref 37.5–51)
HGB BLD-MCNC: 13.9 G/DL (ref 13–17.7)
HGB RETIC QN AUTO: 39.3 PG (ref 29.8–36.1)
IMM GRANULOCYTES # BLD AUTO: 0.07 10*3/MM3 (ref 0–0.05)
IMM GRANULOCYTES NFR BLD AUTO: 0.6 % (ref 0–0.5)
IMM RETICS NFR: 13.7 % (ref 3–15.8)
IRON 24H UR-MRATE: 64 MCG/DL (ref 59–158)
IRON SATN MFR SERPL: 17 % (ref 14–48)
LDH SERPL-CCNC: 178 U/L (ref 99–259)
LYMPHOCYTES # BLD AUTO: 2.12 10*3/MM3 (ref 0.7–3.1)
LYMPHOCYTES NFR BLD AUTO: 18.8 % (ref 19.6–45.3)
MCH RBC QN AUTO: 37.6 PG (ref 26.6–33)
MCHC RBC AUTO-ENTMCNC: 34.7 G/DL (ref 31.5–35.7)
MCV RBC AUTO: 108.4 FL (ref 79–97)
MONOCYTES # BLD AUTO: 1.14 10*3/MM3 (ref 0.1–0.9)
MONOCYTES NFR BLD AUTO: 10.1 % (ref 5–12)
NEUTROPHILS NFR BLD AUTO: 68.4 % (ref 42.7–76)
NEUTROPHILS NFR BLD AUTO: 7.7 10*3/MM3 (ref 1.7–7)
NRBC BLD AUTO-RTO: 0.4 /100 WBC (ref 0–0.2)
PLATELET # BLD AUTO: 226 10*3/MM3 (ref 140–450)
PMV BLD AUTO: 11.7 FL (ref 6–12)
POTASSIUM SERPL-SCNC: 4.8 MMOL/L (ref 3.5–4.7)
PROT SERPL-MCNC: 7.2 G/DL (ref 6.3–8)
RBC # BLD AUTO: 3.7 10*6/MM3 (ref 4.14–5.8)
RETICS # AUTO: 0.04 10*6/MM3 (ref 0.02–0.13)
RETICS/RBC NFR AUTO: 1.02 % (ref 0.7–1.9)
SODIUM SERPL-SCNC: 141 MMOL/L (ref 134–145)
TIBC SERPL-MCNC: 372 MCG/DL (ref 249–505)
TRANSFERRIN SERPL-MCNC: 266 MG/DL (ref 200–360)
VIT B12 BLD-MCNC: 1734 PG/ML (ref 211–946)
WBC NRBC COR # BLD: 11.26 10*3/MM3 (ref 3.4–10.8)

## 2022-07-18 PROCEDURE — 88185 FLOWCYTOMETRY/TC ADD-ON: CPT

## 2022-07-18 PROCEDURE — 88184 FLOWCYTOMETRY/ TC 1 MARKER: CPT

## 2022-07-18 PROCEDURE — 84466 ASSAY OF TRANSFERRIN: CPT | Performed by: INTERNAL MEDICINE

## 2022-07-18 PROCEDURE — 36415 COLL VENOUS BLD VENIPUNCTURE: CPT

## 2022-07-18 PROCEDURE — 82607 VITAMIN B-12: CPT | Performed by: INTERNAL MEDICINE

## 2022-07-18 PROCEDURE — 86431 RHEUMATOID FACTOR QUANT: CPT | Performed by: INTERNAL MEDICINE

## 2022-07-18 PROCEDURE — 83540 ASSAY OF IRON: CPT | Performed by: INTERNAL MEDICINE

## 2022-07-18 PROCEDURE — 80053 COMPREHEN METABOLIC PANEL: CPT | Performed by: INTERNAL MEDICINE

## 2022-07-18 PROCEDURE — 85046 RETICYTE/HGB CONCENTRATE: CPT | Performed by: INTERNAL MEDICINE

## 2022-07-18 PROCEDURE — 88182 CELL MARKER STUDY: CPT

## 2022-07-18 PROCEDURE — 85652 RBC SED RATE AUTOMATED: CPT | Performed by: INTERNAL MEDICINE

## 2022-07-18 PROCEDURE — 83615 LACTATE (LD) (LDH) ENZYME: CPT | Performed by: INTERNAL MEDICINE

## 2022-07-18 PROCEDURE — 99214 OFFICE O/P EST MOD 30 MIN: CPT | Performed by: INTERNAL MEDICINE

## 2022-07-18 PROCEDURE — 82728 ASSAY OF FERRITIN: CPT | Performed by: INTERNAL MEDICINE

## 2022-07-18 PROCEDURE — 86140 C-REACTIVE PROTEIN: CPT | Performed by: INTERNAL MEDICINE

## 2022-07-18 PROCEDURE — 85025 COMPLETE CBC W/AUTO DIFF WBC: CPT

## 2022-07-18 NOTE — PROGRESS NOTES
Subjective     REASON FOR CONSULTATION: Macrocytic anemia  Provide an opinion on any further workup or treatment                             REQUESTING PHYSICIAN: Dr. Marco Reagan    RECORDS OBTAINED:  Records of the patients history including those obtained from the referring provider were reviewed and summarized in detail.    HISTORY OF PRESENT ILLNESS:  The patient is a 81 y.o. year old male who is here for an opinion about the above issue.    History of Present Illness     Patient is a 81-year-old gentleman who has been referred here for evaluation of anemia.  He has macrocytosis with an MCV as high as 114 in the past.  His white count is 11.26 and he has had intermittent elevation of white count.  He was anemic his hemoglobin today though is 13.9.  Platelets are stable.  He has been referred here for evaluation of macrocytic anemia and intermittent leukocytosis.  Patient has not lost weight.  He has got a reasonable appetite.  He does have some fatigue.    Past Medical History:   Diagnosis Date   • Acid reflux    • Anemia    • Arthritis     OSTEO   • Asthma    • Chest discomfort    • Colon polyp    • COPD (chronic obstructive pulmonary disease) (HCC)    • Depression    • Diabetes mellitus (HCC)     type 2   • Disease of thyroid gland    • Emphysema lung (HCC)    • High cholesterol    • Hypertension    • Morbid obesity (HCC)    • Neuropathy     BOTH FEET   • Obesity, Class III, BMI 40-49.9 (morbid obesity) (HCC)    • PAD (peripheral artery disease) (HCC)    • Poor circulation of extremity     LEFT LEG   • Sleep apnea    • Vitamin D deficiency         Past Surgical History:   Procedure Laterality Date   • APPENDECTOMY     • CATARACT EXTRACTION W/ INTRAOCULAR LENS IMPLANT Bilateral    • CHOLECYSTECTOMY     • COLONOSCOPY  01/01/2014   • COLONOSCOPY W/ POLYPECTOMY      BENIGN   • HERNIA REPAIR     • INTERSTIM PLACEMENT Left 08/30/2016    BLADDER CONTROL   • KNEE ARTHROPLASTY Right    • MOUTH SURGERY  06/26/2018    • NOSE SURGERY      REMOVED BLOCKAGE    • ROTATOR CUFF REPAIR Right    • TOTAL HIP ARTHROPLASTY Right 11/9/2017    Procedure: RIGHT TOTAL HIP ARTHROPLASTY;  Surgeon: Riky Fernando MD;  Location: [facility] OR;  Service:         Current Outpatient Medications on File Prior to Visit   Medication Sig Dispense Refill   • albuterol sulfate  (90 Base) MCG/ACT inhaler Inhale 1 puff Every 4 (Four) Hours As Needed for Wheezing. 18 g 6   • amLODIPine (NORVASC) 5 MG tablet TAKE 1 TABLET BY MOUTH DAILY 90 tablet 3   • Ascorbic Acid (VITAMIN C PO) Take 1 tablet by mouth Daily.     • atenolol (TENORMIN) 25 MG tablet Take 1 tablet by mouth Daily. 90 tablet 3   • Cholecalciferol (VITAMIN D) 1000 units tablet Take 1,000 Units by mouth Every Morning.     • DULoxetine (CYMBALTA) 60 MG capsule Take 1 capsule by mouth Every Morning. 90 capsule 3   • Empagliflozin (Jardiance) 10 MG tablet Take 10 mg by mouth Daily. 30 tablet 11   • Fluticasone-Umeclidin-Vilant (Trelegy Ellipta) 100-62.5-25 MCG/INH inhaler Inhale 1 puff Daily. 1 each 12   • glucose blood (LAINEY CONTOUR TEST) test strip Use as instructed to test glucose 100 each 1   • levothyroxine (Synthroid) 112 MCG tablet Take 1 tablet by mouth Daily. 90 tablet 2   • losartan (COZAAR) 100 MG tablet Take 1 tablet by mouth Daily. 90 tablet 3   • metFORMIN (GLUCOPHAGE) 850 MG tablet Take 1 tablet by mouth 2 (Two) Times a Day With Meals. 30 tablet 6   • MICROLET LANCETS misc Use daily as directed to test glucose 100 each 1   • Multiple Vitamin (MULTIVITAMIN) tablet Take 1 tablet by mouth Every Morning.     • oxybutynin XL (DITROPAN XL) 15 MG 24 hr tablet Take 15 mg by mouth Every Night.     • pravastatin (PRAVACHOL) 40 MG tablet Take 1 tablet by mouth Every Night. 90 tablet 3   • pregabalin (LYRICA) 150 MG capsule Take 150 mg by mouth 2 (Two) Times a Day.     • vitamin B-12 (CYANOCOBALAMIN) 1000 MCG tablet Take 1,000 mcg by mouth Every Morning.     • warfarin (COUMADIN) 5 MG  tablet Take 1 tablet by mouth Daily. 60 tablet 11     No current facility-administered medications on file prior to visit.        ALLERGIES:    Allergies   Allergen Reactions   • Lisinopril Unknown - High Severity     Other reaction(s): Cough        Social History     Socioeconomic History   • Marital status:      Spouse name: Chitra   • Years of education: high school   Tobacco Use   • Smoking status: Former Smoker     Packs/day: 1.50     Years: 40.00     Pack years: 60.00     Types: Cigarettes     Quit date:      Years since quittin.5   • Smokeless tobacco: Never Used   • Tobacco comment: from age [age]   Vaping Use   • Vaping Use: Never used   Substance and Sexual Activity   • Alcohol use: Yes     Comment: HOLIDAYS  caffeine -2 cups coffee daily   • Drug use: No     Comment: PRN use for pain   • Sexual activity: Defer        Family History   Problem Relation Age of Onset   • Stroke Mother    • Hypertension Mother    • Heart attack Father    • Alcohol abuse Father    • Heart disease Father    • Stomach cancer Brother    • Stroke Brother    • Alcohol abuse Brother    • Hypertension Daughter    • Diabetes Daughter    • Diabetes Sister    • Malig Hyperthermia Neg Hx         Review of Systems   Constitutional: Negative for appetite change, chills, diaphoresis, fatigue, fever and unexpected weight change.   HENT: Negative for hearing loss, sore throat and trouble swallowing.    Respiratory: Negative for cough, chest tightness, shortness of breath and wheezing.    Cardiovascular: Negative for chest pain, palpitations and leg swelling.   Gastrointestinal: Negative for abdominal distention, abdominal pain, constipation, diarrhea, nausea and vomiting.   Genitourinary: Negative for dysuria, frequency, hematuria and urgency.   Musculoskeletal: Negative for joint swelling.        No muscle weakness.   Skin: Negative for rash and wound.   Neurological: Negative for seizures, syncope, speech difficulty,  "weakness, numbness and headaches.   Hematological: Negative for adenopathy. Does not bruise/bleed easily.   Psychiatric/Behavioral: Negative for behavioral problems, confusion and suicidal ideas.   All other systems reviewed and are negative.  Patient complains of fatigue    Objective     Vitals:    07/18/22 1535   BP: 149/70   Pulse: 69   Resp: 20   Temp: 97.5 °F (36.4 °C)   TempSrc: Temporal   SpO2: 90%   Weight: (!) 143 kg (315 lb 3.2 oz)   Height: 181 cm (71.26\")  Comment: New Ht   PainSc: 0-No pain     Current Status 7/18/2022   ECOG score 0       Physical Exam         This patient's ACP documentation is up to date, and there's nothing further left to document.      CONSTITUTIONAL:  Vital signs reviewed.  No distress, looks comfortable.  RESPIRATORY:  Normal respiratory effort.  Lungs clear to auscultation bilaterally.  CARDIOVASCULAR:  Normal S1, S2.  No murmurs rubs or gallops.  No significant lower extremity edema.  GASTROINTESTINAL: Abdomen appears unremarkable.  Nontender.  No hepatomegaly.  No splenomegaly.  LYMPHATIC:  No cervical, supraclavicular, axillary lymphadenopathy.  SKIN:  Warm.  No rashes.  PSYCHIATRIC:  Normal judgment and insight.  Normal mood and affect.    RECENT LABS:  Hematology WBC   Date Value Ref Range Status   07/18/2022 11.26 (H) 3.40 - 10.80 10*3/mm3 Final   01/09/2020 7.10 3.40 - 10.80 10*3/mm3 Final     RBC   Date Value Ref Range Status   07/18/2022 3.70 (L) 4.14 - 5.80 10*6/mm3 Final   01/09/2020 2.98 (L) 4.14 - 5.80 10*6/mm3 Final     Hemoglobin   Date Value Ref Range Status   07/18/2022 13.9 13.0 - 17.7 g/dL Final     Hematocrit   Date Value Ref Range Status   07/18/2022 41.0 37.5 - 51.0 % Final   07/18/2022 40.1 37.5 - 51.0 % Final     Platelets   Date Value Ref Range Status   07/18/2022 226 140 - 450 10*3/mm3 Final          Assessment & Plan     *Macrocytic anemia: Patient doing well except fatigue.  · His MCV has been as high as 114 with mild anemia in the past though today " is 13.9  · He has intermittent leukocytosis  · He was referred here for evaluation of microcytic anemia.  · We will do a complete work-up today.  · His peripheral blood has been reviewed.  He has macrocytosis on peripheral smear.  He does not have immature cells.    *Hypertension stable    *Hypothyroidism, macrocytosis could be secondary to hypothyroidism as well    *Chronic lymphedema of the lower extremity left greater than right followed by lymphedema clinic    Plan  · Will evaluate completely with RBC folate, vitamin B12, TSH, ESR, C-reactive protein, rheumatoid factor, TOBY and flow cytometry  · Given intermittent leukocytosis we will check peripheral blood for JAK2 mutation and BCR able  · Patient complains of weight loss and hence will obtain CT scan of the chest abdomen pelvis in 1 to 2 weeks  · We will discuss the results of the CT scan on the phone  · Follow-up with me in 3 months with labs    Chio Gilliam MD

## 2022-07-18 NOTE — PROGRESS NOTES
Reviewed echocardiogram results with patient.  He was made aware of aortic valve stenosis which we will continue to get surveillance echocardiograms on.  He is in rate controlled atrial flutter on his monitor but is asymptomatic.  He is anticoagulated with warfarin.  No change to regimen.  Patient will need follow-up appointment with Dr. Shelton in 3 months.

## 2022-07-19 LAB
CENTROMERE B AB SER-ACNC: <0.2 AI (ref 0–0.9)
CHROMATIN AB SERPL-ACNC: <0.2 AI (ref 0–0.9)
DSDNA AB SER-ACNC: <1 IU/ML (ref 0–9)
ENA JO1 AB SER-ACNC: <0.2 AI (ref 0–0.9)
ENA RNP AB SER-ACNC: <0.2 AI (ref 0–0.9)
ENA SCL70 AB SER-ACNC: <0.2 AI (ref 0–0.9)
ENA SM AB SER-ACNC: <0.2 AI (ref 0–0.9)
ENA SS-A AB SER-ACNC: <0.2 AI (ref 0–0.9)
ENA SS-B AB SER-ACNC: <0.2 AI (ref 0–0.9)
FOLATE BLD-MCNC: >620 NG/ML
FOLATE RBC-MCNC: >1512 NG/ML
HCT VFR BLD AUTO: 41 % (ref 37.5–51)
Lab: NORMAL
REF LAB TEST METHOD: NORMAL

## 2022-07-27 ENCOUNTER — ANTICOAGULATION VISIT (OUTPATIENT)
Dept: PHARMACY | Facility: HOSPITAL | Age: 82
End: 2022-07-27

## 2022-07-27 DIAGNOSIS — I48.92 ATRIAL FLUTTER WITH CONTROLLED RESPONSE: Primary | ICD-10-CM

## 2022-07-27 LAB
INR PPP: 2.6 (ref 0.91–1.09)
PROTHROMBIN TIME: 31.7 SECONDS (ref 10–13.8)

## 2022-07-27 PROCEDURE — 36416 COLLJ CAPILLARY BLOOD SPEC: CPT

## 2022-07-27 PROCEDURE — 85610 PROTHROMBIN TIME: CPT

## 2022-07-27 NOTE — PROGRESS NOTES
Anticoagulation Clinic Progress Note    Anticoagulation Summary  As of 2022    INR goal:  2.0-3.0   TTR:  84.6 % (3.7 wk)   INR used for dosin.6 (2022)   Warfarin maintenance plan:  10 mg every Sun; 7.5 mg all other days   Weekly warfarin total:  55 mg   No change documented:  Augusta Perea   Plan last modified:  Yu Rader, PharmD (2022)   Next INR check:  8/10/2022   Priority:  Maintenance   Target end date:      Indications    Atrial flutter with controlled response (HCC) [I48.92]             Anticoagulation Episode Summary     INR check location:      Preferred lab:      Send INR reminders to:   [facility] [place]    Comments:        Anticoagulation Care Providers     Provider Role Specialty Phone number    Roseline Silva APRN Referring Cardiology [phone]          Clinic Interview:      INR History:  Anticoagulation Monitoring 2022   INR 2.2 2.7 2.6   INR Date 2022   INR Goal 2.0-3.0 2.0-3.0 2.0-3.0   Trend - Same Same   Last Week Total 55 mg 55 mg 55 mg   Next Week Total 55 mg 55 mg 55 mg   Sun 10 mg 10 mg 10 mg   Mon 7.5 mg 7.5 mg 7.5 mg   Tue 7.5 mg 7.5 mg 7.5 mg   Wed 7.5 mg 7.5 mg 7.5 mg   Thu 7.5 mg 7.5 mg 7.5 mg   Fri 7.5 mg 7.5 mg 7.5 mg   Sat 7.5 mg 7.5 mg 7.5 mg   Visit Report - - -   Some recent data might be hidden       Plan:  1. INR is therapeutic today- see above in Anticoagulation Summary.   Will instruct Riky Moon to continue their warfarin regimen- see above in Anticoagulation Summary.  2. Follow up in 2 weeks.  3. Patient declines warfarin refills.  4. Verbal and written information provided. Patient expresses understanding and has no further questions at this time.    Augusta Perea

## 2022-08-01 ENCOUNTER — HOSPITAL ENCOUNTER (OUTPATIENT)
Dept: CT IMAGING | Facility: HOSPITAL | Age: 82
Discharge: HOME | End: 2022-08-01
Admitting: INTERNAL MEDICINE

## 2022-08-01 DIAGNOSIS — D50.0 IRON DEFICIENCY ANEMIA DUE TO CHRONIC BLOOD LOSS: ICD-10-CM

## 2022-08-01 PROCEDURE — 25010000002 IOPAMIDOL 61 % SOLUTION: Performed by: INTERNAL MEDICINE

## 2022-08-01 PROCEDURE — 0 DIATRIZOATE MEGLUMINE & SODIUM PER 1 ML: Performed by: INTERNAL MEDICINE

## 2022-08-01 PROCEDURE — 71260 CT THORAX DX C+: CPT

## 2022-08-01 PROCEDURE — 74177 CT ABD & PELVIS W/CONTRAST: CPT

## 2022-08-01 RX ADMIN — IOPAMIDOL 100 ML: 612 INJECTION, SOLUTION INTRAVENOUS at 16:32

## 2022-08-01 RX ADMIN — DIATRIZOATE MEGLUMINE AND DIATRIZOATE SODIUM 30 ML: 660; 100 LIQUID ORAL; RECTAL at 15:15

## 2022-08-03 ENCOUNTER — TELEPHONE (OUTPATIENT)
Dept: PHARMACY | Facility: HOSPITAL | Age: 82
End: 2022-08-03

## 2022-08-03 NOTE — TELEPHONE ENCOUNTER
Patient called to report his injection on 8/25 does not require him to come off the warfarin. Will continue with regular warfarin dose. Rescheduled appointment as patient was scheduled earlier due to procedure.

## 2022-08-10 ENCOUNTER — APPOINTMENT (OUTPATIENT)
Dept: PHARMACY | Facility: HOSPITAL | Age: 82
End: 2022-08-10

## 2022-08-11 LAB — REF LAB TEST METHOD: NORMAL

## 2022-08-15 ENCOUNTER — OFFICE VISIT (OUTPATIENT)
Dept: ONCOLOGY | Facility: CLINIC | Age: 82
End: 2022-08-15

## 2022-08-15 ENCOUNTER — LAB (OUTPATIENT)
Dept: LAB | Facility: HOSPITAL | Age: 82
End: 2022-08-15

## 2022-08-15 VITALS
SYSTOLIC BLOOD PRESSURE: 130 MMHG | BODY MASS INDEX: 44.1 KG/M2 | OXYGEN SATURATION: 94 % | TEMPERATURE: 96.8 F | HEIGHT: 71 IN | RESPIRATION RATE: 16 BRPM | HEART RATE: 66 BPM | WEIGHT: 315 LBS | DIASTOLIC BLOOD PRESSURE: 73 MMHG

## 2022-08-15 DIAGNOSIS — D50.0 IRON DEFICIENCY ANEMIA DUE TO CHRONIC BLOOD LOSS: Primary | ICD-10-CM

## 2022-08-15 DIAGNOSIS — D75.89 MACROCYTOSIS: Primary | ICD-10-CM

## 2022-08-15 DIAGNOSIS — D53.9 MACROCYTIC ANEMIA: ICD-10-CM

## 2022-08-15 LAB
ALBUMIN SERPL-MCNC: 4.1 G/DL (ref 3.5–5.2)
ALBUMIN/GLOB SERPL: 1.5 G/DL (ref 1.1–2.4)
ALP SERPL-CCNC: 57 U/L (ref 38–116)
ALT SERPL W P-5'-P-CCNC: 13 U/L (ref 0–41)
ANION GAP SERPL CALCULATED.3IONS-SCNC: 11 MMOL/L (ref 5–15)
AST SERPL-CCNC: 16 U/L (ref 0–40)
BASOPHILS # BLD AUTO: 0.09 10*3/MM3 (ref 0–0.2)
BASOPHILS NFR BLD AUTO: 0.9 % (ref 0–1.5)
BILIRUB SERPL-MCNC: 0.7 MG/DL (ref 0.2–1.2)
BUN SERPL-MCNC: 24 MG/DL (ref 6–20)
BUN/CREAT SERPL: 20.3 (ref 7.3–30)
CALCIUM SPEC-SCNC: 9.4 MG/DL (ref 8.5–10.2)
CHLORIDE SERPL-SCNC: 107 MMOL/L (ref 98–107)
CO2 SERPL-SCNC: 24 MMOL/L (ref 22–29)
CREAT SERPL-MCNC: 1.18 MG/DL (ref 0.7–1.3)
DEPRECATED RDW RBC AUTO: 70 FL (ref 37–54)
EGFRCR SERPLBLD CKD-EPI 2021: 61.6 ML/MIN/1.73
EOSINOPHIL # BLD AUTO: 0.17 10*3/MM3 (ref 0–0.4)
EOSINOPHIL NFR BLD AUTO: 1.8 % (ref 0.3–6.2)
ERYTHROCYTE [DISTWIDTH] IN BLOOD BY AUTOMATED COUNT: 17.2 % (ref 12.3–15.4)
FERRITIN SERPL-MCNC: 195.2 NG/ML (ref 30–400)
FOLATE SERPL-MCNC: 19.8 NG/ML (ref 4.78–24.2)
GLOBULIN UR ELPH-MCNC: 2.7 GM/DL (ref 1.8–3.5)
GLUCOSE SERPL-MCNC: 144 MG/DL (ref 74–124)
HCT VFR BLD AUTO: 40.2 % (ref 37.5–51)
HGB BLD-MCNC: 13.5 G/DL (ref 13–17.7)
HGB RETIC QN AUTO: 39.8 PG (ref 29.8–36.1)
IMM GRANULOCYTES # BLD AUTO: 0.03 10*3/MM3 (ref 0–0.05)
IMM GRANULOCYTES NFR BLD AUTO: 0.3 % (ref 0–0.5)
IMM RETICS NFR: 22.3 % (ref 3–15.8)
IRON 24H UR-MRATE: 108 MCG/DL (ref 59–158)
IRON SATN MFR SERPL: 31 % (ref 14–48)
LYMPHOCYTES # BLD AUTO: 1.76 10*3/MM3 (ref 0.7–3.1)
LYMPHOCYTES NFR BLD AUTO: 18.4 % (ref 19.6–45.3)
MCH RBC QN AUTO: 36.7 PG (ref 26.6–33)
MCHC RBC AUTO-ENTMCNC: 33.6 G/DL (ref 31.5–35.7)
MCV RBC AUTO: 109.2 FL (ref 79–97)
MONOCYTES # BLD AUTO: 0.91 10*3/MM3 (ref 0.1–0.9)
MONOCYTES NFR BLD AUTO: 9.5 % (ref 5–12)
NEUTROPHILS NFR BLD AUTO: 6.6 10*3/MM3 (ref 1.7–7)
NEUTROPHILS NFR BLD AUTO: 69.1 % (ref 42.7–76)
NRBC BLD AUTO-RTO: 0.2 /100 WBC (ref 0–0.2)
PLATELET # BLD AUTO: 206 10*3/MM3 (ref 140–450)
PMV BLD AUTO: 11.1 FL (ref 6–12)
POTASSIUM SERPL-SCNC: 4.6 MMOL/L (ref 3.5–4.7)
PROT SERPL-MCNC: 6.8 G/DL (ref 6.3–8)
RBC # BLD AUTO: 3.68 10*6/MM3 (ref 4.14–5.8)
REF LAB TEST METHOD: NORMAL
RETICS # AUTO: 0.04 10*6/MM3 (ref 0.02–0.13)
RETICS/RBC NFR AUTO: 1.12 % (ref 0.7–1.9)
SODIUM SERPL-SCNC: 142 MMOL/L (ref 134–145)
TIBC SERPL-MCNC: 349 MCG/DL (ref 249–505)
TRANSFERRIN SERPL-MCNC: 249 MG/DL (ref 200–360)
WBC NRBC COR # BLD: 9.56 10*3/MM3 (ref 3.4–10.8)

## 2022-08-15 PROCEDURE — 82746 ASSAY OF FOLIC ACID SERUM: CPT | Performed by: NURSE PRACTITIONER

## 2022-08-15 PROCEDURE — 85025 COMPLETE CBC W/AUTO DIFF WBC: CPT

## 2022-08-15 PROCEDURE — 83540 ASSAY OF IRON: CPT

## 2022-08-15 PROCEDURE — 36415 COLL VENOUS BLD VENIPUNCTURE: CPT

## 2022-08-15 PROCEDURE — 82728 ASSAY OF FERRITIN: CPT

## 2022-08-15 PROCEDURE — 85046 RETICYTE/HGB CONCENTRATE: CPT | Performed by: NURSE PRACTITIONER

## 2022-08-15 PROCEDURE — 80053 COMPREHEN METABOLIC PANEL: CPT

## 2022-08-15 PROCEDURE — 84466 ASSAY OF TRANSFERRIN: CPT

## 2022-08-15 PROCEDURE — 99214 OFFICE O/P EST MOD 30 MIN: CPT | Performed by: NURSE PRACTITIONER

## 2022-08-15 NOTE — PROGRESS NOTES
Subjective     REASON FOR FOLLOW-UP: Macrocytic anemia  Provide an opinion on any further workup or treatment    HISTORY OF PRESENT ILLNESS:  The patient is a 81 y.o. year old male who is here for an opinion about the above issue.    History of Present Illness     Patient is a 81-year-old gentleman who has been referred here for evaluation of anemia.  He has macrocytosis with an MCV as high as 114 in the past.  Patient underwent work-up alectinib by Dr. Gilliam with BCR able negative and JAK2 negative.  Both cytometry unremarkable.  Reticulocyte count and CRP slightly elevated.  Patient underwent CT chest, abdomen, pelvis due to ongoing lymphadenopathy and bilateral lower extremities.  Patient does have known venous insufficiency and has been lymphedema clinic in the past with some improvement though still does need compression stockings and leg wraps.  Patient denies any recent fevers or infections.  He has had no new pain.  He denies any changes to his health history or medications since the last office visit.     Past Medical History:   Diagnosis Date   • Acid reflux    • Anemia    • Arthritis     OSTEO   • Asthma    • Chest discomfort    • Colon polyp    • COPD (chronic obstructive pulmonary disease) (HCC)    • Depression    • Diabetes mellitus (HCC)     type 2   • Disease of thyroid gland    • Emphysema lung (HCC)    • High cholesterol    • Hypertension    • Morbid obesity (HCC)    • Neuropathy     BOTH FEET   • Obesity, Class III, BMI 40-49.9 (morbid obesity) (HCC)    • PAD (peripheral artery disease) (HCC)    • Poor circulation of extremity     LEFT LEG   • Sleep apnea    • Vitamin D deficiency         Past Surgical History:   Procedure Laterality Date   • APPENDECTOMY     • CATARACT EXTRACTION W/ INTRAOCULAR LENS IMPLANT Bilateral    • CHOLECYSTECTOMY     • COLONOSCOPY  01/01/2014   • COLONOSCOPY W/ POLYPECTOMY      BENIGN   • HERNIA REPAIR     • INTERSTIM PLACEMENT Left 08/30/2016    BLADDER CONTROL   •  KNEE ARTHROPLASTY Right    • MOUTH SURGERY  06/26/2018   • NOSE SURGERY      REMOVED BLOCKAGE    • ROTATOR CUFF REPAIR Right    • TOTAL HIP ARTHROPLASTY Right 11/9/2017    Procedure: RIGHT TOTAL HIP ARTHROPLASTY;  Surgeon: Riky Fernando MD;  Location: [facility] OR;  Service:         Current Outpatient Medications on File Prior to Visit   Medication Sig Dispense Refill   • albuterol sulfate  (90 Base) MCG/ACT inhaler Inhale 1 puff Every 4 (Four) Hours As Needed for Wheezing. 18 g 6   • amLODIPine (NORVASC) 5 MG tablet TAKE 1 TABLET BY MOUTH DAILY 90 tablet 3   • Ascorbic Acid (VITAMIN C PO) Take 1 tablet by mouth Daily.     • atenolol (TENORMIN) 25 MG tablet Take 1 tablet by mouth Daily. 90 tablet 3   • Cholecalciferol (VITAMIN D) 1000 units tablet Take 1,000 Units by mouth Every Morning.     • DULoxetine (CYMBALTA) 60 MG capsule Take 1 capsule by mouth Every Morning. 90 capsule 3   • Empagliflozin (Jardiance) 10 MG tablet Take 10 mg by mouth Daily. 30 tablet 11   • Fluticasone-Umeclidin-Vilant (Trelegy Ellipta) 100-62.5-25 MCG/INH inhaler Inhale 1 puff Daily. 1 each 12   • glucose blood (LAINEY CONTOUR TEST) test strip Use as instructed to test glucose 100 each 1   • levothyroxine (Synthroid) 112 MCG tablet Take 1 tablet by mouth Daily. 90 tablet 2   • losartan (COZAAR) 100 MG tablet Take 1 tablet by mouth Daily. 90 tablet 3   • metFORMIN (GLUCOPHAGE) 850 MG tablet Take 1 tablet by mouth 2 (Two) Times a Day With Meals. 30 tablet 6   • MICROLET LANCETS misc Use daily as directed to test glucose 100 each 1   • Multiple Vitamin (MULTIVITAMIN) tablet Take 1 tablet by mouth Every Morning.     • oxybutynin XL (DITROPAN XL) 15 MG 24 hr tablet Take 15 mg by mouth Every Night.     • pravastatin (PRAVACHOL) 40 MG tablet Take 1 tablet by mouth Every Night. 90 tablet 3   • pregabalin (LYRICA) 150 MG capsule Take 150 mg by mouth 2 (Two) Times a Day.     • vitamin B-12 (CYANOCOBALAMIN) 1000 MCG tablet Take 1,000 mcg by  mouth Every Morning.     • warfarin (COUMADIN) 5 MG tablet Take 1 tablet by mouth Daily. 60 tablet 11     No current facility-administered medications on file prior to visit.        ALLERGIES:    Allergies   Allergen Reactions   • Lisinopril Unknown - High Severity     Other reaction(s): Cough        Social History     Socioeconomic History   • Marital status:      Spouse name: Chitra   • Years of education: high school   Tobacco Use   • Smoking status: Former Smoker     Packs/day: 1.50     Years: 40.00     Pack years: 60.00     Types: Cigarettes     Quit date:      Years since quittin.6   • Smokeless tobacco: Never Used   • Tobacco comment: from age [age]   Vaping Use   • Vaping Use: Never used   Substance and Sexual Activity   • Alcohol use: Yes     Comment: HOLIDAYS  caffeine -2 cups coffee daily   • Drug use: No     Comment: PRN use for pain   • Sexual activity: Defer        Family History   Problem Relation Age of Onset   • Stroke Mother    • Hypertension Mother    • Heart attack Father    • Alcohol abuse Father    • Heart disease Father    • Diabetes Sister    • Heart disease Brother    • Stomach cancer Brother    • Stroke Brother    • Alcohol abuse Brother    • Hypertension Daughter    • Diabetes Daughter    • Malig Hyperthermia Neg Hx         Review of Systems   Constitutional: Negative for appetite change, chills, diaphoresis, fatigue, fever and unexpected weight change.   HENT: Negative for hearing loss, sore throat and trouble swallowing.    Respiratory: Negative for cough, chest tightness, shortness of breath and wheezing.    Cardiovascular: Negative for chest pain, palpitations and leg swelling.   Gastrointestinal: Negative for abdominal distention, abdominal pain, constipation, diarrhea, nausea and vomiting.   Genitourinary: Negative for dysuria, frequency, hematuria and urgency.   Musculoskeletal: Negative for joint swelling.        No muscle weakness.   Skin: Negative for rash and  "wound.   Neurological: Negative for seizures, syncope, speech difficulty, weakness, numbness and headaches.   Hematological: Negative for adenopathy. Does not bruise/bleed easily.   Psychiatric/Behavioral: Negative for behavioral problems, confusion and suicidal ideas.   All other systems reviewed and are negative.  Patient complains of fatigue    Objective     Vitals:    08/15/22 1104   BP: 130/73   Pulse: 66   Resp: 16   Temp: 96.8 °F (36 °C)   TempSrc: Temporal   SpO2: 94%   Weight: (!) 143 kg (315 lb 4.8 oz)   Height: 181 cm (71.26\")   PainSc: 0-No pain     Current Status 8/15/2022   ECOG score 1       Physical Exam         This patient's ACP documentation is up to date, and there's nothing further left to document.      CONSTITUTIONAL:  Vital signs reviewed.  No distress, looks comfortable.  RESPIRATORY:  Normal respiratory effort.  Lungs clear to auscultation bilaterally.  CARDIOVASCULAR:  Normal S1, S2.  No murmurs rubs or gallops.  Bilateral lower extremity lymphedema, compression stockings and leg wraps in place.   LYMPHATIC:  No cervical, supraclavicular lymphadenopathy.  SKIN:  Warm.  No rashes.  PSYCHIATRIC:  Normal judgment and insight.  Normal mood and affect.    RECENT LABS:  Hematology WBC   Date Value Ref Range Status   08/15/2022 9.56 3.40 - 10.80 10*3/mm3 Final   01/09/2020 7.10 3.40 - 10.80 10*3/mm3 Final     RBC   Date Value Ref Range Status   08/15/2022 3.68 (L) 4.14 - 5.80 10*6/mm3 Final   01/09/2020 2.98 (L) 4.14 - 5.80 10*6/mm3 Final     Hemoglobin   Date Value Ref Range Status   08/15/2022 13.5 13.0 - 17.7 g/dL Final     Hematocrit   Date Value Ref Range Status   08/15/2022 40.2 37.5 - 51.0 % Final     Platelets   Date Value Ref Range Status   08/15/2022 206 140 - 450 10*3/mm3 Final          CT Chest, Abdomen, Pelvis 8/1/2022  IMPRESSION:  1. There is moderately extensive sigmoid diverticulosis without evidence  for diverticulitis.  2. Splenic size is normal and there is no splenomegaly. No " acute  abnormality is seen.  IMPRESSION:  1. There is moderately extensive sigmoid diverticulosis without evidence  for diverticulitis.  2. Splenic size is normal and there is no splenomegaly. No acute  abnormality is seen.    Assessment & Plan     *Macrocytic anemia: Patient doing well except fatigue.  · His MCV has been as high as 114 with mild anemia in the past though today is 13.9  · He has intermittent leukocytosis  · He was referred here for evaluation of macrocytic anemia.  · We will do a complete work-up today.  · His peripheral blood has been reviewed.  He has macrocytosis on peripheral smear.  He does not have immature cells.  · Ferritin iron panel reviewed and unremarkable, B12 elevated, JAK2 negative, BCR able not detected, TSH slightly elevated however T4 within normal limits, rheumatoid factor normal, TOBY unremarkable, ESR in normal range, CRP 0.66  · CT chest abdomen pelvis 8/1/2022 unremarkable for lymphadenopathy or mass.  · Patient return to follow-up on CT imaging which fortunately was negative for any mass, lymphadenopathy, splenomegaly.  His MCV remains elevated however he is not anemic today elevated at 13.5.  Ferritin and iron panel reviewed and Unremarkable today.  Leukocytosis has normalized today.  Reticulocyte count added in addition to copper level pending.  He will follow up with Dr. Gilliam in 2 months.      *Hypertension stable    *Hypothyroidism, macrocytosis could be secondary to hypothyroidism as well    *Chronic lymphedema of the lower extremity left greater than right followed by lymphedema clinic    Plan  · Copper level pending  · Will review labwork with Dr. Gilliam once resulted  · Follow-up with Dr. Gilliam in 2 months with repeat CBC, retic    EVON Maria

## 2022-08-18 LAB — COPPER SERPL-MCNC: 96 UG/DL (ref 69–132)

## 2022-08-19 ENCOUNTER — LAB (OUTPATIENT)
Dept: LAB | Facility: HOSPITAL | Age: 82
End: 2022-08-19

## 2022-08-19 ENCOUNTER — OFFICE VISIT (OUTPATIENT)
Dept: INTERNAL MEDICINE | Facility: CLINIC | Age: 82
End: 2022-08-19

## 2022-08-19 VITALS
DIASTOLIC BLOOD PRESSURE: 78 MMHG | BODY MASS INDEX: 44.1 KG/M2 | OXYGEN SATURATION: 92 % | HEIGHT: 71 IN | HEART RATE: 60 BPM | WEIGHT: 315 LBS | SYSTOLIC BLOOD PRESSURE: 122 MMHG

## 2022-08-19 DIAGNOSIS — E03.9 HYPOTHYROIDISM, UNSPECIFIED TYPE: Primary | ICD-10-CM

## 2022-08-19 LAB
T-UPTAKE NFR SERPL: 1.05 TBI (ref 0.8–1.3)
T4 SERPL-MCNC: 6.38 MCG/DL (ref 4.5–11.7)
TSH SERPL DL<=0.05 MIU/L-ACNC: 4.67 UIU/ML (ref 0.27–4.2)

## 2022-08-19 PROCEDURE — 84436 ASSAY OF TOTAL THYROXINE: CPT | Performed by: FAMILY MEDICINE

## 2022-08-19 PROCEDURE — 84443 ASSAY THYROID STIM HORMONE: CPT | Performed by: FAMILY MEDICINE

## 2022-08-19 PROCEDURE — 99213 OFFICE O/P EST LOW 20 MIN: CPT | Performed by: FAMILY MEDICINE

## 2022-08-19 PROCEDURE — 84479 ASSAY OF THYROID (T3 OR T4): CPT | Performed by: FAMILY MEDICINE

## 2022-08-19 PROCEDURE — 36415 COLL VENOUS BLD VENIPUNCTURE: CPT | Performed by: FAMILY MEDICINE

## 2022-08-19 RX ORDER — WARFARIN SODIUM 5 MG/1
TABLET ORAL
Qty: 145 TABLET | Refills: 0 | Status: SHIPPED | OUTPATIENT
Start: 2022-08-19 | End: 2022-11-09 | Stop reason: SDUPTHER

## 2022-08-20 DIAGNOSIS — E03.9 HYPOTHYROIDISM, UNSPECIFIED TYPE: Primary | ICD-10-CM

## 2022-08-20 RX ORDER — LEVOTHYROXINE SODIUM 125 MCG
125 TABLET ORAL DAILY
Qty: 30 TABLET | Refills: 11 | Status: SHIPPED | OUTPATIENT
Start: 2022-08-20

## 2022-08-20 NOTE — PROGRESS NOTES
"His currentChief Complaint  2 Month Follow Up    Subjective        Riky Moon presents to [facility]  History of Present Illness    Patient has a history of hypothyroidism.  Patient is currently taking levothyroxine 112 mcg daily.  Patient is stating that he has no side effects of this medication.  Patient states that he seems to be doing fairly well with this medicine.  He was on 100 mcg prior.    Objective   Vital Signs:  /78 (BP Location: Left arm, Patient Position: Sitting, Cuff Size: Adult)   Pulse 60   Ht 180.3 cm (71\")   Wt (!) 143 kg (315 lb 12.8 oz)   SpO2 92%   BMI 44.05 kg/m²   Estimated body mass index is 44.05 kg/m² as calculated from the following:    Height as of this encounter: 180.3 cm (71\").    Weight as of this encounter: 143 kg (315 lb 12.8 oz).          Physical Exam  Vitals and nursing note reviewed.   Constitutional:       Appearance: He is well-developed.   HENT:      Head: Normocephalic and atraumatic.   Musculoskeletal:      Cervical back: Normal range of motion and neck supple.   Neurological:      Mental Status: He is alert and oriented to person, place, and time.   Psychiatric:         Behavior: Behavior normal.        Result Review :                Assessment and Plan   Diagnoses and all orders for this visit:    1. Hypothyroidism, unspecified type (Primary)  -     Thyroid Panel With TSH    Discussed at today's of visit that we can check his thyroid panel to see if the higher dose seems to be working.  We will continue levothyroxine 112 mcg for now.         Follow Up   No follow-ups on file.  Patient was given instructions and counseling regarding his condition or for health maintenance advice. Please see specific information pulled into the AVS if appropriate.       "

## 2022-08-21 NOTE — PROGRESS NOTES
Please inform the patient of the following abnormal results. Thyroid is still abnormal. Will need to increase and do branded synthroid. Start synthroid 125mcg. Patient will need to repeat thyroid panel in 4-6 wks, with follow up few days after.

## 2022-08-24 ENCOUNTER — ANTICOAGULATION VISIT (OUTPATIENT)
Dept: PHARMACY | Facility: HOSPITAL | Age: 82
End: 2022-08-24

## 2022-08-24 DIAGNOSIS — I48.92 ATRIAL FLUTTER WITH CONTROLLED RESPONSE: Primary | ICD-10-CM

## 2022-08-24 LAB
INR PPP: 1.4 (ref 0.91–1.09)
PROTHROMBIN TIME: 17.3 SECONDS (ref 10–13.8)

## 2022-08-24 PROCEDURE — G0463 HOSPITAL OUTPT CLINIC VISIT: HCPCS

## 2022-08-24 PROCEDURE — 36416 COLLJ CAPILLARY BLOOD SPEC: CPT

## 2022-08-24 PROCEDURE — 85610 PROTHROMBIN TIME: CPT

## 2022-08-24 NOTE — PROGRESS NOTES
Anticoagulation Clinic Progress Note    Anticoagulation Summary  As of 2022    INR goal:  2.0-3.0   TTR:  66.9 % (1.8 mo)   INR used for dosin.4 (2022)   Warfarin maintenance plan:  10 mg every Sun; 7.5 mg all other days   Weekly warfarin total:  55 mg   Plan last modified:  Yu Rader, PharmD (2022)   Next INR check:  2022   Priority:  Maintenance   Target end date:      Indications    Atrial flutter with controlled response (HCC) [I48.92]             Anticoagulation Episode Summary     INR check location:      Preferred lab:      Send INR reminders to:   [facility]    Comments:        Anticoagulation Care Providers     Provider Role Specialty Phone number    Roseline Silva APRN Referring Cardiology [phone]          Clinic Interview:  Patient Findings     Positives:  Upcoming invasive procedure, Missed doses, Change in   medications    Negatives:  Signs/symptoms of thrombosis, Signs/symptoms of bleeding,   Laboratory test error suspected, Change in health, Change in alcohol use,   Change in activity, Emergency department visit, Upcoming dental procedure,   Extra doses, Change in diet/appetite, Hospital admission, Bruising, Other   complaints    Comments:  Reports he has a procedure tomorrow- no hold required. Missed   dose on Monday. Also reports his levothyroxine was increased.       Clinical Outcomes     Negatives:  Major bleeding event, Thromboembolic event,   Anticoagulation-related hospital admission, Anticoagulation-related ED   visit, Anticoagulation-related fatality    Comments:  Reports he has a procedure tomorrow- no hold required. Missed   dose on Monday. Also reports his levothyroxine was increased.         INR History:  Anticoagulation Monitoring 2022   INR 2.7 2.6 1.4   INR Date 2022   INR Goal 2.0-3.0 2.0-3.0 2.0-3.0   Trend Same Same Same   Last Week Total 55 mg 55 mg 47.5 mg   Next Week Total  55 mg 55 mg 57.5 mg   Sun 10 mg 10 mg 10 mg   Mon 7.5 mg 7.5 mg 7.5 mg   Tue 7.5 mg 7.5 mg 7.5 mg   Wed 7.5 mg 7.5 mg 10 mg (8/24)   Thu 7.5 mg 7.5 mg 7.5 mg   Fri 7.5 mg 7.5 mg 7.5 mg   Sat 7.5 mg 7.5 mg 7.5 mg   Visit Report - - -   Some recent data might be hidden       Plan:  1. INR is Subtherapeutic today- see above in Anticoagulation Summary.  Will instruct Riky Moon to Increase their warfarin regimen- see above in Anticoagulation Summary.  2. Follow up in 1 week  3. Patient declines warfarin refills.  4. Verbal and written information provided. Patient expresses understanding and has no further questions at this time.    Olga Granados [facility]

## 2022-08-31 ENCOUNTER — ANTICOAGULATION VISIT (OUTPATIENT)
Dept: PHARMACY | Facility: HOSPITAL | Age: 82
End: 2022-08-31

## 2022-08-31 DIAGNOSIS — I48.92 ATRIAL FLUTTER WITH CONTROLLED RESPONSE: Primary | ICD-10-CM

## 2022-08-31 LAB
INR PPP: 1.7 (ref 0.91–1.09)
PROTHROMBIN TIME: 20.9 SECONDS (ref 10–13.8)

## 2022-08-31 PROCEDURE — 85610 PROTHROMBIN TIME: CPT

## 2022-08-31 PROCEDURE — 36416 COLLJ CAPILLARY BLOOD SPEC: CPT

## 2022-08-31 PROCEDURE — G0463 HOSPITAL OUTPT CLINIC VISIT: HCPCS

## 2022-08-31 NOTE — PROGRESS NOTES
Anticoagulation Clinic Progress Note    Anticoagulation Summary  As of 2022    INR goal:  2.0-3.0   TTR:  59.3 % (2 mo)   INR used for dosin.7 (2022)   Warfarin maintenance plan:  10 mg every Sun, Wed; 7.5 mg all other days   Weekly warfarin total:  57.5 mg   Plan last modified:  Roz Maharaj, Pharmacy Intern (2022)   Next INR check:  2022   Priority:  Maintenance   Target end date:      Indications    Atrial flutter with controlled response (HCC) [I48.92]             Anticoagulation Episode Summary     INR check location:      Preferred lab:      Send INR reminders to:  [facility]    Comments:        Anticoagulation Care Providers     Provider Role Specialty Phone number    Roseline Silva APRN Referring Cardiology [phone]          Clinic Interview:  Patient Findings     Positives:  Change in diet/appetite    Negatives:  Signs/symptoms of thrombosis, Signs/symptoms of bleeding,   Laboratory test error suspected, Change in health, Change in alcohol use,   Change in activity, Upcoming invasive procedure, Emergency department   visit, Upcoming dental procedure, Missed doses, Extra doses, Change in   medications, Hospital admission, Bruising, Other complaints    Comments:  Patient reported eating a lot of foods high in vitamin K such   as brussel sprouts, broccoli, kale. Counseled patient to be consistent   with diet. Increased weekly regimen.       Clinical Outcomes     Negatives:  Major bleeding event, Thromboembolic event,   Anticoagulation-related hospital admission, Anticoagulation-related ED   visit, Anticoagulation-related fatality    Comments:  Patient reported eating a lot of foods high in vitamin K such   as brussel sprouts, broccoli, kale. Counseled patient to be consistent   with diet. Increased weekly regimen.         INR History:  Anticoagulation Monitoring 2022   INR 2.6 1.4 1.7   INR Date 2022   INR  Goal 2.0-3.0 2.0-3.0 2.0-3.0   Trend Same Same Up   Last Week Total 55 mg 47.5 mg 57.5 mg   Next Week Total 55 mg 57.5 mg 57.5 mg   Sun 10 mg 10 mg 10 mg   Mon 7.5 mg 7.5 mg 7.5 mg   Tue 7.5 mg 7.5 mg 7.5 mg   Wed 7.5 mg 10 mg (8/24) 10 mg   Thu 7.5 mg 7.5 mg 7.5 mg   Fri 7.5 mg 7.5 mg 7.5 mg   Sat 7.5 mg 7.5 mg 7.5 mg   Visit Report - - -   Some recent data might be hidden       Plan:  1. INR is Subtherapeutic today- see above in Anticoagulation Summary.  Will instruct Riky Moon to Change their warfarin regimen- see above in Anticoagulation Summary.    Trial 10 mg on Sunday and Wednesday and 7.5 mg on all other days.     2. Follow up in 2 weeks  3. Patient declines warfarin refills.  4. Verbal and written information provided. Patient expresses understanding and has no further questions at this time.    Roz Maharaj, Pharmacy Intern

## 2022-09-14 ENCOUNTER — ANTICOAGULATION VISIT (OUTPATIENT)
Dept: PHARMACY | Facility: HOSPITAL | Age: 82
End: 2022-09-14

## 2022-09-14 DIAGNOSIS — I48.92 ATRIAL FLUTTER WITH CONTROLLED RESPONSE: Primary | ICD-10-CM

## 2022-09-14 LAB
INR PPP: 1.8 (ref 0.91–1.09)
PROTHROMBIN TIME: 22.1 SECONDS (ref 10–13.8)

## 2022-09-14 PROCEDURE — G0463 HOSPITAL OUTPT CLINIC VISIT: HCPCS

## 2022-09-14 PROCEDURE — 85610 PROTHROMBIN TIME: CPT

## 2022-09-14 PROCEDURE — 36416 COLLJ CAPILLARY BLOOD SPEC: CPT

## 2022-09-14 NOTE — PROGRESS NOTES
.  Anticoagulation Clinic Progress Note    Anticoagulation Summary  As of 2022    INR goal:  2.0-3.0   TTR:  48.3 % (2.5 mo)   INR used for dosin.8 (2022)   Warfarin maintenance plan:  10 mg every Sun, Wed, Fri; 7.5 mg all other days   Weekly warfarin total:  60 mg   Plan last modified:  Fahad Singh RP (2022)   Next INR check:  2022   Priority:  Maintenance   Target end date:      Indications    Atrial flutter with controlled response (HCC) [I48.92]             Anticoagulation Episode Summary     INR check location:      Preferred lab:      Send INR reminders to:   [facility]    Comments:        Anticoagulation Care Providers     Provider Role Specialty Phone number    Roseline Silva APRN Referring Cardiology [phone]          Clinic Interview:  Patient Findings     Comments:  Patient says he is still eating lot of greens. States he is   not moving around as much due to his legs hurting. Reports no missed   warfarin doses, however states he forgets to take his other medications   sometimes. No medication changes reported.        Clinical Outcomes     Comments:  Patient says he is still eating lot of greens. States he is   not moving around as much due to his legs hurting. Reports no missed   warfarin doses, however states he forgets to take his other medications   sometimes. No medication changes reported.          INR History:  Anticoagulation Monitoring 2022   INR 1.4 1.7 1.8   INR Date 2022   INR Goal 2.0-3.0 2.0-3.0 2.0-3.0   Trend Same Up Up   Last Week Total 47.5 mg 57.5 mg 57.5 mg   Next Week Total 57.5 mg 57.5 mg 60 mg   Sun 10 mg 10 mg 10 mg   Mon 7.5 mg 7.5 mg 7.5 mg   Tue 7.5 mg 7.5 mg 7.5 mg   Wed 10 mg () 10 mg 10 mg   Thu 7.5 mg 7.5 mg 7.5 mg   Fri 7.5 mg 7.5 mg 10 mg   Sat 7.5 mg 7.5 mg 7.5 mg   Visit Report - - -   Some recent data might be hidden       Plan:  1. INR is Subtherapeutic  today- see above in Anticoagulation Summary.  Will instruct Riky ELISABETH Red to Increase their warfarin regimen- see above in Anticoagulation Summary.  2. Follow up in 2 weeks  3. Patient declines warfarin refills.  4. Verbal and written information provided. Patient expresses understanding and has no further questions at this time.    Fahad Singh, PharmD  Pharmacy Resident

## 2022-09-14 NOTE — PROGRESS NOTES
I have supervised and reviewed the notes, assessments, and/or procedures performed by our pharmacy resident. The documented assessment and plan were developed cooperatively, and the plan was not implemented in my presence. I concur with the documentation of this patient encounter.

## 2022-09-28 ENCOUNTER — ANTICOAGULATION VISIT (OUTPATIENT)
Dept: PHARMACY | Facility: HOSPITAL | Age: 82
End: 2022-09-28

## 2022-09-28 DIAGNOSIS — I48.92 ATRIAL FLUTTER WITH CONTROLLED RESPONSE: Primary | ICD-10-CM

## 2022-09-28 LAB
INR PPP: 1.9 (ref 0.91–1.09)
PROTHROMBIN TIME: 23.1 SECONDS (ref 10–13.8)

## 2022-09-28 PROCEDURE — 36416 COLLJ CAPILLARY BLOOD SPEC: CPT

## 2022-09-28 PROCEDURE — 85610 PROTHROMBIN TIME: CPT

## 2022-09-28 NOTE — PROGRESS NOTES
Anticoagulation Clinic Progress Note    Anticoagulation Summary  As of 2022    INR goal:  2.0-3.0   TTR:  40.7 % (3 mo)   INR used for dosin.9 (2022)   Warfarin maintenance plan:  10 mg every Sun, Wed, Fri; 7.5 mg all other days   Weekly warfarin total:  60 mg   No change documented:  Jennifer Guevara, Pharmacy Technician   Plan last modified:  Fahad Singh RP (2022)   Next INR check:  10/12/2022   Priority:  Maintenance   Target end date:      Indications    Atrial flutter with controlled response (HCC) [I48.92]             Anticoagulation Episode Summary     INR check location:      Preferred lab:      Send INR reminders to:  [facility]    Comments:        Anticoagulation Care Providers     Provider Role Specialty Phone number    Roseline Silva APRN Referring Cardiology [phone]          Clinic Interview:  Patient Findings     Positives:  Missed doses    Negatives:  Signs/symptoms of thrombosis, Signs/symptoms of bleeding,   Laboratory test error suspected, Change in health, Change in alcohol use,   Change in activity, Upcoming invasive procedure, Emergency department   visit, Upcoming dental procedure, Extra doses, Change in medications,   Change in diet/appetite, Hospital admission, Bruising, Other complaints    Comments:  Patient missed        Clinical Outcomes     Negatives:  Major bleeding event, Thromboembolic event,   Anticoagulation-related hospital admission, Anticoagulation-related ED   visit, Anticoagulation-related fatality    Comments:  Patient missed          INR History:  Anticoagulation Monitoring 2022   INR 1.7 1.8 1.9   INR Date 2022   INR Goal 2.0-3.0 2.0-3.0 2.0-3.0   Trend Up Up Same   Last Week Total 57.5 mg 57.5 mg 50 mg   Next Week Total 57.5 mg 60 mg 60 mg   Sun 10 mg 10 mg 10 mg   Mon 7.5 mg 7.5 mg 7.5 mg   Tue 7.5 mg 7.5 mg 7.5 mg   Wed 10 mg 10 mg 10 mg   Thu 7.5 mg 7.5 mg  7.5 mg   Fri 7.5 mg 10 mg 10 mg   Sat 7.5 mg 7.5 mg 7.5 mg   Visit Report - - -   Some recent data might be hidden       Plan:  1. INR is therapeutic today- see above in Anticoagulation Summary.   Will instruct Riky Moon to continue their warfarin regimen- see above in Anticoagulation Summary.  2. Follow up in 2 weeks.  3. Patient declines warfarin refills.  4. Verbal and written information provided. Patient expresses understanding and has no further questions at this time.    Jennifer Guevara, Pharmacy Technician

## 2022-10-11 ENCOUNTER — APPOINTMENT (OUTPATIENT)
Dept: LAB | Facility: HOSPITAL | Age: 82
End: 2022-10-11

## 2022-10-12 ENCOUNTER — ANTICOAGULATION VISIT (OUTPATIENT)
Dept: PHARMACY | Facility: HOSPITAL | Age: 82
End: 2022-10-12

## 2022-10-12 DIAGNOSIS — I48.92 ATRIAL FLUTTER WITH CONTROLLED RESPONSE: Primary | ICD-10-CM

## 2022-10-12 LAB
INR PPP: 2.3 (ref 0.91–1.09)
PROTHROMBIN TIME: 27.4 SECONDS (ref 10–13.8)

## 2022-10-12 PROCEDURE — 36416 COLLJ CAPILLARY BLOOD SPEC: CPT

## 2022-10-12 PROCEDURE — 85610 PROTHROMBIN TIME: CPT

## 2022-10-12 NOTE — PROGRESS NOTES
Anticoagulation Clinic Progress Note    Anticoagulation Summary  As of 10/12/2022    INR goal:  2.0-3.0   TTR:  45.4 % (3.4 mo)   INR used for dosin.3 (10/12/2022)   Warfarin maintenance plan:  10 mg every Sun, Wed, Fri; 7.5 mg all other days   Weekly warfarin total:  60 mg   No change documented:  Maureen Farias [facility]   Plan last modified:  Fahad Singh [facility] (2022)   Next INR check:  2022   Priority:  Maintenance   Target end date:      Indications    Atrial flutter with controlled response (HCC) [I48.92]             Anticoagulation Episode Summary     INR check location:      Preferred lab:      Send INR reminders to:   ROGER BURDICK [facility]    Comments:        Anticoagulation Care Providers     Provider Role Specialty Phone number    Roseline Silva APRN Referring Cardiology [phone]          Clinic Interview:  Patient Findings     Negatives:  Signs/symptoms of thrombosis, Signs/symptoms of bleeding,   Laboratory test error suspected, Change in health, Change in alcohol use,   Change in activity, Upcoming invasive procedure, Emergency department   visit, Upcoming dental procedure, Missed doses, Extra doses, Change in   medications, Change in diet/appetite, Hospital admission, Bruising, Other   complaints      Clinical Outcomes     Negatives:  Major bleeding event, Thromboembolic event,   Anticoagulation-related hospital admission, Anticoagulation-related ED   visit, Anticoagulation-related fatality        INR History:  Anticoagulation Monitoring 2022 2022 10/12/2022   INR 1.8 1.9 2.3   INR Date 2022 2022 10/12/2022   INR Goal 2.0-3.0 2.0-3.0 2.0-3.0   Trend Up Same Same   Last Week Total 57.5 mg 50 mg 60 mg   Next Week Total 60 mg 60 mg 60 mg   Sun 10 mg 10 mg 10 mg   Mon 7.5 mg 7.5 mg 7.5 mg   Tue 7.5 mg 7.5 mg 7.5 mg   Wed 10 mg 10 mg 10 mg   Thu 7.5 mg 7.5 mg 7.5 mg   Fri 10 mg 10 mg 10 mg   Sat 7.5 mg 7.5 mg 7.5 mg   Visit Report - - -   Some recent  data might be hidden       Plan:  1. INR is Therapeutic today- see above in Anticoagulation Summary.  Will instruct Riky Moon to continue his currrent warfarin regimen- see above in Anticoagulation Summary.  2. Follow up in 3 weeks  3. Patient declines warfarin refills.  4. Verbal and written information provided. Patient expresses understanding and has no further questions at this time.    Maureen Farias, [facility]

## 2022-10-14 ENCOUNTER — TELEPHONE (OUTPATIENT)
Dept: INTERNAL MEDICINE | Facility: CLINIC | Age: 82
End: 2022-10-14

## 2022-10-14 NOTE — TELEPHONE ENCOUNTER
Caller: Riky Moon    Relationship to patient: Self    Best call back number: [phone]    Date of exposure: UNKNOWN    Date of positive COVID19 test: 10/14/2022     Date if possible COVID19 exposure: UNKNOWN     COVID19 symptoms: COUGH    Date of initial quarantine: TODAY     Additional information or concerns: WOULD LIKE TO KNOW IF [facility] CAN BE CALLED IN FOR HIM. HIS WIFE ALSO HAS COVID AND SHE WAS TAKEN TO THE HOSPITAL TODAY BECAUSE OF HER WORSENING SYMPTOMS   What is the patients preferred pharmacy:   [id number] [place] [id number] [facility] AT [facility] - [phone]  - [phone] FX

## 2022-10-17 ENCOUNTER — ANTICOAGULATION VISIT (OUTPATIENT)
Dept: PHARMACY | Facility: HOSPITAL | Age: 82
End: 2022-10-17

## 2022-10-17 DIAGNOSIS — I48.92 ATRIAL FLUTTER WITH CONTROLLED RESPONSE: Primary | ICD-10-CM

## 2022-10-17 NOTE — PROGRESS NOTES
Anticoagulation Clinic Progress Note    Anticoagulation Summary  As of 10/17/2022    INR goal:  2.0-3.0   TTR:  45.4 % (3.4 mo)   INR used for dosing:  No new INR was available at the time of this encounter.   Warfarin maintenance plan:  10 mg every Sun, Wed, Fri; 7.5 mg all other days   Weekly warfarin total:  60 mg   No change documented:  Yu Rader, PharmD   Plan last modified:  Fahad Singh [facility] (9/14/2022)   Next INR check:  10/24/2022   Priority:  Maintenance   Target end date:      Indications    Atrial flutter with controlled response (HCC) [I48.92]             Anticoagulation Episode Summary     INR check location:      Preferred lab:      Send INR reminders to:   ROGER BURDICK [facility]    Comments:        Anticoagulation Care Providers     Provider Role Specialty Phone number    Roseline Silva APRN Referring Cardiology [phone]          Clinic Interview:  Patient Findings     Negatives:  Signs/symptoms of thrombosis, Signs/symptoms of bleeding,   Laboratory test error suspected, Change in health, Change in alcohol use,   Change in activity, Upcoming invasive procedure, Emergency department   visit, Upcoming dental procedure, Missed doses, Extra doses, Change in   medications, Change in diet/appetite, Hospital admission, Bruising, Other   complaints    Comments:  Patient called to inform us he has COVID and is taking   paxlovid.  Denies any other symptoms other than congestion and cough.       Clinical Outcomes     Negatives:  Major bleeding event, Thromboembolic event,   Anticoagulation-related hospital admission, Anticoagulation-related ED   visit, Anticoagulation-related fatality    Comments:  Patient called to inform us he has COVID and is taking   paxlovid.  Denies any other symptoms other than congestion and cough.         INR History:  Anticoagulation Monitoring 9/28/2022 10/12/2022 10/17/2022   INR 1.9 2.3 -   INR Date 9/28/2022 10/12/2022 -   INR Goal 2.0-3.0 2.0-3.0  2.0-3.0   Trend Same Same Same   Last Week Total 50 mg 60 mg 60 mg   Next Week Total 60 mg 60 mg 60 mg   Sun 10 mg 10 mg 10 mg   Mon 7.5 mg 7.5 mg 7.5 mg   Tue 7.5 mg 7.5 mg 7.5 mg   Wed 10 mg 10 mg 10 mg   Thu 7.5 mg 7.5 mg 7.5 mg   Fri 10 mg 10 mg 10 mg   Sat 7.5 mg 7.5 mg 7.5 mg   Visit Report - - -   Some recent data might be hidden       Plan:  1. INR is NO INR today- see above in Anticoagulation Summary.   Will instruct Riky Moon to Continue their warfarin regimen- see above in Anticoagulation Summary.  2. Follow up in 1 weeks  3. They have been instructed to call if any changes in medications, doses, concerns, etc. Patient expresses understanding and has no further questions at this time.    Yu Rader, PharmD

## 2022-10-20 ENCOUNTER — APPOINTMENT (OUTPATIENT)
Dept: SLEEP MEDICINE | Facility: HOSPITAL | Age: 82
End: 2022-10-20

## 2022-10-21 ENCOUNTER — APPOINTMENT (OUTPATIENT)
Dept: LAB | Facility: HOSPITAL | Age: 82
End: 2022-10-21

## 2022-10-24 ENCOUNTER — ANTICOAGULATION VISIT (OUTPATIENT)
Dept: PHARMACY | Facility: HOSPITAL | Age: 82
End: 2022-10-24

## 2022-10-24 DIAGNOSIS — I48.92 ATRIAL FLUTTER WITH CONTROLLED RESPONSE: Primary | ICD-10-CM

## 2022-10-24 LAB
INR PPP: 1.7 (ref 0.91–1.09)
PROTHROMBIN TIME: 20.8 SECONDS (ref 10–13.8)

## 2022-10-24 PROCEDURE — 36416 COLLJ CAPILLARY BLOOD SPEC: CPT

## 2022-10-24 PROCEDURE — 85610 PROTHROMBIN TIME: CPT

## 2022-10-24 PROCEDURE — G0463 HOSPITAL OUTPT CLINIC VISIT: HCPCS

## 2022-10-24 NOTE — PROGRESS NOTES
Anticoagulation Clinic Progress Note    Anticoagulation Summary  As of 10/24/2022    INR goal:  2.0-3.0   TTR:  45.9 % (3.8 mo)   INR used for dosin.7 (10/24/2022)   Warfarin maintenance plan:  7.5 mg every Tue, Thu, Sat; 10 mg all other days   Weekly warfarin total:  62.5 mg   Plan last modified:  Heron Delaney, PharmD (10/24/2022)   Next INR check:  2022   Priority:  Maintenance   Target end date:      Indications    Atrial flutter with controlled response (HCC) [I48.92]             Anticoagulation Episode Summary     INR check location:      Preferred lab:      Send INR reminders to:   [facility]    Comments:        Anticoagulation Care Providers     Provider Role Specialty Phone number    Roseline Silva APRN Referring Cardiology [phone]          Clinic Interview:  Patient Findings     Positives:  Change in medications, Change in diet/appetite    Negatives:  Signs/symptoms of thrombosis, Signs/symptoms of bleeding,   Laboratory test error suspected, Change in health, Change in alcohol use,   Change in activity, Upcoming invasive procedure, Emergency department   visit, Upcoming dental procedure, Missed doses, Extra doses, Hospital   admission, Bruising, Other complaints    Comments:  Reports different diet while wife has been in hospital;   Reports she may be discharged home as soon as today. Finished recent   course of Paxlovid.       Clinical Outcomes     Negatives:  Major bleeding event, Thromboembolic event,   Anticoagulation-related hospital admission, Anticoagulation-related ED   visit, Anticoagulation-related fatality    Comments:  Reports different diet while wife has been in hospital;   Reports she may be discharged home as soon as today. Finished recent   course of Paxlovid.         INR History:  Anticoagulation Monitoring 10/12/2022 10/17/2022 10/24/2022   INR 2.3 - 1.7   INR Date 10/12/2022 - 10/24/2022   INR Goal 2.0-3.0 2.0-3.0 2.0-3.0   Trend Same Same Up   Last  Week Total 60 mg 60 mg 60 mg   Next Week Total 60 mg 60 mg 62.5 mg   Sun 10 mg 10 mg 10 mg   Mon 7.5 mg 7.5 mg 10 mg   Tue 7.5 mg 7.5 mg 7.5 mg   Wed 10 mg 10 mg 10 mg   Thu 7.5 mg 7.5 mg 7.5 mg   Fri 10 mg 10 mg 10 mg   Sat 7.5 mg 7.5 mg 7.5 mg   Visit Report - - -   Some recent data might be hidden       Plan:  1. INR is Subtherapeutic today- see above in Anticoagulation Summary.  Will instruct Riky Moon to Increase their warfarin regimen- see above in Anticoagulation Summary.  2. Follow up in 2 weeks  3. Patient declines warfarin refills.  4. Verbal and written information provided. Patient expresses understanding and has no further questions at this time.    Heron Delaney, PharmD

## 2022-11-07 ENCOUNTER — APPOINTMENT (OUTPATIENT)
Dept: PHARMACY | Facility: HOSPITAL | Age: 82
End: 2022-11-07

## 2022-11-09 ENCOUNTER — ANTICOAGULATION VISIT (OUTPATIENT)
Dept: PHARMACY | Facility: HOSPITAL | Age: 82
End: 2022-11-09

## 2022-11-09 DIAGNOSIS — I48.92 ATRIAL FLUTTER WITH CONTROLLED RESPONSE: Primary | ICD-10-CM

## 2022-11-09 LAB
INR PPP: 1.7 (ref 0.91–1.09)
PROTHROMBIN TIME: 20.3 SECONDS (ref 10–13.8)

## 2022-11-09 PROCEDURE — 36416 COLLJ CAPILLARY BLOOD SPEC: CPT

## 2022-11-09 PROCEDURE — 85610 PROTHROMBIN TIME: CPT

## 2022-11-09 PROCEDURE — G0463 HOSPITAL OUTPT CLINIC VISIT: HCPCS

## 2022-11-09 RX ORDER — WARFARIN SODIUM 5 MG/1
TABLET ORAL
Qty: 180 TABLET | Refills: 1 | Status: SHIPPED | OUTPATIENT
Start: 2022-11-09

## 2022-11-09 NOTE — PROGRESS NOTES
Anticoagulation Clinic Progress Note    Anticoagulation Summary  As of 2022    INR goal:  2.0-3.0   TTR:  40.3 % (4.4 mo)   INR used for dosin.7 (2022)   Warfarin maintenance plan:  7.5 mg every Tue; 10 mg all other days; Starting 2022   Weekly warfarin total:  67.5 mg   Plan last modified:  Heron Delaney, PharmD (2022)   Next INR check:  2022   Priority:  Maintenance   Target end date:      Indications    Atrial flutter with controlled response (HCC) [I48.92]             Anticoagulation Episode Summary     INR check location:      Preferred lab:      Send INR reminders to:   [facility]    Comments:        Anticoagulation Care Providers     Provider Role Specialty Phone number    Ty Shelton MD Referring Cardiology [phone]          Clinic Interview:  Patient Findings     Negatives:  Signs/symptoms of thrombosis, Signs/symptoms of bleeding,   Laboratory test error suspected, Change in health, Change in alcohol use,   Change in activity, Upcoming invasive procedure, Emergency department   visit, Upcoming dental procedure, Missed doses, Extra doses, Change in   medications, Change in diet/appetite, Hospital admission, Bruising, Other   complaints      Clinical Outcomes     Negatives:  Major bleeding event, Thromboembolic event,   Anticoagulation-related hospital admission, Anticoagulation-related ED   visit, Anticoagulation-related fatality        INR History:  Anticoagulation Monitoring 10/17/2022 10/24/2022 2022   INR - 1.7 1.7   INR Date - 10/24/2022 2022   INR Goal 2.0-3.0 2.0-3.0 2.0-3.0   Trend Same Up Up   Last Week Total 60 mg 60 mg 62.5 mg   Next Week Total 60 mg 62.5 mg 67.5 mg   Sun 10 mg 10 mg 10 mg   Mon 7.5 mg 10 mg 10 mg   Tue 7.5 mg 7.5 mg 7.5 mg   Wed 10 mg 10 mg 10 mg   Thu 7.5 mg 7.5 mg 10 mg   Fri 10 mg 10 mg 10 mg   Sat 7.5 mg 7.5 mg 10 mg   Visit Report - - -   Some recent data might be hidden       Plan:  1. INR is  Subtherapeutic today- see above in Anticoagulation Summary.  Will instruct Riky ELISABETH Red to Increase their warfarin regimen- see above in Anticoagulation Summary.  2. Follow up in 2 weeks  3. Patient desires warfarin refills.  4. Verbal and written information provided. Patient expresses understanding and has no further questions at this time.    Heron Delaney, PharmD

## 2022-11-30 ENCOUNTER — ANTICOAGULATION VISIT (OUTPATIENT)
Dept: PHARMACY | Facility: HOSPITAL | Age: 82
End: 2022-11-30

## 2022-11-30 DIAGNOSIS — I48.92 ATRIAL FLUTTER WITH CONTROLLED RESPONSE: Primary | ICD-10-CM

## 2022-11-30 LAB
INR PPP: 3.5 (ref 0.91–1.09)
PROTHROMBIN TIME: 41.6 SECONDS (ref 10–13.8)

## 2022-11-30 PROCEDURE — 85610 PROTHROMBIN TIME: CPT

## 2022-11-30 PROCEDURE — G0463 HOSPITAL OUTPT CLINIC VISIT: HCPCS

## 2022-11-30 PROCEDURE — 36416 COLLJ CAPILLARY BLOOD SPEC: CPT

## 2022-11-30 NOTE — PROGRESS NOTES
Anticoagulation Clinic Progress Note    Anticoagulation Summary  As of 11/30/2022    INR goal:  2.0-3.0   TTR:  42.4 % (5.1 mo)   INR used for dosing:  3.5 (11/30/2022)   Warfarin maintenance plan:  7.5 mg every Tue; 10 mg all other days; Starting 11/30/2022   Weekly warfarin total:  67.5 mg   Plan last modified:  Heron Delaney, PharmD (11/9/2022)   Next INR check:  12/13/2022   Priority:  Maintenance   Target end date:      Indications    Atrial flutter with controlled response (HCC) [I48.92]             Anticoagulation Episode Summary     INR check location:      Preferred lab:      Send INR reminders to:   [facility]    Comments:        Anticoagulation Care Providers     Provider Role Specialty Phone number    Ty Shelton MD Referring Cardiology [phone]          Clinic Interview:  Patient Findings     Positives:  Change in alcohol use, Change in diet/appetite    Negatives:  Signs/symptoms of thrombosis, Signs/symptoms of bleeding,   Laboratory test error suspected, Change in health, Change in activity,   Upcoming invasive procedure, Emergency department visit, Upcoming dental   procedure, Missed doses, Extra doses, Change in medications, Hospital   admission, Bruising, Other complaints    Comments:  Reports 2-3 alcoholic drinks surrounding Thanksgiving (usually   none). Reports cranberries over Thanksgiving.       Clinical Outcomes     Negatives:  Major bleeding event, Thromboembolic event,   Anticoagulation-related hospital admission, Anticoagulation-related ED   visit, Anticoagulation-related fatality    Comments:  Reports 2-3 alcoholic drinks surrounding Thanksgiving (usually   none). Reports cranberries over Thanksgiving.         INR History:  Anticoagulation Monitoring 10/24/2022 11/9/2022 11/30/2022   INR 1.7 1.7 3.5   INR Date 10/24/2022 11/9/2022 11/30/2022   INR Goal 2.0-3.0 2.0-3.0 2.0-3.0   Trend Up Up Same   Last Week Total 60 mg 62.5 mg 67.5 mg   Next Week Total 62.5 mg  67.5 mg 62.5 mg   Sun 10 mg 10 mg 10 mg   Mon 10 mg 10 mg 10 mg   Tue 7.5 mg 7.5 mg 7.5 mg   Wed 10 mg 10 mg 5 mg (11/30); Otherwise 10 mg   Thu 7.5 mg 10 mg 10 mg   Fri 10 mg 10 mg 10 mg   Sat 7.5 mg 10 mg 10 mg   Visit Report - - -   Some recent data might be hidden       Plan:  1. INR is Supratherapeutic today- see above in Anticoagulation Summary.  Will instruct Riky Moon to Change their warfarin regimen- see above in Anticoagulation Summary.  2. Follow up in 2 weeks  3. Patient declines warfarin refills.  4. Verbal and written information provided. Patient expresses understanding and has no further questions at this time.    Heron Delaney, PharmD

## 2022-12-01 ENCOUNTER — ANTICOAGULATION VISIT (OUTPATIENT)
Dept: PHARMACY | Facility: HOSPITAL | Age: 82
End: 2022-12-01

## 2022-12-01 DIAGNOSIS — I48.92 ATRIAL FLUTTER WITH CONTROLLED RESPONSE: Primary | ICD-10-CM

## 2022-12-01 NOTE — PROGRESS NOTES
Anticoagulation Clinic Progress Note    Anticoagulation Summary  As of 12/1/2022    INR goal:  2.0-3.0   TTR:  42.4 % (5.1 mo)   INR used for dosing:  No new INR was available at the time of this encounter.   Warfarin maintenance plan:  7.5 mg every Tue; 10 mg all other days; Starting 12/1/2022   Weekly warfarin total:  67.5 mg   Plan last modified:  Heron Delaney, PharmD (11/9/2022)   Next INR check:  12/13/2022   Priority:  Maintenance   Target end date:      Indications    Atrial flutter with controlled response (HCC) [I48.92]             Anticoagulation Episode Summary     INR check location:      Preferred lab:      Send INR reminders to:   [facility] [place]    Comments:        Anticoagulation Care Providers     Provider Role Specialty Phone number    Ty Shelton MD Referring Cardiology [phone]          Clinic Interview:  Patient Findings     Negatives:  Signs/symptoms of thrombosis, Signs/symptoms of bleeding,   Laboratory test error suspected, Change in health, Change in alcohol use,   Change in activity, Upcoming invasive procedure, Emergency department   visit, Upcoming dental procedure, Missed doses, Extra doses, Change in   medications, Change in diet/appetite, Hospital admission, Bruising, Other   complaints    Comments:  Upcoming epidural procedure on 12/15.  Instructed to hold 5   days prior--last dose of warfarin on 12/9.       Clinical Outcomes     Negatives:  Major bleeding event, Thromboembolic event,   Anticoagulation-related hospital admission, Anticoagulation-related ED   visit, Anticoagulation-related fatality    Comments:  Upcoming epidural procedure on 12/15.  Instructed to hold 5   days prior--last dose of warfarin on 12/9.         INR History:  Anticoagulation Monitoring 11/9/2022 11/30/2022 12/1/2022   INR 1.7 3.5 -   INR Date 11/9/2022 11/30/2022 -   INR Goal 2.0-3.0 2.0-3.0 2.0-3.0   Trend Up Same Same   Last Week Total 62.5 mg 67.5 mg 62.5 mg   Next Week Total  67.5 mg 62.5 mg 67.5 mg   Sun 10 mg 10 mg Hold (12/11); Otherwise 10 mg   Mon 10 mg 10 mg Hold (12/12); Otherwise 10 mg   Tue 7.5 mg 7.5 mg 7.5 mg   Wed 10 mg 5 mg (11/30); Otherwise 10 mg 10 mg   Thu 10 mg 10 mg 10 mg   Fri 10 mg 10 mg 10 mg   Sat 10 mg 10 mg Hold (12/10); Otherwise 10 mg   Visit Report - - -   Some recent data might be hidden       Plan:    Instructed patient to hold warfarin beginning on Saturday 12/10.  He has follow up visit in clinic on 12/13.  Advised him to keep that appointment since INR was elevated at last check on 11/30.  He was agreeable to this and voiced understanding.  Advised him to contact us if any changes or questions/concerns.     Yu Rader, PharmD

## 2022-12-05 ENCOUNTER — OFFICE VISIT (OUTPATIENT)
Dept: INTERNAL MEDICINE | Facility: CLINIC | Age: 82
End: 2022-12-05

## 2022-12-05 VITALS
SYSTOLIC BLOOD PRESSURE: 122 MMHG | OXYGEN SATURATION: 98 % | BODY MASS INDEX: 44.1 KG/M2 | DIASTOLIC BLOOD PRESSURE: 80 MMHG | WEIGHT: 315 LBS | HEART RATE: 59 BPM | HEIGHT: 71 IN

## 2022-12-05 DIAGNOSIS — M79.18 LEFT BUTTOCK PAIN: ICD-10-CM

## 2022-12-05 DIAGNOSIS — E78.5 HYPERLIPIDEMIA, UNSPECIFIED HYPERLIPIDEMIA TYPE: ICD-10-CM

## 2022-12-05 DIAGNOSIS — E03.9 HYPOTHYROIDISM, UNSPECIFIED TYPE: Primary | ICD-10-CM

## 2022-12-05 PROCEDURE — 99214 OFFICE O/P EST MOD 30 MIN: CPT | Performed by: FAMILY MEDICINE

## 2022-12-05 RX ORDER — LEVOTHYROXINE SODIUM 112 UG/1
TABLET ORAL
COMMUNITY

## 2022-12-05 RX ORDER — SULFAMETHOXAZOLE AND TRIMETHOPRIM 800; 160 MG/1; MG/1
TABLET ORAL
COMMUNITY

## 2022-12-06 LAB
ALBUMIN SERPL-MCNC: 4.5 G/DL (ref 3.6–4.6)
ALBUMIN/GLOB SERPL: 2 {RATIO} (ref 1.2–2.2)
ALP SERPL-CCNC: 61 IU/L (ref 44–121)
ALT SERPL-CCNC: 15 IU/L (ref 0–44)
AST SERPL-CCNC: 20 IU/L (ref 0–40)
BILIRUB SERPL-MCNC: 0.7 MG/DL (ref 0–1.2)
BUN SERPL-MCNC: 27 MG/DL (ref 8–27)
BUN/CREAT SERPL: 21 (ref 10–24)
CALCIUM SERPL-MCNC: 9.1 MG/DL (ref 8.6–10.2)
CHLORIDE SERPL-SCNC: 104 MMOL/L (ref 96–106)
CHOLEST SERPL-MCNC: 117 MG/DL (ref 100–199)
CO2 SERPL-SCNC: 28 MMOL/L (ref 20–29)
CREAT SERPL-MCNC: 1.29 MG/DL (ref 0.76–1.27)
EGFRCR SERPLBLD CKD-EPI 2021: 55 ML/MIN/1.73
FT4I SERPL CALC-MCNC: 2.6 (ref 1.2–4.9)
GLOBULIN SER CALC-MCNC: 2.3 G/DL (ref 1.5–4.5)
GLUCOSE SERPL-MCNC: 125 MG/DL (ref 70–99)
HDLC SERPL-MCNC: 37 MG/DL
LDLC SERPL CALC-MCNC: 60 MG/DL (ref 0–99)
LDLC/HDLC SERPL: 1.6 RATIO (ref 0–3.6)
POTASSIUM SERPL-SCNC: 5 MMOL/L (ref 3.5–5.2)
PROT SERPL-MCNC: 6.8 G/DL (ref 6–8.5)
SODIUM SERPL-SCNC: 142 MMOL/L (ref 134–144)
T3RU NFR SERPL: 30 % (ref 24–39)
T4 SERPL-MCNC: 8.6 UG/DL (ref 4.5–12)
TRIGL SERPL-MCNC: 106 MG/DL (ref 0–149)
TSH SERPL DL<=0.005 MIU/L-ACNC: 3.44 UIU/ML (ref 0.45–4.5)
VLDLC SERPL CALC-MCNC: 20 MG/DL (ref 5–40)

## 2022-12-12 ENCOUNTER — TELEPHONE (OUTPATIENT)
Dept: INTERNAL MEDICINE | Facility: CLINIC | Age: 82
End: 2022-12-12

## 2022-12-12 NOTE — TELEPHONE ENCOUNTER
Hub staff attempted to follow warm transfer process and was unsuccessful     Caller: Riky Moon    Relationship to patient: Self    Best call back number: [phone]    Patient is needing: PATIENT CALLED REGARDING LAST LAB RESULTS

## 2022-12-13 ENCOUNTER — OFFICE VISIT (OUTPATIENT)
Dept: CARDIOLOGY | Facility: CLINIC | Age: 82
End: 2022-12-13

## 2022-12-13 ENCOUNTER — ANTICOAGULATION VISIT (OUTPATIENT)
Dept: PHARMACY | Facility: HOSPITAL | Age: 82
End: 2022-12-13

## 2022-12-13 VITALS
DIASTOLIC BLOOD PRESSURE: 62 MMHG | SYSTOLIC BLOOD PRESSURE: 134 MMHG | HEART RATE: 77 BPM | BODY MASS INDEX: 41.75 KG/M2 | OXYGEN SATURATION: 98 % | WEIGHT: 315 LBS | HEIGHT: 73 IN

## 2022-12-13 DIAGNOSIS — G47.33 OSA TREATED WITH BIPAP: ICD-10-CM

## 2022-12-13 DIAGNOSIS — I48.92 ATRIAL FLUTTER WITH CONTROLLED RESPONSE: ICD-10-CM

## 2022-12-13 DIAGNOSIS — I10 ESSENTIAL HYPERTENSION: Primary | ICD-10-CM

## 2022-12-13 DIAGNOSIS — I48.92 ATRIAL FLUTTER WITH CONTROLLED RESPONSE: Primary | ICD-10-CM

## 2022-12-13 LAB
INR PPP: 1.2 (ref 0.91–1.09)
PROTHROMBIN TIME: 14.2 SECONDS (ref 10–13.8)

## 2022-12-13 PROCEDURE — 99214 OFFICE O/P EST MOD 30 MIN: CPT | Performed by: NURSE PRACTITIONER

## 2022-12-13 PROCEDURE — 93000 ELECTROCARDIOGRAM COMPLETE: CPT | Performed by: NURSE PRACTITIONER

## 2022-12-13 PROCEDURE — G0463 HOSPITAL OUTPT CLINIC VISIT: HCPCS

## 2022-12-13 PROCEDURE — 36416 COLLJ CAPILLARY BLOOD SPEC: CPT

## 2022-12-13 PROCEDURE — 85610 PROTHROMBIN TIME: CPT

## 2022-12-13 NOTE — TELEPHONE ENCOUNTER
[facility] UNABLE TO WARM TRANSFER CALL.  PATIENT SAID THAT HE WAS RETURNING A CALL FROM THE OFFICE REGARDING LAB RESULTS.    PLEASE ADVISE.

## 2022-12-13 NOTE — PROGRESS NOTES
"    CARDIOLOGY        Patient Name: Riky Moon  :1940  Age: 82 y.o.  Primary Cardiologist: Ty Shelton MD  Encounter Provider:  EVON Oneill    Date of Service: 22      CHIEF COMPLAINT / REASON FOR OFFICE VISIT     Atrial flutter with controlled response  (3 month f/u)      HISTORY OF PRESENT ILLNESS       HPI  Riky Moon is a 82 y.o. male who presents today for routine evaluation.     Pt has a  history significant for hypertension, hyperlipidemia, KINDRA on BiPAP, diabetes.    At last assessment patient was diagnosed with rate controlled atrial flutter.  He is currently anticoagulated with Coumadin.  He was having episodes of bradycardia so his atenolol was actually decreased to 25 mg/day.  Patient had outpatient echocardiogram which revealed preserved LVEF.  He states that he has having shortness of breath walking to the mailbox, it is unclear if this is a new symptom for him.  He reports that his lower extremity edema is controlled after going to the lymphedema clinic.  Denies chest discomfort, palpitations or tachycardia, lightheadedness, worsening fatigue.    The following portions of the patient's history were reviewed and updated as appropriate: allergies, current medications, past family history, past medical history, past social history, past surgical history and problem list.      VITAL SIGNS     Visit Vitals  /62 (BP Location: Left arm, Patient Position: Sitting, Cuff Size: Large Adult)   Pulse 77   Ht 185.4 cm (73\")   Wt (!) 143 kg (316 lb 3.2 oz)   SpO2 98%   BMI 41.72 kg/m²         Wt Readings from Last 3 Encounters:   22 (!) 143 kg (316 lb 3.2 oz)   22 (!) 143 kg (315 lb)   22 (!) 143 kg (315 lb 12.8 oz)     Body mass index is 41.72 kg/m².      REVIEW OF SYSTEMS   Review of Systems   Constitutional: Negative for chills, fever, weight gain and weight loss.   Cardiovascular: Positive for dyspnea on exertion and leg swelling.   Respiratory: Negative " for cough, snoring and wheezing.    Hematologic/Lymphatic: Negative for bleeding problem. Does not bruise/bleed easily.   Skin: Negative for color change.   Musculoskeletal: Negative for falls, joint pain and myalgias.   Gastrointestinal: Negative for melena.   Genitourinary: Negative for hematuria.   Neurological: Negative for excessive daytime sleepiness.   Psychiatric/Behavioral: Negative for depression. The patient is not nervous/anxious.            PHYSICAL EXAMINATION     Constitutional:       Appearance: Normal appearance. Well-developed.   Eyes:      Conjunctiva/sclera: Conjunctivae normal.   Neck:      Vascular: No carotid bruit.   Pulmonary:      Effort: Pulmonary effort is normal.      Breath sounds: Normal breath sounds.   Cardiovascular:      Bradycardia present. Regular rhythm. Normal S1. Normal S2.      Murmurs: There is no murmur.      No gallop. No click. No rub.   Edema:     Peripheral edema present.     Pretibial: bilateral 1+ edema of the pretibial area.     Ankle: bilateral 1+ edema of the ankle.     Feet: bilateral 1+ edema of the feet.  Musculoskeletal: Normal range of motion. Skin:     General: Skin is warm and dry.   Neurological:      Mental Status: Alert and oriented to person, place, and time.      GCS: GCS eye subscore is 4. GCS verbal subscore is 5. GCS motor subscore is 6.   Psychiatric:         Speech: Speech normal.         Behavior: Behavior normal.         Thought Content: Thought content normal.         Judgment: Judgment normal.           REVIEWED DATA       ECG 12 Lead    Date/Time: 12/13/2022 2:52 PM  Performed by: Roseline Silva APRN  Authorized by: Roseline Silva APRN   Comparison: compared with previous ECG   Similar to previous ECG  Rhythm: atrial flutter  Rate: normal  BPM: 72  ST Segments: ST segments normal  T Waves: T waves normal  QRS axis: normal    Clinical impression: abnormal EKG            Cardiac Procedures:  1. Myocardial perfusion PET stress test  9/11/2017.  Normal myocardial perfusion study without evidence of ischemia.  Impressions consistent with low risk study.  2. Echocardiogram 3/12/2019.  LVEF 61%.  Grade 1 diastolic dysfunction.  Calculated RVSP 36 mmHg.    Lipid Panel    Lipid Panel 5/17/22 6/15/22 12/5/22   Total Cholesterol 117 103    Total Cholesterol   117   Triglycerides 83 77 106   HDL Cholesterol 35 (A) 35 (A) 37 (A)   VLDL Cholesterol 17 16 20   LDL Cholesterol  65 52 60   LDL/HDL Ratio 1.87 1.50 1.6   (A) Abnormal value       Comments are available for some flowsheets but are not being displayed.           BUN   Date Value Ref Range Status   12/05/2022 27 8 - 27 mg/dL Final   08/15/2022 24 (H) 6 - 20 mg/dL Final     Creatinine   Date Value Ref Range Status   12/05/2022 1.29 (H) 0.76 - 1.27 mg/dL Final   08/15/2022 1.18 0.70 - 1.30 mg/dL Final     Potassium   Date Value Ref Range Status   12/05/2022 5.0 3.5 - 5.2 mmol/L Final   08/15/2022 4.6 3.5 - 4.7 mmol/L Final     ALT (SGPT)   Date Value Ref Range Status   12/05/2022 15 0 - 44 IU/L Final   08/15/2022 13 0 - 41 U/L Final     AST (SGOT)   Date Value Ref Range Status   12/05/2022 20 0 - 40 IU/L Final   08/15/2022 16 0 - 40 U/L Final           ASSESSMENT & PLAN     Diagnoses and all orders for this visit:    1. Essential hypertension (Primary)  · Blood pressure adequately controlled in clinic at 134/62  · Continue atenolol 25 mg/day, amlodipine 5 mg/day, losartan 100 mg/day    2. Atrial flutter with controlled response (HCC)  · Remains in rate controlled atrial flutter on ECG today  · Patient has dyspnea with exertion which could be secondary to deconditioning and obesity  · Reviewed case with EP services, they do not recommend a cardiac ablation since rate controlled  · Continue atenolol for rate control  · Continue Coumadin for anticoagulation    3. KINDRA treated with auto BiPAP        No follow-ups on file.    Future Appointments       Provider Department Center    12/19/2022 12:30 PM LAB  CHA[facility] 3 [facility]    12/19/2022 1:00 PM Chio iGlliam MD [facility] HEMATOLOGY & ONCOLOGY [address] [place]    12/22/2022 1:30 PM ANTI COAG CLINIC [facility] ANTICOAGULATION CLINIC     1/9/2023 1:00 PM Kimberley Case, PT [facility]    2/3/2023 1:45 PM Marco Carrillo MD [facility] PRIMARY CARE [facility]    6/21/2023 2:00 PM Ty Shelton MD [facility] CARDIOLOGY [facility]              MEDICATIONS         Discharge Medications          Accurate as of December 13, 2022  3:56 PM. If you have any questions, ask your nurse or doctor.            Continue These Medications      Instructions Start Date   albuterol sulfate  (90 Base) MCG/ACT inhaler  Commonly known as: PROVENTIL HFA;VENTOLIN HFA;PROAIR HFA   1 puff, Inhalation, Every 4 Hours PRN      amLODIPine 5 MG tablet  Commonly known as: NORVASC   5 mg, Oral, Daily      atenolol 25 MG tablet  Commonly known as: TENORMIN   25 mg, Oral, Daily      cholecalciferol 25 MCG (1000 UT) tablet  Commonly known as: VITAMIN D3   1,000 Units, Oral, Every Morning      DULoxetine 60 MG capsule  Commonly known as: CYMBALTA   60 mg, Oral, Every Morning      glucose blood test strip  Commonly known as: Heidi Contour Test   Use as instructed to test glucose      Jardiance 10 MG tablet tablet  Generic drug: empagliflozin   10 mg, Oral, Daily      losartan 100 MG tablet  Commonly known as: COZAAR   100 mg, Oral, Daily      metFORMIN 850 MG tablet  Commonly known as: GLUCOPHAGE   850 mg, Oral, 2 Times Daily With Meals      Microlet Lancets misc   Use daily as directed to test glucose      multivitamin tablet tablet  Generic drug: multivitamin   1 tablet, Oral, Every Morning      oxybutynin XL 15 MG 24 hr tablet  Commonly known as: DITROPAN XL   15 mg, Oral, Nightly      pravastatin 40 MG tablet  Commonly known as:  PRAVACHOL   40 mg, Oral, Nightly      pregabalin 150 MG capsule  Commonly known as: LYRICA   150 mg, Oral, 2 Times Daily      sulfamethoxazole-trimethoprim 800-160 MG per tablet  Commonly known as: BACTRIM DS,SEPTRA DS   sulfamethoxazole 800 mg-trimethoprim 160 mg tablet   TAKE 1 TABLET BY MOUTH TWICE DAILY FOR 28 DOSES      levothyroxine 112 MCG tablet  Commonly known as: SYNTHROID, LEVOTHROID   levothyroxine 112 mcg tablet   TAKE 1 TABLET BY MOUTH DAILY      Synthroid 125 MCG tablet  Generic drug: levothyroxine   125 mcg, Oral, Daily      Trelegy Ellipta 100-62.5-25 MCG/ACT inhaler  Generic drug: Fluticasone-Umeclidin-Vilant   1 puff, Inhalation, Daily - RT      vitamin B-12 1000 MCG tablet  Commonly known as: CYANOCOBALAMIN   1,000 mcg, Oral, Every Morning      VITAMIN C PO   1 tablet, Oral, Daily      warfarin 5 MG tablet  Commonly known as: COUMADIN   Take one and one-half tablets (7.5 mg) by mouth on Tuesdays, and take two tablets (10 mg) by mouth all other days or as directed.                 **Dragon Disclaimer:   Much of this encounter note is an electronic transcription/translation of spoken language to printed text. The electronic translation of spoken language may permit erroneous, or at times, nonsensical words or phrases to be inadvertently transcribed. Although I have reviewed the note for such errors, some may still exist.

## 2022-12-13 NOTE — PROGRESS NOTES
Anticoagulation Clinic Progress Note    Anticoagulation Summary  As of 2022    INR goal:  2.0-3.0   TTR:  42.5 % (5.5 mo)   INR used for dosin.2 (2022)   Warfarin maintenance plan:  7.5 mg every Tue; 10 mg all other days; Starting 2022   Weekly warfarin total:  67.5 mg   Plan last modified:  Heron Delaney, PharmD (2022)   Next INR check:  2022   Priority:  Maintenance   Target end date:      Indications    Atrial flutter with controlled response (HCC) [I48.92]             Anticoagulation Episode Summary     INR check location:      Preferred lab:      Send INR reminders to:   [facility]    Comments:        Anticoagulation Care Providers     Provider Role Specialty Phone number    Ty Shelton MD Referring Cardiology [phone]          Clinic Interview:  Patient Findings     Positives:  Missed doses    Negatives:  Signs/symptoms of thrombosis, Signs/symptoms of bleeding,   Laboratory test error suspected, Change in health, Change in alcohol use,   Change in activity, Upcoming invasive procedure, Emergency department   visit, Upcoming dental procedure, Extra doses, Change in medications,   Change in diet/appetite, Hospital admission, Bruising, Other complaints    Comments:  Chadvasc=4, no stroke history, holding 5 days for epidural on   12/15. Boost and 2 days after epidural and recheck on the .       Clinical Outcomes     Negatives:  Major bleeding event, Thromboembolic event,   Anticoagulation-related hospital admission, Anticoagulation-related ED   visit, Anticoagulation-related fatality    Comments:  Chadvasc=4, no stroke history, no valves.  Holding 5 days for epidural on   12/15. Boost and 2 days after epidural and recheck on the .         INR History:  Anticoagulation Monitoring 2022   INR 3.5 - 1.2   INR Date 2022 - 2022   INR Goal 2.0-3.0 2.0-3.0 2.0-3.0   Trend Same Same Same   Last Week Total 67.5 mg  62.5 mg 27.5 mg   Next Week Total 62.5 mg 67.5 mg 55 mg   Sun 10 mg Hold (12/11); Otherwise 10 mg 10 mg   Mon 10 mg Hold (12/12); Otherwise 10 mg 10 mg   Tue 7.5 mg 7.5 mg Hold (12/13); Otherwise 7.5 mg   Wed 5 mg (11/30); Otherwise 10 mg 10 mg Hold (12/14); Otherwise 10 mg   Thu 10 mg 10 mg 12.5 mg (12/15)   Fri 10 mg 10 mg 12.5 mg (12/16)   Sat 10 mg Hold (12/10); Otherwise 10 mg 10 mg   Visit Report - - -   Some recent data might be hidden       Plan:  1. INR is Subtherapeutic today- see above in Anticoagulation Summary.  Will instruct Riyk Moon to Change their warfarin regimen- see above in Anticoagulation Summary.  2. Follow up in 1 week  3. Patient declines warfarin refills.  4. Verbal and written information provided. Patient expresses understanding and has no further questions at this time.    Olga Granados [facility]

## 2022-12-21 NOTE — PROGRESS NOTES
"Chief Complaint  follow up hypothyroidism    Subjective        Riky Moon presents to [facility]  History of Present Illness    Patient has hypothyroidism. Patient is currently taking synthroid 125. Patient denies any side effects of the medication.    Patient has a hx of hypertension. He is currently taking amldopine 5mg daily, atenolol 25mg daily, and losartan 100mg daily.    He has hyerplipidemia, and is currently taking pravastatin 40mg daily. Patient denies any side effects of the medication.    Patient has some left buttock pain. Patient would like to do some physical therapy.     Objective   Vital Signs:  /80 (BP Location: Left arm, Patient Position: Sitting, Cuff Size: Adult)   Pulse 59   Ht 180 cm (70.87\")   Wt (!) 143 kg (315 lb)   SpO2 98%   BMI 44.10 kg/m²   Estimated body mass index is 44.1 kg/m² as calculated from the following:    Height as of this encounter: 180 cm (70.87\").    Weight as of this encounter: 143 kg (315 lb).          Physical Exam  Vitals and nursing note reviewed.   Constitutional:       Appearance: He is well-developed.   HENT:      Head: Normocephalic and atraumatic.   Musculoskeletal:      Cervical back: Normal range of motion and neck supple.   Neurological:      Mental Status: He is alert and oriented to person, place, and time.   Psychiatric:         Behavior: Behavior normal.        Result Review :                Assessment and Plan   Diagnoses and all orders for this visit:    1. Hypothyroidism, unspecified type (Primary)  -     Thyroid Panel With TSH  -     Will continue synthroid 125mcg daily.     2. Hyperlipidemia, unspecified hyperlipidemia type  -     Comprehensive Metabolic Panel  -     Lipid Panel With LDL / HDL Ratio  -     Will continue pravastatin 40mg daily.     3. Left buttock pain  -     Ambulatory Referral to Physical Therapy Evaluate and treat  -     Will refer to physical therapy.              Follow Up   No " follow-ups on file.  Patient was given instructions and counseling regarding his condition or for health maintenance advice. Please see specific information pulled into the AVS if appropriate.

## 2022-12-22 ENCOUNTER — ANTICOAGULATION VISIT (OUTPATIENT)
Dept: PHARMACY | Facility: HOSPITAL | Age: 82
End: 2022-12-22

## 2022-12-22 DIAGNOSIS — I48.92 ATRIAL FLUTTER WITH CONTROLLED RESPONSE: Primary | ICD-10-CM

## 2022-12-22 LAB
INR PPP: 1.6 (ref 0.91–1.09)
PROTHROMBIN TIME: 18.9 SECONDS (ref 10–13.8)

## 2022-12-22 PROCEDURE — 36416 COLLJ CAPILLARY BLOOD SPEC: CPT

## 2022-12-22 PROCEDURE — 85610 PROTHROMBIN TIME: CPT

## 2022-12-22 PROCEDURE — G0463 HOSPITAL OUTPT CLINIC VISIT: HCPCS

## 2022-12-22 NOTE — PROGRESS NOTES
I have supervised and reviewed the notes, assessments, and/or procedures performed by our Pharmacy Intern. The documented assessment and plan were developed cooperatively, and the plan was implemented in my presence. I concur with the documentation of this patient encounter.    Olga Granados [facility]

## 2022-12-22 NOTE — PROGRESS NOTES
Anticoagulation Clinic Progress Note    Anticoagulation Summary  As of 2022    INR goal:  2.0-3.0   TTR:  40.3 % (5.8 mo)   INR used for dosin.6 (2022)   Warfarin maintenance plan:  7.5 mg every Tue; 10 mg all other days; Starting 2022   Weekly warfarin total:  67.5 mg   Plan last modified:  Heron Delaney, PharmD (2022)   Next INR check:  2023   Priority:  Maintenance   Target end date:      Indications    Atrial flutter with controlled response (HCC) [I48.92]             Anticoagulation Episode Summary     INR check location:      Preferred lab:      Send INR reminders to:   [facility]    Comments:        Anticoagulation Care Providers     Provider Role Specialty Phone number    Ty Shelton MD Referring Cardiology [phone]          Clinic Interview:  Patient Findings     Negatives:  Signs/symptoms of thrombosis, Signs/symptoms of bleeding,   Laboratory test error suspected, Change in health, Change in alcohol use,   Change in activity, Upcoming invasive procedure, Emergency department   visit, Upcoming dental procedure, Missed doses, Extra doses, Change in   medications, Change in diet/appetite, Hospital admission, Bruising, Other   complaints      Clinical Outcomes     Negatives:  Major bleeding event, Thromboembolic event,   Anticoagulation-related hospital admission, Anticoagulation-related ED   visit, Anticoagulation-related fatality        INR History:  Anticoagulation Monitoring 2022   INR - 1.2 1.6   INR Date - 2022   INR Goal 2.0-3.0 2.0-3.0 2.0-3.0   Trend Same Same Same   Last Week Total 62.5 mg 27.5 mg 72.5 mg   Next Week Total 67.5 mg 55 mg 70 mg   Sun Hold (); Otherwise 10 mg 10 mg 10 mg   Mon Hold (); Otherwise 10 mg 10 mg 10 mg   Tue 7.5 mg Hold (); Otherwise 7.5 mg 7.5 mg   Wed 10 mg Hold (); Otherwise 10 mg 10 mg   Thu 10 mg 12.5 mg (12/15) 12.5 mg (); Otherwise 10 mg    Fri 10 mg 12.5 mg (12/16) 10 mg   Sat Hold (12/10); Otherwise 10 mg 10 mg 10 mg   Visit Report - - -   Some recent data might be hidden       Plan:  1. INR is Subtherapeutic today- see above in Anticoagulation Summary.  Will instruct Riky Moon to Continue their warfarin regimen- see above in Anticoagulation Summary.  2. Follow up in 2 weeks  3. Patient declines warfarin refills.  4. Verbal and written information provided. Patient expresses understanding and has no further questions at this time.    Daysi Franco, Pharmacy Intern

## 2023-01-05 ENCOUNTER — ANTICOAGULATION VISIT (OUTPATIENT)
Dept: PHARMACY | Facility: HOSPITAL | Age: 83
End: 2023-01-05
Payer: MEDICARE

## 2023-01-05 DIAGNOSIS — I48.92 ATRIAL FLUTTER WITH CONTROLLED RESPONSE: Primary | ICD-10-CM

## 2023-01-05 LAB
INR PPP: 1.9 (ref 0.91–1.09)
PROTHROMBIN TIME: 23.3 SECONDS (ref 10–13.8)

## 2023-01-05 PROCEDURE — 85610 PROTHROMBIN TIME: CPT

## 2023-01-05 PROCEDURE — 36416 COLLJ CAPILLARY BLOOD SPEC: CPT

## 2023-01-05 NOTE — PROGRESS NOTES
Anticoagulation Clinic Progress Note    Anticoagulation Summary  As of 2023    INR goal:  2.0-3.0   TTR:  37.3 % (6.3 mo)   INR used for dosin.9 (2023)   Warfarin maintenance plan:  7.5 mg every Tue; 10 mg all other days; Starting 2023   Weekly warfarin total:  67.5 mg   Plan last modified:  Heron Delaney, PharmD (2022)   Next INR check:  2023   Priority:  Maintenance   Target end date:      Indications    Atrial flutter with controlled response (HCC) [I48.92]             Anticoagulation Episode Summary     INR check location:      Preferred lab:      Send INR reminders to:   [facility]    Comments:        Anticoagulation Care Providers     Provider Role Specialty Phone number    Ty Shelton MD Referring Cardiology [phone]          Clinic Interview:  Patient Findings     Negatives:  Signs/symptoms of thrombosis, Signs/symptoms of bleeding,   Laboratory test error suspected, Change in health, Change in alcohol use,   Change in activity, Upcoming invasive procedure, Emergency department   visit, Upcoming dental procedure, Missed doses, Extra doses, Change in   medications, Change in diet/appetite, Hospital admission, Bruising, Other   complaints      Clinical Outcomes     Negatives:  Major bleeding event, Thromboembolic event,   Anticoagulation-related hospital admission, Anticoagulation-related ED   visit, Anticoagulation-related fatality        INR History:  Anticoagulation Monitoring 2022   INR 1.2 1.6 1.9   INR Date 2022   INR Goal 2.0-3.0 2.0-3.0 2.0-3.0   Trend Same Same Same   Last Week Total 27.5 mg 72.5 mg 67.5 mg   Next Week Total 55 mg 70 mg 70 mg   Sun 10 mg 10 mg 10 mg   Mon 10 mg 10 mg 10 mg   Tue Hold (); Otherwise 7.5 mg 7.5 mg 7.5 mg   Wed Hold (); Otherwise 10 mg 10 mg 10 mg   Thu 12.5 mg (12/15) 12.5 mg (); Otherwise 10 mg 12.5 mg (); Otherwise 10 mg   Fri 12.5 mg () 10  mg 10 mg   Sat 10 mg 10 mg 10 mg   Visit Report - - -   Some recent data might be hidden       Plan:  1. INR is Subtherapeutic today- see above in Anticoagulation Summary.  Will instruct Riky Moon to Continue their warfarin regimen, but boost dose today.- see above in Anticoagulation Summary.  2. Follow up in 2 weeks  3. Patient declines warfarin refills.  4. Verbal and written information provided. Patient expresses understanding and has no further questions at this time.    Daysi Franco, Pharmacy Intern

## 2023-01-05 NOTE — PROGRESS NOTES
I have supervised and reviewed the notes, assessments, and/or procedures performed by our Pharmacy Intern. The documented assessment and plan were developed cooperatively, and the plan was implemented in my presence. I concur with the documentation of this patient encounter.    Olga Granados [facility]

## 2023-01-16 DIAGNOSIS — I48.92 ATRIAL FLUTTER WITH CONTROLLED RESPONSE: Primary | ICD-10-CM

## 2023-01-17 DIAGNOSIS — I48.92 ATRIAL FLUTTER WITH CONTROLLED RESPONSE: Primary | ICD-10-CM

## 2023-01-23 ENCOUNTER — ANTICOAGULATION VISIT (OUTPATIENT)
Dept: PHARMACY | Facility: HOSPITAL | Age: 83
End: 2023-01-23
Payer: MEDICARE

## 2023-01-23 ENCOUNTER — LAB (OUTPATIENT)
Dept: LAB | Facility: HOSPITAL | Age: 83
End: 2023-01-23
Payer: MEDICARE

## 2023-01-23 DIAGNOSIS — I48.92 ATRIAL FLUTTER WITH CONTROLLED RESPONSE: Primary | ICD-10-CM

## 2023-01-23 DIAGNOSIS — I48.92 ATRIAL FLUTTER WITH CONTROLLED RESPONSE: ICD-10-CM

## 2023-01-23 LAB
INR PPP: 2.6 (ref 0.8–1.2)
PROTHROMBIN TIME: 29.5 SECONDS (ref 12.8–15.2)

## 2023-01-23 PROCEDURE — 85610 PROTHROMBIN TIME: CPT | Performed by: INTERNAL MEDICINE

## 2023-01-23 NOTE — PROGRESS NOTES
Anticoagulation Clinic Progress Note    Anticoagulation Summary  As of 2023    INR goal:  2.0-3.0   TTR:  41.5 % (6.9 mo)   INR used for dosin.6 (2023)   Warfarin maintenance plan:  7.5 mg every Tue; 10 mg all other days; Starting 2023   Weekly warfarin total:  67.5 mg   No change documented:  Heron Delaney PharmD   Plan last modified:  Heron Delaney PharmD (2022)   Next INR check:  2023   Priority:  Maintenance   Target end date:      Indications    Atrial flutter with controlled response (HCC) [I48.92]             Anticoagulation Episode Summary     INR check location:      Preferred lab:      Send INR reminders to:   [facility]    Comments:        Anticoagulation Care Providers     Provider Role Specialty Phone number    Ty Shelton MD Referring Cardiology [phone]          Clinic Interview:  Patient Findings     Positives:  Change in diet/appetite, Other complaints    Negatives:  Signs/symptoms of thrombosis, Signs/symptoms of bleeding,   Laboratory test error suspected, Change in health, Change in alcohol use,   Change in activity, Upcoming invasive procedure, Emergency department   visit, Upcoming dental procedure, Missed doses, Extra doses, Change in   medications, Hospital admission, Bruising    Comments:  Reports he and his wife have moved into an assisted living   facility. He reports the facility provides their evening meals, so he   indicates his diet may have changed to some degree.       Clinical Outcomes     Negatives:  Major bleeding event, Thromboembolic event,   Anticoagulation-related hospital admission, Anticoagulation-related ED   visit, Anticoagulation-related fatality    Comments:  Reports he and his wife have moved into an assisted living   facility. He reports the facility provides their evening meals, so he   indicates his diet may have changed to some degree.         INR History:  Anticoagulation Monitoring 2022  1/23/2023   INR 1.6 1.9 2.6   INR Date 12/22/2022 1/5/2023 1/23/2023   INR Goal 2.0-3.0 2.0-3.0 2.0-3.0   Trend Same Same Same   Last Week Total 72.5 mg 67.5 mg 67.5 mg   Next Week Total 70 mg 70 mg 67.5 mg   Sun 10 mg 10 mg 10 mg   Mon 10 mg 10 mg 10 mg   Tue 7.5 mg 7.5 mg 7.5 mg   Wed 10 mg 10 mg 10 mg   Thu 12.5 mg (12/22); Otherwise 10 mg 12.5 mg (1/5); Otherwise 10 mg 10 mg   Fri 10 mg 10 mg 10 mg   Sat 10 mg 10 mg 10 mg   Visit Report - - -   Some recent data might be hidden       Plan:  1. INR is Therapeutic today- see above in Anticoagulation Summary.   Will instruct Riky Moon to Continue their warfarin regimen- see above in Anticoagulation Summary.  2. Follow up in 2 weeks.  3. They have been instructed to call if any changes in medications, doses, concerns, etc. Patient expresses understanding and has no further questions at this time.    Heron Delaney, PharmD

## 2023-02-03 ENCOUNTER — OFFICE VISIT (OUTPATIENT)
Dept: INTERNAL MEDICINE | Facility: CLINIC | Age: 83
End: 2023-02-03
Payer: MEDICARE

## 2023-02-03 VITALS
HEART RATE: 83 BPM | OXYGEN SATURATION: 96 % | HEIGHT: 73 IN | BODY MASS INDEX: 40.16 KG/M2 | WEIGHT: 303 LBS | SYSTOLIC BLOOD PRESSURE: 130 MMHG | DIASTOLIC BLOOD PRESSURE: 69 MMHG

## 2023-02-03 DIAGNOSIS — E78.5 HYPERLIPIDEMIA, UNSPECIFIED HYPERLIPIDEMIA TYPE: ICD-10-CM

## 2023-02-03 DIAGNOSIS — E11.9 TYPE 2 DIABETES MELLITUS WITHOUT COMPLICATION, WITHOUT LONG-TERM CURRENT USE OF INSULIN: ICD-10-CM

## 2023-02-03 DIAGNOSIS — E03.9 HYPOTHYROIDISM, UNSPECIFIED TYPE: Primary | ICD-10-CM

## 2023-02-03 DIAGNOSIS — I10 ESSENTIAL HYPERTENSION: ICD-10-CM

## 2023-02-03 PROCEDURE — 99214 OFFICE O/P EST MOD 30 MIN: CPT | Performed by: FAMILY MEDICINE

## 2023-02-03 NOTE — PROGRESS NOTES
"Chief Complaint  Follow-up    Subjective        Riky Moon presents to [facility]  History of Present Illness    Patient presented today's visit with a history of having hypothyroidism.  He is currently taking Synthroid 125 mcg daily.  He denies any side effects of the medication.    Patient also has a past medical history of having hyperlipidemia.  He is currently taking pravastatin 40 mg daily.  Patient denies any side effects of the medication.    Patient has a past medical history having type 2 diabetes.  He is currently taking Jardiance 10 mg daily.  He is also on metformin 850 mg twice a day.  He denies any side effects of the medicine.    Patient also has a history of essential hypertension.  He is currently on amlodipine 5 mg daily, atenolol 25 mg daily, losartan 100 mg daily, and he denies any side effects of these medications.  His blood pressure today is 130/69.  He is well controlled on these medicines.    Objective   Vital Signs:  /69   Pulse 83   Ht 185 cm (72.84\")   Wt (!) 137 kg (303 lb)   SpO2 96%   BMI 40.16 kg/m²   Estimated body mass index is 40.16 kg/m² as calculated from the following:    Height as of this encounter: 185 cm (72.84\").    Weight as of this encounter: 137 kg (303 lb).             Physical Exam  Vitals and nursing note reviewed.   Constitutional:       Appearance: He is well-developed.   HENT:      Head: Normocephalic and atraumatic.   Musculoskeletal:      Cervical back: Normal range of motion and neck supple.   Neurological:      Mental Status: He is alert and oriented to person, place, and time.   Psychiatric:         Behavior: Behavior normal.        Result Review :                   Assessment and Plan   Diagnoses and all orders for this visit:    1. Hypothyroidism, unspecified type (Primary)  -     Thyroid Panel With TSH    2. Hyperlipidemia, unspecified hyperlipidemia type  -     Lipid Panel With LDL / HDL Ratio    3. Type 2 " diabetes mellitus without complication, without long-term current use of insulin (HCC)  -     Hemoglobin A1c  -     Microalbumin / Creatinine Urine Ratio - Urine, Clean Catch    4. Essential hypertension  -     CBC & Differential  -     Comprehensive Metabolic Panel    For his type 2 diabetes, we will continue him on Jardiance as well as metformin.  For the hyperlipidemia, continue taking pravastatin.  For his hypothyroidism, continue levothyroxine 112 mcg daily.  For essential hypertension, continue on current blood pressure medicines.  We will check some labs at today's visit.         Follow Up   No follow-ups on file.  Patient was given instructions and counseling regarding his condition or for health maintenance advice. Please see specific information pulled into the AVS if appropriate.

## 2023-02-04 LAB
ALBUMIN SERPL-MCNC: 4 G/DL (ref 3.6–4.6)
ALBUMIN/CREAT UR: 21 MG/G CREAT (ref 0–29)
ALBUMIN/GLOB SERPL: 1.8 {RATIO} (ref 1.2–2.2)
ALP SERPL-CCNC: 61 IU/L (ref 44–121)
ALT SERPL-CCNC: 16 IU/L (ref 0–44)
AST SERPL-CCNC: 20 IU/L (ref 0–40)
BASOPHILS # BLD AUTO: 0.1 X10E3/UL (ref 0–0.2)
BASOPHILS NFR BLD AUTO: 1 %
BILIRUB SERPL-MCNC: 0.7 MG/DL (ref 0–1.2)
BUN SERPL-MCNC: 22 MG/DL (ref 8–27)
BUN/CREAT SERPL: 19 (ref 10–24)
CALCIUM SERPL-MCNC: 9.2 MG/DL (ref 8.6–10.2)
CHLORIDE SERPL-SCNC: 105 MMOL/L (ref 96–106)
CHOLEST SERPL-MCNC: 113 MG/DL (ref 100–199)
CO2 SERPL-SCNC: 25 MMOL/L (ref 20–29)
CREAT SERPL-MCNC: 1.15 MG/DL (ref 0.76–1.27)
CREAT UR-MCNC: 96.5 MG/DL
EGFRCR SERPLBLD CKD-EPI 2021: 63 ML/MIN/1.73
EOSINOPHIL # BLD AUTO: 0.1 X10E3/UL (ref 0–0.4)
EOSINOPHIL NFR BLD AUTO: 1 %
ERYTHROCYTE [DISTWIDTH] IN BLOOD BY AUTOMATED COUNT: 16.8 % (ref 11.6–15.4)
FT4I SERPL CALC-MCNC: 1.8 (ref 1.2–4.9)
GLOBULIN SER CALC-MCNC: 2.2 G/DL (ref 1.5–4.5)
GLUCOSE SERPL-MCNC: 119 MG/DL (ref 70–99)
HBA1C MFR BLD: 6.9 % (ref 4.8–5.6)
HCT VFR BLD AUTO: 38.5 % (ref 37.5–51)
HDLC SERPL-MCNC: 40 MG/DL
HGB BLD-MCNC: 13.2 G/DL (ref 13–17.7)
IMM GRANULOCYTES # BLD AUTO: 0 X10E3/UL (ref 0–0.1)
IMM GRANULOCYTES NFR BLD AUTO: 0 %
LDLC SERPL CALC-MCNC: 45 MG/DL (ref 0–99)
LDLC/HDLC SERPL: 1.1 RATIO (ref 0–3.6)
LYMPHOCYTES # BLD AUTO: 1.9 X10E3/UL (ref 0.7–3.1)
LYMPHOCYTES NFR BLD AUTO: 18 %
MCH RBC QN AUTO: 37.3 PG (ref 26.6–33)
MCHC RBC AUTO-ENTMCNC: 34.3 G/DL (ref 31.5–35.7)
MCV RBC AUTO: 109 FL (ref 79–97)
MICROALBUMIN UR-MCNC: 20.1 UG/ML
MONOCYTES # BLD AUTO: 0.8 X10E3/UL (ref 0.1–0.9)
MONOCYTES NFR BLD AUTO: 8 %
NEUTROPHILS # BLD AUTO: 7.6 X10E3/UL (ref 1.4–7)
NEUTROPHILS NFR BLD AUTO: 72 %
PLATELET # BLD AUTO: 215 X10E3/UL (ref 150–450)
POTASSIUM SERPL-SCNC: 4.5 MMOL/L (ref 3.5–5.2)
PROT SERPL-MCNC: 6.2 G/DL (ref 6–8.5)
RBC # BLD AUTO: 3.54 X10E6/UL (ref 4.14–5.8)
SODIUM SERPL-SCNC: 144 MMOL/L (ref 134–144)
T3RU NFR SERPL: 26 % (ref 24–39)
T4 SERPL-MCNC: 7.1 UG/DL (ref 4.5–12)
TRIGL SERPL-MCNC: 167 MG/DL (ref 0–149)
TSH SERPL DL<=0.005 MIU/L-ACNC: 1.4 UIU/ML (ref 0.45–4.5)
VLDLC SERPL CALC-MCNC: 28 MG/DL (ref 5–40)
WBC # BLD AUTO: 10.5 X10E3/UL (ref 3.4–10.8)

## 2023-02-07 ENCOUNTER — LAB (OUTPATIENT)
Dept: LAB | Facility: HOSPITAL | Age: 83
End: 2023-02-07
Payer: MEDICARE

## 2023-02-07 ENCOUNTER — ANTICOAGULATION VISIT (OUTPATIENT)
Dept: PHARMACY | Facility: HOSPITAL | Age: 83
End: 2023-02-07
Payer: MEDICARE

## 2023-02-07 DIAGNOSIS — I48.92 ATRIAL FLUTTER WITH CONTROLLED RESPONSE: Primary | ICD-10-CM

## 2023-02-07 DIAGNOSIS — I48.92 ATRIAL FLUTTER WITH CONTROLLED RESPONSE: ICD-10-CM

## 2023-02-07 LAB
INR PPP: 2.9 (ref 0.8–1.2)
PROTHROMBIN TIME: 33.1 SECONDS (ref 12.8–15.2)

## 2023-02-07 PROCEDURE — 85610 PROTHROMBIN TIME: CPT | Performed by: INTERNAL MEDICINE

## 2023-02-07 NOTE — PROGRESS NOTES
Anticoagulation Clinic Progress Note    Anticoagulation Summary  As of 2023    INR goal:  2.0-3.0   TTR:  45.5 % (7.4 mo)   INR used for dosin.9 (2023)   Warfarin maintenance plan:  7.5 mg every Tue; 10 mg all other days; Starting 2023   Weekly warfarin total:  67.5 mg   No change documented:  Olga Granados, ROLO   Plan last modified:  Heron Delaney, PharmD (2022)   Next INR check:  3/7/2023   Priority:  Maintenance   Target end date:      Indications    Atrial flutter with controlled response (HCC) [I48.92]             Anticoagulation Episode Summary     INR check location:      Preferred lab:      Send INR reminders to:   [facility]    Comments:        Anticoagulation Care Providers     Provider Role Specialty Phone number    Ty Shelton MD Referring Cardiology [phone]          Clinic Interview:  Patient Findings     Negatives:  Signs/symptoms of thrombosis, Signs/symptoms of bleeding,   Laboratory test error suspected, Change in health, Change in alcohol use,   Change in activity, Upcoming invasive procedure, Emergency department   visit, Upcoming dental procedure, Missed doses, Extra doses, Change in   medications, Change in diet/appetite, Hospital admission, Bruising, Other   complaints      Clinical Outcomes     Negatives:  Major bleeding event, Thromboembolic event,   Anticoagulation-related hospital admission, Anticoagulation-related ED   visit, Anticoagulation-related fatality        INR History:  Anticoagulation Monitoring 2023   INR 1.9 2.6 2.9   INR Date 2023   INR Goal 2.0-3.0 2.0-3.0 2.0-3.0   Trend Same Same Same   Last Week Total 67.5 mg 67.5 mg 67.5 mg   Next Week Total 70 mg 67.5 mg 67.5 mg   Sun 10 mg 10 mg 10 mg   Mon 10 mg 10 mg 10 mg   Tue 7.5 mg 7.5 mg 7.5 mg   Wed 10 mg 10 mg 10 mg   Thu 12.5 mg (); Otherwise 10 mg 10 mg 10 mg   Fri 10 mg 10 mg 10 mg   Sat 10 mg 10 mg 10 mg   Visit Report - -  -   Some recent data might be hidden       Plan:  1. INR is Therapeutic today- see above in Anticoagulation Summary.   Will instruct Riky Moon to Continue their warfarin regimen- see above in Anticoagulation Summary.  2. Follow up in 4 weeks  3. They have been instructed to call if any changes in medications, doses, concerns, etc. Patient expresses understanding and has no further questions at this time.    Olga Granados, [facility]

## 2023-03-17 ENCOUNTER — ANTICOAGULATION VISIT (OUTPATIENT)
Dept: PHARMACY | Facility: HOSPITAL | Age: 83
End: 2023-03-17
Payer: MEDICARE

## 2023-03-17 DIAGNOSIS — I48.92 ATRIAL FLUTTER WITH CONTROLLED RESPONSE: Primary | ICD-10-CM

## 2023-03-17 LAB
INR PPP: 3.1 (ref 0.91–1.09)
PROTHROMBIN TIME: 37.3 SECONDS (ref 10–13.8)

## 2023-03-17 PROCEDURE — 85610 PROTHROMBIN TIME: CPT

## 2023-03-17 PROCEDURE — 36416 COLLJ CAPILLARY BLOOD SPEC: CPT

## 2023-03-17 NOTE — PROGRESS NOTES
Anticoagulation Clinic Progress Note    Anticoagulation Summary  As of 3/17/2023    INR goal:  2.0-3.0   TTR:  46.1 % (8.6 mo)   INR used for dosing:  3.1 (3/17/2023)   Warfarin maintenance plan:  7.5 mg every Tue; 10 mg all other days; Starting 3/17/2023   Weekly warfarin total:  67.5 mg   No change documented:  Nataliya Roman, PharmD   Plan last modified:  Heron Delaney, PharmD (11/9/2022)   Next INR check:  3/31/2023   Priority:  Maintenance   Target end date:      Indications    Atrial flutter with controlled response (HCC) [I48.92]             Anticoagulation Episode Summary     INR check location:      Preferred lab:      Send INR reminders to:   [facility] POOL    Comments:        Anticoagulation Care Providers     Provider Role Specialty Phone number    Ty Shelton MD Referring Cardiology [phone]          Clinic Interview:  Patient Findings     Negatives:  Signs/symptoms of thrombosis, Signs/symptoms of bleeding,   Laboratory test error suspected, Change in health, Change in alcohol use,   Change in activity, Upcoming invasive procedure, Emergency department   visit, Upcoming dental procedure, Missed doses, Extra doses, Change in   medications, Change in diet/appetite, Hospital admission, Bruising, Other   complaints      Clinical Outcomes     Negatives:  Major bleeding event, Thromboembolic event,   Anticoagulation-related hospital admission, Anticoagulation-related ED   visit, Anticoagulation-related fatality        INR History:  Anticoagulation Monitoring 1/23/2023 2/7/2023 3/17/2023   INR 2.6 2.9 3.1   INR Date 1/23/2023 2/7/2023 3/17/2023   INR Goal 2.0-3.0 2.0-3.0 2.0-3.0   Trend Same Same Same   Last Week Total 67.5 mg 67.5 mg 67.5 mg   Next Week Total 67.5 mg 67.5 mg 67.5 mg   Sun 10 mg 10 mg 10 mg   Mon 10 mg 10 mg 10 mg   Tue 7.5 mg 7.5 mg 7.5 mg   Wed 10 mg 10 mg 10 mg   Thu 10 mg 10 mg 10 mg   Fri 10 mg 10 mg 10 mg   Sat 10 mg 10 mg 10 mg   Visit Report - - -   Some  recent data might be hidden       Plan:  1. INR is Therapeutic today- see above in Anticoagulation Summary.  Will instruct Riky Moon to Continue their warfarin regimen- see above in Anticoagulation Summary.  2. Follow up in 2 weeks  3. Patient declines warfarin refills.  4. Verbal and written information provided. Patient expresses understanding and has no further questions at this time.    Nataliya Roman, PharmD

## 2023-03-17 NOTE — PROGRESS NOTES
I have supervised and reviewed the notes, assessments, and/or procedures performed by our Pharmacy Resident. The documented assessment and plan were developed cooperatively. I concur with the documentation of this patient encounter.    Heron Delaney, PharmD

## 2023-03-30 ENCOUNTER — ANTICOAGULATION VISIT (OUTPATIENT)
Dept: PHARMACY | Facility: HOSPITAL | Age: 83
End: 2023-03-30
Payer: MEDICARE

## 2023-03-30 DIAGNOSIS — I48.92 ATRIAL FLUTTER WITH CONTROLLED RESPONSE: Primary | ICD-10-CM

## 2023-03-30 LAB
INR PPP: 2.7 (ref 0.91–1.09)
PROTHROMBIN TIME: 33 SECONDS (ref 10–13.8)

## 2023-03-30 PROCEDURE — 36416 COLLJ CAPILLARY BLOOD SPEC: CPT

## 2023-03-30 PROCEDURE — 85610 PROTHROMBIN TIME: CPT

## 2023-03-30 NOTE — PROGRESS NOTES
Anticoagulation Clinic Progress Note    Anticoagulation Summary  As of 3/30/2023    INR goal:  2.0-3.0   TTR:  47.5 % (9.1 mo)   INR used for dosin.7 (3/30/2023)   Warfarin maintenance plan:  7.5 mg every Tue; 10 mg all other days; Starting 3/30/2023   Weekly warfarin total:  67.5 mg   No change documented:  Minnie Londono, Pharmacy Technician   Plan last modified:  Heron Delaney, PharmD (2022)   Next INR check:  2023   Priority:  Maintenance   Target end date:      Indications    Atrial flutter with controlled response (HCC) [I48.92]             Anticoagulation Episode Summary     INR check location:      Preferred lab:      Send INR reminders to:  [facility]    Comments:        Anticoagulation Care Providers     Provider Role Specialty Phone number    Ty Shelton MD Referring Cardiology [phone]          Clinic Interview:  Patient Findings     Negatives:  Signs/symptoms of thrombosis, Signs/symptoms of bleeding,   Laboratory test error suspected, Change in health, Change in alcohol use,   Change in activity, Upcoming invasive procedure, Emergency department   visit, Upcoming dental procedure, Missed doses, Extra doses, Change in   medications, Change in diet/appetite, Hospital admission, Bruising, Other   complaints      Clinical Outcomes     Negatives:  Major bleeding event, Thromboembolic event,   Anticoagulation-related hospital admission, Anticoagulation-related ED   visit, Anticoagulation-related fatality        INR History:  Anticoagulation Monitoring 2023 3/17/2023 3/30/2023   INR 2.9 3.1 2.7   INR Date 2023 3/17/2023 3/30/2023   INR Goal 2.0-3.0 2.0-3.0 2.0-3.0   Trend Same Same Same   Last Week Total 67.5 mg 67.5 mg 67.5 mg   Next Week Total 67.5 mg 67.5 mg 67.5 mg   Sun 10 mg 10 mg 10 mg   Mon 10 mg 10 mg 10 mg   Tue 7.5 mg 7.5 mg 7.5 mg   Wed 10 mg 10 mg 10 mg   Thu 10 mg 10 mg 10 mg   Fri 10 mg 10 mg 10 mg   Sat 10 mg 10 mg 10 mg   Visit Report - - -    Some recent data might be hidden       Plan:  1. INR is therapeutic today- see above in Anticoagulation Summary.   Will instruct Riky Moon to continue their warfarin regimen- see above in Anticoagulation Summary.  2. Follow up in 4 weeks.  3. Patient declines warfarin refills.  4. Verbal and written information provided. Patient expresses understanding and has no further questions at this time.    Minnie Londono, Pharmacy Technician

## 2023-04-20 RX ORDER — LEVOTHYROXINE SODIUM 125 MCG
125 TABLET ORAL DAILY
Qty: 30 TABLET | Refills: 11 | Status: SHIPPED | OUTPATIENT
Start: 2023-04-20

## 2023-05-03 ENCOUNTER — ANTICOAGULATION VISIT (OUTPATIENT)
Dept: PHARMACY | Facility: HOSPITAL | Age: 83
End: 2023-05-03
Payer: MEDICARE

## 2023-05-03 ENCOUNTER — OFFICE VISIT (OUTPATIENT)
Dept: INTERNAL MEDICINE | Facility: CLINIC | Age: 83
End: 2023-05-03
Payer: MEDICARE

## 2023-05-03 VITALS
TEMPERATURE: 97.8 F | SYSTOLIC BLOOD PRESSURE: 110 MMHG | HEART RATE: 90 BPM | RESPIRATION RATE: 16 BRPM | HEIGHT: 73 IN | DIASTOLIC BLOOD PRESSURE: 60 MMHG | WEIGHT: 306 LBS | OXYGEN SATURATION: 95 % | BODY MASS INDEX: 40.56 KG/M2

## 2023-05-03 DIAGNOSIS — I48.92 ATRIAL FLUTTER WITH CONTROLLED RESPONSE: Primary | ICD-10-CM

## 2023-05-03 DIAGNOSIS — E11.9 TYPE 2 DIABETES MELLITUS WITHOUT COMPLICATION, WITHOUT LONG-TERM CURRENT USE OF INSULIN: Primary | ICD-10-CM

## 2023-05-03 DIAGNOSIS — I10 ESSENTIAL HYPERTENSION: ICD-10-CM

## 2023-05-03 DIAGNOSIS — E78.5 HYPERLIPIDEMIA, UNSPECIFIED HYPERLIPIDEMIA TYPE: ICD-10-CM

## 2023-05-03 DIAGNOSIS — E03.9 HYPOTHYROIDISM, UNSPECIFIED TYPE: ICD-10-CM

## 2023-05-03 LAB
INR PPP: 3.5 (ref 0.91–1.09)
PROTHROMBIN TIME: 41.6 SECONDS (ref 10–13.8)

## 2023-05-03 PROCEDURE — G0463 HOSPITAL OUTPT CLINIC VISIT: HCPCS

## 2023-05-03 PROCEDURE — 85610 PROTHROMBIN TIME: CPT

## 2023-05-03 PROCEDURE — 36416 COLLJ CAPILLARY BLOOD SPEC: CPT

## 2023-05-03 RX ORDER — OXYBUTYNIN CHLORIDE 5 MG/1
1 TABLET ORAL EVERY 12 HOURS SCHEDULED
COMMUNITY
Start: 2023-04-19

## 2023-05-03 RX ORDER — WARFARIN SODIUM 5 MG/1
TABLET ORAL
Qty: 180 TABLET | Refills: 1 | Status: SHIPPED | OUTPATIENT
Start: 2023-05-03

## 2023-05-03 NOTE — PROGRESS NOTES
"Chief Complaint  Hypothyroidism    Subjective          Riky Moon presents to [facility]  History of Present Illness    Mr. Moon is an 82-year-old male who presents for a follow-up.    He goes to physical therapy twice a week and occupational therapy once a week. He is currently taking Jardiance 10 mg daily and metformin 850 mg twice a day. He is taking Synthroid 125 mcg daily. He is taking pravastatin 40 mg daily. He is taking amlodipine 5 mg daily and losartan 100 mg daily. He denies any issues with his medication.He is inquiring about what he could do for his neuropathy in his feet.    Objective   Vital Signs:   /60   Pulse 90   Temp 97.8 °F (36.6 °C)   Resp 16   Ht 185.4 cm (73\")   Wt (!) 139 kg (306 lb)   SpO2 95%   BMI 40.37 kg/m²    A review of systems was performed, and the pertinent positives are noted in the HPI.     Physical Exam  Vitals and nursing note reviewed.   Constitutional:       Appearance: He is well-developed.   HENT:      Head: Normocephalic and atraumatic.   Musculoskeletal:      Cervical back: Normal range of motion and neck supple.   Neurological:      Mental Status: He is alert and oriented to person, place, and time.   Psychiatric:         Behavior: Behavior normal.        Result Review :                 Assessment and Plan    Diagnoses and all orders for this visit:    1. Type 2 diabetes mellitus without complication, without long-term current use of insulin (Primary)  -     Microalbumin / Creatinine Urine Ratio - Urine, Clean Catch  -     Hemoglobin A1c    2. Hypothyroidism, unspecified type  -     Thyroid Panel With TSH    3. Hyperlipidemia, unspecified hyperlipidemia type  -     Lipid Panel With LDL / HDL Ratio    4. Essential hypertension  -     CBC & Differential  -     Comprehensive Metabolic Panel      1. Type 2 diabetes  - He will continue Jardiance 10 mg daily and metformin 850 mg twice daily.  - Labs will be obtained.    2. " Hypothyroidism  - He will continue Synthroid 125 mcg daily.    3. Hyperlipidemia  - He will continue pravastatin 40 mg daily.    4. Hypertension  - He will continue amlodipine 5 mg daily and losartan 100 mg daily.    5. Neuropathy  - He will continue Lyrica.  - He will use capsaicin cream.    He will return to the clinic in 06/2023 for a Medicare wellness visit.      Follow Up   No follow-ups on file.  Patient was given instructions and counseling regarding his condition or for health maintenance advice. Please see specific information pulled into the AVS if appropriate.         Transcribed from ambient dictation for Marco Carrillo MD by Mildred Sanchez.  05/03/23   13:24 EDT    Patient or patient representative verbalized consent to the visit recording.  I have personally performed the services described in this document as transcribed by the above individual, and it is both accurate and complete.

## 2023-05-03 NOTE — PROGRESS NOTES
Anticoagulation Clinic Progress Note    Anticoagulation Summary  As of 5/3/2023    INR goal:  2.0-3.0   TTR:  46.4 % (10.2 mo)   INR used for dosing:  3.5 (5/3/2023)   Warfarin maintenance plan:  7.5 mg every Tue; 10 mg all other days; Starting 5/3/2023   Weekly warfarin total:  67.5 mg   Plan last modified:  Heron Delaney, PharmD (11/9/2022)   Next INR check:  5/17/2023   Priority:  Maintenance   Target end date:      Indications    Atrial flutter with controlled response [I48.92]             Anticoagulation Episode Summary     INR check location:      Preferred lab:      Send INR reminders to:   [facility]    Comments:        Anticoagulation Care Providers     Provider Role Specialty Phone number    Ty Shelton MD Referring Cardiology [phone]          Clinic Interview:  Patient Findings     Negatives:  Signs/symptoms of thrombosis, Signs/symptoms of bleeding,   Laboratory test error suspected, Change in health, Change in alcohol use,   Change in activity, Upcoming invasive procedure, Emergency department   visit, Upcoming dental procedure, Missed doses, Extra doses, Change in   medications, Change in diet/appetite, Hospital admission, Bruising, Other   complaints      Clinical Outcomes     Negatives:  Major bleeding event, Thromboembolic event,   Anticoagulation-related hospital admission, Anticoagulation-related ED   visit, Anticoagulation-related fatality        INR History:      12/22/2022     1:30 PM 1/5/2023     1:45 PM 1/23/2023     2:49 PM 2/7/2023     3:27 PM 3/17/2023     1:15 PM 3/30/2023    11:15 AM 5/3/2023    10:30 AM   Anticoagulation Monitoring   INR 1.6 1.9 2.6 2.9 3.1 2.7 3.5   INR Date 12/22/2022 1/5/2023 1/23/2023 2/7/2023 3/17/2023 3/30/2023 5/3/2023   INR Goal 2.0-3.0 2.0-3.0 2.0-3.0 2.0-3.0 2.0-3.0 2.0-3.0 2.0-3.0   Trend Same Same Same Same Same Same Same   Last Week Total 72.5 mg 67.5 mg 67.5 mg 67.5 mg 67.5 mg 67.5 mg 67.5 mg   Next Week Total 70 mg 70 mg 67.5  mg 67.5 mg 67.5 mg 67.5 mg 62.5 mg   Sun 10 mg 10 mg 10 mg 10 mg 10 mg 10 mg 10 mg   Mon 10 mg 10 mg 10 mg 10 mg 10 mg 10 mg 10 mg   Tue 7.5 mg 7.5 mg 7.5 mg 7.5 mg 7.5 mg 7.5 mg 7.5 mg   Wed 10 mg 10 mg 10 mg 10 mg 10 mg 10 mg 5 mg (5/3); Otherwise 10 mg   Thu 12.5 mg (12/22); Otherwise 10 mg 12.5 mg (1/5); Otherwise 10 mg 10 mg 10 mg 10 mg 10 mg 10 mg   Fri 10 mg 10 mg 10 mg 10 mg 10 mg 10 mg 10 mg   Sat 10 mg 10 mg 10 mg 10 mg 10 mg 10 mg 10 mg       Plan:  1. INR is Supratherapeutic today- see above in Anticoagulation Summary.  Will instruct Riky Moon to Change their warfarin regimen- see above in Anticoagulation Summary.  2. Follow up in 2 weeks  3. Patient desires warfarin refills.  4. Verbal and written information provided. Patient expresses understanding and has no further questions at this time.    Heron Delaney, PharmD

## 2023-05-04 LAB
ALBUMIN SERPL-MCNC: 4.1 G/DL (ref 3.6–4.6)
ALBUMIN/CREAT UR: 23 MG/G CREAT (ref 0–29)
ALBUMIN/GLOB SERPL: 1.9 {RATIO} (ref 1.2–2.2)
ALP SERPL-CCNC: 54 IU/L (ref 44–121)
ALT SERPL-CCNC: 16 IU/L (ref 0–44)
AST SERPL-CCNC: 20 IU/L (ref 0–40)
BASOPHILS # BLD AUTO: 0.1 X10E3/UL (ref 0–0.2)
BASOPHILS NFR BLD AUTO: 1 %
BILIRUB SERPL-MCNC: 0.7 MG/DL (ref 0–1.2)
BUN SERPL-MCNC: 20 MG/DL (ref 8–27)
BUN/CREAT SERPL: 16 (ref 10–24)
CALCIUM SERPL-MCNC: 9.1 MG/DL (ref 8.6–10.2)
CHLORIDE SERPL-SCNC: 102 MMOL/L (ref 96–106)
CHOLEST SERPL-MCNC: 110 MG/DL (ref 100–199)
CO2 SERPL-SCNC: 24 MMOL/L (ref 20–29)
CREAT SERPL-MCNC: 1.23 MG/DL (ref 0.76–1.27)
CREAT UR-MCNC: 83.6 MG/DL
EGFRCR SERPLBLD CKD-EPI 2021: 58 ML/MIN/1.73
EOSINOPHIL # BLD AUTO: 0.1 X10E3/UL (ref 0–0.4)
EOSINOPHIL NFR BLD AUTO: 1 %
ERYTHROCYTE [DISTWIDTH] IN BLOOD BY AUTOMATED COUNT: 16.3 % (ref 11.6–15.4)
FT4I SERPL CALC-MCNC: 1.7 (ref 1.2–4.9)
GLOBULIN SER CALC-MCNC: 2.2 G/DL (ref 1.5–4.5)
GLUCOSE SERPL-MCNC: 91 MG/DL (ref 70–99)
HBA1C MFR BLD: 6.4 % (ref 4.8–5.6)
HCT VFR BLD AUTO: 37.4 % (ref 37.5–51)
HDLC SERPL-MCNC: 32 MG/DL
HGB BLD-MCNC: 13.1 G/DL (ref 13–17.7)
IMM GRANULOCYTES # BLD AUTO: 0 X10E3/UL (ref 0–0.1)
IMM GRANULOCYTES NFR BLD AUTO: 0 %
LDLC SERPL CALC-MCNC: 50 MG/DL (ref 0–99)
LDLC/HDLC SERPL: 1.6 RATIO (ref 0–3.6)
LYMPHOCYTES # BLD AUTO: 1.9 X10E3/UL (ref 0.7–3.1)
LYMPHOCYTES NFR BLD AUTO: 19 %
MCH RBC QN AUTO: 38.2 PG (ref 26.6–33)
MCHC RBC AUTO-ENTMCNC: 35 G/DL (ref 31.5–35.7)
MCV RBC AUTO: 109 FL (ref 79–97)
MICROALBUMIN UR-MCNC: 19.6 UG/ML
MONOCYTES # BLD AUTO: 0.8 X10E3/UL (ref 0.1–0.9)
MONOCYTES NFR BLD AUTO: 8 %
NEUTROPHILS # BLD AUTO: 7.2 X10E3/UL (ref 1.4–7)
NEUTROPHILS NFR BLD AUTO: 71 %
NRBC BLD AUTO-RTO: 1 % (ref 0–0)
PLATELET # BLD AUTO: 176 X10E3/UL (ref 150–450)
POTASSIUM SERPL-SCNC: 4.5 MMOL/L (ref 3.5–5.2)
PROT SERPL-MCNC: 6.3 G/DL (ref 6–8.5)
RBC # BLD AUTO: 3.43 X10E6/UL (ref 4.14–5.8)
SODIUM SERPL-SCNC: 143 MMOL/L (ref 134–144)
T3RU NFR SERPL: 24 % (ref 24–39)
T4 SERPL-MCNC: 7.1 UG/DL (ref 4.5–12)
TRIGL SERPL-MCNC: 164 MG/DL (ref 0–149)
TSH SERPL DL<=0.005 MIU/L-ACNC: 6.61 UIU/ML (ref 0.45–4.5)
VLDLC SERPL CALC-MCNC: 28 MG/DL (ref 5–40)
WBC # BLD AUTO: 10.2 X10E3/UL (ref 3.4–10.8)

## 2023-05-07 RX ORDER — LEVOTHYROXINE SODIUM 0.12 MG/1
125 TABLET ORAL DAILY
Qty: 90 TABLET | Refills: 2 | Status: SHIPPED | OUTPATIENT
Start: 2023-05-07

## 2023-05-08 NOTE — PROGRESS NOTES
Please inform the patient of the following abnormal results. TSH is still elevated. He should be taking levothyroxine 125mcg. I would like to see him back in the office in one month.

## 2023-05-17 ENCOUNTER — ANTICOAGULATION VISIT (OUTPATIENT)
Dept: PHARMACY | Facility: HOSPITAL | Age: 83
End: 2023-05-17
Payer: MEDICARE

## 2023-05-17 DIAGNOSIS — I48.92 ATRIAL FLUTTER WITH CONTROLLED RESPONSE: Primary | ICD-10-CM

## 2023-05-17 LAB
INR PPP: 3.6 (ref 0.91–1.09)
PROTHROMBIN TIME: 42.8 SECONDS (ref 10–13.8)

## 2023-05-17 PROCEDURE — G0463 HOSPITAL OUTPT CLINIC VISIT: HCPCS

## 2023-05-17 PROCEDURE — 36416 COLLJ CAPILLARY BLOOD SPEC: CPT

## 2023-05-17 PROCEDURE — 85610 PROTHROMBIN TIME: CPT

## 2023-05-17 NOTE — PROGRESS NOTES
Anticoagulation Clinic Progress Note    Anticoagulation Summary  As of 5/17/2023    INR goal:  2.0-3.0   TTR:  44.3 % (10.7 mo)   INR used for dosing:  3.6 (5/17/2023)   Warfarin maintenance plan:  5 mg every Tue, Sat; 10 mg all other days; Starting 5/17/2023   Weekly warfarin total:  60 mg   Plan last modified:  Heron Delaney, PharmD (5/17/2023)   Next INR check:  5/31/2023   Priority:  Maintenance   Target end date:      Indications    Atrial flutter with controlled response [I48.92]             Anticoagulation Episode Summary     INR check location:      Preferred lab:      Send INR reminders to:   [facility]    Comments:        Anticoagulation Care Providers     Provider Role Specialty Phone number    Ty Shelton MD Referring Cardiology [phone]          Clinic Interview:  Patient Findings     Negatives:  Signs/symptoms of thrombosis, Signs/symptoms of bleeding,   Laboratory test error suspected, Change in health, Change in alcohol use,   Change in activity, Upcoming invasive procedure, Emergency department   visit, Upcoming dental procedure, Missed doses, Extra doses, Change in   medications, Change in diet/appetite, Hospital admission, Bruising, Other   complaints      Clinical Outcomes     Negatives:  Major bleeding event, Thromboembolic event,   Anticoagulation-related hospital admission, Anticoagulation-related ED   visit, Anticoagulation-related fatality        INR History:      1/5/2023     1:45 PM 1/23/2023     2:49 PM 2/7/2023     3:27 PM 3/17/2023     1:15 PM 3/30/2023    11:15 AM 5/3/2023    10:30 AM 5/17/2023    11:30 AM   Anticoagulation Monitoring   INR 1.9 2.6 2.9 3.1 2.7 3.5 3.6   INR Date 1/5/2023 1/23/2023 2/7/2023 3/17/2023 3/30/2023 5/3/2023 5/17/2023   INR Goal 2.0-3.0 2.0-3.0 2.0-3.0 2.0-3.0 2.0-3.0 2.0-3.0 2.0-3.0   Trend Same Same Same Same Same Same Down   Last Week Total 67.5 mg 67.5 mg 67.5 mg 67.5 mg 67.5 mg 67.5 mg 67.5 mg   Next Week Total 70 mg 67.5 mg  67.5 mg 67.5 mg 67.5 mg 62.5 mg 60 mg   Sun 10 mg 10 mg 10 mg 10 mg 10 mg 10 mg 10 mg   Mon 10 mg 10 mg 10 mg 10 mg 10 mg 10 mg 10 mg   Tue 7.5 mg 7.5 mg 7.5 mg 7.5 mg 7.5 mg 7.5 mg 5 mg   Wed 10 mg 10 mg 10 mg 10 mg 10 mg 5 mg (5/3); Otherwise 10 mg 10 mg   Thu 12.5 mg (1/5); Otherwise 10 mg 10 mg 10 mg 10 mg 10 mg 10 mg 10 mg   Fri 10 mg 10 mg 10 mg 10 mg 10 mg 10 mg 10 mg   Sat 10 mg 10 mg 10 mg 10 mg 10 mg 10 mg 5 mg   Visit Report      Report        Plan:  1. INR is Supratherapeutic today- see above in Anticoagulation Summary.  Will instruct Riky Moon to Change their warfarin regimen to 5 mg Tues/Sat, 10 mg all other days - see above in Anticoagulation Summary.  2. Follow up in 2 weeks.  3. Patient declines warfarin refills.  4. Verbal and written information provided. Patient expresses understanding and has no further questions at this time.    Heron Delaney, PharmD

## 2023-05-31 ENCOUNTER — TELEPHONE (OUTPATIENT)
Dept: CARDIOLOGY | Facility: CLINIC | Age: 83
End: 2023-05-31

## 2023-05-31 ENCOUNTER — ANTICOAGULATION VISIT (OUTPATIENT)
Dept: PHARMACY | Facility: HOSPITAL | Age: 83
End: 2023-05-31

## 2023-05-31 DIAGNOSIS — I48.92 ATRIAL FLUTTER WITH CONTROLLED RESPONSE: Primary | ICD-10-CM

## 2023-05-31 LAB
INR PPP: 3.3 (ref 0.91–1.09)
PROTHROMBIN TIME: 39.5 SECONDS (ref 10–13.8)

## 2023-05-31 PROCEDURE — 36416 COLLJ CAPILLARY BLOOD SPEC: CPT

## 2023-05-31 PROCEDURE — 85610 PROTHROMBIN TIME: CPT

## 2023-05-31 PROCEDURE — G0463 HOSPITAL OUTPT CLINIC VISIT: HCPCS

## 2023-05-31 NOTE — PROGRESS NOTES
Anticoagulation Clinic Progress Note    Anticoagulation Summary  As of 5/31/2023    INR goal:  2.0-3.0   TTR:  42.5 % (11.1 mo)   INR used for dosing:  3.3 (5/31/2023)   Warfarin maintenance plan:  5 mg every Tue, Thu, Sat; 10 mg all other days; Starting 5/31/2023   Weekly warfarin total:  55 mg   Plan last modified:  Heron Delaney, PharmD (5/31/2023)   Next INR check:  6/15/2023   Priority:  Maintenance   Target end date:      Indications    Atrial flutter with controlled response [I48.92]             Anticoagulation Episode Summary     INR check location:      Preferred lab:      Send INR reminders to:   [facility]    Comments:        Anticoagulation Care Providers     Provider Role Specialty Phone number    Ty Shelton MD Referring Cardiology [phone]          Clinic Interview:  Patient Findings     Positives:  Upcoming invasive procedure    Negatives:  Signs/symptoms of thrombosis, Signs/symptoms of bleeding,   Laboratory test error suspected, Change in health, Change in alcohol use,   Change in activity, Emergency department visit, Upcoming dental procedure,   Missed doses, Extra doses, Change in medications, Change in diet/appetite,   Hospital admission, Bruising, Other complaints    Comments:  Reports InterStim procedure for bladder tentatively scheduled   for 6/27/23. He indicates his surgeon is planning to contact Dr. Shelton   for clearance.      Clinical Outcomes     Negatives:  Major bleeding event, Thromboembolic event,   Anticoagulation-related hospital admission, Anticoagulation-related ED   visit, Anticoagulation-related fatality    Comments:  Reports InterStim procedure for bladder tentatively scheduled   for 6/27/23. He indicates his surgeon is planning to contact Dr. Shelton   for clearance.        INR History:      1/23/2023     2:49 PM 2/7/2023     3:27 PM 3/17/2023     1:15 PM 3/30/2023    11:15 AM 5/3/2023    10:30 AM 5/17/2023    11:30 AM 5/31/2023    11:15 AM    Anticoagulation Monitoring   INR 2.6 2.9 3.1 2.7 3.5 3.6 3.3   INR Date 1/23/2023 2/7/2023 3/17/2023 3/30/2023 5/3/2023 5/17/2023 5/31/2023   INR Goal 2.0-3.0 2.0-3.0 2.0-3.0 2.0-3.0 2.0-3.0 2.0-3.0 2.0-3.0   Trend Same Same Same Same Same Down Down   Last Week Total 67.5 mg 67.5 mg 67.5 mg 67.5 mg 67.5 mg 67.5 mg 60 mg   Next Week Total 67.5 mg 67.5 mg 67.5 mg 67.5 mg 62.5 mg 60 mg 55 mg   Sun 10 mg 10 mg 10 mg 10 mg 10 mg 10 mg 10 mg   Mon 10 mg 10 mg 10 mg 10 mg 10 mg 10 mg 10 mg   Tue 7.5 mg 7.5 mg 7.5 mg 7.5 mg 7.5 mg 5 mg 5 mg   Wed 10 mg 10 mg 10 mg 10 mg 5 mg (5/3); Otherwise 10 mg 10 mg 10 mg   Thu 10 mg 10 mg 10 mg 10 mg 10 mg 10 mg 5 mg   Fri 10 mg 10 mg 10 mg 10 mg 10 mg 10 mg 10 mg   Sat 10 mg 10 mg 10 mg 10 mg 10 mg 5 mg 5 mg   Visit Report     Report         Plan:  1. INR is Supratherapeutic today- see above in Anticoagulation Summary.  Will instruct Riky Moon to Change their warfarin regimen to 5 mg Tues/Thurs/Sat, 10 mg all other days - see above in Anticoagulation Summary.  2. Follow up in 2 weeks.  3. Patient declines warfarin refills.  4. Verbal and written information provided. Patient expresses understanding and has no further questions at this time.    Heron Delaney, PharmD

## 2023-05-31 NOTE — TELEPHONE ENCOUNTER
Patient is in chronic atrial fibrillation so he does have a slight increased risk with holding his anticoagulation for 7 days.  However he is going to have to hold it for this procedure.  As long as patient understands the risk okay to hold 7 days prior.

## 2023-05-31 NOTE — TELEPHONE ENCOUNTER
Pt needs clearance for an Interstim battery & lead replacement being done 06/27/23.  They would like pt to hold his warfarin 7 days prior.      LOV 12/13/22      Fax to:  [facility]  [phone]    Thanks,  Niru

## 2023-06-15 ENCOUNTER — ANTICOAGULATION VISIT (OUTPATIENT)
Dept: PHARMACY | Facility: HOSPITAL | Age: 83
End: 2023-06-15
Payer: MEDICARE

## 2023-06-15 DIAGNOSIS — I48.92 ATRIAL FLUTTER WITH CONTROLLED RESPONSE: Primary | ICD-10-CM

## 2023-06-15 LAB
INR PPP: 2.6 (ref 0.91–1.09)
PROTHROMBIN TIME: 31.4 SECONDS (ref 10–13.8)

## 2023-06-15 PROCEDURE — 85610 PROTHROMBIN TIME: CPT

## 2023-06-15 PROCEDURE — 36416 COLLJ CAPILLARY BLOOD SPEC: CPT

## 2023-06-15 NOTE — PROGRESS NOTES
Anticoagulation Clinic Progress Note    Anticoagulation Summary  As of 6/15/2023      INR goal:  2.0-3.0   TTR:  43.1 % (11.6 mo)   INR used for dosin.6 (6/15/2023)   Warfarin maintenance plan:  5 mg every Tue, Thu, Sat; 10 mg all other days; Starting 6/15/2023   Weekly warfarin total:  55 mg   Plan last modified:  Heron Delaney, PharmD (2023)   Next INR check:  7/3/2023   Priority:  Maintenance   Target end date:      Indications    Atrial flutter with controlled response [I48.92]                 Anticoagulation Episode Summary       INR check location:      Preferred lab:      Send INR reminders to:   [facility]    Comments:            Anticoagulation Care Providers       Provider Role Specialty Phone number    Ty Shelton MD Referring Cardiology [phone]            Clinic Interview:  Patient Findings     Positives:  Upcoming invasive procedure    Negatives:  Signs/symptoms of thrombosis, Signs/symptoms of bleeding,   Laboratory test error suspected, Change in health, Change in alcohol use,   Change in activity, Emergency department visit, Upcoming dental procedure,   Missed doses, Extra doses, Change in medications, Change in diet/appetite,   Hospital admission, Bruising, Other complaints    Comments:  Interstim bladder procedure on ; cardiology approved a 7   days hold w/o bridge (see phone note ).  Denies any other changes to   medications or diet and confirms his current warfarin dose.      Clinical Outcomes     Negatives:  Major bleeding event, Thromboembolic event,   Anticoagulation-related hospital admission, Anticoagulation-related ED   visit, Anticoagulation-related fatality    Comments:  Interstim bladder procedure on ; cardiology approved a 7   days hold w/o bridge (see phone note ).  Denies any other changes to   medications or diet and confirms his current warfarin dose.        INR History:      2023     3:27 PM 3/17/2023     1:15 PM 3/30/2023     11:15 AM 5/3/2023    10:30 AM 5/17/2023    11:30 AM 5/31/2023    11:15 AM 6/15/2023    11:45 AM   Anticoagulation Monitoring   INR 2.9 3.1 2.7 3.5 3.6 3.3 2.6   INR Date 2/7/2023 3/17/2023 3/30/2023 5/3/2023 5/17/2023 5/31/2023 6/15/2023   INR Goal 2.0-3.0 2.0-3.0 2.0-3.0 2.0-3.0 2.0-3.0 2.0-3.0 2.0-3.0   Trend Same Same Same Same Down Down Same   Last Week Total 67.5 mg 67.5 mg 67.5 mg 67.5 mg 67.5 mg 60 mg 55 mg   Next Week Total 67.5 mg 67.5 mg 67.5 mg 62.5 mg 60 mg 55 mg 40 mg   Sun 10 mg 10 mg 10 mg 10 mg 10 mg 10 mg Hold (6/25); Otherwise 10 mg   Mon 10 mg 10 mg 10 mg 10 mg 10 mg 10 mg Hold (6/26); Otherwise 10 mg   Tue 7.5 mg 7.5 mg 7.5 mg 7.5 mg 5 mg 5 mg Hold (6/20), 10 mg (6/27)   Wed 10 mg 10 mg 10 mg 5 mg (5/3); Otherwise 10 mg 10 mg 10 mg Hold (6/21), 15 mg (6/28)   Thu 10 mg 10 mg 10 mg 10 mg 10 mg 5 mg Hold (6/22), 10 mg (6/29); Otherwise 5 mg   Fri 10 mg 10 mg 10 mg 10 mg 10 mg 10 mg Hold (6/23); Otherwise 10 mg   Sat 10 mg 10 mg 10 mg 10 mg 5 mg 5 mg Hold (6/24); Otherwise 5 mg   Visit Report    Report          Plan:  1. INR is Therapeutic today- see above in Anticoagulation Summary.  Will instruct Riky Moon to Continue their warfarin regimen- see above in Anticoagulation Summary.  Continue current regimen through 6/19.  Begin holding warfarin for procedure on 6/20 and resume warfarin after procedure on 6/27 (if cleared by surgeon).  Take 10 mg (6/27), 15 mg (6/28) and 10 mg (6/29), then resume usual dose of 5 mg Tuesday/Thursday and Saturday and 10 mg on all other days until next INR check on 7/3.   2. Follow up in 19 days (7/3 post procedure)   3. Patient desires warfarin refills.  4. Verbal and written information provided. Patient expresses understanding and has no further questions at this time.    Yu Rader, PharmD

## 2023-06-30 ENCOUNTER — TELEPHONE (OUTPATIENT)
Dept: INTERNAL MEDICINE | Facility: CLINIC | Age: 83
End: 2023-06-30

## 2023-06-30 NOTE — TELEPHONE ENCOUNTER
Caller: Riky Moon    Relationship to patient: Self    Best call back number: [phone]    Patient is needing: PATIENT WAS RETURNING CALL PLEASE CALL BACK.

## 2023-07-26 ENCOUNTER — ANTICOAGULATION VISIT (OUTPATIENT)
Dept: PHARMACY | Facility: HOSPITAL | Age: 83
End: 2023-07-26
Payer: MEDICARE

## 2023-07-26 DIAGNOSIS — I48.92 ATRIAL FLUTTER WITH CONTROLLED RESPONSE: Primary | ICD-10-CM

## 2023-07-26 LAB
INR PPP: 1.8 (ref 0.91–1.09)
PROTHROMBIN TIME: 21.2 SECONDS (ref 10–13.8)

## 2023-07-26 PROCEDURE — 85610 PROTHROMBIN TIME: CPT

## 2023-07-26 PROCEDURE — 36416 COLLJ CAPILLARY BLOOD SPEC: CPT

## 2023-07-26 PROCEDURE — G0463 HOSPITAL OUTPT CLINIC VISIT: HCPCS

## 2023-07-26 NOTE — PROGRESS NOTES
Anticoagulation Clinic Progress Note    Anticoagulation Summary  As of 2023      INR goal:  2.0-3.0   TTR:  40.4 % (1.1 y)   INR used for dosin.8 (2023)   Warfarin maintenance plan:  5 mg every Tue, Thu, Sat; 10 mg all other days   Weekly warfarin total:  55 mg   Plan last modified:  Heron Delaney, PharmD (2023)   Next INR check:  8/10/2023   Priority:  Maintenance   Target end date:      Indications    Atrial flutter with controlled response [I48.92]                 Anticoagulation Episode Summary       INR check location:      Preferred lab:      Send INR reminders to:  [facility]    Comments:            Anticoagulation Care Providers       Provider Role Specialty Phone number    Ty Shelton MD Referring Cardiology [phone]            Clinic Interview:  Patient Findings     Negatives:  Signs/symptoms of thrombosis, Signs/symptoms of bleeding,   Laboratory test error suspected, Change in health, Change in alcohol use,   Change in activity, Upcoming invasive procedure, Emergency department   visit, Upcoming dental procedure, Missed doses, Extra doses, Change in   medications, Change in diet/appetite, Hospital admission, Bruising, Other   complaints      Clinical Outcomes     Negatives:  Major bleeding event, Thromboembolic event,   Anticoagulation-related hospital admission, Anticoagulation-related ED   visit, Anticoagulation-related fatality        INR History:      5/3/2023    10:30 AM 2023    11:30 AM 2023    11:15 AM 6/15/2023    11:45 AM 7/3/2023    11:30 AM 2023    11:30 AM 2023    11:15 AM   Anticoagulation Monitoring   INR 3.5 3.6 3.3 2.6 1.1 1.9 1.8   INR Date 5/3/2023 2023 2023 6/15/2023 7/3/2023 2023 2023   INR Goal 2.0-3.0 2.0-3.0 2.0-3.0 2.0-3.0 2.0-3.0 2.0-3.0 2.0-3.0   Trend Same Down Down Same Same Same Same   Last Week Total 67.5 mg 67.5 mg 60 mg 55 mg 30 mg 55 mg 55 mg   Next Week Total 62.5 mg 60 mg 55 mg 40  mg 65 mg 55 mg 60 mg   Sun 10 mg 10 mg 10 mg Hold (6/25); Otherwise 10 mg 10 mg 10 mg 10 mg   Mon 10 mg 10 mg 10 mg Hold (6/26); Otherwise 10 mg 15 mg (7/3); Otherwise 10 mg 10 mg 10 mg   Tue 7.5 mg 5 mg 5 mg Hold (6/20), 10 mg (6/27) 10 mg (7/4); Otherwise 5 mg 5 mg 5 mg   Wed 5 mg (5/3); Otherwise 10 mg 10 mg 10 mg Hold (6/21), 15 mg (6/28) 10 mg 10 mg 10 mg   Thu 10 mg 10 mg 5 mg Hold (6/22), 10 mg (6/29); Otherwise 5 mg 5 mg 5 mg 10 mg (7/27); Otherwise 5 mg   Fri 10 mg 10 mg 10 mg Hold (6/23); Otherwise 10 mg 10 mg 10 mg 10 mg   Sat 10 mg 5 mg 5 mg Hold (6/24); Otherwise 5 mg 5 mg 5 mg 5 mg   Visit Report Report             Plan:  1. INR is Subtherapeutic today- see above in Anticoagulation Summary.  Will instruct Riky Moon to Increase their warfarin regimen- see above in Anticoagulation Summary.  2. Follow up in 2 weeks  3. Patient declines warfarin refills.  4. Verbal and written information provided. Patient expresses understanding and has no further questions at this time.    Olga Granados [facility]

## 2023-08-02 ENCOUNTER — OFFICE VISIT (OUTPATIENT)
Dept: CARDIOLOGY | Facility: CLINIC | Age: 83
End: 2023-08-02
Payer: MEDICARE

## 2023-08-02 VITALS
OXYGEN SATURATION: 97 % | BODY MASS INDEX: 40.29 KG/M2 | WEIGHT: 304 LBS | DIASTOLIC BLOOD PRESSURE: 68 MMHG | HEIGHT: 73 IN | SYSTOLIC BLOOD PRESSURE: 118 MMHG | HEART RATE: 54 BPM

## 2023-08-02 DIAGNOSIS — I48.92 ATRIAL FLUTTER WITH CONTROLLED RESPONSE: Primary | ICD-10-CM

## 2023-08-02 DIAGNOSIS — I10 PRIMARY HYPERTENSION: ICD-10-CM

## 2023-08-10 ENCOUNTER — ANTICOAGULATION VISIT (OUTPATIENT)
Dept: PHARMACY | Facility: HOSPITAL | Age: 83
End: 2023-08-10
Payer: MEDICARE

## 2023-08-10 DIAGNOSIS — I48.92 ATRIAL FLUTTER WITH CONTROLLED RESPONSE: Primary | ICD-10-CM

## 2023-08-10 LAB
INR PPP: 1.8 (ref 0.91–1.09)
PROTHROMBIN TIME: 22 SECONDS (ref 10–13.8)

## 2023-08-10 PROCEDURE — G0463 HOSPITAL OUTPT CLINIC VISIT: HCPCS

## 2023-08-10 PROCEDURE — 85610 PROTHROMBIN TIME: CPT

## 2023-08-10 PROCEDURE — 36416 COLLJ CAPILLARY BLOOD SPEC: CPT

## 2023-08-10 NOTE — PROGRESS NOTES
Anticoagulation Clinic Progress Note    Anticoagulation Summary  As of 8/10/2023      INR goal:  2.0-3.0   TTR:  38.9 % (1.1 y)   INR used for dosin.8 (8/10/2023)   Warfarin maintenance plan:  5 mg every Tue, Sat; 10 mg all other days   Weekly warfarin total:  60 mg   Plan last modified:  Heron Delaney, PharmD (8/10/2023)   Next INR check:  2023   Priority:  Maintenance   Target end date:      Indications    Atrial flutter with controlled response [I48.92]                 Anticoagulation Episode Summary       INR check location:      Preferred lab:      Send INR reminders to:  [facility] [place]    Comments:            Anticoagulation Care Providers       Provider Role Specialty Phone number    Ty Shelton MD Referring Cardiology [phone]            Clinic Interview:  Patient Findings     Negatives:  Signs/symptoms of thrombosis, Signs/symptoms of bleeding,   Laboratory test error suspected, Change in health, Change in alcohol use,   Change in activity, Upcoming invasive procedure, Emergency department   visit, Upcoming dental procedure, Missed doses, Extra doses, Change in   medications, Change in diet/appetite, Hospital admission, Bruising, Other   complaints      Clinical Outcomes     Negatives:  Major bleeding event, Thromboembolic event,   Anticoagulation-related hospital admission, Anticoagulation-related ED   visit, Anticoagulation-related fatality        INR History:      2023    11:30 AM 2023    11:15 AM 6/15/2023    11:45 AM 7/3/2023    11:30 AM 2023    11:30 AM 2023    11:15 AM 8/10/2023    11:30 AM   Anticoagulation Monitoring   INR 3.6 3.3 2.6 1.1 1.9 1.8 1.8   INR Date 2023 2023 6/15/2023 7/3/2023 2023 2023 8/10/2023   INR Goal 2.0-3.0 2.0-3.0 2.0-3.0 2.0-3.0 2.0-3.0 2.0-3.0 2.0-3.0   Trend Down Down Same Same Same Same Up   Last Week Total 67.5 mg 60 mg 55 mg 30 mg 55 mg 55 mg 55 mg   Next Week Total 60 mg 55 mg 40 mg 65 mg 55 mg  60 mg 60 mg   Sun 10 mg 10 mg Hold (6/25); Otherwise 10 mg 10 mg 10 mg 10 mg 10 mg   Mon 10 mg 10 mg Hold (6/26); Otherwise 10 mg 15 mg (7/3); Otherwise 10 mg 10 mg 10 mg 10 mg   Tue 5 mg 5 mg Hold (6/20), 10 mg (6/27) 10 mg (7/4); Otherwise 5 mg 5 mg 5 mg 5 mg   Wed 10 mg 10 mg Hold (6/21), 15 mg (6/28) 10 mg 10 mg 10 mg 10 mg   Thu 10 mg 5 mg Hold (6/22), 10 mg (6/29); Otherwise 5 mg 5 mg 5 mg 10 mg (7/27); Otherwise 5 mg 10 mg   Fri 10 mg 10 mg Hold (6/23); Otherwise 10 mg 10 mg 10 mg 10 mg 10 mg   Sat 5 mg 5 mg Hold (6/24); Otherwise 5 mg 5 mg 5 mg 5 mg 5 mg       Plan:  1. INR is Subtherapeutic today- see above in Anticoagulation Summary.  Will instruct Riky Moon to Increase their warfarin regimen to 5 mg Tues/Sat, 10 mg all other days - see above in Anticoagulation Summary.  2. Follow up in 2 weeks  3. Patient declines warfarin refills.  4. Verbal and written information provided. Patient expresses understanding and has no further questions at this time.    Heron Delaney, PharmD

## 2023-08-16 ENCOUNTER — ANTICOAGULATION VISIT (OUTPATIENT)
Dept: PHARMACY | Facility: HOSPITAL | Age: 83
End: 2023-08-16
Payer: MEDICARE

## 2023-08-16 DIAGNOSIS — I48.92 ATRIAL FLUTTER WITH CONTROLLED RESPONSE: Primary | ICD-10-CM

## 2023-08-16 NOTE — PROGRESS NOTES
Anticoagulation Clinic Progress Note    Anticoagulation Summary  As of 8/16/2023      INR goal:  2.0-3.0   TTR:  38.9 % (1.1 y)   INR used for dosing:  No new INR was available at the time of this encounter.   Warfarin maintenance plan:  5 mg every Tue, Sat; 10 mg all other days   Weekly warfarin total:  60 mg   Plan last modified:  Heron Delaney, PharmD (8/10/2023)   Next INR check:  9/5/2023   Priority:  Maintenance   Target end date:      Indications    Atrial flutter with controlled response [I48.92]                 Anticoagulation Episode Summary       INR check location:      Preferred lab:      Send INR reminders to:   [facility]    Comments:            Anticoagulation Care Providers       Provider Role Specialty Phone number    Ty Shelton MD Referring Cardiology [phone]            Clinic Interview:  Patient Findings     Positives:  Upcoming invasive procedure    Negatives:  Signs/symptoms of thrombosis, Signs/symptoms of bleeding,   Laboratory test error suspected, Change in health, Change in alcohol use,   Change in activity, Emergency department visit, Upcoming dental procedure,   Missed doses, Extra doses, Change in medications, Change in diet/appetite,   Hospital admission, Bruising, Other complaints    Comments:  Mr. Moon reports epidural is scheduled for 8/24/23, prior to   which the pain management provider has requested that he withhold warfarin   5 days. He discussed risk of warfarin interruption surrounding epidurals   wth Dr. Shelton during 8/2/23 Cardiology encounter, and enoxaparin was not   recommended.       Clinical Outcomes     Negatives:  Major bleeding event, Thromboembolic event,   Anticoagulation-related hospital admission, Anticoagulation-related ED   visit, Anticoagulation-related fatality    Comments:  Mr. Red nicolas epidural is scheduled for 8/24/23, prior to   which the pain management provider has requested that he withhold warfarin   5 days. He  Physical Therapy  Visit Type: initial evaluation  Precautions:  Medical precautions:  fall risk; contact & special precautions.  Covid +, N95/goggles  Lines:     Basic: capped IV      Lines in chart and on patient reviewed, cautions maintained throughout session.  Safety Measures: bed alarm      SUBJECTIVE                                                                                                            Patient agreed to participate in therapy this date.  \" I am fine. Patient has not been hospitalized, in a skilled nursing facility, or seen by home health in the last 30 days.  Patient / Family Goal: return home      OBJECTIVE                                                                                                                Room air: 95% at rest, 92% after 40ft   Oriented to person and place     Arousal alertness: appropriate responses to stimuli  Patient activity tolerance: 1 to 1 activity to rest  Bed Mobility:    Rolling left: independent    Supine to sit: independent    Sit to supine: independent  Transfers:    Assistive devices: 2-wheeled walker, 1 person and gait belt    Sit to stand: modified independent    Stand to sit: modified independent    Stand pivot: modified independent  Gait/Ambulation:     Assistance: modified independent   Assistive device: 2-wheeled walker, 1 person and gait belt    Distance (ft): 40    Pattern: within functional limits  Training Completed:      No CAR, no LOB,         Interventions                                                                                                       Skilled input: Verbal instruction/cues and tactile instruction/cues  Verbal Consent: Writer verbally educated and received verbal consent for hand placement, positioning of patient, and techniques to be performed today from patient for clothing adjustments for techniques as described above and how they are pertinent to the patient's plan of care.        ASSESSMENT                                                                                                                 Impairments: safety awareness and activity tolerance  Functional Limitations: ambulation  Patient seen on 2W nursing unit.  Patient presents near baseline which was modified independent with mobility using a cane or WW. She typically resides alone in a condominium with 15 steps/2 rails to enter.. For safe return to prior living situation the patient needs to be at a modified independent  level for mobility.      The patient now presents with impairments in activity tolerance which impact safe and effective performance in ambulation and stair negotiation.  Patient is current functioning at supervision  for mobility. She was able to ambulate 40ft with a WW with stable SpO2 >92% on room air. I did not attempt 15 stairs due to patient requesting to take a nap. Will assess in am. She should be safe to DC home, when medically ready. .         Discharge Recommendations: home, home therapy                    PT/OT ADL Equipment for Discharge: cont to assess        Skilled therapy is required to address these limitations in attempt to maximize the patient's independence.  Predicted patient presentation: Low (stable) - Patient comorbidities and complexities, as defined above, will have little effect on progress for prescribed plan of care.    End of Session:   Location: in bed  Safety measures: call light within reach, alarm system in place/re-engaged and lines intact  Handoff to: nurse            PLAN                                                                                                                            Suggestions for next session as indicated: Pending pt presentation and ability next session will work on assessment of 15 stairs (bring step stool into room), inc amb with vitals monitoring          Frequency Comments: 0/2 by 11/14, alone, condo, 15 step entry, WW or cane if needed     Interventions: balance, bed  discussed risk of warfarin interruption surrounding epidurals   [facility] Dr. Shelton during 8/2/23 Cardiology encounter, and enoxaparin was not   recommended.         INR History:      5/31/2023    11:15 AM 6/15/2023    11:45 AM 7/3/2023    11:30 AM 7/13/2023    11:30 AM 7/26/2023    11:15 AM 8/10/2023    11:30 AM 8/16/2023    12:39 PM   Anticoagulation Monitoring   INR 3.3 2.6 1.1 1.9 1.8 1.8    INR Date 5/31/2023 6/15/2023 7/3/2023 7/13/2023 7/26/2023 8/10/2023    INR Goal 2.0-3.0 2.0-3.0 2.0-3.0 2.0-3.0 2.0-3.0 2.0-3.0 2.0-3.0   Trend Down Same Same Same Same Up Same   Last Week Total 60 mg 55 mg 30 mg 55 mg 55 mg 55 mg 60 mg   Next Week Total 55 mg 40 mg 65 mg 55 mg 60 mg 60 mg 30 mg   Sun 10 mg Hold (6/25); Otherwise 10 mg 10 mg 10 mg 10 mg 10 mg Hold (8/20); Otherwise 10 mg   Mon 10 mg Hold (6/26); Otherwise 10 mg 15 mg (7/3); Otherwise 10 mg 10 mg 10 mg 10 mg Hold (8/21); Otherwise 10 mg   Tue 5 mg Hold (6/20), 10 mg (6/27) 10 mg (7/4); Otherwise 5 mg 5 mg 5 mg 5 mg Hold (8/22); Otherwise 5 mg   Wed 10 mg Hold (6/21), 15 mg (6/28) 10 mg 10 mg 10 mg 10 mg Hold (8/23); Otherwise 10 mg   Thu 5 mg Hold (6/22), 10 mg (6/29); Otherwise 5 mg 5 mg 5 mg 10 mg (7/27); Otherwise 5 mg 10 mg Hold (8/24); Otherwise 10 mg   Fri 10 mg Hold (6/23); Otherwise 10 mg 10 mg 10 mg 10 mg 10 mg 15 mg (8/25); Otherwise 10 mg   Sat 5 mg Hold (6/24); Otherwise 5 mg 5 mg 5 mg 5 mg 5 mg Hold (8/19), 15 mg (8/26); Otherwise 5 mg       Plan:  1. INR is  unknown  today- see above in Anticoagulation Summary. Mr. Moon discussed risk of warfarin interruption surrounding epidurals [facility] Dr. Shelton during 8/2/23 Cardiology encounter, and enoxaparin was not recommended. CHADS-VASc score is 4; no h/o CVA or other thrombotic event.   Will instruct Riky Moon to Change their warfarin regimen (HOLD warfarin as directed by Pain Management; Take warfarin 15 mg on 8/25/23 and 8/26/23, then resume 5 mg Tues/Sat, 10 mg all other days) - see above in  Anticoagulation Summary.  2. Follow up in in clinic on 9/5/23.   3. They have been instructed to call if any changes in medications, doses, concerns, etc. Patient expresses understanding and has no further questions at this time.    Heron Delaney, PharmD   mobility, gait training, neuromuscular re-education, ROM, strengthening, safety education, patient/family training, HEP train/position, functional transfer training and stairs retraining  Agreement to plan and goals: patient agrees with goals and treatment plan        GOALS:  Review Date: 11/14/2020  Long Term Goals: (to be met by time of discharge from hospital)  Sit to supine: Patient will complete sit to supine independent.  Supine to sit: Patient will complete supine to sit independent.  Sit to stand: Patient will complete sit to stand transfer with gait belt and 2-wheeled walker, modified independent.   Stand to sit: Patient will complete stand to sit transfer with gait belt and 2-wheeled walker, modified independent.   Ambulation (even): Patient will ambulate on even surface for 200 feet with gait belt and 2-wheeled walker (SpO2 >92%), modified independent.   Flight of stairs: Patient will ambulate a flight of stairs with gait belt and 1 person using 2 rails, supervision.     Documented in the chart in the following areas: Pain. Assessment.      Admitting diagnosis: COVID-19 (U07.1)    Co-morbidities and problem list:   Patient Active Problem List:   Depression with anxiety   GERD (gastroesophageal reflux disease)   HTN (hypertension)   Hyperlipidemia   Insomnia   Osteoporosis   Palpitations   Tear of meniscus of right knee   Fibromyalgia   Vitamin D deficiency   Irritable bowel syndrome   Chronic fatigue   Allergic rhinitis, cause unspecified   LBP (low back pain)   Lumbar disc disease-servere   Primary osteoarthritis of both knees   History of total right knee replacement   Keratoconjunctivitis sicca of both eyes not specified as Sjogren's   Age-related nuclear cataract of both eyes   Hyperopia of both eyes with astigmatism   Stage 2 chronic kidney disease   Closed tibia fracture   COVID-19      The referring provider's electronic signature on the evaluation authorizes the therapy plan of care and certifies  the need for these services, furnished under this plan of care while under their care.

## 2023-08-21 ENCOUNTER — OFFICE VISIT (OUTPATIENT)
Dept: INTERNAL MEDICINE | Facility: CLINIC | Age: 83
End: 2023-08-21
Payer: MEDICARE

## 2023-08-21 VITALS
HEART RATE: 86 BPM | WEIGHT: 300.3 LBS | RESPIRATION RATE: 16 BRPM | DIASTOLIC BLOOD PRESSURE: 62 MMHG | SYSTOLIC BLOOD PRESSURE: 120 MMHG | HEIGHT: 73 IN | OXYGEN SATURATION: 94 % | BODY MASS INDEX: 39.8 KG/M2

## 2023-08-21 DIAGNOSIS — E03.9 HYPOTHYROIDISM, UNSPECIFIED TYPE: ICD-10-CM

## 2023-08-21 DIAGNOSIS — E78.5 HYPERLIPIDEMIA, UNSPECIFIED HYPERLIPIDEMIA TYPE: ICD-10-CM

## 2023-08-21 DIAGNOSIS — I89.0 LYMPHEDEMA: ICD-10-CM

## 2023-08-21 DIAGNOSIS — Z00.00 MEDICARE ANNUAL WELLNESS VISIT, SUBSEQUENT: Primary | ICD-10-CM

## 2023-08-21 DIAGNOSIS — I10 ESSENTIAL HYPERTENSION: ICD-10-CM

## 2023-08-21 DIAGNOSIS — E11.9 TYPE 2 DIABETES MELLITUS WITHOUT COMPLICATION, WITHOUT LONG-TERM CURRENT USE OF INSULIN: ICD-10-CM

## 2023-08-21 NOTE — PROGRESS NOTES
The ABCs of the Annual Wellness Visit  Subsequent Medicare Wellness Visit    Subjective      Riky Moon is a 82 y.o. male who presents for a Subsequent Medicare Wellness Visit.    The following portions of the patient's history were reviewed and   updated as appropriate: allergies, current medications, past family history, past medical history, past social history, past surgical history, and problem list.    Compared to one year ago, the patient feels his physical   health is better.    Compared to one year ago, the patient feels his mental   health is the same.    Recent Hospitalizations:  He was not admitted to the hospital during the last year.       Current Medical Providers:  Patient Care Team:  Marco Carrillo MD as PCP - General (Family Medicine)  Cosmo French MD as Consulting Physician (Sleep Medicine)  Ty Shelton MD as Consulting Physician (Cardiology)  Marco Carrillo MD as Referring Physician (Family Medicine)  Chio Gilliam MD as Consulting Physician (Hematology and Oncology)    Outpatient Medications Prior to Visit   Medication Sig Dispense Refill    albuterol sulfate  (90 Base) MCG/ACT inhaler Inhale 1 puff Every 4 (Four) Hours As Needed for Wheezing. 18 g 6    amLODIPine (NORVASC) 5 MG tablet TAKE 1 TABLET BY MOUTH DAILY 90 tablet 3    Ascorbic Acid (VITAMIN C PO) Take 1 tablet by mouth Daily.      atenolol (TENORMIN) 25 MG tablet Take 1 tablet by mouth Daily. 90 tablet 3    Cholecalciferol (VITAMIN D) 1000 units tablet Take 1 tablet by mouth Every Morning.      DULoxetine (Cymbalta) 30 MG capsule Take 1 capsule by mouth Daily. 90 capsule 1    DULoxetine (CYMBALTA) 60 MG capsule Take 1 capsule by mouth Every Morning. 90 capsule 3    Empagliflozin (Jardiance) 10 MG tablet Take 10 mg by mouth Daily. 30 tablet 11    Fluticasone-Umeclidin-Vilant (Trelegy Ellipta) 100-62.5-25 MCG/INH inhaler Inhale 1 puff Daily. 1 each 12    glucose blood (LAINEY CONTOUR TEST) test strip  Use as instructed to test glucose 100 each 1    levothyroxine (Synthroid) 125 MCG tablet Take 1 tablet by mouth Daily. 90 tablet 2    losartan (COZAAR) 100 MG tablet Take 1 tablet by mouth Daily. 90 tablet 3    metFORMIN (GLUCOPHAGE) 850 MG tablet Take 1 tablet by mouth 2 (Two) Times a Day With Meals. 30 tablet 6    MICROLET LANCETS misc Use daily as directed to test glucose 100 each 1    Multiple Vitamin (MULTIVITAMIN) tablet Take 1 tablet by mouth Every Morning.      oxybutynin (DITROPAN) 5 MG tablet Take 1 tablet by mouth Every 12 (Twelve) Hours.      oxybutynin XL (DITROPAN XL) 15 MG 24 hr tablet Take 1 tablet by mouth Every Night.      pravastatin (PRAVACHOL) 40 MG tablet Take 1 tablet by mouth Every Night. 90 tablet 3    pregabalin (LYRICA) 150 MG capsule Take 1 capsule by mouth 2 (Two) Times a Day.      Synthroid 125 MCG tablet TAKE 1 TABLET BY MOUTH DAILY 30 tablet 11    vitamin B-12 (CYANOCOBALAMIN) 1000 MCG tablet Take 1 tablet by mouth Every Morning.      warfarin (COUMADIN) 5 MG tablet Take one and one-half tablets (7.5 mg) by mouth on Tuesdays, and take two tablets (10 mg) by mouth all other days or as directed. (Patient taking differently: Take one and one-half tablets (7.5 mg) by mouth on Tuesdays, and take two tablets (10 mg) by mouth all other days or as directed. Pt to stop taking 6/20/23 due to procedure.) 180 tablet 1     No facility-administered medications prior to visit.       No opioid medication identified on active medication list. I have reviewed chart for other potential  high risk medication/s and harmful drug interactions in the elderly.        Aspirin is not on active medication list.  Aspirin use is not indicated based on review of current medical condition/s. Risk of harm outweighs potential benefits.  .    Patient Active Problem List   Diagnosis    OA (osteoarthritis) of hip    KINDRA treated with auto BiPAP    Chest pain    Diabetes mellitus    Hypertension    Hyperlipidemia     "Hypothyroidism    Asthma    Overactive bladder    CAP (community acquired pneumonia)    Cellulitis of left lower extremity    COPD (chronic obstructive pulmonary disease)    Dyslipidemia    Gastroesophageal reflux disease    Leukocytosis    Peripheral nerve disease    Constipation    Oropharyngeal dysphagia    Bronchitis    Class 3 severe obesity due to excess calories with serious comorbidity and body mass index (BMI) of 40.0 to 44.9 in adult    Left leg swelling    Anemia    Leukocytosis    Circadian rhythm sleep disorder, delayed sleep phase type    Atrial flutter with controlled response     Advance Care Planning   Advance Care Planning     Advance Directive is not on file.  ACP discussion was held with the patient during this visit. Patient has an advance directive (not in EMR), copy requested.     Objective    Vitals:    08/21/23 1345   BP: 120/62   BP Location: Left arm   Patient Position: Sitting   Cuff Size: Adult   Pulse: 86   Resp: 16   SpO2: 94%   Weight: (!) 136 kg (300 lb 4.8 oz)   Height: 185.4 cm (73\")   PainSc: 0-No pain     Estimated body mass index is 39.62 kg/m² as calculated from the following:    Height as of this encounter: 185.4 cm (73\").    Weight as of this encounter: 136 kg (300 lb 4.8 oz).    Class 2 Severe Obesity (BMI >=35 and <=39.9). Obesity-related health conditions include the following: hypertension, diabetes mellitus, and dyslipidemias. Obesity is improving with treatment. BMI is is above average; BMI management plan is completed. We discussed portion control and increasing exercise.      Does the patient have evidence of cognitive impairment?   No    Lab Results   Component Value Date    CHLPL 114 08/21/2023    TRIG 111 08/21/2023    HDL 37 (L) 08/21/2023    LDL 57 08/21/2023    VLDL 20 08/21/2023    HGBA1C 6.50 (H) 08/21/2023          HEALTH RISK ASSESSMENT    Smoking Status:  Social History     Tobacco Use   Smoking Status Former    Packs/day: 1.50    Years: 40.00    Pack " years: 60.00    Types: Cigarettes    Quit date:     Years since quittin.6   Smokeless Tobacco Never   Tobacco Comments    from age [age]     Alcohol Consumption:  Social History     Substance and Sexual Activity   Alcohol Use Yes    Comment: HOLIDAYS  caffeine -2 cups coffee daily     Fall Risk Screen:    ANNA Fall Risk Assessment has not been completed.    Depression Screenin/20/2023     3:45 PM   PHQ-2/PHQ-9 Depression Screening   Little Interest or Pleasure in Doing Things 0-->not at all   Feeling Down, Depressed or Hopeless 0-->not at all   PHQ-9: Brief Depression Severity Measure Score 0       Health Habits and Functional and Cognitive Screenin/21/2023     1:00 PM   Functional & Cognitive Status   Do you have difficulty preparing food and eating? No   Do you have difficulty bathing yourself, getting dressed or grooming yourself? No   Do you have difficulty using the toilet? No   Do you have difficulty moving around from place to place? Yes   Do you have trouble with steps or getting out of a bed or a chair? No   Current Diet Well Balanced Diet   Dental Exam Up to date   Eye Exam Up to date   Exercise (times per week) 0 times per week   Current Exercises Include No Regular Exercise   Do you need help using the phone?  No   Are you deaf or do you have serious difficulty hearing?  No   Do you need help to go to places out of walking distance? No   Do you need help shopping? No   Do you need help preparing meals?  No   Do you need help with housework?  No   Do you need help with laundry? No   Do you need help taking your medications? No   Do you need help managing money? No   Do you ever drive or ride in a car without wearing a seat belt? No   Have you felt unusual stress, anger or loneliness in the last month? No   Who do you live with? Spouse   If you need help, do you have trouble finding someone available to you? No   Have you been bothered in the last four weeks by sexual problems?  No   Do you have difficulty concentrating, remembering or making decisions? No       Age-appropriate Screening Schedule:  Refer to the list below for future screening recommendations based on patient's age, sex and/or medical conditions. Orders for these recommended tests are listed in the plan section. The patient has been provided with a written plan.    Health Maintenance   Topic Date Due    DIABETIC EYE EXAM  03/09/2019    ZOSTER VACCINE (3 of 3) 12/11/2019    COVID-19 Vaccine (4 - Pfizer series) 11/05/2021    ANNUAL WELLNESS VISIT  06/15/2023    INFLUENZA VACCINE  10/01/2023    HEMOGLOBIN A1C  02/21/2024    LIPID PANEL  08/21/2024    URINE MICROALBUMIN  08/21/2024    BMI FOLLOWUP  08/21/2024    TDAP/TD VACCINES (2 - Td or Tdap) 11/23/2031    Pneumococcal Vaccine 65+  Completed                  CMS Preventative Services Quick Reference  Risk Factors Identified During Encounter:    None Identified  Chronic Pain: Natural history and expected course discussed. Questions answered.  Polypharmacy: Medication List reviewed and Medications are appropriate for patient    The above risks/problems have been discussed with the patient.  Pertinent information has been shared with the patient in the After Visit Summary.    Diagnoses and all orders for this visit:    1. Medicare annual wellness visit, subsequent (Primary)    2. Type 2 diabetes mellitus without complication, without long-term current use of insulin  -     Hemoglobin A1c  -     Microalbumin / Creatinine Urine Ratio - Urine, Clean Catch    3. Hypothyroidism, unspecified type  -     Thyroid Panel With TSH    4. Essential hypertension  -     CBC & Differential  -     Comprehensive Metabolic Panel    5. Hyperlipidemia, unspecified hyperlipidemia type  -     Lipid Panel With LDL / HDL Ratio    6. Lymphedema  -     Ambulatory Referral to Home Health    Other orders  -     Manual Differential        Follow Up:   Next Medicare Wellness visit to be scheduled in 1 year.       An After Visit Summary and PPPS were made available to the patient.

## 2023-08-22 LAB
ALBUMIN SERPL-MCNC: 4.4 G/DL (ref 3.5–5.2)
ALBUMIN/CREAT UR: 21 MG/G CREAT (ref 0–29)
ALBUMIN/GLOB SERPL: 2.3 G/DL
ALP SERPL-CCNC: 56 U/L (ref 39–117)
ALT SERPL-CCNC: 15 U/L (ref 1–41)
AST SERPL-CCNC: 16 U/L (ref 1–40)
BASOPHILS # BLD MANUAL: 0.09 10*3/MM3 (ref 0–0.2)
BASOPHILS NFR BLD MANUAL: 1 % (ref 0–1.5)
BILIRUB SERPL-MCNC: 0.9 MG/DL (ref 0–1.2)
BUN SERPL-MCNC: 22 MG/DL (ref 8–23)
BUN/CREAT SERPL: 17.9 (ref 7–25)
CALCIUM SERPL-MCNC: 9.6 MG/DL (ref 8.6–10.5)
CHLORIDE SERPL-SCNC: 106 MMOL/L (ref 98–107)
CHOLEST SERPL-MCNC: 114 MG/DL (ref 0–200)
CO2 SERPL-SCNC: 27.8 MMOL/L (ref 22–29)
CREAT SERPL-MCNC: 1.23 MG/DL (ref 0.76–1.27)
CREAT UR-MCNC: 62.7 MG/DL
DIFFERENTIAL COMMENT: ABNORMAL
EGFRCR SERPLBLD CKD-EPI 2021: 58.3 ML/MIN/1.73
EOSINOPHIL # BLD MANUAL: 0.09 10*3/MM3 (ref 0–0.4)
EOSINOPHIL NFR BLD MANUAL: 1 % (ref 0.3–6.2)
ERYTHROCYTE [DISTWIDTH] IN BLOOD BY AUTOMATED COUNT: 16.6 % (ref 12.3–15.4)
FT4I SERPL CALC-MCNC: 2 (ref 1.2–4.9)
GLOBULIN SER CALC-MCNC: 1.9 GM/DL
GLUCOSE SERPL-MCNC: 104 MG/DL (ref 65–99)
HBA1C MFR BLD: 6.5 % (ref 4.8–5.6)
HCT VFR BLD AUTO: 37.2 % (ref 37.5–51)
HDLC SERPL-MCNC: 37 MG/DL (ref 40–60)
HGB BLD-MCNC: 12.7 G/DL (ref 13–17.7)
LDLC SERPL CALC-MCNC: 57 MG/DL (ref 0–100)
LDLC/HDLC SERPL: 1.48 {RATIO}
LYMPHOCYTES # BLD MANUAL: 1.4 10*3/MM3 (ref 0.7–3.1)
LYMPHOCYTES NFR BLD MANUAL: 15.2 % (ref 19.6–45.3)
MCH RBC QN AUTO: 37.8 PG (ref 26.6–33)
MCHC RBC AUTO-ENTMCNC: 34.1 G/DL (ref 31.5–35.7)
MCV RBC AUTO: 110.7 FL (ref 79–97)
MICROALBUMIN UR-MCNC: 13.1 UG/ML
MONOCYTES # BLD MANUAL: 1.02 10*3/MM3 (ref 0.1–0.9)
MONOCYTES NFR BLD MANUAL: 11.1 % (ref 5–12)
NEUTROPHILS # BLD MANUAL: 6.6 10*3/MM3 (ref 1.7–7)
NEUTROPHILS NFR BLD MANUAL: 71.7 % (ref 42.7–76)
NRBC BLD AUTO-RTO: 0.5 /100 WBC (ref 0–0.2)
PLATELET # BLD AUTO: 151 10*3/MM3 (ref 140–450)
PLATELET BLD QL SMEAR: ABNORMAL
POTASSIUM SERPL-SCNC: 4.7 MMOL/L (ref 3.5–5.2)
PROT SERPL-MCNC: 6.3 G/DL (ref 6–8.5)
RBC # BLD AUTO: 3.36 10*6/MM3 (ref 4.14–5.8)
RBC MORPH BLD: ABNORMAL
SODIUM SERPL-SCNC: 143 MMOL/L (ref 136–145)
T3RU NFR SERPL: 26 % (ref 24–39)
T4 SERPL-MCNC: 7.7 UG/DL (ref 4.5–12)
TRIGL SERPL-MCNC: 111 MG/DL (ref 0–150)
TSH SERPL DL<=0.005 MIU/L-ACNC: 1.75 UIU/ML (ref 0.45–4.5)
VLDLC SERPL CALC-MCNC: 20 MG/DL (ref 5–40)
WBC # BLD AUTO: 9.21 10*3/MM3 (ref 3.4–10.8)

## 2023-09-04 NOTE — PROGRESS NOTES
"Chief Complaint  Medicare Wellness-subsequent    Subjective        Riky Moon presents to [facility]  History of Present Illness    Patient continues to have some lymphedema in both of his legs.  He states that he did go to the lymphedema clinic and did have some benefit.  However transportation and getting to go to the multiple clinics seem to be troublesome for him.  And his ability to get around has also declined.    Patient history having type 2 diabetes.  Currently Jardiance 10 mg daily.  He is also taking metformin 850 mg twice a day.  He denies any side effects of the medication.    Patient's hypothyroidism.  Currently on Synthroid 125 mcg daily.  He denies any side effects of the medication.    Patient does have hyperlipidemia.  Currently on pravastatin 40 mg daily.  Denies any side effects of the medicine.    Patient also has a history having essential hypertension.  Today blood pressure 1/20/1963.  He is currently taking losartan 100 mg daily.  He denies any side effects of the medicine.    Objective   Vital Signs:  /62 (BP Location: Left arm, Patient Position: Sitting, Cuff Size: Adult)   Pulse 86   Resp 16   Ht 185.4 cm (73\")   Wt (!) 136 kg (300 lb 4.8 oz)   SpO2 94%   BMI 39.62 kg/m²   Estimated body mass index is 39.62 kg/m² as calculated from the following:    Height as of this encounter: 185.4 cm (73\").    Weight as of this encounter: 136 kg (300 lb 4.8 oz).             Physical Exam  Vitals and nursing note reviewed.   Constitutional:       Appearance: He is well-developed.   HENT:      Head: Normocephalic and atraumatic.   Musculoskeletal:      Cervical back: Normal range of motion and neck supple.   Neurological:      Mental Status: He is alert and oriented to person, place, and time.   Psychiatric:         Behavior: Behavior normal.      Result Review :                   Assessment and Plan   Diagnoses and all orders for this visit:    1. Medicare " annual wellness visit, subsequent (Primary)    2. Type 2 diabetes mellitus without complication, without long-term current use of insulin  -     Hemoglobin A1c  -     Microalbumin / Creatinine Urine Ratio - Urine, Clean Catch    3. Hypothyroidism, unspecified type  -     Thyroid Panel With TSH    4. Essential hypertension  -     CBC & Differential  -     Comprehensive Metabolic Panel    5. Hyperlipidemia, unspecified hyperlipidemia type  -     Lipid Panel With LDL / HDL Ratio    6. Lymphedema  -     Ambulatory Referral to Home Health    Other orders  -     Manual Differential    I discussed with patient at today's visit about perhaps trying to get home health to help him with the lymphedema.  I think he would benefit from having legs wrapped regularly.  Referral has been placed at today's visit.  For the hyperlipidemia, continue taking pravastatin.  For essential hypertension, continue taking losartan.  For the hypothyroidism, continue Synthroid.  For the type 2 diabetes, continue taking Jardiance as well as metformin.         Follow Up   No follow-ups on file.  Patient was given instructions and counseling regarding his condition or for health maintenance advice. Please see specific information pulled into the AVS if appropriate.

## 2023-09-05 ENCOUNTER — ANTICOAGULATION VISIT (OUTPATIENT)
Dept: PHARMACY | Facility: HOSPITAL | Age: 83
End: 2023-09-05
Payer: MEDICARE

## 2023-09-05 DIAGNOSIS — I48.92 ATRIAL FLUTTER WITH CONTROLLED RESPONSE: Primary | ICD-10-CM

## 2023-09-05 LAB
INR PPP: 1.6 (ref 0.91–1.09)
PROTHROMBIN TIME: 19.1 SECONDS (ref 10–13.8)

## 2023-09-05 PROCEDURE — 85610 PROTHROMBIN TIME: CPT

## 2023-09-05 PROCEDURE — G0463 HOSPITAL OUTPT CLINIC VISIT: HCPCS

## 2023-09-05 PROCEDURE — 36416 COLLJ CAPILLARY BLOOD SPEC: CPT

## 2023-09-05 NOTE — PROGRESS NOTES
Anticoagulation Clinic Progress Note    Anticoagulation Summary  As of 2023      INR goal:  2.0-3.0   TTR:  36.6 % (1.2 y)   INR used for dosin.6 (2023)   Warfarin maintenance plan:  5 mg every Sat; 10 mg all other days   Weekly warfarin total:  65 mg   Plan last modified:  Olga Granados RPH (2023)   Next INR check:  2023   Priority:  Maintenance   Target end date:      Indications    Atrial flutter with controlled response [I48.92]                 Anticoagulation Episode Summary       INR check location:      Preferred lab:      Send INR reminders to:  [facility]    Comments:            Anticoagulation Care Providers       Provider Role Specialty Phone number    Ty Shelton MD Referring Cardiology [phone]            Clinic Interview:  Patient Findings     Negatives:  Signs/symptoms of thrombosis, Signs/symptoms of bleeding,   Laboratory test error suspected, Change in health, Change in alcohol use,   Change in activity, Upcoming invasive procedure, Emergency department   visit, Upcoming dental procedure, Missed doses, Extra doses, Change in   medications, Change in diet/appetite, Hospital admission, Bruising, Other   complaints      Clinical Outcomes     Negatives:  Major bleeding event, Thromboembolic event,   Anticoagulation-related hospital admission, Anticoagulation-related ED   visit, Anticoagulation-related fatality        INR History:      6/15/2023    11:45 AM 7/3/2023    11:30 AM 2023    11:30 AM 2023    11:15 AM 8/10/2023    11:30 AM 2023    12:39 PM 2023    11:30 AM   Anticoagulation Monitoring   INR 2.6 1.1 1.9 1.8 1.8  1.6   INR Date 6/15/2023 7/3/2023 2023 2023 8/10/2023  2023   INR Goal 2.0-3.0 2.0-3.0 2.0-3.0 2.0-3.0 2.0-3.0 2.0-3.0 2.0-3.0   Trend Same Same Same Same Up Same Up   Last Week Total 55 mg 30 mg 55 mg 55 mg 55 mg 60 mg 60 mg   Next Week Total 40 mg 65 mg 55 mg 60 mg 60 mg 30 mg 65 mg   Sun Hold  (6/25); Otherwise 10 mg 10 mg 10 mg 10 mg 10 mg Hold (8/20); Otherwise 10 mg 10 mg   Mon Hold (6/26); Otherwise 10 mg 15 mg (7/3); Otherwise 10 mg 10 mg 10 mg 10 mg Hold (8/21); Otherwise 10 mg 10 mg   Tue Hold (6/20), 10 mg (6/27) 10 mg (7/4); Otherwise 5 mg 5 mg 5 mg 5 mg Hold (8/22); Otherwise 5 mg 10 mg   Wed Hold (6/21), 15 mg (6/28) 10 mg 10 mg 10 mg 10 mg Hold (8/23); Otherwise 10 mg 10 mg   Thu Hold (6/22), 10 mg (6/29); Otherwise 5 mg 5 mg 5 mg 10 mg (7/27); Otherwise 5 mg 10 mg Hold (8/24); Otherwise 10 mg 10 mg   Fri Hold (6/23); Otherwise 10 mg 10 mg 10 mg 10 mg 10 mg 15 mg (8/25); Otherwise 10 mg 10 mg   Sat Hold (6/24); Otherwise 5 mg 5 mg 5 mg 5 mg 5 mg Hold (8/19), 15 mg (8/26); Otherwise 5 mg 5 mg       Plan:  1. INR is Subtherapeutic today- see above in Anticoagulation Summary.  Will instruct Riky Moon to Increase their warfarin regimen- see above in Anticoagulation Summary.  Patient's weekly dose was increased to 5mg on Saturday, and 10mg all other days.  2. Follow up in 2 weeks  3. Patient desires warfarin refills.  4. Verbal and written information provided. Patient expresses understanding and has no further questions at this time.    Gianni Cespedes, Pharmacy Intern

## 2023-09-05 NOTE — PROGRESS NOTES
I have supervised and reviewed the notes, assessments, and/or procedures performed by our Pharmacy Intern. The documented assessment and plan were developed cooperatively, and the plan was implemented in my presence. I concur with the documentation of this patient encounter.    Olga Granados [facility]

## 2023-09-19 ENCOUNTER — ANTICOAGULATION VISIT (OUTPATIENT)
Dept: PHARMACY | Facility: HOSPITAL | Age: 83
End: 2023-09-19
Payer: MEDICARE

## 2023-09-19 DIAGNOSIS — I48.92 ATRIAL FLUTTER WITH CONTROLLED RESPONSE: Primary | ICD-10-CM

## 2023-09-19 LAB
INR PPP: 2.1 (ref 0.91–1.09)
PROTHROMBIN TIME: 24.9 SECONDS (ref 10–13.8)

## 2023-09-19 PROCEDURE — 36416 COLLJ CAPILLARY BLOOD SPEC: CPT

## 2023-09-19 PROCEDURE — 85610 PROTHROMBIN TIME: CPT

## 2023-09-19 NOTE — PROGRESS NOTES
Anticoagulation Clinic Progress Note    Anticoagulation Summary  As of 2023      INR goal:  2.0-3.0   TTR:  36.1 % (1.2 y)   INR used for dosin.1 (2023)   Warfarin maintenance plan:  5 mg every Sat; 10 mg all other days   Weekly warfarin total:  65 mg   No change documented:  Wayne, Jamila, Pharmacy Intern   Plan last modified:  Olga Granados RPH (2023)   Next INR check:  10/3/2023   Priority:  Maintenance   Target end date:      Indications    Atrial flutter with controlled response [I48.92]                 Anticoagulation Episode Summary       INR check location:      Preferred lab:      Send INR reminders to:   [facility]    Comments:            Anticoagulation Care Providers       Provider Role Specialty Phone number    Ty Shelton MD Referring Cardiology [phone]            Clinic Interview:  Patient Findings     Negatives:  Signs/symptoms of thrombosis, Signs/symptoms of bleeding,   Laboratory test error suspected, Change in health, Change in alcohol use,   Change in activity, Upcoming invasive procedure, Emergency department   visit, Upcoming dental procedure, Missed doses, Extra doses, Change in   medications, Change in diet/appetite, Hospital admission, Bruising, Other   complaints      Clinical Outcomes     Negatives:  Major bleeding event, Thromboembolic event,   Anticoagulation-related hospital admission, Anticoagulation-related ED   visit, Anticoagulation-related fatality        INR History:      7/3/2023    11:30 AM 2023    11:30 AM 2023    11:15 AM 8/10/2023    11:30 AM 2023    12:39 PM 2023    11:30 AM 2023    11:30 AM   Anticoagulation Monitoring   INR 1.1 1.9 1.8 1.8  1.6 2.1   INR Date 7/3/2023 2023 2023 8/10/2023  2023 2023   INR Goal 2.0-3.0 2.0-3.0 2.0-3.0 2.0-3.0 2.0-3.0 2.0-3.0 2.0-3.0   Trend Same Same Same Up Same Up Same   Last Week Total 30 mg 55 mg 55 mg 55 mg 60 mg 60 mg 65 mg   Next Week  Total 65 mg 55 mg 60 mg 60 mg 30 mg 65 mg 65 mg   Sun 10 mg 10 mg 10 mg 10 mg Hold (8/20); Otherwise 10 mg 10 mg 10 mg   Mon 15 mg (7/3); Otherwise 10 mg 10 mg 10 mg 10 mg Hold (8/21); Otherwise 10 mg 10 mg 10 mg   Tue 10 mg (7/4); Otherwise 5 mg 5 mg 5 mg 5 mg Hold (8/22); Otherwise 5 mg 10 mg 10 mg   Wed 10 mg 10 mg 10 mg 10 mg Hold (8/23); Otherwise 10 mg 10 mg 10 mg   Thu 5 mg 5 mg 10 mg (7/27); Otherwise 5 mg 10 mg Hold (8/24); Otherwise 10 mg 10 mg 10 mg   Fri 10 mg 10 mg 10 mg 10 mg 15 mg (8/25); Otherwise 10 mg 10 mg 10 mg   Sat 5 mg 5 mg 5 mg 5 mg Hold (8/19), 15 mg (8/26); Otherwise 5 mg 5 mg 5 mg       Plan:  1. INR is Therapeutic today- see above in Anticoagulation Summary.  Will instruct Riky Moon to Continue their warfarin regimen- see above in Anticoagulation Summary.  2. Follow up in 2 weeks  3. Patient declines warfarin refills.  4. Verbal and written information provided. Patient expresses understanding and has no further questions at this time.    Jamila Black, Pharmacy Intern

## 2023-09-25 ENCOUNTER — TELEPHONE (OUTPATIENT)
Dept: INTERNAL MEDICINE | Facility: CLINIC | Age: 83
End: 2023-09-25

## 2023-09-25 NOTE — TELEPHONE ENCOUNTER
PATIENT HAS A LOW HEART RATE/ 50     HE IS ON ATENOLOL AND NORVASC     Caller: BIA BALLARD    Relationship: Home Health    Best call back number:   BIA Townsend [facility] ([facility]) [phone]       What orders are you requesting (i.e. lab or imaging):  START OF CARE ORDERS / AMEDISIS    In what timeframe would the patient need to come in: ASAP     Where will you receive your lab/imaging services: [facility]     Additional notes:     MEDICATION CHANGES : JARDIANCE 12.5 MG (INCREASE)     MYRBECRIQ 50MG 1 DAILY     DITROPAN DISCONTINUED     COUMADIN 10MG DAILY  EXCEPT ON SAT  SATURDAY TAKE  5MG OF COUMADIN     PATIENT GOES TO CLINIC FOR PT/INR     BUT ELIO CAN DO IN HOUSE PT INR IF OK WITH

## 2023-10-04 ENCOUNTER — ANTICOAGULATION VISIT (OUTPATIENT)
Dept: PHARMACY | Facility: HOSPITAL | Age: 83
End: 2023-10-04
Payer: MEDICARE

## 2023-10-04 DIAGNOSIS — I48.92 ATRIAL FLUTTER WITH CONTROLLED RESPONSE: Primary | ICD-10-CM

## 2023-10-04 LAB — INR PPP: 1.9

## 2023-10-04 NOTE — PROGRESS NOTES
Anticoagulation Clinic Progress Note    Anticoagulation Summary  As of 10/4/2023      INR goal:  2.0-3.0   TTR:  36.5 % (1.3 y)   INR used for dosin.90 (10/4/2023)   Warfarin maintenance plan:  10 mg every day   Weekly warfarin total:  70 mg   Plan last modified:  Yu Rader, PharmD (10/4/2023)   Next INR check:  10/11/2023   Priority:  Maintenance   Target end date:      Indications    Atrial flutter with controlled response [I48.92]                 Anticoagulation Episode Summary       INR check location:      Preferred lab:      Send INR reminders to:  [facility]    Comments:            Anticoagulation Care Providers       Provider Role Specialty Phone number    Ty Shelton MD Referring Cardiology [phone]            INR History:      2023    11:15 AM 8/10/2023    11:30 AM 2023    12:39 PM 2023    11:30 AM 2023    11:30 AM 10/4/2023    12:00 AM 10/4/2023     1:11 PM   Anticoagulation Monitoring   INR 1.8 1.8  1.6 2.1  1.90   INR Date 2023 8/10/2023  2023 2023  10/4/2023   INR Goal 2.0-3.0 2.0-3.0 2.0-3.0 2.0-3.0 2.0-3.0  2.0-3.0   Trend Same Up Same Up Same  Up   Last Week Total 55 mg 55 mg 60 mg 60 mg 65 mg  65 mg   Next Week Total 60 mg 60 mg 30 mg 65 mg 65 mg  70 mg   Sun 10 mg 10 mg Hold (); Otherwise 10 mg 10 mg 10 mg  10 mg   Mon 10 mg 10 mg Hold (); Otherwise 10 mg 10 mg 10 mg  10 mg   Tue 5 mg 5 mg Hold (); Otherwise 5 mg 10 mg 10 mg  10 mg   Wed 10 mg 10 mg Hold (); Otherwise 10 mg 10 mg 10 mg  10 mg   Thu 10 mg (); Otherwise 5 mg 10 mg Hold (); Otherwise 10 mg 10 mg 10 mg  10 mg   Fri 10 mg 10 mg 15 mg (); Otherwise 10 mg 10 mg 10 mg  10 mg   Sat 5 mg 5 mg Hold (), 15 mg (); Otherwise 5 mg 5 mg 5 mg  10 mg   Historical INR      1.90            This result is from an external source.       Plan:  1. INR is Subtherapeutic today- see above in Anticoagulation Summary.   Provided instructions to  Marilyn  with [facility] to Change their warfarin regimen- see above in Anticoagulation Summary.  2. Follow up in 1 week      Ivory BañuelosD

## 2023-10-11 ENCOUNTER — ANTICOAGULATION VISIT (OUTPATIENT)
Dept: PHARMACY | Facility: HOSPITAL | Age: 83
End: 2023-10-11
Payer: MEDICARE

## 2023-10-11 DIAGNOSIS — I48.92 ATRIAL FLUTTER WITH CONTROLLED RESPONSE: Primary | ICD-10-CM

## 2023-10-11 LAB — INR PPP: 2.4

## 2023-10-11 NOTE — PROGRESS NOTES
Anticoagulation Clinic Progress Note    Anticoagulation Summary  As of 10/11/2023      INR goal:  2.0-3.0   TTR:  37.2% (1.3 y)   INR used for dosin.40 (10/11/2023)   Warfarin maintenance plan:  10 mg every day   Weekly warfarin total:  70 mg   No change documented:  Olga Granados, ROLO   Plan last modified:  Yu Rader PharmD (10/4/2023)   Next INR check:  10/18/2023   Priority:  Maintenance   Target end date:      Indications    Atrial flutter with controlled response [I48.92]                 Anticoagulation Episode Summary       INR check location:      Preferred lab:      [facility]   [facility]    Comments:            Anticoagulation Care Providers       Provider Role Specialty Phone number    Ty Shelton MD Referring Cardiology [phone]            INR History:      2023    12:39 PM 2023    11:30 AM 2023    11:30 AM 10/4/2023    12:00 AM 10/4/2023     1:11 PM 10/11/2023    12:00 AM 10/11/2023    10:51 AM   Anticoagulation Monitoring   INR  1.6 2.1  1.90  2.40   INR Date  2023 2023  10/4/2023  10/11/2023   INR Goal 2.0-3.0 2.0-3.0 2.0-3.0  2.0-3.0  2.0-3.0   Trend Same Up Same  Up  Same   Last Week Total 60 mg 60 mg 65 mg  65 mg  70 mg   Next Week Total 30 mg 65 mg 65 mg  70 mg  70 mg   Sun Hold (); Otherwise 10 mg 10 mg 10 mg  10 mg  10 mg   Mon Hold (); Otherwise 10 mg 10 mg 10 mg  10 mg  10 mg   Tue Hold (); Otherwise 5 mg 10 mg 10 mg  10 mg  10 mg   Wed Hold (); Otherwise 10 mg 10 mg 10 mg  10 mg  10 mg   Thu Hold (); Otherwise 10 mg 10 mg 10 mg  10 mg  10 mg   Fri 15 mg (); Otherwise 10 mg 10 mg 10 mg  10 mg  10 mg   Sat Hold (), 15 mg (); Otherwise 5 mg 5 mg 5 mg  10 mg  10 mg   Historical INR    1.90      2.40            This result is from an external source.       Plan:  1. INR is Therapeutic today- see above in Anticoagulation Summary.   Provided instructions to Marilyn with [facility]  to Continue their warfarin regimen- see above in Anticoagulation Summary.  2. Follow up in 1 week      Olga Granados RPH

## 2023-10-19 ENCOUNTER — ANTICOAGULATION VISIT (OUTPATIENT)
Dept: PHARMACY | Facility: HOSPITAL | Age: 83
End: 2023-10-19
Payer: MEDICARE

## 2023-10-19 DIAGNOSIS — I48.92 ATRIAL FLUTTER WITH CONTROLLED RESPONSE: Primary | ICD-10-CM

## 2023-10-19 LAB — INR PPP: 2.9

## 2023-10-19 NOTE — PROGRESS NOTES
Anticoagulation Clinic Progress Note    Anticoagulation Summary  As of 10/19/2023      INR goal:  2.0-3.0   TTR:  38.2% (1.3 y)   INR used for dosin.90 (10/19/2023)   Warfarin maintenance plan:  10 mg every day   Weekly warfarin total:  70 mg   No change documented:  Olga Granados RPH   Plan last modified:  Yu Rader, PharmD (10/4/2023)   Next INR check:  10/26/2023   Priority:  Maintenance   Target end date:      Indications    Atrial flutter with controlled response [I48.92]                 Anticoagulation Episode Summary       INR check location:      Preferred lab:      [facility]:   [facility]    Comments:            Anticoagulation Care Providers       Provider Role Specialty Phone number    Ty Shelton MD Referring Cardiology [phone]            INR History:      2023    11:30 AM 10/4/2023    12:00 AM 10/4/2023     1:11 PM 10/11/2023    12:00 AM 10/11/2023    10:51 AM 10/19/2023    12:00 AM 10/19/2023    10:28 AM   Anticoagulation Monitoring   INR 2.1  1.90  2.40  2.90   INR Date 2023  10/4/2023  10/11/2023  10/19/2023   INR Goal 2.0-3.0  2.0-3.0  2.0-3.0  2.0-3.0   Trend Same  Up  Same  Same   Last Week Total 65 mg  65 mg  70 mg  70 mg   Next Week Total 65 mg  70 mg  70 mg  70 mg   Sun 10 mg  10 mg  10 mg  10 mg   Mon 10 mg  10 mg  10 mg  10 mg   Tue 10 mg  10 mg  10 mg  10 mg   Wed 10 mg  10 mg  10 mg  10 mg   Thu 10 mg  10 mg  10 mg  10 mg   Fri 10 mg  10 mg  10 mg  10 mg   Sat 5 mg  10 mg  10 mg  10 mg   Historical INR  1.90      2.40      2.90            This result is from an external source.       Plan:  1. INR is Therapeutic today- see above in Anticoagulation Summary.   Provided [facility] to Florencio with [facility] to Continue their warfarin regimen- see above in Anticoagulation Summary.  2. Follow up in 1 week      Olga Granados RPH  
4

## 2023-10-25 ENCOUNTER — ANTICOAGULATION VISIT (OUTPATIENT)
Dept: PHARMACY | Facility: HOSPITAL | Age: 83
End: 2023-10-25
Payer: MEDICARE

## 2023-10-25 DIAGNOSIS — I48.92 ATRIAL FLUTTER WITH CONTROLLED RESPONSE: Primary | ICD-10-CM

## 2023-10-25 LAB — INR PPP: 2.7

## 2023-10-25 NOTE — PROGRESS NOTES
Anticoagulation Clinic Progress Note    Anticoagulation Summary  As of 10/25/2023      INR goal:  2.0-3.0   TTR:  39.0% (1.3 y)   INR used for dosin.70 (10/25/2023)   Warfarin maintenance plan:  10 mg every day   Weekly warfarin total:  70 mg   No change documented:  Olga Granados RPH   Plan last modified:  Yu Rader, PharmD (10/4/2023)   Next INR check:  2023   Priority:  Maintenance   Target end date:      Indications    Atrial flutter with controlled response [I48.92]                 Anticoagulation Episode Summary       INR check location:      Preferred lab:      Send INR reminders to:   [facility] [place]    Comments:            Anticoagulation Care Providers       Provider Role Specialty Phone number    Ty Shelton MD Referring Cardiology [phone]            INR History:      10/4/2023     1:11 PM 10/11/2023    12:00 AM 10/11/2023    10:51 AM 10/19/2023    12:00 AM 10/19/2023    10:28 AM 10/25/2023    12:00 AM 10/25/2023     1:00 PM   Anticoagulation Monitoring   INR 1.90  2.40  2.90  2.70   INR Date 10/4/2023  10/11/2023  10/19/2023  10/25/2023   INR Goal 2.0-3.0  2.0-3.0  2.0-3.0  2.0-3.0   Trend Up  Same  Same  Same   Last Week Total 65 mg  70 mg  70 mg  70 mg   Next Week Total 70 mg  70 mg  70 mg  70 mg   Sun 10 mg  10 mg  10 mg  10 mg   Mon 10 mg  10 mg  10 mg  10 mg   Tue 10 mg  10 mg  10 mg  10 mg   Wed 10 mg  10 mg  10 mg  10 mg   Thu 10 mg  10 mg  10 mg  10 mg   Fri 10 mg  10 mg  10 mg  10 mg   Sat 10 mg  10 mg  10 mg  10 mg   Historical INR  2.40      2.90      2.70            This result is from an external source.       Plan:  1. INR is Therapeutic today- see above in Anticoagulation Summary.   Provided instructions to Marilyn with [facility] to Continue their warfarin regimen- see above in Anticoagulation Summary.  2. Follow up in 2 weeks    Patient is interested in home testing. Patient has medicare and anthem supplement      Olga Granados  RPH

## 2023-11-09 ENCOUNTER — ANTICOAGULATION VISIT (OUTPATIENT)
Dept: PHARMACY | Facility: HOSPITAL | Age: 83
End: 2023-11-09
Payer: MEDICARE

## 2023-11-09 DIAGNOSIS — I48.92 ATRIAL FLUTTER WITH CONTROLLED RESPONSE: Primary | ICD-10-CM

## 2023-11-09 LAB — INR PPP: 3.5

## 2023-11-09 NOTE — PROGRESS NOTES
Anticoagulation Clinic Progress Note    Anticoagulation Summary  As of 11/9/2023      INR goal:  2.0-3.0   TTR:  39.0% (1.4 y)   INR used for dosing:  3.50 (11/9/2023)   Warfarin maintenance plan:  10 mg every day   Weekly warfarin total:  70 mg   Plan last modified:  Yu Rader, PharmD (10/4/2023)   Next INR check:  11/16/2023   Priority:  Maintenance   Target end date:      Indications    Atrial flutter with controlled response [I48.92]                 Anticoagulation Episode Summary       INR check location:      Preferred lab:      Send INR reminders to:  [facility]    Comments:            Anticoagulation Care Providers       Provider Role Specialty Phone number    Ty Shelton MD Referring Cardiology [phone]            INR History:      10/11/2023    10:51 AM 10/19/2023    12:00 AM 10/19/2023    10:28 AM 10/25/2023    12:00 AM 10/25/2023     1:00 PM 11/9/2023    12:00 AM 11/9/2023     2:14 PM   Anticoagulation Monitoring   INR 2.40  2.90  2.70  3.50   INR Date 10/11/2023  10/19/2023  10/25/2023  11/9/2023   INR Goal 2.0-3.0  2.0-3.0  2.0-3.0  2.0-3.0   Trend Same  Same  Same  Same   Last Week Total 70 mg  70 mg  70 mg  70 mg   Next Week Total 70 mg  70 mg  70 mg  65 mg   Sun 10 mg  10 mg  10 mg  10 mg   Mon 10 mg  10 mg  10 mg  10 mg   Tue 10 mg  10 mg  10 mg  10 mg   Wed 10 mg  10 mg  10 mg  10 mg   Thu 10 mg  10 mg  10 mg  5 mg (11/9)   Fri 10 mg  10 mg  10 mg  10 mg   Sat 10 mg  10 mg  10 mg  10 mg   Historical INR  2.90      2.70      3.50            This result is from an external source.       Plan:  1. INR is Supratherapeutic today- see above in Anticoagulation Summary.   Provided instructions to Yu with [facility] to Change their warfarin regimen- see above in Anticoagulation Summary.   reduce 5 mg on thurs, 10 mg AOCLARISSE, rck 1 week (then discuss home testing with patient as he will be discharged then) - Luis   2. Follow up in 1  dwight Granados, [facility], located within [facility]

## 2023-11-10 ENCOUNTER — TELEPHONE (OUTPATIENT)
Dept: INTERNAL MEDICINE | Facility: CLINIC | Age: 83
End: 2023-11-10

## 2023-11-10 ENCOUNTER — OFFICE VISIT (OUTPATIENT)
Dept: INTERNAL MEDICINE | Facility: CLINIC | Age: 83
End: 2023-11-10
Payer: MEDICARE

## 2023-11-10 ENCOUNTER — APPOINTMENT (OUTPATIENT)
Dept: GENERAL RADIOLOGY | Facility: HOSPITAL | Age: 83
End: 2023-11-10
Payer: MEDICARE

## 2023-11-10 ENCOUNTER — HOSPITAL ENCOUNTER (EMERGENCY)
Facility: HOSPITAL | Age: 83
Discharge: HOME | End: 2023-11-10
Attending: EMERGENCY MEDICINE
Payer: MEDICARE

## 2023-11-10 ENCOUNTER — APPOINTMENT (OUTPATIENT)
Dept: CT IMAGING | Facility: HOSPITAL | Age: 83
End: 2023-11-10
Payer: MEDICARE

## 2023-11-10 VITALS
HEART RATE: 81 BPM | HEIGHT: 72 IN | RESPIRATION RATE: 18 BRPM | DIASTOLIC BLOOD PRESSURE: 62 MMHG | TEMPERATURE: 97.5 F | WEIGHT: 300 LBS | BODY MASS INDEX: 40.63 KG/M2 | SYSTOLIC BLOOD PRESSURE: 145 MMHG | OXYGEN SATURATION: 98 %

## 2023-11-10 VITALS
SYSTOLIC BLOOD PRESSURE: 128 MMHG | TEMPERATURE: 97.5 F | DIASTOLIC BLOOD PRESSURE: 70 MMHG | HEIGHT: 73 IN | BODY MASS INDEX: 40.42 KG/M2 | RESPIRATION RATE: 16 BRPM | HEART RATE: 59 BPM | OXYGEN SATURATION: 96 % | WEIGHT: 305 LBS

## 2023-11-10 DIAGNOSIS — M54.50 ACUTE LOW BACK PAIN WITHOUT SCIATICA, UNSPECIFIED BACK PAIN LATERALITY: ICD-10-CM

## 2023-11-10 DIAGNOSIS — M54.50 LEFT-SIDED LOW BACK PAIN WITHOUT SCIATICA, UNSPECIFIED CHRONICITY: Primary | ICD-10-CM

## 2023-11-10 DIAGNOSIS — R06.02 SHORTNESS OF BREATH ON EXERTION: Primary | ICD-10-CM

## 2023-11-10 LAB
ALBUMIN SERPL-MCNC: 3.8 G/DL (ref 3.5–5.2)
ALBUMIN/GLOB SERPL: 1.5 G/DL
ALP SERPL-CCNC: 57 U/L (ref 39–117)
ALT SERPL W P-5'-P-CCNC: 15 U/L (ref 1–41)
ANION GAP SERPL CALCULATED.3IONS-SCNC: 8.5 MMOL/L (ref 5–15)
AST SERPL-CCNC: 15 U/L (ref 1–40)
BASOPHILS # BLD AUTO: 0.06 10*3/MM3 (ref 0–0.2)
BASOPHILS NFR BLD AUTO: 0.6 % (ref 0–1.5)
BILIRUB SERPL-MCNC: 0.6 MG/DL (ref 0–1.2)
BILIRUB UR QL STRIP: NEGATIVE
BUN SERPL-MCNC: 22 MG/DL (ref 8–23)
BUN/CREAT SERPL: 19.1 (ref 7–25)
CALCIUM SPEC-SCNC: 9.4 MG/DL (ref 8.6–10.5)
CHLORIDE SERPL-SCNC: 103 MMOL/L (ref 98–107)
CLARITY UR: CLEAR
CO2 SERPL-SCNC: 27.5 MMOL/L (ref 22–29)
COLOR UR: YELLOW
CREAT SERPL-MCNC: 1.15 MG/DL (ref 0.76–1.27)
DEPRECATED RDW RBC AUTO: 74.6 FL (ref 37–54)
EGFRCR SERPLBLD CKD-EPI 2021: 62.8 ML/MIN/1.73
EOSINOPHIL # BLD AUTO: 0.09 10*3/MM3 (ref 0–0.4)
EOSINOPHIL NFR BLD AUTO: 0.9 % (ref 0.3–6.2)
ERYTHROCYTE [DISTWIDTH] IN BLOOD BY AUTOMATED COUNT: 17.4 % (ref 12.3–15.4)
FLUAV SUBTYP SPEC NAA+PROBE: NOT DETECTED
FLUBV RNA ISLT QL NAA+PROBE: NOT DETECTED
GLOBULIN UR ELPH-MCNC: 2.5 GM/DL
GLUCOSE SERPL-MCNC: 90 MG/DL (ref 65–99)
GLUCOSE UR STRIP-MCNC: ABNORMAL MG/DL
HCT VFR BLD AUTO: 38.1 % (ref 37.5–51)
HGB BLD-MCNC: 12.6 G/DL (ref 13–17.7)
HGB UR QL STRIP.AUTO: ABNORMAL
IMM GRANULOCYTES # BLD AUTO: 0.03 10*3/MM3 (ref 0–0.05)
IMM GRANULOCYTES NFR BLD AUTO: 0.3 % (ref 0–0.5)
KETONES UR QL STRIP: NEGATIVE
LEUKOCYTE ESTERASE UR QL STRIP.AUTO: NEGATIVE
LIPASE SERPL-CCNC: 36 U/L (ref 13–60)
LYMPHOCYTES # BLD AUTO: 1.58 10*3/MM3 (ref 0.7–3.1)
LYMPHOCYTES NFR BLD AUTO: 16 % (ref 19.6–45.3)
MCH RBC QN AUTO: 37.5 PG (ref 26.6–33)
MCHC RBC AUTO-ENTMCNC: 33.1 G/DL (ref 31.5–35.7)
MCV RBC AUTO: 113.4 FL (ref 79–97)
MONOCYTES # BLD AUTO: 0.84 10*3/MM3 (ref 0.1–0.9)
MONOCYTES NFR BLD AUTO: 8.5 % (ref 5–12)
NEUTROPHILS NFR BLD AUTO: 7.25 10*3/MM3 (ref 1.7–7)
NEUTROPHILS NFR BLD AUTO: 73.7 % (ref 42.7–76)
NITRITE UR QL STRIP: NEGATIVE
NT-PROBNP SERPL-MCNC: 1201 PG/ML (ref 0–1800)
PH UR STRIP.AUTO: 5.5 [PH] (ref 5–8)
PLATELET # BLD AUTO: 191 10*3/MM3 (ref 140–450)
PMV BLD AUTO: 11.4 FL (ref 6–12)
POTASSIUM SERPL-SCNC: 4.6 MMOL/L (ref 3.5–5.2)
PROT SERPL-MCNC: 6.3 G/DL (ref 6–8.5)
PROT UR QL STRIP: ABNORMAL
RBC # BLD AUTO: 3.36 10*6/MM3 (ref 4.14–5.8)
SARS-COV-2 RNA RESP QL NAA+PROBE: NOT DETECTED
SODIUM SERPL-SCNC: 139 MMOL/L (ref 136–145)
SP GR UR STRIP: 1.02 (ref 1–1.03)
TROPONIN T SERPL HS-MCNC: 30 NG/L
TROPONIN T SERPL HS-MCNC: 30 NG/L
UROBILINOGEN UR QL STRIP: ABNORMAL
WBC NRBC COR # BLD: 9.85 10*3/MM3 (ref 3.4–10.8)

## 2023-11-10 PROCEDURE — 83880 ASSAY OF NATRIURETIC PEPTIDE: CPT | Performed by: EMERGENCY MEDICINE

## 2023-11-10 PROCEDURE — 1159F MED LIST DOCD IN RCRD: CPT | Performed by: FAMILY MEDICINE

## 2023-11-10 PROCEDURE — 99214 OFFICE O/P EST MOD 30 MIN: CPT | Performed by: FAMILY MEDICINE

## 2023-11-10 PROCEDURE — 93010 ELECTROCARDIOGRAM REPORT: CPT | Performed by: INTERNAL MEDICINE

## 2023-11-10 PROCEDURE — 99284 EMERGENCY DEPT VISIT MOD MDM: CPT | Performed by: EMERGENCY MEDICINE

## 2023-11-10 PROCEDURE — 3078F DIAST BP <80 MM HG: CPT | Performed by: FAMILY MEDICINE

## 2023-11-10 PROCEDURE — 85025 COMPLETE CBC W/AUTO DIFF WBC: CPT | Performed by: EMERGENCY MEDICINE

## 2023-11-10 PROCEDURE — 87636 SARSCOV2 & INF A&B AMP PRB: CPT | Performed by: EMERGENCY MEDICINE

## 2023-11-10 PROCEDURE — 74176 CT ABD & PELVIS W/O CONTRAST: CPT

## 2023-11-10 PROCEDURE — 83690 ASSAY OF LIPASE: CPT | Performed by: EMERGENCY MEDICINE

## 2023-11-10 PROCEDURE — 84484 ASSAY OF TROPONIN QUANT: CPT | Performed by: EMERGENCY MEDICINE

## 2023-11-10 PROCEDURE — 1160F RVW MEDS BY RX/DR IN RCRD: CPT | Performed by: FAMILY MEDICINE

## 2023-11-10 PROCEDURE — 36415 COLL VENOUS BLD VENIPUNCTURE: CPT

## 2023-11-10 PROCEDURE — 93005 ELECTROCARDIOGRAM TRACING: CPT | Performed by: EMERGENCY MEDICINE

## 2023-11-10 PROCEDURE — 99284 EMERGENCY DEPT VISIT MOD MDM: CPT

## 2023-11-10 PROCEDURE — 81003 URINALYSIS AUTO W/O SCOPE: CPT | Performed by: EMERGENCY MEDICINE

## 2023-11-10 PROCEDURE — 3074F SYST BP LT 130 MM HG: CPT | Performed by: FAMILY MEDICINE

## 2023-11-10 PROCEDURE — 71045 X-RAY EXAM CHEST 1 VIEW: CPT

## 2023-11-10 PROCEDURE — G0463 HOSPITAL OUTPT CLINIC VISIT: HCPCS | Performed by: EMERGENCY MEDICINE

## 2023-11-10 PROCEDURE — 80053 COMPREHEN METABOLIC PANEL: CPT | Performed by: EMERGENCY MEDICINE

## 2023-11-10 RX ORDER — SODIUM CHLORIDE 0.9 % (FLUSH) 0.9 %
10 SYRINGE (ML) INJECTION AS NEEDED
Status: DISCONTINUED | OUTPATIENT
Start: 2023-11-10 | End: 2023-11-10 | Stop reason: HOSPADM

## 2023-11-10 NOTE — DISCHARGE INSTRUCTIONS
We discussed the CAT scan results did not show anything concerning inside you related to the back pain you have when you move.  Your lab work for infection and anemia was negative.  Your lab work for electrolyte levels, hydration status and kidney function were good.  Your urine came back not infected.  I was concerned that maybe an infection had worked its way up the ureter to that left side and was part of the reason you are having pain.  The troponin which is for heart attack was elevated but not incredibly high and I have been wanting to get a repeat to make sure it had not gone higher or was the same at least.  Do not expect this to be high but it is part of a good work-up since it was a little bit elevated.  Your EKG showed you are in atrial flutter.  The heart rate was normal.  Your blood pressure has been good here.  Your back pain is worse with movement which suggests more of a musculoskeletal issue rather than a serious emergency issue.  You said that you could take Tylenol for the pain and that it was tolerable with that.  I encourage you to follow-up with your primary care doctor next week.  You are always welcome to return here if anything changes or gets worse.

## 2023-11-10 NOTE — FSED PROVIDER NOTE
Subjective   History of Present Illness  83-year-old obese gentleman who was sent here by his primary care doctor as he noted that the gentleman was short of breath walking into the office more than his baseline and increased swelling of his lower extremities.  Patient arrived at his PCP because he was having left lower back pain worsened with certain positions that he could not describe to me and short lasting.  He has been having this for several weeks.  Patient also noted that when he had bowel movements that back pain was less painful.  He has 1 bowel movement every 1 to 3 days.  Nonbloody no black stools.  He says he has a history of a flutter.  And that he is on multiple medications.  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  He is not he said on any water pills.  He has no chest pain or abdominal pain.  Had no fever or cough.  He has a stimulator for the urinary bladder.  He only has pain with certain positions in his left lower back and that is why he is here.      Review of Systems    Past Medical History:   Diagnosis Date    Acid reflux     Anemia     Arthritis     OSTEO    Asthma     Chest discomfort     Colon polyp     COPD (chronic obstructive pulmonary disease)     Depression     Diabetes mellitus     type 2    Disease of thyroid gland     Emphysema lung     High cholesterol     Hypertension     Morbid obesity     Neuropathy     BOTH FEET    Obesity, Class III, BMI 40-49.9 (morbid obesity)     PAD (peripheral artery disease)     Poor circulation of extremity     LEFT LEG    Sleep apnea     Vitamin D deficiency        Allergies   Allergen Reactions    Lisinopril Unknown - High Severity     Other reaction(s): Cough       Past Surgical History:   Procedure Laterality Date    APPENDECTOMY      CATARACT EXTRACTION W/ INTRAOCULAR LENS IMPLANT Bilateral     CHOLECYSTECTOMY      COLONOSCOPY  01/01/2014    COLONOSCOPY W/  POLYPECTOMY      BENIGN    HERNIA REPAIR      INTERSTIM PLACEMENT Left 2016    BLADDER CONTROL    KNEE ARTHROPLASTY Right     MOUTH SURGERY  2018    NOSE SURGERY      REMOVED BLOCKAGE     ROTATOR CUFF REPAIR Right     TOTAL HIP ARTHROPLASTY Right 2017    Procedure: RIGHT TOTAL HIP ARTHROPLASTY;  Surgeon: Riky Fernando MD;  Location: [facility] OR;  Service:        Family History   Problem Relation Age of Onset    Stroke Mother     Hypertension Mother     Heart attack Father     Alcohol abuse Father     Heart disease Father     Diabetes Sister     Heart disease Brother     Stomach cancer Brother     Stroke Brother     Alcohol abuse Brother     Hypertension Daughter     Diabetes Daughter     Malig Hyperthermia Neg Hx        Social History     Socioeconomic History    Marital status:      Spouse name: Chitra    Years of education: high school   Tobacco Use    Smoking status: Former     Packs/day: 1.50     Years: 40.00     Additional pack years: 0.00     Total pack years: 60.00     Types: Cigarettes     Quit date:      Years since quittin.8    Smokeless tobacco: Never    Tobacco comments:     from age [age]   Vaping Use    Vaping Use: Never used   Substance and Sexual Activity    Alcohol use: Yes     Comment: HOLIDAYS  caffeine -2 cups coffee daily    Drug use: No     Comment: PRN use for pain    Sexual activity: Defer           Objective   Physical Exam  Vitals and nursing note reviewed.   Constitutional:       Appearance: He is obese.   HENT:      Head: Atraumatic.      Mouth/Throat:      Mouth: Mucous membranes are moist.      Pharynx: Oropharynx is clear.   Eyes:      Extraocular Movements: Extraocular movements intact.   Cardiovascular:      Rate and Rhythm: Normal rate. Rhythm irregular.      Heart sounds: No murmur heard.  Pulmonary:      Effort: Pulmonary effort is normal. No tachypnea, accessory muscle usage or respiratory distress.      Breath sounds: Normal breath sounds.  No stridor. No decreased breath sounds, wheezing, rhonchi or rales.   Chest:      Chest wall: No deformity or crepitus.      Comments: Back exam he says he is tender and he pointed to just below the left and last rib quite lateral but not axial without mass redness swelling ecchymosis and the tenderness is very mild.  When palpated it does not create radiating pain.  When asked where the pain goes when it does hurt he says everywhere  Abdominal:      Palpations: Abdomen is soft.      Tenderness: There is no abdominal tenderness. There is no guarding or rebound.   Musculoskeletal:      Cervical back: Neck supple.      Right lower leg: No tenderness. Edema present.      Left lower leg: No tenderness. Edema present.      Comments: No weeping of the lower extremities, he has some anterior tibia redness distally.  Bilaterally   Skin:     General: Skin is dry.      Capillary Refill: Capillary refill takes less than 2 seconds.   Neurological:      General: No focal deficit present.      Mental Status: He is alert and oriented to person, place, and time. He is disoriented.   Psychiatric:         Mood and Affect: Mood normal.         Procedures           ED Course  ED Course as of 11/10/23 1854   Fri Nov 10, 2023   1644 Lipase and CMP wnl, kidney function good, no pancreatitis. Trop 30 unlikely high enough to be a concern but will get a delta, slightly low Hg, slight in crease in ANC and slightly low L% whether these are truly related to infection it is hard to know, UA pending [AR]   1646 Single High Sensitivity Troponin T(!) [AR]   1647 CT Abdomen Pelvis Stone Protocol [AR]   1716 Covid influenza neg [AR]   1717 CT abd pelvis without neg for pathology associated with symptoms of left flank low back pain when moves  [AR]   1719 BNP below 1800 despite swelling of legs suggesting no lung involvement [AR]   1720 Chest xray no infiltrates or evidence of fluid overload, BNP and exam confirms this [AR]   1826 Second trop keeps  getting cancelled, will try again. [AR]   1854 Second troponin with no delta. [AR]      ED Course User Index  [AR] Yesenia Etienne MD                                           Medical Decision Making  [facility] includes [facility] not limited to heart failure, MI, valve insufficiency, kidney insufficiency related to the shortness of breath and swelling.  Left lower back pain potentially related to musculoskeletal, ureteral stone, constipation, location for aorta dysfunction however it still on the list, rib fractures/injury    We discussed the CAT scan results did not show anything concerning inside you related to the back pain you have when you move.  Your lab work for infection and anemia was negative.  Your lab work for electrolyte levels, hydration status and kidney function were good.  Your urine came back not infected.  I was concerned that maybe an infection had worked its way up the ureter to that left side and was part of the reason you are having pain.  The troponin which is for heart attack was elevated but not incredibly high and I have been wanting to get a repeat to make sure it had not gone higher or was the same at least.  Do not expect this to be high but it is part of a good work-up since it was a little bit elevated.  Your EKG showed you are in atrial flutter.  The heart rate was normal.  Your blood pressure has been good here.  Your back pain is worse with movement which suggests more of a musculoskeletal issue rather than a serious emergency issue.  You said that you could take Tylenol for the pain and that it was tolerable with that.  I encourage you to follow-up with your primary care doctor next week.  You are always welcome to return here if anything changes or gets worse.    He understood and agreed    Amount and/or Complexity of Data Reviewed  Labs: ordered. Decision-making details documented in ED Course.  Radiology: ordered and independent interpretation performed. Decision-making  details documented in ED Course.  ECG/medicine tests: ordered and independent interpretation performed.     Details: EKG today is a flutter rate 64 different from 2017 normal sinus rhythm with first-degree block with a nonspecific conduction delay at that time.  The EKG does today suggest anterior lateral infarct is possible underneath the flutter this could be why he may be having extra fluid on his legs although I doubt it was today that it occurred or it may be more related to the flutter itself.    Risk  Prescription drug management.        Final diagnoses:   Left-sided low back pain without sciatica, unspecified chronicity       ED Disposition  ED Disposition       ED Disposition   Discharge    Condition   Stable    Comment   --               Marco Carrillo MD  [id number] [id number]  [phone]    In 4 days           Medication List        Changed      warfarin 5 MG tablet  Commonly known as: COUMADIN  Take one and one-half tablets (7.5 mg) by mouth on Tuesdays, and take two tablets (10 mg) by mouth all other days or as directed.  What changed: additional instructions

## 2023-11-10 NOTE — PROGRESS NOTES
"Chief Complaint  Back Pain (Low back ), Edema (Long time), and Shortness of Breath    Subjective          Riky Moon presents to [facility]  History of Present Illness    The patient presents today due to back pain, shortness of breath, and edema. He is accompanied by his wife today.    The patient has been experiencing back pain. When he moves, the pain shoots. The pain is getting worse. He has been experiencing dyspnea recently.    Objective   Vital Signs:   /70   Pulse 59   Temp 97.5 °F (36.4 °C)   Resp 16   Ht 185.4 cm (72.99\")   Wt (!) 138 kg (305 lb)   SpO2 96%   BMI 40.25 kg/m²     A review of systems was performed, and the pertinent positives are noted in the HPI.    Physical Exam  Vitals and nursing note reviewed.   Constitutional:       Appearance: He is well-developed.   HENT:      Head: Normocephalic and atraumatic.   Musculoskeletal:      Cervical back: Normal range of motion and neck supple.   Neurological:      Mental Status: He is alert and oriented to person, place, and time.   Psychiatric:         Behavior: Behavior normal.        Result Review :                 Assessment and Plan    There are no diagnoses linked to this encounter.    1. Shortness of breath  - I would like to send him to the emergency room because he has shortness of breath. His legs are swelling more than they typically have. His wife has confirmed that he is more short of breath and this need further evaluation.    2. Low back pain  - It could be related to a kidney stone. I do think getting a CT scan of his abdomen and pelvis would be beneficial, hence one of the reasons why I sent him to the emergency room for further evaluation.    The patient will follow up in 1 week.      Follow Up   No follow-ups on file.  Patient was given instructions and counseling regarding his condition or for health maintenance advice. Please see specific information pulled into the AVS if appropriate. "         Transcribed from ambient dictation for Marco Carrillo MD by Mildred Sanchez.  11/10/23   15:15 EST    Patient or patient representative verbalized consent to the visit recording.  I have personally performed the services described in this document as transcribed by the above individual, and it is both accurate and complete.

## 2023-11-13 LAB
QT INTERVAL: 386 MS
QTC INTERVAL: 399 MS

## 2023-11-15 ENCOUNTER — OFFICE VISIT (OUTPATIENT)
Dept: INTERNAL MEDICINE | Facility: CLINIC | Age: 83
End: 2023-11-15
Payer: MEDICARE

## 2023-11-15 VITALS
SYSTOLIC BLOOD PRESSURE: 134 MMHG | DIASTOLIC BLOOD PRESSURE: 62 MMHG | HEART RATE: 95 BPM | BODY MASS INDEX: 41.45 KG/M2 | OXYGEN SATURATION: 94 % | RESPIRATION RATE: 18 BRPM | HEIGHT: 72 IN | WEIGHT: 306 LBS

## 2023-11-15 DIAGNOSIS — E03.9 HYPOTHYROIDISM, UNSPECIFIED TYPE: ICD-10-CM

## 2023-11-15 DIAGNOSIS — E78.5 HYPERLIPIDEMIA, UNSPECIFIED HYPERLIPIDEMIA TYPE: ICD-10-CM

## 2023-11-15 DIAGNOSIS — K59.00 CONSTIPATION, UNSPECIFIED CONSTIPATION TYPE: Primary | ICD-10-CM

## 2023-11-15 DIAGNOSIS — I10 ESSENTIAL HYPERTENSION: ICD-10-CM

## 2023-11-15 DIAGNOSIS — E11.9 TYPE 2 DIABETES MELLITUS WITHOUT COMPLICATION, WITHOUT LONG-TERM CURRENT USE OF INSULIN: ICD-10-CM

## 2023-11-15 NOTE — PROGRESS NOTES
"Chief Complaint  Hypothyroidism    Subjective          Riky Moon presents to [facility]  History of Present Illness    The patient presents today for a follow-up. He was recently seen in the emergency room due to lower back pain and some trouble breathing.    The patient believes that some of his problem was that he has stool that is backed up. He takes a stool softener daily, but it is not helping. He still must \"sit there and grunt\" to get it out sometimes. After his ER visit, he had 3 large bowel movements. He was not given an enema or other remedy for constipation. He has not tried MiraLAX. No renal lithiasis were found. His troponin was a little high, yet both samplings were the same. Atrial fibrillation was noted. He feels better now. His pain and dyspnea have resolved.    He has a history of hyperlipidemia. He is currently taking pravastatin 40 mg nightly.    The patient has a history of having hypothyroidism and is taking Synthroid 125 mcg daily.    The patient has a history of diabetes mellitus. He is currently taking metformin 850 mg twice a day along with Jardiance 10 mg daily. He does not have any side effects from that.    The patient has a history of having essential hypertension. He is taking amlodipine 5 mg daily along with losartan 100 mg daily. He does not have any side effects from that.    Review of Systems was performed, and pertinent findings are noted in the HPI.      Objective   Vital Signs:   /62 (BP Location: Left arm, Patient Position: Sitting, Cuff Size: Adult)   Pulse 95   Resp 18   Ht 182.9 cm (72\")   Wt (!) 139 kg (306 lb)   SpO2 94%   BMI 41.50 kg/m²     Physical Exam  Vitals and nursing note reviewed.   Constitutional:       Appearance: He is well-developed.   HENT:      Head: Normocephalic and atraumatic.   Musculoskeletal:      Cervical back: Normal range of motion and neck supple.   Neurological:      Mental Status: He is alert and " oriented to person, place, and time.   Psychiatric:         Behavior: Behavior normal.        Result Review :              No labs were obtained or reviewed today.     Assessment and Plan    Diagnoses and all orders for this visit:    1. Constipation, unspecified constipation type (Primary)    2. Type 2 diabetes mellitus without complication, without long-term current use of insulin  -     Hemoglobin A1c  -     Microalbumin / Creatinine Urine Ratio - Urine, Clean Catch    3. Hypothyroidism, unspecified type  -     Thyroid Panel With TSH    4. Essential hypertension  -     CBC & Differential  -     Comprehensive Metabolic Panel    5. Hyperlipidemia, unspecified hyperlipidemia type  -     Lipid Panel With LDL / HDL Ratio    1. Constipation  - Advised patient to take MiraLAX.    2. Diabetes mellitus  - He will continue metformin 850 mg twice daily along with Jardiance 10 mg daily.   - Labs ordered today.    3. Hypothyroidism  - He will continue Synthroid 125 mcg daily.  - TSH ordered today.    4. Essential hypertension  - He will continue amlodipine 5 mg daily along with losartan 100 mg daily.  - CMP and CBC ordered today.    2. Hyperlipidemia  - He will continue pravastatin 40 mg nightly.  - Lipid panel ordered.      Follow Up   No follow-ups on file.  He will cancel his appointment for 11/27/2023, as we covered everything today.    Patient was given instructions and counseling regarding his condition or for health maintenance advice. Please see specific information pulled into the AVS if appropriate.         Transcribed from ambient dictation for Marco Carrillo MD by Paulina Rosas.  11/15/23   15:28 EST    Patient or patient representative verbalized consent to the visit recording.  I have personally performed the services described in this document as transcribed by the above individual, and it is both accurate and complete.

## 2023-11-17 ENCOUNTER — ANTICOAGULATION VISIT (OUTPATIENT)
Dept: PHARMACY | Facility: HOSPITAL | Age: 83
End: 2023-11-17
Payer: MEDICARE

## 2023-11-17 DIAGNOSIS — I48.92 ATRIAL FLUTTER WITH CONTROLLED RESPONSE: Primary | ICD-10-CM

## 2023-11-17 LAB — INR PPP: 3.2

## 2023-11-17 NOTE — PROGRESS NOTES
Anticoagulation Clinic Progress Note    Anticoagulation Summary  As of 11/17/2023      INR goal:  2.0-3.0   TTR:  38.3% (1.4 y)   INR used for dosing:  3.20 (11/17/2023)   Warfarin maintenance plan:  5 mg every Thu; 10 mg all other days   Weekly warfarin total:  65 mg   Plan last modified:  Heron Delaney, PharmD (11/17/2023)   Next INR check:  11/28/2023   Priority:  Maintenance   Target end date:      Indications    Atrial flutter with controlled response [I48.92]                 Anticoagulation Episode Summary       INR check location:      Preferred lab:      Send INR reminders to:   [facility]    Comments:            Anticoagulation Care Providers       Provider Role Specialty Phone number    Ty Shelton MD Referring Cardiology [phone]          Findings:  Confirmed he continued taking 5 mg Thurs, 10 mg all other days. Reports ED visit this past week for lymphedema. Denies change in diet or medications. [facility] reported they will continue to follow him for at least the next 60 days.     INR History:      10/19/2023    10:28 AM 10/25/2023    12:00 AM 10/25/2023     1:00 PM 11/9/2023    12:00 AM 11/9/2023     2:14 PM 11/17/2023    12:00 AM 11/17/2023    12:51 PM   Anticoagulation Monitoring   INR 2.90  2.70  3.50  3.20   INR Date 10/19/2023  10/25/2023  11/9/2023  11/17/2023   INR Goal 2.0-3.0  2.0-3.0  2.0-3.0  2.0-3.0   Trend Same  Same  Same  Down   Last Week Total 70 mg  70 mg  70 mg  65 mg   Next Week Total 70 mg  70 mg  65 mg  60 mg   Sun 10 mg  10 mg  10 mg  10 mg   Mon 10 mg  10 mg  10 mg  10 mg   Tue 10 mg  10 mg  10 mg  10 mg   Wed 10 mg  10 mg  10 mg  10 mg   Thu 10 mg  10 mg  5 mg (11/9)  5 mg   Fri 10 mg  10 mg  10 mg  5 mg (11/17); Otherwise 10 mg   Sat 10 mg  10 mg  10 mg  10 mg   Historical INR  2.70      3.50      3.20            This result is from an external source.       Plan:  1. INR is Supratherapeutic today- see above in Anticoagulation Summary.   Provided  instructions to Florencio with [facility] to Change their warfarin regimen (5 mg today, then resume 5 mg Thurs, 10 mg all other days) - see above in Anticoagulation Summary.  2. Follow up in 4 days.      Heron Delaney, IvoryD

## 2023-11-21 LAB
ALBUMIN SERPL-MCNC: 4.2 G/DL (ref 3.7–4.7)
ALBUMIN/CREAT UR: 46 MG/G CREAT (ref 0–29)
ALBUMIN/GLOB SERPL: 2 {RATIO} (ref 1.2–2.2)
ALP SERPL-CCNC: 57 IU/L (ref 44–121)
ALT SERPL-CCNC: 15 IU/L (ref 0–44)
AST SERPL-CCNC: 19 IU/L (ref 0–40)
BASOPHILS # BLD AUTO: 0.1 X10E3/UL (ref 0–0.2)
BASOPHILS NFR BLD AUTO: 1 %
BILIRUB SERPL-MCNC: 0.6 MG/DL (ref 0–1.2)
BUN SERPL-MCNC: 21 MG/DL (ref 8–27)
BUN/CREAT SERPL: 16 (ref 10–24)
CALCIUM SERPL-MCNC: 9.2 MG/DL (ref 8.6–10.2)
CHLORIDE SERPL-SCNC: 106 MMOL/L (ref 96–106)
CHOLEST SERPL-MCNC: 121 MG/DL (ref 100–199)
CO2 SERPL-SCNC: 26 MMOL/L (ref 20–29)
CREAT SERPL-MCNC: 1.34 MG/DL (ref 0.76–1.27)
CREAT UR-MCNC: 85.6 MG/DL
EGFRCR SERPLBLD CKD-EPI 2021: 52 ML/MIN/1.73
EOSINOPHIL # BLD AUTO: 0.1 X10E3/UL (ref 0–0.4)
EOSINOPHIL NFR BLD AUTO: 1 %
ERYTHROCYTE [DISTWIDTH] IN BLOOD BY AUTOMATED COUNT: 16.4 % (ref 11.6–15.4)
FT4I SERPL CALC-MCNC: 1.9 (ref 1.2–4.9)
GLOBULIN SER CALC-MCNC: 2.1 G/DL (ref 1.5–4.5)
GLUCOSE SERPL-MCNC: 111 MG/DL (ref 70–99)
HBA1C MFR BLD: 6.7 % (ref 4.8–5.6)
HCT VFR BLD AUTO: 37.4 % (ref 37.5–51)
HDLC SERPL-MCNC: 40 MG/DL
HGB BLD-MCNC: 12.9 G/DL (ref 13–17.7)
IMM GRANULOCYTES # BLD AUTO: 0.1 X10E3/UL (ref 0–0.1)
IMM GRANULOCYTES NFR BLD AUTO: 1 %
LDLC SERPL CALC-MCNC: 57 MG/DL (ref 0–99)
LDLC/HDLC SERPL: 1.4 RATIO (ref 0–3.6)
LYMPHOCYTES # BLD AUTO: 1.8 X10E3/UL (ref 0.7–3.1)
LYMPHOCYTES NFR BLD AUTO: 20 %
MCH RBC QN AUTO: 37.8 PG (ref 26.6–33)
MCHC RBC AUTO-ENTMCNC: 34.5 G/DL (ref 31.5–35.7)
MCV RBC AUTO: 110 FL (ref 79–97)
MICROALBUMIN UR-MCNC: 39.8 UG/ML
MONOCYTES # BLD AUTO: 0.9 X10E3/UL (ref 0.1–0.9)
MONOCYTES NFR BLD AUTO: 10 %
NEUTROPHILS # BLD AUTO: 6.3 X10E3/UL (ref 1.4–7)
NEUTROPHILS NFR BLD AUTO: 67 %
NRBC BLD AUTO-RTO: 1 % (ref 0–0)
PLATELET # BLD AUTO: 204 X10E3/UL (ref 150–450)
POTASSIUM SERPL-SCNC: 4.5 MMOL/L (ref 3.5–5.2)
PROT SERPL-MCNC: 6.3 G/DL (ref 6–8.5)
RBC # BLD AUTO: 3.41 X10E6/UL (ref 4.14–5.8)
SODIUM SERPL-SCNC: 145 MMOL/L (ref 134–144)
T3RU NFR SERPL: 26 % (ref 24–39)
T4 SERPL-MCNC: 7.3 UG/DL (ref 4.5–12)
TRIGL SERPL-MCNC: 140 MG/DL (ref 0–149)
TSH SERPL DL<=0.005 MIU/L-ACNC: 2.04 UIU/ML (ref 0.45–4.5)
VLDLC SERPL CALC-MCNC: 24 MG/DL (ref 5–40)
WBC # BLD AUTO: 9.3 X10E3/UL (ref 3.4–10.8)

## 2023-11-27 ENCOUNTER — OFFICE VISIT (OUTPATIENT)
Dept: INTERNAL MEDICINE | Facility: CLINIC | Age: 83
End: 2023-11-27
Payer: MEDICARE

## 2023-11-27 VITALS
OXYGEN SATURATION: 98 % | HEIGHT: 72 IN | DIASTOLIC BLOOD PRESSURE: 66 MMHG | HEART RATE: 77 BPM | BODY MASS INDEX: 41.45 KG/M2 | RESPIRATION RATE: 18 BRPM | WEIGHT: 306 LBS | SYSTOLIC BLOOD PRESSURE: 120 MMHG

## 2023-11-27 DIAGNOSIS — E03.9 HYPOTHYROIDISM, UNSPECIFIED TYPE: ICD-10-CM

## 2023-11-27 DIAGNOSIS — E78.5 HYPERLIPIDEMIA, UNSPECIFIED HYPERLIPIDEMIA TYPE: ICD-10-CM

## 2023-11-27 DIAGNOSIS — E11.9 TYPE 2 DIABETES MELLITUS WITHOUT COMPLICATION, WITHOUT LONG-TERM CURRENT USE OF INSULIN: Primary | ICD-10-CM

## 2023-11-27 DIAGNOSIS — I10 ESSENTIAL HYPERTENSION: ICD-10-CM

## 2023-11-27 NOTE — PROGRESS NOTES
"Chief Complaint  Hypertension    Subjective        Riky Moon presents to [facility]  History of Present Illness    Patient presents at today's office visit with a history having hypothyroidism.  Patient is currently taking levothyroxine 125 mcg daily.  Patient denies any side effects of the medicine.    Patient has a history of hyperlipidemia.  Patient is currently taking pravastatin 40 mg daily.  Patient denies any side effects of the medicine.    Patient also has a history of having type 2 diabetes.  He is currently on Jardiance 10 mg daily.  He is also taking metformin 850 mg twice a day.     Patient has a history of having essential hypertension.  He is currently on atenolol 25 mg daily.  He is also on amlodipine 5 mg daily.  He is also taking losartan 100 mg daily.  Patient blood pressure is 120/66.    Objective   Vital Signs:  /66 (BP Location: Left arm, Patient Position: Sitting, Cuff Size: Adult)   Pulse 77   Resp 18   Ht 182.9 cm (72\")   Wt (!) 139 kg (306 lb)   SpO2 98%   BMI 41.50 kg/m²   Estimated body mass index is 41.5 kg/m² as calculated from the following:    Height as of this encounter: 182.9 cm (72\").    Weight as of this encounter: 139 kg (306 lb).               Physical Exam  Vitals and nursing note reviewed.   Constitutional:       Appearance: He is well-developed.   HENT:      Head: Normocephalic and atraumatic.   Musculoskeletal:      Cervical back: Normal range of motion and neck supple.   Neurological:      Mental Status: He is alert and oriented to person, place, and time.   Psychiatric:         Behavior: Behavior normal.        Result Review :    Common labs          8/21/2023    14:43 11/10/2023    15:47 11/20/2023    14:32   Common Labs   Glucose 104  90  111    BUN 22  22  21    Creatinine 1.23  1.15  1.34    Sodium 143  139  145    Potassium 4.7  4.6  4.5    Chloride 106  103  106    Calcium 9.6  9.4  9.2    Total Protein 6.3   6.3  "   Albumin 4.4  3.8  4.2    Total Bilirubin 0.9  0.6  0.6    Alkaline Phosphatase 56  57  57    AST (SGOT) 16  15  19    ALT (SGPT) 15  15  15    WBC 9.21  9.85  9.3    Hemoglobin 12.7  12.6  12.9    Hematocrit 37.2  38.1  37.4    Platelets 151  191  204    Total Cholesterol 114   121    Triglycerides 111   140    HDL Cholesterol 37   40    LDL Cholesterol  57   57    Hemoglobin A1C 6.50   6.7    Microalbumin, Urine 13.1   39.8                   Assessment and Plan   Diagnoses and all orders for this visit:    1. Type 2 diabetes mellitus without complication, without long-term current use of insulin (Primary)  -     Hemoglobin A1c  -     Microalbumin / Creatinine Urine Ratio - Urine, Clean Catch    2. Hypothyroidism, unspecified type  -     Thyroid Panel With TSH    3. Essential hypertension  -     CBC & Differential  -     Comprehensive Metabolic Panel    4. Hyperlipidemia, unspecified hyperlipidemia type  -     Lipid Panel With LDL / HDL Ratio    For his diabetes, continue taking Jardiance 10 mg daily along with metformin 850 mg twice a day.  For essential hypertension, continue current blood pressure medicines.  For his hyperlipidemia, continue pravastatin.  For the hypothyroidism, continue levothyroxine.         Follow Up   No follow-ups on file.  Patient was given instructions and counseling regarding his condition or for health maintenance advice. Please see specific information pulled into the AVS if appropriate.

## 2023-11-30 ENCOUNTER — ANTICOAGULATION VISIT (OUTPATIENT)
Dept: PHARMACY | Facility: HOSPITAL | Age: 83
End: 2023-11-30
Payer: MEDICARE

## 2023-11-30 DIAGNOSIS — I48.92 ATRIAL FLUTTER WITH CONTROLLED RESPONSE: Primary | ICD-10-CM

## 2023-11-30 LAB — INR PPP: 3.7

## 2023-11-30 NOTE — PROGRESS NOTES
Anticoagulation Clinic Progress Note    Anticoagulation Summary  As of 11/30/2023      INR goal:  2.0-3.0   TTR:  37.4% (1.4 y)   INR used for dosing:  3.70 (11/30/2023)   Warfarin maintenance plan:  5 mg every Mon, Thu; 10 mg all other days   Weekly warfarin total:  60 mg   Plan last modified:  Heron Delaney, PharmD (11/30/2023)   Next INR check:  12/7/2023   Priority:  Maintenance   Target end date:      Indications    Atrial flutter with controlled response [I48.92]                 Anticoagulation Episode Summary       INR check location:      Preferred lab:      Send INR reminders to:   [facility]    Comments:            Anticoagulation Care Providers       Provider Role Specialty Phone number    Ty Shelton MD Referring Cardiology [phone]            INR History:      10/25/2023     1:00 PM 11/9/2023    12:00 AM 11/9/2023     2:14 PM 11/17/2023    12:00 AM 11/17/2023    12:51 PM 11/30/2023    12:00 AM 11/30/2023     1:32 PM   Anticoagulation Monitoring   INR 2.70  3.50  3.20  3.70   INR Date 10/25/2023  11/9/2023  11/17/2023  11/30/2023   INR Goal 2.0-3.0  2.0-3.0  2.0-3.0  2.0-3.0   Trend Same  Same  Down  Down   Last Week Total 70 mg  70 mg  65 mg  65 mg   Next Week Total 70 mg  65 mg  60 mg  55 mg   Sun 10 mg  10 mg  10 mg  10 mg   Mon 10 mg  10 mg  10 mg  5 mg   Tue 10 mg  10 mg  10 mg  10 mg   Wed 10 mg  10 mg  10 mg  10 mg   Thu 10 mg  5 mg (11/9)  5 mg  Hold (11/30)   Fri 10 mg  10 mg  5 mg (11/17); Otherwise 10 mg  10 mg   Sat 10 mg  10 mg  10 mg  10 mg   Historical INR  3.50      3.20      3.70            This result is from an external source.       Plan:  1. INR is Supratherapeutic today- see above in Anticoagulation Summary.   Provided instructions to Marilyn with [employer] to Change their warfarin regimen (HOLD today, then decrease to 5 mg Mon/Thurs, 10 mg all other days) - see above in Anticoagulation Summary.  2. Follow up in 1 week.      Heron Delaney,  PharmD

## 2023-12-06 ENCOUNTER — ANTICOAGULATION VISIT (OUTPATIENT)
Dept: PHARMACY | Facility: HOSPITAL | Age: 83
End: 2023-12-06
Payer: MEDICARE

## 2023-12-06 DIAGNOSIS — I48.92 ATRIAL FLUTTER WITH CONTROLLED RESPONSE: Primary | ICD-10-CM

## 2023-12-06 LAB — INR PPP: 2.6

## 2023-12-06 NOTE — PROGRESS NOTES
Anticoagulation Clinic Progress Note    Anticoagulation Summary  As of 2023      INR goal:  2.0-3.0   TTR:  37.4% (1.4 y)   INR used for dosin.60 (2023)   Warfarin maintenance plan:  5 mg every Mon, Thu; 10 mg all other days   Weekly warfarin total:  60 mg   No change documented:  Yu Rader, PharmD   Plan last modified:  Heron Delaney PharmD (2023)   Next INR check:  2023   Priority:  Maintenance   Target end date:      Indications    Atrial flutter with controlled response [I48.92]                 Anticoagulation Episode Summary       INR check location:      Preferred lab:      Send INR reminders to:  [facility] [place]    Comments:            Anticoagulation Care Providers       Provider Role Specialty Phone number    Ty Shelton MD Referring Cardiology [phone]            INR History:      2023     2:14 PM 2023    12:00 AM 2023    12:51 PM 2023    12:00 AM 2023     1:32 PM 2023    12:00 AM 2023     2:05 PM   Anticoagulation Monitoring   INR 3.50  3.20  3.70  2.60   INR Date 2023   INR Goal 2.0-3.0  2.0-3.0  2.0-3.0  2.0-3.0   Trend Same  Down  Down  Same   Last Week Total 70 mg  65 mg  65 mg  55 mg   Next Week Total 65 mg  60 mg  55 mg  60 mg   Sun 10 mg  10 mg  10 mg  10 mg   Mon 10 mg  10 mg  5 mg  5 mg   Tue 10 mg  10 mg  10 mg  10 mg   Wed 10 mg  10 mg  10 mg  10 mg   Thu 5 mg ()  5 mg  Hold ()  5 mg   Fri 10 mg  5 mg (); Otherwise 10 mg  10 mg  10 mg   Sat 10 mg  10 mg  10 mg  10 mg   Historical INR  3.20      3.70      2.60            This result is from an external source.       Plan:  1. INR is Therapeutic today- see above in Anticoagulation Summary.   Provided instructions to Yu with [facility] to Continue their warfarin regimen- see above in Anticoagulation Summary.  2. Follow up in 1 week      Yu Rader, PharmD

## 2023-12-15 ENCOUNTER — ANTICOAGULATION VISIT (OUTPATIENT)
Dept: PHARMACY | Facility: HOSPITAL | Age: 83
End: 2023-12-15
Payer: MEDICARE

## 2023-12-15 DIAGNOSIS — I48.92 ATRIAL FLUTTER WITH CONTROLLED RESPONSE: Primary | ICD-10-CM

## 2023-12-15 LAB — INR PPP: 2.9

## 2023-12-15 NOTE — PROGRESS NOTES
Anticoagulation Clinic Progress Note    Anticoagulation Summary  As of 12/15/2023      INR goal:  2.0-3.0   TTR:  38.4% (1.5 y)   INR used for dosin.90 (12/15/2023)   Warfarin maintenance plan:  5 mg every Mon, Thu; 10 mg all other days   Weekly warfarin total:  60 mg   No change documented:  Olga Granados RPH   Plan last modified:  Heron Delaney, PharmD (2023)   Next INR check:  2023   Priority:  Maintenance   Target end date:      Indications    Atrial flutter with controlled response [I48.92]                 Anticoagulation Episode Summary       INR check location:      Preferred lab:      [facility] [place]    Comments:            Anticoagulation Care Providers       Provider Role Specialty Phone number    Ty Shelton MD Referring Cardiology [phone]            INR History:      2023    12:51 PM 2023    12:00 AM 2023     1:32 PM 2023    12:00 AM 2023     2:05 PM 12/15/2023    12:00 AM 12/15/2023     2:22 PM   Anticoagulation Monitoring   INR 3.20  3.70  2.60  2.90   INR Date 2023  2023  2023  12/15/2023   INR Goal 2.0-3.0  2.0-3.0  2.0-3.0  2.0-3.0   Trend Down  Down  Same  Same   Last Week Total 65 mg  65 mg  55 mg  60 mg   Next Week Total 60 mg  55 mg  60 mg  60 mg   Sun 10 mg  10 mg  10 mg  10 mg   Mon 10 mg  5 mg  5 mg  5 mg   Tue 10 mg  10 mg  10 mg  10 mg   Wed 10 mg  10 mg  10 mg  10 mg   Thu 5 mg  Hold ()  5 mg  5 mg   Fri 5 mg (); Otherwise 10 mg  10 mg  10 mg  10 mg   Sat 10 mg  10 mg  10 mg  10 mg   Historical INR  3.70      2.60      2.90            This result is from an external source.       Plan:  1. INR is Therapeutic today- see above in Anticoagulation Summary.   Provided [facility] to Continue their warfarin regimen- see above in Anticoagulation Summary.  2. Follow up in 1 week      Olga Granados RPH

## 2023-12-21 ENCOUNTER — ANTICOAGULATION VISIT (OUTPATIENT)
Dept: PHARMACY | Facility: HOSPITAL | Age: 83
End: 2023-12-21
Payer: MEDICARE

## 2023-12-21 DIAGNOSIS — I48.92 ATRIAL FLUTTER WITH CONTROLLED RESPONSE: Primary | ICD-10-CM

## 2023-12-21 LAB — INR PPP: 2.7

## 2023-12-21 RX ORDER — WARFARIN SODIUM 5 MG/1
TABLET ORAL
Qty: 180 TABLET | Refills: 1 | Status: SHIPPED | OUTPATIENT
Start: 2023-12-21

## 2023-12-21 NOTE — PROGRESS NOTES
Anticoagulation Clinic Progress Note    Anticoagulation Summary  As of 2023      INR goal:  2.0-3.0   TTR:  39.1% (1.5 y)   INR used for dosin.70 (2023)   Warfarin maintenance plan:  5 mg every Mon, Thu; 10 mg all other days   Weekly warfarin total:  60 mg   No change documented:  uY Rader PharmD   Plan last modified:  Heron Delaney PharmD (2023)   Next INR check:  2023   Priority:  Maintenance   Target end date:      Indications    Atrial flutter with controlled response [I48.92]                 Anticoagulation Episode Summary       INR check location:      Preferred lab:      Send INR reminders to:  [facility] CL[facility] [place]    Comments:            Anticoagulation Care Providers       Provider Role Specialty Phone number    Ty Shelton MD Referring Cardiology [phone]            INR History:      2023     1:32 PM 2023    12:00 AM 2023     2:05 PM 12/15/2023    12:00 AM 12/15/2023     2:22 PM 2023    12:00 AM 2023     2:13 PM   Anticoagulation Monitoring   INR 3.70  2.60  2.90  2.70   INR Date 2023  2023  12/15/2023  2023   INR Goal 2.0-3.0  2.0-3.0  2.0-3.0  2.0-3.0   Trend Down  Same  Same  Same   Last Week Total 65 mg  55 mg  60 mg  60 mg   Next Week Total 55 mg  60 mg  60 mg  60 mg   Sun 10 mg  10 mg  10 mg  10 mg   Mon 5 mg  5 mg  5 mg  5 mg   Tue 10 mg  10 mg  10 mg  10 mg   Wed 10 mg  10 mg  10 mg  10 mg   Thu Hold ()  5 mg  5 mg  5 mg   Fri 10 mg  10 mg  10 mg  10 mg   Sat 10 mg  10 mg  10 mg  10 mg   Historical INR  2.60      2.90      2.70            This result is from an external source.       Plan:  1. INR is Therapeutic today- see above in Anticoagulation Summary.   Provided instructions to Marilyn  with [employer] to Continue their warfarin regimen- see above in Anticoagulation Summary.  2. Follow up in 1 week      Yu Rader PharmD

## 2023-12-27 ENCOUNTER — TELEPHONE (OUTPATIENT)
Dept: INTERNAL MEDICINE | Facility: CLINIC | Age: 83
End: 2023-12-27
Payer: MEDICARE

## 2023-12-27 DIAGNOSIS — I89.0 LYMPHEDEMA: Primary | ICD-10-CM

## 2023-12-27 NOTE — TELEPHONE ENCOUNTER
Caller: Riky Moon    Relationship: Self    Best call back number: [phone]     What is the medical concern/diagnosis: SWOLLEN LEFT LEG    What is the provider, practice or medical service name: [facility]    What is the office location: [facility]    Any additional details: PATIENT HAS BEEN TO THIS CLINIC BEFORE, HE NOW NEEDS A REFERRAL

## 2023-12-28 ENCOUNTER — ANTICOAGULATION VISIT (OUTPATIENT)
Dept: PHARMACY | Facility: HOSPITAL | Age: 83
End: 2023-12-28
Payer: MEDICARE

## 2023-12-28 DIAGNOSIS — I48.92 ATRIAL FLUTTER WITH CONTROLLED RESPONSE: Primary | ICD-10-CM

## 2023-12-28 LAB — INR PPP: 3.6

## 2023-12-28 NOTE — TELEPHONE ENCOUNTER
"Chief Complaint   Patient presents with     Well Child     Age 2       Initial BP 90/60  Pulse 100  Temp 97.4  F (36.3  C) (Tympanic)  Resp 20  Ht 3' 0.5\" (0.927 m)  Wt 33 lb (15 kg)  SpO2 100%  BMI 17.42 kg/m2 Estimated body mass index is 17.42 kg/(m^2) as calculated from the following:    Height as of this encounter: 3' 0.5\" (0.927 m).    Weight as of this encounter: 33 lb (15 kg).  Medication Reconciliation: complete   MARINA DOMINGUEZ      " Referral has been placed.

## 2023-12-28 NOTE — PROGRESS NOTES
Anticoagulation Clinic Progress Note    Anticoagulation Summary  As of 12/28/2023      INR goal:  2.0-3.0   TTR:  39.0% (1.5 y)   INR used for dosing:  3.60 (12/28/2023)   Warfarin maintenance plan:  5 mg every Mon, Thu; 10 mg all other days   Weekly warfarin total:  60 mg   Plan last modified:  Heron Delaney PharmD (11/30/2023)   Next INR check:  1/4/2024   Priority:  Maintenance   Target end date:      Indications    Atrial flutter with controlled response [I48.92]                 Anticoagulation Episode Summary       INR check location:      Preferred lab:      Send INR reminders to:   [facility]    Comments:            Anticoagulation Care Providers       Provider Role Specialty Phone number    Ty Shelton MD Referring Cardiology [phone]            INR History:      12/6/2023     2:05 PM 12/15/2023    12:00 AM 12/15/2023     2:22 PM 12/21/2023    12:00 AM 12/21/2023     2:13 PM 12/28/2023    12:00 AM 12/28/2023    10:42 AM   Anticoagulation Monitoring   INR 2.60  2.90  2.70  3.60   INR Date 12/6/2023  12/15/2023  12/21/2023  12/28/2023   INR Goal 2.0-3.0  2.0-3.0  2.0-3.0  2.0-3.0   Trend Same  Same  Same  Same   Last Week Total 55 mg  60 mg  60 mg  60 mg   Next Week Total 60 mg  60 mg  60 mg  55 mg   Sun 10 mg  10 mg  10 mg  10 mg   Mon 5 mg  5 mg  5 mg  5 mg   Tue 10 mg  10 mg  10 mg  10 mg   Wed 10 mg  10 mg  10 mg  10 mg   Thu 5 mg  5 mg  5 mg  Hold (12/28)   Fri 10 mg  10 mg  10 mg  10 mg   Sat 10 mg  10 mg  10 mg  10 mg   Historical INR  2.90      2.70      3.60            This result is from an external source.       Plan:  1. INR is Supratherapeutic today- see above in Anticoagulation Summary.   Provided instructions to Marilyn with [employer] to Change their warfarin regimen (HOLD today, then resume 5 mg Mon/Thurs, 10 mg all other days) - see above in Anticoagulation Summary.  2. Follow up in 1 week      Heron Delaney PharmD

## 2024-01-10 ENCOUNTER — ANTICOAGULATION VISIT (OUTPATIENT)
Dept: PHARMACY | Facility: HOSPITAL | Age: 84
End: 2024-01-10
Payer: MEDICARE

## 2024-01-10 DIAGNOSIS — I48.92 ATRIAL FLUTTER WITH CONTROLLED RESPONSE: Primary | ICD-10-CM

## 2024-01-10 LAB — INR PPP: 2.5

## 2024-01-11 NOTE — PROGRESS NOTES
Anticoagulation Clinic Progress Note    Anticoagulation Summary  As of 1/10/2024      INR goal:  2.0-3.0   TTR:  39.2% (1.5 y)   INR used for dosin.50 (1/10/2024)   Warfarin maintenance plan:  5 mg every Mon, Thu; 10 mg all other days   Weekly warfarin total:  60 mg   No change documented:  Yu Rader PharmD   Plan last modified:  Heron Delaney PharmD (2023)   Next INR check:  2024   Priority:  Maintenance   Target end date:      Indications    Atrial flutter with controlled response [I48.92]                 Anticoagulation Episode Summary       INR check location:      Preferred lab:      Send INR reminders to:  [facility] CLIN[facility] [place]    Comments:            Anticoagulation Care Providers       Provider Role Specialty Phone number    Ty Shelton MD Referring Cardiology [phone]            INR History:      12/15/2023     2:22 PM 2023    12:00 AM 2023     2:13 PM 2023    12:00 AM 2023    10:42 AM 1/10/2024    12:00 AM 1/10/2024     2:47 PM   Anticoagulation Monitoring   INR 2.90  2.70  3.60  2.50   INR Date 12/15/2023  2023  2023  1/10/2024   INR Goal 2.0-3.0  2.0-3.0  2.0-3.0  2.0-3.0   Trend Same  Same  Same  Same   Last Week Total 60 mg  60 mg  60 mg  60 mg   Next Week Total 60 mg  60 mg  55 mg  60 mg   Sun 10 mg  10 mg  10 mg  10 mg   Mon 5 mg  5 mg  5 mg  5 mg   Tue 10 mg  10 mg  10 mg  10 mg   Wed 10 mg  10 mg  10 mg  10 mg   Thu 5 mg  5 mg  Hold ()  5 mg   Fri 10 mg  10 mg  10 mg  10 mg   Sat 10 mg  10 mg  10 mg  10 mg   Historical INR  2.70      3.60      2.50            This result is from an external source.       Plan:  1. INR is Therapeutic today- see above in Anticoagulation Summary.   Provided instructions to Marilyn with [employer] to Continue their warfarin regimen- see above in Anticoagulation Summary.  2. Follow up in 1 week      Yu Rader PharmD

## 2024-01-16 ENCOUNTER — ANTICOAGULATION VISIT (OUTPATIENT)
Dept: PHARMACY | Facility: HOSPITAL | Age: 84
End: 2024-01-16
Payer: MEDICARE

## 2024-01-16 DIAGNOSIS — I48.92 ATRIAL FLUTTER WITH CONTROLLED RESPONSE: Primary | ICD-10-CM

## 2024-01-16 LAB — INR PPP: 2.8

## 2024-01-16 NOTE — PROGRESS NOTES
Anticoagulation Clinic Progress Note    Anticoagulation Summary  As of 2024      INR goal:  2.0-3.0   TTR:  39.8% (1.5 y)   INR used for dosin.80 (2024)   Warfarin maintenance plan:  5 mg every Mon, Thu; 10 mg all other days   Weekly warfarin total:  60 mg   No change documented:  Olga Granados RPH   Plan last modified:  Heron Delaney, PharmD (2023)   Next INR check:  2024   Priority:  Maintenance   Target end date:      Indications    Atrial flutter with controlled response [I48.92]                 Anticoagulation Episode Summary       INR check location:      Preferred lab:      [facility] [place]    Comments:            Anticoagulation Care Providers       Provider Role Specialty Phone number    Ty Shelton MD Referring Cardiology [phone]            INR History:      2023     2:13 PM 2023    12:00 AM 2023    10:42 AM 1/10/2024    12:00 AM 1/10/2024     2:47 PM 2024    12:00 AM 2024     1:12 PM   Anticoagulation Monitoring   INR 2.70  3.60  2.50  2.80   INR Date 2023  2023  1/10/2024  2024   INR Goal 2.0-3.0  2.0-3.0  2.0-3.0  2.0-3.0   Trend Same  Same  Same  Same   Last Week Total 60 mg  60 mg  60 mg  60 mg   Next Week Total 60 mg  55 mg  60 mg  60 mg   Sun 10 mg  10 mg  10 mg  10 mg   Mon 5 mg  5 mg  5 mg  5 mg   Tue 10 mg  10 mg  10 mg  10 mg   Wed 10 mg  10 mg  10 mg  10 mg   Thu 5 mg  Hold ()  5 mg  5 mg   Fri 10 mg  10 mg  10 mg  10 mg   Sat 10 mg  10 mg  10 mg  10 mg   Historical INR  3.60      2.50      2.80            This result is from an external source.       Plan:  1. INR is Therapeutic today- see above in Anticoagulation Summary.   Provided instructions to Florencio with [facility] to Continue their warfarin regimen- see above in Anticoagulation Summary.  2. Follow up in 2 weeks as patient is being discharged from [facility]       Olga Granados RPH

## 2024-01-24 ENCOUNTER — HOSPITAL ENCOUNTER (OUTPATIENT)
Dept: PHYSICAL THERAPY | Facility: HOSPITAL | Age: 84
Setting detail: THERAPIES SERIES
Discharge: HOME | End: 2024-01-24
Payer: MEDICARE

## 2024-01-24 DIAGNOSIS — I89.0 LYMPHEDEMA: Primary | ICD-10-CM

## 2024-01-24 PROCEDURE — 97162 PT EVAL MOD COMPLEX 30 MIN: CPT

## 2024-01-26 ENCOUNTER — TELEPHONE (OUTPATIENT)
Dept: CARDIOLOGY | Facility: CLINIC | Age: 84
End: 2024-01-26

## 2024-01-26 NOTE — TELEPHONE ENCOUNTER
Caller: Riky Moon    Relationship: Self    Best call back number: [phone]    What is the best time to reach you: ANYTIME    Who are you requesting to speak with (clinical staff, provider,  specific staff member): CLINICAL        What was the call regarding: PT CURRENTLY TAKES WARFARIN AND WOULD LIKE TO TAKE DIFFERENT MEDICATION, SO HE DOESN'T HAVE TO GO TO HAVE  BLOOD DRAWN.  PLEASE CALL TO DISCUSS     Is it okay if the provider responds through MyChart: NO

## 2024-01-31 ENCOUNTER — ANTICOAGULATION VISIT (OUTPATIENT)
Dept: PHARMACY | Facility: HOSPITAL | Age: 84
End: 2024-01-31
Payer: MEDICARE

## 2024-01-31 DIAGNOSIS — I48.92 ATRIAL FLUTTER WITH CONTROLLED RESPONSE: Primary | ICD-10-CM

## 2024-01-31 LAB
INR PPP: 3.6 (ref 0.91–1.09)
PROTHROMBIN TIME: 43.1 SECONDS (ref 10–13.8)

## 2024-01-31 PROCEDURE — 36416 COLLJ CAPILLARY BLOOD SPEC: CPT

## 2024-01-31 PROCEDURE — G0463 HOSPITAL OUTPT CLINIC VISIT: HCPCS

## 2024-01-31 PROCEDURE — 85610 PROTHROMBIN TIME: CPT

## 2024-01-31 NOTE — PROGRESS NOTES
Anticoagulation Clinic Progress Note    Anticoagulation Summary  As of 1/31/2024      INR goal:  2.0-3.0   TTR:  39.4% (1.6 y)   INR used for dosing:  3.6 (1/31/2024)   Warfarin maintenance plan:  5 mg every Mon, Thu; 10 mg all other days   Weekly warfarin total:  60 mg   Plan last modified:  Heron Delaney, PharmD (11/30/2023)   Next INR check:  2/12/2024   Priority:  Maintenance   Target end date:      Indications    Atrial flutter with controlled response [I48.92]                 Anticoagulation Episode Summary       INR check location:      Preferred lab:      Send INR reminders to:  [facility] [place]    Comments:            Anticoagulation Care Providers       Provider Role Specialty Phone number    Ty Shelton MD Referring Cardiology [phone]            Clinic Interview:  Patient Findings     Negatives:  Signs/symptoms of thrombosis, Signs/symptoms of bleeding,   Laboratory test error suspected, Change in health, Change in alcohol use,   Change in activity, Upcoming invasive procedure, Emergency department   visit, Upcoming dental procedure, Missed doses, Extra doses, Change in   medications, Change in diet/appetite, Hospital admission, Bruising, Other   complaints    Comments:  He requests to enroll in our home INR testing program.       Clinical Outcomes     Negatives:  Major bleeding event, Thromboembolic event,   Anticoagulation-related hospital admission, Anticoagulation-related ED   visit, Anticoagulation-related fatality    Comments:  He requests to enroll in our home INR testing program.         INR History:      12/28/2023    12:00 AM 12/28/2023    10:42 AM 1/10/2024    12:00 AM 1/10/2024     2:47 PM 1/16/2024    12:00 AM 1/16/2024     1:12 PM 1/31/2024     1:15 PM   Anticoagulation Monitoring   INR  3.60  2.50  2.80 3.6   INR Date  12/28/2023  1/10/2024  1/16/2024 1/31/2024   INR Goal  2.0-3.0  2.0-3.0  2.0-3.0 2.0-3.0   Trend  Same  Same  Same Same   Last Week Total  60 mg  60  mg  60 mg 60 mg   Next Week Total  55 mg  60 mg  60 mg 55 mg   Sun  10 mg  10 mg  10 mg 10 mg   Mon  5 mg  5 mg  5 mg 5 mg   Tue  10 mg  10 mg  10 mg 10 mg   Wed  10 mg  10 mg  10 mg 5 mg (1/31); Otherwise 10 mg   Thu  Hold (12/28)  5 mg  5 mg 5 mg   Fri  10 mg  10 mg  10 mg 10 mg   Sat  10 mg  10 mg  10 mg 10 mg   Historical INR 3.60      2.50      2.80             This result is from an external source.       Plan:  1. INR is Supratherapeutic today- see above in Anticoagulation Summary.  Will instruct Riky Moon to Change their warfarin regimen- see above in Anticoagulation Summary.  2. Follow up in 2 weeks for home INR testing training.   3. Patient declines warfarin refills.  4. Verbal and written information provided. Patient expresses understanding and has no further questions at this time.    Heron Delaney, PharmD

## 2024-02-12 ENCOUNTER — ANTICOAGULATION VISIT (OUTPATIENT)
Dept: PHARMACY | Facility: HOSPITAL | Age: 84
End: 2024-02-12
Payer: MEDICARE

## 2024-02-12 DIAGNOSIS — I48.92 ATRIAL FLUTTER WITH CONTROLLED RESPONSE: Primary | ICD-10-CM

## 2024-02-12 LAB
INR PPP: 3.3
INR PPP: 3.4

## 2024-02-12 PROCEDURE — G0248 DEMONSTRATE USE HOME INR MON: HCPCS

## 2024-02-12 PROCEDURE — G0249 PROVIDE INR TEST MATER/EQUIP: HCPCS

## 2024-02-19 ENCOUNTER — ANTICOAGULATION VISIT (OUTPATIENT)
Dept: PHARMACY | Facility: HOSPITAL | Age: 84
End: 2024-02-19
Payer: MEDICARE

## 2024-02-19 DIAGNOSIS — I48.92 ATRIAL FLUTTER WITH CONTROLLED RESPONSE: Primary | ICD-10-CM

## 2024-02-19 LAB — INR PPP: 3.4

## 2024-02-19 NOTE — PROGRESS NOTES
Anticoagulation Clinic Progress Note    Anticoagulation Summary  As of 2/19/2024      INR goal:  2.0-3.0   TTR:  38.2% (1.6 y)   INR used for dosing:  3.40 (2/19/2024)   Warfarin maintenance plan:  5 mg every Mon, Thu, Sat; 10 mg all other days   Weekly warfarin total:  55 mg   Plan last modified:  Olga Granados RPH (2/19/2024)   Next INR check:  2/26/2024   Priority:  Maintenance   Target end date:      Indications    Atrial flutter with controlled response [I48.92]                 Anticoagulation Episode Summary       INR check location:  Home Draw    Preferred lab:      Send INR reminders to:  [facility]    Comments:  [facility] home [facility]          Anticoagulation Care Providers       Provider Role Specialty Phone number    Ty Shelton MD Referring Cardiology [phone]            Clinic Interview:  Patient Findings     Negatives:  Signs/symptoms of thrombosis, Signs/symptoms of bleeding,   Laboratory test error suspected, Change in health, Change in alcohol use,   Change in activity, Upcoming invasive procedure, Emergency department   visit, Upcoming dental procedure, Missed doses, Extra doses, Change in   medications, Change in diet/appetite, Hospital admission, Bruising, Other   complaints      Clinical Outcomes     Negatives:  Major bleeding event, Thromboembolic event,   Anticoagulation-related hospital admission, Anticoagulation-related ED   visit, Anticoagulation-related fatality        INR History:      1/16/2024    12:00 AM 1/16/2024     1:12 PM 1/31/2024     1:15 PM 2/12/2024    12:00 AM 2/12/2024     1:00 PM 2/19/2024    12:00 AM 2/19/2024     3:14 PM   Anticoagulation Monitoring   INR  2.80 3.6  3.30  3.40   INR Date  1/16/2024 1/31/2024 2/12/2024 2/19/2024   INR Goal  2.0-3.0 2.0-3.0  2.0-3.0  2.0-3.0   Trend  Same Same  Same  Down   Last Week Total  60 mg 60 mg  60 mg  55 mg   Next Week Total  60 mg 55 mg  55 mg  50 mg   Sun  10 mg 10 mg  10 mg  10 mg   Mon  5 mg 5 mg   Hold (2/12)  Hold (2/19)   Tue  10 mg 10 mg  10 mg  10 mg   Wed  10 mg 5 mg (1/31); Otherwise 10 mg  10 mg  10 mg   Thu  5 mg 5 mg  5 mg  5 mg   Fri  10 mg 10 mg  10 mg  10 mg   Sat  10 mg 10 mg  10 mg  5 mg   Historical INR 2.80       3.40        3.30      3.40            This result is from an external source.    Multiple values from one day are sorted in reverse-chronological order       Plan:  1. INR is Supratherapeutic today- see above in Anticoagulation Summary.   Will instruct Riky BARBER Moon to Change their warfarin regimen- see above in Anticoagulation Summary.  Still having swelling in his legs, Hold warfarin today then trial 5 mg on mon, thurs, sat and 10 mg AOCLARISSE rck 1 week   2. Follow up in 1 weeks  3. They have been instructed to call if any changes in medications, doses, concerns, etc. Patient expresses understanding and has no further questions at this time.    Olga Granados [facility]

## 2024-02-26 ENCOUNTER — ANTICOAGULATION VISIT (OUTPATIENT)
Dept: PHARMACY | Facility: HOSPITAL | Age: 84
End: 2024-02-26
Payer: MEDICARE

## 2024-02-26 DIAGNOSIS — I48.92 ATRIAL FLUTTER WITH CONTROLLED RESPONSE: Primary | ICD-10-CM

## 2024-02-26 LAB — INR PPP: 2.1

## 2024-02-26 NOTE — PROGRESS NOTES
Anticoagulation Clinic Progress Note    Anticoagulation Summary  As of 2024      INR goal:  2.0-3.0   TTR:  38.6% (1.7 y)   INR used for dosin.10 (2024)   Warfarin maintenance plan:  5 mg every Mon, Thu, Sat; 10 mg all other days   Weekly warfarin total:  55 mg   No change documented:  Heron Delaney, PharmD   Plan last modified:  Olga Granados RPH (2024)   Next INR check:  3/4/2024   Priority:  Maintenance   Target end date:      Indications    Atrial flutter with controlled response [I48.92]                 Anticoagulation Episode Summary       INR check location:  Home Draw    Preferred lab:      Send INR reminders to:   [facility]    Comments:  [facility] home stephania          Anticoagulation Care Providers       Provider Role Specialty Phone number    Ty Shelton MD Referring Cardiology [phone]            Clinic Interview:  Patient Findings     Positives:  Other complaints    Negatives:  Signs/symptoms of thrombosis, Signs/symptoms of bleeding,   Laboratory test error suspected, Change in health, Change in alcohol use,   Change in activity, Upcoming invasive procedure, Emergency department   visit, Upcoming dental procedure, Missed doses, Extra doses, Change in   medications, Change in diet/appetite, Hospital admission, Bruising    Comments:  Spouse is currently in the hospital.       Clinical Outcomes     Negatives:  Major bleeding event, Thromboembolic event,   Anticoagulation-related hospital admission, Anticoagulation-related ED   visit, Anticoagulation-related fatality    Comments:  Spouse is currently in the hospital.         INR History:      2024     1:15 PM 2024    12:00 AM 2024     1:00 PM 2024    12:00 AM 2024     3:14 PM 2024    12:00 AM 2024     2:59 PM   Anticoagulation Monitoring   INR 3.6  3.30  3.40  2.10   INR Date 2024   INR Goal 2.0-3.0  2.0-3.0  2.0-3.0  2.0-3.0   Trend Same   Same  Down  Same   Last Week Total 60 mg  60 mg  55 mg  50 mg   Next Week Total 55 mg  55 mg  50 mg  55 mg   Sun 10 mg  10 mg  10 mg  10 mg   Mon 5 mg  Hold (2/12)  Hold (2/19)  5 mg   Tue 10 mg  10 mg  10 mg  10 mg   Wed 5 mg (1/31); Otherwise 10 mg  10 mg  10 mg  10 mg   Thu 5 mg  5 mg  5 mg  5 mg   Fri 10 mg  10 mg  10 mg  10 mg   Sat 10 mg  10 mg  5 mg  5 mg   Historical INR  3.40        3.30      3.40      2.10            This result is from an external source.    Multiple values from one day are sorted in reverse-chronological order       Plan:  1. INR is Therapeutic today- see above in Anticoagulation Summary.   Will instruct Riky Moon to Continue their warfarin regimen at dose of 5 mg Mon/Thurs/Sat, 10 mg all other days - see above in Anticoagulation Summary.  2. Follow up in 1 week.  3. They have been instructed to call if any changes in medications, doses, concerns, etc. Patient expresses understanding and has no further questions at this time.    Heron Delaney, PharmD

## 2024-02-27 ENCOUNTER — OFFICE VISIT (OUTPATIENT)
Dept: INTERNAL MEDICINE | Facility: CLINIC | Age: 84
End: 2024-02-27
Payer: MEDICARE

## 2024-02-27 VITALS
DIASTOLIC BLOOD PRESSURE: 68 MMHG | HEIGHT: 72 IN | WEIGHT: 315 LBS | HEART RATE: 87 BPM | SYSTOLIC BLOOD PRESSURE: 132 MMHG | OXYGEN SATURATION: 93 % | BODY MASS INDEX: 42.66 KG/M2 | RESPIRATION RATE: 18 BRPM

## 2024-02-27 DIAGNOSIS — J42 CHRONIC BRONCHITIS, UNSPECIFIED CHRONIC BRONCHITIS TYPE: ICD-10-CM

## 2024-02-27 DIAGNOSIS — I10 ESSENTIAL HYPERTENSION: ICD-10-CM

## 2024-02-27 DIAGNOSIS — I89.0 LYMPHEDEMA: Primary | ICD-10-CM

## 2024-02-27 DIAGNOSIS — E03.9 HYPOTHYROIDISM, UNSPECIFIED TYPE: ICD-10-CM

## 2024-02-27 DIAGNOSIS — E78.5 HYPERLIPIDEMIA, UNSPECIFIED HYPERLIPIDEMIA TYPE: ICD-10-CM

## 2024-02-27 DIAGNOSIS — E11.9 TYPE 2 DIABETES MELLITUS WITHOUT COMPLICATION, WITHOUT LONG-TERM CURRENT USE OF INSULIN: ICD-10-CM

## 2024-02-27 RX ORDER — FUROSEMIDE 20 MG/1
20 TABLET ORAL DAILY
Qty: 3 TABLET | Refills: 0 | Status: SHIPPED | OUTPATIENT
Start: 2024-02-27 | End: 2024-03-01

## 2024-02-27 NOTE — PROGRESS NOTES
"Chief Complaint  Diabetes    Subjective          Riky Moon presents to [facility]  History of Present Illness  The patient is an 83-year-old male who presents for evaluation of multiple medical concerns.    His leg is bothering him the most. He called the lymphedema clinic again today because they had to wait until there was an opening and told him it would take 2 weeks. When he called today, the girl that usually takes care of him is in the process of discharging some people, so he will probably get in sooner. His leg has gotten big and it makes him get out of breath with walking. He is not on any water pills. He has an appointment in the first part of next month with the pulmonary doctor. He does not wear compression stockings because he can not get one. He only has compression stockings from the ankle down. When he was at the lymphedema clinic before, she told him it was not much bigger than it was before.    His breathing is fine when he is sitting down. He is using Trelegy.  He has an appointment with his pulmonary doctor in the first part of next month.     He is taking Jardiance 10 mg daily and metformin 850 mg twice a day.     He is taking Synthroid 125 mcg daily for his thyroid.     He is taking pravastatin 40 mg daily for his cholesterol.     He is taking losartan 100 mg daily, amlodipine 5 mg daily, and atenolol 25 mg daily for his blood pressure.    Objective   Vital Signs:   /68 (BP Location: Left arm, Patient Position: Sitting, Cuff Size: Large Adult)   Pulse 87   Resp 18   Ht 182.9 cm (72\")   Wt (!) 143 kg (315 lb)   SpO2 93%   BMI 42.72 kg/m²     Physical Exam  Vitals and nursing note reviewed.   Constitutional:       Appearance: He is well-developed.   HENT:      Head: Normocephalic and atraumatic.   Musculoskeletal:      Cervical back: Normal range of motion and neck supple.   Neurological:      Mental Status: He is alert and oriented to person, place, " and time.   Psychiatric:         Behavior: Behavior normal.         Physical Exam       Result Review :                 Assessment and Plan    Diagnoses and all orders for this visit:    1. Lymphedema (Primary)  -     furosemide (Lasix) 20 MG tablet; Take 1 tablet by mouth Daily for 3 days.  Dispense: 3 tablet; Refill: 0    2. Chronic bronchitis, unspecified chronic bronchitis type    3. Type 2 diabetes mellitus without complication, without long-term current use of insulin  -     Hemoglobin A1c  -     Microalbumin / Creatinine Urine Ratio - Urine, Clean Catch    4. Hypothyroidism, unspecified type  -     Thyroid Panel With TSH    5. Essential hypertension  -     CBC & Differential  -     Comprehensive Metabolic Panel    6. Hyperlipidemia, unspecified hyperlipidemia type  -     Lipid Panel With LDL / HDL Ratio      Assessment & Plan  1. Lymphedema.  I will start him on Lasix 20 mg daily for the next 3 days. He will get into a lymphedema clinic.    2. Breathing issues.  He has an appointment scheduled with his pulmonary doctor in the first part of next month.    3. Diabetes.  He will continue Jardiance 10 mg daily and metformin 850 mg twice a day.    4. Hypothyroidism.  He will continue Synthroid 125 mcg daily.    5. Hyperlipidemia.  He will continue pravastatin 40 mg daily.    6. Hypertension.  He will continue losartan 100 mg daily, amlodipine 5 mg daily, and atenolol 25 mg daily.    Follow-up  He will get blood work today.    Follow Up   No follow-ups on file.  Patient was given instructions and counseling regarding his condition or for health maintenance advice. Please see specific information pulled into the AVS if appropriate.       Patient or patient representative verbalized consent for the use of Ambient Listening during the visit with  Marco Carrillo MD for chart documentation. 2/27/2024  14:18 EST

## 2024-02-28 LAB
ALBUMIN SERPL-MCNC: 4.3 G/DL (ref 3.7–4.7)
ALBUMIN/CREAT UR: 19 MG/G CREAT (ref 0–29)
ALBUMIN/GLOB SERPL: 2 {RATIO} (ref 1.2–2.2)
ALP SERPL-CCNC: 60 IU/L (ref 44–121)
ALT SERPL-CCNC: 15 IU/L (ref 0–44)
AST SERPL-CCNC: 21 IU/L (ref 0–40)
BASOPHILS # BLD AUTO: 0.1 X10E3/UL (ref 0–0.2)
BASOPHILS NFR BLD AUTO: 1 %
BILIRUB SERPL-MCNC: 0.8 MG/DL (ref 0–1.2)
BUN SERPL-MCNC: 26 MG/DL (ref 8–27)
BUN/CREAT SERPL: 19 (ref 10–24)
CALCIUM SERPL-MCNC: 9 MG/DL (ref 8.6–10.2)
CHLORIDE SERPL-SCNC: 103 MMOL/L (ref 96–106)
CHOLEST SERPL-MCNC: 125 MG/DL (ref 100–199)
CO2 SERPL-SCNC: 23 MMOL/L (ref 20–29)
CREAT SERPL-MCNC: 1.36 MG/DL (ref 0.76–1.27)
CREAT UR-MCNC: 78.3 MG/DL
EGFRCR SERPLBLD CKD-EPI 2021: 51 ML/MIN/1.73
EOSINOPHIL # BLD AUTO: 0.1 X10E3/UL (ref 0–0.4)
EOSINOPHIL NFR BLD AUTO: 1 %
ERYTHROCYTE [DISTWIDTH] IN BLOOD BY AUTOMATED COUNT: 15.9 % (ref 11.6–15.4)
FT4I SERPL CALC-MCNC: 1.8 (ref 1.2–4.9)
GLOBULIN SER CALC-MCNC: 2.2 G/DL (ref 1.5–4.5)
GLUCOSE SERPL-MCNC: 150 MG/DL (ref 70–99)
HBA1C MFR BLD: 7 % (ref 4.8–5.6)
HCT VFR BLD AUTO: 38.6 % (ref 37.5–51)
HDLC SERPL-MCNC: 37 MG/DL
HGB BLD-MCNC: 13.5 G/DL (ref 13–17.7)
IMM GRANULOCYTES # BLD AUTO: 0 X10E3/UL (ref 0–0.1)
IMM GRANULOCYTES NFR BLD AUTO: 0 %
LDLC SERPL CALC-MCNC: 66 MG/DL (ref 0–99)
LDLC/HDLC SERPL: 1.8 RATIO (ref 0–3.6)
LYMPHOCYTES # BLD AUTO: 1.8 X10E3/UL (ref 0.7–3.1)
LYMPHOCYTES NFR BLD AUTO: 18 %
Lab: NORMAL
MCH RBC QN AUTO: 38.7 PG (ref 26.6–33)
MCHC RBC AUTO-ENTMCNC: 35 G/DL (ref 31.5–35.7)
MCV RBC AUTO: 111 FL (ref 79–97)
MICROALBUMIN UR-MCNC: 14.5 UG/ML
MONOCYTES # BLD AUTO: 0.8 X10E3/UL (ref 0.1–0.9)
MONOCYTES NFR BLD AUTO: 9 %
NEUTROPHILS # BLD AUTO: 7.1 X10E3/UL (ref 1.4–7)
NEUTROPHILS NFR BLD AUTO: 71 %
NRBC BLD AUTO-RTO: 1 % (ref 0–0)
PLATELET # BLD AUTO: 177 X10E3/UL (ref 150–450)
POTASSIUM SERPL-SCNC: 4.4 MMOL/L (ref 3.5–5.2)
PROT SERPL-MCNC: 6.5 G/DL (ref 6–8.5)
RBC # BLD AUTO: 3.49 X10E6/UL (ref 4.14–5.8)
SODIUM SERPL-SCNC: 142 MMOL/L (ref 134–144)
T3RU NFR SERPL: 26 % (ref 24–39)
T4 SERPL-MCNC: 7.1 UG/DL (ref 4.5–12)
TRIGL SERPL-MCNC: 119 MG/DL (ref 0–149)
TSH SERPL DL<=0.005 MIU/L-ACNC: 3.97 UIU/ML (ref 0.45–4.5)
VLDLC SERPL CALC-MCNC: 22 MG/DL (ref 5–40)
WBC # BLD AUTO: 9.9 X10E3/UL (ref 3.4–10.8)

## 2024-03-04 ENCOUNTER — ANTICOAGULATION VISIT (OUTPATIENT)
Dept: PHARMACY | Facility: HOSPITAL | Age: 84
End: 2024-03-04
Payer: MEDICARE

## 2024-03-04 DIAGNOSIS — I48.92 ATRIAL FLUTTER WITH CONTROLLED RESPONSE: Primary | ICD-10-CM

## 2024-03-04 LAB — INR PPP: 2.2

## 2024-03-04 NOTE — PROGRESS NOTES
Anticoagulation Clinic Progress Note    Anticoagulation Summary  As of 3/4/2024      INR goal:  2.0-3.0   TTR:  39.3% (1.7 y)   INR used for dosin.20 (3/4/2024)   Warfarin maintenance plan:  5 mg every Mon, Thu, Sat; 10 mg all other days   Weekly warfarin total:  55 mg   No change documented:  Olga Granados RPH   Plan last modified:  Olga Granados RPH (2024)   Next INR check:  3/11/2024   Priority:  Maintenance   Target end date:      Indications    Atrial flutter with controlled response [I48.92]                 Anticoagulation Episode Summary       INR check location:  Home Draw    Preferred lab:      Send INR reminders to:   [facility]    Comments:  [facility] home stephania          Anticoagulation Care Providers       Provider Role Specialty Phone number    Ty Shelton MD Referring Cardiology [phone]            Clinic Interview:  No pertinent clinical findings have been reported.    INR History:      2024     1:00 PM 2024    12:00 AM 2024     3:14 PM 2024    12:00 AM 2024     2:59 PM 3/4/2024    12:00 AM 3/4/2024     1:27 PM   Anticoagulation Monitoring   INR 3.30  3.40  2.10  2.20   INR Date 2024  2024  2024  3/4/2024   INR Goal 2.0-3.0  2.0-3.0  2.0-3.0  2.0-3.0   Trend Same  Down  Same  Same   Last Week Total 60 mg  55 mg  50 mg  55 mg   Next Week Total 55 mg  50 mg  55 mg  55 mg   Sun 10 mg  10 mg  10 mg  10 mg   Mon Hold ()  Hold ()  5 mg  5 mg   Tue 10 mg  10 mg  10 mg  10 mg   Wed 10 mg  10 mg  10 mg  10 mg   Thu 5 mg  5 mg  5 mg  5 mg   Fri 10 mg  10 mg  10 mg  10 mg   Sat 10 mg  5 mg  5 mg  5 mg   Historical INR  3.40      2.10      2.20            This result is from an external source.       Plan:  1. INR is therapeutic today- see above in Anticoagulation Summary.    Riky Moon to continue their warfarin regimen- see above in Anticoagulation Summary.  2. Follow up in 1 week  3. Pt has agreed to only be called if  INR out of range. They have been instructed to call if any changes in medications, doses, concerns, etc. Patient expresses understanding and has no further questions at this time.    Olga Granados, [facility]

## 2024-03-13 ENCOUNTER — ANTICOAGULATION VISIT (OUTPATIENT)
Dept: PHARMACY | Facility: HOSPITAL | Age: 84
End: 2024-03-13
Payer: MEDICARE

## 2024-03-13 DIAGNOSIS — I48.92 ATRIAL FLUTTER WITH CONTROLLED RESPONSE: Primary | ICD-10-CM

## 2024-03-13 LAB — INR PPP: 1.9

## 2024-03-13 PROCEDURE — G0249 PROVIDE INR TEST MATER/EQUIP: HCPCS

## 2024-03-13 NOTE — PROGRESS NOTES
Anticoagulation Clinic Progress Note    Anticoagulation Summary  As of 3/13/2024      INR goal:  2.0-3.0   TTR:  39.7% (1.7 y)   INR used for dosin.90 (3/13/2024)   Warfarin maintenance plan:  5 mg every Mon, Thu, Sat; 10 mg all other days   Weekly warfarin total:  55 mg   No change documented:  Daysi Franco RPH   Plan last modified:  Olga Granados RPH (2024)   Next INR check:  3/20/2024   Priority:  Maintenance   Target end date:      Indications    Atrial flutter with controlled response [I48.92]                 Anticoagulation Episode Summary       INR check location:  Home Draw    Preferred lab:      Send INR reminders to:   [facility] [place]    Comments:  [facility] home stephania          Anticoagulation Care Providers       Provider Role Specialty Phone number    Ty Shelton MD Referring Cardiology [phone]            Clinic Interview:  Patient Findings     Negatives:  Signs/symptoms of thrombosis, Signs/symptoms of bleeding,   Laboratory test error suspected, Change in health, Change in alcohol use,   Change in activity, Upcoming invasive procedure, Emergency department   visit, Upcoming dental procedure, Missed doses, Extra doses, Change in   medications, Change in diet/appetite, Hospital admission, Bruising, Other   complaints      Clinical Outcomes     Negatives:  Major bleeding event, Thromboembolic event,   Anticoagulation-related hospital admission, Anticoagulation-related ED   visit, Anticoagulation-related fatality        INR History:      2024     3:14 PM 2024    12:00 AM 2024     2:59 PM 3/4/2024    12:00 AM 3/4/2024     1:27 PM 3/13/2024    12:00 AM 3/13/2024     1:15 PM   Anticoagulation Monitoring   INR 3.40  2.10  2.20  1.90   INR Date 2024  2024  3/4/2024  3/13/2024   INR Goal 2.0-3.0  2.0-3.0  2.0-3.0  2.0-3.0   Trend Down  Same  Same  Same   Last Week Total 55 mg  50 mg  55 mg  55 mg   Next Week Total 50 mg  55 mg  55 mg  55 mg   Sun 10  mg  10 mg  10 mg  10 mg   Mon Hold (2/19)  5 mg  5 mg  5 mg   Tue 10 mg  10 mg  10 mg  10 mg   Wed 10 mg  10 mg  10 mg  10 mg   Thu 5 mg  5 mg  5 mg  5 mg   Fri 10 mg  10 mg  10 mg  10 mg   Sat 5 mg  5 mg  5 mg  5 mg   Historical INR  2.10      2.20      1.90            This result is from an external source.       Plan:  1. INR is Subtherapeutic today- see above in Anticoagulation Summary.   Will instruct Riky Moon to Continue their warfarin regimen- see above in Anticoagulation Summary.  2. Follow up in 1 week  3. They have been instructed to call if any changes in medications, doses, concerns, etc. Patient expresses understanding and has no further questions at this time.    Daysi Franco [facility]

## 2024-03-13 NOTE — PROGRESS NOTES
I have supervised and reviewed the notes, assessments, and/or procedures performed. The documented assessment and plan were developed cooperatively, and the plan was implemented in my presence. I concur with the documentation of this patient encounter.    Heron Delaney, [facility]

## 2024-03-19 ENCOUNTER — ANTICOAGULATION VISIT (OUTPATIENT)
Dept: PHARMACY | Facility: HOSPITAL | Age: 84
End: 2024-03-19
Payer: MEDICARE

## 2024-03-19 DIAGNOSIS — I48.92 ATRIAL FLUTTER WITH CONTROLLED RESPONSE: Primary | ICD-10-CM

## 2024-03-19 LAB — INR PPP: 2.6

## 2024-03-19 NOTE — PROGRESS NOTES
Anticoagulation Clinic Progress Note    Anticoagulation Summary  As of 3/19/2024      INR goal:  2.0-3.0   TTR:  40.1% (1.7 y)   INR used for dosin.60 (3/19/2024)   Warfarin maintenance plan:  5 mg every Mon, Thu, Sat; 10 mg all other days   Weekly warfarin total:  55 mg   No change documented:  Olga Granados RPH   Plan last modified:  Olga Granados RPH (2024)   Next INR check:  3/26/2024   Priority:  Maintenance   Target end date:      Indications    Atrial flutter with controlled response [I48.92]                 Anticoagulation Episode Summary       INR check location:  Home Draw    Preferred lab:      Send INR reminders to:   [facility]    Comments:  [facility] home stephania          Anticoagulation Care Providers       Provider Role Specialty Phone number    Ty Shelton MD Referring Cardiology [phone]            Clinic Interview:  No pertinent clinical findings have been reported.    INR History:      2024     2:59 PM 3/4/2024    12:00 AM 3/4/2024     1:27 PM 3/13/2024    12:00 AM 3/13/2024     1:15 PM 3/19/2024    12:00 AM 3/19/2024     1:24 PM   Anticoagulation Monitoring   INR 2.10  2.20  1.90  2.60   INR Date 2024  3/4/2024  3/13/2024  3/19/2024   INR Goal 2.0-3.0  2.0-3.0  2.0-3.0  2.0-3.0   Trend Same  Same  Same  Same   Last Week Total 50 mg  55 mg  55 mg  55 mg   Next Week Total 55 mg  55 mg  55 mg  55 mg   Sun 10 mg  10 mg  10 mg  10 mg   Mon 5 mg  5 mg  5 mg  5 mg   Tue 10 mg  10 mg  10 mg  10 mg   Wed 10 mg  10 mg  10 mg  10 mg   Thu 5 mg  5 mg  5 mg  5 mg   Fri 10 mg  10 mg  10 mg  10 mg   Sat 5 mg  5 mg  5 mg  5 mg   Historical INR  2.20      1.90      2.60            This result is from an external source.       Plan:  1. INR is therapeutic today- see above in Anticoagulation Summary.    Riky BARBER Moon to continue their warfarin regimen- see above in Anticoagulation Summary.  2. Follow up in 1 week  3. Pt has agreed to only be called if INR out of  range. They have been instructed to call if any changes in medications, doses, concerns, etc. Patient expresses understanding and has no further questions at this time.    Olga Granados, [facility]

## 2024-03-27 ENCOUNTER — ANTICOAGULATION VISIT (OUTPATIENT)
Dept: PHARMACY | Facility: HOSPITAL | Age: 84
End: 2024-03-27
Payer: MEDICARE

## 2024-03-27 DIAGNOSIS — I48.92 ATRIAL FLUTTER WITH CONTROLLED RESPONSE: Primary | ICD-10-CM

## 2024-03-27 LAB — INR PPP: 2.6

## 2024-03-27 NOTE — PROGRESS NOTES
Anticoagulation Clinic Progress Note    Anticoagulation Summary  As of 3/27/2024      INR goal:  2.0-3.0   TTR:  40.9% (1.7 y)   INR used for dosin.60 (3/27/2024)   Warfarin maintenance plan:  5 mg every Mon, Thu, Sat; 10 mg all other days   Weekly warfarin total:  55 mg   No change documented:  Olga Granados RPH   Plan last modified:  Olga Granados RPH (2024)   Next INR check:  4/3/2024   Priority:  Maintenance   Target end date:      Indications    Atrial flutter with controlled response [I48.92]                 Anticoagulation Episode Summary       INR check location:  Home Draw    Preferred lab:      Send INR reminders to:   [facility] CLINICAL POOL    Comments:  [facility] home stephania          Anticoagulation Care Providers       Provider Role Specialty Phone number    Ty Shelton MD Referring Cardiology [phone]            Clinic Interview:  No pertinent clinical findings have been reported.    INR History:      3/4/2024     1:27 PM 3/13/2024    12:00 AM 3/13/2024     1:15 PM 3/19/2024    12:00 AM 3/19/2024     1:24 PM 3/27/2024    12:00 AM 3/27/2024     1:44 PM   Anticoagulation Monitoring   INR 2.20  1.90  2.60  2.60   INR Date 3/4/2024  3/13/2024  3/19/2024  3/27/2024   INR Goal 2.0-3.0  2.0-3.0  2.0-3.0  2.0-3.0   Trend Same  Same  Same  Same   Last Week Total 55 mg  55 mg  55 mg  55 mg   Next Week Total 55 mg  55 mg  55 mg  55 mg   Sun 10 mg  10 mg  10 mg  10 mg   Mon 5 mg  5 mg  5 mg  5 mg   Tue 10 mg  10 mg  10 mg  10 mg   Wed 10 mg  10 mg  10 mg  10 mg   Thu 5 mg  5 mg  5 mg  5 mg   Fri 10 mg  10 mg  10 mg  10 mg   Sat 5 mg  5 mg  5 mg  5 mg   Historical INR  1.90      2.60      2.60            This result is from an external source.       Plan:  1. INR is therapeutic today- see above in Anticoagulation Summary.    Riky BARBER Moon to continue their warfarin regimen- see above in Anticoagulation Summary.  2. Follow up in 1 week  3. Pt has agreed to only be called if INR out of  range. They have been instructed to call if any changes in medications, doses, concerns, etc. Patient expresses understanding and has no further questions at this time.    Olga Granados, [facility]

## 2024-04-08 ENCOUNTER — ANTICOAGULATION VISIT (OUTPATIENT)
Dept: PHARMACY | Facility: HOSPITAL | Age: 84
End: 2024-04-08
Payer: MEDICARE

## 2024-04-08 DIAGNOSIS — I48.92 ATRIAL FLUTTER WITH CONTROLLED RESPONSE: Primary | ICD-10-CM

## 2024-04-08 LAB — INR PPP: 2.2

## 2024-04-08 NOTE — PROGRESS NOTES
Anticoagulation Clinic Progress Note    Anticoagulation Summary  As of 2024      INR goal:  2.0-3.0   TTR:  42.0% (1.8 y)   INR used for dosin.20 (2024)   Warfarin maintenance plan:  5 mg every Mon, Thu, Sat; 10 mg all other days   Weekly warfarin total:  55 mg   No change documented:  Olga Granados RPH   Plan last modified:  Olga Granados RPH (2024)   Next INR check:  4/15/2024   Priority:  Maintenance   Target end date:      Indications    Atrial flutter with controlled response [I48.92]                 Anticoagulation Episode Summary       INR check location:  Home Draw    Preferred lab:      Send INR reminders to:   [facility]    Comments:  [facility] home stephania          Anticoagulation Care Providers       Provider Role Specialty Phone number    Ty Shelton MD Referring Cardiology [phone]            Clinic Interview:  No pertinent clinical findings have been reported.    INR History:      3/13/2024     1:15 PM 3/19/2024    12:00 AM 3/19/2024     1:24 PM 3/27/2024    12:00 AM 3/27/2024     1:44 PM 2024    12:00 AM 2024     2:18 PM   Anticoagulation Monitoring   INR 1.90  2.60  2.60  2.20   INR Date 3/13/2024  3/19/2024  3/27/2024  2024   INR Goal 2.0-3.0  2.0-3.0  2.0-3.0  2.0-3.0   Trend Same  Same  Same  Same   Last Week Total 55 mg  55 mg  55 mg  55 mg   Next Week Total 55 mg  55 mg  55 mg  55 mg   Sun 10 mg  10 mg  10 mg  10 mg   Mon 5 mg  5 mg  5 mg  5 mg   Tue 10 mg  10 mg  10 mg  10 mg   Wed 10 mg  10 mg  10 mg  10 mg   Thu 5 mg  5 mg  5 mg  5 mg   Fri 10 mg  10 mg  10 mg  10 mg   Sat 5 mg  5 mg  5 mg  5 mg   Historical INR  2.60      2.60      2.20            This result is from an external source.       Plan:  1. INR is therapeutic today- see above in Anticoagulation Summary.    Riky BARBER Moon to continue their warfarin regimen- see above in Anticoagulation Summary.  2. Follow up in 1 week  3. Pt has agreed to only be called if INR out of  range. They have been instructed to call if any changes in medications, doses, concerns, etc. Patient expresses understanding and has no further questions at this time.    Olga Granados, [facility]

## 2024-04-10 ENCOUNTER — HOSPITAL ENCOUNTER (OUTPATIENT)
Dept: PHYSICAL THERAPY | Facility: HOSPITAL | Age: 84
Setting detail: THERAPIES SERIES
Discharge: HOME | End: 2024-04-10
Payer: MEDICARE

## 2024-04-10 DIAGNOSIS — I89.0 LYMPHEDEMA: Primary | ICD-10-CM

## 2024-04-10 PROCEDURE — 97140 MANUAL THERAPY 1/> REGIONS: CPT

## 2024-04-10 NOTE — THERAPY PROGRESS REPORT/RE-CERT
Outpatient Physical Therapy Lymphedema Progress Note  [facility]     Patient Name: Riky Moon  : 1940  MRN: [id number]  Today's Date: 4/10/2024        Visit Date: 04/10/2024    Visit Dx:    ICD-10-CM ICD-9-CM   1. Lymphedema  I89.0 457.1       Patient Active Problem List   Diagnosis    OA (osteoarthritis) of hip    KINDRA treated with auto BiPAP    Chest pain    Diabetes mellitus    Hypertension    Hyperlipidemia    Hypothyroidism    Asthma    Overactive bladder    CAP (community acquired pneumonia)    Cellulitis of left lower extremity    COPD (chronic obstructive pulmonary disease)    Dyslipidemia    Gastroesophageal reflux disease    Leukocytosis    Peripheral nerve disease    Constipation    Oropharyngeal dysphagia    Bronchitis    Class 3 severe obesity due to excess calories with serious comorbidity and body mass index (BMI) of 40.0 to 44.9 in adult    Left leg swelling    Anemia    Leukocytosis    Circadian rhythm sleep disorder, delayed sleep phase type    Atrial flutter with controlled response         Lymphedema       Row Name 04/10/24 1000             Subjective Pain    Able to rate subjective pain? yes  -KA      Pre-Treatment Pain Level 6  -KA      Subjective Pain Comment L knee with walking  -KA         Subjective    Subjective Comments Nothing has changed medically since initial evaluation, but LLE is more swollen.  Has not noted any weeping or open wounds but does have some scabs on LLE.  -KA         Lymphedema Assessment    Lymphedema Classification RLE:;LLE:;stage 2 (Spontaneously Irreversible);secondary  -KA      Infections or Cellulitis? yes  none recently  -KA      Lymphedema Precautions asthma;anemia;other (comment)  neuropathy  -KA         Posture/Observations    Posture/Observations Comments Decreased gait speed, decreased heel-toe gait mechanics, no AD this date  -KA         General ROM    GENERAL ROM COMMENTS Pt is unable to reach legs to put on socks and shoes (or compression  garments), B ankle dorsiflexion limited to neutral  -KA         MMT (Manual Muscle Testing)    General MMT Comments BLE strength at least 3/5  -KA         Lymphedema Edema Assessment    Ptting Edema Category By grade out of 4  -KA      Pitting Edema + 3/4  -KA      Stemmer Sign bilateral:;positive  -KA      [place]ump left:;positive  -KA      Edema Assessment Comment Moderate-severe lymphedema affecting LLE foot to knee, Mild lymphedema affecting RLE foot to knee; Abnormal contours of L foot and ankle  -KA         Skin Changes/Observations    Location/Assessment Lower Extremity  -KA      Lower Extremity Conditions bilateral:;intact;clean;dry;shiny;crust;scab(s);inflamed  -KA      Lower Extremity Color/Pigment bilateral:;red;fibrosis  -KA      Skin Observations Comment No open wounds or weeping, does have several scabs; other than redness (which is baseline) no active signs of infection.  -KA         Lymphedema Measurements    Measurement Type(s) Circumferential  -KA      Circumferential Areas Lower extremities  -KA         BLE Circumferential (cm)    Measurement Location 1 Knee (popliteal crease)  -KA      Left 1 42.7 cm  -KA      Right 1 40.9 cm  -KA      Measurement Location 2 10cm below knee  -KA      Left 2 53.9 cm  -KA      Right 2 43 cm  -KA      Measurement Location 3 20cm below knee  -KA      Left 3 45.9 cm  -KA      Right 3 35.8 cm  -KA      Measurement Location 4 30cm below knee  -KA      Left 4 39 cm  -KA      Right 4 25.7 cm  -KA      Measurement Location 5 Ankle  -KA      Left 5 39.5 cm  -KA      Right 5 27.5 cm  -KA      Measurement Location 6 Midfoot  -KA      Left 6 28 cm  -KA      Right 6 26.5 cm  -KA      LLE Circumferential Total 249 cm  -KA      RLE Circumferential Total 199.4 cm  -KA         Compression/Skin Care    Compression/Skin Care skin care;wrapping location;bandaging  -KA      Skin Care washed/dried;moisturizing lotion applied  Eucerin  -KA      Wrapping Location lower extremity  -KA       Wrapping Location LE left:;foot to knee  -      Bandage Layers cotton liner;padding/fluff layer;dense foam;short-stretch bandages (comment size/quantity)  -      Bandaging Comments LLE: TG9, Cellona x 1, Rosidal soft x 2 ankle to knee, Rosidal K 1-8 cm, 2-10 cm, 1-12 cm, Darco shoe; no bandaging applied to RLE this date  -      Bandaging Technique figure-eight;light compression  -                User Key  (r) = Recorded By, (t) = Taken By, (c) = Cosigned By      Initials Name Provider Type    Svetlana Couch, PT Physical Therapist                                   PT Assessment/Plan       Row Name 04/10/24 8905          PT Assessment    Assessment Comments Pt returns for first treatment session following initial evaluation in January 2024, brought bandaging supplies to clinic with him.  Girth measurements for LLE increased by 13 cm since initial evaluation, girth measurements for RLE have remained fairly stable.  Lymphedema still consistent with secondary Stage 2 in BLE  and is characterized by +B stemmer sign, dry skin, redness, fibrotic skin changes, scabbing, +L buffalo hump, loss of normal contours L ankle/foot, and pitting edema (3+ LLE, 2+RLE).  No active weeping or wounds.  Began with compression bandaging to LLE (4 SS bandages, light compression, figure 8 technique), will assess pt's compression garments to determine whether RLE needs bandaging vs. transition directly to long term compression garment.  Pt remains appropriate for skilled physical therapy to reduce edema, improve skin condition, reduce infection risk, and improve self management of condition.  Pt's wife (who previously had been helping with application of garments) has alzheimer's and is experiencing declining function/ability to assist, pt states he can obtain other assistance through his facility prior to transitioning to Phase 2 of treatment.  -     Rehab Potential Good  -     Patient/caregiver participated in  establishment of treatment plan and goals Yes  -KA     Patient would benefit from skilled therapy intervention Yes  -KA        PT Plan    PT Frequency 3x/week  -KA     Predicted Duration of Therapy Intervention (PT) 4-6 weeks  -KA     Planned CPT's? PT RE-EVAL: 29537;PT THER PROC EA 15 MIN: 59192;PT THER ACT EA 15 MIN: 96614;PT MANUAL THERAPY EA 15 MIN: 97051;PT NEUROMUSC RE-EDUCATION EA 15 MIN: 46292;PT GAIT TRAINING EA 15 MIN: 78992;PT SELF CARE/HOME MGMT/TRAIN EA 15: 21024  -KA        Therapy Assessment/Plan (OT)    Predicted Duration of Therapy Intervention (OT) Assess response to bandaging, begin MLD LLE, bandaging vs. compression garment for RLE.  -KA               User Key  (r) = Recorded By, (t) = Taken By, (c) = Cosigned By      Initials Name Provider Type    Svetlana Couch, PT Physical Therapist                        OP Exercises       Row Name 04/10/24 1119 04/10/24 1000          Subjective    Subjective Comments -- Nothing has changed medically since initial evaluation, but LLE is more swollen.  Has not noted any weeping or open wounds but does have some scabs on LLE.  -JOHNIE        Subjective Pain    Able to rate subjective pain? -- yes  -KA     Pre-Treatment Pain Level -- 6  -KA     Subjective Pain Comment -- L knee with walking  -KA        Total Minutes    63330 - PT Manual Therapy Minutes 48  -KA --               User Key  (r) = Recorded By, (t) = Taken By, (c) = Cosigned By      Initials Name Provider Type    Svetlana Couch, PT Physical Therapist                            Manual Rx (Last 36 Hours)       Manual Treatments       Row Name 04/10/24 1119             Total Minutes    70326 - PT Manual Therapy Minutes 48  -KA                User Key  (r) = Recorded By, (t) = Taken By, (c) = Cosigned By      Initials Name Provider Type    Svetlana Couch, PT Physical Therapist                     PT OP Goals       Row Name 04/10/24 1100          PT Short Term Goals    STG Date to Achieve  04/24/24  -     STG 1 Patient to demonstrate awareness of condition and precautions for improved prevention, management, care of symptoms, and ease of transition to self care of condition.  -KA     STG 1 Progress New  -     STG 2 Patient demonstrates decreased net edema of Left Lower Extremity>/= 5-10 cm for decreased edema symptoms, decreased risk of infection, and improved skin care.  -     STG 2 Progress New  -        Long Term Goals    LTG Date to Achieve 05/22/24  -     LTG 1 Patient/family/caregiver independent with self-care techniques for self-management of condition.  -KA     LTG 1 Progress New  -KA     LTG 2 Patient demonstrates decreased net edema of Left Lower Extremity>/= 10-20 cm for decreased edema symptoms, decreased risk of infection, and improved skin care.  -KA     LTG 2 Progress New  -     LTG 3 Patient/family/caregiver independent with compression garments as indicated for self-management of condition.  -     LTG 3 Progress New  -        Time Calculation    PT Goal Re-Cert Due Date 04/23/24  -               User Key  (r) = Recorded By, (t) = Taken By, (c) = Cosigned By      Initials Name Provider Type    Svetlana Couch, PT Physical Therapist                    Therapy Education  Education Details: Discussed keeping bandaging on until AM prior to next appt, then remove bandaging to shower and bring all supplies back to clinic with him.  Discussed girth measurements and skin concerns.  Pt to bring velcro garments used previously in for reassessment, may be able to put RLE directly into compression garment as it does not need to reduce much but does need compression to help with fibrosis.  If garments are worn out or fit poorly, may consider bandaging RLE next visit as well but will prioritize LLE for MLD.  Given: Symptoms/condition management, Edema management, Bandaging/dressing change  Program: New  How Provided: Verbal, Demonstration  Provided to: Patient  Level of  Understanding: Verbalized              Time Calculation:   Start Time: 1015  Stop Time: 1103  Time Calculation (min): 48 min  Timed Charges  40791 - PT Manual Therapy Minutes: 48  Total Minutes  Timed Charges Total Minutes: 48   Total Minutes: 48   Therapy Charges for Today       Code Description Service Date Service Provider Modifiers Qty    40553014986 HC PT MANUAL THERAPY EA 15 MIN 4/10/2024 Svetlana French, PT GP 3                      Svetlana French, PT  4/10/2024

## 2024-04-12 ENCOUNTER — HOSPITAL ENCOUNTER (OUTPATIENT)
Dept: PHYSICAL THERAPY | Facility: HOSPITAL | Age: 84
Setting detail: THERAPIES SERIES
Discharge: HOME | End: 2024-04-12
Payer: MEDICARE

## 2024-04-12 DIAGNOSIS — I89.0 LYMPHEDEMA: Primary | ICD-10-CM

## 2024-04-12 PROCEDURE — 97140 MANUAL THERAPY 1/> REGIONS: CPT

## 2024-04-12 NOTE — THERAPY TREATMENT NOTE
Outpatient Physical Therapy Lymphedema Treatment Note  [facility]     Patient Name: Riky Moon  : 1940  MRN: [id number]  Today's Date: 2024        Visit Date: 2024    Visit Dx:    ICD-10-CM ICD-9-CM   1. Lymphedema  I89.0 457.1       Patient Active Problem List   Diagnosis    OA (osteoarthritis) of hip    KINDRA treated with auto BiPAP    Chest pain    Diabetes mellitus    Hypertension    Hyperlipidemia    Hypothyroidism    Asthma    Overactive bladder    CAP (community acquired pneumonia)    Cellulitis of left lower extremity    COPD (chronic obstructive pulmonary disease)    Dyslipidemia    Gastroesophageal reflux disease    Leukocytosis    Peripheral nerve disease    Constipation    Oropharyngeal dysphagia    Bronchitis    Class 3 severe obesity due to excess calories with serious comorbidity and body mass index (BMI) of 40.0 to 44.9 in adult    Left leg swelling    Anemia    Leukocytosis    Circadian rhythm sleep disorder, delayed sleep phase type    Atrial flutter with controlled response         Lymphedema       Row Name 24 1600             Subjective Pain    Able to rate subjective pain? yes  -KA      Pre-Treatment Pain Level 5  -KA      Subjective Pain Comment L knee with walking  -KA         Subjective    Subjective Comments Noted decreased swelling in LLE when he removed bandaging, brought velcro compression for RLE to be assessed.  -KA         Skin Changes/Observations    Skin Observations Comment Small open wound LLE anterior proximal shin where scab came off, minimal depth and no active drainage  -KA         Manual Lymphatic Drainage    Manual Lymphatic Drainage Comments Short neck, B axilla, B IA, diaphragmatic breathing, L inguinals, LLE  -KA         Compression/Skin Care    Compression/Skin Care skin care;wrapping location;bandaging;compression garment  -KA      Skin Care moisturizing lotion applied  Eucerin; zinc oxide and non-adherent pad to small wound  -KA       Wrapping Location lower extremity  -KA      Wrapping Location LE left:;foot to knee  -KA      Bandage Layers cotton liner;padding/fluff layer;dense foam;short-stretch bandages (comment size/quantity)  -KA      Bandaging Comments LLE: TG9, Cellona x 1, Rosidal soft x 2 ankle to knee, Rosidal K 1-8 cm, 2-10 cm, 1-12 cm, Darco shoe; no bandaging applied to RLE this date  -KA      Bandaging Technique figure-eight;light compression  -KA      Compression Garment Comments Juxtalite HD system size Large long applied to RLE, fit is appropriate, length is still not quite long enough for leg  -KA                User Key  (r) = Recorded By, (t) = Taken By, (c) = Cosigned By      Initials Name Provider Type    Svetlana Couch, PT Physical Therapist                                   PT Assessment/Plan       Row Name 04/12/24 1645          PT Assessment    Assessment Comments Pt responded well to initial compression bandaging.  Performed MLD to LLE this date.  Utilized same bandaging technique today, but added zinc oxide and non-adherent pad to small wound where scab came off with bandaging.  Applied Juxtalite HD system to RLE in size large long using 20-30 mmHG, fit is good except would benefit from extra length.  -JOHNIE        Therapy Assessment/Plan (OT)    Predicted Duration of Therapy Intervention (OT) Continue CDT.  -KA               User Key  (r) = Recorded By, (t) = Taken By, (c) = Cosigned By      Initials Name Provider Type    Svetlana Couch, PT Physical Therapist                        OP Exercises       Row Name 04/12/24 1642 04/12/24 1600          Subjective    Subjective Comments -- Noted decreased swelling in LLE when he removed bandaging, brought velcro compression for RLE to be assessed.  -KA        Subjective Pain    Able to rate subjective pain? -- yes  -KA     Pre-Treatment Pain Level -- 5  -KA     Subjective Pain Comment -- L knee with walking  -KA        Total Minutes    73199 - PT Manual Therapy  Minutes 55  -KA --               User Key  (r) = Recorded By, (t) = Taken By, (c) = Cosigned By      Initials Name Provider Type    Svetlana Couch, CJ Physical Therapist                            Manual Rx (Last 36 Hours)       Manual Treatments       Row Name 04/12/24 1647             Total Minutes    69732 - PT Manual Therapy Minutes 55  -KA                User Key  (r) = Recorded By, (t) = Taken By, (c) = Cosigned By      Initials Name Provider Type    Svetlana Couch, CJ Physical Therapist                                       Time Calculation:   Start Time: 1420  Stop Time: 1515  Time Calculation (min): 55 min  Timed Charges  93029 - PT Manual Therapy Minutes: 55  Total Minutes  Timed Charges Total Minutes: 55   Total Minutes: 55   Therapy Charges for Today       Code Description Service Date Service Provider Modifiers Qty    64001741489 HC PT MANUAL THERAPY EA 15 MIN 4/12/2024 Svetlana French, PT GP 4                      Svetlana French PT  4/12/2024

## 2024-04-15 ENCOUNTER — HOSPITAL ENCOUNTER (OUTPATIENT)
Dept: PHYSICAL THERAPY | Facility: HOSPITAL | Age: 84
Setting detail: THERAPIES SERIES
Discharge: HOME | End: 2024-04-15
Payer: MEDICARE

## 2024-04-15 DIAGNOSIS — I89.0 LYMPHEDEMA: Primary | ICD-10-CM

## 2024-04-15 PROCEDURE — 97140 MANUAL THERAPY 1/> REGIONS: CPT

## 2024-04-15 NOTE — THERAPY TREATMENT NOTE
Outpatient Physical Therapy Lymphedema Treatment Note  [facility]     Patient Name: Riky Moon  : 1940  MRN: [id number]  Today's Date: 4/15/2024        Visit Date: 04/15/2024    Visit Dx:    ICD-10-CM ICD-9-CM   1. Lymphedema  I89.0 457.1       Patient Active Problem List   Diagnosis    OA (osteoarthritis) of hip    KINDRA treated with auto BiPAP    Chest pain    Diabetes mellitus    Hypertension    Hyperlipidemia    Hypothyroidism    Asthma    Overactive bladder    CAP (community acquired pneumonia)    Cellulitis of left lower extremity    COPD (chronic obstructive pulmonary disease)    Dyslipidemia    Gastroesophageal reflux disease    Leukocytosis    Peripheral nerve disease    Constipation    Oropharyngeal dysphagia    Bronchitis    Class 3 severe obesity due to excess calories with serious comorbidity and body mass index (BMI) of 40.0 to 44.9 in adult    Left leg swelling    Anemia    Leukocytosis    Circadian rhythm sleep disorder, delayed sleep phase type    Atrial flutter with controlled response         Lymphedema       Row Name 04/15/24 1400             Manual Lymphatic Drainage    Manual Lymphatic Drainage Comments Short neck, B axilla, B IA, diaphragmatic breathing, L inguinals, LLE  -KA         Compression/Skin Care    Compression/Skin Care skin care;wrapping location;bandaging;compression garment  -KA      Skin Care moisturizing lotion applied  Eucerin  -KA      Wrapping Location lower extremity  -KA      Wrapping Location LE left:;foot to knee  -KA      Bandage Layers cotton liner;padding/fluff layer;dense foam;short-stretch bandages (comment size/quantity)  -KA      Bandaging Comments LLE: TG9, Cellona x 1, Rosidal soft x 2 ankle to knee, Rosidal K 1-8 cm, 2-10 cm, 1-12 cm, Darco shoe; no bandaging applied to RLE this date  -KA      Bandaging Technique figure-eight;light compression  -KA      Compression Garment Comments Juxtalite HD large long applied to RLE  -KA                User Key   (r) = Recorded By, (t) = Taken By, (c) = Cosigned By      Initials Name Provider Type    Svetlana Couch PT Physical Therapist                                   PT Assessment/Plan       Row Name 04/15/24 1503          PT Assessment    Functional Limitations Impaired gait;Limitation in home management;Limitations in community activities;Limitations in functional capacity and performance;Performance in self-care ADL  -     Impairments Balance;Edema;Endurance;Gait;Impaired lymphatic circulation;Joint mobility;Muscle strength;Posture;Sensation  -JOHNIE     Assessment Comments Pt continues to respond well to MLD and bandaging of LLE and compression garment to RLE.  Skin condition appears to be improving but still has severe dryness bilaterally and several loose scabs on LLE which may cause raw areas when they fall off; will monitor.  Reassess girth measurements next visit.  -JOHNIE        Therapy Assessment/Plan (OT)    Predicted Duration of Therapy Intervention (OT) Continue CDT  -JOHNIE               User Key  (r) = Recorded By, (t) = Taken By, (c) = Cosigned By      Initials Name Provider Type    Svetlana Couch, PT Physical Therapist                        OP Exercises       Row Name 04/15/24 1522             Total Minutes    86024 - PT Manual Therapy Minutes 61  -KA                User Key  (r) = Recorded By, (t) = Taken By, (c) = Cosigned By      Initials Name Provider Type    Svetlana Couch PT Physical Therapist                            Manual Rx (Last 36 Hours)       Manual Treatments       Row Name 04/15/24 1522             Total Minutes    30120 - PT Manual Therapy Minutes 61  -KA                User Key  (r) = Recorded By, (t) = Taken By, (c) = Cosigned By      Initials Name Provider Type    Svetlana Couch PT Physical Therapist                                       Time Calculation:   Start Time: 1420  Stop Time: 1521  Time Calculation (min): 61 min  Timed Charges  64316 - PT Manual Therapy  Minutes: 61  Total Minutes  Timed Charges Total Minutes: 61   Total Minutes: 61   Therapy Charges for Today       Code Description Service Date Service Provider Modifiers Qty    50679093615 HC PT MANUAL THERAPY EA 15 MIN 4/15/2024 Svetlana French, PT GP 4                      Svetlana French, PT  4/15/2024

## 2024-04-17 ENCOUNTER — HOSPITAL ENCOUNTER (OUTPATIENT)
Dept: PHYSICAL THERAPY | Facility: HOSPITAL | Age: 84
Setting detail: THERAPIES SERIES
Discharge: HOME | End: 2024-04-17
Payer: MEDICARE

## 2024-04-17 DIAGNOSIS — I89.0 LYMPHEDEMA: Primary | ICD-10-CM

## 2024-04-17 PROCEDURE — 97140 MANUAL THERAPY 1/> REGIONS: CPT

## 2024-04-17 NOTE — THERAPY PROGRESS REPORT/RE-CERT
Outpatient Physical Therapy Lymphedema Progress Note  [facility]     Patient Name: Riky Moon  : 1940  MRN: [id number]  Today's Date: 2024        Visit Date: 2024    Visit Dx:    ICD-10-CM ICD-9-CM   1. Lymphedema  I89.0 457.1       Patient Active Problem List   Diagnosis    OA (osteoarthritis) of hip    KINDRA treated with auto BiPAP    Chest pain    Diabetes mellitus    Hypertension    Hyperlipidemia    Hypothyroidism    Asthma    Overactive bladder    CAP (community acquired pneumonia)    Cellulitis of left lower extremity    COPD (chronic obstructive pulmonary disease)    Dyslipidemia    Gastroesophageal reflux disease    Leukocytosis    Peripheral nerve disease    Constipation    Oropharyngeal dysphagia    Bronchitis    Class 3 severe obesity due to excess calories with serious comorbidity and body mass index (BMI) of 40.0 to 44.9 in adult    Left leg swelling    Anemia    Leukocytosis    Circadian rhythm sleep disorder, delayed sleep phase type    Atrial flutter with controlled response         Lymphedema       Row Name 24 1400             Subjective Pain    Able to rate subjective pain? yes  -KA      Pre-Treatment Pain Level 0  -KA         Subjective    Subjective Comments Pt reports no issues with previous bandaging  -KA         Skin Changes/Observations    Skin Observations Comment Wound has closed on LLE.  Still has dry skin, redness, and fibrosis in BLE but worse in LLE  -KA         Lymphedema Measurements    Measurement Type(s) Circumferential  -KA      Circumferential Areas Lower extremities  -KA         BLE Circumferential (cm)    Measurement Location 1 Knee (popliteal crease)  -KA      Left 1 42.3 cm  -KA      Right 1 42.5 cm  -KA      Measurement Location 2 10cm below knee  -KA      Left 2 43.7 cm  -KA      Right 2 44 cm  -KA      Measurement Location 3 20cm below knee  -KA      Left 3 41.9 cm  -KA      Right 3 33.1 cm  -KA      Measurement Location 4 30cm below knee   -KA      Left 4 35.3 cm  -KA      Right 4 24.2 cm  -KA      Measurement Location 5 Ankle  -KA      Left 5 35 cm  -KA      Right 5 26 cm  -KA      Measurement Location 6 Midfoot  -KA      Left 6 26 cm  -KA      Right 6 26.5 cm  -KA      LLE Circumferential Total 224.2 cm  -KA      RLE Circumferential Total 196.3 cm  -KA         Manual Lymphatic Drainage    Manual Lymphatic Drainage Comments Short neck, B axilla, B IA, diaphragmatic breathing, L inguinals, LLE  -KA         Compression/Skin Care    Compression/Skin Care skin care;compression garment  -KA      Skin Care moisturizing lotion applied  -KA      Compression Garment Comments Juxtalite HD size Large long applied to RLE, Juxtalite HD size XL long applied to LLE  -KA                User Key  (r) = Recorded By, (t) = Taken By, (c) = Cosigned By      Initials Name Provider Type    Svetlana Couch, PT Physical Therapist                                   PT Assessment/Plan       Row Name 04/17/24 6425          PT Assessment    Functional Limitations Impaired gait;Limitation in home management;Limitations in community activities;Limitations in functional capacity and performance;Performance in self-care ADL  -KA     Impairments Balance;Edema;Endurance;Gait;Impaired lymphatic circulation;Joint mobility;Muscle strength;Posture;Sensation  -KA     Assessment Comments Pt is responding well to CDT.  At this time, he has met 2/2 STG and 1/3 LTG.  He has seen net decreased edema of 24.8 cm LLE and 3.1 cm RLE since starting treatment; however of note, RLE is reduced in area covered well by Juxtalite HD and more swollen at knee and 10 cm distal to knee.  Pt is within acceptable range for his older Juxtalite HD velcro compression garments, will perform trial to see if this garment provides adequate coverage or if a longer garment (such as reduction kit) is required.  Performed MLD to LLE this date, will resume bandaging next visit.  Pt remains appropriate for skilled  physical therapy to reduce edema, improve skin condition, reduce infection risk, and improve self management of condition.  -KA     Rehab Potential Good  -KA     Patient/caregiver participated in establishment of treatment plan and goals Yes  -KA     Patient would benefit from skilled therapy intervention Yes  -KA        PT Plan    PT Plan Comments Continue CDT  -KA               User Key  (r) = Recorded By, (t) = Taken By, (c) = Cosigned By      Initials Name Provider Type    Svetlana Couch, PT Physical Therapist                        OP Exercises       Row Name 04/17/24 1647 04/17/24 1400          Subjective    Subjective Comments -- Pt reports no issues with previous bandaging  -        Subjective Pain    Able to rate subjective pain? -- yes  -KA     Pre-Treatment Pain Level -- 0  -KA        Total Minutes    96472 - PT Manual Therapy Minutes 58  -KA --               User Key  (r) = Recorded By, (t) = Taken By, (c) = Cosigned By      Initials Name Provider Type    Svetlana Couch, PT Physical Therapist                            Manual Rx (Last 36 Hours)       Manual Treatments       Row Name 04/17/24 1647             Total Minutes    73345 - PT Manual Therapy Minutes 58  -KA                User Key  (r) = Recorded By, (t) = Taken By, (c) = Cosigned By      Initials Name Provider Type    Svetlana Couch, PT Physical Therapist                     PT OP Goals       Row Name 04/17/24 1600          PT Short Term Goals    STG Date to Achieve 04/24/24  -KA     STG 1 Patient to demonstrate awareness of condition and precautions for improved prevention, management, care of symptoms, and ease of transition to self care of condition.  -KA     STG 1 Progress Met  -KA     STG 2 Patient demonstrates decreased net edema of Left Lower Extremity>/= 5-10 cm for decreased edema symptoms, decreased risk of infection, and improved skin care.  -KA     STG 2 Progress Met  -        Long Term Goals    LTG Date to  Achieve 05/22/24  -JOHNIE     LTG 1 Patient/family/caregiver independent with self-care techniques for self-management of condition.  -     LTG 1 Progress New  -     LTG 2 Patient demonstrates decreased net edema of Left Lower Extremity>/= 10-20 cm for decreased edema symptoms, decreased risk of infection, and improved skin care.  -     LTG 2 Progress Met;Ongoing  -     LTG 3 Patient/family/caregiver independent with compression garments as indicated for self-management of condition.  -     LTG 3 Progress New  -               User Key  (r) = Recorded By, (t) = Taken By, (c) = Cosigned By      Initials Name Provider Type    Svetlana Couch, PT Physical Therapist                    Therapy Education  Education Details: Discussed girth measurements, pt to wear Juxtalite HD (old garments) on BLE until next visit to help determine if he will need a longer garment (such as reduction kit) or foot piece to best manage lymphedema.  If he doesn't see any swelling over the top of the garment, he can remove and shower as usual, but if he does see increased swelling, he should leave them intact so therapist can assess.  Will resume compression bandaging next visit.  Given: Edema management, Bandaging/dressing change  Program: New  How Provided: Verbal  Provided to: Patient  Level of Understanding: Verbalized              Time Calculation:   Start Time: 1415  Stop Time: 1513  Time Calculation (min): 58 min  Timed Charges  57242 - PT Manual Therapy Minutes: 58  Total Minutes  Timed Charges Total Minutes: 58   Total Minutes: 58   Therapy Charges for Today       Code Description Service Date Service Provider Modifiers Qty    02630104726 HC PT MANUAL THERAPY EA 15 MIN 4/17/2024 Svetlana French, PT GP 4                      Svetlana French PT  4/17/2024

## 2024-04-19 ENCOUNTER — HOSPITAL ENCOUNTER (OUTPATIENT)
Dept: PHYSICAL THERAPY | Facility: HOSPITAL | Age: 84
Setting detail: THERAPIES SERIES
Discharge: HOME | End: 2024-04-19
Payer: MEDICARE

## 2024-04-19 DIAGNOSIS — I89.0 LYMPHEDEMA: Primary | ICD-10-CM

## 2024-04-19 PROCEDURE — 97140 MANUAL THERAPY 1/> REGIONS: CPT

## 2024-04-19 NOTE — THERAPY TREATMENT NOTE
Outpatient Physical Therapy Lymphedema Treatment Note  [facility]     Patient Name: Riky Moon  : 1940  MRN: [id number]  Today's Date: 2024        Visit Date: 2024    Visit Dx:    ICD-10-CM ICD-9-CM   1. Lymphedema  I89.0 457.1       Patient Active Problem List   Diagnosis    OA (osteoarthritis) of hip    KINDRA treated with auto BiPAP    Chest pain    Diabetes mellitus    Hypertension    Hyperlipidemia    Hypothyroidism    Asthma    Overactive bladder    CAP (community acquired pneumonia)    Cellulitis of left lower extremity    COPD (chronic obstructive pulmonary disease)    Dyslipidemia    Gastroesophageal reflux disease    Leukocytosis    Peripheral nerve disease    Constipation    Oropharyngeal dysphagia    Bronchitis    Class 3 severe obesity due to excess calories with serious comorbidity and body mass index (BMI) of 40.0 to 44.9 in adult    Left leg swelling    Anemia    Leukocytosis    Circadian rhythm sleep disorder, delayed sleep phase type    Atrial flutter with controlled response         Lymphedema       Row Name 24 1400             Subjective Pain    Able to rate subjective pain? yes  -KA      Pre-Treatment Pain Level 0  -KA         Subjective    Subjective Comments No pain, did not note swelling over the top of velcro compression on either side when wearing from Wednesday PM to Friday AM.  -KA         Skin Changes/Observations    Skin Observations Comment Dry skin, still has discoloration but is gradually improving  -KA         Lymphedema Measurements    Measurement Type(s) Circumferential  -KA      Circumferential Areas Lower extremities  -KA         BLE Circumferential (cm)    Measurement Location 1 Knee (popliteal crease)  -KA      Left 1 41.4 cm  -KA      Measurement Location 2 10cm below knee  -KA      Left 2 42.3 cm  -KA      Measurement Location 3 20cm below knee  -KA      Left 3 41 cm  -KA      Measurement Location 4 30cm below knee  -KA      Left 4 35 cm  -KA       Measurement Location 5 Ankle  -KA      Left 5 37 cm  -KA      Measurement Location 6 Midfoot  -KA      Left 6 26 cm  -KA      LLE Circumferential Total 222.7 cm  -KA         Manual Lymphatic Drainage    Manual Lymphatic Drainage Comments Short neck, B axilla, B IA, diaphragmatic breathing, L inguinals, LLE  -KA         Compression/Skin Care    Compression/Skin Care skin care;compression garment;wrapping location;bandaging  -KA      Skin Care moisturizing lotion applied  -KA      Wrapping Location lower extremity  -KA      Wrapping Location LE left:;foot to knee  -KA      Bandage Layers cotton liner;padding/fluff layer;dense foam;short-stretch bandages (comment size/quantity)  -KA      Bandaging Comments LLE: TG9, Cellona x 1, Rosidal soft x 2 ankle to knee, Rosidal K 1-8 cm, 2-10 cm, 1-12 cm, Darco shoe; no bandaging applied to RLE this date  -      Bandaging Technique figure-eight;light compression  -      Compression Garment Comments Juxtalite HD large long applied to RLE  -KA                User Key  (r) = Recorded By, (t) = Taken By, (c) = Cosigned By      Initials Name Provider Type    Svetlana Couch, PT Physical Therapist                                   PT Assessment/Plan       Row Name 04/19/24 3076          PT Assessment    Assessment Comments LLE maintained girth measurements with use of Juxtalite HD on BLE in long length, no swelling over the top of the garment even though it did not fully extend to knee, did have some swelling at L ankle compared to last treatment, may consider PAC band or AFW for additional support if shoe will still fit.  Will reorder Juxtalite garments through [employer] once LLE is fully reduced.  Resumed compression bandaging for LLE as well as MLD for LLE.  -JOHNIE        Therapy Assessment/Plan (OT)    Predicted Duration of Therapy Intervention (OT) Continue CDT.  -JOHNIE               User Key  (r) = Recorded By, (t) = Taken By, (c) = Cosigned By      Initials Name  Provider Type    Svetlana Couch, CJ Physical Therapist                        OP Exercises       Row Name 04/19/24 1457 04/19/24 1400          Subjective    Subjective Comments -- No pain, did not note swelling over the top of velcro compression on either side when wearing from Wednesday PM to Friday AM.  -KA        Subjective Pain    Able to rate subjective pain? -- yes  -JOHNIE     Pre-Treatment Pain Level -- 0  -KA        Total Minutes    09002 - PT Manual Therapy Minutes 57  -KA --               User Key  (r) = Recorded By, (t) = Taken By, (c) = Cosigned By      Initials Name Provider Type    Svetlana Couch PT Physical Therapist                            Manual Rx (Last 36 Hours)       Manual Treatments       Row Name 04/19/24 1457             Total Minutes    68863 - PT Manual Therapy Minutes 57  -KA                User Key  (r) = Recorded By, (t) = Taken By, (c) = Cosigned By      Initials Name Provider Type    Svetlana Couch, PT Physical Therapist                                       Time Calculation:   Start Time: 1415  Stop Time: 1512  Time Calculation (min): 57 min  Timed Charges  12878 - PT Manual Therapy Minutes: 57  Total Minutes  Timed Charges Total Minutes: 57   Total Minutes: 57   Therapy Charges for Today       Code Description Service Date Service Provider Modifiers Qty    21506406529 HC PT MANUAL THERAPY EA 15 MIN 4/19/2024 Svetlana French, PT GP 4                      Svetlana French PT  4/19/2024

## 2024-04-21 LAB — INR PPP: 2.5

## 2024-04-22 ENCOUNTER — ANTICOAGULATION VISIT (OUTPATIENT)
Dept: PHARMACY | Facility: HOSPITAL | Age: 84
End: 2024-04-22
Payer: MEDICARE

## 2024-04-22 ENCOUNTER — HOSPITAL ENCOUNTER (OUTPATIENT)
Dept: PHYSICAL THERAPY | Facility: HOSPITAL | Age: 84
Setting detail: THERAPIES SERIES
Discharge: HOME | End: 2024-04-22
Payer: MEDICARE

## 2024-04-22 DIAGNOSIS — I48.92 ATRIAL FLUTTER WITH CONTROLLED RESPONSE: Primary | ICD-10-CM

## 2024-04-22 DIAGNOSIS — I89.0 LYMPHEDEMA: Primary | ICD-10-CM

## 2024-04-22 PROCEDURE — G0249 PROVIDE INR TEST MATER/EQUIP: HCPCS

## 2024-04-22 PROCEDURE — 97140 MANUAL THERAPY 1/> REGIONS: CPT

## 2024-04-22 NOTE — THERAPY TREATMENT NOTE
Outpatient Physical Therapy Lymphedema Treatment Note  [facility]     Patient Name: Riky Moon  : 1940  MRN: [id number]  Today's Date: 2024        Visit Date: 2024    Visit Dx:    ICD-10-CM ICD-9-CM   1. Lymphedema  I89.0 457.1       Patient Active Problem List   Diagnosis    OA (osteoarthritis) of hip    KINDRA treated with auto BiPAP    Chest pain    Diabetes mellitus    Hypertension    Hyperlipidemia    Hypothyroidism    Asthma    Overactive bladder    CAP (community acquired pneumonia)    Cellulitis of left lower extremity    COPD (chronic obstructive pulmonary disease)    Dyslipidemia    Gastroesophageal reflux disease    Leukocytosis    Peripheral nerve disease    Constipation    Oropharyngeal dysphagia    Bronchitis    Class 3 severe obesity due to excess calories with serious comorbidity and body mass index (BMI) of 40.0 to 44.9 in adult    Left leg swelling    Anemia    Leukocytosis    Circadian rhythm sleep disorder, delayed sleep phase type    Atrial flutter with controlled response         Lymphedema       Row Name 24 1400             Subjective Pain    Able to rate subjective pain? yes  -KA      Pre-Treatment Pain Level 0  -KA         Subjective    Subjective Comments L knee is still bothering him, making him limp.  Waiting to get injection  -KA         Manual Lymphatic Drainage    Manual Lymphatic Drainage Comments Short neck, B axilla, B IA, diaphragmatic breathing, L inguinals, LLE  -KA         Compression/Skin Care    Compression/Skin Care skin care;compression garment;wrapping location;bandaging  -KA      Skin Care moisturizing lotion applied  -KA      Wrapping Location lower extremity  -KA      Wrapping Location LE left:;foot to knee  -KA      Bandage Layers cotton liner;padding/fluff layer;dense foam;short-stretch bandages (comment size/quantity)  -KA      Bandaging Comments LLE: TG9, Cellona x 1, Komprex II channelled foam to dorsum of foot and anterior ankle,  Rosidal soft x 2 ankle to knee, Rosidal K 1-8 cm, 2-10 cm, 1-12 cm, Darco shoe; no bandaging applied to RLE this date  -KA      Bandaging Technique figure-eight;light compression  -KA      Compression Garment Comments Juxtalite HD large long applied to RLE  -KA                User Key  (r) = Recorded By, (t) = Taken By, (c) = Cosigned By      Initials Name Provider Type    Svetlana Couch, PT Physical Therapist                                   PT Assessment/Plan       Row Name 04/22/24 1458          PT Assessment    Functional Limitations Impaired gait;Limitation in home management;Limitations in community activities;Limitations in functional capacity and performance;Performance in self-care ADL  -KA     Impairments Balance;Edema;Endurance;Gait;Impaired lymphatic circulation;Joint mobility;Muscle strength;Posture;Sensation  -     Assessment Comments Continued with MLD and compression bandaging for LLE, added Komprex II channelled foam to dorsum of foot and anterior L ankle to further reduce edema.  -KA        PT Plan    PT Plan Comments Assess response to channelled foam, provide education to family/friend on application of Juxtalite HD garments.  -KA               User Key  (r) = Recorded By, (t) = Taken By, (c) = Cosigned By      Initials Name Provider Type    Svetlana Couch, PT Physical Therapist                        OP Exercises       Row Name 04/22/24 1522 04/22/24 1400          Subjective    Subjective Comments -- L knee is still bothering him, making him limp.  Waiting to get injection  -KA        Subjective Pain    Able to rate subjective pain? -- yes  -KA     Pre-Treatment Pain Level -- 0  -KA        Total Minutes    57481 - PT Manual Therapy Minutes 55  -KA --               User Key  (r) = Recorded By, (t) = Taken By, (c) = Cosigned By      Initials Name Provider Type    Svetlana Couch, PT Physical Therapist                            Manual Rx (Last 36 Hours)       Manual Treatments        Row Name 04/22/24 1522             Total Minutes    41500 - PT Manual Therapy Minutes 55  -KA                User Key  (r) = Recorded By, (t) = Taken By, (c) = Cosigned By      Initials Name Provider Type    Svetlana Couch, PT Physical Therapist                        Therapy Education  Education Details: Discussed bandage changes, will plan to measure for long term compression garments when checking girth measurements next visit.  Recommend bringing family member/friend who will help with compression garments long term to provide training.  Given: Edema management, Bandaging/dressing change  Program: New, Reinforced  How Provided: Verbal  Provided to: Patient  Level of Understanding: Verbalized              Time Calculation:   Start Time: 1420  Stop Time: 1515  Time Calculation (min): 55 min  Timed Charges  46099 - PT Manual Therapy Minutes: 55  Total Minutes  Timed Charges Total Minutes: 55   Total Minutes: 55   Therapy Charges for Today       Code Description Service Date Service Provider Modifiers Qty    38228620994 HC PT MANUAL THERAPY EA 15 MIN 4/22/2024 Svetlana French, PT GP 4                      Svetlana French PT  4/22/2024

## 2024-04-22 NOTE — PROGRESS NOTES
Anticoagulation Clinic Progress Note    Anticoagulation Summary  As of 2024      INR goal:  2.0-3.0   TTR:  43.1% (1.8 y)   INR used for dosin.50 (2024)   Warfarin maintenance plan:  5 mg every Mon, Thu, Sat; 10 mg all other days   Weekly warfarin total:  55 mg   No change documented:  Olga Granados RPH   Plan last modified:  Olga Granados RPH (2024)   Next INR check:  2024   Priority:  Maintenance   Target end date:      Indications    Atrial flutter with controlled response [I48.92]                 Anticoagulation Episode Summary       INR check location:  Home Draw    Preferred lab:      Send INR reminders to:   [facility]    Comments:  [facility] home stephania          Anticoagulation Care Providers       Provider Role Specialty Phone number    Ty Shelton MD Referring Cardiology [phone]            Clinic Interview:  No pertinent clinical findings have been reported.    INR History:      3/19/2024     1:24 PM 3/27/2024    12:00 AM 3/27/2024     1:44 PM 2024    12:00 AM 2024     2:18 PM 2024    12:00 AM 2024     8:30 AM   Anticoagulation Monitoring   INR 2.60  2.60  2.20  2.50   INR Date 3/19/2024  3/27/2024  2024  2024   INR Goal 2.0-3.0  2.0-3.0  2.0-3.0  2.0-3.0   Trend Same  Same  Same  Same   Last Week Total 55 mg  55 mg  55 mg  55 mg   Next Week Total 55 mg  55 mg  55 mg  55 mg   Sun 10 mg  10 mg  10 mg  -   Mon 5 mg  5 mg  5 mg  5 mg   Tue 10 mg  10 mg  10 mg  10 mg   Wed 10 mg  10 mg  10 mg  10 mg   Thu 5 mg  5 mg  5 mg  5 mg   Fri 10 mg  10 mg  10 mg  10 mg   Sat 5 mg  5 mg  5 mg  5 mg   Historical INR  2.60      2.20      2.50            This result is from an external source.       Plan:  1. INR is therapeutic today- see above in Anticoagulation Summary.    Riky BARBER Moon to continue their warfarin regimen- see above in Anticoagulation Summary.  2. Follow up in 1 week  3. Pt has agreed to only be called if INR out of range.  They have been instructed to call if any changes in medications, doses, concerns, etc. Patient expresses understanding and has no further questions at this time.    Olga Granados, [facility]

## 2024-04-24 ENCOUNTER — HOSPITAL ENCOUNTER (OUTPATIENT)
Dept: PHYSICAL THERAPY | Facility: HOSPITAL | Age: 84
Discharge: HOME | End: 2024-04-24
Admitting: FAMILY MEDICINE
Payer: MEDICARE

## 2024-04-24 DIAGNOSIS — I89.0 LYMPHEDEMA: Primary | ICD-10-CM

## 2024-04-24 PROCEDURE — 97535 SELF CARE MNGMENT TRAINING: CPT

## 2024-04-24 PROCEDURE — 97140 MANUAL THERAPY 1/> REGIONS: CPT

## 2024-04-24 NOTE — THERAPY PROGRESS REPORT/RE-CERT
Outpatient Physical Therapy Lymphedema Progress Note  [facility]     Patient Name: Riky Moon  : 1940  MRN: [id number]  Today's Date: 2024        Visit Date: 2024    Visit Dx:    ICD-10-CM ICD-9-CM   1. Lymphedema  I89.0 457.1       Patient Active Problem List   Diagnosis    OA (osteoarthritis) of hip    KINDRA treated with auto BiPAP    Chest pain    Diabetes mellitus    Hypertension    Hyperlipidemia    Hypothyroidism    Asthma    Overactive bladder    CAP (community acquired pneumonia)    Cellulitis of left lower extremity    COPD (chronic obstructive pulmonary disease)    Dyslipidemia    Gastroesophageal reflux disease    Leukocytosis    Peripheral nerve disease    Constipation    Oropharyngeal dysphagia    Bronchitis    Class 3 severe obesity due to excess calories with serious comorbidity and body mass index (BMI) of 40.0 to 44.9 in adult    Left leg swelling    Anemia    Leukocytosis    Circadian rhythm sleep disorder, delayed sleep phase type    Atrial flutter with controlled response         Lymphedema       Row Name 24 1400             Subjective Pain    Able to rate subjective pain? yes  -KA      Pre-Treatment Pain Level 0  -KA         Subjective    Subjective Comments Brought wife and sitter with him as sitter will mostly be the one helping with garments.  -KA         Lymphedema Measurements    Measurement Type(s) Circumferential  -KA      Circumferential Areas Lower extremities  -KA         BLE Circumferential (cm)    Measurement Location 1 Knee (popliteal crease)  -KA      Left 1 41.7 cm  -KA      Right 1 42 cm  -KA      Measurement Location 2 10cm below knee  -KA      Left 2 42 cm  -KA      Right 2 45.5 cm  -KA      Measurement Location 3 20cm below knee  -KA      Left 3 37.7 cm  -KA      Right 3 34.4 cm  -KA      Measurement Location 4 30cm below knee  -KA      Left 4 33 cm  -KA      Right 4 24.5 cm  -KA      Measurement Location 5 Ankle  -KA      Left 5 35 cm  -KA       Right 5 26.8 cm  -KA      Measurement Location 6 Midfoot  -KA      Left 6 26 cm  -KA      Right 6 26 cm  -KA      LLE Circumferential Total 215.4 cm  -KA      RLE Circumferential Total 199.2 cm  -KA         Manual Lymphatic Drainage    Manual Lymphatic Drainage Comments No MLD today  -KA         Compression/Skin Care    Compression/Skin Care skin care;compression garment;wrapping location;bandaging  -KA      Skin Care moisturizing lotion applied  -KA      Wrapping Location lower extremity  -KA      Wrapping Location LE left:;foot to knee  -KA      Bandage Layers cotton liner;padding/fluff layer;dense foam;short-stretch bandages (comment size/quantity)  -KA      Bandaging Comments LLE: TG9, Cellona x 1, Komprex II channelled foam to dorsum of foot and anterior ankle, Rosidal soft x 2 ankle to knee, Rosidal K 1-8 cm, 2-10 cm, 1-12 cm, Darco shoe; no bandaging applied to RLE this date  -      Bandaging Technique figure-eight;light compression  -KA      Compression Garment Comments Juxtalite HD large long applied to RLE  -KA                User Key  (r) = Recorded By, (t) = Taken By, (c) = Cosigned By      Initials Name Provider Type    Svetlana Couch, PT Physical Therapist                                   PT Assessment/Plan       Row Name 04/24/24 1520 04/24/24 1513       PT Assessment    Functional Limitations -- Impaired gait;Limitation in home management;Limitations in community activities;Limitations in functional capacity and performance;Performance in self-care ADL  -    Impairments -- Balance;Edema;Endurance;Gait;Impaired lymphatic circulation;Joint mobility;Muscle strength;Posture;Sensation  -    Assessment Comments -- Pt continues to respond well to CDT.  Reassessed girth measurements this visit, pt has seen net decreased edema of 33.6 cm in LLE since starting treatment.  Ankle measurement bilaterally is staying stable, recommend moving foward with purchasing long term compression through  insurance, will need Juxtalite HD size Large Long for RLE and Juxtalite HD size XL Long for LLE, recommend purchasing 3 for each affected extremity per insurance allotment. Pt brought wife Pennie and wife's sitter Kaitlin to clinic to learn how to apply 20-30 mmHg to Juxtalite HD velcro compression.  Kaitlin able to apply garment correctly to RLE with min VCs following training.  Continued with bandaging of LLE this date.  Pt remains appropriate for skilled physical therapy to reduce edema, improve skin condition, reduce infection risk, and improve self management of condition.  -KA    Rehab Potential Good  -KA --    Patient/caregiver participated in establishment of treatment plan and goals Yes  -KA --    Patient would benefit from skilled therapy intervention Yes  -KA --       PT Plan    PT Frequency 3x/week  -KA --    Predicted Duration of Therapy Intervention (PT) 4-6 weeks per PT POC  -KA --    Planned CPT's? PT RE-EVAL: 39049;PT THER PROC EA 15 MIN: 85127;PT THER ACT EA 15 MIN: 46854;PT MANUAL THERAPY EA 15 MIN: 90430;PT NEUROMUSC RE-EDUCATION EA 15 MIN: 69277;PT GAIT TRAINING EA 15 MIN: 41786;PT SELF CARE/HOME MGMT/TRAIN EA 15: 83855  -KA --    PT Plan Comments Continue CDT.  Transition to long term compression when appropriate.  -KA --              User Key  (r) = Recorded By, (t) = Taken By, (c) = Cosigned By      Initials Name Provider Type    Svetlana Couch, PT Physical Therapist                        OP Exercises       Row Name 04/24/24 1522 04/24/24 1400          Subjective    Subjective Comments -- Brought wife and sitter with him as sitter will mostly be the one helping with garments.  -KA        Subjective Pain    Able to rate subjective pain? -- yes  -KA     Pre-Treatment Pain Level -- 0  -KA        Total Minutes    44189 - PT Manual Therapy Minutes 23  -KA --               User Key  (r) = Recorded By, (t) = Taken By, (c) = Cosigned By      Initials Name Provider Type    Svetlana Couch, PT  Physical Therapist                            Manual Rx (Last 36 Hours)       Manual Treatments       Row Name 04/24/24 1522             Total Minutes    00440 - PT Manual Therapy Minutes 23  -KA                User Key  (r) = Recorded By, (t) = Taken By, (c) = Cosigned By      Initials Name Provider Type    Svetlana Couch, PT Physical Therapist                     PT OP Goals       Row Name 04/24/24 1500          PT Short Term Goals    STG Date to Achieve 04/24/24  -KA     STG 1 Patient to demonstrate awareness of condition and precautions for improved prevention, management, care of symptoms, and ease of transition to self care of condition.  -KA     STG 1 Progress Met  -KA     STG 2 Patient demonstrates decreased net edema of Left Lower Extremity>/= 5-10 cm for decreased edema symptoms, decreased risk of infection, and improved skin care.  -KA     STG 2 Progress Met  -KA        Long Term Goals    LTG Date to Achieve 05/22/24  -KA     LTG 1 Patient/family/caregiver independent with self-care techniques for self-management of condition.  -KA     LTG 1 Progress Ongoing  -KA     LTG 2 Patient demonstrates decreased net edema of Left Lower Extremity>/= 10-20 cm for decreased edema symptoms, decreased risk of infection, and improved skin care.  -KA     LTG 2 Progress Met;Ongoing  -KA     LTG 2 Progress Comments Decreased net edema 33.6 cm LLE  -KA     LTG 3 Patient/family/caregiver independent with compression garments as indicated for self-management of condition.  -KA     LTG 3 Progress Ongoing  -     LTG 3 Progress Comments Training provided in clinic today  -KA        Time Calculation    PT Goal Re-Cert Due Date 07/23/24  -               User Key  (r) = Recorded By, (t) = Taken By, (c) = Cosigned By      Initials Name Provider Type    Svetlana Couch, PT Physical Therapist                    Therapy Education  Education Details: Discussed girth measurements.  Reviewed donning/doffing of garment with  caregiver, instructed to apply daily using 20-30 mmHg.  Will order long term compression after MD signs prescription.  Given: Edema management  Program: New, Reinforced  How Provided: Verbal  Provided to: Patient  Level of Understanding: Verbalized  98516 - PT Self Care/Mgmt Minutes: 16              Time Calculation:   Start Time: 1430  Stop Time: 1509  Time Calculation (min): 39 min  Timed Charges  33058 - PT Manual Therapy Minutes: 23  52036 - PT Self Care/Mgmt Minutes: 16  Total Minutes  Timed Charges Total Minutes: 39   Total Minutes: 39   Therapy Charges for Today       Code Description Service Date Service Provider Modifiers Qty    58998621756 HC PT MANUAL THERAPY EA 15 MIN 4/24/2024 Svetlana French, PT GP 2    46327402976 HC PT SELF CARE/MGMT/TRAIN EA 15 MIN 4/24/2024 Svetlana French, PT GP 1                      Svetlana French, PT  4/24/2024

## 2024-04-26 ENCOUNTER — HOSPITAL ENCOUNTER (OUTPATIENT)
Dept: PHYSICAL THERAPY | Facility: HOSPITAL | Age: 84
Setting detail: THERAPIES SERIES
Discharge: HOME | End: 2024-04-26
Payer: MEDICARE

## 2024-04-26 DIAGNOSIS — I89.0 LYMPHEDEMA: Primary | ICD-10-CM

## 2024-04-26 PROCEDURE — 97535 SELF CARE MNGMENT TRAINING: CPT

## 2024-04-26 NOTE — THERAPY TREATMENT NOTE
Outpatient Physical Therapy Lymphedema Treatment Note  [facility]     Patient Name: Riky Moon  : 1940  MRN: [id number]  Today's Date: 2024        Visit Date: 2024    Visit Dx:    ICD-10-CM ICD-9-CM   1. Lymphedema  I89.0 457.1       Patient Active Problem List   Diagnosis    OA (osteoarthritis) of hip    KINDRA treated with auto BiPAP    Chest pain    Diabetes mellitus    Hypertension    Hyperlipidemia    Hypothyroidism    Asthma    Overactive bladder    CAP (community acquired pneumonia)    Cellulitis of left lower extremity    COPD (chronic obstructive pulmonary disease)    Dyslipidemia    Gastroesophageal reflux disease    Leukocytosis    Peripheral nerve disease    Constipation    Oropharyngeal dysphagia    Bronchitis    Class 3 severe obesity due to excess calories with serious comorbidity and body mass index (BMI) of 40.0 to 44.9 in adult    Left leg swelling    Anemia    Leukocytosis    Circadian rhythm sleep disorder, delayed sleep phase type    Atrial flutter with controlled response         Lymphedema       Row Name 24 1400             Subjective Pain    Able to rate subjective pain? yes  -KA      Pre-Treatment Pain Level 0  -KA         Subjective    Subjective Comments Pt states that his wife's sitter Maddison was able to put Juxtalite HD on BLE today without too much difficulty.  Ready to discharge to self management.  -KA         Compression/Skin Care    Compression Garment Comments Juxtalite HD size Large long on RLE, Juxtalite HD size XL long on LLE  -KA                User Key  (r) = Recorded By, (t) = Taken By, (c) = Cosigned By      Initials Name Provider Type    Svetlana Couch, PT Physical Therapist                                   PT Assessment/Plan       Row Name 24 1797          PT Assessment    Functional Limitations Impaired gait;Limitation in home management;Limitations in community activities;Limitations in functional capacity and  performance;Performance in self-care ADL  -KA     Impairments Balance;Edema;Endurance;Gait;Impaired lymphatic circulation;Joint mobility;Muscle strength;Posture;Sensation  -KA     Assessment Comments Pt has met all functional goals at this time.  His wife's sitter Kaitlin was able to apply Juxtalite HD to BLE at home with 30-40 mmHG, pt advised that this is acceptable as long as it is comfortable for him, but should not apply more compression than this.  Current compression garments are >6 months old, so recommend replacing with new Juxtalite HD when he receives them.  Reviewed self care in preparation for transition to self management this date, pt stating that he believes that he is ready to start managing condition at home with assist from his wife's sitter. Return for further self care training and/or re-evaluation as needed due to progressive nature of lymphedema.  -KA        PT Plan    PT Plan Comments Transition to self management of condition, return for additional self care training and/or re-evaluation as needed.  -JOHNIE               User Key  (r) = Recorded By, (t) = Taken By, (c) = Cosigned By      Initials Name Provider Type    Svetlana Couch, PT Physical Therapist                        OP Exercises       Row Name 04/26/24 1400             Subjective    Subjective Comments Pt states that his wife's susan Washburn was able to put Juxtalite HD on BLE today without too much difficulty.  Ready to discharge to self management.  -KA         Subjective Pain    Able to rate subjective pain? yes  -KA      Pre-Treatment Pain Level 0  -KA                User Key  (r) = Recorded By, (t) = Taken By, (c) = Cosigned By      Initials Name Provider Type    Svetlana Couch, PT Physical Therapist                                 PT OP Goals       Row Name 04/26/24 1400          PT Short Term Goals    STG Date to Achieve 04/24/24  -KA     STG 1 Patient to demonstrate awareness of condition and precautions for  improved prevention, management, care of symptoms, and ease of transition to self care of condition.  -KA     STG 1 Progress Met  -KA     STG 2 Patient demonstrates decreased net edema of Left Lower Extremity>/= 5-10 cm for decreased edema symptoms, decreased risk of infection, and improved skin care.  -KA     STG 2 Progress Met  -KA        Long Term Goals    LTG Date to Achieve 05/22/24  -KA     LTG 1 Patient/family/caregiver independent with self-care techniques for self-management of condition.  -KA     LTG 1 Progress Met  -KA     LTG 2 Patient demonstrates decreased net edema of Left Lower Extremity>/= 10-20 cm for decreased edema symptoms, decreased risk of infection, and improved skin care.  -KA     LTG 2 Progress Met  -KA     LTG 3 Patient/family/caregiver independent with compression garments as indicated for self-management of condition.  -     LTG 3 Progress Met  -KA               User Key  (r) = Recorded By, (t) = Taken By, (c) = Cosigned By      Initials Name Provider Type    Svetlana Couch, PT Physical Therapist                    Therapy Education  Education Details: Reviewed techniques to reduce gaps in compression at bottom between undersock and velcro device, can do 20-30 mmHg or 30-40 mmHg to comfort but needs to be consistent with compression all the way up.  Will order long term compression through [employer] and ship to home, recommended replacing velcro devices with a new pair as soon as he receives them, then replacing every 6 months thereafter.  Can reorder garments in 6 months per LTA, will need to come back for new evaluation if he has not been seen for treatment within the year.  Recommended using lymphedema pump at 30 mmHg 1 hour daily.  Wear compression daily and would recommend wearing at night as well for at least the next few weeks for best management though this is optional.  Return for further self care training as needed, for re-evaluation if he notes new skin issues,  has increased swelling that cannot be managed by compression garments.  Given: Edema management, HEP  Program: New, Reinforced  How Provided: Verbal, Demonstration  Provided to: Patient  Level of Understanding: Verbalized  82663 - PT Self Care/Mgmt Minutes: 23              Time Calculation:   Start Time: 1420  Stop Time: 1445  Time Calculation (min): 25 min  Timed Charges  29863 - PT Self Care/Mgmt Minutes: 23  Total Minutes  Timed Charges Total Minutes: 23   Total Minutes: 23   Therapy Charges for Today       Code Description Service Date Service Provider Modifiers Qty    96189001444 HC PT SELF CARE/MGMT/TRAIN EA 15 MIN 4/26/2024 Svetlana French, PT GP 2                      Svetlana French, PT  4/26/2024

## 2024-04-29 ENCOUNTER — ANTICOAGULATION VISIT (OUTPATIENT)
Dept: PHARMACY | Facility: HOSPITAL | Age: 84
End: 2024-04-29
Payer: MEDICARE

## 2024-04-29 ENCOUNTER — APPOINTMENT (OUTPATIENT)
Dept: PHYSICAL THERAPY | Facility: HOSPITAL | Age: 84
End: 2024-04-29
Payer: MEDICARE

## 2024-04-29 DIAGNOSIS — I48.92 ATRIAL FLUTTER WITH CONTROLLED RESPONSE: Primary | ICD-10-CM

## 2024-04-29 LAB — INR PPP: 2.1

## 2024-04-29 NOTE — PROGRESS NOTES
Anticoagulation Clinic Progress Note    Anticoagulation Summary  As of 2024      INR goal:  2.0-3.0   TTR:  43.8% (1.8 y)   INR used for dosin.10 (2024)   Warfarin maintenance plan:  5 mg every Mon, Thu, Sat; 10 mg all other days   Weekly warfarin total:  55 mg   No change documented:  Heron Delaney, PharmD   Plan last modified:  Olga Granados RPH (2024)   Next INR check:  2024   Priority:  Maintenance   Target end date:      Indications    Atrial flutter with controlled response [I48.92]                 Anticoagulation Episode Summary       INR check location:  Home Draw    Preferred lab:      Send INR reminders to:   [facility]    Comments:  [facility] home stephania          Anticoagulation Care Providers       Provider Role Specialty Phone number    Ty Shelton MD Referring Cardiology [phone]            Clinic Interview:  No pertinent clinical findings have been reported.    INR History:      3/27/2024     1:44 PM 2024    12:00 AM 2024     2:18 PM 2024    12:00 AM 2024     8:30 AM 2024    12:00 AM 2024     1:07 PM   Anticoagulation Monitoring   INR 2.60  2.20  2.50  2.10   INR Date 3/27/2024  2024  2024  2024   INR Goal 2.0-3.0  2.0-3.0  2.0-3.0  2.0-3.0   Trend Same  Same  Same  Same   Last Week Total 55 mg  55 mg  55 mg  55 mg   Next Week Total 55 mg  55 mg  55 mg  55 mg   Sun 10 mg  10 mg  -  10 mg   Mon 5 mg  5 mg  5 mg  5 mg   Tue 10 mg  10 mg  10 mg  10 mg   Wed 10 mg  10 mg  10 mg  10 mg   Thu 5 mg  5 mg  5 mg  5 mg   Fri 10 mg  10 mg  10 mg  10 mg   Sat 5 mg  5 mg  5 mg  5 mg   Historical INR  2.20      2.50      2.10            This result is from an external source.       Plan:  1. INR is therapeutic today- see above in Anticoagulation Summary.    Riky BARBER Moon to continue their warfarin regimen- see above in Anticoagulation Summary.  2. Follow up in 1 week  3. Pt has agreed to only be called if INR out of range.  They have been instructed to call if any changes in medications, doses, concerns, etc. Patient expresses understanding and has no further questions at this time.    Heron Delaney, PharmD

## 2024-05-07 ENCOUNTER — ANTICOAGULATION VISIT (OUTPATIENT)
Dept: PHARMACY | Facility: HOSPITAL | Age: 84
End: 2024-05-07
Payer: MEDICARE

## 2024-05-07 DIAGNOSIS — I48.92 ATRIAL FLUTTER WITH CONTROLLED RESPONSE: Primary | ICD-10-CM

## 2024-05-07 LAB — INR PPP: 2.4

## 2024-05-13 ENCOUNTER — ANTICOAGULATION VISIT (OUTPATIENT)
Dept: PHARMACY | Facility: HOSPITAL | Age: 84
End: 2024-05-13
Payer: MEDICARE

## 2024-05-13 DIAGNOSIS — I48.92 ATRIAL FLUTTER WITH CONTROLLED RESPONSE: Primary | ICD-10-CM

## 2024-05-13 LAB — INR PPP: 2

## 2024-05-13 RX ORDER — LEVOTHYROXINE SODIUM 125 MCG
125 TABLET ORAL DAILY
Qty: 30 TABLET | Refills: 11 | Status: SHIPPED | OUTPATIENT
Start: 2024-05-13

## 2024-05-13 NOTE — PROGRESS NOTES
Anticoagulation Clinic Progress Note    Anticoagulation Summary  As of 2024      INR goal:  2.0-3.0   TTR:  44.9% (1.9 y)   INR used for dosin.00 (2024)   Warfarin maintenance plan:  5 mg every Mon, Thu, Sat; 10 mg all other days   Weekly warfarin total:  55 mg   No change documented:  Olga Granados RPH   Plan last modified:  Olga Granados RPH (2024)   Next INR check:  2024   Priority:  Maintenance   Target end date:      Indications    Atrial flutter with controlled response [I48.92]                 Anticoagulation Episode Summary       INR check location:  Home Draw    Preferred lab:      Send INR reminders to:   [facility]    Comments:  [facility] home stephania          Anticoagulation Care Providers       Provider Role Specialty Phone number    Ty Shelton MD Referring Cardiology [phone]            Clinic Interview:  No pertinent clinical findings have been reported.    INR History:      2024     8:30 AM 2024    12:00 AM 2024     1:07 PM 2024    12:00 AM 2024     1:31 PM 2024    12:00 AM 2024    12:54 PM   Anticoagulation Monitoring   INR 2.50  2.10  2.40  2.00   INR Date 2024   INR Goal 2.0-3.0  2.0-3.0  2.0-3.0  2.0-3.0   Trend Same  Same  Same  Same   Last Week Total 55 mg  55 mg  55 mg  55 mg   Next Week Total 55 mg  55 mg  55 mg  55 mg   Sun -  10 mg  10 mg  10 mg   Mon 5 mg  5 mg  5 mg  5 mg   Tue 10 mg  10 mg  10 mg  10 mg   Wed 10 mg  10 mg  10 mg  10 mg   Thu 5 mg  5 mg  5 mg  5 mg   Fri 10 mg  10 mg  10 mg  10 mg   Sat 5 mg  5 mg  5 mg  5 mg   Historical INR  2.10      2.40      2.00            This result is from an external source.       Plan:  1. INR is therapeutic today- see above in Anticoagulation Summary.    Riky BARBER Moon to continue their warfarin regimen- see above in Anticoagulation Summary.  2. Follow up in 1 week  3. Pt has agreed to only be called if INR out of range.  They have been instructed to call if any changes in medications, doses, concerns, etc. Patient expresses understanding and has no further questions at this time.    Olga Granados, [facility]

## 2024-05-20 ENCOUNTER — ANTICOAGULATION VISIT (OUTPATIENT)
Dept: PHARMACY | Facility: HOSPITAL | Age: 84
End: 2024-05-20
Payer: MEDICARE

## 2024-05-20 DIAGNOSIS — I48.92 ATRIAL FLUTTER WITH CONTROLLED RESPONSE: Primary | ICD-10-CM

## 2024-05-20 LAB — INR PPP: 2.3

## 2024-05-20 PROCEDURE — G0249 PROVIDE INR TEST MATER/EQUIP: HCPCS

## 2024-05-20 NOTE — PROGRESS NOTES
Anticoagulation Clinic Progress Note    Anticoagulation Summary  As of 2024      INR goal:  2.0-3.0   TTR:  45.5% (1.9 y)   INR used for dosin.30 (2024)   Warfarin maintenance plan:  5 mg every Mon, Thu, Sat; 10 mg all other days   Weekly warfarin total:  55 mg   No change documented:  Olga Granados RPH   Plan last modified:  Olga Granados RPH (2024)   Next INR check:  2024   Priority:  Maintenance   Target end date:      Indications    Atrial flutter with controlled response [I48.92]                 Anticoagulation Episode Summary       INR check location:  Home Draw    Preferred lab:      Send INR reminders to:   [facility]    Comments:  [facility] home stephania          Anticoagulation Care Providers       Provider Role Specialty Phone number    Ty Shelton MD Referring Cardiology [phone]            Clinic Interview:  No pertinent clinical findings have been reported.    INR History:      2024     1:07 PM 2024    12:00 AM 2024     1:31 PM 2024    12:00 AM 2024    12:54 PM 2024    12:00 AM 2024     1:45 PM   Anticoagulation Monitoring   INR 2.10  2.40  2.00  2.30   INR Date 2024   INR Goal 2.0-3.0  2.0-3.0  2.0-3.0  2.0-3.0   Trend Same  Same  Same  Same   Last Week Total 55 mg  55 mg  55 mg  55 mg   Next Week Total 55 mg  55 mg  55 mg  55 mg   Sun 10 mg  10 mg  10 mg  10 mg   Mon 5 mg  5 mg  5 mg  5 mg   Tue 10 mg  10 mg  10 mg  10 mg   Wed 10 mg  10 mg  10 mg  10 mg   Thu 5 mg  5 mg  5 mg  5 mg   Fri 10 mg  10 mg  10 mg  10 mg   Sat 5 mg  5 mg  5 mg  5 mg   Historical INR  2.40      2.00      2.30            This result is from an external source.       Plan:  1. INR is therapeutic today- see above in Anticoagulation Summary.    Riky BARBER Moon to continue their warfarin regimen- see above in Anticoagulation Summary.  2. Follow up in 1 week  3. Pt has agreed to only be called if INR out of  range. They have been instructed to call if any changes in medications, doses, concerns, etc. Patient expresses understanding and has no further questions at this time.    Olga Granados, [facility]

## 2024-05-28 ENCOUNTER — ANTICOAGULATION VISIT (OUTPATIENT)
Dept: PHARMACY | Facility: HOSPITAL | Age: 84
End: 2024-05-28
Payer: MEDICARE

## 2024-05-28 DIAGNOSIS — I48.92 ATRIAL FLUTTER WITH CONTROLLED RESPONSE: Primary | ICD-10-CM

## 2024-05-28 LAB — INR PPP: 2.3

## 2024-05-28 NOTE — PROGRESS NOTES
Anticoagulation Clinic Progress Note    Anticoagulation Summary  As of 2024      INR goal:  2.0-3.0   TTR:  46.1% (1.9 y)   INR used for dosin.30 (2024)   Warfarin maintenance plan:  5 mg every Mon, Thu, Sat; 10 mg all other days   Weekly warfarin total:  55 mg   No change documented:  Yu Rader, PharmD   Plan last modified:  Olga Granados RPH (2024)   Next INR check:  2024   Priority:  Maintenance   Target end date:      Indications    Atrial flutter with controlled response [I48.92]                 Anticoagulation Episode Summary       INR check location:  Home Draw    Preferred lab:      Send INR reminders to:   [facility]    Comments:  [facility] home stephania          Anticoagulation Care Providers       Provider Role Specialty Phone number    Ty Shelton MD Referring Cardiology [phone]            Clinic Interview:  No pertinent clinical findings have been reported.    INR History:      2024     1:31 PM 2024    12:00 AM 2024    12:54 PM 2024    12:00 AM 2024     1:45 PM 2024    12:00 AM 2024     1:04 PM   Anticoagulation Monitoring   INR 2.40  2.00  2.30  2.30   INR Date 2024   INR Goal 2.0-3.0  2.0-3.0  2.0-3.0  2.0-3.0   Trend Same  Same  Same  Same   Last Week Total 55 mg  55 mg  55 mg  55 mg   Next Week Total 55 mg  55 mg  55 mg  55 mg   Sun 10 mg  10 mg  10 mg  10 mg   Mon 5 mg  5 mg  5 mg  5 mg   Tue 10 mg  10 mg  10 mg  10 mg   Wed 10 mg  10 mg  10 mg  10 mg   Thu 5 mg  5 mg  5 mg  5 mg   Fri 10 mg  10 mg  10 mg  10 mg   Sat 5 mg  5 mg  5 mg  5 mg   Historical INR  2.00      2.30      2.30            This result is from an external source.       Plan:  1. INR is therapeutic today- see above in Anticoagulation Summary.    Riky BARBER Moon to continue their warfarin regimen- see above in Anticoagulation Summary.  2. Follow up in 1 week  3.  They have been instructed to call if any  changes in medications, doses, concerns, etc. Patient expresses understanding and has no further questions at this time.    Yu Rader, PharmD

## 2024-06-04 ENCOUNTER — ANTICOAGULATION VISIT (OUTPATIENT)
Dept: PHARMACY | Facility: HOSPITAL | Age: 84
End: 2024-06-04
Payer: MEDICARE

## 2024-06-04 DIAGNOSIS — I48.92 ATRIAL FLUTTER WITH CONTROLLED RESPONSE: Primary | ICD-10-CM

## 2024-06-04 LAB — INR PPP: 2.1

## 2024-06-04 NOTE — PROGRESS NOTES
Anticoagulation Clinic Progress Note    Anticoagulation Summary  As of 2024      INR goal:  2.0-3.0   TTR:  46.7% (1.9 y)   INR used for dosin.10 (2024)   Warfarin maintenance plan:  5 mg every Mon, Thu, Sat; 10 mg all other days   Weekly warfarin total:  55 mg   No change documented:  Olga Granados RPH   Plan last modified:  Olga Granados RPH (2024)   Next INR check:  2024   Priority:  Maintenance   Target end date:      Indications    Atrial flutter with controlled response [I48.92]                 Anticoagulation Episode Summary       INR check location:  Home Draw    Preferred lab:      Send INR reminders to:   [facility]    Comments:  [facility] stephania          Anticoagulation Care Providers       Provider Role Specialty Phone number    Ty Shelton MD Referring Cardiology [phone]            Clinic Interview:  No pertinent clinical findings have been reported.    INR History:      2024    12:54 PM 2024    12:00 AM 2024     1:45 PM 2024    12:00 AM 2024     1:04 PM 2024    12:00 AM 2024     1:54 PM   Anticoagulation Monitoring   INR 2.00  2.30  2.30  2.10   INR Date 2024   INR Goal 2.0-3.0  2.0-3.0  2.0-3.0  2.0-3.0   Trend Same  Same  Same  Same   Last Week Total 55 mg  55 mg  55 mg  55 mg   Next Week Total 55 mg  55 mg  55 mg  55 mg   Sun 10 mg  10 mg  10 mg  10 mg   Mon 5 mg  5 mg  5 mg  5 mg   Tue 10 mg  10 mg  10 mg  10 mg   Wed 10 mg  10 mg  10 mg  10 mg   Thu 5 mg  5 mg  5 mg  5 mg   Fri 10 mg  10 mg  10 mg  10 mg   Sat 5 mg  5 mg  5 mg  5 mg   Historical INR  2.30      2.30      2.10            This result is from an external source.       Plan:  1. INR is therapeutic today- see above in Anticoagulation Summary.    Riky BARBER Moon to continue their warfarin regimen- see above in Anticoagulation Summary.  2. Follow up in 1 week  3. Pt has agreed to only be called if INR out of  range. They have been instructed to call if any changes in medications, doses, concerns, etc. Patient expresses understanding and has no further questions at this time.    Olga Granados, [facility]

## 2024-06-10 ENCOUNTER — ANTICOAGULATION VISIT (OUTPATIENT)
Dept: PHARMACY | Facility: HOSPITAL | Age: 84
End: 2024-06-10
Payer: MEDICARE

## 2024-06-10 DIAGNOSIS — I48.92 ATRIAL FLUTTER WITH CONTROLLED RESPONSE: Primary | ICD-10-CM

## 2024-06-10 LAB — INR PPP: 2.3

## 2024-06-10 NOTE — PROGRESS NOTES
Anticoagulation Clinic Progress Note    Anticoagulation Summary  As of 6/10/2024      INR goal:  2.0-3.0   TTR:  47.1% (1.9 y)   INR used for dosin.30 (6/10/2024)   Warfarin maintenance plan:  5 mg every Mon, Thu, Sat; 10 mg all other days   Weekly warfarin total:  55 mg   No change documented:  Olga Granados RPH   Plan last modified:  Olga Granados RPH (2024)   Next INR check:  2024   Priority:  Maintenance   Target end date:      Indications    Atrial flutter with controlled response [I48.92]                 Anticoagulation Episode Summary       INR check location:  Home Draw    Preferred lab:      Send INR reminders to:   [facility]    Comments:  [facility] home stephania          Anticoagulation Care Providers       Provider Role Specialty Phone number    Ty Shelton MD Referring Cardiology [phone]            Clinic Interview:  No pertinent clinical findings have been reported.    INR History:      2024     1:45 PM 2024    12:00 AM 2024     1:04 PM 2024    12:00 AM 2024     1:54 PM 6/10/2024    12:00 AM 6/10/2024     1:49 PM   Anticoagulation Monitoring   INR 2.30  2.30  2.10  2.30   INR Date 2024  2024  2024  6/10/2024   INR Goal 2.0-3.0  2.0-3.0  2.0-3.0  2.0-3.0   Trend Same  Same  Same  Same   Last Week Total 55 mg  55 mg  55 mg  55 mg   Next Week Total 55 mg  55 mg  55 mg  55 mg   Sun 10 mg  10 mg  10 mg  10 mg   Mon 5 mg  5 mg  5 mg  5 mg   Tue 10 mg  10 mg  10 mg  10 mg   Wed 10 mg  10 mg  10 mg  10 mg   Thu 5 mg  5 mg  5 mg  5 mg   Fri 10 mg  10 mg  10 mg  10 mg   Sat 5 mg  5 mg  5 mg  5 mg   Historical INR  2.30      2.10      2.30            This result is from an external source.       Plan:  1. INR is therapeutic today- see above in Anticoagulation Summary.    Riky BARBER Moon to continue their warfarin regimen- see above in Anticoagulation Summary.  2. Follow up in 1 week  3. Pt has agreed to only be called if INR out of  range. They have been instructed to call if any changes in medications, doses, concerns, etc. Patient expresses understanding and has no further questions at this time.    Olga Granados, [facility]

## 2024-06-17 ENCOUNTER — ANTICOAGULATION VISIT (OUTPATIENT)
Dept: PHARMACY | Facility: HOSPITAL | Age: 84
End: 2024-06-17
Payer: MEDICARE

## 2024-06-17 DIAGNOSIS — I48.92 ATRIAL FLUTTER WITH CONTROLLED RESPONSE: Primary | ICD-10-CM

## 2024-06-17 LAB — INR PPP: 1.8

## 2024-06-17 PROCEDURE — G0249 PROVIDE INR TEST MATER/EQUIP: HCPCS

## 2024-06-17 NOTE — PROGRESS NOTES
Anticoagulation Clinic Progress Note    Anticoagulation Summary  As of 2024      INR goal:  2.0-3.0   TTR:  47.2% (2 y)   INR used for dosin.80 (2024)   Warfarin maintenance plan:  5 mg every Mon, Thu, Sat; 10 mg all other days   Weekly warfarin total:  55 mg   Plan last modified:  Olga Granados RPH (2024)   Next INR check:  2024   Priority:  Maintenance   Target end date:      Indications    Atrial flutter with controlled response [I48.92]                 Anticoagulation Episode Summary       INR check location:  Home Draw    Preferred lab:      Send INR reminders to:  [facility]    Comments:  [facility] home ste[facility]ia          Anticoagulation Care Providers       Provider Role Specialty Phone number    Ty Shelton MD Referring Cardiology [phone]            Clinic Interview:  Patient Findings     Negatives:  Signs/symptoms of thrombosis, Signs/symptoms of bleeding,   Laboratory test error suspected, Change in health, Change in alcohol use,   Change in activity, Upcoming invasive procedure, Emergency department   visit, Upcoming dental procedure, Missed doses, Extra doses, Change in   medications, Change in diet/appetite, Hospital admission, Bruising, Other   complaints      Clinical Outcomes     Negatives:  Major bleeding event, Thromboembolic event,   Anticoagulation-related hospital admission, Anticoagulation-related ED   visit, Anticoagulation-related fatality        INR History:      2024     1:04 PM 2024    12:00 AM 2024     1:54 PM 6/10/2024    12:00 AM 6/10/2024     1:49 PM 2024    12:00 AM 2024     2:15 PM   Anticoagulation Monitoring   INR 2.30  2.10  2.30  1.80   INR Date 2024  2024  6/10/2024  2024   INR Goal 2.0-3.0  2.0-3.0  2.0-3.0  2.0-3.0   Trend Same  Same  Same  Same   Last Week Total 55 mg  55 mg  55 mg  55 mg   Next Week Total 55 mg  55 mg  55 mg  57.5 mg   Sun 10 mg  10 mg  10 mg  10 mg   Mon 5 mg  5 mg  5 mg   7.5 mg (6/17)   Tue 10 mg  10 mg  10 mg  10 mg   Wed 10 mg  10 mg  10 mg  10 mg   Thu 5 mg  5 mg  5 mg  5 mg   Fri 10 mg  10 mg  10 mg  10 mg   Sat 5 mg  5 mg  5 mg  5 mg   Historical INR  2.10      2.30      1.80            This result is from an external source.       Plan:  1. INR is Subtherapeutic today- see above in Anticoagulation Summary.   Will instruct Riky Moon to Change their warfarin regimen- see above in Anticoagulation Summary.  2. Follow up in 1 weeks  3. They have been instructed to call if any changes in medications, doses, concerns, etc. Patient expresses understanding and has no further questions at this time.    Yu Rader, PharmD

## 2024-06-25 ENCOUNTER — ANTICOAGULATION VISIT (OUTPATIENT)
Dept: PHARMACY | Facility: HOSPITAL | Age: 84
End: 2024-06-25
Payer: MEDICARE

## 2024-06-25 DIAGNOSIS — I48.92 ATRIAL FLUTTER WITH CONTROLLED RESPONSE: Primary | ICD-10-CM

## 2024-06-25 LAB — INR PPP: 2.2

## 2024-06-25 NOTE — PROGRESS NOTES
Anticoagulation Clinic Progress Note    Anticoagulation Summary  As of 2024      INR goal:  2.0-3.0   TTR:  47.3% (2 y)   INR used for dosin.20 (2024)   Warfarin maintenance plan:  5 mg every Mon, Thu, Sat; 10 mg all other days   Weekly warfarin total:  55 mg   No change documented:  Olga Granados RPH   Plan last modified:  Olga Granados RPH (2024)   Next INR check:  2024   Priority:  Maintenance   Target end date:      Indications    Atrial flutter with controlled response [I48.92]                 Anticoagulation Episode Summary       INR check location:  Home Draw    Preferred lab:      Send INR reminders to:   [facility]    Comments:  [facility] home stephania          Anticoagulation Care Providers       Provider Role Specialty Phone number    Ty Shelton MD Referring Cardiology [phone]            Clinic Interview:  No pertinent clinical findings have been reported.    INR History:      2024     1:54 PM 6/10/2024    12:00 AM 6/10/2024     1:49 PM 2024    12:00 AM 2024     2:15 PM 2024    12:00 AM 2024     1:42 PM   Anticoagulation Monitoring   INR 2.10  2.30  1.80  2.20   INR Date 2024  6/10/2024  2024  2024   INR Goal 2.0-3.0  2.0-3.0  2.0-3.0  2.0-3.0   Trend Same  Same  Same  Same   Last Week Total 55 mg  55 mg  55 mg  55 mg   Next Week Total 55 mg  55 mg  57.5 mg  55 mg   Sun 10 mg  10 mg  10 mg  10 mg   Mon 5 mg  5 mg  7.5 mg ()  5 mg   Tue 10 mg  10 mg  10 mg  10 mg   Wed 10 mg  10 mg  10 mg  10 mg   Thu 5 mg  5 mg  5 mg  5 mg   Fri 10 mg  10 mg  10 mg  10 mg   Sat 5 mg  5 mg  5 mg  5 mg   Historical INR  2.30      1.80      2.20            This result is from an external source.       Plan:  1. INR is therapeutic today- see above in Anticoagulation Summary.    Riky BARBER Moon to continue their warfarin regimen- see above in Anticoagulation Summary.  2. Follow up in 1 week  3. Pt has agreed to only be called if INR  out of range. They have been instructed to call if any changes in medications, doses, concerns, etc. Patient expresses understanding and has no further questions at this time.    Olga Granados [facility]

## 2024-07-01 ENCOUNTER — OFFICE VISIT (OUTPATIENT)
Dept: INTERNAL MEDICINE | Facility: CLINIC | Age: 84
End: 2024-07-01
Payer: MEDICARE

## 2024-07-01 VITALS
OXYGEN SATURATION: 96 % | HEART RATE: 71 BPM | HEIGHT: 72 IN | DIASTOLIC BLOOD PRESSURE: 64 MMHG | BODY MASS INDEX: 40.73 KG/M2 | SYSTOLIC BLOOD PRESSURE: 134 MMHG | WEIGHT: 300.7 LBS | RESPIRATION RATE: 16 BRPM

## 2024-07-01 DIAGNOSIS — E11.9 TYPE 2 DIABETES MELLITUS WITHOUT COMPLICATION, WITHOUT LONG-TERM CURRENT USE OF INSULIN: Primary | ICD-10-CM

## 2024-07-01 DIAGNOSIS — E03.9 HYPOTHYROIDISM, UNSPECIFIED TYPE: ICD-10-CM

## 2024-07-01 DIAGNOSIS — E78.5 HYPERLIPIDEMIA, UNSPECIFIED HYPERLIPIDEMIA TYPE: ICD-10-CM

## 2024-07-01 DIAGNOSIS — I10 ESSENTIAL HYPERTENSION: ICD-10-CM

## 2024-07-02 ENCOUNTER — ANTICOAGULATION VISIT (OUTPATIENT)
Dept: PHARMACY | Facility: HOSPITAL | Age: 84
End: 2024-07-02
Payer: MEDICARE

## 2024-07-02 DIAGNOSIS — I48.92 ATRIAL FLUTTER WITH CONTROLLED RESPONSE: Primary | ICD-10-CM

## 2024-07-02 LAB
ALBUMIN SERPL-MCNC: 4 G/DL (ref 3.7–4.7)
ALBUMIN/CREAT UR: 20 MG/G CREAT (ref 0–29)
ALP SERPL-CCNC: 65 IU/L (ref 44–121)
ALT SERPL-CCNC: 18 IU/L (ref 0–44)
AST SERPL-CCNC: 17 IU/L (ref 0–40)
BASOPHILS # BLD AUTO: 0.1 X10E3/UL (ref 0–0.2)
BASOPHILS NFR BLD AUTO: 1 %
BILIRUB SERPL-MCNC: 0.7 MG/DL (ref 0–1.2)
BUN SERPL-MCNC: 20 MG/DL (ref 8–27)
BUN/CREAT SERPL: 16 (ref 10–24)
CALCIUM SERPL-MCNC: 9.2 MG/DL (ref 8.6–10.2)
CHLORIDE SERPL-SCNC: 102 MMOL/L (ref 96–106)
CHOLEST SERPL-MCNC: 126 MG/DL (ref 100–199)
CO2 SERPL-SCNC: 25 MMOL/L (ref 20–29)
CREAT SERPL-MCNC: 1.27 MG/DL (ref 0.76–1.27)
CREAT UR-MCNC: 75.2 MG/DL
EGFRCR SERPLBLD CKD-EPI 2021: 56 ML/MIN/1.73
EOSINOPHIL # BLD AUTO: 0.2 X10E3/UL (ref 0–0.4)
EOSINOPHIL NFR BLD AUTO: 2 %
ERYTHROCYTE [DISTWIDTH] IN BLOOD BY AUTOMATED COUNT: 16 % (ref 11.6–15.4)
FT4I SERPL CALC-MCNC: 2.1 (ref 1.2–4.9)
GLOBULIN SER CALC-MCNC: 2.4 G/DL (ref 1.5–4.5)
GLUCOSE SERPL-MCNC: 176 MG/DL (ref 70–99)
HBA1C MFR BLD: 7.2 % (ref 4.8–5.6)
HCT VFR BLD AUTO: 38.6 % (ref 37.5–51)
HDLC SERPL-MCNC: 39 MG/DL
HGB BLD-MCNC: 13 G/DL (ref 13–17.7)
IMM GRANULOCYTES # BLD AUTO: 0.1 X10E3/UL (ref 0–0.1)
IMM GRANULOCYTES NFR BLD AUTO: 1 %
INR PPP: 1.6
LDLC SERPL CALC-MCNC: 64 MG/DL (ref 0–99)
LDLC/HDLC SERPL: 1.6 RATIO (ref 0–3.6)
LYMPHOCYTES # BLD AUTO: 1.6 X10E3/UL (ref 0.7–3.1)
LYMPHOCYTES NFR BLD AUTO: 16 %
MCH RBC QN AUTO: 37.2 PG (ref 26.6–33)
MCHC RBC AUTO-ENTMCNC: 33.7 G/DL (ref 31.5–35.7)
MCV RBC AUTO: 111 FL (ref 79–97)
MICROALBUMIN UR-MCNC: 15 UG/ML
MONOCYTES # BLD AUTO: 0.8 X10E3/UL (ref 0.1–0.9)
MONOCYTES NFR BLD AUTO: 8 %
NEUTROPHILS # BLD AUTO: 7.3 X10E3/UL (ref 1.4–7)
NEUTROPHILS NFR BLD AUTO: 72 %
NRBC BLD AUTO-RTO: 1 % (ref 0–0)
PLATELET # BLD AUTO: 157 X10E3/UL (ref 150–450)
POTASSIUM SERPL-SCNC: 4.8 MMOL/L (ref 3.5–5.2)
PROT SERPL-MCNC: 6.4 G/DL (ref 6–8.5)
RBC # BLD AUTO: 3.49 X10E6/UL (ref 4.14–5.8)
SODIUM SERPL-SCNC: 141 MMOL/L (ref 134–144)
T3RU NFR SERPL: 28 % (ref 24–39)
T4 SERPL-MCNC: 7.6 UG/DL (ref 4.5–12)
TRIGL SERPL-MCNC: 126 MG/DL (ref 0–149)
TSH SERPL DL<=0.005 MIU/L-ACNC: 3.21 UIU/ML (ref 0.45–4.5)
VLDLC SERPL CALC-MCNC: 23 MG/DL (ref 5–40)
WBC # BLD AUTO: 10 X10E3/UL (ref 3.4–10.8)

## 2024-07-02 NOTE — PROGRESS NOTES
Anticoagulation Clinic Progress Note    Anticoagulation Summary  As of 2024      INR goal:  2.0-3.0   TTR:  47.1% (2 y)   INR used for dosin.60 (2024)   Warfarin maintenance plan:  5 mg every Mon, Thu, Sat; 10 mg all other days   Weekly warfarin total:  55 mg   Plan last modified:  Olga Granados RPH (2024)   Next INR check:  2024   Priority:  Maintenance   Target end date:      Indications    Atrial flutter with controlled response [I48.92]                 Anticoagulation Episode Summary       INR check location:  Home Draw    Preferred lab:      Send INR reminders to:   [facility]    Comments:  [facility] home stephania          Anticoagulation Care Providers       Provider Role Specialty Phone number    Ty Shelton MD Referring Cardiology [phone]            Clinic Interview:  Patient Findings     Negatives:  Signs/symptoms of thrombosis, Signs/symptoms of bleeding,   Laboratory test error suspected, Change in health, Change in alcohol use,   Change in activity, Upcoming invasive procedure, Emergency department   visit, Upcoming dental procedure, Missed doses, Extra doses, Change in   medications, Change in diet/appetite, Hospital admission, Bruising, Other   complaints      Clinical Outcomes     Negatives:  Major bleeding event, Thromboembolic event,   Anticoagulation-related hospital admission, Anticoagulation-related ED   visit, Anticoagulation-related fatality        INR History:      6/10/2024     1:49 PM 2024    12:00 AM 2024     2:15 PM 2024    12:00 AM 2024     1:42 PM 2024    12:00 AM 2024     2:38 PM   Anticoagulation Monitoring   INR 2.30  1.80  2.20  1.60   INR Date 6/10/2024  2024  2024  2024   INR Goal 2.0-3.0  2.0-3.0  2.0-3.0  2.0-3.0   Trend Same  Same  Same  Same   Last Week Total 55 mg  55 mg  55 mg  55 mg   Next Week Total 55 mg  57.5 mg  55 mg  60 mg   Sun 10 mg  10 mg  10 mg  10 mg   Mon 5 mg  7.5 mg ()   5 mg  5 mg   Tue 10 mg  10 mg  10 mg  15 mg (7/2)   Wed 10 mg  10 mg  10 mg  10 mg   Thu 5 mg  5 mg  5 mg  5 mg   Fri 10 mg  10 mg  10 mg  10 mg   Sat 5 mg  5 mg  5 mg  5 mg   Historical INR  1.80      2.20      1.60            This result is from an external source.       Plan:  1. INR is Subtherapeutic today- see above in Anticoagulation Summary.   Will instruct Riky Moon to Change their warfarin regimen- see above in Anticoagulation Summary.  2. Follow up in 1 weeks  3. They have been instructed to call if any changes in medications, doses, concerns, etc. Patient expresses understanding and has no further questions at this time.    Yu Rader, PharmD

## 2024-07-03 NOTE — PROGRESS NOTES
"Chief Complaint  Diabetes    Subjective          Riky Moon presents to [facility]  History of Present Illness  The patient is an 83-year-old male who presents for evaluation of multiple medical concerns.    The patient reports a general sense of well-being, albeit with easy fatigability. He has experienced a weight loss of 15 pounds, which he attributes to dietary modifications. He also mentions a significant reduction in his leg weight, which he attributes to fluid accumulation in his leg valves. He continues to utilize compression stockings, although he plans to wear them upon his return home. He expresses concern that his weight loss could be attributed to fluid accumulation.    The patient reports pruritus in his left ear.    Supplemental Information  He has a history of having essential hypertension. Today's blood pressure is 134/64. He is taking amlodipine 5 mg daily. He is also on atenolol 25 mg daily. He is also taking losartan 100 mg daily. He has a history of having hyperlipidemia. He is taking pravastatin 40 mg nightly and no side effects there. He has got a history of having hypothyroidism. He is taking Synthroid 125 mcg daily. He has a history of having type 2 diabetes. He is on Jardiance 10 mg daily and metformin 850 twice a day.    Objective   Vital Signs:   /64 (BP Location: Left arm, Patient Position: Sitting, Cuff Size: Adult)   Pulse 71   Resp 16   Ht 182.9 cm (72\")   Wt (!) 136 kg (300 lb 11.2 oz)   SpO2 96%   BMI 40.78 kg/m²     Physical Exam  Vitals and nursing note reviewed.   Constitutional:       Appearance: He is well-developed.   HENT:      Head: Normocephalic and atraumatic.   Musculoskeletal:      Cervical back: Normal range of motion and neck supple.   Neurological:      Mental Status: He is alert and oriented to person, place, and time.   Psychiatric:         Behavior: Behavior normal.         Physical Exam  Cerumen impaction noted in the " left ear.     Result Review :                 Assessment and Plan    Diagnoses and all orders for this visit:    1. Type 2 diabetes mellitus without complication, without long-term current use of insulin (Primary)  -     Hemoglobin A1c  -     Microalbumin / Creatinine Urine Ratio - Urine, Clean Catch    2. Hypothyroidism, unspecified type  -     Thyroid Panel With TSH    3. Essential hypertension  -     CBC & Differential  -     Comprehensive Metabolic Panel    4. Hyperlipidemia, unspecified hyperlipidemia type  -     Lipid Panel With LDL / HDL Ratio      Assessment & Plan  1. Left ear itching.  Upon examination, cerumen impaction was observed, but no signs of infection were detected. A recommendation for mineral oil drops otc was given.    2.  Hyperlipidemia  Continue pravastatin 40 mg daily.    3.  Type 2 diabetes  Continue metformin 850 mg twice a day.  Continue Jardiance 10 mg daily.    4.  Hypothyroidism  Continue Synthroid 125 mcg daily.    5.  Essential hypertension  Continue current blood pressure medications.        Follow-up  A follow-up appointment is scheduled for 3 months from now.    Follow Up   No follow-ups on file.  Patient was given instructions and counseling regarding his condition or for health maintenance advice. Please see specific information pulled into the AVS if appropriate.           Patient or patient representative verbalized consent for the use of Ambient Listening during the visit with  Marco Carrillo MD for chart documentation. 7/3/2024  15:37 EDT

## 2024-07-07 NOTE — PROGRESS NOTES
Please inform the patient of the following abnormal results. Hba1c is starting to rise, needs to diet and exercise more, will continue current medications. Other labs are stable.

## 2024-07-10 ENCOUNTER — ANTICOAGULATION VISIT (OUTPATIENT)
Dept: PHARMACY | Facility: HOSPITAL | Age: 84
End: 2024-07-10
Payer: MEDICARE

## 2024-07-10 DIAGNOSIS — I48.92 ATRIAL FLUTTER WITH CONTROLLED RESPONSE: Primary | ICD-10-CM

## 2024-07-10 LAB — INR PPP: 2

## 2024-07-10 NOTE — PROGRESS NOTES
Anticoagulation Clinic Progress Note    Anticoagulation Summary  As of 7/10/2024      INR goal:  2.0-3.0   TTR:  46.6% (2 y)   INR used for dosin.00 (7/10/2024)   Warfarin maintenance plan:  5 mg every Mon, Thu, Sat; 10 mg all other days   Weekly warfarin total:  55 mg   No change documented:  Olga Granados RPH   Plan last modified:  Olga Granados RPH (2024)   Next INR check:  2024   Priority:  Maintenance   Target end date:      Indications    Atrial flutter with controlled response [I48.92]                 Anticoagulation Episode Summary       INR check location:  Home Draw    Preferred lab:      Send INR reminders to:   [facility]    Comments:  [facility] home stephania          Anticoagulation Care Providers       Provider Role Specialty Phone number    Ty Shelton MD Referring Cardiology [phone]            Clinic Interview:  No pertinent clinical findings have been reported.    INR History:      2024     2:15 PM 2024    12:00 AM 2024     1:42 PM 2024    12:00 AM 2024     2:38 PM 7/10/2024    12:00 AM 7/10/2024     3:37 PM   Anticoagulation Monitoring   INR 1.80  2.20  1.60  2.00   INR Date 2024  2024  2024  7/10/2024   INR Goal 2.0-3.0  2.0-3.0  2.0-3.0  2.0-3.0   Trend Same  Same  Same  Same   Last Week Total 55 mg  55 mg  55 mg  55 mg   Next Week Total 57.5 mg  55 mg  60 mg  55 mg   Sun 10 mg  10 mg  10 mg  10 mg   Mon 7.5 mg ()  5 mg  5 mg  5 mg   Tue 10 mg  10 mg  15 mg ()  10 mg   Wed 10 mg  10 mg  10 mg  10 mg   Thu 5 mg  5 mg  5 mg  5 mg   Fri 10 mg  10 mg  10 mg  10 mg   Sat 5 mg  5 mg  5 mg  5 mg   Historical INR  2.20      1.60      2.00            This result is from an external source.       Plan:  1. INR is therapeutic today- see above in Anticoagulation Summary.    Riky Moon to continue their warfarin regimen- see above in Anticoagulation Summary.  2. Follow up in 1 week  3. Pt has agreed to only be called if  INR out of range. They have been instructed to call if any changes in medications, doses, concerns, etc. Patient expresses understanding and has no further questions at this time.    Olga Granados, [facility]

## 2024-07-16 ENCOUNTER — ANTICOAGULATION VISIT (OUTPATIENT)
Dept: PHARMACY | Facility: HOSPITAL | Age: 84
End: 2024-07-16
Payer: MEDICARE

## 2024-07-16 DIAGNOSIS — I48.92 ATRIAL FLUTTER WITH CONTROLLED RESPONSE: Primary | ICD-10-CM

## 2024-07-16 LAB — INR PPP: 1.9

## 2024-07-16 PROCEDURE — G0249 PROVIDE INR TEST MATER/EQUIP: HCPCS

## 2024-07-16 NOTE — PROGRESS NOTES
Anticoagulation Clinic Progress Note    Anticoagulation Summary  As of 2024      INR goal:  2.0-3.0   TTR:  46.3% (2 y)   INR used for dosin.90 (2024)   Warfarin maintenance plan:  5 mg every Mon, Sat; 10 mg all other days   Weekly warfarin total:  60 mg   Plan last modified:  Olga Granados RPH (2024)   Next INR check:  2024   Priority:  Maintenance   Target end date:      Indications    Atrial flutter with controlled response [I48.92]                 Anticoagulation Episode Summary       INR check location:  Home Draw    Preferred lab:      Send INR reminders to:   [facility]    Comments:  [facility] home stephania          Anticoagulation Care Providers       Provider Role Specialty Phone number    Ty Shelton MD Referring Cardiology [phone]            Clinic Interview:  Patient Findings     Negatives:  Signs/symptoms of thrombosis, Signs/symptoms of bleeding,   Laboratory test error suspected, Change in health, Change in alcohol use,   Change in activity, Upcoming invasive procedure, Emergency department   visit, Upcoming dental procedure, Missed doses, Extra doses, Change in   medications, Change in diet/appetite, Hospital admission, Bruising, Other   complaints      Clinical Outcomes     Negatives:  Major bleeding event, Thromboembolic event,   Anticoagulation-related hospital admission, Anticoagulation-related ED   visit, Anticoagulation-related fatality        INR History:      2024     1:42 PM 2024    12:00 AM 2024     2:38 PM 7/10/2024    12:00 AM 7/10/2024     3:37 PM 2024    12:00 AM 2024     2:30 PM   Anticoagulation Monitoring   INR 2.20  1.60  2.00  1.90   INR Date 2024  2024  7/10/2024  2024   INR Goal 2.0-3.0  2.0-3.0  2.0-3.0  2.0-3.0   Trend Same  Same  Same  Up   Last Week Total 55 mg  55 mg  55 mg  55 mg   Next Week Total 55 mg  60 mg  55 mg  60 mg   Sun 10 mg  10 mg  10 mg  10 mg   Mon 5 mg  5 mg  5 mg  5 mg    Tue 10 mg  15 mg (7/2)  10 mg  10 mg   Wed 10 mg  10 mg  10 mg  10 mg   Thu 5 mg  5 mg  5 mg  10 mg   Fri 10 mg  10 mg  10 mg  10 mg   Sat 5 mg  5 mg  5 mg  5 mg   Historical INR  1.60      2.00      1.90            This result is from an external source.       Plan:  1. INR is Subtherapeutic today- see above in Anticoagulation Summary.   Will instruct Riky Moon to Increase their warfarin regimen- see above in Anticoagulation Summary.   increase to 5 mg Mon, Sat and 10 mg AOCLARISSE, rck 1 week   2. Follow up in 1 weeks  3. They have been instructed to call if any changes in medications, doses, concerns, etc. Patient expresses understanding and has no further questions at this time.    Olga Granados [facility]

## 2024-07-24 ENCOUNTER — ANTICOAGULATION VISIT (OUTPATIENT)
Dept: PHARMACY | Facility: HOSPITAL | Age: 84
End: 2024-07-24
Payer: MEDICARE

## 2024-07-24 DIAGNOSIS — I48.92 ATRIAL FLUTTER WITH CONTROLLED RESPONSE: Primary | ICD-10-CM

## 2024-07-24 LAB — INR PPP: 2.1

## 2024-07-24 RX ORDER — WARFARIN SODIUM 5 MG/1
TABLET ORAL
Qty: 155 TABLET | Refills: 1 | Status: SHIPPED | OUTPATIENT
Start: 2024-07-24

## 2024-07-24 NOTE — PROGRESS NOTES
Anticoagulation Clinic Progress Note    Anticoagulation Summary  As of 2024      INR goal:  2.0-3.0   TTR:  46.3% (2.1 y)   INR used for dosin.10 (2024)   Warfarin maintenance plan:  5 mg every Mon, Sat; 10 mg all other days   Weekly warfarin total:  60 mg   No change documented:  Heron Delaney, PharmD   Plan last modified:  Olga Granados RPH (2024)   Next INR check:  2024   Priority:  Maintenance   Target end date:      Indications    Atrial flutter with controlled response [I48.92]                 Anticoagulation Episode Summary       INR check location:  Home Draw    Preferred lab:      Send INR reminders to:   [facility]    Comments:  [facility] home stephania          Anticoagulation Care Providers       Provider Role Specialty Phone number    Ty Shelton MD Referring Cardiology [phone]            Clinic Interview:  Patient Findings     Negatives:  Signs/symptoms of thrombosis, Signs/symptoms of bleeding,   Laboratory test error suspected, Change in health, Change in alcohol use,   Change in activity, Upcoming invasive procedure, Emergency department   visit, Upcoming dental procedure, Missed doses, Extra doses, Change in   medications, Change in diet/appetite, Hospital admission, Bruising, Other   complaints      Clinical Outcomes     Negatives:  Major bleeding event, Thromboembolic event,   Anticoagulation-related hospital admission, Anticoagulation-related ED   visit, Anticoagulation-related fatality        INR History:      2024     2:38 PM 7/10/2024    12:00 AM 7/10/2024     3:37 PM 2024    12:00 AM 2024     2:30 PM 2024    12:00 AM 2024    12:51 PM   Anticoagulation Monitoring   INR 1.60  2.00  1.90  2.10   INR Date 2024  7/10/2024  2024  2024   INR Goal 2.0-3.0  2.0-3.0  2.0-3.0  2.0-3.0   Trend Same  Same  Up  Same   Last Week Total 55 mg  55 mg  55 mg  60 mg   Next Week Total 60 mg  55 mg  60 mg  60 mg   Sun 10 mg  10  mg  10 mg  10 mg   Mon 5 mg  5 mg  5 mg  5 mg   Tue 15 mg (7/2)  10 mg  10 mg  10 mg   Wed 10 mg  10 mg  10 mg  10 mg   Thu 5 mg  5 mg  10 mg  10 mg   Fri 10 mg  10 mg  10 mg  10 mg   Sat 5 mg  5 mg  5 mg  5 mg   Historical INR  2.00      1.90      2.10            This result is from an external source.       Plan:  1. INR is Therapeutic today- see above in Anticoagulation Summary.   Will instruct Riky Moon to Continue their warfarin regimen- see above in Anticoagulation Summary.  2. Follow up in 1 week.  3. They have been instructed to call if any changes in medications, doses, concerns, etc. Patient expresses understanding and has no further questions at this time.    Heron Delaney, PharmD

## 2024-07-30 LAB — INR PPP: 2.5

## 2024-07-31 ENCOUNTER — ANTICOAGULATION VISIT (OUTPATIENT)
Dept: PHARMACY | Facility: HOSPITAL | Age: 84
End: 2024-07-31
Payer: MEDICARE

## 2024-07-31 DIAGNOSIS — I48.92 ATRIAL FLUTTER WITH CONTROLLED RESPONSE: Primary | ICD-10-CM

## 2024-08-06 ENCOUNTER — OFFICE VISIT (OUTPATIENT)
Dept: CARDIOLOGY | Facility: CLINIC | Age: 84
End: 2024-08-06
Payer: MEDICARE

## 2024-08-06 VITALS
DIASTOLIC BLOOD PRESSURE: 68 MMHG | WEIGHT: 305.8 LBS | SYSTOLIC BLOOD PRESSURE: 110 MMHG | HEART RATE: 70 BPM | HEIGHT: 72 IN | OXYGEN SATURATION: 96 % | BODY MASS INDEX: 41.42 KG/M2

## 2024-08-06 DIAGNOSIS — I48.92 ATRIAL FLUTTER WITH CONTROLLED RESPONSE: Primary | ICD-10-CM

## 2024-08-06 DIAGNOSIS — I10 ESSENTIAL HYPERTENSION: ICD-10-CM

## 2024-08-06 DIAGNOSIS — E66.01 CLASS 3 SEVERE OBESITY DUE TO EXCESS CALORIES WITH SERIOUS COMORBIDITY AND BODY MASS INDEX (BMI) OF 40.0 TO 44.9 IN ADULT: ICD-10-CM

## 2024-08-06 DIAGNOSIS — G47.33 OSA TREATED WITH BIPAP: ICD-10-CM

## 2024-08-06 PROCEDURE — 3078F DIAST BP <80 MM HG: CPT | Performed by: NURSE PRACTITIONER

## 2024-08-06 PROCEDURE — 3074F SYST BP LT 130 MM HG: CPT | Performed by: NURSE PRACTITIONER

## 2024-08-06 PROCEDURE — 99214 OFFICE O/P EST MOD 30 MIN: CPT | Performed by: NURSE PRACTITIONER

## 2024-08-06 RX ORDER — WARFARIN SODIUM 5 MG/1
TABLET ORAL
Qty: 155 TABLET | Refills: 1 | Status: SHIPPED | OUTPATIENT
Start: 2024-08-06

## 2024-08-06 RX ORDER — ATENOLOL 25 MG/1
12.5 TABLET ORAL DAILY
Qty: 45 TABLET | Refills: 3 | Status: SHIPPED | OUTPATIENT
Start: 2024-08-06

## 2024-08-06 NOTE — ASSESSMENT & PLAN NOTE
[facility], my interpretation is atrial flutter with heart rate 48.  This is unchanged from previous  Recommend decreasing atenolol to 12.5 mg/day  Anticoagulated with warfarin secondary to obesity.  He does home INR's and calls them to the anticoagulation monitoring clinic.  He is not experiencing any bleeding complications.

## 2024-08-06 NOTE — PROGRESS NOTES
"    CARDIOLOGY        Patient Name: Riky Moon  :1940  Age: 83 y.o.  Primary Cardiologist: Ty Shelton MD  Encounter Provider:  EVON Oneill    Date of Service: 24      CHIEF COMPLAINT / REASON FOR OFFICE VISIT     Atrial Fibrillation, Hypertension, and Follow-up      HISTORY OF PRESENT ILLNESS       Atrial Fibrillation  Past medical history includes atrial fibrillation.   Hypertension  Associated symptoms include malaise/fatigue.   Follow-up  Conditions present:  DepressionCurrent symptoms include no weight gain, no weight loss, no myalgias, no wheezing and no cough.     Riky Moon is a 83 y.o. male who presents today for routine evaluation.     Pt has a  history significant for hypertension, hyperlipidemia, KINDRA on BiPAP, diabetes.    Patient presents today for annual assessment.  [facility]  My interpretation is atrial flutter with heart rate 48.  This is unchanged compared to ECG dated 2023.  Patient reports that he has had worsening shortness of breath with exertion over the past several months.  He also complains of left lower extremity edema that is secondary to lymphedema.  He has gone to the lymphedema clinic.  He denies any chest pain, dyspnea at rest, palpitations, lightheadedness.  He is tolerating warfarin without any bleeding complications.    The following portions of the patient's history were reviewed and updated as appropriate: allergies, current medications, past family history, past medical history, past social history, past surgical history and problem list.      VITAL SIGNS     Visit Vitals  /68 (BP Location: Left arm, Patient Position: Sitting, Cuff Size: Large Adult)   Pulse 70   Ht 182.9 cm (72\")   Wt (!) 139 kg (305 lb 12.8 oz)   SpO2 96%   BMI 41.47 kg/m²         Wt Readings from Last 3 Encounters:   24 (!) 139 kg (305 lb 12.8 oz)   24 (!) 136 kg (300 lb 11.2 oz)   24 (!) 143 " kg (315 lb)     Body mass index is 41.47 kg/m².      REVIEW OF SYSTEMS   Review of Systems   Constitutional: Positive for malaise/fatigue. Negative for chills, fever, weight gain and weight loss.   Cardiovascular:  Positive for dyspnea on exertion and leg swelling.   Respiratory:  Negative for cough, snoring and wheezing.    Hematologic/Lymphatic: Negative for bleeding problem. Does not bruise/bleed easily.   Skin:  Negative for color change.   Musculoskeletal:  Negative for falls, joint pain and myalgias.   Gastrointestinal:  Negative for melena.   Genitourinary:  Negative for hematuria.   Neurological:  Negative for excessive daytime sleepiness.   Psychiatric/Behavioral:  Negative for depression. The patient is not nervous/anxious.            PHYSICAL EXAMINATION     Constitutional:       Appearance: Normal appearance. Well-developed.   Eyes:      Conjunctiva/sclera: Conjunctivae normal.   Neck:      Vascular: No carotid bruit.   Pulmonary:      Effort: Pulmonary effort is normal.      Breath sounds: Normal breath sounds.   Cardiovascular:      Bradycardia present. Regular rhythm. Normal S1. Normal S2.       Murmurs: There is no murmur.      No gallop.  No click. No rub.   Edema:     Peripheral edema present.     Pretibial: bilateral 1+ edema of the pretibial area.     Ankle: bilateral 1+ edema of the ankle.     Feet: bilateral 1+ edema of the feet.  Musculoskeletal: Normal range of motion. Skin:     General: Skin is warm and dry.   Neurological:      Mental Status: Alert and oriented to person, place, and time.      GCS: GCS eye subscore is 4. GCS verbal subscore is 5. GCS motor subscore is 6.   Psychiatric:         Speech: Speech normal.         Behavior: Behavior normal.         Thought Content: Thought content normal.         Judgment: Judgment normal.           REVIEWED DATA     Procedures    Cardiac Procedures:  Myocardial perfusion PET stress test 9/11/2017.  Normal myocardial perfusion study without  evidence of ischemia.  Impressions consistent with low risk study.  Echocardiogram 3/12/2019.  LVEF 61%.  Grade 1 diastolic dysfunction.  Calculated RVSP 36 mmHg.    Lipid Panel          11/20/2023    14:32 2/27/2024    00:00 7/1/2024    13:57   Lipid Panel   Total Cholesterol 121  125  126    Triglycerides 140  119  126    HDL Cholesterol 40  37  39    VLDL Cholesterol 24  22  23    LDL Cholesterol  57  66  64    LDL/HDL Ratio 1.4  1.8  1.6      BUN   Date Value Ref Range Status   07/01/2024 20 8 - 27 mg/dL Final   11/10/2023 22 8 - 23 mg/dL Final     Creatinine   Date Value Ref Range Status   07/01/2024 1.27 0.76 - 1.27 mg/dL Final   11/10/2023 1.15 0.76 - 1.27 mg/dL Final     Potassium   Date Value Ref Range Status   07/01/2024 4.8 3.5 - 5.2 mmol/L Final   11/10/2023 4.6 3.5 - 5.2 mmol/L Final     ALT (SGPT)   Date Value Ref Range Status   07/01/2024 18 0 - 44 IU/L Final   11/10/2023 15 1 - 41 U/L Final     AST (SGOT)   Date Value Ref Range Status   07/01/2024 17 0 - 40 IU/L Final   11/10/2023 15 1 - 40 U/L Final           ASSESSMENT & PLAN     Diagnoses and all orders for this visit:    1. Atrial flutter with controlled response (Primary)  Assessment & Plan:  [facility], my interpretation is atrial flutter with heart rate 48.  This is unchanged from previous  Recommend decreasing atenolol to 12.5 mg/day  Anticoagulated with warfarin secondary to obesity.  He does home INR's and calls them to the anticoagulation monitoring clinic.  He is not experiencing any bleeding complications.      2. Essential hypertension  Assessment & Plan:  BP controlled in clinic at 110/68  Continue the following antihypertensive regimen:  Amlodipine 5 mg/day  Atenolol 12.5 mg/day, this is decreased from 25 mg/day    Orders:  -     atenolol (TENORMIN) 25 MG tablet; Take 0.5 tablets by mouth Daily.  Dispense: 45 tablet; Refill: 3    3. KINDRA treated with auto BiPAP    4. Class 3 severe obesity due to excess calories  with serious comorbidity and body mass index (BMI) of 40.0 to 44.9 in adult    Other orders  -     warfarin (COUMADIN) 5 MG tablet; Take one tablet (5 mg) by mouth on Monday and Saturdays, and take two tablets (10 mg) by mouth all other days or as directed.  Dispense: 155 tablet; Refill: 1        Return in about 1 year (around 8/6/2025) for Dr. Shelton- Routine.    Future Appointments         Provider Department Center    10/8/2024 1:30 PM Marco Carrillo MD [facility]    8/11/2025 1:40 PM Ty Shelton MD [facility] CARDIOLOGY [facility]                MEDICATIONS         Discharge Medications            Accurate as of August 6, 2024  3:34 PM. If you have any questions, ask your nurse or doctor.                Changes to Medications        Instructions Start Date   atenolol 25 MG tablet  Commonly known as: TENORMIN  What changed: how much to take  Changed by: EVON Raphael   12.5 mg, Oral, Daily      Jardiance 10 MG tablet tablet  Generic drug: empagliflozin  What changed: how much to take   10 mg, Oral, Daily             Continue These Medications        Instructions Start Date   albuterol sulfate  (90 Base) MCG/ACT inhaler  Commonly known as: PROVENTIL HFA;VENTOLIN HFA;PROAIR HFA   1 puff, Inhalation, Every 4 Hours PRN      amLODIPine 5 MG tablet  Commonly known as: NORVASC   5 mg, Oral, Daily      cholecalciferol 25 MCG (1000 UT) tablet  Commonly known as: VITAMIN D3   1,000 Units, Oral, Every Morning      DULoxetine 60 MG capsule  Commonly known as: CYMBALTA   60 mg, Oral, Every Morning      DULoxetine 30 MG capsule  Commonly known as: Cymbalta   30 mg, Oral, Daily      furosemide 20 MG tablet  Commonly known as: Lasix   20 mg, Oral, Daily      glucose blood test strip  Commonly known as: Heidi Contour Test   Use as instructed to test glucose      levothyroxine 125 MCG tablet  Commonly known as: Synthroid   125 mcg, Oral, Daily      Synthroid  125 MCG tablet  Generic drug: levothyroxine   125 mcg, Oral, Daily      losartan 100 MG tablet  Commonly known as: COZAAR   100 mg, Oral, Daily      metFORMIN 850 MG tablet  Commonly known as: GLUCOPHAGE   850 mg, Oral, 2 Times Daily With Meals      Microlet Lancets misc   Use daily as directed to test glucose      multivitamin tablet  Generic drug: multivitamin   1 tablet, Oral, Every Morning      oxybutynin XL 15 MG 24 hr tablet  Commonly known as: DITROPAN XL   15 mg, Oral, Nightly      oxybutynin 5 MG tablet  Commonly known as: DITROPAN   1 tablet, Oral, Every 12 Hours Scheduled      pravastatin 40 MG tablet  Commonly known as: PRAVACHOL   40 mg, Oral, Nightly      pregabalin 150 MG capsule  Commonly known as: LYRICA   150 mg, Oral, 2 Times Daily      Trelegy Ellipta 100-62.5-25 MCG/INH inhaler  Generic drug: Fluticasone-Umeclidin-Vilant   1 puff, Inhalation, Daily - RT      vitamin B-12 1000 MCG tablet  Commonly known as: CYANOCOBALAMIN   500 mcg, Oral, Every Morning      VITAMIN C PO   1 tablet, Oral, Daily      warfarin 5 MG tablet  Commonly known as: COUMADIN   Take one tablet (5 mg) by mouth on Monday and Saturdays, and take two tablets (10 mg) by mouth all other days or as directed.                   **Dragon Disclaimer:   Much of this encounter note is an electronic transcription/translation of spoken language to printed text. The electronic translation of spoken language may permit erroneous, or at times, nonsensical words or phrases to be inadvertently transcribed. Although I have reviewed the note for such errors, some may still exist.

## 2024-08-06 NOTE — ASSESSMENT & PLAN NOTE
BP controlled in clinic at 110/68  Continue the following antihypertensive regimen:  Amlodipine 5 mg/day  Atenolol 12.5 mg/day, this is decreased from 25 mg/day

## 2024-08-07 ENCOUNTER — TELEPHONE (OUTPATIENT)
Dept: CARDIOLOGY | Facility: CLINIC | Age: 84
End: 2024-08-07
Payer: MEDICARE

## 2024-08-07 LAB — INR PPP: 2.7

## 2024-08-07 NOTE — TELEPHONE ENCOUNTER
Pt called and left a VM. He forgot which medication Padmini wanted him to cut in half. Per Padmini's note, pt is to cut his atenolol in half, making his dose 12.5 mg a day. I called and left a detailed message. I asked pt to call back and confirm he got the message.    Thank you,    Kimberly Drake, RN  Triage [facility]  08/07/24 16:11 EDT

## 2024-08-07 NOTE — TELEPHONE ENCOUNTER
Patient called back and said he got Kimberly's VM. He didn't have any further questions or concerns.     Belen Sutton RN  [place] [facility]

## 2024-08-08 ENCOUNTER — ANTICOAGULATION VISIT (OUTPATIENT)
Dept: PHARMACY | Facility: HOSPITAL | Age: 84
End: 2024-08-08
Payer: MEDICARE

## 2024-08-08 DIAGNOSIS — I48.92 ATRIAL FLUTTER WITH CONTROLLED RESPONSE: Primary | ICD-10-CM

## 2024-08-08 NOTE — PROGRESS NOTES
Anticoagulation Clinic Progress Note    Anticoagulation Summary  As of 2024      INR goal:  2.0-3.0   TTR:  47.3% (2.1 y)   INR used for dosin.70 (2024)   Warfarin maintenance plan:  5 mg every Mon, Sat; 10 mg all other days   Weekly warfarin total:  60 mg   No change documented:  Olga Granados RPH   Plan last modified:  Olga Granados RPH (2024)   Next INR check:  2024   Priority:  Maintenance   Target end date:      Indications    Atrial flutter with controlled response [I48.92]                 Anticoagulation Episode Summary       INR check location:  Home Draw    Preferred lab:      Send INR reminders to:   [facility]    Comments:  [facility] home [facility]          Anticoagulation Care Providers       Provider Role Specialty Phone number    Ty Shelton MD Referring Cardiology [phone]            Clinic Interview:  No pertinent clinical findings have been reported.    INR History:      2024     2:30 PM 2024    12:00 AM 2024    12:51 PM 2024    12:00 AM 2024     8:23 AM 2024    12:00 AM 2024     8:21 AM   Anticoagulation Monitoring   INR 1.90  2.10  2.50  2.70   INR Date 2024   INR Goal 2.0-3.0  2.0-3.0  2.0-3.0  2.0-3.0   Trend Up  Same  Same  Same   Last Week Total 55 mg  60 mg  60 mg  60 mg   Next Week Total 60 mg  60 mg  60 mg  60 mg   Sun 10 mg  10 mg  10 mg  10 mg   Mon 5 mg  5 mg  5 mg  5 mg   Tue 10 mg  10 mg  -  10 mg   Wed 10 mg  10 mg  10 mg  -   Thu 10 mg  10 mg  10 mg  10 mg   Fri 10 mg  10 mg  10 mg  10 mg   Sat 5 mg  5 mg  5 mg  5 mg   Historical INR  2.10      2.50      2.70            This result is from an external source.       Plan:  1. INR is therapeutic today- see above in Anticoagulation Summary.    Riky BARBER Moon to continue their warfarin regimen- see above in Anticoagulation Summary.  2. Follow up in 1 week  3. Pt has agreed to only be called if INR out of range. They have  been instructed to call if any changes in medications, doses, concerns, etc. Patient expresses understanding and has no further questions at this time.    Olga Granados, CHANTAL

## 2024-08-14 ENCOUNTER — ANTICOAGULATION VISIT (OUTPATIENT)
Dept: PHARMACY | Facility: HOSPITAL | Age: 84
End: 2024-08-14
Payer: MEDICARE

## 2024-08-14 DIAGNOSIS — I48.92 ATRIAL FLUTTER WITH CONTROLLED RESPONSE: Primary | ICD-10-CM

## 2024-08-14 LAB — INR PPP: 2.6

## 2024-08-14 PROCEDURE — G0249 PROVIDE INR TEST MATER/EQUIP: HCPCS

## 2024-08-14 NOTE — PROGRESS NOTES
Anticoagulation Clinic Progress Note    Anticoagulation Summary  As of 2024      INR goal:  2.0-3.0   TTR:  47.7% (2.1 y)   INR used for dosin.60 (2024)   Warfarin maintenance plan:  5 mg every Mon, Sat; 10 mg all other days   Weekly warfarin total:  60 mg   No change documented:  Olga Granados RPH   Plan last modified:  Olga Granados RPH (2024)   Next INR check:  2024   Priority:  Maintenance   Target end date:      Indications    Atrial flutter with controlled response [I48.92]                 Anticoagulation Episode Summary       INR check location:  Home Draw    Preferred lab:      Send INR reminders to:   [facility]    Comments:  [facility] home stephania          Anticoagulation Care Providers       Provider Role Specialty Phone number    Ty Shelton MD Referring Cardiology [phone]            Clinic Interview:  No pertinent clinical findings have been reported.    INR History:      2024    12:51 PM 2024    12:00 AM 2024     8:23 AM 2024    12:00 AM 2024     8:21 AM 2024    12:00 AM 2024     1:45 PM   Anticoagulation Monitoring   INR 2.10  2.50  2.70  2.60   INR Date 2024   INR Goal 2.0-3.0  2.0-3.0  2.0-3.0  2.0-3.0   Trend Same  Same  Same  Same   Last Week Total 60 mg  60 mg  60 mg  60 mg   Next Week Total 60 mg  60 mg  60 mg  60 mg   Sun 10 mg  10 mg  10 mg  10 mg   Mon 5 mg  5 mg  5 mg  5 mg   Tue 10 mg  -  10 mg  10 mg   Wed 10 mg  10 mg  -  10 mg   Thu 10 mg  10 mg  10 mg  10 mg   Fri 10 mg  10 mg  10 mg  10 mg   Sat 5 mg  5 mg  5 mg  5 mg   Historical INR  2.50      2.70      2.60            This result is from an external source.       Plan:  1. INR is therapeutic today- see above in Anticoagulation Summary.    Riky BARBER Moon to continue their warfarin regimen- see above in Anticoagulation Summary.  2. Follow up in 1 week  3. Pt has agreed to only be called if INR out of range. They  have been instructed to call if any changes in medications, doses, concerns, etc. Patient expresses understanding and has no further questions at this time.    Olga Granados RP

## 2024-08-21 ENCOUNTER — ANTICOAGULATION VISIT (OUTPATIENT)
Dept: PHARMACY | Facility: HOSPITAL | Age: 84
End: 2024-08-21
Payer: MEDICARE

## 2024-08-21 DIAGNOSIS — I48.92 ATRIAL FLUTTER WITH CONTROLLED RESPONSE: Primary | ICD-10-CM

## 2024-08-21 LAB — INR PPP: 2.5

## 2024-08-21 NOTE — PROGRESS NOTES
Anticoagulation Clinic Progress Note    Anticoagulation Summary  As of 2024      INR goal:  2.0-3.0   TTR:  48.2% (2.1 y)   INR used for dosin.50 (2024)   Warfarin maintenance plan:  5 mg every Mon, Sat; 10 mg all other days   Weekly warfarin total:  60 mg   No change documented:  Olga Granados RPH   Plan last modified:  Olga Granados RPH (2024)   Next INR check:  2024   Priority:  Maintenance   Target end date:      Indications    Atrial flutter with controlled response [I48.92]                 Anticoagulation Episode Summary       INR check location:  Home Draw    Preferred lab:      Send INR reminders to:   [facility]    Comments:  [facility] home stephania          Anticoagulation Care Providers       Provider Role Specialty Phone number    Ty Shelton MD Referring Cardiology [phone]            Clinic Interview:  No pertinent clinical findings have been reported.    INR History:      2024     8:23 AM 2024    12:00 AM 2024     8:21 AM 2024    12:00 AM 2024     1:45 PM 2024    12:00 AM 2024     1:25 PM   Anticoagulation Monitoring   INR 2.50  2.70  2.60  2.50   INR Date 2024   INR Goal 2.0-3.0  2.0-3.0  2.0-3.0  2.0-3.0   Trend Same  Same  Same  Same   Last Week Total 60 mg  60 mg  60 mg  60 mg   Next Week Total 60 mg  60 mg  60 mg  60 mg   Sun 10 mg  10 mg  10 mg  10 mg   Mon 5 mg  5 mg  5 mg  5 mg   Tue -  10 mg  10 mg  10 mg   Wed 10 mg  -  10 mg  10 mg   Thu 10 mg  10 mg  10 mg  10 mg   Fri 10 mg  10 mg  10 mg  10 mg   Sat 5 mg  5 mg  5 mg  5 mg   Historical INR  2.70      2.60      2.50            This result is from an external source.       Plan:  1. INR is therapeutic today- see above in Anticoagulation Summary.    Riky BARBER Moon to continue their warfarin regimen- see above in Anticoagulation Summary.  2. Follow up in 1 week  3. Pt has agreed to only be called if INR out of range. They  have been instructed to call if any changes in medications, doses, concerns, etc. Patient expresses understanding and has no further questions at this time.    Olga Granados RP

## 2024-08-26 ENCOUNTER — ANTICOAGULATION VISIT (OUTPATIENT)
Dept: PHARMACY | Facility: HOSPITAL | Age: 84
End: 2024-08-26
Payer: MEDICARE

## 2024-08-26 DIAGNOSIS — I48.92 ATRIAL FLUTTER WITH CONTROLLED RESPONSE: Primary | ICD-10-CM

## 2024-08-26 LAB — INR PPP: 2.5

## 2024-08-26 NOTE — PROGRESS NOTES
Anticoagulation Clinic Progress Note    Anticoagulation Summary  As of 2024      INR goal:  2.0-3.0   TTR:  48.5% (2.2 y)   INR used for dosin.50 (2024)   Warfarin maintenance plan:  5 mg every Mon, Sat; 10 mg all other days   Weekly warfarin total:  60 mg   No change documented:  Yu Rader, Masoud   Plan last modified:  Olga Granados RPH (2024)   Next INR check:  2024   Priority:  Maintenance   Target end date:      Indications    Atrial flutter with controlled response [I48.92]                 Anticoagulation Episode Summary       INR check location:  Home Draw    Preferred lab:      Send INR reminders to:   [facility]    Comments:  [facility] home ste[facility]          Anticoagulation Care Providers       Provider Role Specialty Phone number    Ty Shelton MD Referring Cardiology [phone]            Clinic Interview:  Patient Findings     Negatives:  Signs/symptoms of thrombosis, Signs/symptoms of bleeding,   Laboratory test error suspected, Change in health, Change in alcohol use,   Change in activity, Upcoming invasive procedure, Emergency department   visit, Upcoming dental procedure, Missed doses, Extra doses, Change in   medications, Change in diet/appetite, Hospital admission, Bruising, Other   complaints      Clinical Outcomes     Negatives:  Major bleeding event, Thromboembolic event,   Anticoagulation-related hospital admission, Anticoagulation-related ED   visit, Anticoagulation-related fatality        INR History:      2024     8:21 AM 2024    12:00 AM 2024     1:45 PM 2024    12:00 AM 2024     1:25 PM 2024    12:00 AM 2024     1:10 PM   Anticoagulation Monitoring   INR 2.70  2.60  2.50  2.50   INR Date 2024   INR Goal 2.0-3.0  2.0-3.0  2.0-3.0  2.0-3.0   Trend Same  Same  Same  Same   Last Week Total 60 mg  60 mg  60 mg  60 mg   Next Week Total 60 mg  60 mg  60 mg  60 mg   Sun 10 mg   10 mg  10 mg  10 mg   Mon 5 mg  5 mg  5 mg  5 mg   Tue 10 mg  10 mg  10 mg  10 mg   Wed -  10 mg  10 mg  10 mg   Thu 10 mg  10 mg  10 mg  10 mg   Fri 10 mg  10 mg  10 mg  10 mg   Sat 5 mg  5 mg  5 mg  5 mg   Historical INR  2.60      2.50      2.50            This result is from an external source.       Plan:  1. INR is Therapeutic today- see above in Anticoagulation Summary.   Will instruct Riky Moon to Continue their warfarin regimen- see above in Anticoagulation Summary.  2. Follow up in 1 weeks  3. They have been instructed to call if any changes in medications, doses, concerns, etc. Patient expresses understanding and has no further questions at this time.    Yu Rader, PharmD

## 2024-09-05 ENCOUNTER — ANTICOAGULATION VISIT (OUTPATIENT)
Dept: PHARMACY | Facility: HOSPITAL | Age: 84
End: 2024-09-05
Payer: MEDICARE

## 2024-09-05 DIAGNOSIS — I48.92 ATRIAL FLUTTER WITH CONTROLLED RESPONSE: Primary | ICD-10-CM

## 2024-09-05 LAB — INR PPP: 2

## 2024-09-05 NOTE — PROGRESS NOTES
Anticoagulation Clinic Progress Note    Anticoagulation Summary  As of 2024      INR goal:  2.0-3.0   TTR:  49.2% (2.2 y)   INR used for dosin.00 (2024)   Warfarin maintenance plan:  5 mg every Mon, Sat; 10 mg all other days   Weekly warfarin total:  60 mg   No change documented:  Yu Rader, Masoud   Plan last modified:  lOga Granados RPH (2024)   Next INR check:  2024   Priority:  Maintenance   Target end date:      Indications    Atrial flutter with controlled response [I48.92]                 Anticoagulation Episode Summary       INR check location:  Home Draw    Preferred lab:      Send INR reminders to:   [facility]    Comments:  [facility] home stephania          Anticoagulation Care Providers       Provider Role Specialty Phone number    Ty Shelton MD Referring Cardiology [phone]            Clinic Interview:  No pertinent clinical findings have been reported.    INR History:      2024     1:45 PM 2024    12:00 AM 2024     1:25 PM 2024    12:00 AM 2024     1:10 PM 2024    12:00 AM 2024     3:13 PM   Anticoagulation Monitoring   INR 2.60  2.50  2.50  2.00   INR Date 2024   INR Goal 2.0-3.0  2.0-3.0  2.0-3.0  2.0-3.0   Trend Same  Same  Same  Same   Last Week Total 60 mg  60 mg  60 mg  60 mg   Next Week Total 60 mg  60 mg  60 mg  60 mg   Sun 10 mg  10 mg  10 mg  10 mg   Mon 5 mg  5 mg  5 mg  5 mg   Tue 10 mg  10 mg  10 mg  10 mg   Wed 10 mg  10 mg  10 mg  10 mg   Thu 10 mg  10 mg  10 mg  10 mg   Fri 10 mg  10 mg  10 mg  10 mg   Sat 5 mg  5 mg  5 mg  5 mg   Historical INR  2.50      2.50      2.00            This result is from an external source.       Plan:  1. INR is therapeutic today- see above in Anticoagulation Summary.    Riky BARBER Moon to continue their warfarin regimen- see above in Anticoagulation Summary.  2. Follow up in 1 week  3. Pt has agreed to only be called if INR out of  range. They have been instructed to call if any changes in medications, doses, concerns, etc. Patient expresses understanding and has no further questions at this time.    Yu Rader, PharmD

## 2024-09-10 LAB — INR PPP: 2.4

## 2024-09-11 ENCOUNTER — ANTICOAGULATION VISIT (OUTPATIENT)
Dept: PHARMACY | Facility: HOSPITAL | Age: 84
End: 2024-09-11
Payer: MEDICARE

## 2024-09-11 DIAGNOSIS — I48.92 ATRIAL FLUTTER WITH CONTROLLED RESPONSE: Primary | ICD-10-CM

## 2024-09-11 PROCEDURE — G0249 PROVIDE INR TEST MATER/EQUIP: HCPCS

## 2024-09-11 NOTE — PROGRESS NOTES
Anticoagulation Clinic Progress Note    Anticoagulation Summary  As of 2024      INR goal:  2.0-3.0   TTR:  49.5% (2.2 y)   INR used for dosin.40 (9/10/2024)   Warfarin maintenance plan:  5 mg every Mon, Sat; 10 mg all other days   Weekly warfarin total:  60 mg   No change documented:  Olga Granados RPH   Plan last modified:  Olga Granados RPH (2024)   Next INR check:  2024   Priority:  Maintenance   Target end date:      Indications    Atrial flutter with controlled response [I48.92]                 Anticoagulation Episode Summary       INR check location:  Home Draw    Preferred lab:      Send INR reminders to:   [facility]    Comments:  [facility] home stephania          Anticoagulation Care Providers       Provider Role Specialty Phone number    Ty Shelton MD Referring Cardiology [phone]            Clinic Interview:  No pertinent clinical findings have been reported.    INR History:      2024     1:25 PM 2024    12:00 AM 2024     1:10 PM 2024    12:00 AM 2024     3:13 PM 9/10/2024    12:00 AM 2024     8:30 AM   Anticoagulation Monitoring   INR 2.50  2.50  2.00  2.40   INR Date 2024  2024  2024  9/10/2024   INR Goal 2.0-3.0  2.0-3.0  2.0-3.0  2.0-3.0   Trend Same  Same  Same  Same   Last Week Total 60 mg  60 mg  60 mg  60 mg   Next Week Total 60 mg  60 mg  60 mg  60 mg   Sun 10 mg  10 mg  10 mg  10 mg   Mon 5 mg  5 mg  5 mg  5 mg   Tue 10 mg  10 mg  10 mg  -   Wed 10 mg  10 mg  10 mg  10 mg   Thu 10 mg  10 mg  10 mg  10 mg   Fri 10 mg  10 mg  10 mg  10 mg   Sat 5 mg  5 mg  5 mg  5 mg   Historical INR  2.50      2.00      2.40            This result is from an external source.       Plan:  1. INR is therapeutic today- see above in Anticoagulation Summary.    Riky BARBER Omon to continue their warfarin regimen- see above in Anticoagulation Summary.  2. Follow up in 1 week  3. Pt has agreed to only be called if INR out of range.  They have been instructed to call if any changes in medications, doses, concerns, etc. Patient expresses understanding and has no further questions at this time.    Olga Granados, [facility]

## 2024-09-19 ENCOUNTER — ANTICOAGULATION VISIT (OUTPATIENT)
Dept: PHARMACY | Facility: HOSPITAL | Age: 84
End: 2024-09-19
Payer: MEDICARE

## 2024-09-19 DIAGNOSIS — I48.92 ATRIAL FLUTTER WITH CONTROLLED RESPONSE: Primary | ICD-10-CM

## 2024-09-19 LAB — INR PPP: 2.1

## 2024-09-24 ENCOUNTER — ANTICOAGULATION VISIT (OUTPATIENT)
Dept: PHARMACY | Facility: HOSPITAL | Age: 84
End: 2024-09-24
Payer: MEDICARE

## 2024-09-24 DIAGNOSIS — I48.92 ATRIAL FLUTTER WITH CONTROLLED RESPONSE: Primary | ICD-10-CM

## 2024-09-24 LAB — INR PPP: 2.3

## 2024-10-04 ENCOUNTER — ANTICOAGULATION VISIT (OUTPATIENT)
Dept: PHARMACY | Facility: HOSPITAL | Age: 84
End: 2024-10-04
Payer: MEDICARE

## 2024-10-04 DIAGNOSIS — I48.92 ATRIAL FLUTTER WITH CONTROLLED RESPONSE: Primary | ICD-10-CM

## 2024-10-04 LAB — INR PPP: 2.6

## 2024-10-04 NOTE — PROGRESS NOTES
Anticoagulation Clinic Progress Note    Anticoagulation Summary  As of 10/4/2024      INR goal:  2.0-3.0   TTR:  51.0% (2.3 y)   INR used for dosin.60 (10/4/2024)   Warfarin maintenance plan:  5 mg every Mon, Sat; 10 mg all other days   Weekly warfarin total:  60 mg   No change documented:  Olga Granados RPH   Plan last modified:  Olga Granados RPH (2024)   Next INR check:  10/11/2024   Priority:  Maintenance   Target end date:      Indications    Atrial flutter with controlled response [I48.92]                 Anticoagulation Episode Summary       INR check location:  Home Draw    Preferred lab:      Send INR reminders to:   [facility]    Comments:  [facility] home stephania          Anticoagulation Care Providers       Provider Role Specialty Phone number    Ty Shelton MD Referring Cardiology [phone]            Clinic Interview:  No pertinent clinical findings have been reported.    INR History:      2024     8:30 AM 2024    12:00 AM 2024     1:29 PM 2024    12:00 AM 2024     2:50 PM 10/4/2024    12:00 AM 10/4/2024    11:25 AM   Anticoagulation Monitoring   INR 2.40  2.10  2.30  2.60   INR Date 9/10/2024  2024  2024  10/4/2024   INR Goal 2.0-3.0  2.0-3.0  2.0-3.0  2.0-3.0   Trend Same  Same  Same  Same   Last Week Total 60 mg  60 mg  60 mg  60 mg   Next Week Total 60 mg  60 mg  60 mg  60 mg   Sun 10 mg  10 mg  10 mg  10 mg   Mon 5 mg  5 mg  5 mg  5 mg   Tue -  10 mg  10 mg  10 mg   Wed 10 mg  10 mg  10 mg  10 mg   Thu 10 mg  10 mg  10 mg  10 mg   Fri 10 mg  10 mg  10 mg  10 mg   Sat 5 mg  5 mg  5 mg  5 mg   Historical INR  2.10      2.30      2.60            This result is from an external source.       Plan:  1. INR is therapeutic today- see above in Anticoagulation Summary.    Riky BARBER Moon to continue their warfarin regimen- see above in Anticoagulation Summary.  2. Follow up in 1 week  3. Pt has agreed to only be called if INR out of  range. They have been instructed to call if any changes in medications, doses, concerns, etc. Patient expresses understanding and has no further questions at this time.    Olga Granados, [facility]

## 2024-10-14 ENCOUNTER — ANTICOAGULATION VISIT (OUTPATIENT)
Dept: PHARMACY | Facility: HOSPITAL | Age: 84
End: 2024-10-14
Payer: MEDICARE

## 2024-10-14 DIAGNOSIS — I48.92 ATRIAL FLUTTER WITH CONTROLLED RESPONSE: Primary | ICD-10-CM

## 2024-10-14 LAB — INR PPP: 1.9

## 2024-10-14 PROCEDURE — G0249 PROVIDE INR TEST MATER/EQUIP: HCPCS

## 2024-10-14 NOTE — PROGRESS NOTES
Anticoagulation Clinic Progress Note    Anticoagulation Summary  As of 10/14/2024      INR goal:  2.0-3.0   TTR:  51.4% (2.3 y)   INR used for dosin.90 (10/14/2024)   Warfarin maintenance plan:  5 mg every Mon, Sat; 10 mg all other days   Weekly warfarin total:  60 mg   Plan last modified:  Olga Granados RPH (2024)   Next INR check:  10/21/2024   Priority:  Maintenance   Target end date:  --    Indications    Atrial flutter with controlled response [I48.92]                 Anticoagulation Episode Summary       INR check location:  Home Draw    Preferred lab:  --    Send INR reminders to:  [facility]    Comments:  [facility] home stephania          Anticoagulation Care Providers       Provider Role Specialty Phone number    Ty Shelton MD Referring Cardiology [phone]            Drug interactions: has remained unchanged.  Diet: has remained unchanged.    Clinic Interview:  No pertinent clinical findings have been reported.    INR History:      2024     1:29 PM 2024    12:00 AM 2024     2:50 PM 10/4/2024    12:00 AM 10/4/2024    11:25 AM 10/14/2024    12:00 AM 10/14/2024     3:30 PM   Anticoagulation Monitoring   INR 2.10  2.30  2.60  1.90   INR Date 2024  2024  10/4/2024  10/14/2024   INR Goal 2.0-3.0  2.0-3.0  2.0-3.0  2.0-3.0   Trend Same  Same  Same  Same   Last Week Total 60 mg  60 mg  60 mg  60 mg   Next Week Total 60 mg  60 mg  60 mg  62.5 mg   Sun 10 mg  10 mg  10 mg  10 mg   Mon 5 mg  5 mg  5 mg  7.5 mg (10/14)   Tue 10 mg  10 mg  10 mg  10 mg   Wed 10 mg  10 mg  10 mg  10 mg   Thu 10 mg  10 mg  10 mg  10 mg   Fri 10 mg  10 mg  10 mg  10 mg   Sat 5 mg  5 mg  5 mg  5 mg   Historical INR  2.30      2.60      1.90            This result is from an external source.       Plan:  1. INR is Subtherapeutic today- see above in Anticoagulation Summary.    Pt has no reason for low INR. Instructed pt to boost today with 7.5mg , then cont same, - see above in  Anticoagulation Summary.  2. Follow up in 1 week  3. Pt has agreed to only be called if INR out of range. They have been instructed to call if any changes in medications, doses, concerns, etc. Patient expresses understanding and has no further questions at this time.    Kiara Huitron, [facility]

## 2024-10-22 ENCOUNTER — APPOINTMENT (OUTPATIENT)
Dept: GENERAL RADIOLOGY | Facility: HOSPITAL | Age: 84
End: 2024-10-22
Payer: MEDICARE

## 2024-10-22 ENCOUNTER — HOSPITAL ENCOUNTER (INPATIENT)
Facility: HOSPITAL | Age: 84
LOS: 7 days | Discharge: HOME | End: 2024-10-30
Attending: EMERGENCY MEDICINE | Admitting: INTERNAL MEDICINE
Payer: MEDICARE

## 2024-10-22 DIAGNOSIS — R13.19 ESOPHAGEAL DYSPHAGIA: ICD-10-CM

## 2024-10-22 DIAGNOSIS — N39.0 ACUTE UTI: Primary | ICD-10-CM

## 2024-10-22 DIAGNOSIS — Z79.01 CHRONIC ANTICOAGULATION: ICD-10-CM

## 2024-10-22 DIAGNOSIS — R13.12 OROPHARYNGEAL DYSPHAGIA: ICD-10-CM

## 2024-10-22 DIAGNOSIS — I50.9 ACUTE CONGESTIVE HEART FAILURE, UNSPECIFIED HEART FAILURE TYPE: ICD-10-CM

## 2024-10-22 DIAGNOSIS — R06.02 SHORTNESS OF BREATH: ICD-10-CM

## 2024-10-22 DIAGNOSIS — R53.1 WEAKNESS GENERALIZED: ICD-10-CM

## 2024-10-22 DIAGNOSIS — J44.1 COPD WITH ACUTE EXACERBATION: ICD-10-CM

## 2024-10-22 DIAGNOSIS — R79.89 ELEVATED TROPONIN: ICD-10-CM

## 2024-10-22 LAB
ALBUMIN SERPL-MCNC: 3.8 G/DL (ref 3.5–5.2)
ALBUMIN/GLOB SERPL: 1.5 G/DL
ALP SERPL-CCNC: 55 U/L (ref 39–117)
ALT SERPL W P-5'-P-CCNC: 14 U/L (ref 1–41)
ANION GAP SERPL CALCULATED.3IONS-SCNC: 8.1 MMOL/L (ref 5–15)
AST SERPL-CCNC: 20 U/L (ref 1–40)
BACTERIA UR QL AUTO: NORMAL /HPF
BASOPHILS # BLD MANUAL: 0.18 10*3/MM3 (ref 0–0.2)
BASOPHILS NFR BLD MANUAL: 1 % (ref 0–1.5)
BILIRUB SERPL-MCNC: 1.5 MG/DL (ref 0–1.2)
BILIRUB UR QL STRIP: ABNORMAL
BUN SERPL-MCNC: 28 MG/DL (ref 8–23)
BUN/CREAT SERPL: 23 (ref 7–25)
CALCIUM SPEC-SCNC: 9 MG/DL (ref 8.6–10.5)
CHLORIDE SERPL-SCNC: 102 MMOL/L (ref 98–107)
CLARITY UR: CLEAR
CO2 SERPL-SCNC: 25.9 MMOL/L (ref 22–29)
COLOR UR: ABNORMAL
CREAT SERPL-MCNC: 1.22 MG/DL (ref 0.76–1.27)
DEPRECATED RDW RBC AUTO: 73.7 FL (ref 37–54)
EGFRCR SERPLBLD CKD-EPI 2021: 58.1 ML/MIN/1.73
EOSINOPHIL # BLD MANUAL: 0.18 10*3/MM3 (ref 0–0.4)
EOSINOPHIL NFR BLD MANUAL: 1 % (ref 0.3–6.2)
ERYTHROCYTE [DISTWIDTH] IN BLOOD BY AUTOMATED COUNT: 17.2 % (ref 12.3–15.4)
FLUAV SUBTYP SPEC NAA+PROBE: NOT DETECTED
FLUBV RNA ISLT QL NAA+PROBE: NOT DETECTED
GLOBULIN UR ELPH-MCNC: 2.5 GM/DL
GLUCOSE BLDC GLUCOMTR-MCNC: 190 MG/DL (ref 70–130)
GLUCOSE SERPL-MCNC: 137 MG/DL (ref 65–99)
GLUCOSE UR STRIP-MCNC: ABNORMAL MG/DL
HCT VFR BLD AUTO: 35.9 % (ref 37.5–51)
HGB BLD-MCNC: 12.1 G/DL (ref 13–17.7)
HGB UR QL STRIP.AUTO: NEGATIVE
HOLD SPECIMEN: NORMAL
HYALINE CASTS UR QL AUTO: NORMAL /LPF
INR PPP: 2 (ref 0.9–1.1)
INR PPP: 2.2
KETONES UR QL STRIP: ABNORMAL
LEUKOCYTE ESTERASE UR QL STRIP.AUTO: NEGATIVE
LYMPHOCYTES # BLD MANUAL: 0.91 10*3/MM3 (ref 0.7–3.1)
LYMPHOCYTES NFR BLD MANUAL: 4 % (ref 5–12)
MACROCYTES BLD QL SMEAR: ABNORMAL
MCH RBC QN AUTO: 38.9 PG (ref 26.6–33)
MCHC RBC AUTO-ENTMCNC: 33.7 G/DL (ref 31.5–35.7)
MCV RBC AUTO: 115.4 FL (ref 79–97)
MONOCYTES # BLD: 0.72 10*3/MM3 (ref 0.1–0.9)
NEUTROPHILS # BLD AUTO: 16.13 10*3/MM3 (ref 1.7–7)
NEUTROPHILS NFR BLD MANUAL: 89 % (ref 42.7–76)
NITRITE UR QL STRIP: NEGATIVE
NT-PROBNP SERPL-MCNC: 3083 PG/ML (ref 0–1800)
PH UR STRIP.AUTO: 5.5 [PH] (ref 5–8)
PLAT MORPH BLD: NORMAL
PLATELET # BLD AUTO: 166 10*3/MM3 (ref 140–450)
PMV BLD AUTO: 11.8 FL (ref 6–12)
POLYCHROMASIA BLD QL SMEAR: ABNORMAL
POTASSIUM SERPL-SCNC: 4.1 MMOL/L (ref 3.5–5.2)
PROT SERPL-MCNC: 6.3 G/DL (ref 6–8.5)
PROT UR QL STRIP: ABNORMAL
PROTHROMBIN TIME: 22.5 SECONDS
PROTHROMBIN TIME: 22.5 SECONDS (ref 11–15)
QT INTERVAL: 557 MS
QTC INTERVAL: 614 MS
RBC # BLD AUTO: 3.11 10*6/MM3 (ref 4.14–5.8)
RBC # UR STRIP: NORMAL /HPF
REF LAB TEST METHOD: NORMAL
SARS-COV-2 RNA RESP QL NAA+PROBE: NOT DETECTED
SODIUM SERPL-SCNC: 136 MMOL/L (ref 136–145)
SP GR UR STRIP: 1.01 (ref 1–1.03)
SQUAMOUS #/AREA URNS HPF: NORMAL /HPF
TROPONIN T SERPL HS-MCNC: 40 NG/L
TROPONIN T SERPL HS-MCNC: 42 NG/L
TROPONIN T SERPL HS-MCNC: 46 NG/L
UROBILINOGEN UR QL STRIP: ABNORMAL
VARIANT LYMPHS NFR BLD MANUAL: 5 % (ref 19.6–45.3)
WBC # UR STRIP: NORMAL /HPF
WBC MORPH BLD: NORMAL
WBC NRBC COR # BLD AUTO: 17.81 10*3/MM3 (ref 3.4–10.8)

## 2024-10-22 PROCEDURE — 93005 ELECTROCARDIOGRAM TRACING: CPT | Performed by: NURSE PRACTITIONER

## 2024-10-22 PROCEDURE — 80053 COMPREHEN METABOLIC PANEL: CPT | Performed by: NURSE PRACTITIONER

## 2024-10-22 PROCEDURE — G0378 HOSPITAL OBSERVATION PER HR: HCPCS

## 2024-10-22 PROCEDURE — 81001 URINALYSIS AUTO W/SCOPE: CPT | Performed by: EMERGENCY MEDICINE

## 2024-10-22 PROCEDURE — 99285 EMERGENCY DEPT VISIT HI MDM: CPT

## 2024-10-22 PROCEDURE — 25010000002 CEFTRIAXONE PER 250 MG: Performed by: NURSE PRACTITIONER

## 2024-10-22 PROCEDURE — 99284 EMERGENCY DEPT VISIT MOD MDM: CPT | Performed by: NURSE PRACTITIONER

## 2024-10-22 PROCEDURE — 85025 COMPLETE CBC W/AUTO DIFF WBC: CPT | Performed by: NURSE PRACTITIONER

## 2024-10-22 PROCEDURE — 36415 COLL VENOUS BLD VENIPUNCTURE: CPT

## 2024-10-22 PROCEDURE — 84484 ASSAY OF TROPONIN QUANT: CPT | Performed by: NURSE PRACTITIONER

## 2024-10-22 PROCEDURE — 85610 PROTHROMBIN TIME: CPT

## 2024-10-22 PROCEDURE — 93010 ELECTROCARDIOGRAM REPORT: CPT | Performed by: INTERNAL MEDICINE

## 2024-10-22 PROCEDURE — 85007 BL SMEAR W/DIFF WBC COUNT: CPT | Performed by: NURSE PRACTITIONER

## 2024-10-22 PROCEDURE — 25010000002 FUROSEMIDE PER 20 MG: Performed by: NURSE PRACTITIONER

## 2024-10-22 PROCEDURE — 87636 SARSCOV2 & INF A&B AMP PRB: CPT | Performed by: EMERGENCY MEDICINE

## 2024-10-22 PROCEDURE — 71046 X-RAY EXAM CHEST 2 VIEWS: CPT

## 2024-10-22 PROCEDURE — 82948 REAGENT STRIP/BLOOD GLUCOSE: CPT

## 2024-10-22 PROCEDURE — 25010000002 METHYLPREDNISOLONE PER 125 MG: Performed by: NURSE PRACTITIONER

## 2024-10-22 PROCEDURE — 83880 ASSAY OF NATRIURETIC PEPTIDE: CPT | Performed by: NURSE PRACTITIONER

## 2024-10-22 RX ORDER — OXYBUTYNIN CHLORIDE 5 MG/1
5 TABLET ORAL EVERY 12 HOURS SCHEDULED
Status: DISCONTINUED | OUTPATIENT
Start: 2024-10-23 | End: 2024-10-30 | Stop reason: HOSPADM

## 2024-10-22 RX ORDER — ONDANSETRON 2 MG/ML
4 INJECTION INTRAMUSCULAR; INTRAVENOUS EVERY 6 HOURS PRN
Status: DISCONTINUED | OUTPATIENT
Start: 2024-10-22 | End: 2024-10-30 | Stop reason: HOSPADM

## 2024-10-22 RX ORDER — DULOXETIN HYDROCHLORIDE 60 MG/1
60 CAPSULE, DELAYED RELEASE ORAL EVERY MORNING
Status: DISCONTINUED | OUTPATIENT
Start: 2024-10-23 | End: 2024-10-30 | Stop reason: HOSPADM

## 2024-10-22 RX ORDER — AMLODIPINE BESYLATE 5 MG/1
5 TABLET ORAL DAILY
Status: DISCONTINUED | OUTPATIENT
Start: 2024-10-23 | End: 2024-10-24

## 2024-10-22 RX ORDER — IBUPROFEN 600 MG/1
1 TABLET ORAL
Status: DISCONTINUED | OUTPATIENT
Start: 2024-10-22 | End: 2024-10-30 | Stop reason: HOSPADM

## 2024-10-22 RX ORDER — ATENOLOL 25 MG/1
12.5 TABLET ORAL DAILY
Status: DISCONTINUED | OUTPATIENT
Start: 2024-10-23 | End: 2024-10-30 | Stop reason: HOSPADM

## 2024-10-22 RX ORDER — WARFARIN SODIUM 7.5 MG/1
7.5 TABLET ORAL
Status: COMPLETED | OUTPATIENT
Start: 2024-10-23 | End: 2024-10-23

## 2024-10-22 RX ORDER — IPRATROPIUM BROMIDE AND ALBUTEROL SULFATE 2.5; .5 MG/3ML; MG/3ML
3 SOLUTION RESPIRATORY (INHALATION) ONCE
Status: COMPLETED | OUTPATIENT
Start: 2024-10-22 | End: 2024-10-22

## 2024-10-22 RX ORDER — DIPHENOXYLATE HYDROCHLORIDE AND ATROPINE SULFATE 2.5; .025 MG/1; MG/1
1 TABLET ORAL EVERY MORNING
Status: DISCONTINUED | OUTPATIENT
Start: 2024-10-23 | End: 2024-10-30 | Stop reason: HOSPADM

## 2024-10-22 RX ORDER — SODIUM CHLORIDE 0.9 % (FLUSH) 0.9 %
10 SYRINGE (ML) INJECTION AS NEEDED
Status: DISCONTINUED | OUTPATIENT
Start: 2024-10-22 | End: 2024-10-30 | Stop reason: HOSPADM

## 2024-10-22 RX ORDER — ACETAMINOPHEN 160 MG/5ML
650 SOLUTION ORAL EVERY 4 HOURS PRN
Status: DISCONTINUED | OUTPATIENT
Start: 2024-10-22 | End: 2024-10-30 | Stop reason: HOSPADM

## 2024-10-22 RX ORDER — ACETAMINOPHEN 650 MG/1
650 SUPPOSITORY RECTAL EVERY 4 HOURS PRN
Status: DISCONTINUED | OUTPATIENT
Start: 2024-10-22 | End: 2024-10-30 | Stop reason: HOSPADM

## 2024-10-22 RX ORDER — WARFARIN SODIUM 5 MG/1
5 TABLET ORAL
Status: DISCONTINUED | OUTPATIENT
Start: 2024-10-23 | End: 2024-10-22 | Stop reason: DRUGHIGH

## 2024-10-22 RX ORDER — LEVOTHYROXINE SODIUM 125 UG/1
125 TABLET ORAL DAILY
Status: DISCONTINUED | OUTPATIENT
Start: 2024-10-23 | End: 2024-10-30 | Stop reason: HOSPADM

## 2024-10-22 RX ORDER — INSULIN LISPRO 100 [IU]/ML
2-7 INJECTION, SOLUTION INTRAVENOUS; SUBCUTANEOUS
Status: DISCONTINUED | OUTPATIENT
Start: 2024-10-23 | End: 2024-10-30 | Stop reason: HOSPADM

## 2024-10-22 RX ORDER — SODIUM CHLORIDE 0.9 % (FLUSH) 0.9 %
10 SYRINGE (ML) INJECTION EVERY 12 HOURS SCHEDULED
Status: DISCONTINUED | OUTPATIENT
Start: 2024-10-23 | End: 2024-10-30 | Stop reason: HOSPADM

## 2024-10-22 RX ORDER — DULOXETIN HYDROCHLORIDE 30 MG/1
30 CAPSULE, DELAYED RELEASE ORAL DAILY
Status: DISCONTINUED | OUTPATIENT
Start: 2024-10-23 | End: 2024-10-23

## 2024-10-22 RX ORDER — ACETAMINOPHEN 325 MG/1
650 TABLET ORAL EVERY 4 HOURS PRN
Status: DISCONTINUED | OUTPATIENT
Start: 2024-10-22 | End: 2024-10-30 | Stop reason: HOSPADM

## 2024-10-22 RX ORDER — IPRATROPIUM BROMIDE AND ALBUTEROL SULFATE 2.5; .5 MG/3ML; MG/3ML
3 SOLUTION RESPIRATORY (INHALATION) EVERY 4 HOURS PRN
Status: DISCONTINUED | OUTPATIENT
Start: 2024-10-22 | End: 2024-10-30 | Stop reason: HOSPADM

## 2024-10-22 RX ORDER — PREGABALIN 75 MG/1
150 CAPSULE ORAL EVERY 12 HOURS SCHEDULED
Status: DISCONTINUED | OUTPATIENT
Start: 2024-10-23 | End: 2024-10-30 | Stop reason: HOSPADM

## 2024-10-22 RX ORDER — CHOLECALCIFEROL (VITAMIN D3) 25 MCG
1000 TABLET ORAL EVERY MORNING
Status: DISCONTINUED | OUTPATIENT
Start: 2024-10-23 | End: 2024-10-30 | Stop reason: HOSPADM

## 2024-10-22 RX ORDER — UREA 10 %
500 LOTION (ML) TOPICAL EVERY MORNING
Status: DISCONTINUED | OUTPATIENT
Start: 2024-10-23 | End: 2024-10-30 | Stop reason: HOSPADM

## 2024-10-22 RX ORDER — LOSARTAN POTASSIUM 100 MG/1
100 TABLET ORAL DAILY
Status: DISCONTINUED | OUTPATIENT
Start: 2024-10-23 | End: 2024-10-29

## 2024-10-22 RX ORDER — NITROGLYCERIN 0.4 MG/1
0.4 TABLET SUBLINGUAL
Status: DISCONTINUED | OUTPATIENT
Start: 2024-10-22 | End: 2024-10-30 | Stop reason: HOSPADM

## 2024-10-22 RX ORDER — FUROSEMIDE 10 MG/ML
80 INJECTION INTRAMUSCULAR; INTRAVENOUS ONCE
Status: COMPLETED | OUTPATIENT
Start: 2024-10-22 | End: 2024-10-22

## 2024-10-22 RX ORDER — FUROSEMIDE 10 MG/ML
40 INJECTION INTRAMUSCULAR; INTRAVENOUS
Status: DISCONTINUED | OUTPATIENT
Start: 2024-10-23 | End: 2024-10-23

## 2024-10-22 RX ORDER — ACETAMINOPHEN 500 MG
1000 TABLET ORAL ONCE
Status: COMPLETED | OUTPATIENT
Start: 2024-10-22 | End: 2024-10-22

## 2024-10-22 RX ORDER — DEXTROSE MONOHYDRATE 25 G/50ML
25 INJECTION, SOLUTION INTRAVENOUS
Status: DISCONTINUED | OUTPATIENT
Start: 2024-10-22 | End: 2024-10-30 | Stop reason: HOSPADM

## 2024-10-22 RX ORDER — NICOTINE POLACRILEX 4 MG
15 LOZENGE BUCCAL
Status: DISCONTINUED | OUTPATIENT
Start: 2024-10-22 | End: 2024-10-30 | Stop reason: HOSPADM

## 2024-10-22 RX ORDER — ASCORBIC ACID 500 MG
500 TABLET ORAL DAILY
Status: DISCONTINUED | OUTPATIENT
Start: 2024-10-23 | End: 2024-10-30 | Stop reason: HOSPADM

## 2024-10-22 RX ORDER — LEVOTHYROXINE SODIUM 125 UG/1
125 TABLET ORAL DAILY
Status: DISCONTINUED | OUTPATIENT
Start: 2024-10-23 | End: 2024-10-22 | Stop reason: SDUPTHER

## 2024-10-22 RX ORDER — METHYLPREDNISOLONE SODIUM SUCCINATE 125 MG/2ML
60 INJECTION, POWDER, LYOPHILIZED, FOR SOLUTION INTRAMUSCULAR; INTRAVENOUS EVERY 8 HOURS
Status: DISCONTINUED | OUTPATIENT
Start: 2024-10-23 | End: 2024-10-24

## 2024-10-22 RX ORDER — PRAVASTATIN SODIUM 40 MG
40 TABLET ORAL NIGHTLY
Status: DISCONTINUED | OUTPATIENT
Start: 2024-10-23 | End: 2024-10-30 | Stop reason: HOSPADM

## 2024-10-22 RX ORDER — METHYLPREDNISOLONE SODIUM SUCCINATE 125 MG/2ML
125 INJECTION, POWDER, LYOPHILIZED, FOR SOLUTION INTRAMUSCULAR; INTRAVENOUS ONCE
Status: COMPLETED | OUTPATIENT
Start: 2024-10-22 | End: 2024-10-22

## 2024-10-22 RX ADMIN — IPRATROPIUM BROMIDE AND ALBUTEROL SULFATE 3 ML: .5; 3 SOLUTION RESPIRATORY (INHALATION) at 18:23

## 2024-10-22 RX ADMIN — IPRATROPIUM BROMIDE AND ALBUTEROL SULFATE 3 ML: .5; 3 SOLUTION RESPIRATORY (INHALATION) at 15:10

## 2024-10-22 RX ADMIN — CEFTRIAXONE SODIUM 1000 MG: 1 INJECTION, POWDER, FOR SOLUTION INTRAMUSCULAR; INTRAVENOUS at 16:06

## 2024-10-22 RX ADMIN — ACETAMINOPHEN 1000 MG: 500 TABLET ORAL at 19:20

## 2024-10-22 RX ADMIN — FUROSEMIDE 80 MG: 10 INJECTION, SOLUTION INTRAMUSCULAR; INTRAVENOUS at 16:44

## 2024-10-22 RX ADMIN — METHYLPREDNISOLONE SODIUM SUCCINATE 125 MG: 125 INJECTION INTRAMUSCULAR; INTRAVENOUS at 15:18

## 2024-10-22 NOTE — ED NOTES
"\"Nursing report ED to floor  Riky Moon  84 y.o.  male    HPI :  HPI  Stated Reason for Visit: CONGESTION AND WEAKNESS X FEW DAYS. URINARY FREQUENCY X FEW DAYS.    Chief Complaint  Chief Complaint   Patient presents with    URI    Urinary Frequency       Admitting doctor:   Mainor Pope MD    Admitting diagnosis:   The primary encounter diagnosis was Acute UTI. Diagnoses of COPD with acute exacerbation, Weakness generalized, Shortness of breath, Acute congestive heart failure, unspecified heart failure type, and Elevated troponin were also pertinent to this visit.    Code status:   Current Code Status       Date Active Code Status Order ID Comments User Context       Prior            Allergies:   Lisinopril    Isolation:   No active isolations    Intake and Output  No intake or output data in the 24 hours ending 10/22/24 1704    Weight:       10/22/24  1337   Weight: (!) 138 kg (305 lb)       Most recent vitals:   Vitals:    10/22/24 1600 10/22/24 1625 10/22/24 1630 10/22/24 1643   BP: 128/68  116/54    Pulse: 67 82 66 76   Resp:       Temp:       SpO2: 94% 93% 93% 94%   Weight:       Height:           Active LDAs/IV Access:   Lines, Drains & Airways       Active LDAs       Name Placement date Placement time Site Days    Peripheral IV 10/22/24 1517 Left Antecubital 10/22/24  1517  Antecubital  less than 1                    Labs (abnormal labs have a star):   Labs Reviewed   URINALYSIS W/ CULTURE IF INDICATED - Abnormal; Notable for the following components:       Result Value    Color, UA Dark Yellow (*)     Glucose, UA >=1000 mg/dL (3+) (*)     Ketones, UA 15 mg/dL (1+) (*)     Bilirubin, UA Small (1+) (*)     Protein, UA 30 mg/dL (1+) (*)     All other components within normal limits    Narrative:     In absence of clinical symptoms, the presence of pyuria, bacteria, and/or nitrites on the urinalysis result does not correlate with infection.   COMPREHENSIVE METABOLIC PANEL - Abnormal; Notable for the following " components:    Glucose 137 (*)     BUN 28 (*)     Total Bilirubin 1.5 (*)     eGFR 58.1 (*)     All other components within normal limits    Narrative:     GFR Normal >60  Chronic Kidney Disease <60  Kidney Failure <15    The GFR formula is only valid for adults with stable renal function between ages 18 and 70.   BNP (IN-HOUSE) - Abnormal; Notable for the following components:    proBNP 3,083.0 (*)     All other components within normal limits    Narrative:     This assay is used as an aid in the diagnosis of individuals suspected of having heart failure. It can be used as an aid in the diagnosis of acute decompensated heart failure (ADHF) in patients presenting with signs and symptoms of ADHF to the emergency department (ED). In addition, NT-proBNP of <300 pg/mL indicates ADHF is not likely.    Age Range Result Interpretation  NT-proBNP Concentration (pg/mL:      <50             Positive            >450                   Gray                 300-450                    Negative             <300    50-75           Positive            >900                  Gray                300-900                  Negative            <300      >75             Positive            >1800                  Gray                300-1800                  Negative            <300   SINGLE HS TROPONIN T - Abnormal; Notable for the following components:    HS Troponin T 46 (*)     All other components within normal limits    Narrative:     High Sensitive Troponin T Reference Range:  <14.0 ng/L- Negative Female for AMI  <22.0 ng/L- Negative Male for AMI  >=14 - Abnormal Female indicating possible myocardial injury.  >=22 - Abnormal Male indicating possible myocardial injury.   Clinicians would have to utilize clinical acumen, EKG, Troponin, and serial changes to determine if it is an Acute Myocardial Infarction or myocardial injury due to an underlying chronic condition.        CBC WITH AUTO DIFFERENTIAL - Abnormal; Notable for the following  components:    WBC 17.81 (*)     RBC 3.11 (*)     Hemoglobin 12.1 (*)     Hematocrit 35.9 (*)     .4 (*)     MCH 38.9 (*)     RDW 17.2 (*)     RDW-SD 73.7 (*)     All other components within normal limits   LAB PROTIME-INR, FINGERSTICK - Abnormal; Notable for the following components:    Protime 22.5 (*)     All other components within normal limits   POCT PROTIME - INR - Abnormal; Notable for the following components:    INR 2 (*)     All other components within normal limits   COVID-19 AND FLU A/B, NP SWAB IN TRANSPORT MEDIA 1 HR TAT - Normal    Narrative:     Fact sheet for providers: https://www.fda.gov/media/915327/download    Fact sheet for patients: https://www.fda.gov/media/246579/download    Test performed by PCR.   URINALYSIS, MICROSCOPIC ONLY   RAINBOW URINE CULTURE TUBE   MANUAL DIFFERENTIAL   SINGLE HS TROPONIN T   CBC AND DIFFERENTIAL    Narrative:     The following orders were created for panel order CBC & Differential.  Procedure                               Abnormality         Status                     ---------                               -----------         ------                     CBC Auto Differential[[id number]]        Abnormal            Final result                 Please view results for these tests on the individual orders.       EKG:   ECG 12 Lead Dyspnea   Final Result   HEART RATE=73  bpm   RR Fngpoohe=265  ms   UT Interval=  ms   P Horizontal Axis=  deg   P Front Axis=  deg   QRSD Ewhrryyi=520  ms   QT Xksiuhrf=857  ms   DGpZ=188  ms   QRS Axis=-17  deg   T Wave Axis=51  deg   - ABNORMAL ECG -   Poor quality tracing, repeat.   Electronically Signed By: Jovanny Myers) ([facility]) 2024-10-22 15:50:49   Date and Time of Study:2024-10-22 15:08:32          Meds given in ED:   Medications   sodium chloride 0.9 % flush 10 mL (has no administration in time range)   ipratropium-albuterol (DUO-NEB) nebulizer solution 3 mL (3 mL Nebulization Given 10/22/24 1510)   methylPREDNISolone  sodium succinate (SOLU-Medrol) injection 125 mg (125 mg Intravenous Given 10/22/24 1518)   cefTRIAXone (ROCEPHIN) 1,000 mg in sodium chloride 0.9 % 100 mL MBP (1,000 mg Intravenous New Bag 10/22/24 1606)   furosemide (LASIX) injection 80 mg (80 mg Intravenous Given 10/22/24 1644)       Imaging results:  XR Chest 2 View    Result Date: 10/22/2024  No focal pulmonary consolidation. Follow-up as clinical indications persist.  This report was finalized on 10/22/2024 2:35 PM by Dr. Dipak Dc M.D on Workstation: [employer]       Ambulatory status:   - assistive device    Social issues:   Social History     Socioeconomic History    Marital status:      Spouse name: Chitra    Years of education: high school   Tobacco Use    Smoking status: Former     Current packs/day: 0.00     Average packs/day: 1.5 packs/day for 40.0 years (60.0 ttl pk-yrs)     Types: Cigarettes     Start date:      Quit date:      Years since quittin.8    Smokeless tobacco: Never    Tobacco comments:     from age [age]   Vaping Use    Vaping status: Never Used   Substance and Sexual Activity    Alcohol use: Yes     Comment: HOLIDAYS  caffeine -2 cups coffee daily    Drug use: No     Comment: PRN use for pain    Sexual activity: Defer       Peripheral Neurovascular  Peripheral Neurovascular (Adult)  Peripheral Neurovascular WDL: .WDL except  Additional Documentation: Edema (Group)  Edema  Edema: leg, right, leg, left  Leg, Left Edema: 1+ (Trace)  Leg, Right Edema: 1+ (Trace)    Neuro Cognitive  Neuro Cognitive (Adult)  Cognitive/Neuro/Behavioral WDL: WDL    Learning  Learning Assessment  Learning Readiness and Ability: no barriers identified    Respiratory  Respiratory  Airway WDL: WDL  Additional Documentation: Breath Sounds (Group)  Respiratory WDL  Respiratory WDL: .WDL except, rhythm/pattern  Rhythm/Pattern, Respiratory: shortness of breath  Breath Sounds  All Lung Fields Breath Sounds: All Fields  All Lung Fields Breath  Sounds: wheezes, expiratory    Abdominal Pain       Pain Assessments  Pain (Adult)  (0-10) Pain Rating: Rest: 0    NIH Stroke Scale       Radha Fox RN  10/22/24 17:04 EDT

## 2024-10-22 NOTE — ED NOTES
Pt continues to be diaphoretic, afebrile. Pt denies SOA, weakness or CP at this time. Sarika BARNARD at bedside to reevaulate pt. Orders placed.

## 2024-10-22 NOTE — H&P
Internal medicine history and physical  INTERNAL MEDICINE   [facility]       Patient Identification:  Name: Riky Moon  Age: 84 y.o.  Sex: male  :  1940  MRN: [id number]                   Primary Care Physician: Marco Carrillo MD                               Date of admission:10/22/2024    Chief Complaint: Worsening shortness of breath dyspnea on exertion and weakness over the course of last 4 to 5 days.    History of Present Illness:   Patient is a 84-year-old male who has complicated past medical history including history of COPD, reactive airway disease, benign prostatic hyperplasia, diabetes, hypothyroidism, obesity with obstructive sleep apnea uses CPAP device, chronic lymphedema as well as history of atrial fibrillation/flutter for which she takes anticoagulation therapy has been progressively declining in terms of functional capacity with dyspnea on exertion and fatigue and weakness over the course of last 6 months.  Before 6 months ago he could walk a block without any problem but now has reached a point from walking less than a block starting 6 months ago to moving in the house was making him out of breath.  Ironically he denies orthopnea or PND.  He denies any worsening swelling of his legs but claims that he has lymphedema and his both legs are swollen most of the time.  Patient has his last visit with his cardiologist about a year ago in August of last year and was not repeat doing well at that time.  Patient also has history of chronic back pain and has had need for epidural injections.  About 4-5 days ago patient started noticing increasing shortness of breath breath, wheezing, fatigue and today he simply had no energy.  Minimal activity was making him out of breath and weak and that he could not get around.  Patient lives at assisted/independent living facility and takes care of his wife who has dementia and is becoming a chore for her.  Patient as mentioned denies any  fever or chills.  Patient has history of benign prostatic hyperplasia and has frequent urination but denies any burning urgency or flank pain.  Workup in the emergency room revealed elevated BNP mildly elevated troponin and white blood cell count of 17,000.  Patient was noted to have abnormal urinalysis with negative leuk trase and nitrates and assessment of UTI was made and patient was started on IV Rocephin and also received a dose of 80 mg of Lasix, DuoNebs and a dose of Solu-Medrol with significant improvement in his symptoms subsequently.  His urinalysis microscopy and his urinalysis was negative for any white blood cells.  Patient has preserved appetite.  Patient is being admitted for further care.      Past Medical History:  Past Medical History:   Diagnosis Date    Acid reflux     Anemia     Arthritis     OSTEO    Asthma     Chest discomfort     Colon polyp     COPD (chronic obstructive pulmonary disease)     Depression     Diabetes mellitus     type 2    Disease of thyroid gland     Emphysema lung     High cholesterol     Hypertension     Morbid obesity     Neuropathy     BOTH FEET    Obesity, Class III, BMI 40-49.9 (morbid obesity)     PAD (peripheral artery disease)     Poor circulation of extremity     LEFT LEG    Sleep apnea     Vitamin D deficiency      Past Surgical History:  Past Surgical History:   Procedure Laterality Date    APPENDECTOMY      CATARACT EXTRACTION W/ INTRAOCULAR LENS IMPLANT Bilateral     CHOLECYSTECTOMY      COLONOSCOPY  01/01/2014    COLONOSCOPY W/ POLYPECTOMY      BENIGN    HERNIA REPAIR      INTERSTIM PLACEMENT Left 08/30/2016    BLADDER CONTROL    KNEE ARTHROPLASTY Right     MOUTH SURGERY  06/26/2018    NOSE SURGERY      REMOVED BLOCKAGE     ROTATOR CUFF REPAIR Right     TOTAL HIP ARTHROPLASTY Right 11/9/2017    Procedure: RIGHT TOTAL HIP ARTHROPLASTY;  Surgeon: Riky Fernando MD;  Location: [facility] OR;  Service:       Home Meds:  (Not in a hospital admission)    Current  Meds:     Current Facility-Administered Medications:     acetaminophen (TYLENOL) tablet 1,000 mg, 1,000 mg, Oral, Once, Sarika Hwang APRN    [COMPLETED] Insert peripheral IV, , , Once **AND** sodium chloride 0.9 % flush 10 mL, 10 mL, Intravenous, PRN, Sarika Hwang APRN    Current Outpatient Medications:     albuterol sulfate  (90 Base) MCG/ACT inhaler, Inhale 1 puff Every 4 (Four) Hours As Needed for Wheezing., Disp: 18 g, Rfl: 6    amLODIPine (NORVASC) 5 MG tablet, TAKE 1 TABLET BY MOUTH DAILY, Disp: 90 tablet, Rfl: 3    Ascorbic Acid (VITAMIN C PO), Take 1 tablet by mouth Daily., Disp: , Rfl:     atenolol (TENORMIN) 25 MG tablet, Take 0.5 tablets by mouth Daily., Disp: 45 tablet, Rfl: 3    Cholecalciferol (VITAMIN D) 1000 units tablet, Take 1 tablet by mouth Every Morning., Disp: , Rfl:     DULoxetine (Cymbalta) 30 MG capsule, Take 1 capsule by mouth Daily., Disp: 90 capsule, Rfl: 1    DULoxetine (CYMBALTA) 60 MG capsule, Take 1 capsule by mouth Every Morning., Disp: 90 capsule, Rfl: 3    Empagliflozin (Jardiance) 10 MG tablet, Take 10 mg by mouth Daily. (Patient taking differently: Take 2.5 tablets by mouth Daily.), Disp: 30 tablet, Rfl: 11    Fluticasone-Umeclidin-Vilant (Trelegy Ellipta) 100-62.5-25 MCG/INH inhaler, Inhale 1 puff Daily., Disp: 1 each, Rfl: 12    furosemide (Lasix) 20 MG tablet, Take 1 tablet by mouth Daily for 3 days., Disp: 3 tablet, Rfl: 0    glucose blood (LAINEY CONTOUR TEST) test strip, Use as instructed to test glucose, Disp: 100 each, Rfl: 1    levothyroxine (Synthroid) 125 MCG tablet, Take 1 tablet by mouth Daily., Disp: 90 tablet, Rfl: 2    losartan (COZAAR) 100 MG tablet, Take 1 tablet by mouth Daily., Disp: 90 tablet, Rfl: 3    metFORMIN (GLUCOPHAGE) 850 MG tablet, Take 1 tablet by mouth 2 (Two) Times a Day With Meals., Disp: 30 tablet, Rfl: 6    MICROLET LANCETS misc, Use daily as directed to test glucose, Disp: 100 each, Rfl: 1    Multiple Vitamin (MULTIVITAMIN)  tablet, Take 1 tablet by mouth Every Morning., Disp: , Rfl:     oxybutynin (DITROPAN) 5 MG tablet, Take 1 tablet by mouth Every 12 (Twelve) Hours., Disp: , Rfl:     oxybutynin XL (DITROPAN XL) 15 MG 24 hr tablet, Take 1 tablet by mouth Every Night., Disp: , Rfl:     pravastatin (PRAVACHOL) 40 MG tablet, Take 1 tablet by mouth Every Night., Disp: 90 tablet, Rfl: 3    pregabalin (LYRICA) 150 MG capsule, Take 1 capsule by mouth 2 (Two) Times a Day., Disp: , Rfl:     Synthroid 125 MCG tablet, TAKE 1 TABLET BY MOUTH DAILY, Disp: 30 tablet, Rfl: 11    vitamin B-12 (CYANOCOBALAMIN) 1000 MCG tablet, Take 0.5 tablets by mouth Every Morning., Disp: , Rfl:     warfarin (COUMADIN) 5 MG tablet, Take one tablet (5 mg) by mouth on Monday and , and take two tablets (10 mg) by mouth all other days or as directed., Disp: 155 tablet, Rfl: 1  Allergies:  Allergies   Allergen Reactions    Lisinopril Unknown - High Severity     Other reaction(s): Cough     Social History:   Social History     Tobacco Use    Smoking status: Former     Current packs/day: 0.00     Average packs/day: 1.5 packs/day for 40.0 years (60.0 ttl pk-yrs)     Types: Cigarettes     Start date:      Quit date:      Years since quittin.8    Smokeless tobacco: Never    Tobacco comments:     from age [age]   Substance Use Topics    Alcohol use: Yes     Comment: HOLIDAYS  caffeine -2 cups coffee daily      Family History:  Family History   Problem Relation Age of Onset    Stroke Mother     Hypertension Mother     Heart attack Father     Alcohol abuse Father     Heart disease Father     Diabetes Sister     Heart disease Brother     Stomach cancer Brother     Stroke Brother     Alcohol abuse Brother     Hypertension Daughter     Diabetes Daughter     Malig Hyperthermia Neg Hx           Review of Systems  See history of present illness and past medical history.  Patient denies headache, dizziness, syncope, falls, trauma, change in vision, change in  "hearing, change in taste, changes in weight, changes in appetite, focal weakness, numbness, or paresthesia.  Patient denies chest pain, palpitations, dyspnea, orthopnea, PND, cough, sinus pressure, rhinorrhea, epistaxis, hemoptysis, nausea, vomiting,hematemesis, diarrhea, constipation or hematchezia.  Denies cold or heat intolerance, polydipsia, polyuria, polyphagia. Denies hematuria, pyuria, dysuria, hesitancy, frequency or urgency. Denies consumption of raw and under cooked meats foods or change in water source.  Denies fever, chills, sweats, night sweats.  Denies missing any routine medications. Remainder of ROS is negative.      Vitals:   /46   Pulse 70   Temp 99.6 °F (37.6 °C) (Oral) Comment: pt reports noticing sweating- temp elevated. Will cont to monitor.  Resp 22   Ht 182.9 cm (72\")   Wt (!) 138 kg (305 lb)   SpO2 95%   BMI 41.37 kg/m²   I/O:   Intake/Output Summary (Last 24 hours) at 10/22/2024 1918  Last data filed at 10/22/2024 1800  Gross per 24 hour   Intake 100 ml   Output 500 ml   Net -400 ml     Exam:  Patient is examined using the personal protective equipment as per guidelines from infection control for this particular patient as enacted.  Hand washing was performed before and after patient interaction.  General Appearance:  Alert and oriented and interactive and according to the daughter and son at the bedside has better color   Head:    Normocephalic, without obvious abnormality, atraumatic   Eyes:    PERRL, conjunctiva/corneas clear, EOM's intact, both eyes   Ears:    Normal external ear canals, both ears   Nose: No nasal drainage noted   Throat:   Lips, tongue, gums normal; oral mucosa pink and moist   Neck: Supple   Back:     Symmetric, no curvature, ROM normal, no CVA tenderness   Lungs:   No obvious use of accessory muscles of breathing noted scattered rhonchi   Chest Wall:    No tenderness or deformity    Heart:  S1-S2 irregular   Abdomen:   Obese soft nontender   Extremities: " Bilateral 3+ edema right greater than left   Pulses:   Pulses palpable in all extremities; symmetric all extremities   Skin: Chronic changes in the lower extremities skin   Neurologic: Alert and oriented x 3       Data Review:      I reviewed the patient's new clinical results.  Results from last 7 days   Lab Units 10/22/24  1517   WBC 10*3/mm3 17.81*   HEMOGLOBIN g/dL 12.1*   PLATELETS 10*3/mm3 166     Results from last 7 days   Lab Units 10/22/24  1517   SODIUM mmol/L 136   POTASSIUM mmol/L 4.1   CHLORIDE mmol/L 102   CO2 mmol/L 25.9   BUN mg/dL 28*   CREATININE mg/dL 1.22   CALCIUM mg/dL 9.0   GLUCOSE mg/dL 137*     XR Chest 2 View    Result Date: 10/22/2024  No focal pulmonary consolidation. Follow-up as clinical indications persist.  This report was finalized on 10/22/2024 2:35 PM by Dr. Dipak Dc M.D on Workstation: [employer]     ECG 12 Lead Other; diaphoresis   Preliminary Result   HEART RATE=77  bpm   RR Isxmdcig=075  ms   VA Interval=  ms   P Horizontal Axis=  deg   P Front Axis=  deg   QRSD Jjoclieq=738  ms   QT Bpagqhbm=443  ms   ULvR=096  ms   QRS Axis=-28  deg   T Wave Axis=12  deg   - ABNORMAL ECG -   Atrial flutter with varied AV block,   Right bundle branch block   Baseline wander in lead(s) II,V1,V3,V4,V5   Date and Time of Study:2024-10-22 19:31:03      ECG 12 Lead Dyspnea   Final Result   HEART RATE=73  bpm   RR Csgqpnte=713  ms   VA Interval=  ms   P Horizontal Axis=  deg   P Front Axis=  deg   QRSD Yibsdkit=970  ms   QT Gjgrtznd=911  ms   IYrX=865  ms   QRS Axis=-17  deg   T Wave Axis=51  deg   - ABNORMAL ECG -   Poor quality tracing, repeat.   Electronically Signed By: Jovanny Myers) ([facility]) 2024-10-22 15:50:49   Date and Time of Study:2024-10-22 15:08:32      Telemetry Scan   Final Result        Brief Urine Lab Results  (Last result in the past 365 days)        Color   Clarity   Blood   Leuk Est   Nitrite   Protein   CREAT   Urine HCG        10/22/24 1447 Dark Yellow   Clear    Negative   Negative   Negative   30 mg/dL (1+)                   Echocardiogram in 2022 shows preserved left ventricular function ejection fraction greater than 60% and diastolic dysfunction  Assessment:  Active Hospital Problems    Diagnosis  POA    CHF (congestive heart failure) [I50.9]  Unknown    Atrial flutter with controlled response [I48.92]  Yes    Gastroesophageal reflux disease [K21.9]  Yes    Constipation [K59.00]  Yes    Hypothyroidism [E03.9]  Yes    Diabetes mellitus [E11.9]  Yes     type 2      Essential hypertension [I10]  Yes    KINDRA treated with auto BiPAP [G47.33]  Yes    COPD (chronic obstructive pulmonary disease) [J44.9]  Yes    Asthma [J45.909]  Yes       Medical decision making/care plan: See admitting orders  Generalized weakness with progressive dyspnea on exertion with bilateral lower extremity edema, elevated BNP and obvious wheezing on examination and essentially unremarkable urinalysis in terms of absence of leuk trase and nitrates and microscopy negative for any pus cells-this presentation likely represent:  - exacerbation of underlying diastolic congestive heart failure with  - exacerbation of COPD/asthma-plan is to continue with IV diuresis, monitor his renal function and blood pressure and need for oxygen supplementation and continue nebulizer treatment and steroids.  Cardiology consultation and keep an eye on his white blood cell count.  Patient has received a dose of Rocephin in the emergency room for suspected UTI before any blood culture and urine cultures were sent.  Will hold antibiotic therapy given his rapid response to diuretic and nebulizer treatment and reassess for need for antibiotic therapy and check morning procalcitonin and lactic acid level.  Allow him to use his CPAP device.  Provide him with continuous pulse ox monitoring.  Leukocytosis-likely  - reactive/demargination due to stress as a result of significant dyspnea on exertion due to exacerbation of congestive  heart failure and COPD/asthma versus   - primary marrow process versus   - evolving infection-patient has received a dose of Rocephin in the emergency room for suspected UTI with essentially unremarkable urinalysis for UTI.  Plan is to monitor white blood cell count, check procalcitonin level, lactic acid level and consider blood cultures if he shows fever spike.  Repeat chest x-ray.  Hypertension-continue his antihypertensive regimen and avoid hypotensive episodes.  Hold his oral Lasix while he is receiving IV Lasix with close monitoring of his renal function.  Hypothyroidism-continue thyroid replacement therapy.  Obstructive sleep apnea-allow him to use his CPAP device and continue with pulse ox monitoring.  Diabetes mellitus-continue with metformin and Jardiance and Accu-Cheks and sliding scale coverage and watch for hypoglycemia.  Atrial flutter with controlled ventricular response and bradycardia-continue with atenolol while keeping an eye on his heart rate and warfarin.  Morbid obesity-dietary consultation for weight loss management  History of lymphedema-continue with wrapping of lower extremities and elevation.  Mild anemia with macrocytosis-monitor hemoglobin level and consider basic anemia studies either inpatient or outpatient including folate and B12 level.  Mainor Pope MD   10/22/2024  19:18 EDT    Parts of this note may be an electronic transcription/translation of spoken language to printed text using the Dragon dictation system.

## 2024-10-22 NOTE — [facility] PROVIDER NOTE
EMERGENCY DEPARTMENT ENCOUNTER    Room Number:  04/04  Date seen:  10/22/2024  Time seen: 14:42 EDT  PCP: Marco Carrillo MD  Historian: patient, daughter    Discussed/obtained information from independent historians: n/a    HPI:  Chief complaint:congestion, weakness, urinary frequency  A complete HPI/ROS/PMH/PSH/SH/FH are unobtainable due to: n/a  Context:Riky Moon is a 84 y.o. male with h/o KINDRA, DM, HTN, asthma, overactive bladder, COPD, atrial flutter, chronic anticoagulation who presents to the ED with c/o moderate head and chest congestion that has been present about 4 days and he also has associated moderate and worsening weakness with urinary frequency.  States symptoms are moderate and he is so weak he cannot really get around well and endorses increase in SOA.  He uses a bipap and is prescribed inhalers for his COPD but states he has not been taking them.  He denies fever/chills, chest pain/tightness.  His symptoms are made worse by getting up and trying to do easy tasks and are not made better by anything. He is on Warfarin for his h/o atrial flutter.     External (non-ED) record review: I reviewed recent primary care office visit July of this year as well as patient's cardiology visit August 6 of this year.  Patient has longstanding A-flutter.  He also has a history of lymphedema    Chronic or social conditions impacting care: Atrial flutter, chronic anticoagulation, COPD, questionable compliance with breathing treatments    ALLERGIES  Lisinopril    PAST MEDICAL HISTORY  Active Ambulatory Problems     Diagnosis Date Noted    OA (osteoarthritis) of hip 02/23/2017    KINDRA treated with auto BiPAP 06/11/2017    Chest pain 09/02/2017    Diabetes mellitus 05/29/2018    Essential hypertension 05/29/2018    Hyperlipidemia 05/29/2018    Hypothyroidism 05/29/2018    Asthma 06/24/2015    Overactive bladder 06/29/2018    CAP (community acquired pneumonia) 06/24/2015    Cellulitis of left lower extremity  03/19/2017    COPD (chronic obstructive pulmonary disease) 03/19/2017    Dyslipidemia 06/29/2018    Gastroesophageal reflux disease 06/29/2018    Leukocytosis 06/24/2015    Peripheral nerve disease 06/29/2018    Constipation 06/29/2018    Oropharyngeal dysphagia 02/12/2019    Bronchitis 04/09/2019    Class 3 severe obesity due to excess calories with serious comorbidity and body mass index (BMI) of 40.0 to 44.9 in adult 10/26/2019    Left leg swelling 11/06/2019    Anemia 01/30/2020    Leukocytosis 01/30/2020    Circadian rhythm sleep disorder, 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    Atrial flutter with controlled response 06/21/2022    UTI (urinary tract infection) 10/22/2024     Resolved Ambulatory Problems     Diagnosis Date Noted    No Resolved Ambulatory Problems     Past Medical History:   Diagnosis Date    Acid reflux     Arthritis     Chest discomfort     Colon polyp     Depression     Disease of thyroid gland     Emphysema lung     High cholesterol     Hypertension     Morbid obesity     Neuropathy     Obesity, Class III, BMI 40-49.9 (morbid obesity)     PAD (peripheral artery disease)     Poor circulation of extremity     Sleep apnea     Vitamin D deficiency        PAST SURGICAL HISTORY  Past Surgical History:   Procedure Laterality Date    APPENDECTOMY      CATARACT EXTRACTION W/ INTRAOCULAR LENS IMPLANT Bilateral     CHOLECYSTECTOMY      COLONOSCOPY  01/01/2014    COLONOSCOPY W/ POLYPECTOMY      BENIGN    HERNIA REPAIR      INTERSTIM PLACEMENT Left 08/30/2016    BLADDER CONTROL    KNEE ARTHROPLASTY Right     MOUTH SURGERY  06/26/2018    NOSE SURGERY      REMOVED BLOCKAGE     ROTATOR CUFF REPAIR Right     TOTAL HIP ARTHROPLASTY Right 11/9/2017    Procedure: RIGHT TOTAL HIP ARTHROPLASTY;  Surgeon: Riky Fernando MD;  Location: [facility];  Service:        FAMILY HISTORY  Family History   Problem Relation Age of Onset    Stroke Mother     Hypertension Mother     Heart attack Father     Alcohol abuse  Father     Heart disease Father     Diabetes Sister     Heart disease Brother     Stomach cancer Brother     Stroke Brother     Alcohol abuse Brother     Hypertension Daughter     Diabetes Daughter     Malig Hyperthermia Neg Hx        SOCIAL HISTORY  Social History     Socioeconomic History    Marital status:      Spouse name: Chitra    Years of education: high school   Tobacco Use    Smoking status: Former     Current packs/day: 0.00     Average packs/day: 1.5 packs/day for 40.0 years (60.0 ttl pk-yrs)     Types: Cigarettes     Start date:      Quit date:      Years since quittin.8    Smokeless tobacco: Never    Tobacco comments:     from age [age]   Vaping Use    Vaping status: Never Used   Substance and Sexual Activity    Alcohol use: Yes     Comment: HOLIDAYS  caffeine -2 cups coffee daily    Drug use: No     Comment: PRN use for pain    Sexual activity: Defer       REVIEW OF SYSTEMS  Review of Systems    All systems reviewed and negative except for those discussed in HPI.     PHYSICAL EXAM    I have reviewed the triage vital signs and nursing notes.  Vitals:    10/22/24 2126   BP: 108/56   Pulse: 71   Resp: 20   Temp: 98.4 °F (36.9 °C)   SpO2: 96%     Physical Exam    GENERAL: not distressed, elderly  HENT: nares patent  EYES: no scleral icterus  NECK: no ROM limitations  CV: irregularly irreg at controlled rate  RESPIRATORY: normal effort, diminished in bases  ABDOMEN: soft, large, no focal tenderness  : deferred  MUSCULOSKELETAL: no deformity  NEURO: alert, moves all extremities, follows commands  SKIN: warm, dry    LAB RESULTS  Recent Results (from the past 24 hours)   COVID-19 and FLU A/B PCR, 1 HR TAT - Swab, Nasopharynx    Collection Time: 10/22/24  1:45 PM    Specimen: Nasopharynx; Swab   Result Value Ref Range    COVID19 Not Detected Not Detected - Ref. Range    Influenza A PCR Not Detected Not Detected    Influenza B PCR Not Detected Not Detected   Urinalysis With Culture If  Indicated - Urine, Clean Catch    Collection Time: 10/22/24  2:47 PM    Specimen: Urine, Clean Catch   Result Value Ref Range    Color, UA Dark Yellow (A) Yellow, Straw    Appearance, UA Clear Clear    pH, UA 5.5 5.0 - 8.0    Specific Gravity, UA 1.015 1.005 - 1.030    Glucose, UA >=1000 mg/dL (3+) (A) Negative    Ketones, UA 15 mg/dL (1+) (A) Negative    Bilirubin, UA Small (1+) (A) Negative    Blood, UA Negative Negative    Protein, UA 30 mg/dL (1+) (A) Negative    Leuk Esterase, UA Negative Negative    Nitrite, UA Negative Negative    Urobilinogen, UA 1.0 E.U./dL 0.2 - 1.0 E.U./dL   ECG 12 Lead Dyspnea    Collection Time: 10/22/24  3:08 PM   Result Value Ref Range    QT Interval 557 ms    QTC Interval 614 ms   Comprehensive Metabolic Panel    Collection Time: 10/22/24  3:17 PM    Specimen: Blood   Result Value Ref Range    Glucose 137 (H) 65 - 99 mg/dL    BUN 28 (H) 8 - 23 mg/dL    Creatinine 1.22 0.76 - 1.27 mg/dL    Sodium 136 136 - 145 mmol/L    Potassium 4.1 3.5 - 5.2 mmol/L    Chloride 102 98 - 107 mmol/L    CO2 25.9 22.0 - 29.0 mmol/L    Calcium 9.0 8.6 - 10.5 mg/dL    Total Protein 6.3 6.0 - 8.5 g/dL    Albumin 3.8 3.5 - 5.2 g/dL    ALT (SGPT) 14 1 - 41 U/L    AST (SGOT) 20 1 - 40 U/L    Alkaline Phosphatase 55 39 - 117 U/L    Total Bilirubin 1.5 (H) 0.0 - 1.2 mg/dL    Globulin 2.5 gm/dL    A/G Ratio 1.5 g/dL    BUN/Creatinine Ratio 23.0 7.0 - 25.0    Anion Gap 8.1 5.0 - 15.0 mmol/L    eGFR 58.1 (L) >60.0 mL/min/1.73   BNP    Collection Time: 10/22/24  3:17 PM    Specimen: Blood   Result Value Ref Range    proBNP 3,083.0 (H) 0.0 - 1,800.0 pg/mL   Single High Sensitivity Troponin T    Collection Time: 10/22/24  3:17 PM    Specimen: Blood   Result Value Ref Range    HS Troponin T 46 (H) <22 ng/L   CBC Auto Differential    Collection Time: 10/22/24  3:17 PM    Specimen: Blood   Result Value Ref Range    WBC 17.81 (H) 3.40 - 10.80 10*3/mm3    RBC 3.11 (L) 4.14 - 5.80 10*6/mm3    Hemoglobin 12.1 (L) 13.0 - 17.7  g/dL    Hematocrit 35.9 (L) 37.5 - 51.0 %    .4 (H) 79.0 - 97.0 fL    MCH 38.9 (H) 26.6 - 33.0 pg    MCHC 33.7 31.5 - 35.7 g/dL    RDW 17.2 (H) 12.3 - 15.4 %    RDW-SD 73.7 (H) 37.0 - 54.0 fl    MPV 11.8 6.0 - 12.0 fL    Platelets 166 140 - 450 10*3/mm3   Protime-INR, Fingerstick    Collection Time: 10/22/24  3:59 PM    Specimen: Capillary Blood   Result Value Ref Range    Protime 22.5 (H) 11.0 - 15.0 Seconds    INR 2.20    POC Protime / INR    Collection Time: 10/22/24  5:02 PM    Specimen: Blood   Result Value Ref Range    Protime 22.5 seconds    INR 2 (A) 0.9 - 1.1   Single High Sensitivity Troponin T    Collection Time: 10/22/24  5:13 PM    Specimen: Blood   Result Value Ref Range    HS Troponin T 42 (H) <22 ng/L   POC Glucose Once    Collection Time: 10/22/24  6:16 PM    Specimen: Blood   Result Value Ref Range    Glucose 190 (H) 70 - 130 mg/dL   Single High Sensitivity Troponin T    Collection Time: 10/22/24  7:20 PM    Specimen: Blood   Result Value Ref Range    HS Troponin T 40 (H) <22 ng/L   ECG 12 Lead Other; diaphoresis    Collection Time: 10/22/24  7:31 PM   Result Value Ref Range    QT Interval 415 ms    QTC Interval 470 ms       Ordered the above labs and independently interpreted results.  My findings will be discussed in the ED course or medical decision making section below    RADIOLOGY RESULTS  XR Chest 2 View    Result Date: 10/22/2024  XR CHEST 2 VW-  HISTORY: Male who is 84 years-old, cough  TECHNIQUE: Frontal and lateral views of the chest  COMPARISON: 11/10/2023  FINDINGS: Heart size is normal. Aorta is calcified. Pulmonary vasculature is unremarkable. A generous cardiac fat pad is apparent in correlation with the CT from 8/1/2022. No focal pulmonary consolidation, pleural effusion, or pneumothorax. No acute osseous process.      No focal pulmonary consolidation. Follow-up as clinical indications persist.  This report was finalized on 10/22/2024 2:35 PM by Dr. Dipak Dc M.D on  Workstation: [id number]        Ordered the above noted radiological studies.  Independently interpreted by me.  My findings will be discussed in the medical decision section below.     PROGRESS, DATA ANALYSIS, CONSULTS AND MEDICAL DECISION MAKING    Please note that this section constitutes my independent interpretation of clinical data including lab results, radiology, EKG's.  This constitutes my independent professional opinion regarding differential diagnosis and management of this patient.  It may include any factors such as history from outside sources, review of external records, social determinants of health, management of medications, response to those treatments, and discussions with other providers.    ED Course as of 10/22/24 2138   Tue Oct 22, 2024   1443 I viewed CXR in PACs.  My independent interpretation is no acute infiltrate.  [EW]   1632 I discussed admission with patient and his daughter.  He agrees.   [EW]   1643 Discussed pt's symptoms, labs, elevated troponin and request for admission with Dr. Pope SHARONA.  He agrees to admit.  [EW]   1646 EKG          EKG time: 1507  Rhythm/Rate: 66 A-flutter, PVC  P waves and OH: Not applicable  QRS, axis: Nonspecific IVCD  ST and T waves: Minimal ST depression    Interpreted Contemporaneously by me, independently viewed  Compared with prior dated 7/22/2024 [EW]   1912 Pt having some diaphoresis, temp up just a bit.  Will give dose of tylenol.  He is somewhat flushed but denies feeling any worse.  He is not hypoglycemic.   [EW]   1945 Repeat EKG  performed due to pt's diaphoresis.    Time: 1931 .  Remains atrial flutter, tracing similar to prior. Now stating RBBB.  Pt denies chest pain.  [EW]   2101 Per RN, pt's BP is 86/52 manually. He denies feeling any different.  [EW]   2132 Dr. Pope has been here and seen the  patient.  EMS  here now to transport the patient to [facility] [EW]      ED Course User Index  [EW] Sarika Hwang APRN     Orders placed during this  visit:  Orders Placed This Encounter   Procedures    COVID-19 and FLU A/B PCR, 1 HR TAT - Swab, Nasopharynx    XR Chest 2 View    Urinalysis With Culture If Indicated - Urine, Clean Catch    Comprehensive Metabolic Panel    BNP    Single High Sensitivity Troponin T    CBC Auto Differential    Urinalysis, Microscopic Only - Urine, Clean Catch    [place] Urine Culture Tube -    Manual Differential    Protime-INR, Fingerstick    Single High Sensitivity Troponin T    Single High Sensitivity Troponin T    Code Status and Medical Interventions: CPR (Attempt to Resuscitate); Full Support    POC Protime / INR    POC Glucose STAT    POC Glucose Once    ECG 12 Lead Dyspnea    ECG 12 Lead Other; diaphoresis    Blood Draw With IV Start    Insert peripheral IV    Initiate Observation Status    CBC & Differential    ED Acknowledgement Form Needed;            Medical Decision Making  Pt arrives with several issues.  He has congestion, chest congestion and URI symptoms.  He is very weak and has new onset urinary frequency and is worried for UTI.  He has underlying COPD.  I considered pneumonia but the CXR negative and he is  not hypoxic.  Due to the severe weakness troponins checked and did improve but his BNP was quite elevated when compared with prior.  At one time he had been on Furosemide but is no longer taking.  I did diurese him.  WBC 17K and with urinary symptoms I did give a dose of Rocephin.  Due to his chronic anticoagulation on Warfarin INR checked and 2 today.  He has no complaints of falls and is not altered.  Will plan to admit him for further management.  Did  have a few soft BP's after Furosemide but no complaints and BP came back up.  He has never had any complaints of chest pain/tightness.  No hypoxia.        DIAGNOSIS  Final diagnoses:   Acute UTI   COPD with acute exacerbation   Weakness generalized   Shortness of breath   Acute congestive heart failure, unspecified heart failure type   Elevated troponin    Chronic anticoagulation          Medication List      No changes were made to your prescriptions during this visit.         Admission    Latest Documented Vital Signs:  As of 21:38 EDT  BP- 108/56 HR- 71 Temp- 98.4 °F (36.9 °C) (Oral) O2 sat- 96%    Appropriate PPE utilized throughout this patient encounter to include mask, if indicated, per current protocol. Hand hygiene was performed before donning PPE and after removal when leaving the room.    Please note that portions of this were completed with a voice recognition program.     Note Disclaimer: At [facility], we believe that sharing information builds trust and better relationships. You are receiving this note because you are receiving care at [facility] or recently visited. It is possible you will see health information before a provider has talked with you about it. This kind of information can be easy to misunderstand. To help you fully understand what it means for your health, we urge you to discuss this note with your provider.

## 2024-10-23 ENCOUNTER — APPOINTMENT (OUTPATIENT)
Dept: CARDIOLOGY | Facility: HOSPITAL | Age: 84
End: 2024-10-23
Payer: MEDICARE

## 2024-10-23 PROBLEM — R06.02 SHORTNESS OF BREATH: Status: ACTIVE | Noted: 2024-10-23

## 2024-10-23 LAB
ALBUMIN SERPL-MCNC: 3.7 G/DL (ref 3.5–5.2)
ALBUMIN/GLOB SERPL: 1.2 G/DL
ALP SERPL-CCNC: 63 U/L (ref 39–117)
ALT SERPL W P-5'-P-CCNC: 17 U/L (ref 1–41)
ANION GAP SERPL CALCULATED.3IONS-SCNC: 12.9 MMOL/L (ref 5–15)
AORTIC DIMENSIONLESS INDEX: 0.3 (DI)
ASCENDING AORTA: 3.2 CM
AST SERPL-CCNC: 25 U/L (ref 1–40)
B PARAPERT DNA SPEC QL NAA+PROBE: NOT DETECTED
B PERT DNA SPEC QL NAA+PROBE: NOT DETECTED
BASOPHILS # BLD AUTO: 0.02 10*3/MM3 (ref 0–0.2)
BASOPHILS NFR BLD AUTO: 0.1 % (ref 0–1.5)
BH CV ECHO MEAS - ACS: 1.4 CM
BH CV ECHO MEAS - AO MAX PG: 36 MMHG
BH CV ECHO MEAS - AO MEAN PG: 22.3 MMHG
BH CV ECHO MEAS - AO ROOT DIAM: 3 CM
BH CV ECHO MEAS - AO V2 MAX: 300.1 CM/SEC
BH CV ECHO MEAS - AO V2 VTI: 67 CM
BH CV ECHO MEAS - AVA(I,D): 1.02 CM2
BH CV ECHO MEAS - EDV(CUBED): 113.4 ML
BH CV ECHO MEAS - EDV(MOD-SP2): 113 ML
BH CV ECHO MEAS - EDV(MOD-SP4): 110 ML
BH CV ECHO MEAS - EF(MOD-BP): 54.6 %
BH CV ECHO MEAS - EF(MOD-SP2): 52.2 %
BH CV ECHO MEAS - EF(MOD-SP4): 58.2 %
BH CV ECHO MEAS - ESV(CUBED): 42.8 ML
BH CV ECHO MEAS - ESV(MOD-SP2): 54 ML
BH CV ECHO MEAS - ESV(MOD-SP4): 46 ML
BH CV ECHO MEAS - FS: 27.7 %
BH CV ECHO MEAS - IVS/LVPW: 1.13 CM
BH CV ECHO MEAS - IVSD: 1.2 CM
BH CV ECHO MEAS - LAT PEAK E' VEL: 12.5 CM/SEC
BH CV ECHO MEAS - LV MASS(C)D: 205.4 GRAMS
BH CV ECHO MEAS - LV MAX PG: 3.7 MMHG
BH CV ECHO MEAS - LV MEAN PG: 2 MMHG
BH CV ECHO MEAS - LV V1 MAX: 96.1 CM/SEC
BH CV ECHO MEAS - LV V1 VTI: 19.7 CM
BH CV ECHO MEAS - LVIDD: 4.8 CM
BH CV ECHO MEAS - LVIDS: 3.5 CM
BH CV ECHO MEAS - LVOT AREA: 3.5 CM2
BH CV ECHO MEAS - LVOT DIAM: 2.1 CM
BH CV ECHO MEAS - LVPWD: 1.07 CM
BH CV ECHO MEAS - MED PEAK E' VEL: 7.7 CM/SEC
BH CV ECHO MEAS - MV A DUR: 0.12 SEC
BH CV ECHO MEAS - MV A MAX VEL: 71 CM/SEC
BH CV ECHO MEAS - MV DEC SLOPE: 658.9 CM/SEC2
BH CV ECHO MEAS - MV DEC TIME: 0.17 SEC
BH CV ECHO MEAS - MV E MAX VEL: 98.5 CM/SEC
BH CV ECHO MEAS - MV E/A: 1.39
BH CV ECHO MEAS - MV MAX PG: 7 MMHG
BH CV ECHO MEAS - MV MEAN PG: 1.97 MMHG
BH CV ECHO MEAS - MV P1/2T: 57.8 MSEC
BH CV ECHO MEAS - MV V2 VTI: 33.8 CM
BH CV ECHO MEAS - MVA(P1/2T): 3.8 CM2
BH CV ECHO MEAS - MVA(VTI): 2.02 CM2
BH CV ECHO MEAS - PA ACC TIME: 0.08 SEC
BH CV ECHO MEAS - PA V2 MAX: 112.9 CM/SEC
BH CV ECHO MEAS - PULM A REVS DUR: 0.06 SEC
BH CV ECHO MEAS - PULM A REVS VEL: 21.1 CM/SEC
BH CV ECHO MEAS - PULM DIAS VEL: 33.3 CM/SEC
BH CV ECHO MEAS - PULM S/D: 1.13
BH CV ECHO MEAS - PULM SYS VEL: 37.5 CM/SEC
BH CV ECHO MEAS - RAP SYSTOLE: 3 MMHG
BH CV ECHO MEAS - RV MAX PG: 2.6 MMHG
BH CV ECHO MEAS - RV V1 MAX: 80 CM/SEC
BH CV ECHO MEAS - RV V1 VTI: 14.1 CM
BH CV ECHO MEAS - RVSP: 26 MMHG
BH CV ECHO MEAS - SUP REN AO DIAM: 2.2 CM
BH CV ECHO MEAS - SV(LVOT): 68.3 ML
BH CV ECHO MEAS - SV(MOD-SP2): 59 ML
BH CV ECHO MEAS - SV(MOD-SP4): 64 ML
BH CV ECHO MEAS - TAPSE (>1.6): 2.04 CM
BH CV ECHO MEAS - TR MAX PG: 23.3 MMHG
BH CV ECHO MEAS - TR MAX VEL: 241.3 CM/SEC
BH CV ECHO MEASUREMENTS AVERAGE E/E' RATIO: 9.75
BH CV ECHO SHUNT ASSESSMENT PERFORMED (HIDDEN SCRIPTING): 1
BH CV XLRA - RV BASE: 4.4 CM
BH CV XLRA - RV LENGTH: 7.9 CM
BH CV XLRA - RV MID: 3.5 CM
BH CV XLRA - TDI S': 15.9 CM/SEC
BILIRUB SERPL-MCNC: 0.8 MG/DL (ref 0–1.2)
BUN SERPL-MCNC: 33 MG/DL (ref 8–23)
BUN/CREAT SERPL: 20.1 (ref 7–25)
C PNEUM DNA NPH QL NAA+NON-PROBE: NOT DETECTED
CALCIUM SPEC-SCNC: 8.7 MG/DL (ref 8.6–10.5)
CHLORIDE SERPL-SCNC: 100 MMOL/L (ref 98–107)
CO2 SERPL-SCNC: 25.1 MMOL/L (ref 22–29)
CREAT SERPL-MCNC: 1.64 MG/DL (ref 0.76–1.27)
D DIMER PPP FEU-MCNC: 0.29 MCGFEU/ML (ref 0–0.85)
D-LACTATE SERPL-SCNC: 1.2 MMOL/L (ref 0.5–2)
DEPRECATED RDW RBC AUTO: 64.1 FL (ref 37–54)
EGFRCR SERPLBLD CKD-EPI 2021: 40.7 ML/MIN/1.73
EOSINOPHIL # BLD AUTO: 0 10*3/MM3 (ref 0–0.4)
EOSINOPHIL NFR BLD AUTO: 0 % (ref 0.3–6.2)
ERYTHROCYTE [DISTWIDTH] IN BLOOD BY AUTOMATED COUNT: 15.8 % (ref 12.3–15.4)
FERRITIN SERPL-MCNC: 822 NG/ML (ref 30–400)
FLUAV SUBTYP SPEC NAA+PROBE: NOT DETECTED
FLUBV RNA ISLT QL NAA+PROBE: NOT DETECTED
FOLATE SERPL-MCNC: >20 NG/ML (ref 4.78–24.2)
GLOBULIN UR ELPH-MCNC: 3.2 GM/DL
GLUCOSE BLDC GLUCOMTR-MCNC: 187 MG/DL (ref 70–130)
GLUCOSE BLDC GLUCOMTR-MCNC: 237 MG/DL (ref 70–130)
GLUCOSE BLDC GLUCOMTR-MCNC: 247 MG/DL (ref 70–130)
GLUCOSE BLDC GLUCOMTR-MCNC: 248 MG/DL (ref 70–130)
GLUCOSE SERPL-MCNC: 213 MG/DL (ref 65–99)
HADV DNA SPEC NAA+PROBE: NOT DETECTED
HBA1C MFR BLD: 6.7 % (ref 4.8–5.6)
HCOV 229E RNA SPEC QL NAA+PROBE: NOT DETECTED
HCOV HKU1 RNA SPEC QL NAA+PROBE: NOT DETECTED
HCOV NL63 RNA SPEC QL NAA+PROBE: NOT DETECTED
HCOV OC43 RNA SPEC QL NAA+PROBE: NOT DETECTED
HCT VFR BLD AUTO: 36.2 % (ref 37.5–51)
HGB BLD-MCNC: 12.6 G/DL (ref 13–17.7)
HMPV RNA NPH QL NAA+NON-PROBE: NOT DETECTED
HPIV1 RNA ISLT QL NAA+PROBE: NOT DETECTED
HPIV2 RNA SPEC QL NAA+PROBE: NOT DETECTED
HPIV3 RNA NPH QL NAA+PROBE: NOT DETECTED
HPIV4 P GENE NPH QL NAA+PROBE: NOT DETECTED
IMM GRANULOCYTES # BLD AUTO: 0.08 10*3/MM3 (ref 0–0.05)
IMM GRANULOCYTES NFR BLD AUTO: 0.6 % (ref 0–0.5)
INR PPP: 1.83 (ref 0.9–1.1)
IRON 24H UR-MRATE: 37 MCG/DL (ref 59–158)
IRON 24H UR-MRATE: 37 MCG/DL (ref 59–158)
IRON SATN MFR SERPL: 13 % (ref 20–50)
LDH SERPL-CCNC: 193 U/L (ref 135–225)
LDH SERPL-CCNC: 220 U/L (ref 135–225)
LEFT ATRIUM VOLUME INDEX: 29.1 ML/M2
LYMPHOCYTES # BLD AUTO: 0.44 10*3/MM3 (ref 0.7–3.1)
LYMPHOCYTES NFR BLD AUTO: 3 % (ref 19.6–45.3)
M PNEUMO IGG SER IA-ACNC: NOT DETECTED
MAGNESIUM SERPL-MCNC: 2 MG/DL (ref 1.6–2.4)
MCH RBC QN AUTO: 38.2 PG (ref 26.6–33)
MCHC RBC AUTO-ENTMCNC: 34.8 G/DL (ref 31.5–35.7)
MCV RBC AUTO: 109.7 FL (ref 79–97)
MONOCYTES # BLD AUTO: 0.23 10*3/MM3 (ref 0.1–0.9)
MONOCYTES NFR BLD AUTO: 1.6 % (ref 5–12)
NEUTROPHILS NFR BLD AUTO: 13.77 10*3/MM3 (ref 1.7–7)
NEUTROPHILS NFR BLD AUTO: 94.7 % (ref 42.7–76)
PLATELET # BLD AUTO: 171 10*3/MM3 (ref 140–450)
PMV BLD AUTO: 12.2 FL (ref 6–12)
POTASSIUM SERPL-SCNC: 3.8 MMOL/L (ref 3.5–5.2)
PROCALCITONIN SERPL-MCNC: 0.28 NG/ML (ref 0–0.25)
PROT SERPL-MCNC: 6.9 G/DL (ref 6–8.5)
PROTHROMBIN TIME: 21.4 SECONDS (ref 11.7–14.2)
QT INTERVAL: 415 MS
QTC INTERVAL: 470 MS
RBC # BLD AUTO: 3.3 10*6/MM3 (ref 4.14–5.8)
RETICS # AUTO: 0.03 10*6/MM3 (ref 0.02–0.13)
RETICS/RBC NFR AUTO: 0.95 % (ref 0.7–1.9)
RHINOVIRUS RNA SPEC NAA+PROBE: NOT DETECTED
RSV RNA NPH QL NAA+NON-PROBE: NOT DETECTED
SARS-COV-2 RNA NPH QL NAA+NON-PROBE: NOT DETECTED
SINUS: 3.2 CM
SODIUM SERPL-SCNC: 138 MMOL/L (ref 136–145)
STJ: 2.37 CM
TIBC SERPL-MCNC: 279 MCG/DL (ref 298–536)
TRANSFERRIN SERPL-MCNC: 187 MG/DL (ref 200–360)
VIT B12 BLD-MCNC: 1012 PG/ML (ref 211–946)
WBC NRBC COR # BLD AUTO: 14.54 10*3/MM3 (ref 3.4–10.8)

## 2024-10-23 PROCEDURE — 82948 REAGENT STRIP/BLOOD GLUCOSE: CPT

## 2024-10-23 PROCEDURE — 93306 TTE W/DOPPLER COMPLETE: CPT

## 2024-10-23 PROCEDURE — 85379 FIBRIN DEGRADATION QUANT: CPT | Performed by: NURSE PRACTITIONER

## 2024-10-23 PROCEDURE — 80053 COMPREHEN METABOLIC PANEL: CPT | Performed by: INTERNAL MEDICINE

## 2024-10-23 PROCEDURE — 97162 PT EVAL MOD COMPLEX 30 MIN: CPT

## 2024-10-23 PROCEDURE — 82607 VITAMIN B-12: CPT | Performed by: INTERNAL MEDICINE

## 2024-10-23 PROCEDURE — 84145 PROCALCITONIN (PCT): CPT | Performed by: INTERNAL MEDICINE

## 2024-10-23 PROCEDURE — 25510000001 PERFLUTREN 6.52 MG/ML SUSPENSION 2 ML VIAL: Performed by: NURSE PRACTITIONER

## 2024-10-23 PROCEDURE — 85025 COMPLETE CBC W/AUTO DIFF WBC: CPT | Performed by: INTERNAL MEDICINE

## 2024-10-23 PROCEDURE — 85610 PROTHROMBIN TIME: CPT | Performed by: INTERNAL MEDICINE

## 2024-10-23 PROCEDURE — 83615 LACTATE (LD) (LDH) ENZYME: CPT | Performed by: INTERNAL MEDICINE

## 2024-10-23 PROCEDURE — 83036 HEMOGLOBIN GLYCOSYLATED A1C: CPT | Performed by: INTERNAL MEDICINE

## 2024-10-23 PROCEDURE — 25010000002 METHYLPREDNISOLONE PER 125 MG: Performed by: INTERNAL MEDICINE

## 2024-10-23 PROCEDURE — 85045 AUTOMATED RETICULOCYTE COUNT: CPT | Performed by: INTERNAL MEDICINE

## 2024-10-23 PROCEDURE — 83540 ASSAY OF IRON: CPT | Performed by: INTERNAL MEDICINE

## 2024-10-23 PROCEDURE — 99222 1ST HOSP IP/OBS MODERATE 55: CPT | Performed by: NURSE PRACTITIONER

## 2024-10-23 PROCEDURE — 93306 TTE W/DOPPLER COMPLETE: CPT | Performed by: INTERNAL MEDICINE

## 2024-10-23 PROCEDURE — 0202U NFCT DS 22 TRGT SARS-COV-2: CPT | Performed by: NURSE PRACTITIONER

## 2024-10-23 PROCEDURE — 82746 ASSAY OF FOLIC ACID SERUM: CPT | Performed by: INTERNAL MEDICINE

## 2024-10-23 PROCEDURE — 25010000002 FUROSEMIDE PER 20 MG: Performed by: INTERNAL MEDICINE

## 2024-10-23 PROCEDURE — 83735 ASSAY OF MAGNESIUM: CPT | Performed by: INTERNAL MEDICINE

## 2024-10-23 PROCEDURE — 83605 ASSAY OF LACTIC ACID: CPT | Performed by: INTERNAL MEDICINE

## 2024-10-23 PROCEDURE — 63710000001 INSULIN LISPRO (HUMAN) PER 5 UNITS: Performed by: INTERNAL MEDICINE

## 2024-10-23 PROCEDURE — 97110 THERAPEUTIC EXERCISES: CPT

## 2024-10-23 PROCEDURE — 82272 OCCULT BLD FECES 1-3 TESTS: CPT | Performed by: INTERNAL MEDICINE

## 2024-10-23 PROCEDURE — 84466 ASSAY OF TRANSFERRIN: CPT | Performed by: INTERNAL MEDICINE

## 2024-10-23 PROCEDURE — 82728 ASSAY OF FERRITIN: CPT | Performed by: INTERNAL MEDICINE

## 2024-10-23 RX ORDER — WARFARIN SODIUM 10 MG/1
10 TABLET ORAL
Status: DISCONTINUED | OUTPATIENT
Start: 2024-10-23 | End: 2024-10-25

## 2024-10-23 RX ORDER — WARFARIN SODIUM 5 MG/1
5 TABLET ORAL
Status: DISCONTINUED | OUTPATIENT
Start: 2024-10-26 | End: 2024-10-26

## 2024-10-23 RX ADMIN — WARFARIN 7.5 MG: 7.5 TABLET ORAL at 01:32

## 2024-10-23 RX ADMIN — Medication 10 ML: at 20:53

## 2024-10-23 RX ADMIN — DULOXETINE HYDROCHLORIDE 60 MG: 60 CAPSULE, DELAYED RELEASE ORAL at 08:31

## 2024-10-23 RX ADMIN — LEVOTHYROXINE SODIUM 125 MCG: 125 TABLET ORAL at 08:26

## 2024-10-23 RX ADMIN — Medication 1000 UNITS: at 08:29

## 2024-10-23 RX ADMIN — WARFARIN 10 MG: 10 TABLET ORAL at 17:28

## 2024-10-23 RX ADMIN — METHYLPREDNISOLONE SODIUM SUCCINATE 60 MG: 125 INJECTION INTRAMUSCULAR; INTRAVENOUS at 08:29

## 2024-10-23 RX ADMIN — PREGABALIN 150 MG: 75 CAPSULE ORAL at 08:34

## 2024-10-23 RX ADMIN — AMLODIPINE BESYLATE 5 MG: 5 TABLET ORAL at 08:26

## 2024-10-23 RX ADMIN — PRAVASTATIN SODIUM 40 MG: 40 TABLET ORAL at 20:51

## 2024-10-23 RX ADMIN — FUROSEMIDE 40 MG: 10 INJECTION, SOLUTION INTRAMUSCULAR; INTRAVENOUS at 08:29

## 2024-10-23 RX ADMIN — INSULIN LISPRO 3 UNITS: 100 INJECTION, SOLUTION INTRAVENOUS; SUBCUTANEOUS at 17:29

## 2024-10-23 RX ADMIN — METHYLPREDNISOLONE SODIUM SUCCINATE 60 MG: 125 INJECTION INTRAMUSCULAR; INTRAVENOUS at 17:29

## 2024-10-23 RX ADMIN — INSULIN LISPRO 2 UNITS: 100 INJECTION, SOLUTION INTRAVENOUS; SUBCUTANEOUS at 08:29

## 2024-10-23 RX ADMIN — INSULIN LISPRO 3 UNITS: 100 INJECTION, SOLUTION INTRAVENOUS; SUBCUTANEOUS at 12:07

## 2024-10-23 RX ADMIN — METFORMIN HYDROCHLORIDE 850 MG: 850 TABLET, FILM COATED ORAL at 08:28

## 2024-10-23 RX ADMIN — PERFLUTREN 2 ML: 6.52 INJECTION, SUSPENSION INTRAVENOUS at 16:13

## 2024-10-23 RX ADMIN — OXYBUTYNIN CHLORIDE 5 MG: 5 TABLET ORAL at 08:34

## 2024-10-23 RX ADMIN — PREGABALIN 150 MG: 75 CAPSULE ORAL at 20:51

## 2024-10-23 RX ADMIN — LOSARTAN POTASSIUM 100 MG: 100 TABLET, FILM COATED ORAL at 08:28

## 2024-10-23 RX ADMIN — OXYCODONE HYDROCHLORIDE AND ACETAMINOPHEN 500 MG: 500 TABLET ORAL at 08:29

## 2024-10-23 RX ADMIN — OXYBUTYNIN CHLORIDE 5 MG: 5 TABLET ORAL at 20:51

## 2024-10-23 RX ADMIN — Medication 10 ML: at 08:32

## 2024-10-23 RX ADMIN — ATENOLOL 12.5 MG: 25 TABLET ORAL at 08:26

## 2024-10-23 RX ADMIN — Medication 10 ML: at 00:32

## 2024-10-23 RX ADMIN — METHYLPREDNISOLONE SODIUM SUCCINATE 60 MG: 125 INJECTION INTRAMUSCULAR; INTRAVENOUS at 00:28

## 2024-10-23 RX ADMIN — PRAVASTATIN SODIUM 40 MG: 40 TABLET ORAL at 01:32

## 2024-10-23 RX ADMIN — METFORMIN HYDROCHLORIDE 850 MG: 850 TABLET, FILM COATED ORAL at 17:29

## 2024-10-23 RX ADMIN — INSULIN LISPRO 3 UNITS: 100 INJECTION, SOLUTION INTRAVENOUS; SUBCUTANEOUS at 21:05

## 2024-10-23 RX ADMIN — PREGABALIN 150 MG: 75 CAPSULE ORAL at 00:28

## 2024-10-23 RX ADMIN — OXYBUTYNIN CHLORIDE 15 MG: 10 TABLET, EXTENDED RELEASE ORAL at 20:51

## 2024-10-23 RX ADMIN — EMPAGLIFLOZIN 25 MG: 25 TABLET, FILM COATED ORAL at 08:29

## 2024-10-23 RX ADMIN — Medication 500 MCG: at 08:28

## 2024-10-23 RX ADMIN — Medication 1 TABLET: at 08:26

## 2024-10-23 NOTE — PROGRESS NOTES
[facility]    [phone]   LOS: 0 days     Name: Riky Moon  Age/Sex: 84 y.o. male  :  1940        PCP: Marco Carrillo MD  Chief Complaint   Patient presents with    URI    Urinary Frequency      Subjective   Feeling better this afternoon.  Describes sensation of sometimes getting food stuck when he swallowing.  He also feels like at times he gets choked up when trying to swallow.  Does feel like he is breathing better.  He is unsure if he was gaining any weight at home.  General: No Fever or Chills, C GI: No Nausea, Vomiting, or Diarrhea, and Other: No bleeding    amLODIPine, 5 mg, Oral, Daily  vitamin C, 500 mg, Oral, Daily  atenolol, 12.5 mg, Oral, Daily  cholecalciferol, 1,000 Units, Oral, QAM  DULoxetine, 60 mg, Oral, QAM  empagliflozin, 25 mg, Oral, Daily  [Held by provider] furosemide, 40 mg, Intravenous, BID  insulin lispro, 2-7 Units, Subcutaneous, 4x Daily AC & at Bedtime  levothyroxine, 125 mcg, Oral, Daily  losartan, 100 mg, Oral, Daily  metFORMIN, 850 mg, Oral, BID With Meals  methylPREDNISolone sodium succinate, 60 mg, Intravenous, Q8H  multivitamin, 1 tablet, Oral, QAM  oxybutynin, 5 mg, Oral, Q12H  oxybutynin XL, 15 mg, Oral, Nightly  pravastatin, 40 mg, Oral, Nightly  pregabalin, 150 mg, Oral, Q12H  sodium chloride, 10 mL, Intravenous, Q12H  vitamin B-12, 500 mcg, Oral, QAM  warfarin, 10 mg, Oral, Once per day on   [START ON 10/26/2024] warfarin, 5 mg, Oral, Once per day on       Pharmacy to dose warfarin,         Objective   Vital Signs  Temp:  [97.3 °F (36.3 °C)-99.6 °F (37.6 °C)] 97.3 °F (36.3 °C)  Heart Rate:  [58-89] 74  Resp:  [18-22] 18  BP: ()/(46-79) 117/65  Body mass index is 40.27 kg/m².    Intake/Output Summary (Last 24 hours) at 10/23/2024 1500  Last data filed at 10/23/2024 1254  Gross per 24 hour   Intake 340 ml   Output 1650 ml   Net -1310 ml       Physical Exam  Vitals reviewed.    Constitutional:       General: He is not in acute distress.     Appearance: He is obese. He is ill-appearing.   Cardiovascular:      Rate and Rhythm: Normal rate and regular rhythm.   Pulmonary:      Effort: No respiratory distress.      Breath sounds: Normal breath sounds.   Abdominal:      General: Bowel sounds are normal. There is no distension.      Palpations: Abdomen is soft.   Musculoskeletal:      Right lower leg: Edema present.      Left lower leg: Edema present.   Neurological:      General: No focal deficit present.      Mental Status: He is alert and oriented to person, place, and time. Mental status is at baseline.           Results Review:       I reviewed the patient's new clinical results.  Results from last 7 days   Lab Units 10/23/24  0254 10/22/24  1517   WBC 10*3/mm3 14.54* 17.81*   HEMOGLOBIN g/dL 12.6* 12.1*   PLATELETS 10*3/mm3 171 166     Results from last 7 days   Lab Units 10/23/24  0254 10/22/24  1517   SODIUM mmol/L 138 136   POTASSIUM mmol/L 3.8 4.1   CHLORIDE mmol/L 100 102   CO2 mmol/L 25.1 25.9   BUN mg/dL 33* 28*   CREATININE mg/dL 1.64* 1.22   CALCIUM mg/dL 8.7 9.0   MAGNESIUM mg/dL 2.0  --    Estimated Creatinine Clearance: 47 mL/min (A) (by C-G formula based on SCr of 1.64 mg/dL (H)).  Lab Results   Component Value Date    HGBA1C 6.70 (H) 10/23/2024    HGBA1C 7.2 (H) 07/01/2024    HGBA1C 7.0 (H) 02/27/2024     Glucose   Date/Time Value Ref Range Status   10/23/2024 1144 247 (H) 70 - 130 mg/dL Final   10/23/2024 0759 187 (H) 70 - 130 mg/dL Final   10/22/2024 1816 190 (H) 70 - 130 mg/dL Final         Assessment & Plan   Active Hospital Problems    Diagnosis  POA    CHF (congestive heart failure) [I50.9]  Unknown    Atrial flutter with controlled response [I48.92]  Yes    Gastroesophageal reflux disease [K21.9]  Yes    Constipation [K59.00]  Yes    Hypothyroidism [E03.9]  Yes    Diabetes mellitus [E11.9]  Yes    Essential hypertension [I10]  Yes    KINDRA treated with auto BiPAP  awaiting bed, no change [G47.33]  Yes    COPD (chronic obstructive pulmonary disease) [J44.9]  Yes    Asthma [J45.909]  Yes      Resolved Hospital Problems   No resolved problems to display.       PLAN  This is a chronically ill 84-year-old gentleman with past medical history pertinent for heart failure atrial flutter hypothyroidism type 2 diabetes hypertension obstructive sleep apnea and COPD who presents to the hospital with weakness and shortness of breath and is admitted with presumed heart failure and COPD exacerbations  -Diuresed about 1.3 L.  Creatinine up to 1.6 and BUN increasing.  No evidence of contraction alkalosis.  Will hold on additional IV diuretics today  -Acute renal failure secondary to overdiuresis and volume depletion.  Check uric acid in the morning  -Continue IV steroids and breathing treatments treating for COPD exacerbation  -With both components of upper pharyngeal dysphagia and esophageal components of dysphagia we will check an esophagram and video swallow study.  -Sputum culture ordered and respiratory viral panel ordered.  RVP is negative  -D-dimer reviewed and negative little concern for pulmonary embolus at this time.  -Blood sugars are expectedly elevated and uncontrolled due to the IV steroids.  Continue sliding scale coverage of hyperglycemia may need additional coverage but hopefully for now we can continue with sliding scale coverage and transition to p.o. steroids tomorrow  -Discontinue Jardiance today with worsening creatinine  -Anemia of chronic disease with noted iron deficiency  -Hemoglobin stable  -Mechanical DVT prophylaxis  -Full code  Disposition  Expected discharge date/ time has not been documented.       Jacobo Ferrera MD  [facility]  10/23/24  15:00 EDT

## 2024-10-23 NOTE — CONSULTS
Patient Name: Riky Moon  :1940  84 y.o.    Date of Admission: 10/22/2024  Date of Consultation:  10/23/24  Encounter Provider: EVON Holbrook  Place of Service: [facility] CARDIOLOGY  Referring Provider: No Known Provider  Patient Care Team:  Marco Carrillo MD as PCP - General (Family Medicine)  Cosmo French MD as Consulting Physician (Sleep Medicine)  Ty Shelton MD as Consulting Physician (Cardiology)  Marco Carrillo MD as Referring Physician (Family Medicine)  Chio Gilliam MD as Consulting Physician (Hematology and Oncology)      Chief complaint: cough, SOA     History of Present Illness: Mr. Moon is an 84 year old gentleman followed by Dr. Shelton. He has hypertension, hyperlipidemia, KINDRA on BiPAP, diabetes mellitus type II and atrial flutter. He is on chronic anticoagulation with warfarin. He has chronic lymphedema.     He presented to the emergency room on 10/22 with complaints of dyspnea on exertion and weakness. He has a cough and sounds hoarse. He feels like he has a virus. He thinks his weight has been about the same.     Procal mildly elevated. WBC count 17K. Hgb 12. Creatinine 1.22. proBNP 3083. HS troponin 42 then 40. INR 1.83 -- he says generally this has been easily managed. He was 1.9 last week and increased warfarin dose one day a week.     He got 80 mg IV lasix.     Creatinine up to 1.6 today. Modest UOP. Some untracked urine. Weight is actually down some since prior -- though bed scale used.     Echo 2022  Mild to moderate aortic valve stenosis is present. Aortic valve area is 1.57 cm2.  Peak velocity of the flow distal to the aortic valve is 257.9 cm/s. Aortic valve maximum pressure gradient is 26.6 mmHg. Aortic valve mean pressure gradient is 17.5 mmHg. Aortic valve dimensionless index is 0.3 .  Left ventricular wall thickness is consistent with mild to moderate concentric hypertrophy.  Estimated left ventricular  EF = 69% Left ventricular systolic function is normal.  Left ventricular diastolic function was indeterminate.  Estimated right ventricular systolic pressure from tricuspid regurgitation is mildly elevated (35-45 mmHg). Calculated right ventricular systolic pressure from tricuspid regurgitation is 38.3 mmHg.    Holter 7/15/22  An abnormal monitor study.  The predominant rhythm noted during the testing period was atrial flutter  Average HR: 57. Min HR: 32. Max HR: 100.  Premature ventricular contractions occured rarely    Past Medical History:   Diagnosis Date    Acid reflux     Anemia     Arthritis     OSTEO    Asthma     Chest discomfort     Colon polyp     COPD (chronic obstructive pulmonary disease)     Depression     Diabetes mellitus     type 2    Disease of thyroid gland     Emphysema lung     High cholesterol     Hypertension     Morbid obesity     Neuropathy     BOTH FEET    Obesity, Class III, BMI 40-49.9 (morbid obesity)     PAD (peripheral artery disease)     Poor circulation of extremity     LEFT LEG    Sleep apnea     Vitamin D deficiency        Past Surgical History:   Procedure Laterality Date    APPENDECTOMY      CATARACT EXTRACTION W/ INTRAOCULAR LENS IMPLANT Bilateral     CHOLECYSTECTOMY      COLONOSCOPY  01/01/2014    COLONOSCOPY W/ POLYPECTOMY      BENIGN    HERNIA REPAIR      INTERSTIM PLACEMENT Left 08/30/2016    BLADDER CONTROL    KNEE ARTHROPLASTY Right     MOUTH SURGERY  06/26/2018    NOSE SURGERY      REMOVED BLOCKAGE     ROTATOR CUFF REPAIR Right     TOTAL HIP ARTHROPLASTY Right 11/9/2017    Procedure: RIGHT TOTAL HIP ARTHROPLASTY;  Surgeon: Riky Fernando MD;  Location: [facility] OR;  Service:          Prior to Admission medications    Medication Sig Start Date End Date Taking? Authorizing Provider   amLODIPine (NORVASC) 5 MG tablet TAKE 1 TABLET BY MOUTH DAILY 7/3/23  Yes Marco Carrillo MD   Ascorbic Acid (VITAMIN C PO) Take 1 tablet by mouth Daily.   Yes Provider, MD Alysia    atenolol (TENORMIN) 25 MG tablet Take 0.5 tablets by mouth Daily. 8/6/24  Yes Roseline Silva APRN   Cholecalciferol (VITAMIN D) 1000 units tablet Take 1 tablet by mouth Every Morning.   Yes Alysia Romeo MD   DULoxetine (CYMBALTA) 60 MG capsule Take 1 capsule by mouth Every Morning. 11/6/19  Yes Marco Carrillo MD   Empagliflozin (Jardiance) 10 MG tablet Take 10 mg by mouth Daily.  Patient taking differently: Take 2.5 tablets by mouth Daily. 9/23/20  Yes Marco Carrillo MD   losartan (COZAAR) 100 MG tablet Take 1 tablet by mouth Daily. 11/6/19  Yes Marco Carrillo MD   metFORMIN (GLUCOPHAGE) 850 MG tablet Take 1 tablet by mouth 2 (Two) Times a Day With Meals. 9/18/20  Yes Marco Carrillo MD   Multiple Vitamin (MULTIVITAMIN) tablet Take 1 tablet by mouth Every Morning.   Yes Alysia Romeo MD   oxybutynin XL (DITROPAN XL) 15 MG 24 hr tablet Take 1 tablet by mouth Every Night.   Yes Alysia Romeo MD   pravastatin (PRAVACHOL) 40 MG tablet Take 1 tablet by mouth Every Night. 11/6/19  Yes Marco Carrillo MD   pregabalin (LYRICA) 150 MG capsule Take 1 capsule by mouth 2 (Two) Times a Day.   Yes Alysia Romeo MD   vitamin B-12 (CYANOCOBALAMIN) 1000 MCG tablet Take 0.5 tablets by mouth Every Morning.   Yes Alysia Romeo MD   furosemide (Lasix) 20 MG tablet Take 1 tablet by mouth Daily for 3 days. 2/27/24 3/1/24  Marco Carrillo MD   glucose blood (LAINEY CONTOUR TEST) test strip Use as instructed to test glucose 7/9/18   Marco Carrillo MD   levothyroxine (Synthroid) 125 MCG tablet Take 1 tablet by mouth Daily. 5/7/23   Marco Carrillo MD   MICROLET LANCETS misc Use daily as directed to test glucose 7/9/18   Marco Carrillo MD   oxybutynin (DITROPAN) 5 MG tablet Take 1 tablet by mouth Every 12 (Twelve) Hours. 4/19/23   Provider, MD Alysia   Synthroid 125 MCG tablet TAKE 1 TABLET BY MOUTH DAILY 5/13/24   Marco Carrillo MD   warfarin (COUMADIN) 5 MG tablet  Take one tablet (5 mg) by mouth on Monday and , and take two tablets (10 mg) by mouth all other days or as directed. 24   Roseline Silva APRN       Allergies   Allergen Reactions    Lisinopril Unknown - High Severity     Other reaction(s): Cough       Social History     Socioeconomic History    Marital status:      Spouse name: Chitra    Years of education: high school   Tobacco Use    Smoking status: Former     Current packs/day: 0.00     Average packs/day: 1.5 packs/day for 40.0 years (60.0 ttl pk-yrs)     Types: Cigarettes     Start date:      Quit date:      Years since quittin.8    Smokeless tobacco: Never    Tobacco comments:     from age [age]   Vaping Use    Vaping status: Never Used   Substance and Sexual Activity    Alcohol use: Yes     Comment: HOLIDAYS  caffeine -2 cups coffee daily    Drug use: No     Comment: PRN use for pain    Sexual activity: Defer       Family History   Problem Relation Age of Onset    Stroke Mother     Hypertension Mother     Heart attack Father     Alcohol abuse Father     Heart disease Father     Diabetes Sister     Heart disease Brother     Stomach cancer Brother     Stroke Brother     Alcohol abuse Brother     Hypertension Daughter     Diabetes Daughter     Malig Hyperthermia Neg Hx        REVIEW OF SYSTEMS:   All systems reviewed.  Pertinent positives identified in HPI.  All other systems are negative.      Objective:     Vitals:    10/22/24 2126 10/23/24 0500 10/23/24 0717 10/23/24 0723   BP: 108/56 113/74  110/58   BP Location: Right arm Left arm  Right arm   Patient Position: Lying Lying  Lying   Pulse: 71 58  69   Resp: 20 20  20   Temp: 98.4 °F (36.9 °C) 97.7 °F (36.5 °C)     TempSrc: Oral Axillary     SpO2: 96% 93%  95%   Weight:   135 kg (296 lb 15.4 oz)    Height:         Body mass index is 40.27 kg/m².    Physical Exam:  Constitutional: He is oriented to person, place, and time. He appears well-developed. He does not appear ill.    HENT:   Head: Normocephalic and atraumatic. Head is without contusion.   Right Ear: Hearing normal. No drainage.   Left Ear: Hearing normal. No drainage.   Nose: No nasal deformity. No epistaxis.   Eyes: Lids are normal. Right eye exhibits no exudate. Left eye exhibits no exudate.  Neck:unable to assess JVD    Cardiovascular: Normal rate, regular rhythm and normal heart sounds.    Pulses:       Posterior tibial pulses are 2+ on the right side, and 2+ on the left side.   Pulmonary/Chest: Effort normal and breath sounds normal.   Abdominal: Soft. Normal appearance and bowel sounds are normal. There is no tenderness.   Musculoskeletal: Normal range of motion.        Right shoulder: He exhibits no deformity.        Left shoulder: He exhibits no deformity.   Neurological: He is alert and oriented to person, place, and time. He has normal strength.   Skin: Skin is warm, dry and intact. No rash noted.   Psychiatric: He has a normal mood and affect. His behavior is normal. Thought content normal.   Vitals reviewed        Lab Review:     Results from last 7 days   Lab Units 10/23/24  0254   SODIUM mmol/L 138   POTASSIUM mmol/L 3.8   CHLORIDE mmol/L 100   CO2 mmol/L 25.1   BUN mg/dL 33*   CREATININE mg/dL 1.64*   CALCIUM mg/dL 8.7   BILIRUBIN mg/dL 0.8   ALK PHOS U/L 63   ALT (SGPT) U/L 17   AST (SGOT) U/L 25   GLUCOSE mg/dL 213*     Results from last 7 days   Lab Units 10/22/24  1920 10/22/24  1713 10/22/24  1517   HSTROP T ng/L 40* 42* 46*     Results from last 7 days   Lab Units 10/23/24  0254   WBC 10*3/mm3 14.54*   HEMOGLOBIN g/dL 12.6*   HEMATOCRIT % 36.2*   PLATELETS 10*3/mm3 171     Results from last 7 days   Lab Units 10/23/24  0254 10/22/24  1702 10/22/24  1559   INR  1.83* 2* 2.20     Results from last 7 days   Lab Units 10/23/24  0254   MAGNESIUM mg/dL 2.0                   Current Facility-Administered Medications:     acetaminophen (TYLENOL) tablet 650 mg, 650 mg, Oral, Q4H PRN **OR** acetaminophen (TYLENOL)  160 MG/5ML oral solution 650 mg, 650 mg, Oral, Q4H PRN **OR** acetaminophen (TYLENOL) suppository 650 mg, 650 mg, Rectal, Q4H PRN, Mainor Pope MD    amLODIPine (NORVASC) tablet 5 mg, 5 mg, Oral, Daily, Mainor Pope MD    ascorbic acid (VITAMIN C) tablet 500 mg, 500 mg, Oral, Daily, Mainor Pope MD    atenolol (TENORMIN) tablet 12.5 mg, 12.5 mg, Oral, Daily, Mainor Pope MD    Calcium Replacement - Follow Nurse / BPA Driven Protocol, , Does not apply, PRN, Mainor Pope MD    cholecalciferol (VITAMIN D3) tablet 1,000 Units, 1,000 Units, Oral, QAM, Mainor Pope MD    dextrose (D50W) (25 g/50 mL) IV injection 25 g, 25 g, Intravenous, Q15 Min PRN, Mainor Pope MD    dextrose (GLUTOSE) oral gel 15 g, 15 g, Oral, Q15 Min PRN, Mainor Pope MD    DULoxetine (CYMBALTA) DR capsule 30 mg, 30 mg, Oral, Daily, Mainor Pope MD    DULoxetine (CYMBALTA) DR capsule 60 mg, 60 mg, Oral, QAM, Mainor Pope MD    empagliflozin (JARDIANCE) tablet 25 mg, 25 mg, Oral, Daily, Mainor Pope MD    furosemide (LASIX) injection 40 mg, 40 mg, Intravenous, BID, Mainor Pope MD    glucagon (GLUCAGEN) injection 1 mg, 1 mg, Intramuscular, Q15 Min PRN, Mainor Pope MD    influenza vac split high-dose (FLUZONE HIGH DOSE) injection 0.5 mL, 0.5 mL, Intramuscular, During Hospitalization, Mainor Pope MD    insulin lispro (HUMALOG/ADMELOG) injection 2-7 Units, 2-7 Units, Subcutaneous, 4x Daily AC & at Bedtime, Mainor Pope MD    ipratropium-albuterol (DUO-NEB) nebulizer solution 3 mL, 3 mL, Nebulization, Q4H PRN, Mainor Pope MD    levothyroxine (SYNTHROID, LEVOTHROID) tablet 125 mcg, 125 mcg, Oral, Daily, Mainor Pope MD    losartan (COZAAR) tablet 100 mg, 100 mg, Oral, Daily, Mainor Pope MD    Magnesium Standard Dose Replacement - Follow Nurse / BPA Driven Protocol, , Does not apply, PRN, Mainor Pope MD    metFORMIN (GLUCOPHAGE) tablet 850 mg, 850 mg, Oral, BID With Meals, Mainor Pope MD    methylPREDNISolone sodium succinate  (SOLU-Medrol) injection 60 mg, 60 mg, Intravenous, Q8H, Mainor Pope MD, 60 mg at 10/23/24 0028    multivitamin (THERAGRAN) tablet 1 tablet, 1 tablet, Oral, Niko TONEY Jawed, MD    nitroglycerin (NITROSTAT) SL tablet 0.4 mg, 0.4 mg, Sublingual, Q5 Min PRN, Mainor Pope MD    ondansetron (ZOFRAN) injection 4 mg, 4 mg, Intravenous, Q6H PRN, Mainor Pope MD    oxybutynin (DITROPAN) tablet 5 mg, 5 mg, Oral, Q12H, Mainor Pope MD    oxybutynin XL (DITROPAN-XL) 24 hr tablet 15 mg, 15 mg, Oral, Nightly, Mainor Pope MD    Pharmacy to dose warfarin, , Does not apply, Continuous PRN, Mainor Pope MD    Phosphorus Replacement - Follow Nurse / BPA Driven Protocol, , Does not apply, PRN, Mainor Pope MD    Potassium Replacement - Follow Nurse / BPA Driven Protocol, , Does not apply, PRN, Mainor Pope MD    pravastatin (PRAVACHOL) tablet 40 mg, 40 mg, Oral, Nightly, Mainor Pope MD, 40 mg at 10/23/24 0132    pregabalin (LYRICA) capsule 150 mg, 150 mg, Oral, Q12H, Mainor Pope MD, 150 mg at 10/23/24 0028    [COMPLETED] Insert peripheral IV, , , Once **AND** sodium chloride 0.9 % flush 10 mL, 10 mL, Intravenous, PRN, Mainor Pope MD    sodium chloride 0.9 % flush 10 mL, 10 mL, Intravenous, Q12H, Mainor Pope MD, 10 mL at 10/23/24 0032    sodium chloride 0.9 % flush 10 mL, 10 mL, Intravenous, PRN, Mainor Pope MD    vitamin B-12 (CYANOCOBALAMIN) tablet 500 mcg, 500 mcg, Oral, QANiko ELIZABETH Jawed, MD    Assessment and Plan:       Active Hospital Problems    Diagnosis  POA    CHF (congestive heart failure) [I50.9]  Unknown    Atrial flutter with controlled response [I48.92]  Yes    Gastroesophageal reflux disease [K21.9]  Yes    Constipation [K59.00]  Yes    Hypothyroidism [E03.9]  Yes    Diabetes mellitus [E11.9]  Yes    Essential hypertension [I10]  Yes    KINDRA treated with auto BiPAP [G47.33]  Yes    COPD (chronic obstructive pulmonary disease) [J44.9]  Yes    Asthma [J45.909]  Yes      Resolved Hospital Problems   No  resolved problems to display.     Dyspnea on exertion, weakness. I think this is likely multifactorial. Unclear how much heart failure is really contributing. Will check an echocardiogram to help iron this out. His waste products increased with diuretics. Check RVP and ddimer (low brittanie for PE - if positive, will obtain LE duplex and go from there. Got IV lasix this morning. Reassess after AM labs to determine ongoing diuretic needs.   Chronic HFpEF  Persistent atrial flutter with with controlled v response . On warfarin. Mildly subtherapeutic.   COPD  Hypertension  Obstructive sleep apnea, compliant with CPAP  Morbid obesity BMI 40  Chronic lymphedema   Anemia   Chronic kidney disease    Laura Lea, APRN  10/23/24  08:01 EDT

## 2024-10-23 NOTE — PLAN OF CARE
Goal Outcome Evaluation:   A direct admit patient from [facility]  ED a 84 year old male with the chief complaints of congestion ,weakness and urinary frequency and with the diagnosis of UTI , copd exacerbation and CHF. Alert and oriented , he  is a big patient weigh > 300 lbs. Uses his own CPAP machine at nights. He is a flutter / a fib on the monitor. On RA tolerated . Consult to Cardiology called. Continue to monitor.                                          Levothyroxine dose increased to 50 mcg daily

## 2024-10-23 NOTE — PLAN OF CARE
Goal Outcome Evaluation:      Patient is a 84 y.o. male admitted to [facility] on 10/22/2024 for SOA, asthma, and generalized weakness. PMHx includes COPD, diabetes, obesity, and chronic lymphedema. Patient is ambulatory with a rollator at baseline and lives in an apartment with his spouse- he is her primary caregiver. Today, patient performed bed mobility with Lulu, required CGA for transfers, and ambulated 50' CGA with a RW. Strength and endurance deficits noted. Patient may benefit from skilled PT services acutely to address functional deficits as well as improve level of independence prior to discharge. Anticipate returning home with possible  PT upon DC.      Anticipated Discharge Disposition (PT): home with home health, home

## 2024-10-23 NOTE — PROGRESS NOTES
[facility] Clinical Pharmacy Services: Warfarin Dosing/Monitoring Consult    Riky Moon is a 84 y.o. male, estimated creatinine clearance is 63.9 mL/min (by C-G formula based on SCr of 1.22 mg/dL). weighing (!) 138 kg (305 lb).    Results from last 7 days   Lab Units 10/23/24  0254 10/22/24  1702 10/22/24  1559 10/22/24  1517   INR  1.83* 2* 2.20  --    HEMOGLOBIN g/dL 12.6*  --   --  12.1*   HEMATOCRIT % 36.2*  --   --  35.9*   PLATELETS 10*3/mm3 171  --   --  166     Prior to admission anticoagulation: Per [facility] AC clinic last dosing regimen as follows: warfarin 5 mg PO Mon/Sat and 10 mg all other days of the week.     Hospital Anticoagulation:  Consulting provider: Dr. Pope  Start date: 10/22/2024 - home med  Indication: A Fib - requiring full anticoagulation  Target INR: 2 - 3  Expected duration: lifelong   Bridge Therapy: No      Potential food or drug interactions:     Education complete?/Date: No; plan for follow up TBD    Assessment/Plan:  Will restart home regimen of warfarin and anticipate that INR will return to therapeutic window.    Pharmacy will continue to follow until discharge or discontinuation of warfarin.     Lashawn DodsonD, BCPS

## 2024-10-23 NOTE — CASE MANAGEMENT/SOCIAL WORK
Discharge Planning Assessment  [facility]     Patient Name: Riky Moon  MRN: [id number]  Today's Date: 10/23/2024    Admit Date: 10/22/2024    Plan: Return to [facility] Independent Living apartment with wife and do onsite PT. Family transport.   Discharge Needs Assessment       Row Name 10/23/24 1554       Living Environment    People in Home spouse    Name(s) of People in Home Pt lives in an independent living apartment at [facility] with his wife    Current Living Arrangements apartment;independent living facility    Potentially Unsafe Housing Conditions none    In the past 12 months has the electric, gas, oil, or water company threatened to shut off services in your home? No    Primary Care Provided by self    Provides Primary Care For spouse    Caregiving Concerns Wife has dementia    Family Caregiver if Needed child(fritz), adult    Quality of Family Relationships helpful;involved;supportive    Able to Return to Prior Arrangements yes       Resource/Environmental Concerns    Resource/Environmental Concerns none    Transportation Concerns none       Transportation Needs    In the past 12 months, has lack of transportation kept you from medical appointments or from getting medications? no    In the past 12 months, has lack of transportation kept you from meetings, work, or from getting things needed for daily living? No       Food Insecurity    Within the past 12 months, you worried that your food would run out before you got the money to buy more. Never true    Within the past 12 months, the food you bought just didn't last and you didn't have money to get more. Never true       Transition Planning    Patient/Family Anticipates Transition to home with family    Patient/Family Anticipated Services at Transition other (see comments)  Wants to go to PT onsite at [facility]    Transportation Anticipated family or friend will provide       Discharge Needs Assessment    Readmission  Within the Last 30 Days no previous admission in last 30 days    Equipment Currently Used at Home glucometer;cpap;rollator;other (see comments)  INR meter    Concerns to be Addressed care coordination/care conferences;discharge planning    Do you want help finding or keeping work or a job? I do not need or want help    Do you want help with school or training? For example, starting or completing job training or getting a high school diploma, GED or equivalent No    Anticipated Changes Related to Illness none    Equipment Needed After Discharge none    Outpatient/Agency/Support Group Needs outpatient therapy    Discharge Facility/Level of Care Needs other (see comments);independent living facility    Provided Post Acute Provider List? Yes    Provided Post Acute Provider Quality & Resource List? Refused    Refused Quality and Resource List Comment States he wants to go to PT onsite at [facility] and does not want HH or SNF    Delivered To Patient    Method of Delivery In person    Offered/Gave Vendor List yes    Current Discharge Risk chronically ill                   Discharge Plan       Row Name 10/23/24 7155       Plan    Plan Return to [facility] apartment with wife and do onsite PT. Family transport.    Patient/Family in Agreement with Plan yes    Plan Comments Met with pt at bedside. Explained roll of . Face sheet and pharmacy verified. Pt lives with his spouse in an independent living apartment at [facility]. Only service they receive is supper daily. Pt states his wife has dementia and he is her primary caregiver. States his wife is mobile and just has issues with memory and fidgeting. There are no steps to enter home. Home DME includes a glucometer, CPAP, INR meter, and Rollator.  States he uses his rollator when out in community but is independent.  Pt is independent with ADLs. Pt has been to [facility] for Rehab and has used [facility]. Pt  states he does not want HH or SNF at D/C. States he wants to do PT onsite at [place]. Ambulated 50' CGA with a RW with PT today.  Pt's PCP is Marco Carrillo MD. Enrolled in Meds to Bed. Pt drives himself to appointments. At discharge, family will transport. Explained that CCP would follow to assess for discharge needs.  Cornelio Zhao RN-BC                  Continued Care and Services - Admitted Since 10/22/2024    No active coordination exists for this encounter.       Expected Discharge Date and Time       Expected Discharge Date Expected Discharge Time    Oct 25, 2024            Demographic Summary       Row Name 10/23/24 1553       General Information    Admission Type observation    Arrived From emergency department    Required Notices Provided Observation Status Notice    Reason for Consult care coordination/care conference;discharge planning    Preferred Language English                   Functional Status       Row Name 10/23/24 1553       Functional Status    Usual Activity Tolerance moderate    Current Activity Tolerance moderate       Functional Status, IADL    Medications independent    Meal Preparation independent    Housekeeping independent    Laundry independent    Shopping assistive equipment    If for any reason you need help with day-to-day activities such as bathing, preparing meals, shopping, managing finances, etc., do you get the help you need? I don't need any help       Mental Status    General Appearance WDL WDL       Mental Status Summary    Recent Changes in Mental Status/Cognitive Functioning no changes       Employment/    Employment Status retired                        Cornelio Zhao, RN

## 2024-10-23 NOTE — PROGRESS NOTES
[facility] Clinical Pharmacy Services: Warfarin Dosing/Monitoring Consult    Riky Moon is a 84 y.o. male, estimated creatinine clearance is 63.9 mL/min (by C-G formula based on SCr of 1.22 mg/dL). weighing (!) 138 kg (305 lb).    Results from last 7 days   Lab Units 10/22/24  1702 10/22/24  1559 10/22/24  1517   INR  2* 2.20  --    HEMOGLOBIN g/dL  --   --  12.1*   HEMATOCRIT %  --   --  35.9*   PLATELETS 10*3/mm3  --   --  166     Prior to admission anticoagulation: Per [facility] AC clinic last dosing regimen as follows: warfarin 5 mg PO Mon/Sat and 10 mg all other days of the week.     Hospital Anticoagulation:  Consulting provider: Dr. Pope  Start date: 10/22/2024 - home med  Indication: A Fib - requiring full anticoagulation  Target INR: 2 - 3  Expected duration: lifelong   Bridge Therapy: No      Potential food or drug interactions:     Education complete?/Date: No; plan for follow up TBD    Assessment/Plan:  INR therapeutic at 2.00 - will order warfarin 7.5 mg PO x1 now.  Monitor for any signs or symptoms of bleeding  Follow up daily INRs and dose adjustments    Pharmacy will continue to follow until discharge or discontinuation of warfarin.     Jeffrey Ballard [facility]  Clinical Pharmacist

## 2024-10-23 NOTE — THERAPY EVALUATION
Patient Name: Riky Moon  : 1940    MRN: [id number]                              Today's Date: 10/23/2024       Admit Date: 10/22/2024    Visit Dx:     ICD-10-CM ICD-9-CM   1. Acute UTI  N39.0 599.0   2. COPD with acute exacerbation  J44.1 491.21   3. Weakness generalized  R53.1 780.79   4. Shortness of breath  R06.02 786.05   5. Acute congestive heart failure, unspecified heart failure type  I50.9 428.0   6. Elevated troponin  R79.89 790.6   7. Chronic anticoagulation  Z79.01 V58.61     Patient Active Problem List   Diagnosis    OA (osteoarthritis) of hip    KINDRA treated with auto BiPAP    Chest pain    Diabetes mellitus    Essential hypertension    Hyperlipidemia    Hypothyroidism    Asthma    Overactive bladder    CAP (community acquired pneumonia)    Cellulitis of left lower extremity    COPD (chronic obstructive pulmonary disease)    Dyslipidemia    Gastroesophageal reflux disease    Leukocytosis    Peripheral nerve disease    Constipation    Oropharyngeal dysphagia    Bronchitis    Class 3 severe obesity due to excess calories with serious comorbidity and body mass index (BMI) of 40.0 to 44.9 in adult    Left leg swelling    Anemia    Leukocytosis    Circadian rhythm sleep disorder, delayed sleep phase type    Atrial flutter with controlled response    UTI (urinary tract infection)    CHF (congestive heart failure)     Past Medical History:   Diagnosis Date    Acid reflux     Anemia     Arthritis     OSTEO    Asthma     Chest discomfort     Colon polyp     COPD (chronic obstructive pulmonary disease)     Depression     Diabetes mellitus     type 2    Disease of thyroid gland     Emphysema lung     High cholesterol     Hypertension     Morbid obesity     Neuropathy     BOTH FEET    Obesity, Class III, BMI 40-49.9 (morbid obesity)     PAD (peripheral artery disease)     Poor circulation of extremity     LEFT LEG    Sleep apnea     Vitamin D deficiency      Past Surgical History:   Procedure Laterality  Date    APPENDECTOMY      CATARACT EXTRACTION W/ INTRAOCULAR LENS IMPLANT Bilateral     CHOLECYSTECTOMY      COLONOSCOPY  01/01/2014    COLONOSCOPY W/ POLYPECTOMY      BENIGN    HERNIA REPAIR      INTERSTIM PLACEMENT Left 08/30/2016    BLADDER CONTROL    KNEE ARTHROPLASTY Right     MOUTH SURGERY  06/26/2018    NOSE SURGERY      REMOVED BLOCKAGE     ROTATOR CUFF REPAIR Right     TOTAL HIP ARTHROPLASTY Right 11/9/2017    Procedure: RIGHT TOTAL HIP ARTHROPLASTY;  Surgeon: Riky Fernando MD;  Location: [facility] MAIN OR;  Service:       General Information       Row Name 10/23/24 1037          Physical Therapy Time and Intention    Document Type evaluation  -MS     Mode of Treatment physical therapy  -MS       Row Name 10/23/24 1037          General Information    Patient Profile Reviewed yes  -MS     Prior Level of Function independent:;all household mobility  ambulatory with a rollator, CG for spouse, denies falls  -MS     Existing Precautions/Restrictions fall  -MS     Barriers to Rehab medically complex  -MS       Row Name 10/23/24 1037          Living Environment    People in Home spouse  -MS       Row Name 10/23/24 1037          Home Main Entrance    Number of Stairs, Main Entrance none  -MS       Row Name 10/23/24 1037          Stairs Within Home, Primary    Number of Stairs, Within Home, Primary none  -MS       Row Name 10/23/24 1037          Cognition    Orientation Status (Cognition) oriented x 4  -MS       Row Name 10/23/24 1037          Safety Issues/Impairments Affecting Functional Mobility    Impairments Affecting Function (Mobility) strength;endurance/activity tolerance;balance  -MS     Comment, Safety Issues/Impairments (Mobility) Gait belt and non skid socks donned.  -MS               User Key  (r) = Recorded By, (t) = Taken By, (c) = Cosigned By      Initials Name Provider Type    MS Kyra Ko PT Physical Therapist                   Mobility       Row Name 10/23/24 1038          Bed Mobility     Bed Mobility supine-sit  -MS     Supine-Sit [place] (Bed Mobility) minimum assist (75% patient effort);nonverbal cues (demo/gesture);verbal cues  -MS     Assistive Device (Bed Mobility) bed rails;head of bed elevated  -MS     Comment, (Bed Mobility) SBA for sitting balance. Heavy reliance on bed rails.  -MS       Row Name 10/23/24 1038          Transfers    Comment, (Transfers) Sequencing and hand placement cues.  -MS       Row Name 10/23/24 1038          Sit-Stand Transfer    Sit-Stand [place] (Transfers) contact guard;verbal cues;nonverbal cues (demo/gesture)  -MS     Assistive Device (Sit-Stand Transfers) walker, front-wheeled  -MS       Row Name 10/23/24 1038          Gait/Stairs (Locomotion)    [place] Level (Gait) contact guard;verbal cues;nonverbal cues (demo/gesture)  -MS     Assistive Device (Gait) walker, front-wheeled  -MS     Patient was able to Ambulate yes  -MS     Distance in Feet (Gait) 50  -MS     Deviations/Abnormal Patterns (Gait) jannet decreased;gait speed decreased  -MS     Bilateral Gait Deviations forward flexed posture  -MS     Comment, (Gait/Stairs) No overt LOB or veering noted. Fatigue limiting. Agreeable to sitting UI.  -MS               User Key  (r) = Recorded By, (t) = Taken By, (c) = Cosigned By      Initials Name Provider Type    MS Kyra Ko, PT Physical Therapist                   Obj/Interventions       Row Name 10/23/24 1038          Range of Motion Comprehensive    Comment, General Range of Motion B LEs grossly WFL  -MS       Row Name 10/23/24 1038          Strength Comprehensive (MMT)    Comment, General Manual Muscle Testing (MMT) Assessment B LEs grossly 4/5  -MS       Row Name 10/23/24 1038          Balance    Balance Assessment sitting static balance;standing static balance  -MS     Static Sitting Balance supervision  -MS     Position, Sitting Balance sitting edge of bed  -MS     Static Standing Balance standby assist  -MS     Position/Device  Used, Standing Balance supported;walker, front-wheeled  -MS       Row Name 10/23/24 1038          Sensory Assessment (Somatosensory)    Sensory Assessment (Somatosensory) sensation intact  -MS               User Key  (r) = Recorded By, (t) = Taken By, (c) = Cosigned By      Initials Name Provider Type    MS KoKyra, PT Physical Therapist                   Goals/Plan       Row Name 10/23/24 1041          Bed Mobility Goal 1 (PT)    Activity/Assistive Device (Bed Mobility Goal 1, PT) bed mobility activities, all  -MS     [place] Level/Cues Needed (Bed Mobility Goal 1, PT) supervision required  -MS     Time Frame (Bed Mobility Goal 1, PT) 1 week  -MS       Row Name 10/23/24 1041          Transfer Goal 1 (PT)    Activity/Assistive Device (Transfer Goal 1, PT) transfers, all;walker, rolling  -MS     [place] Level/Cues Needed (Transfer Goal 1, PT) supervision required  -MS     Time Frame (Transfer Goal 1, PT) 1 week  -MS       Row Name 10/23/24 1041          Gait Training Goal 1 (PT)    Activity/Assistive Device (Gait Training Goal 1, PT) gait (walking locomotion);walker, rolling  -MS     [place] Level (Gait Training Goal 1, PT) supervision required  -MS     Distance (Gait Training Goal 1, PT) 150  -MS     Time Frame (Gait Training Goal 1, PT) 1 week  -MS       Row Name 10/23/24 1041          Therapy Assessment/Plan (PT)    Planned Therapy Interventions (PT) balance training;bed mobility training;gait training;home exercise program;postural re-education;patient/family education;ROM (range of motion);stair training;strengthening;transfer training  -MS               User Key  (r) = Recorded By, (t) = Taken By, (c) = Cosigned By      Initials Name Provider Type    MS KoKyra, PT Physical Therapist                   Clinical Impression       Row Name 10/23/24 1039          Pain    Pretreatment Pain Rating 0/10 - no pain  -MS     Posttreatment Pain Rating 0/10 - no pain  -MS       Row Name  10/23/24 1039          Therapy Assessment/Plan (PT)    Rehab Potential (PT) good  -MS     Criteria for Skilled Interventions Met (PT) yes;meets criteria  -MS     Therapy Frequency (PT) 5 times/wk  -MS       Row Name 10/23/24 1039          Vital Signs    O2 Delivery Pre Treatment room air  -MS       Row Name 10/23/24 1039          Positioning and Restraints    Pre-Treatment Position in bed  -MS     Post Treatment Position chair  -MS     In Chair notified nsg;reclined;encouraged to call for assist;exit alarm on;call light within reach;with family/caregiver  -MS               User Key  (r) = Recorded By, (t) = Taken By, (c) = Cosigned By      Initials Name Provider Type    MS MaikelKyra, PT Physical Therapist                   Outcome Measures       Row Name 10/23/24 1041 10/23/24 0826       How much help from another person do you currently need...    Turning from your back to your side while in flat bed without using bedrails? 3  -MS 3  -PL    Moving from lying on back to sitting on the side of a flat bed without bedrails? 2  -MS 3  -PL    Moving to and from a bed to a chair (including a wheelchair)? 3  -MS 3  -PL    Standing up from a chair using your arms (e.g., wheelchair, bedside chair)? 3  -MS 3  -PL    Climbing 3-5 steps with a railing? 2  -MS 2  -PL    To walk in hospital room? 3  -MS 3  -PL    AM-PAC 6 Clicks Score (PT) 16  -MS 17  -PL      Row Name 10/22/24 1572          How much help from another person do you currently need...    Turning from your back to your side while in flat bed without using bedrails? 4  -BB     Moving from lying on back to sitting on the side of a flat bed without bedrails? 4  -BB     Moving to and from a bed to a chair (including a wheelchair)? 4  -BB     Standing up from a chair using your arms (e.g., wheelchair, bedside chair)? 3  -BB     Climbing 3-5 steps with a railing? 2  -BB     To walk in hospital room? 3  -BB     AM-PAC 6 Clicks Score (PT) 20  -BB               User  Key  (r) = Recorded By, (t) = Taken By, (c) = Cosigned By      Initials Name Provider Type    MS KoKyra, PT Physical Therapist    Richard Abarca, RN Registered Nurse    Kishore Iyer RN Registered Nurse                                 Physical Therapy Education       Title: PT OT SLP Therapies (Done)       Topic: Physical Therapy (Done)       Point: Mobility training (Done)       Learning Progress Summary            Patient Acceptance, E,TB, VU by MS at 10/23/2024 1042                      Point: Home exercise program (Done)       Learning Progress Summary            Patient Acceptance, E,TB, VU by MS at 10/23/2024 1042                      Point: Body mechanics (Done)       Learning Progress Summary            Patient Acceptance, E,TB, VU by MS at 10/23/2024 1042                      Point: Precautions (Done)       Learning Progress Summary            Patient Acceptance, E,TB, VU by MS at 10/23/2024 1042                                      User Key       Initials Effective Dates Name Provider Type Discipline    MS 06/16/21 -  Kyra Ko PT Physical Therapist PT                  PT Recommendation and Plan  Planned Therapy Interventions (PT): balance training, bed mobility training, gait training, home exercise program, postural re-education, patient/family education, ROM (range of motion), stair training, strengthening, transfer training        Time Calculation:         PT Charges       Row Name 10/23/24 1036             Time Calculation    Start Time 1000  -MS      Stop Time 1024  -MS      Time Calculation (min) 24 min  -MS      PT Received On 10/23/24  -MS      PT - Next Appointment 10/24/24  -MS      PT Goal Re-Cert Due Date 10/30/24  -MS                User Key  (r) = Recorded By, (t) = Taken By, (c) = Cosigned By      Initials Name Provider Type    MS KoKyra PT Physical Therapist                  Therapy Charges for Today       Code Description Service Date Service  Provider Modifiers Qty    88185604083 HC PT EVAL MOD COMPLEXITY 3 10/23/2024 Kyra Ko, PT GP 1    11680549656 HC PT THER PROC EA 15 MIN 10/23/2024 Kyra Ko, PT GP 1            PT G-Codes  AM-PAC 6 Clicks Score (PT): 16  PT Discharge Summary  Anticipated Discharge Disposition (PT): home with home health, home    Kyra Ko, PT  10/23/2024

## 2024-10-23 NOTE — PLAN OF CARE
Problem: Adult Inpatient Plan of Care  Goal: Plan of Care Review  Outcome: Progressing  Flowsheets (Taken 10/23/2024 7385)  Progress: no change  Outcome Evaluation: Vital stable. Q2 turn. Echo ordered. Resp panel ordered, came back negative. Video swallow and esophagram ordered. D/C Lasix and Jardiance. Need stool and sputum sample. Call light within reach. Plan of care on going.  Plan of Care Reviewed With: patient

## 2024-10-24 ENCOUNTER — APPOINTMENT (OUTPATIENT)
Dept: GENERAL RADIOLOGY | Facility: HOSPITAL | Age: 84
End: 2024-10-24
Payer: MEDICARE

## 2024-10-24 ENCOUNTER — TELEPHONE (OUTPATIENT)
Dept: PHARMACY | Facility: HOSPITAL | Age: 84
End: 2024-10-24
Payer: MEDICARE

## 2024-10-24 LAB
ALBUMIN SERPL-MCNC: 3.4 G/DL (ref 3.5–5.2)
ALBUMIN UR-MCNC: 1.6 MG/DL
ANION GAP SERPL CALCULATED.3IONS-SCNC: 13.6 MMOL/L (ref 5–15)
BASOPHILS # BLD AUTO: 0.02 10*3/MM3 (ref 0–0.2)
BASOPHILS NFR BLD AUTO: 0.1 % (ref 0–1.5)
BUN SERPL-MCNC: 63 MG/DL (ref 8–23)
BUN/CREAT SERPL: 30.4 (ref 7–25)
CALCIUM SPEC-SCNC: 8.3 MG/DL (ref 8.6–10.5)
CHLORIDE SERPL-SCNC: 100 MMOL/L (ref 98–107)
CO2 SERPL-SCNC: 22.4 MMOL/L (ref 22–29)
CREAT SERPL-MCNC: 2.07 MG/DL (ref 0.76–1.27)
CREAT UR-MCNC: 102.9 MG/DL
CREAT UR-MCNC: 104 MG/DL
DEPRECATED RDW RBC AUTO: 64.6 FL (ref 37–54)
EGFRCR SERPLBLD CKD-EPI 2021: 30.8 ML/MIN/1.73
EOSINOPHIL # BLD AUTO: 0 10*3/MM3 (ref 0–0.4)
EOSINOPHIL NFR BLD AUTO: 0 % (ref 0.3–6.2)
ERYTHROCYTE [DISTWIDTH] IN BLOOD BY AUTOMATED COUNT: 15.8 % (ref 12.3–15.4)
GLUCOSE BLDC GLUCOMTR-MCNC: 185 MG/DL (ref 70–130)
GLUCOSE BLDC GLUCOMTR-MCNC: 210 MG/DL (ref 70–130)
GLUCOSE BLDC GLUCOMTR-MCNC: 217 MG/DL (ref 70–130)
GLUCOSE BLDC GLUCOMTR-MCNC: 273 MG/DL (ref 70–130)
GLUCOSE SERPL-MCNC: 239 MG/DL (ref 65–99)
HCT VFR BLD AUTO: 33.4 % (ref 37.5–51)
HEMOCCULT STL QL: NEGATIVE
HGB BLD-MCNC: 12 G/DL (ref 13–17.7)
IMM GRANULOCYTES # BLD AUTO: 0.08 10*3/MM3 (ref 0–0.05)
IMM GRANULOCYTES NFR BLD AUTO: 0.5 % (ref 0–0.5)
INR PPP: 2.16 (ref 0.9–1.1)
LYMPHOCYTES # BLD AUTO: 0.57 10*3/MM3 (ref 0.7–3.1)
LYMPHOCYTES NFR BLD AUTO: 3.6 % (ref 19.6–45.3)
MCH RBC QN AUTO: 40.1 PG (ref 26.6–33)
MCHC RBC AUTO-ENTMCNC: 35.9 G/DL (ref 31.5–35.7)
MCV RBC AUTO: 111.7 FL (ref 79–97)
MICROALBUMIN/CREAT UR: 15.5 MG/G (ref 0–29)
MONOCYTES # BLD AUTO: 0.57 10*3/MM3 (ref 0.1–0.9)
MONOCYTES NFR BLD AUTO: 3.6 % (ref 5–12)
NEUTROPHILS NFR BLD AUTO: 14.4 10*3/MM3 (ref 1.7–7)
NEUTROPHILS NFR BLD AUTO: 92.2 % (ref 42.7–76)
PHOSPHATE SERPL-MCNC: 3.7 MG/DL (ref 2.5–4.5)
PLATELET # BLD AUTO: 199 10*3/MM3 (ref 140–450)
PMV BLD AUTO: 12.1 FL (ref 6–12)
POTASSIUM SERPL-SCNC: 3.9 MMOL/L (ref 3.5–5.2)
PROT ?TM UR-MCNC: 9.1 MG/DL
PROT/CREAT UR: 87.5 MG/G CREA (ref 0–200)
PROTHROMBIN TIME: 24.4 SECONDS (ref 11.7–14.2)
RBC # BLD AUTO: 2.99 10*6/MM3 (ref 4.14–5.8)
SODIUM SERPL-SCNC: 136 MMOL/L (ref 136–145)
SODIUM UR-SCNC: 21 MMOL/L
URATE SERPL-MCNC: 6 MG/DL (ref 3.4–7)
WBC NRBC COR # BLD AUTO: 15.64 10*3/MM3 (ref 3.4–10.8)

## 2024-10-24 PROCEDURE — 99232 SBSQ HOSP IP/OBS MODERATE 35: CPT | Performed by: INTERNAL MEDICINE

## 2024-10-24 PROCEDURE — 85610 PROTHROMBIN TIME: CPT | Performed by: INTERNAL MEDICINE

## 2024-10-24 PROCEDURE — 25010000002 METHYLPREDNISOLONE PER 125 MG: Performed by: INTERNAL MEDICINE

## 2024-10-24 PROCEDURE — 92611 MOTION FLUOROSCOPY/SWALLOW: CPT

## 2024-10-24 PROCEDURE — 63710000001 INSULIN LISPRO (HUMAN) PER 5 UNITS: Performed by: INTERNAL MEDICINE

## 2024-10-24 PROCEDURE — 87205 SMEAR GRAM STAIN: CPT | Performed by: HOSPITALIST

## 2024-10-24 PROCEDURE — 74221 X-RAY XM ESOPHAGUS 2CNTRST: CPT

## 2024-10-24 PROCEDURE — 84156 ASSAY OF PROTEIN URINE: CPT

## 2024-10-24 PROCEDURE — 74230 X-RAY XM SWLNG FUNCJ C+: CPT

## 2024-10-24 PROCEDURE — 63710000001 PREDNISONE PER 1 MG: Performed by: INTERNAL MEDICINE

## 2024-10-24 PROCEDURE — 82948 REAGENT STRIP/BLOOD GLUCOSE: CPT

## 2024-10-24 PROCEDURE — 84550 ASSAY OF BLOOD/URIC ACID: CPT

## 2024-10-24 PROCEDURE — 80069 RENAL FUNCTION PANEL: CPT | Performed by: HOSPITALIST

## 2024-10-24 PROCEDURE — 97110 THERAPEUTIC EXERCISES: CPT

## 2024-10-24 PROCEDURE — 82570 ASSAY OF URINE CREATININE: CPT

## 2024-10-24 PROCEDURE — 82043 UR ALBUMIN QUANTITATIVE: CPT

## 2024-10-24 PROCEDURE — 87070 CULTURE OTHR SPECIMN AEROBIC: CPT | Performed by: HOSPITALIST

## 2024-10-24 PROCEDURE — 25810000003 SODIUM CHLORIDE 0.9 % SOLUTION: Performed by: HOSPITALIST

## 2024-10-24 PROCEDURE — 84300 ASSAY OF URINE SODIUM: CPT

## 2024-10-24 PROCEDURE — 85025 COMPLETE CBC W/AUTO DIFF WBC: CPT | Performed by: HOSPITALIST

## 2024-10-24 RX ORDER — PREDNISONE 20 MG/1
20 TABLET ORAL
Status: DISCONTINUED | OUTPATIENT
Start: 2024-10-24 | End: 2024-10-25

## 2024-10-24 RX ADMIN — Medication 10 ML: at 08:50

## 2024-10-24 RX ADMIN — INSULIN LISPRO 3 UNITS: 100 INJECTION, SOLUTION INTRAVENOUS; SUBCUTANEOUS at 08:49

## 2024-10-24 RX ADMIN — BARIUM SULFATE 55 ML: 0.81 POWDER, FOR SUSPENSION ORAL at 10:16

## 2024-10-24 RX ADMIN — Medication 10 ML: at 21:43

## 2024-10-24 RX ADMIN — WARFARIN 10 MG: 10 TABLET ORAL at 18:03

## 2024-10-24 RX ADMIN — BARIUM SULFATE 135 ML: 980 POWDER, FOR SUSPENSION ORAL at 12:00

## 2024-10-24 RX ADMIN — PREGABALIN 150 MG: 75 CAPSULE ORAL at 21:42

## 2024-10-24 RX ADMIN — INSULIN LISPRO 2 UNITS: 100 INJECTION, SOLUTION INTRAVENOUS; SUBCUTANEOUS at 12:58

## 2024-10-24 RX ADMIN — PREGABALIN 150 MG: 75 CAPSULE ORAL at 08:47

## 2024-10-24 RX ADMIN — INSULIN LISPRO 3 UNITS: 100 INJECTION, SOLUTION INTRAVENOUS; SUBCUTANEOUS at 18:03

## 2024-10-24 RX ADMIN — OXYBUTYNIN CHLORIDE 5 MG: 5 TABLET ORAL at 08:58

## 2024-10-24 RX ADMIN — PRAVASTATIN SODIUM 40 MG: 40 TABLET ORAL at 21:42

## 2024-10-24 RX ADMIN — ANTACID/ANTIFLATULENT 1 PACKET: 380; 550; 10; 10 GRANULE, EFFERVESCENT ORAL at 11:50

## 2024-10-24 RX ADMIN — SODIUM CHLORIDE 250 ML: 9 INJECTION, SOLUTION INTRAVENOUS at 18:54

## 2024-10-24 RX ADMIN — OXYCODONE HYDROCHLORIDE AND ACETAMINOPHEN 500 MG: 500 TABLET ORAL at 08:49

## 2024-10-24 RX ADMIN — METHYLPREDNISOLONE SODIUM SUCCINATE 60 MG: 125 INJECTION INTRAMUSCULAR; INTRAVENOUS at 00:43

## 2024-10-24 RX ADMIN — LEVOTHYROXINE SODIUM 125 MCG: 125 TABLET ORAL at 08:48

## 2024-10-24 RX ADMIN — AMLODIPINE BESYLATE 5 MG: 5 TABLET ORAL at 08:48

## 2024-10-24 RX ADMIN — BARIUM SULFATE 183 ML: 960 POWDER, FOR SUSPENSION ORAL at 12:10

## 2024-10-24 RX ADMIN — INSULIN LISPRO 4 UNITS: 100 INJECTION, SOLUTION INTRAVENOUS; SUBCUTANEOUS at 21:41

## 2024-10-24 RX ADMIN — METFORMIN HYDROCHLORIDE 850 MG: 850 TABLET, FILM COATED ORAL at 08:56

## 2024-10-24 RX ADMIN — Medication 1000 UNITS: at 07:40

## 2024-10-24 RX ADMIN — OXYBUTYNIN CHLORIDE 15 MG: 10 TABLET, EXTENDED RELEASE ORAL at 21:42

## 2024-10-24 RX ADMIN — LOSARTAN POTASSIUM 100 MG: 100 TABLET, FILM COATED ORAL at 08:48

## 2024-10-24 RX ADMIN — METHYLPREDNISOLONE SODIUM SUCCINATE 60 MG: 125 INJECTION INTRAMUSCULAR; INTRAVENOUS at 08:49

## 2024-10-24 RX ADMIN — BARIUM SULFATE 4 ML: 980 POWDER, FOR SUSPENSION ORAL at 10:16

## 2024-10-24 RX ADMIN — DULOXETINE HYDROCHLORIDE 60 MG: 60 CAPSULE, DELAYED RELEASE ORAL at 07:39

## 2024-10-24 RX ADMIN — PREDNISONE 20 MG: 20 TABLET ORAL at 18:03

## 2024-10-24 RX ADMIN — Medication 1 TABLET: at 07:39

## 2024-10-24 RX ADMIN — Medication 500 MCG: at 07:39

## 2024-10-24 RX ADMIN — ATENOLOL 12.5 MG: 25 TABLET ORAL at 08:47

## 2024-10-24 RX ADMIN — OXYBUTYNIN CHLORIDE 5 MG: 5 TABLET ORAL at 21:42

## 2024-10-24 RX ADMIN — BARIUM SULFATE 5 ML: 400 PASTE ORAL at 10:16

## 2024-10-24 RX ADMIN — BARIUM SULFATE 700 MG: 700 TABLET ORAL at 12:20

## 2024-10-24 NOTE — PLAN OF CARE
Goal Outcome Evaluation:  Plan of Care Reviewed With: patient           Outcome Evaluation: Patient no complaints of pain. Stool sample collected. Tolerated sleep with CPAP. Left compression sock maintained. Plan of care ongoing.

## 2024-10-24 NOTE — PROGRESS NOTES
Hospital Follow Up    LOS: 1  Patient Name: Riky Moon  Age/Sex: 84 y.o. male  : 1940  MRN: [id number]    Day of Service: 10/24/24   Length of Stay: 1  Encounter Provider: Ty Shelton MD  Place of Service: [facility] CARDIOLOGY  Patient Care Team:  Marco Carrillo MD as PCP - General (Family Medicine)  Cosmo French MD as Consulting Physician (Sleep Medicine)  Ty Shelton MD as Consulting Physician (Cardiology)  Marco Carrillo MD as Referring Physician (Family Medicine)  Chio Gilliam MD as Consulting Physician (Hematology and Oncology)    Subjective:     Chief Complaint: Dyspnea    Interval History: Patient says he is doing better today.  No chest pains he says he is breathing better.  He has no significant lower extremity edema he had his left leg with his wrap on it.    Objective:     Objective:  Temp:  [97.3 °F (36.3 °C)-97.9 °F (36.6 °C)] 97.9 °F (36.6 °C)  Heart Rate:  [60-78] 76  Resp:  [18] 18  BP: (106-117)/(47-68) 114/68     Intake/Output Summary (Last 24 hours) at 10/24/2024 0858  Last data filed at 10/24/2024 0707  Gross per 24 hour   Intake 240 ml   Output 1500 ml   Net -1260 ml     Body mass index is 40.12 kg/m².      10/23/24  0717 10/23/24  1612 10/24/24  0500   Weight: 135 kg (296 lb 15.4 oz) 134 kg (296 lb) 134 kg (295 lb 12.8 oz)     Weight change: -3.647 kg (-8 lb 0.7 oz)      Physical Exam:   General :  Alert, cooperative, in no acute distress.  Neuro:   Alert,cooperative and oriented.  Lungs:  CTAB. Normal respiratory effort and rate.  CV:  Regular rate and rhythm, normal S1 and S2, 1/6 systoic murmurs, gallops or rubs.   ABD:  Soft, nontender, nondistended. Positive bowel sounds.  Extr:  No edema or cyanosis, moves all extremities.    Lab Review:   Results from last 7 days   Lab Units 10/24/24  0333 10/23/24  0254 10/22/24  1517   SODIUM mmol/L 136 138 136   POTASSIUM mmol/L 3.9 3.8 4.1   CHLORIDE mmol/L 100 100 102  "  CO2 mmol/L 22.4 25.1 25.9   BUN mg/dL 63* 33* 28*   CREATININE mg/dL 2.07* 1.64* 1.22   GLUCOSE mg/dL 239* 213* 137*   CALCIUM mg/dL 8.3* 8.7 9.0   AST (SGOT) U/L  --  25 20   ALT (SGPT) U/L  --  17 14     Results from last 7 days   Lab Units 10/22/24  1920 10/22/24  1713 10/22/24  1517   HSTROP T ng/L 40* 42* 46*     Results from last 7 days   Lab Units 10/24/24  0333 10/23/24  0254   WBC 10*3/mm3 15.64* 14.54*   HEMOGLOBIN g/dL 12.0* 12.6*   HEMATOCRIT % 33.4* 36.2*   PLATELETS 10*3/mm3 199 171     Results from last 7 days   Lab Units 10/24/24  0333 10/23/24  0254   INR  2.16* 1.83*     Results from last 7 days   Lab Units 10/23/24  0254   MAGNESIUM mg/dL 2.0           Invalid input(s): \"LDLCALC\"  Results from last 7 days   Lab Units 10/22/24  1517   PROBNP pg/mL 3,083.0*           Current Medications:   Scheduled Meds:amLODIPine, 5 mg, Oral, Daily  vitamin C, 500 mg, Oral, Daily  atenolol, 12.5 mg, Oral, Daily  cholecalciferol, 1,000 Units, Oral, QAM  DULoxetine, 60 mg, Oral, QAM  insulin lispro, 2-7 Units, Subcutaneous, 4x Daily AC & at Bedtime  levothyroxine, 125 mcg, Oral, Daily  losartan, 100 mg, Oral, Daily  metFORMIN, 850 mg, Oral, BID With Meals  methylPREDNISolone sodium succinate, 60 mg, Intravenous, Q8H  multivitamin, 1 tablet, Oral, QAM  oxybutynin, 5 mg, Oral, Q12H  oxybutynin XL, 15 mg, Oral, Nightly  pravastatin, 40 mg, Oral, Nightly  pregabalin, 150 mg, Oral, Q12H  sodium chloride, 10 mL, Intravenous, Q12H  vitamin B-12, 500 mcg, Oral, QAM  warfarin, 10 mg, Oral, Once per day on Sunday Tuesday Wednesday Thursday Friday  [START ON 10/26/2024] warfarin, 5 mg, Oral, Once per day on Monday Saturday      Continuous Infusions:Pharmacy to dose warfarin,       Interpretation Summary  10/24/24         Left ventricular systolic function is normal. Calculated left ventricular EF = 54.6%    Left ventricular wall thickness is consistent with mild concentric hypertrophy. Sigmoid-shaped ventricular septum is " present.    Left ventricular diastolic function was indeterminate due to to AFib/flutter.    Aortic valve appears trileaflet and severely calcified. Doppler suggests mild to moderate aortic valve stenosis is present. Aortic valve area is 1 cm2.    Peak velocity of the flow distal to the aortic valve is 300.1 cm/s. Aortic valve mean pressure gradient is 22 mmHg. Aortic valve dimensionless index is 0.3 .    Estimated right ventricular systolic pressure from tricuspid regurgitation is normal (<35 mmHg).    Saline test results are suboptimal due to image quality but grossly negative.    Moderate-severe biatrial enlargement on manual quantitation, normal IVC size.        Allergies:  Allergies   Allergen Reactions    Lisinopril Unknown - High Severity     Other reaction(s): Cough       Assessment:       Shortness of breath    KINDRA treated with auto BiPAP    Diabetes mellitus    Essential hypertension    Hypothyroidism    Asthma    COPD (chronic obstructive pulmonary disease)    Gastroesophageal reflux disease    Constipation    Atrial flutter with controlled response    CHF (congestive heart failure)        Plan:     1.  CHF.  I think he is euvolemic.  His echocardiogram really has not changed much his overall function still remains normal.  2.  Mild to moderate aortic stenosis.  Mean gradient has not significantly changed.  I do not think is contributing factor at the current time.  3.  Hypertension blood pressure is good.  4.  Elevated troponin.  They are flat he has had no chest pains.  5.  Patient looks pretty good from a cardiovascular standpoint.  Nothing really further to add.  He says he still a little weak but he is breathing fine.  I would sign off have patient follow-up in 4 to 6 weeks with EVON Monae or myself.  Please call if any issues or questions arise.    Ty Shelton MD  10/24/24  08:58 EDT

## 2024-10-24 NOTE — MBS/VFSS/FEES
Acute Care - Speech Language Pathology   Swallow Initial Evaluation [facility]     Patient Name: Riky Moon  : 1940  MRN: [id number]  Today's Date: 10/24/2024               Admit Date: 10/22/2024    Visit Dx:     ICD-10-CM ICD-9-CM   1. Acute UTI  N39.0 599.0   2. COPD with acute exacerbation  J44.1 491.21   3. Weakness generalized  R53.1 780.79   4. Shortness of breath  R06.02 786.05   5. Acute congestive heart failure, unspecified heart failure type  I50.9 428.0   6. Elevated troponin  R79.89 790.6   7. Chronic anticoagulation  Z79.01 V58.61     Patient Active Problem List   Diagnosis    OA (osteoarthritis) of hip    KINDRA treated with auto BiPAP    Chest pain    Diabetes mellitus    Essential hypertension    Hyperlipidemia    Hypothyroidism    Asthma    Overactive bladder    CAP (community acquired pneumonia)    Cellulitis of left lower extremity    COPD (chronic obstructive pulmonary disease)    Dyslipidemia    Gastroesophageal reflux disease    Leukocytosis    Peripheral nerve disease    Constipation    Oropharyngeal dysphagia    Bronchitis    Class 3 severe obesity due to excess calories with serious comorbidity and body mass index (BMI) of 40.0 to 44.9 in adult    Left leg swelling    Anemia    Leukocytosis    Circadian rhythm sleep disorder, delayed sleep phase type    Atrial flutter with controlled response    UTI (urinary tract infection)    CHF (congestive heart failure)    Shortness of breath     Past Medical History:   Diagnosis Date    Acid reflux     Anemia     Arthritis     OSTEO    Asthma     Chest discomfort     Colon polyp     COPD (chronic obstructive pulmonary disease)     Depression     Diabetes mellitus     type 2    Disease of thyroid gland     Emphysema lung     High cholesterol     Hypertension     Morbid obesity     Neuropathy     BOTH FEET    Obesity, Class III, BMI 40-49.9 (morbid obesity)     PAD (peripheral artery disease)     Poor circulation of extremity     LEFT LEG     Sleep apnea     Vitamin D deficiency      Past Surgical History:   Procedure Laterality Date    APPENDECTOMY      CATARACT EXTRACTION W/ INTRAOCULAR LENS IMPLANT Bilateral     CHOLECYSTECTOMY      COLONOSCOPY  01/01/2014    COLONOSCOPY W/ POLYPECTOMY      BENIGN    HERNIA REPAIR      INTERSTIM PLACEMENT Left 08/30/2016    BLADDER CONTROL    KNEE ARTHROPLASTY Right     MOUTH SURGERY  06/26/2018    NOSE SURGERY      REMOVED BLOCKAGE     ROTATOR CUFF REPAIR Right     TOTAL HIP ARTHROPLASTY Right 11/9/2017    Procedure: RIGHT TOTAL HIP ARTHROPLASTY;  Surgeon: Riky Fernando MD;  Location: [facility] OR;  Service:        SLP Recommendation and Plan  SLP Swallowing Diagnosis: swallow WFL/no suspected pharyngeal impairment (10/24/24 1015)  SLP Diet Recommendation: thin liquids, regular textures (10/24/24 1015)  Recommended Precautions and Strategies: upright posture during/after eating, small bites of food and sips of liquid, other (see comments) (reduce distractions, use liquid wash) (10/24/24 1015)  SLP Rec. for Method of Medication Administration: meds whole, as tolerated (10/24/24 1015)     Monitor for Signs of Aspiration: yes, notify SLP if any concerns (10/24/24 1015)     Swallow Criteria for Skilled Therapeutic Interventions Met: baseline status (10/24/24 1015)  Anticipated Discharge Disposition (SLP): unknown (10/24/24 1015)  Rehab Potential/Prognosis, Swallowing: good, to achieve stated therapy goals (10/24/24 1015)  Therapy Frequency (Swallow): evaluation only (10/24/24 1015)  Predicted Duration Therapy Intervention (Days): until discharge (10/24/24 1015)  Oral Care Recommendations: Oral Care BID/PRN (10/24/24 1015)                                        Outcome Evaluation: VFSS complete.  Recommend continue with regular textures and thin liquids.  Medications whole as tolerated.  Recommend upright, slow rate, liquid wash to solids, and reduce distractions during PO. intake.  SLP to sign off, please reconsult  as warranted.      SWALLOW EVALUATION (Last 72 Hours)       SLP Adult Swallow Evaluation       Row Name 10/24/24 1017                   Rehab Evaluation    Document Type evaluation  -OC        Subjective Information no complaints  -OC        Patient Observations alert;cooperative;agree to therapy  -OC        Patient Effort good  -OC        Symptoms Noted During/After Treatment none  -OC           General Information    Patient Profile Reviewed yes  -OC        Pertinent History Of Current Problem Patient admitted with shortness of breath. Patient report occasional coughing with peanuts or random food/drink.  -OC        Current Method of Nutrition regular textures;thin liquids  -OC        Precautions/Limitations, Vision WFL  -OC        Precautions/Limitations, Hearing WFL  -OC        Prior Level of Function-Communication WFL  -OC        Prior Level of Function-Swallowing no diet consistency restrictions  -OC        Plans/Goals Discussed with patient;agreed upon  -OC        Barriers to Rehab none identified  -OC        Patient's Goals for Discharge eat/drink without coughing/choking  -OC           Pain    Pretreatment Pain Rating 0/10 - no pain  -OC        Posttreatment Pain Rating 0/10 - no pain  -OC           Oral Motor Structure and Function    Dentition Assessment natural, present and adequate  -OC        Secretion Management WNL/WFL  -OC           Oral Musculature and Cranial Nerve Assessment    Oral Motor General Assessment WFL  -OC           MBS/VFSS Interpretation    VFSS Summary VFSS complete in conjunction with Rachna BARNARD.  Patient presents with functional/age-appropriate swallow.  Timely swallow with adequate airway protection.  No penetration or aspiration across trials of thin via cup single and consecutive swallows, purée, mechanical soft, and regular textures.  No significant pharyngeal residue status post all trials.  -OC           SLP Communication to Radiology    Summary Statement VFSS complete  in conjunction with Rachna BARNARD.  Patient presents with functional/age-appropriate swallow.  Timely swallow with adequate airway protection.  No penetration or aspiration across trials of thin via cup single and consecutive swallows, purée, mechanical soft, and regular textures.  No significant pharyngeal residue status post all trials.  -OC           SLP Evaluation Clinical Impression    SLP Swallowing Diagnosis swallow WFL/no suspected pharyngeal impairment  -OC        Functional Impact no impact on function  -OC        Rehab Potential/Prognosis, Swallowing good, to achieve stated therapy goals  -OC        Swallow Criteria for Skilled Therapeutic Interventions Met baseline status  -OC           Recommendations    Therapy Frequency (Swallow) evaluation only  -OC        Predicted Duration Therapy Intervention (Days) until discharge  -OC        SLP Diet Recommendation thin liquids;regular textures  -OC        Recommended Precautions and Strategies upright posture during/after eating;small bites of food and sips of liquid;other (see comments)  reduce distractions, use liquid wash  -OC        Oral Care Recommendations Oral Care BID/PRN  -OC        SLP Rec. for Method of Medication Administration meds whole;as tolerated  -OC        Monitor for Signs of Aspiration yes;notify SLP if any concerns  -OC        Anticipated Discharge Disposition (SLP) unknown  -OC                  User Key  (r) = Recorded By, (t) = Taken By, (c) = Cosigned By      Initials Name Effective Dates    OC Card, NUNO Munoz 08/28/23 -                     EDUCATION  The patient has been educated in the following areas:   Dysphagia (Swallowing Impairment).                Time Calculation:    Time Calculation- SLP       Row Name 10/24/24 1417             Time Calculation- SLP    SLP Start Time 1015  -OC      SLP Received On 10/24/24  -OC         Untimed Charges    SLP Eval/Re-eval  [facility] Fluoro Eval Swallow - [id number]  -OC      [id number]-[id number]  Fluoro Eval Swallow Minutes 75  -OC         Total Minutes    Untimed Charges Total Minutes 75  -OC       Total Minutes 75  -OC                User Key  (r) = Recorded By, (t) = Taken By, (c) = Cosigned By      Initials Name Provider Type    Tammy Price SLP Speech and Language Pathologist                    Therapy Charges for Today       Code Description Service Date Service Provider Modifiers Qty    [id number] [facility] FLUORO EVAL SWALLOW 5 10/24/2024 Tammy Henry SLP GN 1                 NUNO Avila  10/24/2024

## 2024-10-24 NOTE — CONSULTS
Pulmonary Consultation     Patient Name: Riky Moon  Age/Sex: 84 y.o. male  : 1940  MRN: [id number]    Date of Admission: 10/22/2024  Date of Encounter Visit: 10/24/24  Encounter Provider: Katelynn Mitchell MD  Referring Provider: No Known Provider  Place of Service: [facility]  Patient Care Team:  Marco Carrillo MD as PCP - General (Family Medicine)  Cosmo French MD as Consulting Physician (Sleep Medicine)  Ty Shelton MD as Consulting Physician (Cardiology)  Marco Carrillo MD as Referring Physician (Family Medicine)  Chio Gilliam MD as Consulting Physician (Hematology and Oncology)      Subjective:     Consulted for: COPD exacerbation, heart failure     Chief Complaint: Dyspnea    History of Present Illness:  Riky Moon is a 84 y.o. male with known history of congestive heart failure, atrial flutter, type 2 diabetes, obstructive sleep apnea and COPD who presented to the hospital on 10/22/2024 with weakness and shortness of breath and was admitted and started on diuretics, steroids and bronchodilators.  Patient had some worsening renal function due to diuretics and this is usually on hold and the patient will be evaluated by nephrology.  Respiratory wise he did improve on the steroids  The chest x-ray that was done on admission, showed some chronic COPD changes with flattening of the diaphragms suggestive of hyperinflation with no obvious focal pneumonia or infiltrate.  Patient had CT of the abdomen earlier this month which was reviewed and pulmonary windows were overall negative for any concerning findings.  White count was elevated and seems to be stuck between 15 and 17, kidney function is worse with a creatinine of 1.22 on presentation 2.07 today.  Liver enzymes have been normal, hemoglobin is lower level of normal for this age, and platelet count are within normal range with high MCV.  Respiratory panel was negative  Patient reported that he had generalized weakness  "with worsening cough  Subjective fever but not confirmed  The cough was productive of slightly more yellowish than usual secretions  At this point he is feeling a lot better since admission the cough nearly resolved and his shortness of breath is much better and the wheezing is gone  No hemoptysis  No nausea vomiting or diarrhea  Positive worsening lower extremity edema prior to presentation    Pulmonary Functions Testing Results:    No results found for: \"FEV1\", \"FVC\", \"FGW7BAO\", \"TLC\", \"DLCO\"    Review of Systems:   Review of Systems  As per above otherwise -12 system review    Past Medical History:  Past Medical History:   Diagnosis Date    Acid reflux     Anemia     Arthritis     OSTEO    Asthma     Chest discomfort     Colon polyp     COPD (chronic obstructive pulmonary disease)     Depression     Diabetes mellitus     type 2    Disease of thyroid gland     Emphysema lung     High cholesterol     Hypertension     Morbid obesity     Neuropathy     BOTH FEET    Obesity, Class III, BMI 40-49.9 (morbid obesity)     PAD (peripheral artery disease)     Poor circulation of extremity     LEFT LEG    Sleep apnea     Vitamin D deficiency        Past Surgical History:   Procedure Laterality Date    APPENDECTOMY      CATARACT EXTRACTION W/ INTRAOCULAR LENS IMPLANT Bilateral     CHOLECYSTECTOMY      COLONOSCOPY  01/01/2014    COLONOSCOPY W/ POLYPECTOMY      BENIGN    HERNIA REPAIR      INTERSTIM PLACEMENT Left 08/30/2016    BLADDER CONTROL    KNEE ARTHROPLASTY Right     MOUTH SURGERY  06/26/2018    NOSE SURGERY      REMOVED BLOCKAGE     ROTATOR CUFF REPAIR Right     TOTAL HIP ARTHROPLASTY Right 11/9/2017    Procedure: RIGHT TOTAL HIP ARTHROPLASTY;  Surgeon: Riky Fernando MD;  Location: [facility];  Service:        Home Medications:   Medications Prior to Admission   Medication Sig Dispense Refill Last Dose/Taking    amLODIPine (NORVASC) 5 MG tablet TAKE 1 TABLET BY MOUTH DAILY 90 tablet 3 Taking    Ascorbic Acid " (VITAMIN C PO) Take 1 tablet by mouth Daily.   Taking    atenolol (TENORMIN) 25 MG tablet Take 0.5 tablets by mouth Daily. 45 tablet 3 Taking    Cholecalciferol (VITAMIN D) 1000 units tablet Take 1 tablet by mouth Every Morning.   Taking    DULoxetine (CYMBALTA) 60 MG capsule Take 1 capsule by mouth Every Morning. 90 capsule 3 Taking    Empagliflozin (Jardiance) 10 MG tablet Take 10 mg by mouth Daily. (Patient taking differently: Take 2.5 tablets by mouth Daily.) 30 tablet 11 Taking Differently    losartan (COZAAR) 100 MG tablet Take 1 tablet by mouth Daily. 90 tablet 3 Taking    metFORMIN (GLUCOPHAGE) 850 MG tablet Take 1 tablet by mouth 2 (Two) Times a Day With Meals. 30 tablet 6 Taking    Multiple Vitamin (MULTIVITAMIN) tablet Take 1 tablet by mouth Every Morning.   Taking    oxybutynin XL (DITROPAN XL) 15 MG 24 hr tablet Take 1 tablet by mouth Every Night.   Taking    pravastatin (PRAVACHOL) 40 MG tablet Take 1 tablet by mouth Every Night. 90 tablet 3 Taking    pregabalin (LYRICA) 150 MG capsule Take 1 capsule by mouth 2 (Two) Times a Day.   Taking    vitamin B-12 (CYANOCOBALAMIN) 1000 MCG tablet Take 0.5 tablets by mouth Every Morning.   Taking    furosemide (Lasix) 20 MG tablet Take 1 tablet by mouth Daily for 3 days. 3 tablet 0     glucose blood (LAINEY CONTOUR TEST) test strip Use as instructed to test glucose 100 each 1     levothyroxine (Synthroid) 125 MCG tablet Take 1 tablet by mouth Daily. 90 tablet 2     MICROLET LANCETS misc Use daily as directed to test glucose 100 each 1     oxybutynin (DITROPAN) 5 MG tablet Take 1 tablet by mouth Every 12 (Twelve) Hours.       Synthroid 125 MCG tablet TAKE 1 TABLET BY MOUTH DAILY 30 tablet 11     warfarin (COUMADIN) 5 MG tablet Take one tablet (5 mg) by mouth on Monday and Saturdays, and take two tablets (10 mg) by mouth all other days or as directed. 155 tablet 1        Inpatient Medications:  Scheduled Meds:amLODIPine, 5 mg, Oral, Daily  vitamin C, 500 mg, Oral,  Daily  atenolol, 12.5 mg, Oral, Daily  cholecalciferol, 1,000 Units, Oral, QAM  DULoxetine, 60 mg, Oral, QAM  insulin lispro, 2-7 Units, Subcutaneous, 4x Daily AC & at Bedtime  levothyroxine, 125 mcg, Oral, Daily  [Held by provider] losartan, 100 mg, Oral, Daily  [Held by provider] metFORMIN, 850 mg, Oral, BID With Meals  methylPREDNISolone sodium succinate, 60 mg, Intravenous, Q8H  multivitamin, 1 tablet, Oral, QAM  oxybutynin, 5 mg, Oral, Q12H  oxybutynin XL, 15 mg, Oral, Nightly  pravastatin, 40 mg, Oral, Nightly  pregabalin, 150 mg, Oral, Q12H  sodium chloride, 10 mL, Intravenous, Q12H  vitamin B-12, 500 mcg, Oral, QAM  warfarin, 10 mg, Oral, Once per day on   [START ON 10/26/2024] warfarin, 5 mg, Oral, Once per day on       Continuous Infusions:Pharmacy to dose warfarin,       PRN Meds:.  acetaminophen **OR** acetaminophen **OR** acetaminophen    Calcium Replacement - Follow Nurse / BPA Driven Protocol    dextrose    dextrose    glucagon (human recombinant)    influenza vaccine    ipratropium-albuterol    Magnesium Standard Dose Replacement - Follow Nurse / BPA Driven Protocol    nitroglycerin    ondansetron    Pharmacy to dose warfarin    Phosphorus Replacement - Follow Nurse / BPA Driven Protocol    Potassium Replacement - Follow Nurse / BPA Driven Protocol    [COMPLETED] Insert peripheral IV **AND** sodium chloride    sodium chloride    Allergies:  Allergies   Allergen Reactions    Lisinopril Unknown - High Severity     Other reaction(s): Cough       Past Social History:  Social History     Socioeconomic History    Marital status:      Spouse name: Chitra    Years of education: high school   Tobacco Use    Smoking status: Former     Current packs/day: 0.00     Average packs/day: 1.5 packs/day for 40.0 years (60.0 ttl pk-yrs)     Types: Cigarettes     Start date:      Quit date:      Years since quittin.8    Smokeless tobacco: Never     Tobacco comments:     from age [age]   Vaping Use    Vaping status: Never Used   Substance and Sexual Activity    Alcohol use: Yes     Comment: HOLIDAYS  caffeine -2 cups coffee daily    Drug use: No     Comment: PRN use for pain    Sexual activity: Defer       Past Family History:  Family History   Problem Relation Age of Onset    Stroke Mother     Hypertension Mother     Heart attack Father     Alcohol abuse Father     Heart disease Father     Diabetes Sister     Heart disease Brother     Stomach cancer Brother     Stroke Brother     Alcohol abuse Brother     Hypertension Daughter     Diabetes Daughter     Malig Hyperthermia Neg Hx            Objective:   Temp:  [97.5 °F (36.4 °C)-97.9 °F (36.6 °C)] 97.9 °F (36.6 °C)  Heart Rate:  [60-78] 76  Resp:  [18] 18  BP: (106-117)/(47-68) 114/68  SpO2:  [93 %] 93 %  on    Device (Oxygen Therapy): room air     Intake/Output Summary (Last 24 hours) at 10/24/2024 1411  Last data filed at 10/24/2024 0707  Gross per 24 hour   Intake 0 ml   Output 700 ml   Net -700 ml     Body mass index is 40.12 kg/m².      10/23/24  0717 10/23/24  1612 10/24/24  0500   Weight: 135 kg (296 lb 15.4 oz) 134 kg (296 lb) 134 kg (295 lb 12.8 oz)     Weight change: -3.647 kg (-8 lb 0.7 oz)    Physical Exam:   Physical Exam   General:    No acute distress, alert and oriented x4, pleasant, speech is slightly affected by the dry mouth and the dentures falling                   Head:    Normocephalic, atraumatic. External ears and nose are normal   Eyes:          Conjunctivae and sclerae normal, no icterus, PERRLA, no  discharge   Throat:   No oral lesions, no thrush, dry mucous membrane, dentures   Neck:   Supple, trachea midline. No JVD, no cervical or supraclavicular lymphadenopathy    Lungs:     Normal chest on inspection, CTAB, no wheezes. No rhonchi. No crackles. Respirations regular, even and unlabored.      Heart:    Regular rhythm and normal rate.  No murmurs, gallops, or rubs noted.   Abdomen:      Soft, nontender, nondistended, positive bowel sounds. No hepatosplenomegaly    Extremities:   No clubbing, cyanosis, 1+ pitting edema   Pulses:   Pulses palpable and equal bilaterally.    Skin:   No bleeding or rash. No bumps, good turgor pressure    Neuro:   Nonfocal.  Moves all extremities well. Strength 5/5 and symmetrical, no sensory deficit    Psychiatric:   Normal mood and affect.     Lab Review:   Results from last 7 days   Lab Units 10/24/24  0333 10/23/24  0254 10/22/24  1517   SODIUM mmol/L 136 138 136   POTASSIUM mmol/L 3.9 3.8 4.1   CHLORIDE mmol/L 100 100 102   CO2 mmol/L 22.4 25.1 25.9   BUN mg/dL 63* 33* 28*   CREATININE mg/dL 2.07* 1.64* 1.22   GLUCOSE mg/dL 239* 213* 137*   CALCIUM mg/dL 8.3* 8.7 9.0   AST (SGOT) U/L  --  25 20   ALT (SGPT) U/L  --  17 14   ALBUMIN g/dL 3.4* 3.7 3.8     Results from last 7 days   Lab Units 10/22/24  1920 10/22/24  1713 10/22/24  1517   HSTROP T ng/L 40* 42* 46*     Results from last 7 days   Lab Units 10/24/24  0333 10/23/24  0254 10/22/24  1517 10/22/24  1517   WBC 10*3/mm3 15.64* 14.54*  --  17.81*   HEMOGLOBIN g/dL 12.0* 12.6*  --  12.1*   HEMATOCRIT % 33.4* 36.2*  --  35.9*   PLATELETS 10*3/mm3 199 171  --  166   MCV fL 111.7* 109.7*  --  115.4*   MCH pg 40.1* 38.2*  --  38.9*   MCHC g/dL 35.9* 34.8  --  33.7   RDW % 15.8* 15.8*  --  17.2*   RDW-SD fl 64.6* 64.1*  --  73.7*   MPV fL 12.1* 12.2*  --  11.8   NEUTROPHIL % % 92.2* 94.7*   < >  --    LYMPHOCYTE % % 3.6* 3.0*   < >  --    MONOCYTES % % 3.6* 1.6*   < >  --    EOSINOPHIL % % 0.0* 0.0*   < >  --    BASOPHIL % % 0.1 0.1   < >  --    IMM GRAN % % 0.5 0.6*   < >  --    NEUTROS ABS 10*3/mm3 14.40* 13.77*   < > 16.13*   LYMPHS ABS 10*3/mm3 0.57* 0.44*   < >  --    MONOS ABS 10*3/mm3 0.57 0.23   < >  --    EOS ABS 10*3/mm3 0.00 0.00   < > 0.18   BASOS ABS 10*3/mm3 0.02 0.02   < > 0.18   IMMATURE GRANS (ABS) 10*3/mm3 0.08* 0.08*   < >  --     < > = values in this interval not displayed.     Results from last  "7 days   Lab Units 10/24/24  0333 10/23/24  0254 10/22/24  1702 10/22/24  1559   INR  2.16* 1.83* 2* 2.20     Results from last 7 days   Lab Units 10/23/24  0254   MAGNESIUM mg/dL 2.0           Invalid input(s): \"LDLCALC\"  Results from last 7 days   Lab Units 10/23/24  1156 10/22/24  1517   PROBNP pg/mL  --  3,083.0*   D DIMER QUANT MCGFEU/mL 0.29  --              Results from last 7 days   Lab Units 10/23/24  0555 10/23/24  0254   PROCALCITONIN ng/mL  --  0.28*   LACTATE mmol/L 1.2  --                  Results from last 7 days   Lab Units 10/22/24  1447   NITRITE UA  Negative   WBC UA /HPF 0-2   BACTERIA UA /HPF None Seen   SQUAM EPITHEL UA /HPF 0-2     Results from last 7 days   Lab Units 10/23/24  1210   COVID19  Not Detected   ADENOVIRUS DETECTION BY PCR  Not Detected   CORONAVIRUS 229E  Not Detected   CORONAVIRUS HKU1  Not Detected   CORONAVIRUS NL63  Not Detected   CORONAVIRUS OC43  Not Detected   HUMAN METAPNEUMOVIRUS  Not Detected   HUMAN RHINOVIRUS/ENTEROVIRUS  Not Detected   INFLUENZA B PCR  Not Detected   PARAINFLUENZA 1  Not Detected   PARAINFLUENZA VIRUS 2  Not Detected   PARAINFLUENZA VIRUS 3  Not Detected   PARAINFLUENZA VIRUS 4  Not Detected   BORDETELLA PERTUSSIS PCR  Not Detected   BORDETELLA PARAPERTUSSIS PCR  Not Detected   CHLAMYDOPHILA PNEUMONIAE PCR  Not Detected   MYCOPLAMA PNEUMO PCR  Not Detected   RSV, PCR  Not Detected             Imaging:  Imaging Results (Most Recent)       Procedure Component Value Units Date/Time    FL ESOPHAGRAM DOUBLE CONTRAST [[id number]] Resulted: 10/24/24 1104     Updated: 10/24/24 1223    FL Video Swallow Single Contrast [[id number]] Resulted: 10/24/24 1006     Updated: 10/24/24 1016    XR Chest 2 View [[id number]] Collected: 10/22/24 1431     Updated: 10/22/24 1438    Narrative:      XR CHEST 2 VW-     HISTORY: Male who is 84 years-old, cough     TECHNIQUE: Frontal and lateral views of the chest     COMPARISON: 11/10/2023     FINDINGS: Heart size is normal. Aorta " is calcified. Pulmonary  vasculature is unremarkable. A generous cardiac fat pad is apparent in  correlation with the CT from 8/1/2022. No focal pulmonary consolidation,  pleural effusion, or pneumothorax. No acute osseous process.       Impression:      No focal pulmonary consolidation. Follow-up as clinical  indications persist.     This report was finalized on 10/22/2024 2:35 PM by Dr. Dipak Dc M.D on Workstation: [email or web address]               I personally viewed and interpreted the patient's imaging studies.    Assessment:   Acute COPD exacerbation  Decompensated congestive heart failure  Acute renal failure from overdiuresis  Essential hypertension  Obstructive sleep apnea on auto BiPAP  Atrial flutter  Obesity with a BMI of 40    Plan:     Patient is doing a lot better from the respiratory standpoint, he is on room air, his breathing is nonlabored, he feels better, he is no longer wheezing, and he has no crackles  His chest x-ray was normal  His history is suggestive of some element of acute bronchitis that did not improve with the steroids so we will continue with the taper as already planned by the primary team  Whether he had some element of heart failure on presentation, it sounds like it and currently he is compensating more than enough and if rehydration is necessary, that should be okay from the respiratory standpoint  Will defer the final decision on IV hydration to nephrology team but from the pulmonary standpoint patient is in a much better state compared to initial presentation and we will continue with the steroid taper.  He is currently on 60 mg every 8 hours and we will go ahead and switch to 20 mg 3 times daily p.o.  No antibiotic indicated at this point despite the leukocytosis specially with the absence of fever and with a normal white blood cell count but that will be revisited on the day by day basis  Continue to offer home BiPAP while asleep  Will continue to follow discussed with the  patient       Thank you for allowing me to participate in the care of Riky BARBER Moon. Feel free to contact me directly with any further questions or concerns.    Katelynn Mitchell MD  [facility]   10/24/24  14:11 EDT    Dictated utilizing Dragon dictation

## 2024-10-24 NOTE — PLAN OF CARE
Problem: Adult Inpatient Plan of Care  Goal: Plan of Care Review  Outcome: Progressing  Flowsheets  Taken 10/24/2024 1841 by Kishore Pfeiffer, RN  Outcome Evaluation: Vital stable, Renal and Pulmonology consult. Started on Prednisone. Positive orthostatic BP, Urine sample sent to lab. Pvr 0cc. Video swallow and esophagram done today, no change to diet. 250cc NS Bolus ordered. Plan of care on going  Plan of Care Reviewed With: patient  Taken 10/24/2024 1032 by Kyra Ko, PT  Progress: improving

## 2024-10-24 NOTE — PLAN OF CARE
Goal Outcome Evaluation:  Plan of Care Reviewed With: patient        Progress: improving  Outcome Evaluation: Patient is progressing well. SBA-CGA for transfers and increased ambulation distance to 150' SBA-CGA with a RW. Based on current status, will begin following peripherally. Encourage UIC for all meals and ambulation 3x/day to promote functional mobility during hospitalization.      Anticipated Discharge Disposition (PT): home with home health, home

## 2024-10-24 NOTE — CONSULTS
[facility] Consult Note      Patient Name: Riky Moon  : 1940  MRN: [id number]  Primary Care Physician:  Marco Carrillo MD  Referring Physician: No Known Provider  Date of admission: 10/22/2024    Subjective     Reason for Consult: KEL on CKD stage II    HPI:   Riky Moon is a 84 y.o. male with CKD stage II (baseline SCr 1.2-1.3), chronic lymphedema, COPD, type II DM with neuropathy, KINDRA on CPAP, and hypertension, admitted on 10/22 for progressively worsening SOA and weakness for the prior few days.    In the ER, SCr 1.22, UA positive for 1+ protein and bilirubin, patient was given 1 dose of IV Lasix 80 mg, then was started on IV Lasix 40 mg BID (stopped yesterday).  SCr up to 1.64 yesterday, today at 2.07.    Denies chest pain/pressure, or N/V/D; + BLANCHARD and dizziness; denies orthopnea; Denies NSAID use; Does not drink excessive fluids at home; does add extra salt to food; has chronic BLE lymphedema with recent worsening edema, though improved with compression device; reports having increased UOP upon admission, but not voiding as much yesterday and today.    Review of Systems:   14 point review of systems is otherwise negative except for mentioned above on HPI    Personal History     Past Medical History:   Diagnosis Date    Acid reflux     Anemia     Arthritis     OSTEO    Asthma     Chest discomfort     Colon polyp     COPD (chronic obstructive pulmonary disease)     Depression     Diabetes mellitus     type 2    Disease of thyroid gland     Emphysema lung     High cholesterol     Hypertension     Morbid obesity     Neuropathy     BOTH FEET    Obesity, Class III, BMI 40-49.9 (morbid obesity)     PAD (peripheral artery disease)     Poor circulation of extremity     LEFT LEG    Sleep apnea     Vitamin D deficiency        Past Surgical History:   Procedure Laterality Date    APPENDECTOMY      CATARACT EXTRACTION W/ INTRAOCULAR LENS IMPLANT Bilateral     CHOLECYSTECTOMY       COLONOSCOPY  01/01/2014    COLONOSCOPY W/ POLYPECTOMY      BENIGN    HERNIA REPAIR      INTERSTIM PLACEMENT Left 08/30/2016    BLADDER CONTROL    KNEE ARTHROPLASTY Right     MOUTH SURGERY  06/26/2018    NOSE SURGERY      REMOVED BLOCKAGE     ROTATOR CUFF REPAIR Right     TOTAL HIP ARTHROPLASTY Right 11/9/2017    Procedure: RIGHT TOTAL HIP ARTHROPLASTY;  Surgeon: Riky Fernando MD;  Location: [facility] OR;  Service:        Family History: family history includes Alcohol abuse in his brother and father; Diabetes in his daughter and sister; Heart attack in his father; Heart disease in his brother and father; Hypertension in his daughter and mother; Stomach cancer in his brother; Stroke in his brother and mother.    Social History:  reports that he quit smoking about 27 years ago. His smoking use included cigarettes. He started smoking about 67 years ago. He has a 60 pack-year smoking history. He has never used smokeless tobacco. He reports current alcohol use. He reports that he does not use drugs.    Home Medications:  Prior to Admission medications    Medication Sig Start Date End Date Taking? Authorizing Provider   amLODIPine (NORVASC) 5 MG tablet TAKE 1 TABLET BY MOUTH DAILY 7/3/23  Yes Marco Carrillo MD   Ascorbic Acid (VITAMIN C PO) Take 1 tablet by mouth Daily.   Yes ProviderAlysia MD   atenolol (TENORMIN) 25 MG tablet Take 0.5 tablets by mouth Daily. 8/6/24  Yes Roseline Silva APRN   Cholecalciferol (VITAMIN D) 1000 units tablet Take 1 tablet by mouth Every Morning.   Yes Alysia Romeo MD   DULoxetine (CYMBALTA) 60 MG capsule Take 1 capsule by mouth Every Morning. 11/6/19  Yes Marco Carrillo MD   Empagliflozin (Jardiance) 10 MG tablet Take 10 mg by mouth Daily.  Patient taking differently: Take 2.5 tablets by mouth Daily. 9/23/20  Yes Marco Carrillo MD   losartan (COZAAR) 100 MG tablet Take 1 tablet by mouth Daily. 11/6/19  Yes Marco Carrillo MD   metFORMIN (GLUCOPHAGE) 850  MG tablet Take 1 tablet by mouth 2 (Two) Times a Day With Meals. 9/18/20  Yes Marco Carrillo MD   Multiple Vitamin (MULTIVITAMIN) tablet Take 1 tablet by mouth Every Morning.   Yes Alysia Romeo MD   oxybutynin XL (DITROPAN XL) 15 MG 24 hr tablet Take 1 tablet by mouth Every Night.   Yes Alysia Romeo MD   pravastatin (PRAVACHOL) 40 MG tablet Take 1 tablet by mouth Every Night. 11/6/19  Yes Marco Carrillo MD   pregabalin (LYRICA) 150 MG capsule Take 1 capsule by mouth 2 (Two) Times a Day.   Yes Alysia Romeo MD   vitamin B-12 (CYANOCOBALAMIN) 1000 MCG tablet Take 0.5 tablets by mouth Every Morning.   Yes Alysia Romeo MD   furosemide (Lasix) 20 MG tablet Take 1 tablet by mouth Daily for 3 days. 2/27/24 3/1/24  Marco Carrillo MD   glucose blood (LAINEY CONTOUR TEST) test strip Use as instructed to test glucose 7/9/18   Marco Carrillo MD   levothyroxine (Synthroid) 125 MCG tablet Take 1 tablet by mouth Daily. 5/7/23   Marco Carrillo MD   MICROLET LANCETS misc Use daily as directed to test glucose 7/9/18   Marco Carrillo MD   oxybutynin (DITROPAN) 5 MG tablet Take 1 tablet by mouth Every 12 (Twelve) Hours. 4/19/23   Alysia Romeo MD   Synthroid 125 MCG tablet TAKE 1 TABLET BY MOUTH DAILY 5/13/24   Marco Carrillo MD   warfarin (COUMADIN) 5 MG tablet Take one tablet (5 mg) by mouth on Monday and Saturdays, and take two tablets (10 mg) by mouth all other days or as directed. 8/6/24   Roseline Silva APRN       Allergies:  Allergies   Allergen Reactions    Lisinopril Unknown - High Severity     Other reaction(s): Cough       Objective     Vitals:   Temp:  [97.3 °F (36.3 °C)-97.9 °F (36.6 °C)] 97.9 °F (36.6 °C)  Heart Rate:  [60-78] 76  Resp:  [18] 18  BP: (106-117)/(47-68) 114/68    Intake/Output Summary (Last 24 hours) at 10/24/2024 1247  Last data filed at 10/24/2024 0707  Gross per 24 hour   Intake 240 ml   Output 700 ml   Net -460 ml       Physical Exam:    Constitutional: Awake, chronically ill-appearing, no acute distress, obese.  HEENT: Sclera anicteric, no conjunctival injection  Neck: Supple, no JVD  Respiratory: Clear to auscultation bilaterally, nonlabored respiration on RA  Cardiovascular: Irregularly irregular, no murmurs, no rubs  Gastrointestinal: BS +, abdomen is soft, nontender and nondistended  : No palpable bladder  Musculoskeletal: 1+ BLE edema, no clubbing or cyanosis: Left leg wrapped with compression device  Psychiatric: Appropriate affect, cooperative  Neurologic: Oriented x3, moving all extremities, normal speech and mental status, no asterixis  Skin: Warm and dry; chronic venous stasis changes to BLE       Scheduled Meds:     amLODIPine, 5 mg, Oral, Daily  vitamin C, 500 mg, Oral, Daily  atenolol, 12.5 mg, Oral, Daily  cholecalciferol, 1,000 Units, Oral, QAM  DULoxetine, 60 mg, Oral, QAM  insulin lispro, 2-7 Units, Subcutaneous, 4x Daily AC & at Bedtime  levothyroxine, 125 mcg, Oral, Daily  [Held by provider] losartan, 100 mg, Oral, Daily  [Held by provider] metFORMIN, 850 mg, Oral, BID With Meals  methylPREDNISolone sodium succinate, 60 mg, Intravenous, Q8H  multivitamin, 1 tablet, Oral, QAM  oxybutynin, 5 mg, Oral, Q12H  oxybutynin XL, 15 mg, Oral, Nightly  pravastatin, 40 mg, Oral, Nightly  pregabalin, 150 mg, Oral, Q12H  sodium chloride, 10 mL, Intravenous, Q12H  vitamin B-12, 500 mcg, Oral, QAM  warfarin, 10 mg, Oral, Once per day on Sunday Tuesday Wednesday Thursday Friday  [START ON 10/26/2024] warfarin, 5 mg, Oral, Once per day on Monday Saturday      IV Meds:   Pharmacy to dose warfarin,         Results Reviewed:   I have personally reviewed the results from the time of this admission to 10/24/2024 12:47 EDT     Lab Results   Component Value Date    GLUCOSE 239 (H) 10/24/2024    CALCIUM 8.3 (L) 10/24/2024     10/24/2024    K 3.9 10/24/2024    CO2 22.4 10/24/2024     10/24/2024    BUN 63 (H) 10/24/2024    CREATININE 2.07  (H) 10/24/2024    EGFRIFAFRI 86 01/09/2020    EGFRIFNONA 71 10/26/2021    BCR 30.4 (H) 10/24/2024    ANIONGAP 13.6 10/24/2024      Lab Results   Component Value Date    MG 2.0 10/23/2024    PHOS 3.7 10/24/2024    ALBUMIN 3.4 (L) 10/24/2024           Assessment / Plan       Shortness of breath    KINDRA treated with auto BiPAP    Diabetes mellitus    Essential hypertension    Hypothyroidism    Asthma    COPD (chronic obstructive pulmonary disease)    Gastroesophageal reflux disease    Constipation    Atrial flutter with controlled response    CHF (congestive heart failure)      ASSESSMENT:  Nonoliguric KEL, SCr 1.22 on admission, today up to 2.07, multifactorial: Prerenal with hypotension, aggressive diuresing with IV diuretics; impaired renal autoregulation with the use of ARB; andSGLT2 inhibitor .  Electrolytes within acceptable range; euvolemic by exam  CKD stage II, baseline SCr 1.1-1.3, suspect secondary to longstanding hypertensive and diabetic nephrosclerosis  CHF/mild to moderate aortic stenosis, echo on 10/23 showed EF 54% with normal RVSP and IVC size.  No fluid excess except moderate chronic BLE lymphedema.  Cardiology evaluated and signed off  SOA/COPD/KINDRA with CPAP, chest x-ray negative, uses CPAP at night.  Breathing comfortable on room air, followed by primary team  Hypertension, home Cozaar and amlodipine both resumed, orthostatic today.    Type II DM, home metformin and Jardiance were resumed, latter was discontinued yesterday.  Also on SSI, followed by primary team  A-fib, HR controlled, on atenolol and Coumadin  Hypothyroidism, on Synthroid  Dysphagia, swallow study result pending  Neurogenic bladder s/p bladder stimulator    PLAN:  Discontinue Cozaar, amlodipine, and metformin  Will hold diuretic therapy for now  Will give a small bolus of  normal saline given orthostasis  Check urine sodium, urine protein to creatinine ratio, microalbumin to creatinine ratio  Check bladder scan and orthostatic blood  pressure daily  Educated the patient about the importance of low-sodium diet  Surveillance labs    Thank you for involving us in the care of Riky Moon.  Please feel free to call with any questions.    Isma Austin MD  10/24/24  12:47 EDT    [facility]  [phone]      Please note that portions of this note were completed with a voice recognition program.

## 2024-10-24 NOTE — SIGNIFICANT NOTE
Othostatic Blood Pressure        10/24/24 1408 10/24/24 1411 10/24/24 1412   Vital Signs   /51 (!) 85/44 (!) 83/50   BP Location Left arm Left arm Left arm   BP Method Automatic Automatic Automatic   Patient Position Lying Standing Standing     EVON Sun notified.

## 2024-10-24 NOTE — PLAN OF CARE
Goal Outcome Evaluation:  Plan of Care Reviewed With: patient           Outcome Evaluation: VFSS complete.  Recommend continue with regular textures and thin liquids.  Medications whole as tolerated.  Recommend upright, slow rate, liquid wash to solids, and reduce distractions during PO. intake.  SLP to sign off, please reconsult as warranted.    Anticipated Discharge Disposition (SLP): unknown          SLP Swallowing Diagnosis: swallow WFL/no suspected pharyngeal impairment (10/24/24 1015)

## 2024-10-24 NOTE — PROGRESS NOTES
[facility]    [phone]   LOS: 1 day     Name: Riky Moon  Age/Sex: 84 y.o. male  :  1940        PCP: Marco Carrillo MD  Chief Complaint   Patient presents with    URI    Urinary Frequency      Subjective   Feeling better this afternoon.  Had swallow test done this morning and when he got back he had lunch but got choked up on chicken breast again.  After few minutes he was able to cough it up but this scared him quite a bit  General: No Fever or Chills, C GI: No Nausea, Vomiting, or Diarrhea, and Other: No bleeding    amLODIPine, 5 mg, Oral, Daily  vitamin C, 500 mg, Oral, Daily  atenolol, 12.5 mg, Oral, Daily  cholecalciferol, 1,000 Units, Oral, QAM  DULoxetine, 60 mg, Oral, QAM  insulin lispro, 2-7 Units, Subcutaneous, 4x Daily AC & at Bedtime  levothyroxine, 125 mcg, Oral, Daily  losartan, 100 mg, Oral, Daily  metFORMIN, 850 mg, Oral, BID With Meals  methylPREDNISolone sodium succinate, 60 mg, Intravenous, Q8H  multivitamin, 1 tablet, Oral, QAM  oxybutynin, 5 mg, Oral, Q12H  oxybutynin XL, 15 mg, Oral, Nightly  pravastatin, 40 mg, Oral, Nightly  pregabalin, 150 mg, Oral, Q12H  sodium chloride, 10 mL, Intravenous, Q12H  vitamin B-12, 500 mcg, Oral, QAM  warfarin, 10 mg, Oral, Once per day on   [START ON 10/26/2024] warfarin, 5 mg, Oral, Once per day on       Pharmacy to dose warfarin,         Objective   Vital Signs  Temp:  [97.3 °F (36.3 °C)-97.9 °F (36.6 °C)] 97.9 °F (36.6 °C)  Heart Rate:  [60-78] 76  Resp:  [18] 18  BP: (106-117)/(47-68) 114/68  Body mass index is 40.12 kg/m².    Intake/Output Summary (Last 24 hours) at 10/24/2024 1035  Last data filed at 10/24/2024 0707  Gross per 24 hour   Intake 240 ml   Output 700 ml   Net -460 ml       Physical Exam  Vitals reviewed.   Constitutional:       General: He is not in acute distress.     Appearance: He is obese. He is ill-appearing.   Cardiovascular:      Rate and Rhythm:  Normal rate and regular rhythm.   Pulmonary:      Effort: No respiratory distress.      Breath sounds: Normal breath sounds.   Abdominal:      General: Bowel sounds are normal. There is no distension.      Palpations: Abdomen is soft.   Musculoskeletal:      Right lower leg: Edema present.      Left lower leg: Edema present.   Neurological:      General: No focal deficit present.      Mental Status: He is alert and oriented to person, place, and time. Mental status is at baseline.           Results Review:       I reviewed the patient's new clinical results.  Results from last 7 days   Lab Units 10/24/24  0333 10/23/24  0254 10/22/24  1517   WBC 10*3/mm3 15.64* 14.54* 17.81*   HEMOGLOBIN g/dL 12.0* 12.6* 12.1*   PLATELETS 10*3/mm3 199 171 166     Results from last 7 days   Lab Units 10/24/24  0333 10/23/24  0254 10/22/24  1517   SODIUM mmol/L 136 138 136   POTASSIUM mmol/L 3.9 3.8 4.1   CHLORIDE mmol/L 100 100 102   CO2 mmol/L 22.4 25.1 25.9   BUN mg/dL 63* 33* 28*   CREATININE mg/dL 2.07* 1.64* 1.22   CALCIUM mg/dL 8.3* 8.7 9.0   MAGNESIUM mg/dL  --  2.0  --    PHOSPHORUS mg/dL 3.7  --   --    Estimated Creatinine Clearance: 36.9 mL/min (A) (by C-G formula based on SCr of 2.07 mg/dL (H)).  Lab Results   Component Value Date    HGBA1C 6.70 (H) 10/23/2024    HGBA1C 7.2 (H) 07/01/2024    HGBA1C 7.0 (H) 02/27/2024     Glucose   Date/Time Value Ref Range Status   10/24/2024 0759 217 (H) 70 - 130 mg/dL Final   10/23/2024 2044 237 (H) 70 - 130 mg/dL Final   10/23/2024 1705 248 (H) 70 - 130 mg/dL Final   10/23/2024 1144 247 (H) 70 - 130 mg/dL Final   10/23/2024 0759 187 (H) 70 - 130 mg/dL Final   10/22/2024 1816 190 (H) 70 - 130 mg/dL Final         Assessment & Plan   Active Hospital Problems    Diagnosis  POA    **Shortness of breath [R06.02]  Yes    CHF (congestive heart failure) [I50.9]  Unknown    Atrial flutter with controlled response [I48.92]  Yes    Gastroesophageal reflux disease [K21.9]  Yes    Constipation  [K59.00]  Yes    Hypothyroidism [E03.9]  Yes    Diabetes mellitus [E11.9]  Yes    Essential hypertension [I10]  Yes    KINDRA treated with auto BiPAP [G47.33]  Yes    COPD (chronic obstructive pulmonary disease) [J44.9]  Yes    Asthma [J45.909]  Yes      Resolved Hospital Problems   No resolved problems to display.       PLAN  This is a chronically ill 84-year-old gentleman with past medical history pertinent for heart failure atrial flutter hypothyroidism type 2 diabetes hypertension obstructive sleep apnea and COPD who presents to the hospital with weakness and shortness of breath and is admitted with presumed heart failure and COPD exacerbations  -Diuresed about 1.3 L.  Creatinine up to 2.0 and BUN increasing.  No evidence of contraction alkalosis.  Hold on resuming diuretics for now.  -Acute renal failure secondary to overdiuresis and volume depletion.  Will ask nephrology to evaluate today  -Continue IV steroids and breathing treatments treating for COPD exacerbation, transition to p.o. steroids in the morning  -With both components of upper pharyngeal dysphagia and esophageal components of dysphagia we will check an esophagram and video swallow study.  Studies completed independently reviewed awaiting final read  -Sputum culture ordered and respiratory viral panel ordered.  RVP is negative.  He sees Christopher Gambino in the office and is requested evaluation here in the hospital  -D-dimer reviewed and negative little concern for pulmonary embolus at this time.  -Blood sugars are expectedly elevated and uncontrolled due to the IV steroids.  Continue sliding scale coverage of hyperglycemia may need additional coverage but hopefully for now we can continue with sliding scale coverage and transition to p.o. steroids tomorrow  -Discontinued Jardiance with worsening creatinine  -Anemia of chronic disease with noted iron deficiency  -Hemoglobin stable  -Mechanical DVT prophylaxis  -Full code  Disposition  Expected Discharge  Date: 10/25/2024; Expected Discharge Time:        Jacobo Ferrera MD  [facility]  10/24/24  10:35 EDT

## 2024-10-24 NOTE — THERAPY TREATMENT NOTE
Patient Name: Riky Moon  : 1940    MRN: [id number]                              Today's Date: 10/24/2024       Admit Date: 10/22/2024    Visit Dx:     ICD-10-CM ICD-9-CM   1. Acute UTI  N39.0 599.0   2. COPD with acute exacerbation  J44.1 491.21   3. Weakness generalized  R53.1 780.79   4. Shortness of breath  R06.02 786.05   5. Acute congestive heart failure, unspecified heart failure type  I50.9 428.0   6. Elevated troponin  R79.89 790.6   7. Chronic anticoagulation  Z79.01 V58.61     Patient Active Problem List   Diagnosis    OA (osteoarthritis) of hip    KINDRA treated with auto BiPAP    Chest pain    Diabetes mellitus    Essential hypertension    Hyperlipidemia    Hypothyroidism    Asthma    Overactive bladder    CAP (community acquired pneumonia)    Cellulitis of left lower extremity    COPD (chronic obstructive pulmonary disease)    Dyslipidemia    Gastroesophageal reflux disease    Leukocytosis    Peripheral nerve disease    Constipation    Oropharyngeal dysphagia    Bronchitis    Class 3 severe obesity due to excess calories with serious comorbidity and body mass index (BMI) of 40.0 to 44.9 in adult    Left leg swelling    Anemia    Leukocytosis    Circadian rhythm sleep disorder, delayed sleep phase type    Atrial flutter with controlled response    UTI (urinary tract infection)    CHF (congestive heart failure)    Shortness of breath     Past Medical History:   Diagnosis Date    Acid reflux     Anemia     Arthritis     OSTEO    Asthma     Chest discomfort     Colon polyp     COPD (chronic obstructive pulmonary disease)     Depression     Diabetes mellitus     type 2    Disease of thyroid gland     Emphysema lung     High cholesterol     Hypertension     Morbid obesity     Neuropathy     BOTH FEET    Obesity, Class III, BMI 40-49.9 (morbid obesity)     PAD (peripheral artery disease)     Poor circulation of extremity     LEFT LEG    Sleep apnea     Vitamin D deficiency      Past Surgical History:    Procedure Laterality Date    APPENDECTOMY      CATARACT EXTRACTION W/ INTRAOCULAR LENS IMPLANT Bilateral     CHOLECYSTECTOMY      COLONOSCOPY  01/01/2014    COLONOSCOPY W/ POLYPECTOMY      BENIGN    HERNIA REPAIR      INTERSTIM PLACEMENT Left 08/30/2016    BLADDER CONTROL    KNEE ARTHROPLASTY Right     MOUTH SURGERY  06/26/2018    NOSE SURGERY      REMOVED BLOCKAGE     ROTATOR CUFF REPAIR Right     TOTAL HIP ARTHROPLASTY Right 11/9/2017    Procedure: RIGHT TOTAL HIP ARTHROPLASTY;  Surgeon: Riky Fernando MD;  Location: [facility] OR;  Service:       General Information       Row Name 10/24/24 1030          Physical Therapy Time and Intention    Document Type therapy note (daily note)  -MS     Mode of Treatment physical therapy  -MS       Row Name 10/24/24 1030          General Information    Existing Precautions/Restrictions fall  -MS       Row Name 10/24/24 1030          Cognition    Orientation Status (Cognition) oriented x 4  -MS       Row Name 10/24/24 1030          Safety Issues/Impairments Affecting Functional Mobility    Comment, Safety Issues/Impairments (Mobility) Gait belt and non skid socks donned.  -MS               User Key  (r) = Recorded By, (t) = Taken By, (c) = Cosigned By      Initials Name Provider Type    MS Kyra Ko, PT Physical Therapist                   Mobility       Row Name 10/24/24 1030          Bed Mobility    Bed Mobility sit-supine  -MS     Sit-Supine [place] (Bed Mobility) standby assist;verbal cues;nonverbal cues (demo/gesture)  incr time  -MS     Comment, (Bed Mobility) Sitting EOB upon PT arrival ind.  -MS       Row Name 10/24/24 1030          Transfers    Comment, (Transfers) Hand placement cues.  -MS       Row Name 10/24/24 1030          Sit-Stand Transfer    Sit-Stand [place] (Transfers) standby assist;contact guard;verbal cues  -MS     Assistive Device (Sit-Stand Transfers) walker, front-wheeled  -MS       Row Name 10/24/24 1030          Gait/Stairs  (Locomotion)    [place] Level (Gait) standby assist;contact guard;verbal cues  -MS     Assistive Device (Gait) walker, front-wheeled  -MS     Distance in Feet (Gait) 150  -MS     Deviations/Abnormal Patterns (Gait) jannet decreased;gait speed decreased  -MS     Bilateral Gait Deviations forward flexed posture  -MS     Comment, (Gait/Stairs) No overt LOB or veering noted.  -MS               User Key  (r) = Recorded By, (t) = Taken By, (c) = Cosigned By      Initials Name Provider Type    Kyra Cutler, PT Physical Therapist                   Obj/Interventions       Row Name 10/24/24 1032          Balance    Static Sitting Balance supervision  -MS     Position, Sitting Balance sitting edge of bed  -MS     Static Standing Balance standby assist  -MS     Position/Device Used, Standing Balance supported;walker, front-wheeled  -MS               User Key  (r) = Recorded By, (t) = Taken By, (c) = Cosigned By      Initials Name Provider Type    Kyra Cutler, PT Physical Therapist                   Goals/Plan    No documentation.                  Clinical Impression       Row Name 10/24/24 1032          Pain    Pretreatment Pain Rating 0/10 - no pain  -MS     Posttreatment Pain Rating 0/10 - no pain  -MS       Row Name 10/24/24 1032          Plan of Care Review    Plan of Care Reviewed With patient  -MS     Progress improving  -MS     Outcome Evaluation Patient is progressing well. SBA-CGA for transfers and increased ambulation distance to 150' SBA-CGA with a RW. Based on current status, will begin following peripherally.  -MS       Row Name 10/24/24 1032          Vital Signs    O2 Delivery Pre Treatment room air  -MS       Row Name 10/24/24 1032          Positioning and Restraints    Pre-Treatment Position in bed  -MS     Post Treatment Position bed  -MS     In Bed notified nsg;fowlers;call light within reach;encouraged to call for assist;exit alarm on  -MS               User Key  (r) = Recorded By,  (t) = Taken By, (c) = Cosigned By      Initials Name Provider Type    MS KoKyra, PT Physical Therapist                   Outcome Measures       Row Name 10/24/24 1033 10/24/24 0041       How much help from another person do you currently need...    Turning from your back to your side while in flat bed without using bedrails? 4  -MS 3  -LL    Moving from lying on back to sitting on the side of a flat bed without bedrails? 3  -MS 2  -LL    Moving to and from a bed to a chair (including a wheelchair)? 3  -MS 3  -LL    Standing up from a chair using your arms (e.g., wheelchair, bedside chair)? 3  -MS 3  -LL    Climbing 3-5 steps with a railing? 2  -MS 2  -LL    To walk in hospital room? 3  -MS 3  -LL    AM-PAC 6 Clicks Score (PT) 18  -MS 16  -LL              User Key  (r) = Recorded By, (t) = Taken By, (c) = Cosigned By      Initials Name Provider Type    MS KoKyra, PT Physical Therapist    Roseline Corral RN Registered Nurse                                 Physical Therapy Education       Title: PT OT SLP Therapies (Done)       Topic: Physical Therapy (Done)       Point: Mobility training (Done)       Learning Progress Summary            Patient Acceptance, E,TB, VU by MS at 10/23/2024 1042                      Point: Home exercise program (Done)       Learning Progress Summary            Patient Acceptance, E,TB, VU by MS at 10/23/2024 1042                      Point: Body mechanics (Done)       Learning Progress Summary            Patient Acceptance, E,TB, VU by MS at 10/23/2024 1042                      Point: Precautions (Done)       Learning Progress Summary            Patient Acceptance, E,TB, VU by MS at 10/23/2024 1042                                      User Key       Initials Effective Dates Name Provider Type Discipline    MS 06/16/21 -  Kyra Ko, PT Physical Therapist PT                  PT Recommendation and Plan  Planned Therapy Interventions (PT): balance training, bed  mobility training, gait training, home exercise program, postural re-education, patient/family education, ROM (range of motion), stair training, strengthening, transfer training  Progress: improving  Outcome Evaluation: Patient is progressing well. SBA-CGA for transfers and increased ambulation distance to 150' SBA-CGA with a RW. Based on current status, will begin following peripherally.     Time Calculation:         PT Charges       Row Name 10/24/24 1030             Time Calculation    Start Time 0929  -MS      Stop Time 0942  -MS      Time Calculation (min) 13 min  -MS      PT Received On 10/24/24  -MS      PT - Next Appointment 10/28/24  -MS                User Key  (r) = Recorded By, (t) = Taken By, (c) = Cosigned By      Initials Name Provider Type    Kyra Cutler, PT Physical Therapist                  Therapy Charges for Today       Code Description Service Date Service Provider Modifiers Qty    [id number]  PT EVAL MOD COMPLEXITY 3 10/23/2024 Kyra Ko, PT GP 1    [id number]  PT THER PROC EA 15 MIN 10/23/2024 Kyra Ko, PT GP 1    [id number]  PT THER PROC EA 15 MIN 10/24/2024 Kyra Ko, PT GP 1            PT G-Codes  AM-PAC 6 Clicks Score (PT): 18  PT Discharge Summary  Anticipated Discharge Disposition (PT): home with home health, home    Kyra Ko PT  10/24/2024

## 2024-10-25 LAB
ALBUMIN SERPL-MCNC: 3.1 G/DL (ref 3.5–5.2)
ALBUMIN/GLOB SERPL: 1 G/DL
ALP SERPL-CCNC: 44 U/L (ref 39–117)
ALT SERPL W P-5'-P-CCNC: 22 U/L (ref 1–41)
ANION GAP SERPL CALCULATED.3IONS-SCNC: 11.6 MMOL/L (ref 5–15)
AST SERPL-CCNC: 23 U/L (ref 1–40)
BASOPHILS # BLD AUTO: 0.01 10*3/MM3 (ref 0–0.2)
BASOPHILS NFR BLD AUTO: 0.1 % (ref 0–1.5)
BILIRUB SERPL-MCNC: 0.4 MG/DL (ref 0–1.2)
BUN SERPL-MCNC: 75 MG/DL (ref 8–23)
BUN/CREAT SERPL: 31 (ref 7–25)
CALCIUM SPEC-SCNC: 8.2 MG/DL (ref 8.6–10.5)
CHLORIDE SERPL-SCNC: 98 MMOL/L (ref 98–107)
CO2 SERPL-SCNC: 23.4 MMOL/L (ref 22–29)
CREAT SERPL-MCNC: 2.42 MG/DL (ref 0.76–1.27)
DEPRECATED RDW RBC AUTO: 69.5 FL (ref 37–54)
EGFRCR SERPLBLD CKD-EPI 2021: 25.5 ML/MIN/1.73
EOSINOPHIL # BLD AUTO: 0 10*3/MM3 (ref 0–0.4)
EOSINOPHIL NFR BLD AUTO: 0 % (ref 0.3–6.2)
ERYTHROCYTE [DISTWIDTH] IN BLOOD BY AUTOMATED COUNT: 16.2 % (ref 12.3–15.4)
GLOBULIN UR ELPH-MCNC: 3.1 GM/DL
GLUCOSE BLDC GLUCOMTR-MCNC: 159 MG/DL (ref 70–130)
GLUCOSE BLDC GLUCOMTR-MCNC: 174 MG/DL (ref 70–130)
GLUCOSE BLDC GLUCOMTR-MCNC: 199 MG/DL (ref 70–130)
GLUCOSE BLDC GLUCOMTR-MCNC: 314 MG/DL (ref 70–130)
GLUCOSE SERPL-MCNC: 204 MG/DL (ref 65–99)
HCT VFR BLD AUTO: 33.7 % (ref 37.5–51)
HGB BLD-MCNC: 11.1 G/DL (ref 13–17.7)
IMM GRANULOCYTES # BLD AUTO: 0.08 10*3/MM3 (ref 0–0.05)
IMM GRANULOCYTES NFR BLD AUTO: 0.6 % (ref 0–0.5)
INR PPP: 2.79 (ref 0.9–1.1)
LYMPHOCYTES # BLD AUTO: 0.63 10*3/MM3 (ref 0.7–3.1)
LYMPHOCYTES NFR BLD AUTO: 4.8 % (ref 19.6–45.3)
MAGNESIUM SERPL-MCNC: 2.1 MG/DL (ref 1.6–2.4)
MCH RBC QN AUTO: 37.9 PG (ref 26.6–33)
MCHC RBC AUTO-ENTMCNC: 32.9 G/DL (ref 31.5–35.7)
MCV RBC AUTO: 115 FL (ref 79–97)
MONOCYTES # BLD AUTO: 0.91 10*3/MM3 (ref 0.1–0.9)
MONOCYTES NFR BLD AUTO: 6.9 % (ref 5–12)
NEUTROPHILS NFR BLD AUTO: 11.48 10*3/MM3 (ref 1.7–7)
NEUTROPHILS NFR BLD AUTO: 87.6 % (ref 42.7–76)
PLATELET # BLD AUTO: 200 10*3/MM3 (ref 140–450)
PMV BLD AUTO: 11.6 FL (ref 6–12)
POTASSIUM SERPL-SCNC: 3.7 MMOL/L (ref 3.5–5.2)
PROT SERPL-MCNC: 6.2 G/DL (ref 6–8.5)
PROTHROMBIN TIME: 29.7 SECONDS (ref 11.7–14.2)
RBC # BLD AUTO: 2.93 10*6/MM3 (ref 4.14–5.8)
SODIUM SERPL-SCNC: 133 MMOL/L (ref 136–145)
T4 FREE SERPL-MCNC: 0.97 NG/DL (ref 0.92–1.68)
TSH SERPL DL<=0.05 MIU/L-ACNC: 0.53 UIU/ML (ref 0.27–4.2)
URATE SERPL-MCNC: 6.6 MG/DL (ref 3.4–7)
WBC NRBC COR # BLD AUTO: 13.11 10*3/MM3 (ref 3.4–10.8)

## 2024-10-25 PROCEDURE — 84439 ASSAY OF FREE THYROXINE: CPT

## 2024-10-25 PROCEDURE — 63710000001 PREDNISONE PER 1 MG: Performed by: INTERNAL MEDICINE

## 2024-10-25 PROCEDURE — 85610 PROTHROMBIN TIME: CPT | Performed by: INTERNAL MEDICINE

## 2024-10-25 PROCEDURE — 83735 ASSAY OF MAGNESIUM: CPT

## 2024-10-25 PROCEDURE — 63710000001 INSULIN LISPRO (HUMAN) PER 5 UNITS: Performed by: INTERNAL MEDICINE

## 2024-10-25 PROCEDURE — 99222 1ST HOSP IP/OBS MODERATE 55: CPT | Performed by: INTERNAL MEDICINE

## 2024-10-25 PROCEDURE — 80053 COMPREHEN METABOLIC PANEL: CPT

## 2024-10-25 PROCEDURE — 85025 COMPLETE CBC W/AUTO DIFF WBC: CPT

## 2024-10-25 PROCEDURE — 84550 ASSAY OF BLOOD/URIC ACID: CPT

## 2024-10-25 PROCEDURE — 84443 ASSAY THYROID STIM HORMONE: CPT

## 2024-10-25 PROCEDURE — 82948 REAGENT STRIP/BLOOD GLUCOSE: CPT

## 2024-10-25 RX ORDER — PREDNISONE 20 MG/1
20 TABLET ORAL 2 TIMES DAILY WITH MEALS
Status: COMPLETED | OUTPATIENT
Start: 2024-10-26 | End: 2024-10-28

## 2024-10-25 RX ORDER — POLYETHYLENE GLYCOL 3350 17 G/17G
17 POWDER, FOR SOLUTION ORAL DAILY PRN
Status: DISCONTINUED | OUTPATIENT
Start: 2024-10-25 | End: 2024-10-30 | Stop reason: HOSPADM

## 2024-10-25 RX ORDER — AMOXICILLIN 250 MG
2 CAPSULE ORAL 2 TIMES DAILY PRN
Status: DISCONTINUED | OUTPATIENT
Start: 2024-10-25 | End: 2024-10-30 | Stop reason: HOSPADM

## 2024-10-25 RX ORDER — WARFARIN SODIUM 1 MG/1
1 TABLET ORAL
Status: DISCONTINUED | OUTPATIENT
Start: 2024-10-25 | End: 2024-10-26

## 2024-10-25 RX ORDER — PREDNISONE 20 MG/1
20 TABLET ORAL
Status: COMPLETED | OUTPATIENT
Start: 2024-10-28 | End: 2024-10-29

## 2024-10-25 RX ORDER — BISACODYL 10 MG
10 SUPPOSITORY, RECTAL RECTAL DAILY PRN
Status: DISCONTINUED | OUTPATIENT
Start: 2024-10-25 | End: 2024-10-30 | Stop reason: HOSPADM

## 2024-10-25 RX ORDER — WARFARIN SODIUM 10 MG/1
10 TABLET ORAL
Status: DISCONTINUED | OUTPATIENT
Start: 2024-10-27 | End: 2024-10-26

## 2024-10-25 RX ORDER — BISACODYL 5 MG/1
5 TABLET, DELAYED RELEASE ORAL DAILY PRN
Status: DISCONTINUED | OUTPATIENT
Start: 2024-10-25 | End: 2024-10-30 | Stop reason: HOSPADM

## 2024-10-25 RX ADMIN — LEVOTHYROXINE SODIUM 125 MCG: 125 TABLET ORAL at 08:32

## 2024-10-25 RX ADMIN — Medication 500 MCG: at 06:27

## 2024-10-25 RX ADMIN — Medication 10 ML: at 20:59

## 2024-10-25 RX ADMIN — PREGABALIN 150 MG: 75 CAPSULE ORAL at 08:32

## 2024-10-25 RX ADMIN — DULOXETINE HYDROCHLORIDE 60 MG: 60 CAPSULE, DELAYED RELEASE ORAL at 06:27

## 2024-10-25 RX ADMIN — INSULIN LISPRO 2 UNITS: 100 INJECTION, SOLUTION INTRAVENOUS; SUBCUTANEOUS at 12:13

## 2024-10-25 RX ADMIN — PREDNISONE 20 MG: 20 TABLET ORAL at 12:13

## 2024-10-25 RX ADMIN — Medication 10 ML: at 08:34

## 2024-10-25 RX ADMIN — Medication 1 TABLET: at 06:27

## 2024-10-25 RX ADMIN — PRAVASTATIN SODIUM 40 MG: 40 TABLET ORAL at 20:55

## 2024-10-25 RX ADMIN — SENNOSIDES AND DOCUSATE SODIUM 2 TABLET: 50; 8.6 TABLET ORAL at 12:13

## 2024-10-25 RX ADMIN — INSULIN LISPRO 2 UNITS: 100 INJECTION, SOLUTION INTRAVENOUS; SUBCUTANEOUS at 08:33

## 2024-10-25 RX ADMIN — INSULIN LISPRO 5 UNITS: 100 INJECTION, SOLUTION INTRAVENOUS; SUBCUTANEOUS at 20:58

## 2024-10-25 RX ADMIN — OXYBUTYNIN CHLORIDE 5 MG: 5 TABLET ORAL at 08:37

## 2024-10-25 RX ADMIN — INSULIN LISPRO 2 UNITS: 100 INJECTION, SOLUTION INTRAVENOUS; SUBCUTANEOUS at 17:29

## 2024-10-25 RX ADMIN — PREGABALIN 150 MG: 75 CAPSULE ORAL at 20:55

## 2024-10-25 RX ADMIN — Medication 1000 UNITS: at 06:27

## 2024-10-25 RX ADMIN — PREDNISONE 20 MG: 20 TABLET ORAL at 08:33

## 2024-10-25 RX ADMIN — OXYCODONE HYDROCHLORIDE AND ACETAMINOPHEN 500 MG: 500 TABLET ORAL at 08:32

## 2024-10-25 NOTE — PLAN OF CARE
Goal Outcome Evaluation:  Plan of Care Reviewed With: patient        Progress: improving  Outcome Evaluation: BP still baseline soft but stable, asymptomatic. Up in chair in AM. Suction set up for sputum per pt request. VSS, call light within reach.

## 2024-10-25 NOTE — PROGRESS NOTES
[facility]    [phone]   LOS: 2 days     Name: Riky Moon  Age/Sex: 84 y.o. male  :  1940        PCP: Marco Carrillo MD  Chief Complaint   Patient presents with    URI    Urinary Frequency      Subjective   Feeling better this afternoon.  Had swallow test done this morning and when he got back he had lunch but got choked up on chicken breast again.  After few minutes he was able to cough it up but this scared him quite a bit  General: No Fever or Chills, C GI: No Nausea, Vomiting, or Diarrhea, and Other: No bleeding    vitamin C, 500 mg, Oral, Daily  atenolol, 12.5 mg, Oral, Daily  cholecalciferol, 1,000 Units, Oral, QAM  DULoxetine, 60 mg, Oral, QAM  insulin lispro, 2-7 Units, Subcutaneous, 4x Daily AC & at Bedtime  levothyroxine, 125 mcg, Oral, Daily  [Held by provider] losartan, 100 mg, Oral, Daily  [Held by provider] metFORMIN, 850 mg, Oral, BID With Meals  multivitamin, 1 tablet, Oral, QAM  oxybutynin, 5 mg, Oral, Q12H  oxybutynin XL, 15 mg, Oral, Nightly  pravastatin, 40 mg, Oral, Nightly  predniSONE, 20 mg, Oral, TID With Meals  pregabalin, 150 mg, Oral, Q12H  sodium chloride, 10 mL, Intravenous, Q12H  vitamin B-12, 500 mcg, Oral, QAM  [Held by provider] warfarin, 1 mg, Oral, Once  [Held by provider] warfarin, 10 mg, Oral, Once per day on   [START ON 10/26/2024] warfarin, 5 mg, Oral, Once per day on       Pharmacy to dose warfarin,         Objective   Vital Signs  Temp:  [97.3 °F (36.3 °C)-98.2 °F (36.8 °C)] 97.3 °F (36.3 °C)  Heart Rate:  [54-89] 54  Resp:  [16-18] 18  BP: ()/(42-64) 98/52  Body mass index is 40.82 kg/m².    Intake/Output Summary (Last 24 hours) at 10/25/2024 0854  Last data filed at 10/25/2024 0300  Gross per 24 hour   Intake 240 ml   Output 400 ml   Net -160 ml       Physical Exam  Vitals reviewed.   Constitutional:       General: He is not in acute distress.     Appearance: He is obese. He is  ill-appearing.   Cardiovascular:      Rate and Rhythm: Normal rate and regular rhythm.   Pulmonary:      Effort: No respiratory distress.      Breath sounds: Normal breath sounds.   Abdominal:      General: Bowel sounds are normal. There is no distension.      Palpations: Abdomen is soft.   Musculoskeletal:      Right lower leg: Edema present.      Left lower leg: Edema present.   Neurological:      General: No focal deficit present.      Mental Status: He is alert and oriented to person, place, and time. Mental status is at baseline.           Results Review:       I reviewed the patient's new clinical results.  Results from last 7 days   Lab Units 10/25/24  0341 10/24/24  0333 10/23/24  0254 10/22/24  1517   WBC 10*3/mm3 13.11* 15.64* 14.54* 17.81*   HEMOGLOBIN g/dL 11.1* 12.0* 12.6* 12.1*   PLATELETS 10*3/mm3 200 199 171 166     Results from last 7 days   Lab Units 10/25/24  0341 10/24/24  0333 10/23/24  0254 10/22/24  1517   SODIUM mmol/L 133* 136 138 136   POTASSIUM mmol/L 3.7 3.9 3.8 4.1   CHLORIDE mmol/L 98 100 100 102   CO2 mmol/L 23.4 22.4 25.1 25.9   BUN mg/dL 75* 63* 33* 28*   CREATININE mg/dL 2.42* 2.07* 1.64* 1.22   CALCIUM mg/dL 8.2* 8.3* 8.7 9.0   MAGNESIUM mg/dL 2.1  --  2.0  --    PHOSPHORUS mg/dL  --  3.7  --   --    Estimated Creatinine Clearance: 31.9 mL/min (A) (by C-G formula based on SCr of 2.42 mg/dL (H)).  Lab Results   Component Value Date    HGBA1C 6.70 (H) 10/23/2024    HGBA1C 7.2 (H) 07/01/2024    HGBA1C 7.0 (H) 02/27/2024     Glucose   Date/Time Value Ref Range Status   10/25/2024 0757 174 (H) 70 - 130 mg/dL Final   10/24/2024 2032 273 (H) 70 - 130 mg/dL Final   10/24/2024 1701 210 (H) 70 - 130 mg/dL Final   10/24/2024 1250 185 (H) 70 - 130 mg/dL Final   10/24/2024 0759 217 (H) 70 - 130 mg/dL Final   10/23/2024 2044 237 (H) 70 - 130 mg/dL Final   10/23/2024 1705 248 (H) 70 - 130 mg/dL Final   10/23/2024 1144 247 (H) 70 - 130 mg/dL Final         Assessment & Plan   Active Hospital  Problems    Diagnosis  POA    **Shortness of breath [R06.02]  Yes    CHF (congestive heart failure) [I50.9]  Unknown    Atrial flutter with controlled response [I48.92]  Yes    Gastroesophageal reflux disease [K21.9]  Yes    Constipation [K59.00]  Yes    Hypothyroidism [E03.9]  Yes    Diabetes mellitus [E11.9]  Yes    Essential hypertension [I10]  Yes    KINDRA treated with auto BiPAP [G47.33]  Yes    COPD (chronic obstructive pulmonary disease) [J44.9]  Yes    Asthma [J45.909]  Yes      Resolved Hospital Problems   No resolved problems to display.       PLAN  This is a chronically ill 84-year-old gentleman with past medical history pertinent for heart failure atrial flutter hypothyroidism type 2 diabetes hypertension obstructive sleep apnea and COPD who presents to the hospital with weakness and shortness of breath and is admitted with presumed heart failure and COPD exacerbations  -Diuresed about 1.3 L.  Creatinine up to 2.0 and BUN increasing.  No evidence of contraction alkalosis.  Hold on resuming diuretics for now.  -Acute renal failure secondary to overdiuresis and volume depletion.  Appreciate nephrology input creatinine continues to rise today  -Continue IV steroids and breathing treatments treating for COPD exacerbation, transition to p.o. steroids in the morning  -Reviewed his video swallow likely is upper pharyngeal area shows no evidence of dysphagia unfortunately he has a lot of changes within his esophagram concerning for issues.  Will need to get an EGD done.  Complicating this is his current use of Coumadin for anticoagulation.  Going to place his Coumadin on hold will allow his INR to drift down we will probably plan for EGD Monday or early next week if GI is amenable.  -Sputum culture reviewed and respiratory viral panel ordered.  RVP is negative.    -Pulmonary recommendations reviewed and agree with present management  -D-dimer reviewed and negative little concern for pulmonary embolus at this  time.  -Blood sugars are expectedly elevated and uncontrolled due to the IV steroids.  Continue sliding scale coverage of hyperglycemia may need additional coverage but hopefully for now we can continue with sliding scale coverage and transition to p.o. steroids tomorrow  -Discontinued Jardiance with worsening creatinine  -Anemia of chronic disease with noted iron deficiency, hemoglobin remained stable.  His last colonoscopy was 10 years ago.  I will defer to GI whether they think he should undergo another colonoscopy at this time especially with iron deficiency anemia.  He is on chronic anticoagulation no evidence of obvious blood loss is noted.  -Hemoglobin stable  -Mechanical DVT prophylaxis  -Full code  Disposition  Expected Discharge Date: 10/25/2024; Expected Discharge Time:        Jacobo Ferrera MD  [facility]  10/25/24  08:54 EDT

## 2024-10-25 NOTE — PLAN OF CARE
Goal Outcome Evaluation:  Plan of Care Reviewed With: patient        Progress: improving  Outcome Evaluation: Patient has no complaints of pain. BP decreased intermittently, provider notified. Patient safety maintained. Orthostatic BP complete. Plan of care ongoing.

## 2024-10-25 NOTE — CONSULTS
[facility]  Initial Inpatient Consult Note    Referring Provider: Dr. Ferrera    Reason for Consultation: abnormal esophagram    Subjective     History of present illness:    84 y.o. male that we are asked to see for abnormal esophagram.  Patient reports he frequently coughs when he eats.  He underwent an esophagram today that showed distal esophageal tapering.  The barium tablet remained at this location for 23 minutes.  He reports that he sometimes feels like the food hangs up he points to his sternal notch.  He has episodic heartburn.  He denies nausea vomiting or abdominal pain.  He is followed by Dr. Curtis with the [facility].  He has had previous colonoscopies but has never had an endoscopy.    He has a history of a flutter and is on Coumadin.  INR is currently 2.79 this morning.    Past Medical History:  Past Medical History:   Diagnosis Date    Acid reflux     Anemia     Arthritis     OSTEO    Asthma     Chest discomfort     Colon polyp     COPD (chronic obstructive pulmonary disease)     Depression     Diabetes mellitus     type 2    Disease of thyroid gland     Emphysema lung     High cholesterol     Hypertension     Morbid obesity     Neuropathy     BOTH FEET    Obesity, Class III, BMI 40-49.9 (morbid obesity)     PAD (peripheral artery disease)     Poor circulation of extremity     LEFT LEG    Sleep apnea     Vitamin D deficiency      Past Surgical History:  Past Surgical History:   Procedure Laterality Date    APPENDECTOMY      CATARACT EXTRACTION W/ INTRAOCULAR LENS IMPLANT Bilateral     CHOLECYSTECTOMY      COLONOSCOPY  01/01/2014    COLONOSCOPY W/ POLYPECTOMY      BENIGN    HERNIA REPAIR      INTERSTIM PLACEMENT Left 08/30/2016    BLADDER CONTROL    KNEE ARTHROPLASTY Right     MOUTH SURGERY  06/26/2018    NOSE SURGERY      REMOVED BLOCKAGE     ROTATOR CUFF REPAIR Right     TOTAL HIP ARTHROPLASTY Right 11/9/2017    Procedure: RIGHT TOTAL HIP ARTHROPLASTY;  Surgeon:  Riky Fernando MD;  Location: [facility] OR;  Service:       Social History:   Social History     Tobacco Use    Smoking status: Former     Current packs/day: 0.00     Average packs/day: 1.5 packs/day for 40.0 years (60.0 ttl pk-yrs)     Types: Cigarettes     Start date:      Quit date:      Years since quittin.8    Smokeless tobacco: Never    Tobacco comments:     from age [age]   Substance Use Topics    Alcohol use: Yes     Comment: HOLIDAYS  caffeine -2 cups coffee daily      Family History:  Family History   Problem Relation Age of Onset    Stroke Mother     Hypertension Mother     Heart attack Father     Alcohol abuse Father     Heart disease Father     Diabetes Sister     Heart disease Brother     Stomach cancer Brother     Stroke Brother     Alcohol abuse Brother     Hypertension Daughter     Diabetes Daughter     Malig Hyperthermia Neg Hx        Home Meds:  Medications Prior to Admission   Medication Sig Dispense Refill Last Dose/Taking    amLODIPine (NORVASC) 5 MG tablet TAKE 1 TABLET BY MOUTH DAILY 90 tablet 3 Taking    Ascorbic Acid (VITAMIN C PO) Take 1 tablet by mouth Daily.   Taking    atenolol (TENORMIN) 25 MG tablet Take 0.5 tablets by mouth Daily. 45 tablet 3 Taking    Cholecalciferol (VITAMIN D) 1000 units tablet Take 1 tablet by mouth Every Morning.   Taking    DULoxetine (CYMBALTA) 60 MG capsule Take 1 capsule by mouth Every Morning. 90 capsule 3 Taking    Empagliflozin (Jardiance) 10 MG tablet Take 10 mg by mouth Daily. (Patient taking differently: Take 2.5 tablets by mouth Daily.) 30 tablet 11 Taking Differently    losartan (COZAAR) 100 MG tablet Take 1 tablet by mouth Daily. 90 tablet 3 Taking    metFORMIN (GLUCOPHAGE) 850 MG tablet Take 1 tablet by mouth 2 (Two) Times a Day With Meals. 30 tablet 6 Taking    Multiple Vitamin (MULTIVITAMIN) tablet Take 1 tablet by mouth Every Morning.   Taking    oxybutynin XL (DITROPAN XL) 15 MG 24 hr tablet Take 1 tablet by mouth Every Night.    Taking    pravastatin (PRAVACHOL) 40 MG tablet Take 1 tablet by mouth Every Night. 90 tablet 3 Taking    pregabalin (LYRICA) 150 MG capsule Take 1 capsule by mouth 2 (Two) Times a Day.   Taking    vitamin B-12 (CYANOCOBALAMIN) 1000 MCG tablet Take 0.5 tablets by mouth Every Morning.   Taking    furosemide (Lasix) 20 MG tablet Take 1 tablet by mouth Daily for 3 days. 3 tablet 0     glucose blood (LAINEY CONTOUR TEST) test strip Use as instructed to test glucose 100 each 1     levothyroxine (Synthroid) 125 MCG tablet Take 1 tablet by mouth Daily. 90 tablet 2     MICROLET LANCETS misc Use daily as directed to test glucose 100 each 1     oxybutynin (DITROPAN) 5 MG tablet Take 1 tablet by mouth Every 12 (Twelve) Hours.       Synthroid 125 MCG tablet TAKE 1 TABLET BY MOUTH DAILY 30 tablet 11     warfarin (COUMADIN) 5 MG tablet Take one tablet (5 mg) by mouth on Monday and Saturdays, and take two tablets (10 mg) by mouth all other days or as directed. 155 tablet 1      Current Meds:   vitamin C, 500 mg, Oral, Daily  atenolol, 12.5 mg, Oral, Daily  cholecalciferol, 1,000 Units, Oral, QAM  DULoxetine, 60 mg, Oral, QAM  insulin lispro, 2-7 Units, Subcutaneous, 4x Daily AC & at Bedtime  levothyroxine, 125 mcg, Oral, Daily  [Held by provider] losartan, 100 mg, Oral, Daily  [Held by provider] metFORMIN, 850 mg, Oral, BID With Meals  multivitamin, 1 tablet, Oral, QAM  oxybutynin, 5 mg, Oral, Q12H  oxybutynin XL, 15 mg, Oral, Nightly  pravastatin, 40 mg, Oral, Nightly  [START ON 10/26/2024] predniSONE, 20 mg, Oral, BID With Meals  [START ON 10/28/2024] predniSONE, 20 mg, Oral, Daily With Breakfast  pregabalin, 150 mg, Oral, Q12H  sodium chloride, 10 mL, Intravenous, Q12H  vitamin B-12, 500 mcg, Oral, QAM  [Held by provider] warfarin, 1 mg, Oral, Once  [Held by provider] warfarin, 10 mg, Oral, Once per day on Sunday Tuesday Wednesday Thursday Friday  [START ON 10/26/2024] warfarin, 5 mg, Oral, Once per day on Monday  Saturday      Allergies:  Allergies   Allergen Reactions    Lisinopril Unknown - High Severity     Other reaction(s): Cough     Review of Systems  Pertinent items are noted in HPI     Objective     Vital Signs  Temp:  [97.3 °F (36.3 °C)-98.2 °F (36.8 °C)] 98.2 °F (36.8 °C)  Heart Rate:  [54-89] 78  Resp:  [16-18] 18  BP: ()/(42-64) 100/60  Physical Exam:  General Appearance:    Alert, cooperative, in no acute distress   Head:    Normocephalic, without obvious abnormality, atraumatic   Eyes:          conjunctivae and sclerae normal, no   icterus   Throat:   no thrush, oral mucosa moist   Neck:   Supple, no adenopathy   Lungs:     Clear to auscultation bilaterally    Heart:  Irregularly irregular   Chest Wall:    No abnormalities observed   Abdomen:     Soft, nondistended, nontender        Skin:   No bruising or rash   Psychiatric:  normal mood and insight     Results Review:   I reviewed the patient's new clinical results.    Results from last 7 days   Lab Units 10/25/24  0341 10/24/24  0333 10/23/24  0254   WBC 10*3/mm3 13.11* 15.64* 14.54*   HEMOGLOBIN g/dL 11.1* 12.0* 12.6*   HEMATOCRIT % 33.7* 33.4* 36.2*   PLATELETS 10*3/mm3 200 199 171     Results from last 7 days   Lab Units 10/25/24  0341 10/24/24  0333 10/23/24  0254 10/22/24  1517   SODIUM mmol/L 133* 136 138 136   POTASSIUM mmol/L 3.7 3.9 3.8 4.1   CHLORIDE mmol/L 98 100 100 102   CO2 mmol/L 23.4 22.4 25.1 25.9   BUN mg/dL 75* 63* 33* 28*   CREATININE mg/dL 2.42* 2.07* 1.64* 1.22   CALCIUM mg/dL 8.2* 8.3* 8.7 9.0   BILIRUBIN mg/dL 0.4  --  0.8 1.5*   ALK PHOS U/L 44  --  63 55   ALT (SGPT) U/L 22  --  17 14   AST (SGOT) U/L 23  --  25 20   GLUCOSE mg/dL 204* 239* 213* 137*     Results from last 7 days   Lab Units 10/25/24  0341 10/24/24  0333 10/23/24  0254   INR  2.79* 2.16* 1.83*     Lab Results   Lab Value Date/Time    LIPASE 36 11/10/2023 1547       Radiology:  FL ESOPHAGRAM DOUBLE CONTRAST   Final Result   1.  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   This does not result in any significant flow limitation.   2.  Esophageal dysmotility demonstrated by absent primary peristalsis   with intermittent tertiary wave formations.   3.  Small sliding hiatal hernia with moderate volume of recumbent   gastroesophageal reflux observed refluxing to the upper thoracic   esophagus at the level of the clavicles.   4.  Smooth short segment narrowing at the gastroesophageal junction   resulting in obstruction of a 12.5 mm diameter barium tablet. The tablet   was observed passing at 23 minutes.   5.  Recommend endoscopy.                   This report was finalized on 10/24/2024 4:27 PM by Dr. Rosemary Barrios M.D   on Workstation: [id number]          FL Video Swallow Single Contrast   Final Result      XR Chest 2 View   Final Result   No focal pulmonary consolidation. Follow-up as clinical   indications persist.       This report was finalized on 10/22/2024 2:35 PM by Dr. Dipak Dc M.D on Workstation: [id number]              Assessment & Plan   Active Hospital Problems    Diagnosis     **Shortness of breath     CHF (congestive heart failure)     Atrial flutter with controlled response     Gastroesophageal reflux disease     Constipation     Hypothyroidism     Diabetes mellitus     Essential hypertension     KINDRA treated with auto BiPAP     COPD (chronic obstructive pulmonary disease)     Asthma        Assessment:  Abnormal esophagram  Anticoagulated  Dysphagia-clearly abnormal esophagram but he also describes what sounds like a component of oropharyngeal dysphagia where he coughs when he eats    Plan:  Recommend EGD after holding Coumadin - once INR is less than 1.8 can proceed.  He clearly needs endoscopy to rule out stricture in the distal esophagus.  I am concerned that he may have residual symptoms related to oropharyngeal issues given his coughing and choking with  eating.  He did have a normal VFSS this admission        I discussed the patients findings and my recommendations with patient.          Roseline Lozano M.D.  [facility]  [phone]

## 2024-10-25 NOTE — PROGRESS NOTES
[facility] Progress Note      Patient Name: Riky Moon  : 1940  MRN: [id number]  Primary Care Physician:  Marco Carrillo MD  Date of admission: 10/22/2024    Subjective     Interval History:   The patient was seen and examined today for follow-up on acute kidney injury.  Noted dysphagia today and he choked on a piece chicken this morning.  Appetite is good.  Denies nausea or vomiting   Afebrile  Urine output not properly recorded.  Denies any chest pain or shortness of breath.  Not orthostatic  Review of Systems:   As noted above    Objective     Vitals:   Temp:  [97.3 °F (36.3 °C)-98.2 °F (36.8 °C)] 98.2 °F (36.8 °C)  Heart Rate:  [54-89] 78  Resp:  [16-18] 18  BP: ()/(42-64) 100/60    Intake/Output Summary (Last 24 hours) at 10/25/2024 1443  Last data filed at 10/25/2024 0300  Gross per 24 hour   Intake 0 ml   Output 400 ml   Net -400 ml       Physical Exam:    General Appearance: alert, oriented x 3, no acute distress.  Obese  Skin: warm and dry  HEENT: oral mucosa normal, nonicteric sclera  Neck: supple, no JVD  Lungs: CTA  Heart: RRR, normal S1 and S2  Abdomen: soft, nontender, nondistended  : no palpable bladder  Extremities: Bilateral lower extremity edema.  No cyanosis or clubbing  Neuro: normal speech and mental status     Scheduled Meds:     vitamin C, 500 mg, Oral, Daily  atenolol, 12.5 mg, Oral, Daily  cholecalciferol, 1,000 Units, Oral, QAM  DULoxetine, 60 mg, Oral, QAM  insulin lispro, 2-7 Units, Subcutaneous, 4x Daily AC & at Bedtime  levothyroxine, 125 mcg, Oral, Daily  [Held by provider] losartan, 100 mg, Oral, Daily  [Held by provider] metFORMIN, 850 mg, Oral, BID With Meals  multivitamin, 1 tablet, Oral, QAM  oxybutynin, 5 mg, Oral, Q12H  oxybutynin XL, 15 mg, Oral, Nightly  pravastatin, 40 mg, Oral, Nightly  predniSONE, 20 mg, Oral, TID With Meals  pregabalin, 150 mg, Oral, Q12H  sodium chloride, 10 mL, Intravenous, Q12H  vitamin B-12, 500 mcg,  Oral, QAM  [Held by provider] warfarin, 1 mg, Oral, Once  [Held by provider] warfarin, 10 mg, Oral, Once per day on Sunday Tuesday Wednesday Thursday Friday  [START ON 10/26/2024] warfarin, 5 mg, Oral, Once per day on Monday Saturday      IV Meds:   Pharmacy to dose warfarin,         Results Reviewed:   I have personally reviewed the results from the time of this admission to 10/25/2024 14:43 EDT     Results from last 7 days   Lab Units 10/25/24  0341 10/24/24  0333 10/23/24  0254 10/22/24  1517   SODIUM mmol/L 133* 136 138 136   POTASSIUM mmol/L 3.7 3.9 3.8 4.1   CHLORIDE mmol/L 98 100 100 102   CO2 mmol/L 23.4 22.4 25.1 25.9   BUN mg/dL 75* 63* 33* 28*   CREATININE mg/dL 2.42* 2.07* 1.64* 1.22   CALCIUM mg/dL 8.2* 8.3* 8.7 9.0   BILIRUBIN mg/dL 0.4  --  0.8 1.5*   ALK PHOS U/L 44  --  63 55   ALT (SGPT) U/L 22  --  17 14   AST (SGOT) U/L 23  --  25 20   GLUCOSE mg/dL 204* 239* 213* 137*       Estimated Creatinine Clearance: 31.9 mL/min (A) (by C-G formula based on SCr of 2.42 mg/dL (H)).    Results from last 7 days   Lab Units 10/25/24  0341 10/24/24  0333 10/23/24  0254   MAGNESIUM mg/dL 2.1  --  2.0   PHOSPHORUS mg/dL  --  3.7  --        Results from last 7 days   Lab Units 10/25/24  0341 10/24/24  0333   URIC ACID mg/dL 6.6 6.0       Results from last 7 days   Lab Units 10/25/24  0341 10/24/24  0333 10/23/24  0254 10/22/24  1517   WBC 10*3/mm3 13.11* 15.64* 14.54* 17.81*   HEMOGLOBIN g/dL 11.1* 12.0* 12.6* 12.1*   PLATELETS 10*3/mm3 200 199 171 166       Results from last 7 days   Lab Units 10/25/24  0341 10/24/24  0333 10/23/24  0254 10/22/24  1702 10/22/24  1559   INR  2.79* 2.16* 1.83* 2* 2.20       Assessment / Plan     ASSESSMENT:  Nonoliguric KEL, SCr 1.22 on admission, today up to 2.07, multifactorial: Prerenal with hypotension, aggressive diuresing with IV diuretics; impaired renal autoregulation with the use of ARB; andSGLT2 inhibitor .  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  No fluid excess except moderate chronic BLE lymphedema.  Cardiology evaluated and signed off  SOA/COPD/KINDRA with CPAP, chest x-ray negative, uses CPAP at night.  Breathing comfortable on room air, followed by pulmonary   Hypertension, home Cozaar and amlodipine both resumed, orthostatic on presentation resolved now  Type II DM, home metformin and Jardiance were stopped   A-fib, HR controlled, on atenolol.  Currently Coumadin on hold for possible EGD on Monday  Hypothyroidism, on Synthroid  Dysphagia, swallow study result pending  Neurogenic bladder s/p bladder stimulator        PLAN:  The patient seems to be euvolemic to me at this time with no indication for diuresis.  Will continue to hold Cozaar, diuretic therapy and SGLT2 inhibitors.  Encourage p.o. intake  Plan noted for possible EGD on Monday  Labs in a.m.      Thank you for involving us in the care of Riky Moon.  Please feel free to call with any questions.    Isma Kwan MD  10/25/24  14:43 EDT    [facility]  [phone]    Parts of this note may be an electronic transcription/translation of spoken language to printed text using the Dragon dictation system.

## 2024-10-25 NOTE — SIGNIFICANT NOTE
10/25/24 0515 10/25/24 0521 10/25/24 0524   Vital Signs   Heart Rate 64 89 87   /61 100/54 112/64   Noninvasive MAP (mmHg) 74 67 75   BP Location Right arm Right arm Right arm   BP Method Automatic Automatic Automatic   Patient Position Lying Standing Standing     Orthostatic BP

## 2024-10-25 NOTE — PROGRESS NOTES
"  PROGRESS NOTE  Patient Name: Riky Moon  Age/Sex: 84 y.o. male  : 1940  MRN: [id number]    Date of Admission: 10/22/2024  Date of Encounter Visit: 10/25/24   LOS: 2 days   Patient Care Team:  Marco Carrillo MD as PCP - General (Family Medicine)  Cosmo French MD as Consulting Physician (Sleep Medicine)  Ty Shelton MD as Consulting Physician (Cardiology)  Marco Carrillo MD as Referring Physician (Family Medicine)  Chio Gilliam MD as Consulting Physician (Hematology and Oncology)    Chief Complaint: COPD exacerbation with decompensated heart failure    Hospital course: Patient is doing a lot better compared to yesterday, he did diurese well at the expense of elevated creatinine but today he is sitting comfortably on room air with no wheezing.  He is complaining of the dry secretion in the dry mucous membrane which is evident on exam otherwise no fever or chills  Patient is using of the home BiPAP at night       REVIEW OF SYSTEMS:   CONSTITUTIONAL: no fever or chills  CARDIOVASCULAR: No chest pain, chest pressure or chest discomfort. No palpitations, improved edema  RESPIRATORY: Resolved shortness of breath, no cough or sputum, thick secretions in the posterior oropharynx reported.   GASTROINTESTINAL: No anorexia, nausea, vomiting or diarrhea. No abdominal pain or blood.   HEMATOLOGIC: No bleeding or bruising.     Ventilator/Non-Invasive Ventilation Settings (From admission, onward)      None              Vital Signs  Temp:  [97.3 °F (36.3 °C)-97.8 °F (36.6 °C)] 97.3 °F (36.3 °C)  Heart Rate:  [54-89] 54  Resp:  [16-18] 18  BP: ()/(42-64) 98/52  SpO2:  [94 %-96 %] 96 %  on    Device (Oxygen Therapy): room air    Intake/Output Summary (Last 24 hours) at 10/25/2024 1417  Last data filed at 10/25/2024 0300  Gross per 24 hour   Intake 0 ml   Output 400 ml   Net -400 ml     Flowsheet Rows      Flowsheet Row First Filed Value   Admission Height 182.9 cm (72\") Documented at " 10/22/2024 1337   Admission Weight 138 kg (305 lb) Documented at 10/22/2024 1337          Body mass index is 40.82 kg/m².      10/23/24  1612 10/24/24  0500 10/25/24  0521   Weight: 134 kg (296 lb) 134 kg (295 lb 12.8 oz) (!) 137 kg (301 lb)       Physical Exam:  GEN:  No acute distress, alert, cooperative, well developed   EYES:   Sclerae clear. No icterus. PERRL. Normal EOM  ENT:   External ears/nose normal, no oral lesions, no thrush, very dry mucous member Raynes  NECK:  Supple, midline trachea, no JVD  LUNGS: Normal chest on inspection, diminished breath sounds get to auscultation. Respirations regular, even and unlabored.   CV:  Regular rhythm and rate. Normal S1/S2. No murmurs, gallops, or rubs noted.  ABD:  Soft, nontender and nondistended. Normal bowel sounds. No guarding  EXT:  Moves all extremities well. No cyanosis. No redness.  1+ edema, improved compared to yesterday.   Skin: Dry, intact, no bleeding    Results Review:    Results From Last 14 Days   Lab Units 10/23/24  1156 10/23/24  0555 10/23/24  0254   D DIMER QUANT MCGFEU/mL 0.29  --   --    FERRITIN ng/mL  --   --  822.00*   LACTATE mmol/L  --  1.2  --    LDH U/L  --  193 220     Results from last 7 days   Lab Units 10/25/24  0341 10/24/24  0333 10/23/24  0254 10/22/24  1517   SODIUM mmol/L 133* 136 138 136   POTASSIUM mmol/L 3.7 3.9 3.8 4.1   CHLORIDE mmol/L 98 100 100 102   CO2 mmol/L 23.4 22.4 25.1 25.9   BUN mg/dL 75* 63* 33* 28*   CREATININE mg/dL 2.42* 2.07* 1.64* 1.22   CALCIUM mg/dL 8.2* 8.3* 8.7 9.0   AST (SGOT) U/L 23  --  25 20   ALT (SGPT) U/L 22  --  17 14   ANION GAP mmol/L 11.6 13.6 12.9 8.1   ALBUMIN g/dL 3.1* 3.4* 3.7 3.8     Results from last 7 days   Lab Units 10/23/24  1156 10/22/24  1920 10/22/24  1713 10/22/24  1517   HSTROP T ng/L  --  40* 42* 46*   D DIMER QUANT MCGFEU/mL 0.29  --   --   --      Results from last 7 days   Lab Units 10/25/24  0341   TSH uIU/mL 0.526     Results from last 7 days   Lab Units 10/22/24  1517  "  PROBNP pg/mL 3,083.0*     Results from last 7 days   Lab Units 10/25/24  0341 10/24/24  0333 10/23/24  0254 10/22/24  1517   WBC 10*3/mm3 13.11* 15.64* 14.54* 17.81*   HEMOGLOBIN g/dL 11.1* 12.0* 12.6* 12.1*   HEMATOCRIT % 33.7* 33.4* 36.2* 35.9*   PLATELETS 10*3/mm3 200 199 171 166   MCV fL 115.0* 111.7* 109.7* 115.4*   NEUTROPHIL % % 87.6* 92.2* 94.7*  --    LYMPHOCYTE % % 4.8* 3.6* 3.0*  --    MONOCYTES % % 6.9 3.6* 1.6*  --    EOSINOPHIL % % 0.0* 0.0* 0.0*  --    BASOPHIL % % 0.1 0.1 0.1  --    IMM GRAN % % 0.6* 0.5 0.6*  --      Results from last 7 days   Lab Units 10/25/24  0341 10/24/24  0333 10/23/24  0254   INR  2.79* 2.16* 1.83*     Results from last 7 days   Lab Units 10/25/24  0341 10/23/24  0254   MAGNESIUM mg/dL 2.1 2.0           Invalid input(s): \"LDLCALC\"      Results from last 7 days   Lab Units 10/23/24  0254   HEMOGLOBIN A1C % 6.70*     Glucose   Date/Time Value Ref Range Status   10/25/2024 1150 159 (H) 70 - 130 mg/dL Final   10/25/2024 0757 174 (H) 70 - 130 mg/dL Final   10/24/2024 2032 273 (H) 70 - 130 mg/dL Final   10/24/2024 1701 210 (H) 70 - 130 mg/dL Final   10/24/2024 1250 185 (H) 70 - 130 mg/dL Final   10/24/2024 0759 217 (H) 70 - 130 mg/dL Final   10/23/2024 2044 237 (H) 70 - 130 mg/dL Final   10/23/2024 1705 248 (H) 70 - 130 mg/dL Final     Results from last 7 days   Lab Units 10/23/24  0555 10/23/24  0254   PROCALCITONIN ng/mL  --  0.28*   LACTATE mmol/L 1.2  --      Results from last 7 days   Lab Units 10/24/24  0742   RESPCX  Scant growth (1+) The culture consists of normal respiratory saman. This is a preliminary report; final report to follow.     Results from last 7 days   Lab Units 10/22/24  1447   NITRITE UA  Negative   WBC UA /HPF 0-2   BACTERIA UA /HPF None Seen   SQUAM EPITHEL UA /HPF 0-2     Results from last 7 days   Lab Units 10/23/24  1210   COVID19  Not Detected   ADENOVIRUS DETECTION BY PCR  Not Detected   CORONAVIRUS 229E  Not Detected   CORONAVIRUS HKU1  Not " Detected   CORONAVIRUS NL63  Not Detected   CORONAVIRUS OC43  Not Detected   HUMAN METAPNEUMOVIRUS  Not Detected   HUMAN RHINOVIRUS/ENTEROVIRUS  Not Detected   INFLUENZA B PCR  Not Detected   PARAINFLUENZA 1  Not Detected   PARAINFLUENZA VIRUS 2  Not Detected   PARAINFLUENZA VIRUS 3  Not Detected   PARAINFLUENZA VIRUS 4  Not Detected   BORDETELLA PERTUSSIS PCR  Not Detected   BORDETELLA PARAPERTUSSIS PCR  Not Detected   CHLAMYDOPHILA PNEUMONIAE PCR  Not Detected   MYCOPLAMA PNEUMO PCR  Not Detected   RSV, PCR  Not Detected     Results from last 7 days   Lab Units 10/25/24  0341 10/24/24  1816   SODIUM UR mmol/L  --  21   CREATININE UR mg/dL  --  102.9  104.0   PROTEIN TOTAL URINE mg/dL  --  9.1   URIC ACID mg/dL 6.6  --            Imaging:   Imaging Results (All)               I reviewed the patient's new clinical results.  I personally viewed and interpreted the patient's imaging results:        Medication Review:   vitamin C, 500 mg, Oral, Daily  atenolol, 12.5 mg, Oral, Daily  cholecalciferol, 1,000 Units, Oral, QAM  DULoxetine, 60 mg, Oral, QAM  insulin lispro, 2-7 Units, Subcutaneous, 4x Daily AC & at Bedtime  levothyroxine, 125 mcg, Oral, Daily  [Held by provider] losartan, 100 mg, Oral, Daily  [Held by provider] metFORMIN, 850 mg, Oral, BID With Meals  multivitamin, 1 tablet, Oral, QAM  oxybutynin, 5 mg, Oral, Q12H  oxybutynin XL, 15 mg, Oral, Nightly  pravastatin, 40 mg, Oral, Nightly  predniSONE, 20 mg, Oral, TID With Meals  pregabalin, 150 mg, Oral, Q12H  sodium chloride, 10 mL, Intravenous, Q12H  vitamin B-12, 500 mcg, Oral, QAM  [Held by provider] warfarin, 1 mg, Oral, Once  [Held by provider] warfarin, 10 mg, Oral, Once per day on Sunday Tuesday Wednesday Thursday Friday  [START ON 10/26/2024] warfarin, 5 mg, Oral, Once per day on Monday Saturday        Pharmacy to dose warfarin,         ASSESSMENT:   Acute COPD exacerbation  Decompensated congestive heart failure  Acute renal failure from  overdiuresis  Essential hypertension  Obstructive sleep apnea on auto BiPAP  Atrial flutter  Obesity with a BMI of 40    PLAN:  Creatinine is up, nephrology note reviewed, the patient is nonoliguric, initial creatinine 1.22 up to 2.42.  Patient is currently off the angiotensin receptor blocker and will defer to renal for further management, hold on any diuretics at this point from the pulmonary standpoint.  A-fib and heart rate control management per cardiology  From the respiratory standpoint the patient is currently is in compensated state with no wheezes or congestion to suggest COPD exacerbation, and he is clinically dry.  We will taper and plan on discontinuing the prednisone over the next 5 days  He is to continue with the BiPAP at night and may resume the oxygen during the daytime if needed  Will see as needed for the remainder of the hospital stay and will round in case of any question or concern, please do not hesitate to call    Discussed with patient in details  Labs/Notes/films were independently reviewed and pertinent results are summarized above  The copied texts in this note were reviewed and they are accurate as of 10/25/24    Disposition: Per primary team    Katelynn Mitchell MD  10/25/24  14:17 EDT             Dictated utilizing Dragon dictation

## 2024-10-25 NOTE — PROGRESS NOTES
[facility] Clinical Pharmacy Services: Warfarin Dosing/Monitoring Consult    Riky Moon is a 84 y.o. male, estimated creatinine clearance is 31.9 mL/min (A) (by C-G formula based on SCr of 2.42 mg/dL (H)). weighing (!) 137 kg (301 lb).    Results from last 7 days   Lab Units 10/25/24  0341 10/24/24  0333 10/23/24  0254 10/22/24  1702 10/22/24  1559 10/22/24  1517   INR  2.79* 2.16* 1.83* 2* 2.20  --    HEMOGLOBIN g/dL 11.1* 12.0* 12.6*  --   --  12.1*   HEMATOCRIT % 33.7* 33.4* 36.2*  --   --  35.9*   PLATELETS 10*3/mm3 200 199 171  --   --  166     Prior to admission anticoagulation: Per [facility] clinic last dosing regimen as follows: warfarin 5 mg PO Mon/Sat and 10 mg all other days of the week.     Hospital Anticoagulation:  Consulting provider: Dr. Pope  Start date: 10/22/2024 - home med  Indication: A Fib - requiring full anticoagulation  Target INR: 2 - 3  Expected duration: lifelong   Bridge Therapy: No      Potential food or drug interactions:   Duloxetine (may enhance anticoagulant effect, moderate, home med)  Levothyroxine (may enhance anticoagulant effect, moderate, home med)  Pravastatin (may enhance anticoagulant effect, moderate, home med)  Prednisone (may enhance anticoagulant effect, moderate, new interaction)    Education complete?/Date: N/A; home medication    Assessment/Plan:  INR remains therapeutic but sharp rise today.  New DDI with prednisone likely influencing higher INR.  She has received 7.5-10mg x3 days, anticipate INR will continue to rise, possibly supratherapeutic tomorrow.  Will give low dose warfarin 1mg tonight; prefer to avoid held doses if possible to minimize INR lability, particularly with high maintenance dose requirements (60mg weekly).  Monitor s/sx of bleeding.  Interval drop in H/H noted.  Monitor daily INR trend.      Pharmacy will continue to follow until discharge or discontinuation of warfarin.     Eva Seo, PharmD, MPH, BCPS

## 2024-10-25 NOTE — CASE MANAGEMENT/SOCIAL WORK
Continued Stay Note  [facility]     Patient Name: Riky Moon  MRN: [id number]  Today's Date: 10/25/2024    Admit Date: 10/22/2024    Plan: Return to [facility] apartment with wife and do onsite PT. Family transport. Plan for EGD once INR is less than 1.8.   Discharge Plan       Row Name 10/25/24 1824       Plan    [facility]. Family transport. Plan for EGD once INR is less than 1.8.    Patient/Family in Agreement with Plan yes    Plan Comments Gi plans to do EGD once INR is less than 1.8. Pt recommends home with HH but pt wants to do PT onsite at [facility] instead of . Cornelio Zhao RN-BC                   Discharge Codes    No documentation.                 Expected Discharge Date and Time       Expected Discharge Date Expected Discharge Time    Oct 28, 2024               Cornelio Zhao, RN

## 2024-10-26 LAB
ALBUMIN SERPL-MCNC: 3.2 G/DL (ref 3.5–5.2)
ANION GAP SERPL CALCULATED.3IONS-SCNC: 9.9 MMOL/L (ref 5–15)
BACTERIA SPEC RESP CULT: NORMAL
BUN SERPL-MCNC: 75 MG/DL (ref 8–23)
BUN/CREAT SERPL: 45.2 (ref 7–25)
CALCIUM SPEC-SCNC: 8.5 MG/DL (ref 8.6–10.5)
CHLORIDE SERPL-SCNC: 103 MMOL/L (ref 98–107)
CO2 SERPL-SCNC: 24.1 MMOL/L (ref 22–29)
CREAT SERPL-MCNC: 1.66 MG/DL (ref 0.76–1.27)
EGFRCR SERPLBLD CKD-EPI 2021: 40.1 ML/MIN/1.73
GLUCOSE BLDC GLUCOMTR-MCNC: 115 MG/DL (ref 70–130)
GLUCOSE BLDC GLUCOMTR-MCNC: 235 MG/DL (ref 70–130)
GLUCOSE BLDC GLUCOMTR-MCNC: 257 MG/DL (ref 70–130)
GLUCOSE BLDC GLUCOMTR-MCNC: 96 MG/DL (ref 70–130)
GLUCOSE BLDC GLUCOMTR-MCNC: 98 MG/DL (ref 70–130)
GLUCOSE SERPL-MCNC: 78 MG/DL (ref 65–99)
GRAM STN SPEC: NORMAL
GRAM STN SPEC: NORMAL
INR PPP: 3.18 (ref 0.9–1.1)
PHOSPHATE SERPL-MCNC: 3.6 MG/DL (ref 2.5–4.5)
POTASSIUM SERPL-SCNC: 3.7 MMOL/L (ref 3.5–5.2)
PROTHROMBIN TIME: 32.9 SECONDS (ref 11.7–14.2)
SODIUM SERPL-SCNC: 137 MMOL/L (ref 136–145)

## 2024-10-26 PROCEDURE — 63710000001 PREDNISONE PER 1 MG: Performed by: INTERNAL MEDICINE

## 2024-10-26 PROCEDURE — 80069 RENAL FUNCTION PANEL: CPT | Performed by: HOSPITALIST

## 2024-10-26 PROCEDURE — 63710000001 INSULIN LISPRO (HUMAN) PER 5 UNITS: Performed by: INTERNAL MEDICINE

## 2024-10-26 PROCEDURE — 82948 REAGENT STRIP/BLOOD GLUCOSE: CPT

## 2024-10-26 PROCEDURE — 85610 PROTHROMBIN TIME: CPT | Performed by: INTERNAL MEDICINE

## 2024-10-26 RX ADMIN — DULOXETINE HYDROCHLORIDE 60 MG: 60 CAPSULE, DELAYED RELEASE ORAL at 09:38

## 2024-10-26 RX ADMIN — PREGABALIN 150 MG: 75 CAPSULE ORAL at 20:19

## 2024-10-26 RX ADMIN — INSULIN LISPRO 4 UNITS: 100 INJECTION, SOLUTION INTRAVENOUS; SUBCUTANEOUS at 21:47

## 2024-10-26 RX ADMIN — Medication 10 ML: at 20:21

## 2024-10-26 RX ADMIN — LEVOTHYROXINE SODIUM 125 MCG: 125 TABLET ORAL at 09:38

## 2024-10-26 RX ADMIN — ATENOLOL 12.5 MG: 25 TABLET ORAL at 09:38

## 2024-10-26 RX ADMIN — PRAVASTATIN SODIUM 40 MG: 40 TABLET ORAL at 20:19

## 2024-10-26 RX ADMIN — PREGABALIN 150 MG: 75 CAPSULE ORAL at 09:38

## 2024-10-26 RX ADMIN — Medication 500 MCG: at 09:38

## 2024-10-26 RX ADMIN — PREDNISONE 20 MG: 20 TABLET ORAL at 09:38

## 2024-10-26 RX ADMIN — Medication 1 TABLET: at 09:38

## 2024-10-26 RX ADMIN — Medication 1000 UNITS: at 09:38

## 2024-10-26 RX ADMIN — INSULIN LISPRO 3 UNITS: 100 INJECTION, SOLUTION INTRAVENOUS; SUBCUTANEOUS at 17:26

## 2024-10-26 RX ADMIN — PREDNISONE 20 MG: 20 TABLET ORAL at 17:26

## 2024-10-26 RX ADMIN — Medication 10 ML: at 09:39

## 2024-10-26 RX ADMIN — OXYCODONE HYDROCHLORIDE AND ACETAMINOPHEN 500 MG: 500 TABLET ORAL at 09:38

## 2024-10-26 NOTE — PROGRESS NOTES
[facility]    [phone]   LOS: 3 days     Name: Riky Moon  Age/Sex: 84 y.o. male  :  1940        PCP: Marco Carrillo MD  Chief Complaint   Patient presents with    URI    Urinary Frequency      Subjective   No new issues this morning tolerating modified diet no evidence of aspiration still with productive cough.  General: No Fever or Chills, C GI: No Nausea, Vomiting, or Diarrhea, and Other: No bleeding    vitamin C, 500 mg, Oral, Daily  atenolol, 12.5 mg, Oral, Daily  cholecalciferol, 1,000 Units, Oral, QAM  DULoxetine, 60 mg, Oral, QAM  insulin lispro, 2-7 Units, Subcutaneous, 4x Daily AC & at Bedtime  levothyroxine, 125 mcg, Oral, Daily  [Held by provider] losartan, 100 mg, Oral, Daily  [Held by provider] metFORMIN, 850 mg, Oral, BID With Meals  multivitamin, 1 tablet, Oral, QAM  oxybutynin, 5 mg, Oral, Q12H  oxybutynin XL, 15 mg, Oral, Nightly  pravastatin, 40 mg, Oral, Nightly  predniSONE, 20 mg, Oral, BID With Meals  [START ON 10/28/2024] predniSONE, 20 mg, Oral, Daily With Breakfast  pregabalin, 150 mg, Oral, Q12H  sodium chloride, 10 mL, Intravenous, Q12H  vitamin B-12, 500 mcg, Oral, QAM             Objective   Vital Signs  Temp:  [97.3 °F (36.3 °C)-98.2 °F (36.8 °C)] 97.3 °F (36.3 °C)  Heart Rate:  [63-87] 63  Resp:  [18] 18  BP: (100-125)/(53-67) 113/53  Body mass index is 40.85 kg/m².    Intake/Output Summary (Last 24 hours) at 10/26/2024 0805  Last data filed at 10/26/2024 0520  Gross per 24 hour   Intake --   Output 1950 ml   Net -1950 ml       Physical Exam  Vitals reviewed.   Constitutional:       General: He is not in acute distress.     Appearance: He is obese. He is ill-appearing.   Cardiovascular:      Rate and Rhythm: Normal rate and regular rhythm.   Pulmonary:      Effort: No respiratory distress.      Breath sounds: Normal breath sounds.   Abdominal:      General: Bowel sounds are normal. There is no distension.      Palpations: Abdomen is soft.    Musculoskeletal:      Right lower leg: Edema present.      Left lower leg: Edema present.   Neurological:      General: No focal deficit present.      Mental Status: He is alert and oriented to person, place, and time. Mental status is at baseline.           Results Review:       I reviewed the patient's new clinical results.  Results from last 7 days   Lab Units 10/25/24  0341 10/24/24  0333 10/23/24  0254 10/22/24  1517   WBC 10*3/mm3 13.11* 15.64* 14.54* 17.81*   HEMOGLOBIN g/dL 11.1* 12.0* 12.6* 12.1*   PLATELETS 10*3/mm3 200 199 171 166     Results from last 7 days   Lab Units 10/26/24  0431 10/25/24  0341 10/24/24  0333 10/23/24  0254 10/22/24  1517   SODIUM mmol/L 137 133* 136 138 136   POTASSIUM mmol/L 3.7 3.7 3.9 3.8 4.1   CHLORIDE mmol/L 103 98 100 100 102   CO2 mmol/L 24.1 23.4 22.4 25.1 25.9   BUN mg/dL 75* 75* 63* 33* 28*   CREATININE mg/dL 1.66* 2.42* 2.07* 1.64* 1.22   CALCIUM mg/dL 8.5* 8.2* 8.3* 8.7 9.0   MAGNESIUM mg/dL  --  2.1  --  2.0  --    PHOSPHORUS mg/dL 3.6  --  3.7  --   --    Estimated Creatinine Clearance: 46.5 mL/min (A) (by C-G formula based on SCr of 1.66 mg/dL (H)).  Lab Results   Component Value Date    HGBA1C 6.70 (H) 10/23/2024    HGBA1C 7.2 (H) 07/01/2024    HGBA1C 7.0 (H) 02/27/2024     Glucose   Date/Time Value Ref Range Status   10/26/2024 0730 98 70 - 130 mg/dL Final   10/26/2024 0611 96 70 - 130 mg/dL Final   10/25/2024 2048 314 (H) 70 - 130 mg/dL Final   10/25/2024 1707 199 (H) 70 - 130 mg/dL Final   10/25/2024 1150 159 (H) 70 - 130 mg/dL Final   10/25/2024 0757 174 (H) 70 - 130 mg/dL Final   10/24/2024 2032 273 (H) 70 - 130 mg/dL Final   10/24/2024 1701 210 (H) 70 - 130 mg/dL Final     Results from last 7 days   Lab Units 10/26/24  0431 10/25/24  0341 10/24/24  0333   INR  3.18* 2.79* 2.16*           Assessment & Plan   Active Hospital Problems    Diagnosis  POA    **Shortness of breath [R06.02]  Yes    CHF (congestive heart failure) [I50.9]  Unknown    Atrial flutter  with controlled response [I48.92]  Yes    Gastroesophageal reflux disease [K21.9]  Yes    Constipation [K59.00]  Yes    Hypothyroidism [E03.9]  Yes    Diabetes mellitus [E11.9]  Yes    Essential hypertension [I10]  Yes    KINDRA treated with auto BiPAP [G47.33]  Yes    COPD (chronic obstructive pulmonary disease) [J44.9]  Yes    Asthma [J45.909]  Yes      Resolved Hospital Problems   No resolved problems to display.       PLAN  This is a chronically ill 84-year-old gentleman with past medical history pertinent for heart failure atrial flutter hypothyroidism type 2 diabetes hypertension obstructive sleep apnea and COPD who presents to the hospital with weakness and shortness of breath and is admitted with presumed heart failure and COPD exacerbations  -Creatinine improving today after allowing equilibration.  Back near baseline.  Appreciate nephrology input  -Acute renal failure secondary to overdiuresis and volume depletion.  Continuing to hold ARB and diuretic  -Transition to p.o. steroids continue treatment for possible COPD exacerbation  -Reviewed his video swallow likely is upper pharyngeal area shows no evidence of dysphagia unfortunately he has a lot of changes within his esophagram concerning for issues.  Will need to get an EGD done.  Complicating this is his current use of Coumadin for anticoagulation.  His Coumadin has been held but INR is up again today.  Will plan for FFP tomorrow and Monday will need to get INR down to 1.8 for scope on Monday.  -Sputum culture reviewed and respiratory viral panel ordered.  RVP is negative.  Pulmonary recommendations reviewed and agree with present management  -Blood sugars are expectedly elevated and uncontrolled due to the IV steroids.  Continue sliding scale coverage of hyperglycemia may need additional coverage but hopefully for now we can continue with sliding scale coverage and transition to p.o. steroids tomorrow  -Discontinued Jardiance while inpatient  -Anemia of  chronic disease with noted iron deficiency, hemoglobin remained stable.  His last colonoscopy was 10 years ago.  I will defer to GI whether they think he should undergo another colonoscopy at this time especially with iron deficiency anemia.  He is on chronic anticoagulation no evidence of obvious blood loss is noted.  -Hemoglobin stable  -Mechanical DVT prophylaxis  -Full code  Disposition  Expected Discharge Date: 10/28/2024; Expected Discharge Time:        Jacobo Ferrera MD  [facility]  10/26/24  08:05 EDT

## 2024-10-26 NOTE — PROGRESS NOTES
[facility] Clinical Pharmacy Services: Warfarin Dosing/Monitoring Consult    Riky Moon is a 84 y.o. male, estimated creatinine clearance is 46.5 mL/min (A) (by C-G formula based on SCr of 1.66 mg/dL (H)). weighing (!) 137 kg (301 lb 3.2 oz).    Results from last 7 days   Lab Units 10/26/24  0431 10/25/24  0341 10/24/24  0333 10/23/24  0254 10/22/24  1702 10/22/24  1559 10/22/24  1517   INR  3.18* 2.79* 2.16* 1.83* 2*   < >  --    HEMOGLOBIN g/dL  --  11.1* 12.0* 12.6*  --   --  12.1*   HEMATOCRIT %  --  33.7* 33.4* 36.2*  --   --  35.9*   PLATELETS 10*3/mm3  --  200 199 171  --   --  166    < > = values in this interval not displayed.     Prior to admission anticoagulation: Per [facility] clinic last dosing regimen as follows: warfarin 5 mg PO Mon/Sat and 10 mg all other days of the week.     Hospital Anticoagulation:  Consulting provider: Dr Pope  Start date: PTA 10/22/24  Indication: A Fib - requiring full anticoagulation  Target INR: 2 - 3  Expected duration: lifelong   Bridge Therapy: No      Potential food or drug interactions:   Duloxetine (may enhance anticoagulant effect, moderate, home med)  Levothyroxine (may enhance anticoagulant effect, moderate, home med)  Pravastatin (may enhance anticoagulant effect, moderate, home med)  Prednisone (may enhance anticoagulant effect, moderate, new interaction)    Education complete?/Date: N/A; home medication    Assessment/Plan:  HOLD WARFARIN - EGD Planned once INR < 1.8  Monitor for any signs or symptoms of bleeding  Follow up daily INRs and dose adjustments    Pharmacy will continue to follow until discharge or discontinuation of warfarin.     Belinda Irvin, [facility]  Clinical Pharmacist

## 2024-10-26 NOTE — PLAN OF CARE
Problem: Adult Inpatient Plan of Care  Goal: Plan of Care Review  Outcome: Progressing  Flowsheets (Taken 10/26/2024 5714)  Progress: improving  Outcome Evaluation: Pt vitals stable. No c/o pain Up to chair for meals. Ambulating around the unit w/o dizziness. Pt safety maintained.  Plan of Care Reviewed With: patient

## 2024-10-26 NOTE — PLAN OF CARE
Goal Outcome Evaluation:  Plan of Care Reviewed With: patient        Progress: improving  Outcome Evaluation: Vital signs stable. No c/o pain. Pt sat up in chair tonight. Up with assist x1. PT following. Orthostatics daily x4 occurrences-negative orthostatics yesterday. Safety maintained. Pt slept between care. CPAP at night and room air dueing the day. GI following. Possible EGD monday-see notes. Coumadin on hold for planned EGD. Needs met at this time. Plan of care ongoing.

## 2024-10-26 NOTE — PROGRESS NOTES
[facility] Progress Note      Patient Name: Riky Moon  : 1940  MRN: [id number]  Primary Care Physician:  Marco Carrillo MD  Date of admission: 10/22/2024    Subjective     Interval History:     Patient resting comfortably.  Cough+  Denies any chest pain, nausea or vomiting  Review of Systems:   As noted above    Objective     Vitals:   Temp:  [97.3 °F (36.3 °C)-98.2 °F (36.8 °C)] 97.3 °F (36.3 °C)  Heart Rate:  [63-87] 68  Resp:  [18] 18  BP: (100-125)/(53-67) 118/64    Intake/Output Summary (Last 24 hours) at 10/26/2024 1001  Last data filed at 10/26/2024 0520  Gross per 24 hour   Intake --   Output 1950 ml   Net -1950 ml       Physical Exam:    General Appearance: alert, oriented x 3, no acute distress.  Obese  Skin: warm and dry  HEENT: oral mucosa normal, nonicteric sclera  Neck: supple, no JVD  Lungs: CTA  Heart: RRR, normal S1 and S2  Abdomen: soft, nontender, nondistended  : no palpable bladder  Extremities: Bilateral lower extremity edema.  No cyanosis or clubbing  Neuro: normal speech and mental status     Scheduled Meds:     vitamin C, 500 mg, Oral, Daily  atenolol, 12.5 mg, Oral, Daily  cholecalciferol, 1,000 Units, Oral, QAM  DULoxetine, 60 mg, Oral, QAM  insulin lispro, 2-7 Units, Subcutaneous, 4x Daily AC & at Bedtime  levothyroxine, 125 mcg, Oral, Daily  [Held by provider] losartan, 100 mg, Oral, Daily  [Held by provider] metFORMIN, 850 mg, Oral, BID With Meals  multivitamin, 1 tablet, Oral, QAM  oxybutynin, 5 mg, Oral, Q12H  oxybutynin XL, 15 mg, Oral, Nightly  pravastatin, 40 mg, Oral, Nightly  predniSONE, 20 mg, Oral, BID With Meals  [START ON 10/28/2024] predniSONE, 20 mg, Oral, Daily With Breakfast  pregabalin, 150 mg, Oral, Q12H  sodium chloride, 10 mL, Intravenous, Q12H  vitamin B-12, 500 mcg, Oral, QAM      IV Meds:          Results Reviewed:   I have personally reviewed the results from the time of this admission to 10/26/2024 10:01 EDT      Results from last 7 days   Lab Units 10/26/24  0431 10/25/24  0341 10/24/24  0333 10/23/24  0254 10/22/24  1517   SODIUM mmol/L 137 133* 136 138 136   POTASSIUM mmol/L 3.7 3.7 3.9 3.8 4.1   CHLORIDE mmol/L 103 98 100 100 102   CO2 mmol/L 24.1 23.4 22.4 25.1 25.9   BUN mg/dL 75* 75* 63* 33* 28*   CREATININE mg/dL 1.66* 2.42* 2.07* 1.64* 1.22   CALCIUM mg/dL 8.5* 8.2* 8.3* 8.7 9.0   BILIRUBIN mg/dL  --  0.4  --  0.8 1.5*   ALK PHOS U/L  --  44  --  63 55   ALT (SGPT) U/L  --  22  --  17 14   AST (SGOT) U/L  --  23  --  25 20   GLUCOSE mg/dL 78 204* 239* 213* 137*       Estimated Creatinine Clearance: 46.5 mL/min (A) (by C-G formula based on SCr of 1.66 mg/dL (H)).    Results from last 7 days   Lab Units 10/26/24  0431 10/25/24  0341 10/24/24  0333 10/23/24  0254   MAGNESIUM mg/dL  --  2.1  --  2.0   PHOSPHORUS mg/dL 3.6  --  3.7  --        Results from last 7 days   Lab Units 10/25/24  0341 10/24/24  0333   URIC ACID mg/dL 6.6 6.0       Results from last 7 days   Lab Units 10/25/24  0341 10/24/24  0333 10/23/24  0254 10/22/24  1517   WBC 10*3/mm3 13.11* 15.64* 14.54* 17.81*   HEMOGLOBIN g/dL 11.1* 12.0* 12.6* 12.1*   PLATELETS 10*3/mm3 200 199 171 166       Results from last 7 days   Lab Units 10/26/24  0431 10/25/24  0341 10/24/24  0333 10/23/24  0254 10/22/24  1702   INR  3.18* 2.79* 2.16* 1.83* 2*       Assessment / Plan     ASSESSMENT:  Nonoliguric KEL,  multifactorial: Prerenal with hypotension, aggressive diuresing with IV diuretics; impaired renal autoregulation with the use of ARB; andSGLT2 inhibitor .  Electrolytes within acceptable range; lowish   urine sodium in favor of prerenal state.  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  No fluid excess except moderate chronic BLE  lymphedema.  Cardiology evaluated and signed off  SOA/COPD/KINDRA with CPAP, chest x-ray negative, uses CPAP at night.  Breathing comfortable on room air, followed by pulmonary   Hypertension, blood pressure okay  Type II DM, home metformin and Jardiance were stopped   A-fib, HR controlled, on atenolol.  Currently Coumadin on hold for possible EGD on Monday  Hypothyroidism, on Synthroid  Dysphagia, Neurogenic bladder s/p bladder stimulator        PLAN:  continue to hold Cozaar, diuretic therapy and SGLT2 inhibitors.  Encourage p.o. intake  Labs in a.m.      Thank you for involving us in the care of Riky Moon.  Please feel free to call with any questions.    Osvaldo Lopez MD  10/26/24  10:01 EDT    [facility]  [phone]    Parts of this note may be an electronic transcription/translation of spoken language to printed text using the Dragon dictation system.

## 2024-10-27 ENCOUNTER — INPATIENT HOSPITAL (OUTPATIENT)
Age: 84
End: 2024-10-27

## 2024-10-27 ENCOUNTER — INPATIENT HOSPITAL (OUTPATIENT)
Dept: URBAN - METROPOLITAN AREA HOSPITAL 113 | Facility: HOSPITAL | Age: 84
End: 2024-10-27
Payer: MEDICARE

## 2024-10-27 DIAGNOSIS — Q39.4 ESOPHAGEAL WEB: ICD-10-CM

## 2024-10-27 DIAGNOSIS — K22.2 ESOPHAGEAL OBSTRUCTION: ICD-10-CM

## 2024-10-27 DIAGNOSIS — K21.9 GASTRO-ESOPHAGEAL REFLUX DISEASE WITHOUT ESOPHAGITIS: ICD-10-CM

## 2024-10-27 DIAGNOSIS — R13.10 DYSPHAGIA, UNSPECIFIED: ICD-10-CM

## 2024-10-27 LAB
ALBUMIN SERPL-MCNC: 3.2 G/DL (ref 3.5–5.2)
ANION GAP SERPL CALCULATED.3IONS-SCNC: 8 MMOL/L (ref 5–15)
BUN SERPL-MCNC: 53 MG/DL (ref 8–23)
BUN/CREAT SERPL: 43.4 (ref 7–25)
CALCIUM SPEC-SCNC: 8.7 MG/DL (ref 8.6–10.5)
CHLORIDE SERPL-SCNC: 104 MMOL/L (ref 98–107)
CO2 SERPL-SCNC: 27 MMOL/L (ref 22–29)
CREAT SERPL-MCNC: 1.22 MG/DL (ref 0.76–1.27)
EGFRCR SERPLBLD CKD-EPI 2021: 58.1 ML/MIN/1.73
GLUCOSE BLDC GLUCOMTR-MCNC: 136 MG/DL (ref 70–130)
GLUCOSE BLDC GLUCOMTR-MCNC: 158 MG/DL (ref 70–130)
GLUCOSE BLDC GLUCOMTR-MCNC: 166 MG/DL (ref 70–130)
GLUCOSE BLDC GLUCOMTR-MCNC: 189 MG/DL (ref 70–130)
GLUCOSE BLDC GLUCOMTR-MCNC: 254 MG/DL (ref 70–130)
GLUCOSE SERPL-MCNC: 141 MG/DL (ref 65–99)
INR PPP: 2.47 (ref 0.9–1.1)
PHOSPHATE SERPL-MCNC: 3.3 MG/DL (ref 2.5–4.5)
POTASSIUM SERPL-SCNC: 4.8 MMOL/L (ref 3.5–5.2)
PROTHROMBIN TIME: 27 SECONDS (ref 11.7–14.2)
SODIUM SERPL-SCNC: 139 MMOL/L (ref 136–145)

## 2024-10-27 PROCEDURE — 85610 PROTHROMBIN TIME: CPT | Performed by: INTERNAL MEDICINE

## 2024-10-27 PROCEDURE — 63710000001 INSULIN LISPRO (HUMAN) PER 5 UNITS: Performed by: INTERNAL MEDICINE

## 2024-10-27 PROCEDURE — 80069 RENAL FUNCTION PANEL: CPT | Performed by: INTERNAL MEDICINE

## 2024-10-27 PROCEDURE — 25010000002 VITAMIN K1 PER 1 MG: Performed by: STUDENT IN AN ORGANIZED HEALTH CARE EDUCATION/TRAINING PROGRAM

## 2024-10-27 PROCEDURE — 63710000001 PREDNISONE PER 1 MG: Performed by: INTERNAL MEDICINE

## 2024-10-27 PROCEDURE — 99233 SBSQ HOSP IP/OBS HIGH 50: CPT | Performed by: INTERNAL MEDICINE

## 2024-10-27 PROCEDURE — 82948 REAGENT STRIP/BLOOD GLUCOSE: CPT

## 2024-10-27 RX ADMIN — OXYBUTYNIN CHLORIDE 15 MG: 10 TABLET, EXTENDED RELEASE ORAL at 21:45

## 2024-10-27 RX ADMIN — PHYTONADIONE 2.5 MG: 10 INJECTION, EMULSION INTRAMUSCULAR; INTRAVENOUS; SUBCUTANEOUS at 14:53

## 2024-10-27 RX ADMIN — DULOXETINE HYDROCHLORIDE 60 MG: 60 CAPSULE, DELAYED RELEASE ORAL at 08:13

## 2024-10-27 RX ADMIN — Medication 10 ML: at 08:16

## 2024-10-27 RX ADMIN — OXYCODONE HYDROCHLORIDE AND ACETAMINOPHEN 500 MG: 500 TABLET ORAL at 08:13

## 2024-10-27 RX ADMIN — INSULIN LISPRO 2 UNITS: 100 INJECTION, SOLUTION INTRAVENOUS; SUBCUTANEOUS at 17:18

## 2024-10-27 RX ADMIN — PREGABALIN 150 MG: 75 CAPSULE ORAL at 21:44

## 2024-10-27 RX ADMIN — PREDNISONE 20 MG: 20 TABLET ORAL at 17:18

## 2024-10-27 RX ADMIN — PREDNISONE 20 MG: 20 TABLET ORAL at 08:13

## 2024-10-27 RX ADMIN — Medication 1 TABLET: at 08:13

## 2024-10-27 RX ADMIN — PRAVASTATIN SODIUM 40 MG: 40 TABLET ORAL at 21:45

## 2024-10-27 RX ADMIN — LEVOTHYROXINE SODIUM 125 MCG: 125 TABLET ORAL at 08:13

## 2024-10-27 RX ADMIN — Medication 1000 UNITS: at 08:13

## 2024-10-27 RX ADMIN — ATENOLOL 12.5 MG: 25 TABLET ORAL at 08:13

## 2024-10-27 RX ADMIN — PREGABALIN 150 MG: 75 CAPSULE ORAL at 08:13

## 2024-10-27 RX ADMIN — OXYBUTYNIN CHLORIDE 5 MG: 5 TABLET ORAL at 21:45

## 2024-10-27 RX ADMIN — INSULIN LISPRO 4 UNITS: 100 INJECTION, SOLUTION INTRAVENOUS; SUBCUTANEOUS at 21:45

## 2024-10-27 RX ADMIN — INSULIN LISPRO 2 UNITS: 100 INJECTION, SOLUTION INTRAVENOUS; SUBCUTANEOUS at 12:27

## 2024-10-27 RX ADMIN — Medication 500 MCG: at 08:13

## 2024-10-27 RX ADMIN — Medication 10 ML: at 22:00

## 2024-10-27 NOTE — PROGRESS NOTES
[facility]     Progress Note    Patient Name: Riky Moon  : 1940  MRN: [id number]  Primary Care Physician:  Marco Carrillo MD  Date of admission: 10/22/2024    Subjective   Subjective     Chief Complaint: dysphagia     History of Present Illness  Patient Reports patient feeling ok today     Review of Systems   HENT:  Positive for trouble swallowing.    Respiratory:  Positive for shortness of breath.    Neurological:  Positive for weakness.   All other systems reviewed and are negative.      Objective   Objective     Vitals:   Temp:  [97.3 °F (36.3 °C)-97.5 °F (36.4 °C)] 97.3 °F (36.3 °C)  Heart Rate:  [58-75] 75  Resp:  [16-18] 18  BP: (104-131)/(48-73) 131/68    Physical Exam  HENT:      Right Ear: External ear normal.      Left Ear: External ear normal.      Mouth/Throat:      Pharynx: Oropharynx is clear.   Eyes:      Conjunctiva/sclera: Conjunctivae normal.   Cardiovascular:      Rate and Rhythm: Normal rate.      Pulses: Normal pulses.   Pulmonary:      Effort: Pulmonary effort is normal.   Abdominal:      General: Abdomen is flat.   Skin:     General: Skin is warm.   Neurological:      General: No focal deficit present.      Mental Status: He is alert.   Psychiatric:         Mood and Affect: Mood normal.          Result Review    Result Review:  I have personally reviewed the results from the time of this admission to 10/27/2024 10:36 EDT and agree with these findings:  []  Laboratory list / accordion  []  Microbiology  []  Radiology  []  EKG/Telemetry   []  Cardiology/Vascular   []  Pathology  []  Old records  []  Other:  Most notable findings include:       Assessment & Plan   Assessment / Plan     Brief Patient Summary:  Riky Moon is a 84 y.o. male who   Dysphagia  Esophageal web  Distal esophageal stenosis     Active Hospital Problems:  Active Hospital Problems    Diagnosis     **Shortness of breath     CHF (congestive heart failure)     Atrial flutter with controlled response      Gastroesophageal reflux disease     Constipation     Hypothyroidism     Diabetes mellitus     Essential hypertension     KINDRA treated with auto BiPAP     COPD (chronic obstructive pulmonary disease)     Asthma      Plan: case reviewed with DR Ferrera, plans to have INR < 2 for upper endoscopy with dilation tomorrow with Dr Fishman.  Patient is agreeable.       VTE Prophylaxis:  Mechanical VTE prophylaxis orders are present.        CODE STATUS:    Code Status (Patient has no pulse and is not breathing): CPR (Attempt to Resuscitate)  Medical Interventions (Patient has pulse or is breathing): Full Support    Disposition:  I expect patient to be discharged uncertain .    Zion Brown MD

## 2024-10-27 NOTE — PLAN OF CARE
Problem: Adult Inpatient Plan of Care  Goal: Plan of Care Review  Outcome: Progressing  Flowsheets (Taken 10/27/2024 1938)  Progress: improving  Outcome Evaluation: Pt vitals stable. Up to chair and ambulated in joyce. No c/o dizziness. BM today. NPO at midnight for EGD tomorrow. Consent signed and in the chart. Pt safety maintained  Plan of Care Reviewed With: patient

## 2024-10-27 NOTE — PROGRESS NOTES
Name: Riky Moon ADMIT: 10/22/2024   : 1940  PCP: Marco Carrillo MD    MRN: [id number] LOS: 4 days   AGE/SEX: 84 y.o. male  ROOM: [facility]     Subjective   Subjective     No events overnight. No complaints. INR is starting to come down       Objective   Objective   Vital Signs  Temp:  [97.3 °F (36.3 °C)-98.2 °F (36.8 °C)] 98.2 °F (36.8 °C)  Heart Rate:  [56-75] 56  Resp:  [16-18] 18  BP: (119-131)/(63-73) 119/64  SpO2:  [94 %-98 %] 94 %  on   ;   Device (Oxygen Therapy): CPAP  Body mass index is 40.85 kg/m².  Physical Exam  Constitutional:       General: He is not in acute distress.     Appearance: He is obese. He is not toxic-appearing.   Cardiovascular:      Rate and Rhythm: Normal rate and regular rhythm.      Heart sounds: Normal heart sounds.   Pulmonary:      Effort: Pulmonary effort is normal.      Breath sounds: Normal breath sounds.   Abdominal:      General: Bowel sounds are normal. There is distension.      Palpations: Abdomen is soft.      Tenderness: There is no abdominal tenderness. There is no guarding or rebound.   Musculoskeletal:         General: No tenderness.      Right lower leg: No edema.      Left lower leg: No edema.   Neurological:      Mental Status: He is alert.   Psychiatric:         Mood and Affect: Mood normal.         Behavior: Behavior normal.         Results Review     I reviewed the patient's new clinical results.  Results from last 7 days   Lab Units 10/25/24  0341 10/24/24  0333 10/23/24  0254 10/22/24  1517   WBC 10*3/mm3 13.11* 15.64* 14.54* 17.81*   HEMOGLOBIN g/dL 11.1* 12.0* 12.6* 12.1*   PLATELETS 10*3/mm3 200 199 171 166     Results from last 7 days   Lab Units 10/27/24  0724 10/26/24  0431 10/25/24  0341 10/24/24  0333   SODIUM mmol/L 139 137 133* 136   POTASSIUM mmol/L 4.8 3.7 3.7 3.9   CHLORIDE mmol/L 104 103 98 100   CO2 mmol/L 27.0 24.1 23.4 22.4   BUN mg/dL 53* 75* 75* 63*   CREATININE mg/dL 1.22 1.66* 2.42* 2.07*   GLUCOSE mg/dL 141* 78 204* 239*    Estimated Creatinine Clearance: 63.2 mL/min (by C-G formula based on SCr of 1.22 mg/dL).  Results from last 7 days   Lab Units 10/27/24  0724 10/26/24  0431 10/25/24  0341 10/24/24  0333 10/23/24  0254 10/22/24  1517   ALBUMIN g/dL 3.2* 3.2* 3.1* 3.4* 3.7 3.8   BILIRUBIN mg/dL  --   --  0.4  --  0.8 1.5*   ALK PHOS U/L  --   --  44  --  63 55   AST (SGOT) U/L  --   --  23  --  25 20   ALT (SGPT) U/L  --   --  22  --  17 14     Results from last 7 days   Lab Units 10/27/24  0724 10/26/24  0431 10/25/24  0341 10/24/24  0333 10/23/24  0254   CALCIUM mg/dL 8.7 8.5* 8.2* 8.3* 8.7   ALBUMIN g/dL 3.2* 3.2* 3.1* 3.4* 3.7   MAGNESIUM mg/dL  --   --  2.1  --  2.0   PHOSPHORUS mg/dL 3.3 3.6  --  3.7  --      Results from last 7 days   Lab Units 10/23/24  0555 10/23/24  0254   PROCALCITONIN ng/mL  --  0.28*   LACTATE mmol/L 1.2  --      COVID19   Date Value Ref Range Status   10/23/2024 Not Detected Not Detected - Ref. Range Final   10/22/2024 Not Detected Not Detected - Ref. Range Final   11/10/2023 Not Detected Not Detected - Ref. Range Final     SARS-CoV-2 PCR   Date Value Ref Range Status   09/26/2020 Not Detected Not Detected Final     Comment:     Nucleic acid specific to SARS-CoV-2 (COVID-19) virus was not detected inthis sample by the TaqPath (TM) COVID-19 Combo Kit.SARS-CoV-2 (COVID-19) nucleic acid testing performed using [employer] Aptima (R) SARS-CoV-2 Assay or [employer] TaqPath   (TM)COVID-19 Combo Kit as indicated above under Emergency Use Authorization (EUA) from the FDA. Aptima (R) and TaqPath (TM) test performancecharacteristics verified by [facility] in accordance with the FDAs Guidance Document (Policy for Diagnostic   Tests for Coronavirus Disease-2019during the Public Health Emergency) issued on March 16, 2020. The laboratory is regulated under CLIA as qualified to perform high-complexity testingUnless otherwise noted, all testing was performed at [facility],   CLIA No. [id number], [place]  State Licensee No. [id number]     Glucose   Date/Time Value Ref Range Status   10/27/2024 1157 189 (H) 70 - 130 mg/dL Final   10/27/2024 0805 136 (H) 70 - 130 mg/dL Final   10/27/2024 0619 158 (H) 70 - 130 mg/dL Final   10/26/2024 2044 257 (H) 70 - 130 mg/dL Final   10/26/2024 1717 235 (H) 70 - 130 mg/dL Final   10/26/2024 1204 115 70 - 130 mg/dL Final   10/26/2024 0730 98 70 - 130 mg/dL Final       FL Video Swallow Single Contrast  VIDEO SWALLOWING EXAMINATION BY SPEECH PATHOLOGY     Clinical: Dysphasia     Video swallowing examination performed under the direction of speech  pathology. Imaging reviewed by radiologist who concurs with the  findings.     Speech pathology summary:   VFSS complete in conjunction with Rahcna BARNARD. ?Patient presents with functional/age-appropriate swallow. ?Timely  swallow with adequate airway protection. ?No penetration or aspiration  across trials of thin via cup single and consecutive swallows, pur?e,  mechanical soft, and regular textures. ?No significant pharyngeal  residue status post all trials         Ft 0 min 54 sec ? ? ?4.99 mGy.         This report was finalized on 10/24/2024 4:30 PM by Dr. Rosemary Barrios M.D  on Workstation: [id number]     FL ESOPHAGRAM DOUBLE CONTRAST  Narrative: EXAMINATION:  Esophagram Complete Double-Contrast Fluoroscopy.     DATE: 10/24/2024     COMPARISON:  Video swallow examination 10/24/2024 and chest 2 view 10/22/2024.     CLINICAL HISTORY:  84-year-old male with esophageal dysphagia.     DETAILS:  Administration of thin barium, thick barium, and air crystals were  provided to the patient. Rapid sequence video clips and spot films of  the esophagus were obtained. Images were obtained by EVON Marvin.     *  TOTAL FLUOROSCOPY DOSE: DAP: [id number] uGym2  *  TOTAL NUMBER OF FLUOROSCOPIC IMAGES: 241     FINDINGS:     The preliminary  view of the chest shows no evidence for acute  pneumonia or CHF. Can't exclude small pleural effusions.      Esophagus:  The vallecula and pyriform sinuses are unremarkable, with no evidence of  mucosal abnormality or extrinsic mass compression. Serial images of the  cervical esophagus show no laryngeal penetration or aspiration. No  evidence of cervical esophageal diverticula.  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 This does not result in  significant flow limitation and a 12.5 mm diameter barium tablet passed  promptly beyond the area without hesitation.     Normal esophageal distensibility and caliber. Absent primary peristalsis  with intermittent tertiary wave formations observed throughout the  examination. The esophageal mucosa is normal. No evidence of a filling  defect or ulceration. Small sliding hiatal hernia is identified.  Moderate volume of gastroesophageal reflux is seen refluxing to the  upper thoracic esophagus with the patient in the recumbent position.  Smooth short segment narrowing at the gastroesophageal junction. A  12.5mm barium tablet was administered and obstructed at the level of the  gastroesophageal junction and passed after 23 minutes.     Stomach:  Barium flows readily through the esophagus and into the stomach.        Impression: 1.  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  This does not result in any significant flow limitation.  2.  Esophageal dysmotility demonstrated by absent primary peristalsis  with intermittent tertiary wave formations.  3.  Small sliding hiatal hernia with moderate volume of recumbent  gastroesophageal reflux observed refluxing to the upper thoracic  esophagus at the level of the clavicles.  4.  Smooth short segment narrowing at the gastroesophageal junction  resulting in obstruction of a 12.5 mm diameter barium tablet. The tablet  was observed passing at 23 minutes.  5.   Recommend endoscopy.              This report was finalized on 10/24/2024 4:27 PM by Dr. Rosemary Barrios M.D  on Workstation: [id number]       Scheduled Medications  vitamin C, 500 mg, Oral, Daily  atenolol, 12.5 mg, Oral, Daily  cholecalciferol, 1,000 Units, Oral, QAM  DULoxetine, 60 mg, Oral, QAM  insulin lispro, 2-7 Units, Subcutaneous, 4x Daily AC & at Bedtime  levothyroxine, 125 mcg, Oral, Daily  [Held by provider] losartan, 100 mg, Oral, Daily  [Held by provider] metFORMIN, 850 mg, Oral, BID With Meals  multivitamin, 1 tablet, Oral, QAM  oxybutynin, 5 mg, Oral, Q12H  oxybutynin XL, 15 mg, Oral, Nightly  phytonadione (AQUA-MEPHYTON, VITAMIN K) 2.5 mg in sodium chloride 0.9 % 50 mL IVPB, 2.5 mg, Intravenous, Once  pravastatin, 40 mg, Oral, Nightly  predniSONE, 20 mg, Oral, BID With Meals  [START ON 10/28/2024] predniSONE, 20 mg, Oral, Daily With Breakfast  pregabalin, 150 mg, Oral, Q12H  sodium chloride, 10 mL, Intravenous, Q12H  vitamin B-12, 500 mcg, Oral, QAM    Infusions   Diet  Diet: Cardiac, Diabetic, Renal; Healthy Heart (2-3 Na+); Consistent Carbohydrate; Low Sodium (2-3g), Low Potassium, Low Phosphorus; Texture: Soft to Chew (NDD 3); Soft to Chew: Ground Meat; Fluid Consistency: Thin (IDDSI 0)  NPO Diet NPO Type: Strict NPO       Assessment/Plan     Active Hospital Problems    Diagnosis  POA    **Shortness of breath [R06.02]  Yes    CHF (congestive heart failure) [I50.9]  Unknown    Atrial flutter with controlled response [I48.92]  Yes    Gastroesophageal reflux disease [K21.9]  Yes    Constipation [K59.00]  Yes    Hypothyroidism [E03.9]  Yes    Diabetes mellitus [E11.9]  Yes    Essential hypertension [I10]  Yes    KINDRA treated with auto BiPAP [G47.33]  Yes    COPD (chronic obstructive pulmonary disease) [J44.9]  Yes    Asthma [J45.909]  Yes      Resolved Hospital Problems   No resolved problems to display.       84 y.o. male admitted with Shortness of breath.  84-year-old man with chronic lymphedema, COPD,  atrial flutter, hypothyroidism, type 2 diabetes, hypertension, KINDRA who initially presented with shortness of breath.  There was concern for heart failure exacerbation on admission as well as a COPD exacerbation.  He was initially diuresed, but developed an KEL with diuresis, and so an echocardiogram was performed which did not really show much evidence of heart failure.  He was subsequently placed on steroids for his COPD exacerbation.  He described esophageal dysphagia type symptoms and underwent esophagram which showed multiple issues including anterior cervical esophageal web, esophageal dysmotility acid reflux with a small sliding hiatal hernia.     Acute exacerbation of COPD-completing a course of oral prednisone  Esophageal/oropharyngeal dysphagia with abnormal esophagram-EGD planned for tomorrow once INR is less than 1.8. I will administer a dose of IV vit K to help his INR along.  KEL on CKD 2-due to overdiuresis, and improved following IV fluids and discontinuation of diuretics as well as other nephrotoxic medications.   Mild to moderate aortic stenosis and chronic bilateral lower extremity lymphedema-appears euvolemic currently, with little evidence of heart failure.  Not currently on any oral diuretics, and I do not really think that any are necessary at this time  Essential hypertension-well controlled  Type 2 diabetes-at goal acutely. Continue sliding scale  Chronic A-fib-warfarin on hold for EGD. Atenolol for rate control  Hypothyroidism-synthroid  KINDRA-pap if available  Morbid obesity  SCDs for DVT prophylaxis.  Full code.  Discussed with patient.  Anticipate discharge home with family when cleared by consultants.      Vinay Cross MD  [facility]  10/27/24  14:28 EDT

## 2024-10-27 NOTE — PLAN OF CARE
Goal Outcome Evaluation:  Plan of Care Reviewed With: patient        Progress: improving  Outcome Evaluation: Vital signs stable. No c/o pain. Plan for Egb once INR <1.8-see GI notes. Safety maintained. No c/o dizziness. Orthostatic BP daily x4 occurrences. Pt rested through night. Afib but controlled. Room air and CPAP with sleep. No complaints or concerns. Plan of care ongoing.

## 2024-10-27 NOTE — PROGRESS NOTES
[facility] Progress Note      Patient Name: Riky Moon  : 1940  MRN: [id number]  Primary Care Physician:  Marco Carrillo MD  Date of admission: 10/22/2024    Subjective     Interval History:     Patient resting comfortably.   Denies any chest pain, nausea or vomiting  Good urine output  Review of Systems:   As noted above    Objective     Vitals:   Temp:  [97.3 °F (36.3 °C)-97.5 °F (36.4 °C)] 97.3 °F (36.3 °C)  Heart Rate:  [58-75] 75  Resp:  [16-18] 18  BP: (104-131)/(48-73) 131/68    Intake/Output Summary (Last 24 hours) at 10/27/2024 0922  Last data filed at 10/27/2024 0719  Gross per 24 hour   Intake 0 ml   Output 3350 ml   Net -3350 ml       Physical Exam:    General Appearance: alert, oriented x 3, no acute distress.  Obese  Skin: warm and dry  HEENT: oral mucosa normal, nonicteric sclera  Neck: supple, no JVD  Lungs: CTA  Heart: RRR, normal S1 and S2  Abdomen: soft, nontender, nondistended  : no palpable bladder  Extremities: Bilateral lower extremity edema.  No cyanosis or clubbing  Neuro: normal speech and mental status     Scheduled Meds:     vitamin C, 500 mg, Oral, Daily  atenolol, 12.5 mg, Oral, Daily  cholecalciferol, 1,000 Units, Oral, QAM  DULoxetine, 60 mg, Oral, QAM  insulin lispro, 2-7 Units, Subcutaneous, 4x Daily AC & at Bedtime  levothyroxine, 125 mcg, Oral, Daily  [Held by provider] losartan, 100 mg, Oral, Daily  [Held by provider] metFORMIN, 850 mg, Oral, BID With Meals  multivitamin, 1 tablet, Oral, QAM  oxybutynin, 5 mg, Oral, Q12H  oxybutynin XL, 15 mg, Oral, Nightly  pravastatin, 40 mg, Oral, Nightly  predniSONE, 20 mg, Oral, BID With Meals  [START ON 10/28/2024] predniSONE, 20 mg, Oral, Daily With Breakfast  pregabalin, 150 mg, Oral, Q12H  sodium chloride, 10 mL, Intravenous, Q12H  vitamin B-12, 500 mcg, Oral, QAM      IV Meds:          Results Reviewed:   I have personally reviewed the results from the time of this admission to 10/27/2024  09:22 EDT     Results from last 7 days   Lab Units 10/27/24  0724 10/26/24  0431 10/25/24  0341 10/24/24  0333 10/23/24  0254 10/22/24  1517   SODIUM mmol/L 139 137 133*   < > 138 136   POTASSIUM mmol/L 4.8 3.7 3.7   < > 3.8 4.1   CHLORIDE mmol/L 104 103 98   < > 100 102   CO2 mmol/L 27.0 24.1 23.4   < > 25.1 25.9   BUN mg/dL 53* 75* 75*   < > 33* 28*   CREATININE mg/dL 1.22 1.66* 2.42*   < > 1.64* 1.22   CALCIUM mg/dL 8.7 8.5* 8.2*   < > 8.7 9.0   BILIRUBIN mg/dL  --   --  0.4  --  0.8 1.5*   ALK PHOS U/L  --   --  44  --  63 55   ALT (SGPT) U/L  --   --  22  --  17 14   AST (SGOT) U/L  --   --  23  --  25 20   GLUCOSE mg/dL 141* 78 204*   < > 213* 137*    < > = values in this interval not displayed.       Estimated Creatinine Clearance: 63.2 mL/min (by C-G formula based on SCr of 1.22 mg/dL).    Results from last 7 days   Lab Units 10/27/24  0724 10/26/24  0431 10/25/24  0341 10/24/24  0333 10/23/24  0254   MAGNESIUM mg/dL  --   --  2.1  --  2.0   PHOSPHORUS mg/dL 3.3 3.6  --  3.7  --        Results from last 7 days   Lab Units 10/25/24  0341 10/24/24  0333   URIC ACID mg/dL 6.6 6.0       Results from last 7 days   Lab Units 10/25/24  0341 10/24/24  0333 10/23/24  0254 10/22/24  1517   WBC 10*3/mm3 13.11* 15.64* 14.54* 17.81*   HEMOGLOBIN g/dL 11.1* 12.0* 12.6* 12.1*   PLATELETS 10*3/mm3 200 199 171 166       Results from last 7 days   Lab Units 10/27/24  0329 10/26/24  0431 10/25/24  0341 10/24/24  0333 10/23/24  0254   INR  2.47* 3.18* 2.79* 2.16* 1.83*       Assessment / Plan     ASSESSMENT:  Nonoliguric KEL,  multifactorial: Prerenal with hypotension, aggressive diuresing with IV diuretics; impaired renal autoregulation with the use of ARB; andSGLT2 inhibitor .  Electrolytes within acceptable range; lowish   urine sodium in favor of prerenal state.  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  No fluid excess except moderate chronic BLE lymphedema.  Cardiology evaluated and signed off  SOA/COPD/KINDRA with CPAP, chest x-ray negative, uses CPAP at night.  Breathing comfortable on room air, followed by pulmonary   Hypertension, blood pressure okay  Type II DM, home metformin and Jardiance were stopped   A-fib, HR controlled, on atenolol.  Currently Coumadin on hold for possible EGD on Monday  Hypothyroidism, on Synthroid  Dysphagia, Neurogenic bladder s/p bladder stimulator        PLAN:  okay to restart low-dose diuretic and SGLT2 inhibitor from renal standpoint  Keep off losartan for now  Encourage p.o. intake  Labs in a.m.  Follow-up in 2 to 4 weeks on discharge      Thank you for involving us in the care of Riky Moon.  Please feel free to call with any questions.    Osvaldo Lopez MD  10/27/24  09:22 EDT    [facility]  [phone]    Parts of this note may be an electronic transcription/translation of spoken language to printed text using the Dragon dictation system.

## 2024-10-28 ENCOUNTER — ANESTHESIA (OUTPATIENT)
Dept: GASTROENTEROLOGY | Facility: HOSPITAL | Age: 84
End: 2024-10-28
Payer: MEDICARE

## 2024-10-28 ENCOUNTER — ANESTHESIA EVENT (OUTPATIENT)
Dept: GASTROENTEROLOGY | Facility: HOSPITAL | Age: 84
End: 2024-10-28
Payer: MEDICARE

## 2024-10-28 LAB
ALBUMIN SERPL-MCNC: 3.3 G/DL (ref 3.5–5.2)
ANION GAP SERPL CALCULATED.3IONS-SCNC: 5.1 MMOL/L (ref 5–15)
BUN SERPL-MCNC: 43 MG/DL (ref 8–23)
BUN/CREAT SERPL: 39.8 (ref 7–25)
CALCIUM SPEC-SCNC: 9 MG/DL (ref 8.6–10.5)
CHLORIDE SERPL-SCNC: 106 MMOL/L (ref 98–107)
CO2 SERPL-SCNC: 28.9 MMOL/L (ref 22–29)
CREAT SERPL-MCNC: 1.08 MG/DL (ref 0.76–1.27)
EGFRCR SERPLBLD CKD-EPI 2021: 67.2 ML/MIN/1.73
GLUCOSE BLDC GLUCOMTR-MCNC: 113 MG/DL (ref 70–130)
GLUCOSE BLDC GLUCOMTR-MCNC: 155 MG/DL (ref 70–130)
GLUCOSE BLDC GLUCOMTR-MCNC: 184 MG/DL (ref 70–130)
GLUCOSE BLDC GLUCOMTR-MCNC: 222 MG/DL (ref 70–130)
GLUCOSE BLDC GLUCOMTR-MCNC: 269 MG/DL (ref 70–130)
GLUCOSE SERPL-MCNC: 195 MG/DL (ref 65–99)
INR PPP: 1.3 (ref 0.9–1.1)
PHOSPHATE SERPL-MCNC: 2.8 MG/DL (ref 2.5–4.5)
POTASSIUM SERPL-SCNC: 5.1 MMOL/L (ref 3.5–5.2)
PROTHROMBIN TIME: 16.4 SECONDS (ref 11.7–14.2)
SODIUM SERPL-SCNC: 140 MMOL/L (ref 136–145)

## 2024-10-28 PROCEDURE — 25010000002 PROPOFOL 1000 MG/100ML EMULSION: Performed by: NURSE ANESTHETIST, CERTIFIED REGISTERED

## 2024-10-28 PROCEDURE — 80069 RENAL FUNCTION PANEL: CPT | Performed by: INTERNAL MEDICINE

## 2024-10-28 PROCEDURE — 25010000002 PROPOFOL 200 MG/20ML EMULSION: Performed by: NURSE ANESTHETIST, CERTIFIED REGISTERED

## 2024-10-28 PROCEDURE — 43235 EGD DIAGNOSTIC BRUSH WASH: CPT | Performed by: INTERNAL MEDICINE

## 2024-10-28 PROCEDURE — 25810000003 SODIUM CHLORIDE 0.9 % SOLUTION: Performed by: INTERNAL MEDICINE

## 2024-10-28 PROCEDURE — 97110 THERAPEUTIC EXERCISES: CPT

## 2024-10-28 PROCEDURE — 25010000002 LIDOCAINE 2% SOLUTION: Performed by: NURSE ANESTHETIST, CERTIFIED REGISTERED

## 2024-10-28 PROCEDURE — 82948 REAGENT STRIP/BLOOD GLUCOSE: CPT

## 2024-10-28 PROCEDURE — 63710000001 PREDNISONE PER 1 MG: Performed by: INTERNAL MEDICINE

## 2024-10-28 PROCEDURE — 63710000001 INSULIN LISPRO (HUMAN) PER 5 UNITS: Performed by: INTERNAL MEDICINE

## 2024-10-28 PROCEDURE — 85610 PROTHROMBIN TIME: CPT | Performed by: INTERNAL MEDICINE

## 2024-10-28 PROCEDURE — 0DJ08ZZ INSPECTION OF UPPER INTESTINAL TRACT, VIA NATURAL OR ARTIFICIAL OPENING ENDOSCOPIC: ICD-10-PCS | Performed by: STUDENT IN AN ORGANIZED HEALTH CARE EDUCATION/TRAINING PROGRAM

## 2024-10-28 RX ORDER — TORSEMIDE 20 MG/1
20 TABLET ORAL DAILY
Status: DISCONTINUED | OUTPATIENT
Start: 2024-10-28 | End: 2024-10-29

## 2024-10-28 RX ORDER — LIDOCAINE HYDROCHLORIDE 20 MG/ML
INJECTION, SOLUTION INFILTRATION; PERINEURAL AS NEEDED
Status: DISCONTINUED | OUTPATIENT
Start: 2024-10-28 | End: 2024-10-28 | Stop reason: SURG

## 2024-10-28 RX ORDER — SODIUM CHLORIDE 9 MG/ML
30 INJECTION, SOLUTION INTRAVENOUS CONTINUOUS PRN
Status: ACTIVE | OUTPATIENT
Start: 2024-10-28 | End: 2024-10-28

## 2024-10-28 RX ORDER — WARFARIN SODIUM 6 MG/1
12 TABLET ORAL
Status: COMPLETED | OUTPATIENT
Start: 2024-10-28 | End: 2024-10-28

## 2024-10-28 RX ORDER — PROPOFOL 10 MG/ML
INJECTION, EMULSION INTRAVENOUS AS NEEDED
Status: DISCONTINUED | OUTPATIENT
Start: 2024-10-28 | End: 2024-10-28 | Stop reason: SURG

## 2024-10-28 RX ORDER — PROPOFOL 10 MG/ML
INJECTION, EMULSION INTRAVENOUS CONTINUOUS PRN
Status: DISCONTINUED | OUTPATIENT
Start: 2024-10-28 | End: 2024-10-28 | Stop reason: SURG

## 2024-10-28 RX ADMIN — ATENOLOL 12.5 MG: 25 TABLET ORAL at 14:04

## 2024-10-28 RX ADMIN — OXYBUTYNIN CHLORIDE 5 MG: 5 TABLET ORAL at 21:26

## 2024-10-28 RX ADMIN — TORSEMIDE 20 MG: 20 TABLET ORAL at 14:05

## 2024-10-28 RX ADMIN — PREGABALIN 150 MG: 75 CAPSULE ORAL at 14:06

## 2024-10-28 RX ADMIN — LIDOCAINE HYDROCHLORIDE 3 ML: 20 INJECTION, SOLUTION INFILTRATION; PERINEURAL at 10:05

## 2024-10-28 RX ADMIN — PROPOFOL 120 MCG/KG/MIN: 10 INJECTION, EMULSION INTRAVENOUS at 10:05

## 2024-10-28 RX ADMIN — LEVOTHYROXINE SODIUM 125 MCG: 125 TABLET ORAL at 14:05

## 2024-10-28 RX ADMIN — PRAVASTATIN SODIUM 40 MG: 40 TABLET ORAL at 21:26

## 2024-10-28 RX ADMIN — WARFARIN 12 MG: 6 TABLET ORAL at 17:28

## 2024-10-28 RX ADMIN — SODIUM CHLORIDE 30 ML/HR: 9 INJECTION, SOLUTION INTRAVENOUS at 09:54

## 2024-10-28 RX ADMIN — Medication 10 ML: at 21:25

## 2024-10-28 RX ADMIN — PREGABALIN 150 MG: 75 CAPSULE ORAL at 21:26

## 2024-10-28 RX ADMIN — PROPOFOL INJECTABLE EMULSION 60 MG: 10 INJECTION, EMULSION INTRAVENOUS at 10:05

## 2024-10-28 RX ADMIN — INSULIN LISPRO 3 UNITS: 100 INJECTION, SOLUTION INTRAVENOUS; SUBCUTANEOUS at 17:27

## 2024-10-28 RX ADMIN — PREDNISONE 20 MG: 20 TABLET ORAL at 14:05

## 2024-10-28 RX ADMIN — OXYBUTYNIN CHLORIDE 15 MG: 10 TABLET, EXTENDED RELEASE ORAL at 21:26

## 2024-10-28 RX ADMIN — OXYCODONE HYDROCHLORIDE AND ACETAMINOPHEN 500 MG: 500 TABLET ORAL at 14:05

## 2024-10-28 RX ADMIN — Medication 10 ML: at 14:06

## 2024-10-28 RX ADMIN — OXYBUTYNIN CHLORIDE 5 MG: 5 TABLET ORAL at 14:05

## 2024-10-28 RX ADMIN — INSULIN LISPRO 4 UNITS: 100 INJECTION, SOLUTION INTRAVENOUS; SUBCUTANEOUS at 21:26

## 2024-10-28 NOTE — PLAN OF CARE
Goal Outcome Evaluation:  Plan of Care Reviewed With: patient           Outcome Evaluation: Discussed with MD. Recommend resume previously recommended diet of regular textures and thin liquids. Meds whole with thin/puree as tolerated. Recommend upright, slow rate, reduce distractions with PO intake, alternate liquids/solids, utilize liquid wash with all PO. VFSS and esophagram 10/24/24 with no penetration/aspiration.  Additionally, recommend outpatient speech therapy for generalized pharyngeal strengthening.    Discussed with patient and RN. Patient agreeable to plan. Notified CCP of need for OP speech therapy order at [facility].    Anticipated Discharge Disposition (SLP): unknown

## 2024-10-28 NOTE — CASE MANAGEMENT/SOCIAL WORK
Continued Stay Note  [facility]     Patient Name: Riky Moon  MRN: [id number]  Today's Date: 10/28/2024    Admit Date: 10/22/2024    Plan: Return to [facility], family to transport   Discharge Plan       Row Name 10/28/24 1523       Plan    Plan Return to [facility], family to transport    Patient/Family in Agreement with Plan yes    Plan Comments CCP met with the patient at bedside to confirm his discharge plans. The patient's discharge plans remain the same; return to [facility]. Family to transport. CCP to follow. CD, CSW.                   Discharge Codes    No documentation.                 Expected Discharge Date and Time       Expected Discharge Date Expected Discharge Time    Oct 29, 2024

## 2024-10-28 NOTE — PROGRESS NOTES
[facility] Clinical Pharmacy Services: Warfarin Dosing/Monitoring Consult    Riky Moon is a 84 y.o. male, estimated creatinine clearance is 71.4 mL/min (by C-G formula based on SCr of 1.08 mg/dL). weighing 135 kg (296 lb 9.6 oz).    Results from last 7 days   Lab Units 10/28/24  0502 10/27/24  0329 10/26/24  0431 10/25/24  0341 10/24/24  0333 10/23/24  0254 10/22/24  1559 10/22/24  1517   INR  1.30* 2.47* 3.18* 2.79* 2.16* 1.83*   < >  --    HEMOGLOBIN g/dL  --   --   --  11.1* 12.0* 12.6*  --  12.1*   HEMATOCRIT %  --   --   --  33.7* 33.4* 36.2*  --  35.9*   PLATELETS 10*3/mm3  --   --   --  200 199 171  --  166    < > = values in this interval not displayed.     Prior to admission anticoagulation: Per  clinic : warfarin 5 mg PO Mon/Sat and 10 mg all other days of the week.     Hospital Anticoagulation:  Consulting provider: Dr Pope  Start date: PTA 10/22/24  Indication: A Fib - requiring full anticoagulation  Target INR: 2 - 3  Expected duration: lifelong   Bridge Therapy: No      Potential food or drug interactions:   Duloxetine (may enhance anticoagulant effect, moderate, home med)  Levothyroxine (may enhance anticoagulant effect, moderate, home med)  Pravastatin (may enhance anticoagulant effect, moderate, home med)  Prednisone (may enhance anticoagulant effect, moderate, new interaction)  Phytonadione 2.5mg IV given 10/27 @ 1453    Education complete?/Date: N/A; home medication    Assessment/Plan:  Warfarin ordered to restart post EGD. Given vitamin k reversal, will give a one time dose of 12mg and reassess 10/29.   Monitor for any signs or symptoms of bleeding  Follow up daily INRs and dose adjustments    Pharmacy will continue to follow until discharge or discontinuation of warfarin.     Kate Yo, PharmD  Clinical Pharmacist

## 2024-10-28 NOTE — ANESTHESIA POSTPROCEDURE EVALUATION
Patient: Riky Moon    Procedure Summary       Date: 10/28/24 Room / [facility]:  [facility]    Anesthesia Start: 1002 Anesthesia Stop: 1023    Procedure: ESOPHAGOGASTRODUODENOSCOPY (Esophagus) Diagnosis:       Oropharyngeal dysphagia      (Oropharyngeal dysphagia [R13.12])    Surgeons: Naveen Fishman MD Provider: Alfonso Syed MD    Anesthesia Type: MAC ASA Status: 4            Anesthesia Type: MAC    Vitals  Vitals Value Taken Time   /77 10/28/24 1032   Temp     Pulse 49 10/28/24 1038   Resp 16 10/28/24 1032   SpO2 97 % 10/28/24 1038   Vitals shown include unfiled device data.        Post Anesthesia Care and Evaluation    Patient location during evaluation: PACU  Patient participation: complete - patient participated  Level of consciousness: awake and alert  Pain management: adequate    Airway patency: patent  Anesthetic complications: No anesthetic complications    Cardiovascular status: acceptable  Respiratory status: acceptable  Hydration status: acceptable    Comments: --------------------            10/28/24               1032     --------------------   BP:       118/77     Pulse:      60       Resp:       16       Temp:                SpO2:      97%      --------------------

## 2024-10-28 NOTE — THERAPY TREATMENT NOTE
Patient Name: Riky Moon  : 1940    MRN: [id number]                              Today's Date: 10/28/2024       Admit Date: 10/22/2024    Visit Dx:     ICD-10-CM ICD-9-CM   1. Acute UTI  N39.0 599.0   2. COPD with acute exacerbation  J44.1 491.21   3. Weakness generalized  R53.1 780.79   4. Shortness of breath  R06.02 786.05   5. Acute congestive heart failure, unspecified heart failure type  I50.9 428.0   6. Elevated troponin  R79.89 790.6   7. Chronic anticoagulation  Z79.01 V58.61   8. Oropharyngeal dysphagia  R13.12 787.22   9. Esophageal dysphagia  R13.19 787.29     Patient Active Problem List   Diagnosis    OA (osteoarthritis) of hip    KINDRA treated with auto BiPAP    Chest pain    Diabetes mellitus    Essential hypertension    Hyperlipidemia    Hypothyroidism    Asthma    Overactive bladder    CAP (community acquired pneumonia)    Cellulitis of left lower extremity    COPD (chronic obstructive pulmonary disease)    Dyslipidemia    Gastroesophageal reflux disease    Leukocytosis    Peripheral nerve disease    Constipation    Oropharyngeal dysphagia    Bronchitis    Class 3 severe obesity due to excess calories with serious comorbidity and body mass index (BMI) of 40.0 to 44.9 in adult    Left leg swelling    Anemia    Leukocytosis    Circadian rhythm sleep disorder, delayed sleep phase type    Atrial flutter with controlled response    UTI (urinary tract infection)    CHF (congestive heart failure)    Shortness of breath     Past Medical History:   Diagnosis Date    Acid reflux     Anemia     Arthritis     OSTEO    Asthma     Chest discomfort     Colon polyp     COPD (chronic obstructive pulmonary disease)     Depression     Diabetes mellitus     type 2    Disease of thyroid gland     Emphysema lung     High cholesterol     Hypertension     Morbid obesity     Neuropathy     BOTH FEET    Obesity, Class III, BMI 40-49.9 (morbid obesity)     PAD (peripheral artery disease)     Poor circulation of  extremity     LEFT LEG    Sleep apnea     Vitamin D deficiency      Past Surgical History:   Procedure Laterality Date    APPENDECTOMY      CATARACT EXTRACTION W/ INTRAOCULAR LENS IMPLANT Bilateral     CHOLECYSTECTOMY      COLONOSCOPY  01/01/2014    COLONOSCOPY W/ POLYPECTOMY      BENIGN    HERNIA REPAIR      INTERSTIM PLACEMENT Left 08/30/2016    BLADDER CONTROL    KNEE ARTHROPLASTY Right     MOUTH SURGERY  06/26/2018    NOSE SURGERY      REMOVED BLOCKAGE     ROTATOR CUFF REPAIR Right     TOTAL HIP ARTHROPLASTY Right 11/9/2017    Procedure: RIGHT TOTAL HIP ARTHROPLASTY;  Surgeon: Riky Fernando MD;  Location: [facility] MAIN OR;  Service:       General Information       Row Name 10/28/24 1429          Physical Therapy Time and Intention    Document Type progress note/recertification  -MS     Mode of Treatment physical therapy  -MS       Row Name 10/28/24 1429          General Information    Existing Precautions/Restrictions fall  -MS       Row Name 10/28/24 1429          Cognition    Orientation Status (Cognition) oriented x 4  -MS       Row Name 10/28/24 1429          Safety Issues/Impairments Affecting Functional Mobility    Impairments Affecting Function (Mobility) strength;endurance/activity tolerance;balance  -MS     Comment, Safety Issues/Impairments (Mobility) Gait belt and non skid socks donned.  -MS               User Key  (r) = Recorded By, (t) = Taken By, (c) = Cosigned By      Initials Name Provider Type    MS Kyra Ko, PT Physical Therapist                   Mobility       Row Name 10/28/24 1430          Bed Mobility    Bed Mobility supine-sit  -MS     Supine-Sit [place] (Bed Mobility) supervision  -MS       Row Name 10/28/24 1430          Sit-Stand Transfer    Sit-Stand [place] (Transfers) standby assist;contact guard;verbal cues  -MS     Assistive Device (Sit-Stand Transfers) walker, front-wheeled  -MS       Row Name 10/28/24 1430          Gait/Stairs (Locomotion)    [place]  Level (Gait) standby assist;contact guard;verbal cues  -MS     Assistive Device (Gait) walker, front-wheeled  -MS     Patient was able to Ambulate yes  -MS     Distance in Feet (Gait) 150  -MS     Deviations/Abnormal Patterns (Gait) jannet decreased;gait speed decreased  -MS     Comment, (Gait/Stairs) No overt LOB or veering noted. Fatigue reported.  -MS               User Key  (r) = Recorded By, (t) = Taken By, (c) = Cosigned By      Initials Name Provider Type    Kyra Cutler, PT Physical Therapist                   Obj/Interventions       Row Name 10/28/24 1430          Balance    Static Sitting Balance supervision  -MS     Position, Sitting Balance sitting edge of bed  -MS               User Key  (r) = Recorded By, (t) = Taken By, (c) = Cosigned By      Initials Name Provider Type    Kyra Cutler, PT Physical Therapist                   Goals/Plan       Row Name 10/28/24 1443          Bed Mobility Goal 1 (PT)    Activity/Assistive Device (Bed Mobility Goal 1, PT) bed mobility activities, all  -MS     [place] Level/Cues Needed (Bed Mobility Goal 1, PT) supervision required  -MS     Time Frame (Bed Mobility Goal 1, PT) 1 week  -MS     Progress/Outcomes (Bed Mobility Goal 1, PT) goal ongoing  -MS       Row Name 10/28/24 1443          Transfer Goal 1 (PT)    Activity/Assistive Device (Transfer Goal 1, PT) transfers, all;walker, rolling  -MS     [place] Level/Cues Needed (Transfer Goal 1, PT) supervision required  -MS     Time Frame (Transfer Goal 1, PT) 1 week  -MS     Progress/Outcome (Transfer Goal 1, PT) goal ongoing  -MS       Row Name 10/28/24 1443          Gait Training Goal 1 (PT)    Activity/Assistive Device (Gait Training Goal 1, PT) gait (walking locomotion);walker, rolling  -MS     [place] Level (Gait Training Goal 1, PT) supervision required  -MS     Distance (Gait Training Goal 1, PT) 150  -MS     Time Frame (Gait Training Goal 1, PT) 1 week  -MS     Progress/Outcome  (Gait Training Goal 1, PT) goal ongoing  -MS               User Key  (r) = Recorded By, (t) = Taken By, (c) = Cosigned By      Initials Name Provider Type    MS KoKyra, PT Physical Therapist                   Clinical Impression       Row Name 10/28/24 1442          Pain    Pretreatment Pain Rating 0/10 - no pain  -MS     Posttreatment Pain Rating 0/10 - no pain  -MS       Row Name 10/28/24 1442          Plan of Care Review    Plan of Care Reviewed With patient  -MS     Progress improving  -MS     Outcome Evaluation Patient pleasant and agreeable to PT this afternoon. SV for bed mobility, stood with SBA-CGA to a RW and ambulated 150' SBA-CGA with a RW. Patient is progressing well and has been ambulating with nsg. PT will begin following peripherally. Anticipate returning back home when medically cleared.  -MS       Row Name 10/28/24 1442          Therapy Assessment/Plan (PT)    Therapy Frequency (PT) 3 times/wk  -MS       Row Name 10/28/24 1442          Positioning and Restraints    Pre-Treatment Position in bed  -MS     Post Treatment Position bed  -MS     In Bed notified nsg;sitting EOB;call light within reach;encouraged to call for assist  -MS               User Key  (r) = Recorded By, (t) = Taken By, (c) = Cosigned By      Initials Name Provider Type    MS KoKyra, PT Physical Therapist                   Outcome Measures       Row Name 10/28/24 1443          How much help from another person do you currently need...    Turning from your back to your side while in flat bed without using bedrails? 4  -MS     Moving from lying on back to sitting on the side of a flat bed without bedrails? 4  -MS     Moving to and from a bed to a chair (including a wheelchair)? 3  -MS     Standing up from a chair using your arms (e.g., wheelchair, bedside chair)? 3  -MS     Climbing 3-5 steps with a railing? 3  -MS     To walk in hospital room? 3  -MS     AM-PAC 6 Clicks Score (PT) 20  -MS               User  Key  (r) = Recorded By, (t) = Taken By, (c) = Cosigned By      Initials Name Provider Type    MS Kyra Ko PT Physical Therapist                                 Physical Therapy Education       Title: PT OT SLP Therapies (Done)       Topic: Physical Therapy (Done)       Point: Mobility training (Done)       Learning Progress Summary            Patient Acceptance, E,TB, VU by MS at 10/28/2024 1444    Acceptance, E,TB, VU by MS at 10/23/2024 1042                      Point: Home exercise program (Done)       Learning Progress Summary            Patient Acceptance, E,TB, VU by MS at 10/28/2024 1444    Acceptance, E,TB, VU by MS at 10/23/2024 1042                      Point: Body mechanics (Done)       Learning Progress Summary            Patient Acceptance, E,TB, VU by MS at 10/28/2024 1444    Acceptance, E,TB, VU by MS at 10/23/2024 1042                      Point: Precautions (Done)       Learning Progress Summary            Patient Acceptance, E,TB, VU by MS at 10/28/2024 1444    Acceptance, E,TB, VU by MS at 10/23/2024 1042                                      User Key       Initials Effective Dates Name Provider Type Discipline    MS 06/16/21 -  Kyra Ko PT Physical Therapist PT                  PT Recommendation and Plan  Planned Therapy Interventions (PT): balance training, bed mobility training, gait training, home exercise program, postural re-education, patient/family education, ROM (range of motion), stair training, strengthening, transfer training  Progress: improving  Outcome Evaluation: Patient pleasant and agreeable to PT this afternoon. SV for bed mobility, stood with SBA-CGA to a RW and ambulated 150' SBA-CGA with a RW. Patient is progressing well and has been ambulating with nsg. PT will begin following peripherally. Anticipate returning back home when medically cleared.     Time Calculation:         PT Charges       Row Name 10/28/24 5164             Time Calculation    Start  Time 1347  -MS      Stop Time 1359  -MS      Time Calculation (min) 12 min  -MS      PT Received On 10/28/24  -MS      PT - Next Appointment 10/30/24  -MS      PT Goal Re-Cert Due Date 11/04/24  -MS                User Key  (r) = Recorded By, (t) = Taken By, (c) = Cosigned By      Initials Name Provider Type    Kyra Cutler, PT Physical Therapist                  Therapy Charges for Today       Code Description Service Date Service Provider Modifiers Qty    [id number] HC PT THER PROC EA 15 MIN 10/28/2024 Kyra Ko, PT GP 1            PT G-Codes  AM-PAC 6 Clicks Score (PT): 20  PT Discharge Summary  Anticipated Discharge Disposition (PT): home with home health, home    Kyra Ko PT  10/28/2024

## 2024-10-28 NOTE — PLAN OF CARE
Goal Outcome Evaluation:  Plan of Care Reviewed With: patient        Progress: improving  Outcome Evaluation: EGD completed this morning. Diet resumed. Up to chair and back to bed with walker, assist x1, gait slow but steady. VSS, stable on room air. Call light within reach.

## 2024-10-28 NOTE — PROGRESS NOTES
[facility] Progress Note      Patient Name: Riky Moon  : 1940  MRN: [id number]  Primary Care Physician:  Marco Carrillo MD  Date of admission: 10/22/2024    Subjective     Interval History:     Patient resting comfortably.   Doing well today not on oxygen.  Denies any chest pain or shortness of breath  Review of Systems:   As noted above    Objective     Vitals:   Temp:  [97.5 °F (36.4 °C)-98.2 °F (36.8 °C)] 97.5 °F (36.4 °C)  Heart Rate:  [53-69] 62  Resp:  [16-18] 16  BP: (113-127)/(53-95) 119/69  Flow (L/min) (Oxygen Therapy):  [10] 10    Intake/Output Summary (Last 24 hours) at 10/28/2024 1025  Last data filed at 10/28/2024 1022  Gross per 24 hour   Intake 290 ml   Output 1475 ml   Net -1185 ml       Physical Exam:    General Appearance: alert, oriented x 3, no acute distress.  Obese  Skin: warm and dry  HEENT: oral mucosa normal, nonicteric sclera  Neck: supple, no JVD  Lungs: CTA  Heart: RRR, normal S1 and S2  Abdomen: soft, nontender, nondistended  : no palpable bladder  Extremities: Bilateral lower extremity edema.  No cyanosis or clubbing  Neuro: normal speech and mental status     Scheduled Meds:     vitamin C, 500 mg, Oral, Daily  atenolol, 12.5 mg, Oral, Daily  cholecalciferol, 1,000 Units, Oral, QAM  DULoxetine, 60 mg, Oral, QAM  insulin lispro, 2-7 Units, Subcutaneous, 4x Daily AC & at Bedtime  levothyroxine, 125 mcg, Oral, Daily  [Held by provider] losartan, 100 mg, Oral, Daily  [Held by provider] metFORMIN, 850 mg, Oral, BID With Meals  multivitamin, 1 tablet, Oral, QAM  oxybutynin, 5 mg, Oral, Q12H  oxybutynin XL, 15 mg, Oral, Nightly  pravastatin, 40 mg, Oral, Nightly  predniSONE, 20 mg, Oral, BID With Meals  predniSONE, 20 mg, Oral, Daily With Breakfast  pregabalin, 150 mg, Oral, Q12H  sodium chloride, 10 mL, Intravenous, Q12H  vitamin B-12, 500 mcg, Oral, QAM      IV Meds:   sodium chloride, 30 mL/hr, Last Rate: 30 mL/hr (10/28/24  0954)          Results Reviewed:   I have personally reviewed the results from the time of this admission to 10/28/2024 10:25 EDT     Results from last 7 days   Lab Units 10/28/24  0502 10/27/24  0724 10/26/24  0431 10/25/24  0341 10/24/24  0333 10/23/24  0254 10/22/24  1517   SODIUM mmol/L 140 139 137 133*   < > 138 136   POTASSIUM mmol/L 5.1 4.8 3.7 3.7   < > 3.8 4.1   CHLORIDE mmol/L 106 104 103 98   < > 100 102   CO2 mmol/L 28.9 27.0 24.1 23.4   < > 25.1 25.9   BUN mg/dL 43* 53* 75* 75*   < > 33* 28*   CREATININE mg/dL 1.08 1.22 1.66* 2.42*   < > 1.64* 1.22   CALCIUM mg/dL 9.0 8.7 8.5* 8.2*   < > 8.7 9.0   BILIRUBIN mg/dL  --   --   --  0.4  --  0.8 1.5*   ALK PHOS U/L  --   --   --  44  --  63 55   ALT (SGPT) U/L  --   --   --  22  --  17 14   AST (SGOT) U/L  --   --   --  23  --  25 20   GLUCOSE mg/dL 195* 141* 78 204*   < > 213* 137*    < > = values in this interval not displayed.       Estimated Creatinine Clearance: 71.4 mL/min (by C-G formula based on SCr of 1.08 mg/dL).    Results from last 7 days   Lab Units 10/28/24  0502 10/27/24  0724 10/26/24  0431 10/25/24  0341 10/24/24  0333 10/23/24  0254   MAGNESIUM mg/dL  --   --   --  2.1  --  2.0   PHOSPHORUS mg/dL 2.8 3.3 3.6  --    < >  --     < > = values in this interval not displayed.       Results from last 7 days   Lab Units 10/25/24  0341 10/24/24  0333   URIC ACID mg/dL 6.6 6.0       Results from last 7 days   Lab Units 10/25/24  0341 10/24/24  0333 10/23/24  0254 10/22/24  1517   WBC 10*3/mm3 13.11* 15.64* 14.54* 17.81*   HEMOGLOBIN g/dL 11.1* 12.0* 12.6* 12.1*   PLATELETS 10*3/mm3 200 199 171 166       Results from last 7 days   Lab Units 10/28/24  0502 10/27/24  0329 10/26/24  0431 10/25/24  0341 10/24/24  0333   INR  1.30* 2.47* 3.18* 2.79* 2.16*       Assessment / Plan     ASSESSMENT:  Nonoliguric KEL,  multifactorial: Prerenal with hypotension, aggressive diuresing with IV diuretics; impaired renal autoregulation with the use of ARB; andSGLT2  inhibitor .  Electrolytes within acceptable range; lowish   urine sodium in favor of prerenal state.  Kidney function is improving CKD stage II, baseline SCr 1.1-1.3, suspect secondary to longstanding hypertensive and diabetic nephrosclerosis  CHF/mild to moderate aortic stenosis, echo on 10/23 showed EF 54% with normal RVSP and IVC size.  No fluid excess except moderate chronic BLE lymphedema.  Cardiology evaluated and signed off  SOA/COPD/KINDRA with CPAP, chest x-ray negative, uses CPAP at night.  Breathing comfortable on room air, followed by pulmonary   Hypertension, blood pressure okay  Type II DM, home metformin and Jardiance were stopped   A-fib, HR controlled, on atenolol.  Currently Coumadin on hold for possible EGD on Monday  Hypothyroidism, on Synthroid  Dysphagia, Neurogenic bladder s/p bladder stimulator        PLAN:  Will resume torsemide at 20 mg p.o. daily  Keep off losartan for now  Encourage p.o. intake  Labs in a.m.      Thank you for involving us in the care of Riky Moon.  Please feel free to call with any questions.    Isma Kwan MD  10/28/24  10:25 EDT    [facility]  [phone]    Parts of this note may be an electronic transcription/translation of spoken language to printed text using the Dragon dictation system.

## 2024-10-28 NOTE — PROGRESS NOTES
Name: Riky Moon ADMIT: 10/22/2024   : 1940  PCP: Marco Carrillo MD    MRN: [id number] LOS: 5 days   AGE/SEX: 84 y.o. male  ROOM: [facility]     Subjective   Subjective     No events overnight. No complaints. His EGD was relatively unremarkable except for some secretions in his oropharynx. Discussed with SLP.       Objective   Objective   Vital Signs  Temp:  [97.5 °F (36.4 °C)-98.2 °F (36.8 °C)] 97.5 °F (36.4 °C)  Heart Rate:  [53-69] 55  Resp:  [16-18] 18  BP: (113-127)/(53-95) 123/70  SpO2:  [94 %-100 %] 99 %  on  Flow (L/min) (Oxygen Therapy):  [10] 10;   Device (Oxygen Therapy): room air  Body mass index is 40.23 kg/m².  Physical Exam  Constitutional:       General: He is not in acute distress.     Appearance: He is obese. He is not toxic-appearing.   Cardiovascular:      Rate and Rhythm: Normal rate and regular rhythm.      Heart sounds: Normal heart sounds.   Pulmonary:      Effort: Pulmonary effort is normal.      Breath sounds: Normal breath sounds.   Abdominal:      General: Bowel sounds are normal. There is distension.      Palpations: Abdomen is soft.      Tenderness: There is no abdominal tenderness. There is no guarding or rebound.   Musculoskeletal:         General: No tenderness.      Right lower leg: No edema.      Left lower leg: No edema.   Neurological:      Mental Status: He is alert.   Psychiatric:         Mood and Affect: Mood normal.         Behavior: Behavior normal.         Results Review     I reviewed the patient's new clinical results.  Results from last 7 days   Lab Units 10/25/24  0341 10/24/24  0333 10/23/24  0254 10/22/24  1517   WBC 10*3/mm3 13.11* 15.64* 14.54* 17.81*   HEMOGLOBIN g/dL 11.1* 12.0* 12.6* 12.1*   PLATELETS 10*3/mm3 200 199 171 166     Results from last 7 days   Lab Units 10/28/24  0502 10/27/24  0724 10/26/24  0431 10/25/24  0341   SODIUM mmol/L 140 139 137 133*   POTASSIUM mmol/L 5.1 4.8 3.7 3.7   CHLORIDE mmol/L 106 104 103 98   CO2 mmol/L 28.9 27.0  24.1 23.4   BUN mg/dL 43* 53* 75* 75*   CREATININE mg/dL 1.08 1.22 1.66* 2.42*   GLUCOSE mg/dL 195* 141* 78 204*   Estimated Creatinine Clearance: 71.4 mL/min (by C-G formula based on SCr of 1.08 mg/dL).  Results from last 7 days   Lab Units 10/28/24  0502 10/27/24  0724 10/26/24  0431 10/25/24  0341 10/24/24  0333 10/23/24  0254 10/22/24  1517   ALBUMIN g/dL 3.3* 3.2* 3.2* 3.1*   < > 3.7 3.8   BILIRUBIN mg/dL  --   --   --  0.4  --  0.8 1.5*   ALK PHOS U/L  --   --   --  44  --  63 55   AST (SGOT) U/L  --   --   --  23  --  25 20   ALT (SGPT) U/L  --   --   --  22  --  17 14    < > = values in this interval not displayed.     Results from last 7 days   Lab Units 10/28/24  0502 10/27/24  0724 10/26/24  0431 10/25/24  0341 10/24/24  0333 10/23/24  0254   CALCIUM mg/dL 9.0 8.7 8.5* 8.2* 8.3* 8.7   ALBUMIN g/dL 3.3* 3.2* 3.2* 3.1* 3.4* 3.7   MAGNESIUM mg/dL  --   --   --  2.1  --  2.0   PHOSPHORUS mg/dL 2.8 3.3 3.6  --  3.7  --      Results from last 7 days   Lab Units 10/23/24  0555 10/23/24  0254   PROCALCITONIN ng/mL  --  0.28*   LACTATE mmol/L 1.2  --      COVID19   Date Value Ref Range Status   10/23/2024 Not Detected Not Detected - Ref. Range Final   10/22/2024 Not Detected Not Detected - Ref. Range Final   11/10/2023 Not Detected Not Detected - Ref. Range Final     SARS-CoV-2 PCR   Date Value Ref Range Status   09/26/2020 Not Detected Not Detected Final     Comment:     Nucleic acid specific to SARS-CoV-2 (COVID-19) virus was not detected inthis sample by the TaqPath (TM) COVID-19 Combo Kit.SARS-CoV-2 (COVID-19) nucleic acid testing performed using [employer] Aptima (R) SARS-CoV-2 Assay or [employer] TaqPath   (TM)COVID-19 Combo Kit as indicated above under Emergency Use Authorization (EUA) from the FDA. Aptima (R) and TaqPath (TM) test performancecharacteristics verified by August in accordance with the FDAs Guidance Document (Policy for Diagnostic   Tests for Coronavirus Disease-2019during  the Public Health Emergency) issued on March 16, 2020. The laboratory is regulated under CLIA as qualified to perform high-complexity testingUnless otherwise noted, all testing was performed at [facility],   CLIA No. [id number], [place] State Licensee No. [id number]     Glucose   Date/Time Value Ref Range Status   10/28/2024 1141 113 70 - 130 mg/dL Final   10/28/2024 0825 155 (H) 70 - 130 mg/dL Final   10/28/2024 0553 184 (H) 70 - 130 mg/dL Final   10/27/2024 2110 254 (H) 70 - 130 mg/dL Final   10/27/2024 1703 166 (H) 70 - 130 mg/dL Final   10/27/2024 1157 189 (H) 70 - 130 mg/dL Final   10/27/2024 0805 136 (H) 70 - 130 mg/dL Final       FL Video Swallow Single Contrast  VIDEO SWALLOWING EXAMINATION BY SPEECH PATHOLOGY     Clinical: Dysphasia     Video swallowing examination performed under the direction of speech  pathology. Imaging reviewed by radiologist who concurs with the  findings.     Speech pathology summary:   VFSS complete in conjunction with Rachna BARNARD. ?Patient presents with functional/age-appropriate swallow. ?Timely  swallow with adequate airway protection. ?No penetration or aspiration  across trials of thin via cup single and consecutive swallows, pur?e,  mechanical soft, and regular textures. ?No significant pharyngeal  residue status post all trials         Ft 0 min 54 sec ? ? ?4.99 mGy.         This report was finalized on 10/24/2024 4:30 PM by Dr. oRsemary Barrios M.D  on Workstation: [id number]     FL ESOPHAGRAM DOUBLE CONTRAST  Narrative: EXAMINATION:  Esophagram Complete Double-Contrast Fluoroscopy.     DATE: 10/24/2024     COMPARISON:  Video swallow examination 10/24/2024 and chest 2 view 10/22/2024.     CLINICAL HISTORY:  84-year-old male with esophageal dysphagia.     DETAILS:  Administration of thin barium, thick barium, and air crystals were  provided to the patient. Rapid sequence video clips and spot films of  the esophagus were obtained. Images were obtained by EVON Marvin.     *   TOTAL FLUOROSCOPY DOSE: DAP: 3456 uGym2  *  TOTAL NUMBER OF FLUOROSCOPIC IMAGES: 241     FINDINGS:     The preliminary  view of the chest shows no evidence for acute  pneumonia or CHF. Can't exclude small pleural effusions.     Esophagus:  The vallecula and pyriform sinuses are unremarkable, with no evidence of  mucosal abnormality or extrinsic mass compression. Serial images of the  cervical esophagus show no laryngeal penetration or aspiration. No  evidence of cervical esophageal diverticula.  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 This does not result in  significant flow limitation and a 12.5 mm diameter barium tablet passed  promptly beyond the area without hesitation.     Normal esophageal distensibility and caliber. Absent primary peristalsis  with intermittent tertiary wave formations observed throughout the  examination. The esophageal mucosa is normal. No evidence of a filling  defect or ulceration. Small sliding hiatal hernia is identified.  Moderate volume of gastroesophageal reflux is seen refluxing to the  upper thoracic esophagus with the patient in the recumbent position.  Smooth short segment narrowing at the gastroesophageal junction. A  12.5mm barium tablet was administered and obstructed at the level of the  gastroesophageal junction and passed after 23 minutes.     Stomach:  Barium flows readily through the esophagus and into the stomach.        Impression: 1.  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  This does not result in any significant flow limitation.  2.  Esophageal dysmotility demonstrated by absent primary peristalsis  with intermittent tertiary wave formations.  3.  Small sliding hiatal hernia with moderate volume of recumbent  gastroesophageal reflux observed refluxing to the upper  thoracic  esophagus at the level of the clavicles.  4.  Smooth short segment narrowing at the gastroesophageal junction  resulting in obstruction of a 12.5 mm diameter barium tablet. The tablet  was observed passing at 23 minutes.  5.  Recommend endoscopy.              This report was finalized on 10/24/2024 4:27 PM by Dr. Rosemary Barrios M.D  on Workstation: [id number]       Scheduled Medications  vitamin C, 500 mg, Oral, Daily  atenolol, 12.5 mg, Oral, Daily  cholecalciferol, 1,000 Units, Oral, QAM  DULoxetine, 60 mg, Oral, QAM  insulin lispro, 2-7 Units, Subcutaneous, 4x Daily AC & at Bedtime  levothyroxine, 125 mcg, Oral, Daily  [Held by provider] losartan, 100 mg, Oral, Daily  [Held by provider] metFORMIN, 850 mg, Oral, BID With Meals  multivitamin, 1 tablet, Oral, QAM  oxybutynin, 5 mg, Oral, Q12H  oxybutynin XL, 15 mg, Oral, Nightly  pravastatin, 40 mg, Oral, Nightly  predniSONE, 20 mg, Oral, BID With Meals  predniSONE, 20 mg, Oral, Daily With Breakfast  pregabalin, 150 mg, Oral, Q12H  sodium chloride, 10 mL, Intravenous, Q12H  torsemide, 20 mg, Oral, Daily  vitamin B-12, 500 mcg, Oral, QAM  warfarin, 12 mg, Oral, Once    Infusions  Pharmacy to dose warfarin,     Diet  NPO Diet NPO Type: Strict NPO       Assessment/Plan     Active Hospital Problems    Diagnosis  POA    **Shortness of breath [R06.02]  Yes    CHF (congestive heart failure) [I50.9]  Unknown    Atrial flutter with controlled response [I48.92]  Yes    Oropharyngeal dysphagia [R13.12]  Unknown    Gastroesophageal reflux disease [K21.9]  Yes    Constipation [K59.00]  Yes    Hypothyroidism [E03.9]  Yes    Diabetes mellitus [E11.9]  Yes    Essential hypertension [I10]  Yes    KINDRA treated with auto BiPAP [G47.33]  Yes    COPD (chronic obstructive pulmonary disease) [J44.9]  Yes    Asthma [J45.909]  Yes      Resolved Hospital Problems   No resolved problems to display.       84 y.o. male admitted with Shortness of breath.  84-year-old man with chronic  lymphedema, COPD, atrial flutter, hypothyroidism, type 2 diabetes, hypertension, KINDRA who initially presented with shortness of breath.  There was concern for heart failure exacerbation on admission as well as a COPD exacerbation.  He was initially diuresed, but developed an KEL with diuresis, and so an echocardiogram was performed which did not really show much evidence of heart failure.  He was subsequently placed on steroids for his COPD exacerbation.  He described esophageal dysphagia type symptoms and underwent esophagram which showed multiple issues including anterior cervical esophageal web, esophageal dysmotility acid reflux with a small sliding hiatal hernia.     Acute exacerbation of COPD-completing a course of oral prednisone  Esophageal/oropharyngeal dysphagia with abnormal esophagram-EGD was unremarkable except for some thickened secretions in the oropharynx. VFSS and esophagram which were performed on 10/24 showed no penetration or aspiration. SLP recommends outpatient speech therapy for generalized pharyngeal strengthening, but no modifications to his diet.   KEL on CKD 2-due to overdiuresis, and improved following IV fluids and discontinuation of diuretics as well as other nephrotoxic medications.   Mild to moderate aortic stenosis and chronic bilateral lower extremity lymphedema-nephrology has restarted oral torsemide today  Essential hypertension-well controlled with losartan held  Type 2 diabetes-at goal acutely. Continue sliding scale  Chronic A-fib-Atenolol for rate control. Ask pharmacy to dose warfarin  Hypothyroidism-synthroid  KINDRA-pap if available  Morbid obesity  Warfarin (home med) for DVT prophylaxis.  Full code.  Discussed with patient and SLP .  Anticipate discharge home with family tomorrow. If ok with all      Vinay Cross MD  [facility]  10/28/24  13:36 EDT

## 2024-10-28 NOTE — BRIEF OP NOTE
ESOPHAGOGASTRODUODENOSCOPY  Progress Note    Riky Moon  10/28/2024    Pre-op Diagnosis:   Oropharyngeal dysphagia [R13.12]       Post-Op Diagnosis Codes:     * Oropharyngeal dysphagia [R13.12]    Procedure/CPT® Codes:        Procedure(s):  ESOPHAGOGASTRODUODENOSCOPY              Surgeon(s):  Naveen Fishman MD    Anesthesia: Monitored Anesthesia Care    Staff:   Endo Technician: William Nails  Endo Nurse: Rosalie Reyes RN         Estimated Blood Loss: none    Urine Voided: * No values recorded between 10/28/2024 10:02 AM and 10/28/2024 10:20 AM *    Specimens:                None          Drains:   External Urinary Catheter (Active)   Daily Indications Daily output 10/28/24 0741   Site Assessment Clean;Skin intact 10/28/24 0741   Application/Removal external catheter removed;external catheter changed 10/28/24 0741   Collection Container Wall suction 10/28/24 0741   Wall suction (mmHG) 140 mmHG 10/28/24 0741   Securement Method Securing device 10/28/24 0741   Catheter care complete Yes 10/28/24 0741   Output (mL) 550 mL 10/28/24 0607       Findings:    Completely normal examination up to second part of duodenum.  No esophageal stricture mucosal ring or esophagitis was seen.  Patient did have visit thick secretions in the posterior oropharynx Indicating oropharyngeal dysfunction.        Complications: none    Recommendations:    SLP evaluation and MBSS, follow recommendations of speech pathology regarding feeding in terms of modulating patient's dietary consistency.  Based upon his modified swallow study there is not much risk of aspiration and altering the, consistency of the meals should suffice.  In case of inability to do dietary consistency modification and SLP help, patient may need alternative modality of feeding such as PEG placement.  Please let us know if GI help is needed with regard to (2).above    GI service will therefore, sign off.  Please call if needed.    Above findings were communicated to  patient's son Jaciel [phone]          Naveen Fishman MD     Date: 10/28/2024  Time: 10:27 EDT

## 2024-10-28 NOTE — PLAN OF CARE
Goal Outcome Evaluation:  Plan of Care Reviewed With: patient           Outcome Evaluation: VSS, pt had an uneventful shift. NPO since midnight for am EGD. No complaints. Pending am labs. Plan of care ongoing.

## 2024-10-28 NOTE — PLAN OF CARE
Goal Outcome Evaluation:  Plan of Care Reviewed With: patient        Progress: improving  Outcome Evaluation: Patient pleasant and agreeable to PT this afternoon. SV for bed mobility, stood with SBA-CGA to a RW and ambulated 150' SBA-CGA with a RW. Patient is progressing well and has been ambulating with nsg. PT will begin following peripherally. Anticipate returning back home when medically cleared.    Anticipated Discharge Disposition (PT): home with home health, home

## 2024-10-28 NOTE — ANESTHESIA PREPROCEDURE EVALUATION
Anesthesia Evaluation     Patient summary reviewed and Nursing notes reviewed                Airway   Mallampati: II  Dental    (+) edentulous    Pulmonary    (+) a smoker Former, COPD, asthma,shortness of breath, sleep apnea  Cardiovascular     ECG reviewed  PT is on anticoagulation therapy  Rhythm: irregular  Rate: normal    (+) hypertension, valvular problems/murmurs (moderate AS) AS, dysrhythmias (RBBB) Atrial Flutter, Paroxysmal Atrial Fib, Atrial Fib, CHF , PVD, hyperlipidemia      Neuro/Psych  (+) numbness, psychiatric history Anxiety and Depression  GI/Hepatic/Renal/Endo    (+) morbid obesity, GERD, diabetes mellitus type 2, thyroid problem hypothyroidism    Musculoskeletal     Abdominal    Substance History - negative use     OB/GYN negative ob/gyn ROS         Other   arthritis,                 Anesthesia Plan    ASA 4     MAC     intravenous induction     Anesthetic plan, risks, benefits, and alternatives have been provided, discussed and informed consent has been obtained with: patient.    CODE STATUS:    Code Status (Patient has no pulse and is not breathing): CPR (Attempt to Resuscitate)  Medical Interventions (Patient has pulse or is breathing): Full Support

## 2024-10-29 LAB
ALBUMIN SERPL-MCNC: 3 G/DL (ref 3.5–5.2)
ALBUMIN SERPL-MCNC: 3.4 G/DL (ref 3.5–5.2)
ANION GAP SERPL CALCULATED.3IONS-SCNC: 6.5 MMOL/L (ref 5–15)
ANION GAP SERPL CALCULATED.3IONS-SCNC: 7.8 MMOL/L (ref 5–15)
BUN SERPL-MCNC: 41 MG/DL (ref 8–23)
BUN SERPL-MCNC: 41 MG/DL (ref 8–23)
BUN/CREAT SERPL: 26.5 (ref 7–25)
BUN/CREAT SERPL: 29.7 (ref 7–25)
CALCIUM SPEC-SCNC: 8.8 MG/DL (ref 8.6–10.5)
CALCIUM SPEC-SCNC: 8.9 MG/DL (ref 8.6–10.5)
CHLORIDE SERPL-SCNC: 102 MMOL/L (ref 98–107)
CHLORIDE SERPL-SCNC: 105 MMOL/L (ref 98–107)
CO2 SERPL-SCNC: 29.2 MMOL/L (ref 22–29)
CO2 SERPL-SCNC: 30.5 MMOL/L (ref 22–29)
CREAT SERPL-MCNC: 1.38 MG/DL (ref 0.76–1.27)
CREAT SERPL-MCNC: 1.55 MG/DL (ref 0.76–1.27)
EGFRCR SERPLBLD CKD-EPI 2021: 43.6 ML/MIN/1.73
EGFRCR SERPLBLD CKD-EPI 2021: 50.1 ML/MIN/1.73
GLUCOSE BLDC GLUCOMTR-MCNC: 144 MG/DL (ref 70–130)
GLUCOSE BLDC GLUCOMTR-MCNC: 169 MG/DL (ref 70–130)
GLUCOSE BLDC GLUCOMTR-MCNC: 216 MG/DL (ref 70–130)
GLUCOSE BLDC GLUCOMTR-MCNC: 246 MG/DL (ref 70–130)
GLUCOSE SERPL-MCNC: 192 MG/DL (ref 65–99)
GLUCOSE SERPL-MCNC: 203 MG/DL (ref 65–99)
INR PPP: 1.22 (ref 0.9–1.1)
PHOSPHATE SERPL-MCNC: 2.8 MG/DL (ref 2.5–4.5)
PHOSPHATE SERPL-MCNC: 3 MG/DL (ref 2.5–4.5)
POTASSIUM SERPL-SCNC: 4.5 MMOL/L (ref 3.5–5.2)
POTASSIUM SERPL-SCNC: 5.3 MMOL/L (ref 3.5–5.2)
PROTHROMBIN TIME: 15.6 SECONDS (ref 11.7–14.2)
SODIUM SERPL-SCNC: 139 MMOL/L (ref 136–145)
SODIUM SERPL-SCNC: 142 MMOL/L (ref 136–145)

## 2024-10-29 PROCEDURE — 82948 REAGENT STRIP/BLOOD GLUCOSE: CPT

## 2024-10-29 PROCEDURE — 80069 RENAL FUNCTION PANEL: CPT | Performed by: HOSPITALIST

## 2024-10-29 PROCEDURE — 63710000001 PREDNISONE PER 1 MG: Performed by: INTERNAL MEDICINE

## 2024-10-29 PROCEDURE — 85610 PROTHROMBIN TIME: CPT | Performed by: INTERNAL MEDICINE

## 2024-10-29 PROCEDURE — 63710000001 INSULIN LISPRO (HUMAN) PER 5 UNITS: Performed by: INTERNAL MEDICINE

## 2024-10-29 PROCEDURE — 80069 RENAL FUNCTION PANEL: CPT | Performed by: INTERNAL MEDICINE

## 2024-10-29 RX ORDER — WARFARIN SODIUM 6 MG/1
12 TABLET ORAL
Status: COMPLETED | OUTPATIENT
Start: 2024-10-29 | End: 2024-10-29

## 2024-10-29 RX ORDER — TORSEMIDE 20 MG/1
20 TABLET ORAL DAILY
Status: DISCONTINUED | OUTPATIENT
Start: 2024-10-29 | End: 2024-10-30 | Stop reason: HOSPADM

## 2024-10-29 RX ADMIN — Medication 1000 UNITS: at 06:37

## 2024-10-29 RX ADMIN — WARFARIN 12 MG: 6 TABLET ORAL at 18:16

## 2024-10-29 RX ADMIN — OXYBUTYNIN CHLORIDE 15 MG: 10 TABLET, EXTENDED RELEASE ORAL at 21:04

## 2024-10-29 RX ADMIN — OXYBUTYNIN CHLORIDE 5 MG: 5 TABLET ORAL at 21:04

## 2024-10-29 RX ADMIN — Medication 10 ML: at 22:10

## 2024-10-29 RX ADMIN — OXYBUTYNIN CHLORIDE 5 MG: 5 TABLET ORAL at 09:04

## 2024-10-29 RX ADMIN — Medication 10 ML: at 09:04

## 2024-10-29 RX ADMIN — INSULIN LISPRO 3 UNITS: 100 INJECTION, SOLUTION INTRAVENOUS; SUBCUTANEOUS at 21:04

## 2024-10-29 RX ADMIN — LEVOTHYROXINE SODIUM 125 MCG: 125 TABLET ORAL at 09:04

## 2024-10-29 RX ADMIN — PREDNISONE 20 MG: 20 TABLET ORAL at 09:04

## 2024-10-29 RX ADMIN — TORSEMIDE 20 MG: 20 TABLET ORAL at 12:34

## 2024-10-29 RX ADMIN — PREGABALIN 150 MG: 75 CAPSULE ORAL at 09:04

## 2024-10-29 RX ADMIN — DULOXETINE HYDROCHLORIDE 60 MG: 60 CAPSULE, DELAYED RELEASE ORAL at 06:37

## 2024-10-29 RX ADMIN — PREGABALIN 150 MG: 75 CAPSULE ORAL at 20:26

## 2024-10-29 RX ADMIN — INSULIN LISPRO 3 UNITS: 100 INJECTION, SOLUTION INTRAVENOUS; SUBCUTANEOUS at 18:16

## 2024-10-29 RX ADMIN — Medication 1 TABLET: at 06:37

## 2024-10-29 RX ADMIN — Medication 500 MCG: at 06:37

## 2024-10-29 RX ADMIN — INSULIN LISPRO 2 UNITS: 100 INJECTION, SOLUTION INTRAVENOUS; SUBCUTANEOUS at 12:34

## 2024-10-29 RX ADMIN — OXYCODONE HYDROCHLORIDE AND ACETAMINOPHEN 500 MG: 500 TABLET ORAL at 09:04

## 2024-10-29 RX ADMIN — PRAVASTATIN SODIUM 40 MG: 40 TABLET ORAL at 20:26

## 2024-10-29 RX ADMIN — ATENOLOL 12.5 MG: 25 TABLET ORAL at 09:04

## 2024-10-29 NOTE — PROGRESS NOTES
[facility] Progress Note      Patient Name: Riky Moon  : 1940  MRN: [id number]  Primary Care Physician:  Marco Carrillo MD  Date of admission: 10/22/2024    Subjective     Interval History:   Follow-up acute kidney injury on CKD 2.  EGD yesterday with oropharyngeal secretions but no esophageal ring.  Urine output 2.1 L.  Weight down.    Review of Systems:   As noted above    Objective     Vitals:   Temp:  [97.5 °F (36.4 °C)-98.2 °F (36.8 °C)] 97.7 °F (36.5 °C)  Heart Rate:  [55-67] 66  Resp:  [16-18] 18  BP: (108-123)/(66-74) 113/72    Intake/Output Summary (Last 24 hours) at 10/29/2024 1137  Last data filed at 10/29/2024 0621  Gross per 24 hour   Intake 0 ml   Output 2100 ml   Net -2100 ml       Physical Exam:    General Appearance: alert, oriented x 3, no acute distress   Skin: warm and dry  HEENT: oral mucosa normal, nonicteric sclera  Neck: supple, no JVD  Lungs: Clear to auscultation bilaterally.  Heart: RRR, normal S1 and S2  Abdomen: soft, nontender, nondistended. +bs.  Obese.  : External catheter  Extremities: Lymphedema left lower extremity.  Trace right lower extremity edema.  Neuro: normal speech and mental status     Scheduled Meds:     vitamin C, 500 mg, Oral, Daily  atenolol, 12.5 mg, Oral, Daily  cholecalciferol, 1,000 Units, Oral, QAM  DULoxetine, 60 mg, Oral, QAM  insulin lispro, 2-7 Units, Subcutaneous, 4x Daily AC & at Bedtime  levothyroxine, 125 mcg, Oral, Daily  multivitamin, 1 tablet, Oral, QAM  oxybutynin, 5 mg, Oral, Q12H  oxybutynin XL, 15 mg, Oral, Nightly  pravastatin, 40 mg, Oral, Nightly  pregabalin, 150 mg, Oral, Q12H  sodium chloride, 10 mL, Intravenous, Q12H  torsemide, 20 mg, Oral, Daily  vitamin B-12, 500 mcg, Oral, QAM  warfarin, 12 mg, Oral, Once      IV Meds:   Pharmacy to dose warfarin,         Results Reviewed:   I have personally reviewed the results from the time of this admission to 10/29/2024 11:37 EDT     Results from last 7  days   Lab Units 10/29/24  0841 10/29/24  0336 10/28/24  0502 10/26/24  0431 10/25/24  0341 10/24/24  0333 10/23/24  0254 10/22/24  1517   SODIUM mmol/L 139 142 140   < > 133*   < > 138 136   POTASSIUM mmol/L 4.5 5.3* 5.1   < > 3.7   < > 3.8 4.1   CHLORIDE mmol/L 102 105 106   < > 98   < > 100 102   CO2 mmol/L 29.2* 30.5* 28.9   < > 23.4   < > 25.1 25.9   BUN mg/dL 41* 41* 43*   < > 75*   < > 33* 28*   CREATININE mg/dL 1.38* 1.55* 1.08   < > 2.42*   < > 1.64* 1.22   CALCIUM mg/dL 8.9 8.8 9.0   < > 8.2*   < > 8.7 9.0   BILIRUBIN mg/dL  --   --   --   --  0.4  --  0.8 1.5*   ALK PHOS U/L  --   --   --   --  44  --  63 55   ALT (SGPT) U/L  --   --   --   --  22  --  17 14   AST (SGOT) U/L  --   --   --   --  23  --  25 20   GLUCOSE mg/dL 203* 192* 195*   < > 204*   < > 213* 137*    < > = values in this interval not displayed.       Estimated Creatinine Clearance: 55.2 mL/min (A) (by C-G formula based on SCr of 1.38 mg/dL (H)).    Results from last 7 days   Lab Units 10/29/24  0841 10/29/24  0336 10/28/24  0502 10/26/24  0431 10/25/24  0341 10/24/24  0333 10/23/24  0254   MAGNESIUM mg/dL  --   --   --   --  2.1  --  2.0   PHOSPHORUS mg/dL 3.0 2.8 2.8   < >  --    < >  --     < > = values in this interval not displayed.       Results from last 7 days   Lab Units 10/25/24  0341 10/24/24  0333   URIC ACID mg/dL 6.6 6.0       Results from last 7 days   Lab Units 10/25/24  0341 10/24/24  0333 10/23/24  0254 10/22/24  1517   WBC 10*3/mm3 13.11* 15.64* 14.54* 17.81*   HEMOGLOBIN g/dL 11.1* 12.0* 12.6* 12.1*   PLATELETS 10*3/mm3 200 199 171 166       Results from last 7 days   Lab Units 10/29/24  0459 10/28/24  0502 10/27/24  0329 10/26/24  0431 10/25/24  0341   INR  1.22* 1.30* 2.47* 3.18* 2.79*       Assessment / Plan     ASSESSMENT:  KEL on CKD 2.  Baseline creatinine 1.1-1.3.  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  Acute component due to impaired auto renal regulation with ARB and SGLT2 inhibitor in the setting  of hypotension and diuresis.  His creatinine is 1.3 this morning (repeated because of 1.5 was out of range for what was expected) p.o. torsemide to start today.  From a renal standpoint he could be discharged see below  Heart failure preserved ejection fraction with mild to moderate aortic stenosis.  Compensated.  COPD and obstructive sleep apnea on CPAP.  Atrial flutter with heart rate controlled on atenolol.  INR subtherapeutic after holding for EGD.  Diabetes mellitus type 2  Hypothyroid on replacement  Dysphagia.  EGD negative yesterday except oropharyngeal secretions.    PLAN:  Okay with renal for discharge when Dr. Cross thinks ready.  I will make follow-up arrangements in our office.    Thank you for involving us in the care of Riky Moon.  Please feel free to call with any questions.    Maci Flores MD  10/29/24  11:37 EDT    [facility]  [phone]    Please note that portions of this note were completed with a voice recognition program.

## 2024-10-29 NOTE — PLAN OF CARE
Goal Outcome Evaluation:   VS stable , no complaints made . Able to sleep and rest for the rest of the night . For possible dc to same nursing facility where he came from today.

## 2024-10-29 NOTE — PROGRESS NOTES
[facility] Clinical Pharmacy Services: Warfarin Dosing/Monitoring Consult    Riky Moon is a 84 y.o. male, estimated creatinine clearance is 49 mL/min (A) (by C-G formula based on SCr of 1.55 mg/dL (H)). weighing 132 kg (289 lb 14.5 oz).    Results from last 7 days   Lab Units 10/29/24  0459 10/28/24  0502 10/27/24  0329 10/26/24  0431 10/25/24  0341 10/24/24  0333 10/23/24  0254 10/22/24  1559 10/22/24  1517   INR  1.22* 1.30* 2.47* 3.18* 2.79* 2.16* 1.83*   < >  --    HEMOGLOBIN g/dL  --   --   --   --  11.1* 12.0* 12.6*  --  12.1*   HEMATOCRIT %  --   --   --   --  33.7* 33.4* 36.2*  --  35.9*   PLATELETS 10*3/mm3  --   --   --   --  200 199 171  --  166    < > = values in this interval not displayed.     Prior to admission anticoagulation: Per [facility] clinic : warfarin 5 mg PO Mon/Sat and 10 mg all other days of the week.     Hospital Anticoagulation:  Consulting provider: Dr Pope  Start date: PTA 10/22/24  Indication: A Fib - requiring full anticoagulation  Target INR: 2 - 3  Expected duration: lifelong   Bridge Therapy: No      Potential food or drug interactions:   Duloxetine (may enhance anticoagulant effect, moderate, home med)  Levothyroxine (may enhance anticoagulant effect, moderate, home med)  Pravastatin (may enhance anticoagulant effect, moderate, home med)  )completed   Phytonadione 2.5mg IV given 10/27 @ 2293    Education complete?/Date: N/A; home medication    Assessment/Plan:  INR subtherapeutic 2/2 held doses and reversal with vitamin k for procedure. Will repeat 2mg dose (10mg normally planned for Tue) and monitor INR trend.   Monitor for any signs or symptoms of bleeding  Follow up daily INRs and dose adjustments. Of note, patient does not have to be therapeutic to discharge- would determine if nursing facility would be able to administer lovenox SQ inj as a bridge therapy.     Pharmacy will continue to follow until discharge or discontinuation of warfarin.     Kate Yo,  PharmD  Clinical Pharmacist

## 2024-10-29 NOTE — PROGRESS NOTES
Name: Riky Moon ADMIT: 10/22/2024   : 1940  PCP: Marco Carrillo MD    MRN: [id number] LOS: 6 days   AGE/SEX: 84 y.o. male  ROOM: [facility]     Subjective   Subjective     No events overnight. He continues to feels some oropharyngeal sticking sensations when eating, but appears to be tolerating her diet otherwise without issue. His creatinine was up considerably this morning with associated hyperkalemia, but both of these improved with repeat BMP.        Objective   Objective   Vital Signs  Temp:  [97.5 °F (36.4 °C)-98.2 °F (36.8 °C)] 97.7 °F (36.5 °C)  Heart Rate:  [55-67] 66  Resp:  [16-18] 18  BP: (108-123)/(66-74) 113/72  SpO2:  [94 %-99 %] 96 %  on   ;   Device (Oxygen Therapy): room air  Body mass index is 39.91 kg/m².  Physical Exam  Constitutional:       General: He is not in acute distress.     Appearance: He is obese. He is not toxic-appearing.   Cardiovascular:      Rate and Rhythm: Normal rate and regular rhythm.      Heart sounds: Normal heart sounds.   Pulmonary:      Effort: Pulmonary effort is normal.      Breath sounds: Normal breath sounds.   Abdominal:      General: Bowel sounds are normal. There is distension.      Palpations: Abdomen is soft.      Tenderness: There is no abdominal tenderness. There is no guarding or rebound.   Musculoskeletal:         General: No tenderness.      Right lower leg: No edema.      Left lower leg: No edema.   Neurological:      Mental Status: He is alert.   Psychiatric:         Mood and Affect: Mood normal.         Behavior: Behavior normal.         Results Review     I reviewed the patient's new clinical results.  Results from last 7 days   Lab Units 10/25/24  0341 10/24/24  0333 10/23/24  0254 10/22/24  1517   WBC 10*3/mm3 13.11* 15.64* 14.54* 17.81*   HEMOGLOBIN g/dL 11.1* 12.0* 12.6* 12.1*   PLATELETS 10*3/mm3 200 199 171 166     Results from last 7 days   Lab Units 10/29/24  0841 10/29/24  0336 10/28/24  0502 10/27/24  0724   SODIUM mmol/L 139  142 140 139   POTASSIUM mmol/L 4.5 5.3* 5.1 4.8   CHLORIDE mmol/L 102 105 106 104   CO2 mmol/L 29.2* 30.5* 28.9 27.0   BUN mg/dL 41* 41* 43* 53*   CREATININE mg/dL 1.38* 1.55* 1.08 1.22   GLUCOSE mg/dL 203* 192* 195* 141*   Estimated Creatinine Clearance: 55.2 mL/min (A) (by C-G formula based on SCr of 1.38 mg/dL (H)).  Results from last 7 days   Lab Units 10/29/24  0841 10/29/24  0336 10/28/24  0502 10/27/24  0724 10/26/24  0431 10/25/24  0341 10/24/24  0333 10/23/24  0254 10/22/24  1517   ALBUMIN g/dL 3.4* 3.0* 3.3* 3.2*   < > 3.1*   < > 3.7 3.8   BILIRUBIN mg/dL  --   --   --   --   --  0.4  --  0.8 1.5*   ALK PHOS U/L  --   --   --   --   --  44  --  63 55   AST (SGOT) U/L  --   --   --   --   --  23  --  25 20   ALT (SGPT) U/L  --   --   --   --   --  22  --  17 14    < > = values in this interval not displayed.     Results from last 7 days   Lab Units 10/29/24  0841 10/29/24  0336 10/28/24  0502 10/27/24  0724 10/26/24  0431 10/25/24  0341 10/24/24  0333 10/23/24  0254   CALCIUM mg/dL 8.9 8.8 9.0 8.7   < > 8.2*   < > 8.7   ALBUMIN g/dL 3.4* 3.0* 3.3* 3.2*   < > 3.1*   < > 3.7   MAGNESIUM mg/dL  --   --   --   --   --  2.1  --  2.0   PHOSPHORUS mg/dL 3.0 2.8 2.8 3.3   < >  --    < >  --     < > = values in this interval not displayed.     Results from last 7 days   Lab Units 10/23/24  0555 10/23/24  0254   PROCALCITONIN ng/mL  --  0.28*   LACTATE mmol/L 1.2  --      COVID19   Date Value Ref Range Status   10/23/2024 Not Detected Not Detected - Ref. Range Final   10/22/2024 Not Detected Not Detected - Ref. Range Final   11/10/2023 Not Detected Not Detected - Ref. Range Final     SARS-CoV-2 PCR   Date Value Ref Range Status   09/26/2020 Not Detected Not Detected Final     Comment:     Nucleic acid specific to SARS-CoV-2 (COVID-19) virus was not detected inthis sample by the PinticsPath (TM) COVID-19 Combo Kit.SARS-CoV-2 (COVID-19) nucleic acid testing performed using [employer] Aptima (R) SARS-CoV-2 Assay or  [employer] TaqPath   (TM)COVID-19 Combo Kit as indicated above under Emergency Use Authorization (EUA) from the FDA. Aptima (R) and TaqPath (TM) test performancecharacteristics verified by [facility] in accordance with the FDAs Guidance Document (Policy for Diagnostic   Tests for Coronavirus Disease-2019during the Public Health Emergency) issued on March 16, 2020. The laboratory is regulated under CLIA as qualified to perform high-complexity testingUnless otherwise noted, all testing was performed at [facility],   CLIA No. [id number], [place] State Licensee No. [id number]     Glucose   Date/Time Value Ref Range Status   10/29/2024 1142 169 (H) 70 - 130 mg/dL Final   10/29/2024 0739 144 (H) 70 - 130 mg/dL Final   10/28/2024 2028 269 (H) 70 - 130 mg/dL Final   10/28/2024 1702 222 (H) 70 - 130 mg/dL Final   10/28/2024 1141 113 70 - 130 mg/dL Final   10/28/2024 0825 155 (H) 70 - 130 mg/dL Final   10/28/2024 0553 184 (H) 70 - 130 mg/dL Final       FL Video Swallow Single Contrast  VIDEO SWALLOWING EXAMINATION BY SPEECH PATHOLOGY     Clinical: Dysphasia     Video swallowing examination performed under the direction of speech  pathology. Imaging reviewed by radiologist who concurs with the  findings.     Speech pathology summary:   VFSS complete in conjunction with Rachna BARNARD. ?Patient presents with functional/age-appropriate swallow. ?Timely  swallow with adequate airway protection. ?No penetration or aspiration  across trials of thin via cup single and consecutive swallows, pur?e,  mechanical soft, and regular textures. ?No significant pharyngeal  residue status post all trials         Ft 0 min 54 sec ? ? ?4.99 mGy.         This report was finalized on 10/24/2024 4:30 PM by Dr. Rosemary Barrios M.D  on Workstation: [id number]     FL ESOPHAGRAM DOUBLE CONTRAST  Narrative: EXAMINATION:  Esophagram Complete Double-Contrast Fluoroscopy.     DATE: 10/24/2024     COMPARISON:  Video swallow examination 10/24/2024  and chest 2 view 10/22/2024.     CLINICAL HISTORY:  84-year-old male with esophageal dysphagia.     DETAILS:  Administration of thin barium, thick barium, and air crystals were  provided to the patient. Rapid sequence video clips and spot films of  the esophagus were obtained. Images were obtained by EVON Marvin.     *  TOTAL FLUOROSCOPY DOSE: DAP: 3456 uGym2  *  TOTAL NUMBER OF FLUOROSCOPIC IMAGES: 241     FINDINGS:     The preliminary  view of the chest shows no evidence for acute  pneumonia or CHF. Can't exclude small pleural effusions.     Esophagus:  The vallecula and pyriform sinuses are unremarkable, with no evidence of  mucosal abnormality or extrinsic mass compression. Serial images of the  cervical esophagus show no laryngeal penetration or aspiration. No  evidence of cervical esophageal diverticula.  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 This does not result in  significant flow limitation and a 12.5 mm diameter barium tablet passed  promptly beyond the area without hesitation.     Normal esophageal distensibility and caliber. Absent primary peristalsis  with intermittent tertiary wave formations observed throughout the  examination. The esophageal mucosa is normal. No evidence of a filling  defect or ulceration. Small sliding hiatal hernia is identified.  Moderate volume of gastroesophageal reflux is seen refluxing to the  upper thoracic esophagus with the patient in the recumbent position.  Smooth short segment narrowing at the gastroesophageal junction. A  12.5mm barium tablet was administered and obstructed at the level of the  gastroesophageal junction and passed after 23 minutes.     Stomach:  Barium flows readily through the esophagus and into the stomach.        Impression: 1.  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  This does not result in any significant flow limitation.  2.  Esophageal dysmotility demonstrated by absent primary peristalsis  with intermittent tertiary wave formations.  3.  Small sliding hiatal hernia with moderate volume of recumbent  gastroesophageal reflux observed refluxing to the upper thoracic  esophagus at the level of the clavicles.  4.  Smooth short segment narrowing at the gastroesophageal junction  resulting in obstruction of a 12.5 mm diameter barium tablet. The tablet  was observed passing at 23 minutes.  5.  Recommend endoscopy.              This report was finalized on 10/24/2024 4:27 PM by Dr. Rosemary Barrios M.D  on Workstation: [id number]       Scheduled Medications  vitamin C, 500 mg, Oral, Daily  atenolol, 12.5 mg, Oral, Daily  cholecalciferol, 1,000 Units, Oral, QAM  DULoxetine, 60 mg, Oral, QAM  insulin lispro, 2-7 Units, Subcutaneous, 4x Daily AC & at Bedtime  levothyroxine, 125 mcg, Oral, Daily  multivitamin, 1 tablet, Oral, QAM  oxybutynin, 5 mg, Oral, Q12H  oxybutynin XL, 15 mg, Oral, Nightly  pravastatin, 40 mg, Oral, Nightly  pregabalin, 150 mg, Oral, Q12H  sodium chloride, 10 mL, Intravenous, Q12H  torsemide, 20 mg, Oral, Daily  vitamin B-12, 500 mcg, Oral, QAM  warfarin, 12 mg, Oral, Once    Infusions  Pharmacy to dose warfarin,     Diet  Diet: Regular/House; Texture: Regular (IDDSI 7); Fluid Consistency: Thin (IDDSI 0)       Assessment/Plan     Active Hospital Problems    Diagnosis  POA    **Shortness of breath [R06.02]  Yes    CHF (congestive heart failure) [I50.9]  Unknown    Atrial flutter with controlled response [I48.92]  Yes    Oropharyngeal dysphagia [R13.12]  Unknown    Gastroesophageal reflux disease [K21.9]  Yes    Constipation [K59.00]  Yes    Hypothyroidism [E03.9]  Yes    Diabetes mellitus [E11.9]  Yes    Essential hypertension [I10]  Yes    KINDRA treated with auto BiPAP [G47.33]  Yes    COPD (chronic obstructive pulmonary disease) [J44.9]  Yes     Asthma [J45.909]  Yes      Resolved Hospital Problems   No resolved problems to display.       84 y.o. male admitted with Shortness of breath.  84-year-old man with chronic lymphedema, COPD, atrial flutter, hypothyroidism, type 2 diabetes, hypertension, KINDRA who initially presented with shortness of breath.  There was concern for heart failure exacerbation on admission as well as a COPD exacerbation.  He was initially diuresed, but developed an KEL with diuresis, and so an echocardiogram was performed which did not really show much evidence of heart failure.  He was subsequently placed on steroids for his COPD exacerbation.  He described esophageal dysphagia type symptoms and underwent esophagram which showed multiple issues including anterior cervical esophageal web, esophageal dysmotility acid reflux with a small sliding hiatal hernia.     Acute exacerbation of COPD-resolved. He completed a course of oral prednisone while hospitalized.   Esophageal/oropharyngeal dysphagia with abnormal esophagram-EGD was unremarkable except for some thickened secretions in the oropharynx. VFSS and esophagram which were performed on 10/24 showed no penetration or aspiration. SLP recommends outpatient speech therapy for generalized pharyngeal strengthening, but no modifications to his diet.   KEL on CKD 2-due to overdiuresis, and improved following IV fluids and discontinuation of diuretics as well as other nephrotoxic medications. Creatinine was up this morning with associated hyperkalemia that are improved on repeat labs. Discussed with Dr. Flores. I would be more comfortable watching him another day to demonstrate stability of his labs while back on the diuretics.  Mild to moderate aortic stenosis and chronic bilateral lower extremity lymphedema-on oral torsemide.  Essential hypertension-well controlled with losartan held  Type 2 diabetes-at goal acutely. Continue sliding scale  Chronic A-fib-Atenolol for rate control. Warfarin is  being dosed by pharmacy  Hypothyroidism-synthroid  KINDRA-pap if available  Morbid obesity  Warfarin (home med) for DVT prophylaxis.  Full code.  Discussed with patient and consulting provider.  Anticipate discharge home with family tomorrow. If labs are stable      Vinay Cross MD  [facility]  10/29/24  13:32 EDT

## 2024-10-29 NOTE — PLAN OF CARE
Goal Outcome Evaluation:              Outcome Evaluation: VSS. Pt had no complaints this shift. Continue diuretics per renal. Continue warfarin. INR subtherapeutic. No other complaints. Needs met at this time.

## 2024-10-30 ENCOUNTER — READMISSION MANAGEMENT (OUTPATIENT)
Dept: CALL CENTER | Facility: HOSPITAL | Age: 84
End: 2024-10-30
Payer: MEDICARE

## 2024-10-30 VITALS
HEIGHT: 72 IN | DIASTOLIC BLOOD PRESSURE: 65 MMHG | HEART RATE: 51 BPM | TEMPERATURE: 97.5 F | SYSTOLIC BLOOD PRESSURE: 116 MMHG | RESPIRATION RATE: 18 BRPM | BODY MASS INDEX: 39.1 KG/M2 | WEIGHT: 288.7 LBS | OXYGEN SATURATION: 97 %

## 2024-10-30 PROBLEM — R06.02 SHORTNESS OF BREATH: Status: RESOLVED | Noted: 2024-10-23 | Resolved: 2024-10-30

## 2024-10-30 PROBLEM — K59.00 CONSTIPATION: Status: RESOLVED | Noted: 2018-06-29 | Resolved: 2024-10-30

## 2024-10-30 LAB
ALBUMIN SERPL-MCNC: 2.8 G/DL (ref 3.5–5.2)
ANION GAP SERPL CALCULATED.3IONS-SCNC: 7.3 MMOL/L (ref 5–15)
BUN SERPL-MCNC: 46 MG/DL (ref 8–23)
BUN/CREAT SERPL: 36.8 (ref 7–25)
CALCIUM SPEC-SCNC: 8.4 MG/DL (ref 8.6–10.5)
CHLORIDE SERPL-SCNC: 102 MMOL/L (ref 98–107)
CO2 SERPL-SCNC: 27.7 MMOL/L (ref 22–29)
CREAT SERPL-MCNC: 1.25 MG/DL (ref 0.76–1.27)
EGFRCR SERPLBLD CKD-EPI 2021: 56.4 ML/MIN/1.73
GLUCOSE BLDC GLUCOMTR-MCNC: 132 MG/DL (ref 70–130)
GLUCOSE SERPL-MCNC: 143 MG/DL (ref 65–99)
INR PPP: 1.36 (ref 0.9–1.1)
PHOSPHATE SERPL-MCNC: 3.3 MG/DL (ref 2.5–4.5)
POTASSIUM SERPL-SCNC: 4.3 MMOL/L (ref 3.5–5.2)
PROTHROMBIN TIME: 17 SECONDS (ref 11.7–14.2)
SODIUM SERPL-SCNC: 137 MMOL/L (ref 136–145)

## 2024-10-30 PROCEDURE — G0008 ADMIN INFLUENZA VIRUS VAC: HCPCS | Performed by: INTERNAL MEDICINE

## 2024-10-30 PROCEDURE — 82948 REAGENT STRIP/BLOOD GLUCOSE: CPT

## 2024-10-30 PROCEDURE — 25010000002 INFLUENZA VAC SPLIT HIGH-DOSE 0.5 ML SUSPENSION PREFILLED SYRINGE: Performed by: INTERNAL MEDICINE

## 2024-10-30 PROCEDURE — 80069 RENAL FUNCTION PANEL: CPT | Performed by: INTERNAL MEDICINE

## 2024-10-30 PROCEDURE — 90662 IIV NO PRSV INCREASED AG IM: CPT | Performed by: INTERNAL MEDICINE

## 2024-10-30 PROCEDURE — 85610 PROTHROMBIN TIME: CPT | Performed by: INTERNAL MEDICINE

## 2024-10-30 RX ORDER — TORSEMIDE 20 MG/1
20 TABLET ORAL DAILY
Qty: 30 TABLET | Refills: 0 | Status: SHIPPED | OUTPATIENT
Start: 2024-10-30 | End: 2024-11-06 | Stop reason: SDUPTHER

## 2024-10-30 RX ORDER — WARFARIN SODIUM 5 MG/1
TABLET ORAL
Start: 2024-11-01

## 2024-10-30 RX ORDER — WARFARIN SODIUM 2 MG/1
12 TABLET ORAL NIGHTLY
Qty: 12 TABLET | Refills: 0 | Status: SHIPPED | OUTPATIENT
Start: 2024-10-30 | End: 2024-11-06

## 2024-10-30 RX ORDER — TIOTROPIUM BROMIDE AND OLODATEROL 3.124; 2.736 UG/1; UG/1
1 SPRAY, METERED RESPIRATORY (INHALATION) DAILY
Qty: 4 G | Refills: 1 | Status: SHIPPED | OUTPATIENT
Start: 2024-10-30 | End: 2024-11-06 | Stop reason: SDUPTHER

## 2024-10-30 RX ORDER — ALBUTEROL SULFATE 90 UG/1
2 INHALANT RESPIRATORY (INHALATION) EVERY 4 HOURS PRN
Qty: 18 G | Refills: 0 | Status: SHIPPED | OUTPATIENT
Start: 2024-10-30 | End: 2024-11-06 | Stop reason: SDUPTHER

## 2024-10-30 RX ADMIN — Medication 1 TABLET: at 06:27

## 2024-10-30 RX ADMIN — PREGABALIN 150 MG: 75 CAPSULE ORAL at 08:53

## 2024-10-30 RX ADMIN — Medication 10 ML: at 08:58

## 2024-10-30 RX ADMIN — TORSEMIDE 20 MG: 20 TABLET ORAL at 08:53

## 2024-10-30 RX ADMIN — LEVOTHYROXINE SODIUM 125 MCG: 125 TABLET ORAL at 08:53

## 2024-10-30 RX ADMIN — OXYBUTYNIN CHLORIDE 5 MG: 5 TABLET ORAL at 08:57

## 2024-10-30 RX ADMIN — ATENOLOL 12.5 MG: 25 TABLET ORAL at 08:52

## 2024-10-30 RX ADMIN — Medication 500 MCG: at 06:27

## 2024-10-30 RX ADMIN — OXYCODONE HYDROCHLORIDE AND ACETAMINOPHEN 500 MG: 500 TABLET ORAL at 08:53

## 2024-10-30 RX ADMIN — INFLUENZA A VIRUS A/VICTORIA/4897/2022 IVR-238 (H1N1) ANTIGEN (FORMALDEHYDE INACTIVATED), INFLUENZA A VIRUS A/CALIFORNIA/122/2022 SAN-022 (H3N2) ANTIGEN (FORMALDEHYDE INACTIVATED), AND INFLUENZA B VIRUS B/MICHIGAN/01/2021 ANTIGEN (FORMALDEHYDE INACTIVATED) 0.5 ML: 60; 60; 60 INJECTION, SUSPENSION INTRAMUSCULAR at 10:18

## 2024-10-30 RX ADMIN — DULOXETINE HYDROCHLORIDE 60 MG: 60 CAPSULE, DELAYED RELEASE ORAL at 06:27

## 2024-10-30 RX ADMIN — Medication 1000 UNITS: at 06:26

## 2024-10-30 NOTE — PROGRESS NOTES
[facility] Clinical Pharmacy Services: Warfarin Dosing/Monitoring Consult    Riky Moon is a 84 y.o. male, estimated creatinine clearance is 60.5 mL/min (by C-G formula based on SCr of 1.25 mg/dL). weighing 132 kg (289 lb 14.5 oz).    Results from last 7 days   Lab Units 10/30/24  0337 10/29/24  0459 10/28/24  0502 10/27/24  0329 10/26/24  0431 10/25/24  0341 10/24/24  0333   INR  1.36* 1.22* 1.30* 2.47* 3.18* 2.79* 2.16*   HEMOGLOBIN g/dL  --   --   --   --   --  11.1* 12.0*   HEMATOCRIT %  --   --   --   --   --  33.7* 33.4*   PLATELETS 10*3/mm3  --   --   --   --   --  200 199     Prior to admission anticoagulation: Per [facility] clinic : warfarin 5 mg PO Mon/Sat and 10 mg all other days of the week.     Hospital Anticoagulation:  Consulting provider: Dr Pope  Start date: PTA 10/22/24  Indication: A Fib - requiring full anticoagulation  Target INR: 2 - 3  Expected duration: lifelong   Bridge Therapy: No      Potential food or drug interactions:   Duloxetine (may enhance anticoagulant effect, moderate, home med)  Levothyroxine (may enhance anticoagulant effect, moderate, home med)  Pravastatin (may enhance anticoagulant effect, moderate, home med)  )completed   Phytonadione 2.5mg IV given 10/27 @ 2423    Education complete?/Date: N/A; home medication    Assessment/Plan:  INR still subtherapeutic today at 1.36 (2/2 held doses and reversal with vitamin k for procedure). Will repeat 12mg dose today & tomorrow then on Friday should be able to restart previous home regimen of 5mg Mon/Sat & 10mg all other days of the week.   Monitor for any signs or symptoms of bleeding  Follow up daily INRs and dose adjustments.     Pharmacy will continue to follow until discharge or discontinuation of warfarin.     Vincent Keane [facility]  Clinical Pharmacist

## 2024-10-30 NOTE — PLAN OF CARE
Goal Outcome Evaluation:           Pt d/c back to [facility]. Daughter to transport

## 2024-10-30 NOTE — PROGRESS NOTES
[facility] Progress Note      Patient Name: Riky Moon  : 1940  MRN: [id number]  Primary Care Physician:  Marco Carrillo MD  Date of admission: 10/22/2024    Subjective     Interval History:   Follow-up acute kidney injury on CKD 2.  Feels fine.  Anxious for discharge.  Eating.  Bowels moving.  Urine output 2.3 L.  Intake not recorded.  Weight down.  Review of Systems:   As noted above    Objective     Vitals:   Temp:  [97.3 °F (36.3 °C)-98.2 °F (36.8 °C)] 97.5 °F (36.4 °C)  Heart Rate:  [51-68] 51  Resp:  [16-20] 18  BP: (104-124)/(48-65) 116/65    Intake/Output Summary (Last 24 hours) at 10/30/2024 1015  Last data filed at 10/30/2024 0718  Gross per 24 hour   Intake 0 ml   Output 2300 ml   Net -2300 ml       Physical Exam:    General Appearance: alert, oriented x 3, no acute distress   Skin: warm and dry  HEENT: oral mucosa normal, nonicteric sclera  Neck: supple, no JVD  Lungs: Clear to auscultation bilaterally.  Heart: RRR, normal S1 and S2  Abdomen: soft, nontender, nondistended. +bs.  Obese.  : External catheter  Extremities: Lymphedema left lower extremity.  Trace right lower extremity edema.  Neuro: normal speech and mental status     Scheduled Meds:     vitamin C, 500 mg, Oral, Daily  atenolol, 12.5 mg, Oral, Daily  cholecalciferol, 1,000 Units, Oral, QAM  DULoxetine, 60 mg, Oral, QAM  insulin lispro, 2-7 Units, Subcutaneous, 4x Daily AC & at Bedtime  levothyroxine, 125 mcg, Oral, Daily  multivitamin, 1 tablet, Oral, QAM  oxybutynin, 5 mg, Oral, Q12H  oxybutynin XL, 15 mg, Oral, Nightly  pravastatin, 40 mg, Oral, Nightly  pregabalin, 150 mg, Oral, Q12H  sodium chloride, 10 mL, Intravenous, Q12H  torsemide, 20 mg, Oral, Daily  vitamin B-12, 500 mcg, Oral, QAM      IV Meds:   Pharmacy to dose warfarin,         Results Reviewed:   I have personally reviewed the results from the time of this admission to 10/30/2024 10:15 EDT     Results from last 7 days   Lab Units  10/30/24  0337 10/29/24  0841 10/29/24  0336 10/26/24  0431 10/25/24  0341   SODIUM mmol/L 137 139 142   < > 133*   POTASSIUM mmol/L 4.3 4.5 5.3*   < > 3.7   CHLORIDE mmol/L 102 102 105   < > 98   CO2 mmol/L 27.7 29.2* 30.5*   < > 23.4   BUN mg/dL 46* 41* 41*   < > 75*   CREATININE mg/dL 1.25 1.38* 1.55*   < > 2.42*   CALCIUM mg/dL 8.4* 8.9 8.8   < > 8.2*   BILIRUBIN mg/dL  --   --   --   --  0.4   ALK PHOS U/L  --   --   --   --  44   ALT (SGPT) U/L  --   --   --   --  22   AST (SGOT) U/L  --   --   --   --  23   GLUCOSE mg/dL 143* 203* 192*   < > 204*    < > = values in this interval not displayed.       Estimated Creatinine Clearance: 60.5 mL/min (by C-G formula based on SCr of 1.25 mg/dL).    Results from last 7 days   Lab Units 10/30/24  0337 10/29/24  0841 10/29/24  0336 10/26/24  0431 10/25/24  0341   MAGNESIUM mg/dL  --   --   --   --  2.1   PHOSPHORUS mg/dL 3.3 3.0 2.8   < >  --     < > = values in this interval not displayed.       Results from last 7 days   Lab Units 10/25/24  0341 10/24/24  0333   URIC ACID mg/dL 6.6 6.0       Results from last 7 days   Lab Units 10/25/24  0341 10/24/24  0333   WBC 10*3/mm3 13.11* 15.64*   HEMOGLOBIN g/dL 11.1* 12.0*   PLATELETS 10*3/mm3 200 199       Results from last 7 days   Lab Units 10/30/24  0337 10/29/24  0459 10/28/24  0502 10/27/24  0329 10/26/24  0431   INR  1.36* 1.22* 1.30* 2.47* 3.18*       Assessment / Plan     ASSESSMENT:  KEL on CKD 2.  Baseline creatinine 1.1-1.3.  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  Acute component due to impaired auto renal regulation with ARB and SGLT2 inhibitor in the setting of hypotension and diuresis.  His creatinine is 1.2 this morning . From a renal standpoint he could be discharged .  Heart failure preserved ejection fraction with mild to moderate aortic stenosis.  Compensated.  COPD and obstructive sleep apnea on CPAP.  Atrial flutter with heart rate controlled on atenolol.  INR subtherapeutic after holding for  EGD.  Dr. Cross addressing.  Diabetes mellitus type 2  Hypothyroid on replacement  Dysphagia.  EGD negative  except oropharyngeal secretions.    PLAN:  Okay with renal for discharge .  [facility]-up with [facility] 11/13 at 12:45 PM.    Thank you for involving us in the care of Riky Moon.  Please feel free to call with any questions.    Maci Flores MD  10/30/24  10:15 EDT    [facility]  [phone]    Please note that portions of this note were completed with a voice recognition program.

## 2024-10-30 NOTE — OUTREACH NOTE
Prep Survey      Flowsheet Row Responses   [facility] patient discharged from? [place]   Is LACE score < 7 ? No   Eligibility [facility]   Date of Admission 10/22/24   Date of Discharge 10/30/24   Discharge diagnosis CHF (congestive heart failure)   Does the patient have one of the following disease processes/diagnoses(primary or secondary)? CHF   General alerts for this patient Return to [facility],   Prep survey completed? Yes            Maki ROBB - Registered Nurse

## 2024-10-30 NOTE — CASE MANAGEMENT/SOCIAL WORK
Case Management Discharge Note      Final Note: Return to [facility] apartment with wife and do onsite PT. Referral for OP ST at  [facility]. Family transport.    Provided Post Acute Provider List?: Yes  Provided Post Acute Provider Quality & Resource List?: Refused  Refused Quality and Resource List Comment: States he wants to go to PT onsite at [facility] and does not want HH or SNF  Delivered To: Patient  Method of Delivery: In person    Selected Continued Care - Discharged on 10/30/2024 Admission date: 10/22/2024 - Discharge disposition: Home or Self Care      Destination    No services have been selected for the patient.                Durable Medical Equipment    No services have been selected for the patient.                Dialysis/Infusion    No services have been selected for the patient.                Home Medical Care    No services have been selected for the patient.                Therapy    No services have been selected for the patient.                Community Resources    No services have been selected for the patient.                Community & DME    No services have been selected for the patient.                    Transportation Services  Private: Car    Final Discharge Disposition Code: 01 - home or self-care

## 2024-10-30 NOTE — DISCHARGE SUMMARY
Patient Name: Riky Moon  : 1940  MRN: [id number]    Date of Admission: 10/22/2024  Date of Discharge:  10/30/2024  Primary Care Physician: Marco Carrillo MD      Chief Complaint:   URI and Urinary Frequency      Discharge Diagnoses     Active Hospital Problems    Diagnosis  POA    CHF (congestive heart failure) [I50.9]  Yes    Atrial flutter with controlled response [I48.92]  Yes    Oropharyngeal dysphagia [R13.12]  Yes    Gastroesophageal reflux disease [K21.9]  Yes    Hypothyroidism [E03.9]  Yes    Diabetes mellitus [E11.9]  Yes    Essential hypertension [I10]  Yes    KINDRA treated with auto BiPAP [G47.33]  Yes    COPD (chronic obstructive pulmonary disease) [J44.9]  Yes    Asthma [J45.909]  Yes      Resolved Hospital Problems    Diagnosis Date Resolved POA    **Shortness of breath [R06.02] 10/30/2024 Yes    Constipation [K59.00] 10/30/2024 Yes        Hospital Course     Mr. Moon is a 84 y.o. male with a history of chronic lymphedema, COPD, atrial flutter, hypothyroidism, type 2 diabetes, essential hypertension, KINDRA, morbid obesity, CKD 2 who presented to [facility] initially complaining of shortness of breath.  Please see the admitting history and physical for further details.  There was initially concern for heart failure exacerbation and so he was admitted for further evaluation and treatment.    He was started on IV diuresis and seen in consultation by cardiology.  An echocardiogram was fairly unremarkable, and there really was not much evidence of heart failure.  He actually developed an KEL with diuresis, and nephrology was consulted and diuretics were discontinued and he was administered IV fluids with resolution of his KEL.  He is since been restarted on diuretics with stability of his kidney function.  He will need to follow-up with nephrology and cardiology as an outpatient.    He shortness of breath was eventually attributed to an acute exacerbation of COPD, and he was  started on nebulizers and steroids with help with pulmonology and resolution of his symptoms.  He did complete his oral prednisone course while hospitalized and is no longer wheezing or short of breath.  I have added a long-acting muscarinic agonist and long-acting beta agonist to his medication regimen at discharge.  He will need to follow-up with pulmonology in the outpatient setting.    While hospitalized, the patient also complained of dysphagia symptoms.  An esophagram showed multiple issues including anterior cervical esophageal web, esophageal dysmotility, acid reflux, and a small sliding hiatal hernia, which prompted a gastroenterology consult.  He subsequently underwent EGD which was unremarkable except for some thickened secretions in the oropharynx.  SLP evaluated, and VFSS was performed and did not show any penetration or aspiration.  No diet modifications were recommended, but outpatient speech therapy was recommended for generalized pharyngeal strengthening.    In order to prepare him for his EGD, his warfarin had to be reversed with vitamin K.  His INR remains subtherapeutic today, and I have discussed with pharmacy who recommends that he go home on warfarin 12 mg today and tomorrow and then to resume his prior warfarin dosage thereafter.    Day of Discharge     Subjective:  No events overnight. No complaints.     Physical Exam:  Temp:  [97.3 °F (36.3 °C)-98.2 °F (36.8 °C)] 97.5 °F (36.4 °C)  Heart Rate:  [51-68] 51  Resp:  [16-20] 18  BP: (104-124)/(48-65) 116/65  Body mass index is 39.15 kg/m².  Physical Exam  Constitutional:       General: He is not in acute distress.     Appearance: He is obese. He is not toxic-appearing.   Cardiovascular:      Rate and Rhythm: Normal rate and regular rhythm.      Heart sounds: Normal heart sounds.   Pulmonary:      Effort: Pulmonary effort is normal. No respiratory distress.      Breath sounds: Normal breath sounds. No wheezing, rhonchi or rales.   Abdominal:       General: Bowel sounds are normal.      Palpations: Abdomen is soft.   Musculoskeletal:         General: No tenderness.      Right lower leg: No edema.      Left lower leg: No edema.   Neurological:      Mental Status: He is alert.   Psychiatric:         Mood and Affect: Mood normal.         Behavior: Behavior normal.         Consultants     Consult Orders (all) (From admission, onward)       Start     Ordered    10/25/24 0848  Inpatient Gastroenterology Consult  Once        Specialty:  Gastroenterology  Provider:  James Robins MD    10/25/24 0847    10/24/24 1351  Inpatient Pulmonology Consult  Once        Specialty:  Pulmonary Disease  Provider:  Christopher Alcantara MD    10/24/24 1351    10/24/24 1033  Inpatient Nephrology Consult  Once        Specialty:  Nephrology  Provider:  Herb Sandoval MD    10/24/24 1034    10/22/24 2244  Inpatient Cardiology Consult  Once        Specialty:  Cardiology  Provider:  Ty Shelton MD    10/22/24 2243                  Procedures     Imaging Results (All)       Procedure Component Value Units Date/Time    FL Video Swallow Single Contrast [[id number]] Collected: 10/24/24 1630     Updated: 10/24/24 1633    Narrative:      VIDEO SWALLOWING EXAMINATION BY SPEECH PATHOLOGY     Clinical: Dysphasia     Video swallowing examination performed under the direction of speech  pathology. Imaging reviewed by radiologist who concurs with the  findings.     Speech pathology summary:   VFSS complete in conjunction with Rachna BARNARD. ?Patient presents with functional/age-appropriate swallow. ?Timely  swallow with adequate airway protection. ?No penetration or aspiration  across trials of thin via cup single and consecutive swallows, pur?e,  mechanical soft, and regular textures. ?No significant pharyngeal  residue status post all trials         Ft 0 min 54 sec ? ? ?4.99 mGy.         This report was finalized on 10/24/2024 4:30 PM by Dr. Rosemary Barrios M.D  on Workstation:  [id number]       FL ESOPHAGRAM DOUBLE CONTRAST [[id number]] Collected: 10/24/24 1556     Updated: 10/24/24 1630    Narrative:      EXAMINATION:  Esophagram Complete Double-Contrast Fluoroscopy.     DATE: 10/24/2024     COMPARISON:  Video swallow examination 10/24/2024 and chest 2 view 10/22/2024.     CLINICAL HISTORY:  84-year-old male with esophageal dysphagia.     DETAILS:  Administration of thin barium, thick barium, and air crystals were  provided to the patient. Rapid sequence video clips and spot films of  the esophagus were obtained. Images were obtained by EVON Marvin.     *  TOTAL FLUOROSCOPY DOSE: DAP: 3456 uGym2  *  TOTAL NUMBER OF FLUOROSCOPIC IMAGES: 241     FINDINGS:     The preliminary  view of the chest shows no evidence for acute  pneumonia or CHF. Can't exclude small pleural effusions.     Esophagus:  The vallecula and pyriform sinuses are unremarkable, with no evidence of  mucosal abnormality or extrinsic mass compression. Serial images of the  cervical esophagus show no laryngeal penetration or aspiration. No  evidence of cervical esophageal diverticula.  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 This does not result in  significant flow limitation and a 12.5 mm diameter barium tablet passed  promptly beyond the area without hesitation.     Normal esophageal distensibility and caliber. Absent primary peristalsis  with intermittent tertiary wave formations observed throughout the  examination. The esophageal mucosa is normal. No evidence of a filling  defect or ulceration. Small sliding hiatal hernia is identified.  Moderate volume of gastroesophageal reflux is seen refluxing to the  upper thoracic esophagus with the patient in the recumbent position.  Smooth short segment narrowing at the gastroesophageal junction. A  12.5mm barium tablet was administered and obstructed at the level of  the  gastroesophageal junction and passed after 23 minutes.     Stomach:  Barium flows readily through the esophagus and into the stomach.          Impression:      1.  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  This does not result in any significant flow limitation.  2.  Esophageal dysmotility demonstrated by absent primary peristalsis  with intermittent tertiary wave formations.  3.  Small sliding hiatal hernia with moderate volume of recumbent  gastroesophageal reflux observed refluxing to the upper thoracic  esophagus at the level of the clavicles.  4.  Smooth short segment narrowing at the gastroesophageal junction  resulting in obstruction of a 12.5 mm diameter barium tablet. The tablet  was observed passing at 23 minutes.  5.  Recommend endoscopy.              This report was finalized on 10/24/2024 4:27 PM by Dr. Rosemary Barrios M.D  on Workstation: [id number]       XR Chest 2 View [[id number]] Collected: 10/22/24 1431     Updated: 10/22/24 1438    Narrative:      XR CHEST 2 VW-     HISTORY: Male who is 84 years-old, cough     TECHNIQUE: Frontal and lateral views of the chest     COMPARISON: 11/10/2023     FINDINGS: Heart size is normal. Aorta is calcified. Pulmonary  vasculature is unremarkable. A generous cardiac fat pad is apparent in  correlation with the CT from 8/1/2022. No focal pulmonary consolidation,  pleural effusion, or pneumothorax. No acute osseous process.       Impression:      No focal pulmonary consolidation. Follow-up as clinical  indications persist.     This report was finalized on 10/22/2024 2:35 PM by Dr. Dipak Dc M.D on Workstation: [id number]               Pertinent Labs     Results from last 7 days   Lab Units 10/25/24  0341 10/24/24  0333   WBC 10*3/mm3 13.11* 15.64*   HEMOGLOBIN g/dL 11.1* 12.0*   PLATELETS 10*3/mm3 200 199     Results from last 7 days   Lab Units 10/30/24  0337  "10/29/24  0841 10/29/24  0336 10/28/24  0502   SODIUM mmol/L 137 139 142 140   POTASSIUM mmol/L 4.3 4.5 5.3* 5.1   CHLORIDE mmol/L 102 102 105 106   CO2 mmol/L 27.7 29.2* 30.5* 28.9   BUN mg/dL 46* 41* 41* 43*   CREATININE mg/dL 1.25 1.38* 1.55* 1.08   GLUCOSE mg/dL 143* 203* 192* 195*   Estimated Creatinine Clearance: 60.5 mL/min (by C-G formula based on SCr of 1.25 mg/dL).  Results from last 7 days   Lab Units 10/30/24  0337 10/29/24  0841 10/29/24  0336 10/28/24  0502 10/26/24  0431 10/25/24  0341   ALBUMIN g/dL 2.8* 3.4* 3.0* 3.3*   < > 3.1*   BILIRUBIN mg/dL  --   --   --   --   --  0.4   ALK PHOS U/L  --   --   --   --   --  44   AST (SGOT) U/L  --   --   --   --   --  23   ALT (SGPT) U/L  --   --   --   --   --  22    < > = values in this interval not displayed.     Results from last 7 days   Lab Units 10/30/24  0337 10/29/24  0841 10/29/24  0336 10/28/24  0502 10/26/24  0431 10/25/24  0341   CALCIUM mg/dL 8.4* 8.9 8.8 9.0   < > 8.2*   ALBUMIN g/dL 2.8* 3.4* 3.0* 3.3*   < > 3.1*   MAGNESIUM mg/dL  --   --   --   --   --  2.1   PHOSPHORUS mg/dL 3.3 3.0 2.8 2.8   < >  --     < > = values in this interval not displayed.       Results from last 7 days   Lab Units 10/23/24  1156   D DIMER QUANT MCGFEU/mL 0.29     Results from last 7 days   Lab Units 10/25/24  0341 10/24/24  1816   SODIUM UR mmol/L  --  21   CREATININE UR mg/dL  --  102.9  104.0   PROTEIN TOTAL URINE mg/dL  --  9.1   URIC ACID mg/dL 6.6  --    PROT/CREAT RATIO UR mg/G Crea  --  87.5         Invalid input(s): \"LDLCALC\"  Results from last 7 days   Lab Units 10/24/24  0742   RESPCX  Scant growth (1+) Normal Respiratory Fina       Test Results Pending at Discharge       Discharge Details        Discharge Medications        New Medications        Instructions Start Date   albuterol sulfate  (90 Base) MCG/ACT inhaler  Commonly known as: PROVENTIL HFA;VENTOLIN HFA;PROAIR HFA   2 puffs, Inhalation, Every 4 Hours PRN      Stiolto Respimat 2.5-2.5 " MCG/ACT aerosol solution inhaler  Generic drug: tiotropium bromide-olodaterol   1 puff, Inhalation, Daily      torsemide 20 MG tablet  Commonly known as: DEMADEX   20 mg, Oral, Daily             Changes to Medications        Instructions Start Date   warfarin 2 MG tablet  Commonly known as: COUMADIN  What changed: You were already taking a medication with the same name, and this prescription was added. Make sure you understand how and when to take each.   12 mg, Oral, Nightly      warfarin 5 MG tablet  Commonly known as: COUMADIN  What changed: These instructions start on November 1, 2024. If you are unsure what to do until then, ask your doctor or other care provider.   Take one tablet (5 mg) by mouth on Monday and Saturdays, and take two tablets (10 mg) by mouth all other days or as directed.   Start Date: November 1, 2024            Continue These Medications        Instructions Start Date   atenolol 25 MG tablet  Commonly known as: TENORMIN   12.5 mg, Oral, Daily      cholecalciferol 25 MCG (1000 UT) tablet  Commonly known as: VITAMIN D3   1,000 Units, Every Morning      DULoxetine 60 MG capsule  Commonly known as: CYMBALTA   60 mg, Oral, Every Morning      glucose blood test strip  Commonly known as: Heidi Contour Test   Use as instructed to test glucose      Jardiance 10 MG tablet tablet  Generic drug: empagliflozin   10 mg, Oral, Daily      metFORMIN 850 MG tablet  Commonly known as: GLUCOPHAGE   850 mg, Oral, 2 Times Daily With Meals      Microlet Lancets misc   Use daily as directed to test glucose      multivitamin tablet  Generic drug: multivitamin   1 tablet, Every Morning      oxybutynin XL 15 MG 24 hr tablet  Commonly known as: DITROPAN XL   15 mg, Nightly      pravastatin 40 MG tablet  Commonly known as: PRAVACHOL   40 mg, Oral, Nightly      pregabalin 150 MG capsule  Commonly known as: LYRICA   150 mg, 2 Times Daily      Synthroid 125 MCG tablet  Generic drug: levothyroxine   125 mcg, Oral, Daily       vitamin B-12 1000 MCG tablet  Commonly known as: CYANOCOBALAMIN   500 mcg, Every Morning      VITAMIN C PO   1 tablet, Daily             Stop These Medications      amLODIPine 5 MG tablet  Commonly known as: NORVASC     furosemide 20 MG tablet  Commonly known as: Lasix     losartan 100 MG tablet  Commonly known as: COZAAR              Allergies   Allergen Reactions    Lisinopril Unknown - High Severity     Other reaction(s): Cough         Discharge Disposition:  Home or Self Care    Discharge Diet:  Diet Order   Procedures    Diet: Regular/House; Texture: Regular (IDDSI 7); Fluid Consistency: Thin (IDDSI 0)       Discharge Activity:       CODE STATUS:    Code Status and Medical Interventions: CPR (Attempt to Resuscitate); Full Support   Ordered at: 10/22/24 1924     Code Status (Patient has no pulse and is not breathing):    CPR (Attempt to Resuscitate)     Medical Interventions (Patient has pulse or is breathing):    Full Support       Future Appointments   Date Time Provider Department Center   11/6/2024 12:45 PM Marco Carrillo MD [facility]   11/22/2024  1:20 PM Roseline Silva APRN [facility]   8/11/2025  1:40 PM Ty Shelton MD [facility]     Additional Instructions for the Follow-ups that You Need to Schedule       Ambulatory Referral to Speech Therapy for Evaluation & Treatment   As directed      Follow-up needed: Yes        Discharge Follow-up with PCP   As directed       Currently Documented PCP:    Marco Carrillo MD    PCP Phone Number:    [phone]     Follow Up Details: 1-2 weeks        Discharge Follow-up with Specialty: cardiology 4-6 weeks or as otherwise directed   As directed      Specialty: cardiology 4-6 weeks or as otherwise directed        Discharge Follow-up with Specialty: nephrology 2-4 weeks or as otherwise directed   As directed      Specialty: nephrology 2-4 weeks or as otherwise directed        Discharge Follow-up with Specialty: pulmonology 2-4  weeks   As directed      Specialty: pulmonology 2-4 weeks               Follow-up Information       Svetlana Mccabe APRN. Go on 11/13/2024.    Specialty: Nurse Practitioner  Why: Appointment time is 12:45 PM.    Has scheduled follow-up with [facility] Svetlana Mccabe  Contact information:  [address]  [address]  [place] [address]  [phone]               Marco Carrillo MD. Go on 11/6/2024.    Specialties: Family Medicine, Urgent Care, Emergency Medicine  Why: Appointment time is 12:45 PM.    Primary Care Physician  1-2 weeks  Contact information:  [address] [facility]  [address]  [place] [address]  [phone]               Katelynn Mitchell MD Follow up.    Specialties: Pulmonary Disease, Sleep Medicine, Intensive Care  Why: 2-4 weeks    Pulmonary Physician  Contact information:  [address] [facility] [address]  [address]  [place] [address]  [phone]               Roseline Silva APRN. Go on 11/22/2024.    Specialties: Cardiology, Nurse Practitioner  Why: Appointment time is 1:20 PM.     Cardiology APRN  Contact information:  [address] [facility]  [address]  [place] [address]  [phone]                             Additional Instructions for the Follow-ups that You Need to Schedule       Ambulatory Referral to Speech Therapy for Evaluation & Treatment   As directed      Follow-up needed: Yes        Discharge Follow-up with PCP   As directed       Currently Documented PCP:    Marco Carrillo MD    PCP Phone Number:    [phone]     Follow Up Details: 1-2 weeks        Discharge Follow-up with Specialty: cardiology 4-6 weeks or as otherwise directed   As directed      Specialty: cardiology 4-6 weeks or as otherwise directed        Discharge Follow-up with Specialty: nephrology 2-4 weeks or as otherwise directed   As directed      Specialty: nephrology 2-4 weeks or as otherwise directed        Discharge Follow-up with Specialty: pulmonology 2-4 weeks   As directed      Specialty: pulmonology  2-4 weeks            Time Spent on Discharge:  Greater than 30 minutes      Vinay Cross MD  [facility]  10/30/24  11:27 EDT

## 2024-10-30 NOTE — PLAN OF CARE
Problem: Adult Inpatient Plan of Care  Goal: Plan of Care Review  Outcome: Progressing  Flowsheets (Taken 10/30/2024 9755)  Progress: improving  Outcome Evaluation: Pt VS stable. On room air when awake. Wore home CPAP when sleeping. Pt had no complaints during shift. Pt safety maintained.

## 2024-10-31 ENCOUNTER — TRANSITIONAL CARE MANAGEMENT TELEPHONE ENCOUNTER (OUTPATIENT)
Dept: CALL CENTER | Facility: HOSPITAL | Age: 84
End: 2024-10-31
Payer: MEDICARE

## 2024-11-04 ENCOUNTER — TELEPHONE (OUTPATIENT)
Dept: PHARMACY | Facility: HOSPITAL | Age: 84
End: 2024-11-04
Payer: MEDICARE

## 2024-11-04 ENCOUNTER — ANTICOAGULATION VISIT (OUTPATIENT)
Dept: PHARMACY | Facility: HOSPITAL | Age: 84
End: 2024-11-04
Payer: MEDICARE

## 2024-11-04 DIAGNOSIS — I48.92 ATRIAL FLUTTER WITH CONTROLLED RESPONSE: Primary | ICD-10-CM

## 2024-11-04 LAB — INR PPP: 3.1

## 2024-11-04 NOTE — TELEPHONE ENCOUNTER
INR Reminder: Left message for patient regarding overdue INR. Last INR on  10/14/24 .   Recent hospital admission--pt was given vitamin K and INR was subtherapeutic on discharge. Following up to get patient to test INR so we can adjust dosing.

## 2024-11-04 NOTE — PROGRESS NOTES
Anticoagulation Clinic Progress Note    Anticoagulation Summary  As of 11/4/2024      INR goal:  2.0-3.0   TTR:  51.5% (2.3 y)   INR used for dosing:  3.10 (11/4/2024)   Warfarin maintenance plan:  5 mg every Mon, Sat; 10 mg all other days   Weekly warfarin total:  60 mg   No change documented:  Olga Granados RPH   Plan last modified:  Olga Granados RPH (7/16/2024)   Next INR check:  11/11/2024   Priority:  Maintenance   Target end date:  --    Indications    Atrial flutter with controlled response [I48.92]                 Anticoagulation Episode Summary       INR check location:  Home Draw    Preferred lab:  --    Send INR reminders to:   [facility]    Comments:  [facility] home stephania          Anticoagulation Care Providers       Provider Role Specialty Phone number    Ty Shelton MD Referring Cardiology [phone]            Clinic Interview:  Patient Findings     Positives:  Change in medications, Hospital admission    Negatives:  Signs/symptoms of thrombosis, Signs/symptoms of bleeding,   Laboratory test error suspected, Change in health, Change in alcohol use,   Change in activity, Upcoming invasive procedure, Emergency department   visit, Upcoming dental procedure, Missed doses, Extra doses, Change in   diet/appetite, Bruising, Other complaints      Clinical Outcomes     Negatives:  Major bleeding event, Thromboembolic event,   Anticoagulation-related hospital admission, Anticoagulation-related ED   visit, Anticoagulation-related fatality        INR History:      10/26/2024     4:31 AM 10/27/2024     3:29 AM 10/28/2024     5:02 AM 10/29/2024     4:59 AM 10/30/2024     3:37 AM 11/4/2024    12:00 AM 11/4/2024     1:14 PM   Anticoagulation Monitoring   INR       3.10   INR Date       11/4/2024   INR Goal       2.0-3.0   Trend       Same   Last Week Total       73 mg   Next Week Total       60 mg   Sun       10 mg   Mon       5 mg   Tue       10 mg   Wed       10 mg   Thu       10 mg    Fri       10 mg   Sat       5 mg   Historical INR 3.18  2.47  1.30  1.22  1.36  3.10            This result is from an external source.       Plan:  1. INR is Supratherapeutic today- see above in Anticoagulation Summary.   Will instruct Riky BARBER Moon to resume their warfarin regimen- see above in Anticoagulation Summary.      Admitted 10/22-11/30 for SOB, Underwent EGD and ultimately received vitamin K in order to be able to have the surgery. 12 mg x 4 days was given and then patient resumed his regular dose, continue, rck 1 week   2. Follow up in 1 weeks  3. They have been instructed to call if any changes in medications, doses, concerns, etc. Patient expresses understanding and has no further questions at this time.    Olga Granados [facility]

## 2024-11-06 ENCOUNTER — OFFICE VISIT (OUTPATIENT)
Dept: INTERNAL MEDICINE | Facility: CLINIC | Age: 84
End: 2024-11-06
Payer: MEDICARE

## 2024-11-06 VITALS
BODY MASS INDEX: 40.77 KG/M2 | SYSTOLIC BLOOD PRESSURE: 116 MMHG | TEMPERATURE: 96.5 F | HEART RATE: 63 BPM | DIASTOLIC BLOOD PRESSURE: 78 MMHG | OXYGEN SATURATION: 98 % | HEIGHT: 72 IN | RESPIRATION RATE: 18 BRPM | WEIGHT: 301 LBS

## 2024-11-06 DIAGNOSIS — J44.1 COPD WITH EXACERBATION: Primary | ICD-10-CM

## 2024-11-06 DIAGNOSIS — N39.0 ACUTE UTI: ICD-10-CM

## 2024-11-06 DIAGNOSIS — E87.1 LOW SODIUM LEVELS: ICD-10-CM

## 2024-11-06 DIAGNOSIS — I50.9 CONGESTIVE HEART FAILURE, UNSPECIFIED HF CHRONICITY, UNSPECIFIED HEART FAILURE TYPE: ICD-10-CM

## 2024-11-06 DIAGNOSIS — E87.1 HYPONATREMIA: ICD-10-CM

## 2024-11-06 DIAGNOSIS — R13.12 OROPHARYNGEAL DYSPHAGIA: ICD-10-CM

## 2024-11-06 DIAGNOSIS — R32 URINARY INCONTINENCE, UNSPECIFIED TYPE: ICD-10-CM

## 2024-11-06 DIAGNOSIS — R53.81 DEBILITY: ICD-10-CM

## 2024-11-06 DIAGNOSIS — I10 ESSENTIAL HYPERTENSION: ICD-10-CM

## 2024-11-06 RX ORDER — TIOTROPIUM BROMIDE AND OLODATEROL 3.124; 2.736 UG/1; UG/1
2 SPRAY, METERED RESPIRATORY (INHALATION) DAILY
Qty: 4 G | Refills: 3 | Status: SHIPPED | OUTPATIENT
Start: 2024-11-06

## 2024-11-06 RX ORDER — ALBUTEROL SULFATE 90 UG/1
2 INHALANT RESPIRATORY (INHALATION) EVERY 4 HOURS PRN
Qty: 18 G | Refills: 6 | Status: SHIPPED | OUTPATIENT
Start: 2024-11-06

## 2024-11-06 RX ORDER — TORSEMIDE 20 MG/1
20 TABLET ORAL DAILY
Qty: 30 TABLET | Refills: 4 | Status: SHIPPED | OUTPATIENT
Start: 2024-11-06 | End: 2025-04-05

## 2024-11-06 RX ORDER — OXYBUTYNIN CHLORIDE 15 MG/1
15 TABLET, EXTENDED RELEASE ORAL NIGHTLY
Qty: 30 TABLET | Refills: 6 | Status: SHIPPED | OUTPATIENT
Start: 2024-11-06

## 2024-11-06 NOTE — PROGRESS NOTES
"Transitional Care Follow Up Visit  Subjective     Rkiy Moon is a 84 y.o. male who presents for a transitional care management visit.    Within 48 business hours after discharge our office contacted him via telephone to coordinate his care and needs.      I reviewed and discussed the details of that call along with the discharge summary, hospital problems, inpatient lab results, inpatient diagnostic studies, and consultation reports with Riky.     Current outpatient and discharge medications have been reconciled for the patient.        10/30/2024     4:49 PM   Date of TCM Phone Call   [facility]   Date of Admission 10/22/2024   Date of Discharge 10/30/2024     Risk for Readmission (LACE) Score: 13 (10/30/2024  6:00 AM)      History of Present Illness   Course During Hospital Stay:       \"Mr. Moon is a 84 y.o. male with a history of chronic lymphedema, COPD, atrial flutter, hypothyroidism, type 2 diabetes, essential hypertension, KINDRA, morbid obesity, CKD 2 who presented to [facility] initially complaining of shortness of breath.  Please see the admitting history and physical for further details.  There was initially concern for heart failure exacerbation and so he was admitted for further evaluation and treatment.     He was started on IV diuresis and seen in consultation by cardiology.  An echocardiogram was fairly unremarkable, and there really was not much evidence of heart failure.  He actually developed an KEL with diuresis, and nephrology was consulted and diuretics were discontinued and he was administered IV fluids with resolution of his KEL.  He is since been restarted on diuretics with stability of his kidney function.  He will need to follow-up with nephrology and cardiology as an outpatient.     He shortness of breath was eventually attributed to an acute exacerbation of COPD, and he was started on nebulizers and steroids with help with pulmonology and " "resolution of his symptoms.  He did complete his oral prednisone course while hospitalized and is no longer wheezing or short of breath.  I have added a long-acting muscarinic agonist and long-acting beta agonist to his medication regimen at discharge.  He will need to follow-up with pulmonology in the outpatient setting.     While hospitalized, the patient also complained of dysphagia symptoms.  An esophagram showed multiple issues including anterior cervical esophageal web, esophageal dysmotility, acid reflux, and a small sliding hiatal hernia, which prompted a gastroenterology consult.  He subsequently underwent EGD which was unremarkable except for some thickened secretions in the oropharynx.  SLP evaluated, and VFSS was performed and did not show any penetration or aspiration.  No diet modifications were recommended, but outpatient speech therapy was recommended for generalized pharyngeal strengthening.     In order to prepare him for his EGD, his warfarin had to be reversed with vitamin K.  His INR remains subtherapeutic today, and I have discussed with pharmacy who recommends that he go home on warfarin 12 mg today and tomorrow and then to resume his prior warfarin dosage thereafter.\"    He reports feeling weak and has difficulty with mobility, such as getting up, down, and into a car. He was recently discharged from the hospital on 10/30/2024. His voice, while improving, remains slightly hoarse. He also experiences frequent choking. A swallow test was conducted, which did not reveal any abnormalities. He was advised to undergo speech therapy, but he declined due to his preference for physical therapy at [facility], where he resides. He is interested in receiving both physical and speech therapy at [place], if possible.    He was informed that his shortness of breath was due to COPD. He was prescribed nebulizers and steroids, which seemed to alleviate his symptoms. However, he still experiences " breathlessness during activities such as walking. He was given two inhalers, one for daily use and one for use as needed. He has an albuterol inhaler at home.    He was switched from furosemide to torsemide, but he is unsure if this is a temporary or permanent change. His losartan and amlodipine were discontinued. He is curious about the reason for his recent hospitalization, as his cardiologist did not identify any cardiac issues.    He was diagnosed with a bladder infection at an [facility] care center and was prescribed antibiotics. However, a hospital doctor later informed him that he did not have a bladder infection. He does not recall providing a urine sample. He was taken off oxybutynin prior to his hospital stay, but it was reintroduced during his hospitalization. He resumed taking the oxybutynin he had at home. He believes he was advised to stop taking it by a urologist, but he is uncertain if he needs it.    The following portions of the patient's history were reviewed and updated as appropriate: allergies, current medications, past family history, past medical history, past social history, past surgical history, and problem list.    Current outpatient and discharge medications have been reconciled for the patient.  Reviewed by: Marco Carrillo MD      Review of Systems    Objective   Physical Exam  Vitals and nursing note reviewed.   Constitutional:       Appearance: He is well-developed.   HENT:      Head: Normocephalic and atraumatic.   Musculoskeletal:      Cervical back: Normal range of motion and neck supple.   Neurological:      Mental Status: He is alert and oriented to person, place, and time.   Psychiatric:         Behavior: Behavior normal.         Assessment & Plan   Diagnoses and all orders for this visit:    1. COPD with exacerbation (Primary)  -     tiotropium bromide-olodaterol (Stiolto Respimat) 2.5-2.5 MCG/ACT aerosol solution inhaler; Inhale 2 puffs Daily.  Dispense: 4 g; Refill: 3  -      albuterol sulfate  (90 Base) MCG/ACT inhaler; Inhale 2 puffs Every 4 (Four) Hours As Needed for Wheezing.  Dispense: 18 g; Refill: 6    2. Congestive heart failure, unspecified HF chronicity, unspecified heart failure type  -     CBC & Differential  -     Comprehensive Metabolic Panel    3. Acute UTI  -     Urinalysis With Microscopic - Urine, Clean Catch    4. Low sodium levels    5. Urinary incontinence, unspecified type  -     oxybutynin XL (DITROPAN XL) 15 MG 24 hr tablet; Take 1 tablet by mouth Every Night.  Dispense: 30 tablet; Refill: 6    6. Essential hypertension    7. Debility  -     Ambulatory Referral to Physical Therapy for Evaluation & Treatment    8. Oropharyngeal dysphagia  -     Ambulatory Referral to Speech Therapy for Evaluation & Treatment    9. Hyponatremia    Other orders  -     torsemide (DEMADEX) 20 MG tablet; Take 1 tablet by mouth Daily for 150 days.  Dispense: 30 tablet; Refill: 4  -     Microscopic Examination -    1. Choking.  A referral for speech therapy will be made. He is advised to inquire about the availability of physical therapy at [facility] and inform if an order is required. He is also advised to check if [facility] can provide speech therapy services.     2. Chronic Obstructive Pulmonary Disease (COPD).  A prescription for Stiolto, 2 puffs daily, will be provided. Additionally, a refill for the albuterol inhaler will be given. He reports that nebulizers and steroids helped with his shortness of breath, but he still experiences breathlessness with exertion.     3. Hypertension.  His blood pressure readings are within normal range today (116/78). A refill for torsemide will be provided. The decision to continue this medication will be left to the discretion of the cardiologist or nephrologist. He is advised to refrain from taking losartan and amlodipine for the time being. Blood work will be ordered to monitor his condition.     4. Urinary Tract Infection.  A urine  test will be conducted today. If the results indicate an infection, appropriate treatment will be initiated. He is advised to continue taking oxybutynin 15 mg nightly for incontinence.     5. Health Maintenance.  He is advised to receive the Prevnar 20, RSV, and Shingrix vaccines at his local pharmacy. He received the flu shot before leaving the hospital.           Dictated utilizing Dragon Voice Recognition Software

## 2024-11-07 LAB
ALBUMIN SERPL-MCNC: 4 G/DL (ref 3.7–4.7)
ALP SERPL-CCNC: 52 IU/L (ref 44–121)
ALT SERPL-CCNC: 22 IU/L (ref 0–44)
APPEARANCE UR: CLEAR
AST SERPL-CCNC: 25 IU/L (ref 0–40)
BACTERIA #/AREA URNS HPF: ABNORMAL /[HPF]
BASOPHILS # BLD AUTO: 0.1 X10E3/UL (ref 0–0.2)
BASOPHILS NFR BLD AUTO: 1 %
BILIRUB SERPL-MCNC: 0.6 MG/DL (ref 0–1.2)
BILIRUB UR QL STRIP: NEGATIVE
BUN SERPL-MCNC: 33 MG/DL (ref 8–27)
BUN/CREAT SERPL: 22 (ref 10–24)
CALCIUM SERPL-MCNC: 9 MG/DL (ref 8.6–10.2)
CASTS URNS QL MICRO: ABNORMAL /LPF
CHLORIDE SERPL-SCNC: 100 MMOL/L (ref 96–106)
CO2 SERPL-SCNC: 22 MMOL/L (ref 20–29)
COLOR UR: YELLOW
CREAT SERPL-MCNC: 1.48 MG/DL (ref 0.76–1.27)
EGFRCR SERPLBLD CKD-EPI 2021: 46 ML/MIN/1.73
EOSINOPHIL # BLD AUTO: 0.2 X10E3/UL (ref 0–0.4)
EOSINOPHIL NFR BLD AUTO: 2 %
EPI CELLS #/AREA URNS HPF: ABNORMAL /HPF (ref 0–10)
ERYTHROCYTE [DISTWIDTH] IN BLOOD BY AUTOMATED COUNT: 15.6 % (ref 11.6–15.4)
GLOBULIN SER CALC-MCNC: 2.6 G/DL (ref 1.5–4.5)
GLUCOSE SERPL-MCNC: 145 MG/DL (ref 70–99)
GLUCOSE UR QL STRIP: ABNORMAL
HCT VFR BLD AUTO: 37.1 % (ref 37.5–51)
HGB BLD-MCNC: 12.8 G/DL (ref 13–17.7)
HGB UR QL STRIP: NEGATIVE
IMM GRANULOCYTES # BLD AUTO: 0 X10E3/UL (ref 0–0.1)
IMM GRANULOCYTES NFR BLD AUTO: 0 %
KETONES UR QL STRIP: NEGATIVE
LEUKOCYTE ESTERASE UR QL STRIP: ABNORMAL
LYMPHOCYTES # BLD AUTO: 1.9 X10E3/UL (ref 0.7–3.1)
LYMPHOCYTES NFR BLD AUTO: 16 %
MCH RBC QN AUTO: 39.1 PG (ref 26.6–33)
MCHC RBC AUTO-ENTMCNC: 34.5 G/DL (ref 31.5–35.7)
MCV RBC AUTO: 114 FL (ref 79–97)
MICRO URNS: ABNORMAL
MONOCYTES # BLD AUTO: 1.1 X10E3/UL (ref 0.1–0.9)
MONOCYTES NFR BLD AUTO: 10 %
NEUTROPHILS # BLD AUTO: 8.1 X10E3/UL (ref 1.4–7)
NEUTROPHILS NFR BLD AUTO: 71 %
NITRITE UR QL STRIP: NEGATIVE
PH UR STRIP: 5.5 [PH] (ref 5–7.5)
PLATELET # BLD AUTO: 180 X10E3/UL (ref 150–450)
POTASSIUM SERPL-SCNC: 4.3 MMOL/L (ref 3.5–5.2)
PROT SERPL-MCNC: 6.6 G/DL (ref 6–8.5)
PROT UR QL STRIP: ABNORMAL
RBC # BLD AUTO: 3.27 X10E6/UL (ref 4.14–5.8)
RBC #/AREA URNS HPF: ABNORMAL /HPF (ref 0–2)
SODIUM SERPL-SCNC: 143 MMOL/L (ref 134–144)
SP GR UR STRIP: 1.02 (ref 1–1.03)
UROBILINOGEN UR STRIP-MCNC: 1 MG/DL (ref 0.2–1)
WBC # BLD AUTO: 11.5 X10E3/UL (ref 3.4–10.8)
WBC #/AREA URNS HPF: ABNORMAL /HPF (ref 0–5)

## 2024-11-09 NOTE — PROGRESS NOTES
"Chief Complaint  Hospital Follow Up Visit    Subjective     {Problem List  Visit Diagnosis   Encounters  Notes  Medications  Labs  Result Review Imaging  Media :23}     Riky Moon presents to [facility]  History of Present Illness  The patient is an 84-year-old male who presents for evaluation of multiple medical concerns. He is accompanied by his son.        IMMUNIZATIONS  He received influenza vaccine before he left the hospital. He had shingles vaccine a long time ago.    Objective   Vital Signs:   /78   Pulse 63   Temp 96.5 °F (35.8 °C)   Resp 18   Ht 182.9 cm (72.01\")   Wt (!) 137 kg (301 lb)   SpO2 98%   BMI 40.81 kg/m²     Physical Exam    Physical Exam  Vital Signs  Blood pressure reading today is 116/78.     Result Review :{Labs  Result Review  Imaging  Med Tab  Media  Procedures :23}   {The following data was reviewed by (Optional):04903}  {Ambulatory Labs (Optional):52578}  {Data reviewed (Optional):07022:::1}          Assessment and Plan {CC Problem List  Visit Diagnosis   ROS  Review (Popup)  Health Maintenance  Quality  BestPractice  Medications  SmartSets  SnapShot Encounters  Media :23}   Diagnoses and all orders for this visit:    1. COPD with exacerbation (Primary)  -     tiotropium bromide-olodaterol (Stiolto Respimat) 2.5-2.5 MCG/ACT aerosol solution inhaler; Inhale 2 puffs Daily.  Dispense: 4 g; Refill: 3  -     albuterol sulfate  (90 Base) MCG/ACT inhaler; Inhale 2 puffs Every 4 (Four) Hours As Needed for Wheezing.  Dispense: 18 g; Refill: 6    2. Congestive heart failure, unspecified HF chronicity, unspecified heart failure type  -     CBC & Differential  -     Comprehensive Metabolic Panel    3. Acute UTI  -     Urinalysis With Microscopic - Urine, Clean Catch    4. Low sodium levels    5. Urinary incontinence, unspecified type  -     oxybutynin XL (DITROPAN XL) 15 MG 24 hr tablet; Take 1 tablet by mouth Every " Night.  Dispense: 30 tablet; Refill: 6    6. Essential hypertension    7. Debility  -     Ambulatory Referral to Physical Therapy for Evaluation & Treatment    8. Oropharyngeal dysphagia  -     Ambulatory Referral to Speech Therapy for Evaluation & Treatment    9. Hyponatremia    Other orders  -     torsemide (DEMADEX) 20 MG tablet; Take 1 tablet by mouth Daily for 150 days.  Dispense: 30 tablet; Refill: 4  -     Microscopic Examination -      Assessment & Plan  1. Choking.  A referral for speech therapy will be made. He is advised to inquire about the availability of physical therapy at [facility] and inform if an order is required. He is also advised to check if [facility] can provide speech therapy services.    2. Chronic Obstructive Pulmonary Disease (COPD).  A prescription for Stiolto, 2 puffs daily, will be provided. Additionally, a refill for the albuterol inhaler will be given. He reports that nebulizers and steroids helped with his shortness of breath, but he still experiences breathlessness with exertion.    3. Hypertension.  His blood pressure readings are within normal range today (116/78). A refill for torsemide will be provided. The decision to continue this medication will be left to the discretion of the cardiologist or nephrologist. He is advised to refrain from taking losartan and amlodipine for the time being. Blood work will be ordered to monitor his condition.    4. Urinary Tract Infection.  A urine test will be conducted today. If the results indicate an infection, appropriate treatment will be initiated. He is advised to continue taking oxybutynin 15 mg nightly for incontinence.    5. Health Maintenance.  He is advised to receive the Prevnar 20, RSV, and Shingrix vaccines at his local pharmacy. He received the flu shot before leaving the hospital.    Follow-up  Return in 1 month for follow up.  {Time Spent (Optional):93694}  Follow Up {Instructions Charge Capture  Follow-up Communications  :23}  No follow-ups on file.  Patient was given instructions and counseling regarding his condition or for health maintenance advice. Please see specific information pulled into the AVS if appropriate.           {CORNEL[employer] Provider Statement:47752}

## 2024-11-21 ENCOUNTER — ANTICOAGULATION VISIT (OUTPATIENT)
Dept: PHARMACY | Facility: HOSPITAL | Age: 84
End: 2024-11-21
Payer: MEDICARE

## 2024-11-21 DIAGNOSIS — I48.92 ATRIAL FLUTTER WITH CONTROLLED RESPONSE: Primary | ICD-10-CM

## 2024-11-21 LAB — INR PPP: 5.7

## 2024-11-21 NOTE — PROGRESS NOTES
Anticoagulation Clinic Progress Note    Anticoagulation Summary  As of 2024      INR goal:  2.0-3.0   TTR:  50.5% (2.4 y)   INR used for dosin.70 (2024)   Warfarin maintenance plan:  5 mg every Mon, Sat; 10 mg all other days   Weekly warfarin total:  60 mg   Plan last modified:  Olga Granados RPH (2024)   Next INR check:  2024   Priority:  Maintenance   Target end date:  --    Indications    Atrial flutter with controlled response [I48.92]                 Anticoagulation Episode Summary       INR check location:  Home Draw    Preferred lab:  --    Send INR reminders to:  [facility]    Comments:  [facility] home stephania          Anticoagulation Care Providers       Provider Role Specialty Phone number    Ty Shelton MD Referring Cardiology [phone]            Clinic Interview:  Patient Findings     Positives:  Hospital admission    Negatives:  Signs/symptoms of thrombosis, Signs/symptoms of bleeding,   Laboratory test error suspected, Change in health, Change in alcohol use,   Change in activity, Upcoming invasive procedure, Emergency department   visit, Upcoming dental procedure, Missed doses, Extra doses, Change in   medications, Change in diet/appetite, Bruising, Other complaints      Clinical Outcomes     Negatives:  Major bleeding event, Thromboembolic event,   Anticoagulation-related hospital admission, Anticoagulation-related ED   visit, Anticoagulation-related fatality        INR History:      10/28/2024     5:02 AM 10/29/2024     4:59 AM 10/30/2024     3:37 AM 2024    12:00 AM 2024     1:14 PM 2024    12:00 AM 2024     2:43 PM   Anticoagulation Monitoring   INR     3.10  5.70   INR Date     2024   INR Goal     2.0-3.0  2.0-3.0   Trend     Same  Same   Last Week Total     73 mg  60 mg   Next Week Total     60 mg  40 mg   Sun     10 mg  10 mg   Mon     5 mg  -   Tue     10 mg  -   Wed     10 mg  -   Thu     10 mg  Hold  (11/21)   Fri     10 mg  Hold (11/22)   Sat     5 mg  5 mg   Historical INR 1.30  1.22  1.36  3.10      5.70            This result is from an external source.       Plan:  1. INR is Supratherapeutic today- see above in Anticoagulation Summary.   Will instruct Riky Moon to Change their warfarin regimen- see above in Anticoagulation Summary.      No changes since admission. He received IV diuresis while admitted and developed an KEL. Potentially may be causing the elevation in the INR. Hold x 2 days and recheck on Monday.   2. Follow up on Monday   3. They have been instructed to call if any changes in medications, doses, concerns, etc. Patient expresses understanding and has no further questions at this time.    Olga Granados [facility]

## 2024-11-25 ENCOUNTER — ANTICOAGULATION VISIT (OUTPATIENT)
Dept: PHARMACY | Facility: HOSPITAL | Age: 84
End: 2024-11-25
Payer: MEDICARE

## 2024-11-25 DIAGNOSIS — I48.92 ATRIAL FLUTTER WITH CONTROLLED RESPONSE: Primary | ICD-10-CM

## 2024-11-25 LAB — INR PPP: 1.7

## 2024-11-25 NOTE — PROGRESS NOTES
Anticoagulation Clinic Progress Note    Anticoagulation Summary  As of 2024      INR goal:  2.0-3.0   TTR:  50.4% (2.4 y)   INR used for dosin.70 (2024)   Warfarin maintenance plan:  5 mg every Mon, Wed, Sat; 10 mg all other days   Weekly warfarin total:  55 mg   Plan last modified:  Olga Granados RPH (2024)   Next INR check:  2024   Priority:  Maintenance   Target end date:  --    Indications    Atrial flutter with controlled response [I48.92]                 Anticoagulation Episode Summary       INR check location:  Home Draw    Preferred lab:  --    Send INR reminders to:   [facility]    Comments:  [facility] home stephania          Anticoagulation Care Providers       Provider Role Specialty Phone number    Ty Shelton MD Referring Cardiology [phone]            Clinic Interview:  Patient Findings     Negatives:  Signs/symptoms of thrombosis, Signs/symptoms of bleeding,   Laboratory test error suspected, Change in health, Change in alcohol use,   Change in activity, Upcoming invasive procedure, Emergency department   visit, Upcoming dental procedure, Missed doses, Extra doses, Change in   medications, Change in diet/appetite, Hospital admission, Bruising, Other   complaints      Clinical Outcomes     Negatives:  Major bleeding event, Thromboembolic event,   Anticoagulation-related hospital admission, Anticoagulation-related ED   visit, Anticoagulation-related fatality        INR History:      10/30/2024     3:37 AM 2024    12:00 AM 2024     1:14 PM 2024    12:00 AM 2024     2:43 PM 2024    12:00 AM 2024     1:57 PM   Anticoagulation Monitoring   INR   3.10  5.70  1.70   INR Date   2024   INR Goal   2.0-3.0  2.0-3.0  2.0-3.0   Trend   Same  Same  Down   Last Week Total   73 mg  60 mg  40 mg   Next Week Total   60 mg  40 mg  55 mg   Sun   10 mg  10 mg  10 mg   Mon   5 mg  -  5 mg   Tue   10 mg  -  10  mg   Wed   10 mg  -  5 mg   Thu   10 mg  Hold (11/21)  10 mg   Fri   10 mg  Hold (11/22)  10 mg   Sat   5 mg  5 mg  5 mg   Historical INR 1.36  3.10      5.70      1.70            This result is from an external source.       Plan:  1. INR is Subtherapeutic today- see above in Anticoagulation Summary.   Will instruct Riky ELISABETH Moon to change their warfarin regimen- see above in Anticoagulation Summary.  Trial on 5 mg mon, wed, sat and 10 mg AOD, rck 1 week   2. Follow up in 1 weeks  3. They have been instructed to call if any changes in medications, doses, concerns, etc. Patient expresses understanding and has no further questions at this time.    Olga Granados RP

## 2024-12-03 ENCOUNTER — OFFICE VISIT (OUTPATIENT)
Dept: CARDIOLOGY | Facility: CLINIC | Age: 84
End: 2024-12-03
Payer: MEDICARE

## 2024-12-03 VITALS
SYSTOLIC BLOOD PRESSURE: 140 MMHG | HEIGHT: 72 IN | BODY MASS INDEX: 40.77 KG/M2 | OXYGEN SATURATION: 97 % | HEART RATE: 76 BPM | WEIGHT: 301 LBS | DIASTOLIC BLOOD PRESSURE: 70 MMHG

## 2024-12-03 DIAGNOSIS — I10 ESSENTIAL HYPERTENSION: ICD-10-CM

## 2024-12-03 DIAGNOSIS — I50.9 CONGESTIVE HEART FAILURE, UNSPECIFIED HF CHRONICITY, UNSPECIFIED HEART FAILURE TYPE: Primary | ICD-10-CM

## 2024-12-03 DIAGNOSIS — E78.5 HYPERLIPIDEMIA, UNSPECIFIED HYPERLIPIDEMIA TYPE: ICD-10-CM

## 2024-12-03 RX ORDER — TORSEMIDE 20 MG/1
20 TABLET ORAL DAILY PRN
Start: 2024-12-03 | End: 2025-05-02

## 2024-12-03 NOTE — ASSESSMENT & PLAN NOTE
Patient with recent hospitalization with concern for diastolic heart failure exacerbation, ultimately ended up being a COPD exacerbation.  Patient only taking torsemide as needed as it causes a dry mouth when he takes this daily.  Patient has followed with renal and renal function has been stable  He will follow-up with pulmonology next week and PCP tomorrow.

## 2024-12-03 NOTE — ASSESSMENT & PLAN NOTE
BP has been controlled at home although slightly elevated in clinic today  Antihypertensive regimen as follows:  Atenolol 25 mg every other day, patient was taking 12.5 mg/day but has difficulty with splitting the medication.  Torsemide 20 mg as needed

## 2024-12-03 NOTE — PROGRESS NOTES
CARDIOLOGY        Patient Name: Riky Moon  :1940  Age: 84 y.o.  Primary Cardiologist: Ty Shelton MD  Encounter Provider:  EVON Oneill    Date of Service: 24      CHIEF COMPLAINT / REASON FOR OFFICE VISIT     Hospital Follow Up Visit (COPD, dCHF)      HISTORY OF PRESENT ILLNESS       Atrial Fibrillation  Past medical history includes atrial fibrillation.   Hypertension  Associated symptoms include malaise/fatigue.   Primary Care Follow-Up  Conditions present:  DepressionPertinent negatives include no weight gain, no weight loss, no myalgias, no wheezing, no cough and is not nervous/anxious.     Riky Moon is a 84 y.o. male who presents today for hospital follow-up.    Pt has a  history significant for hypertension, hyperlipidemia, KINDRA on BiPAP, diabetes.    Medical record review reveals patient was hospitalized 10/22 - 10/30/2024.  Was hospitalized for concern for heart failure exacerbation.  Patient was evaluated by our service and was given IV diuretics.  Echocardiogram fairly unremarkable.  Patient developed KEL with diuresis and nephrology was consulted, diuretics were discontinued.  Dyspnea was ultimately thought to be exacerbation of COPD.  He was placed on nebulizers and steroid treatments.    Patient reports that he has done well since hospitalization.  He notes that he is only taking diuretic as needed, he notes that he feels that his mouth is dry.  He did take a dose over the weekend.  He notes that his still has some residual shortness of breath, but inhaler is helping.  He has only needed his rescue inhaler once.  He has followed up with renal and has an upcoming appointment with Dr. Alcantara.  He has chronic but stable pedal edema.     The following portions of the patient's history were reviewed and updated as appropriate: allergies, current medications, past family history, past medical history, past social history, past surgical history and problem  "list.      VITAL SIGNS     Visit Vitals  /70 (BP Location: Right arm, Patient Position: Sitting, Cuff Size: Adult)   Pulse 76   Ht 182.9 cm (72\")   Wt (!) 137 kg (301 lb)   SpO2 97%   BMI 40.82 kg/m²         Wt Readings from Last 3 Encounters:   12/03/24 (!) 137 kg (301 lb)   11/06/24 (!) 137 kg (301 lb)   10/30/24 131 kg (288 lb 11.2 oz)     Body mass index is 40.82 kg/m².      REVIEW OF SYSTEMS   Review of Systems   Constitutional: Positive for malaise/fatigue. Negative for chills, fever, weight gain and weight loss.   Cardiovascular:  Positive for dyspnea on exertion and leg swelling.   Respiratory:  Negative for cough, snoring and wheezing.    Hematologic/Lymphatic: Negative for bleeding problem. Does not bruise/bleed easily.   Skin:  Negative for color change.   Musculoskeletal:  Negative for falls, joint pain and myalgias.   Gastrointestinal:  Negative for melena.   Genitourinary:  Negative for hematuria.   Neurological:  Negative for excessive daytime sleepiness.   Psychiatric/Behavioral:  Negative for depression. The patient is not nervous/anxious.            PHYSICAL EXAMINATION     Constitutional:       Appearance: Normal appearance. Well-developed.   Eyes:      Conjunctiva/sclera: Conjunctivae normal.   Neck:      Vascular: No carotid bruit.   Pulmonary:      Effort: Pulmonary effort is normal.      Breath sounds: Normal breath sounds.   Cardiovascular:      Normal rate. Regular rhythm. Normal S1. Normal S2.       Murmurs: There is no murmur.      No gallop.  No click. No rub.   Edema:     Peripheral edema present.     Pretibial: bilateral 1+ edema of the pretibial area.     Ankle: bilateral 1+ edema of the ankle.     Feet: bilateral 1+ edema of the feet.  Musculoskeletal: Normal range of motion. Skin:     General: Skin is warm and dry.   Neurological:      Mental Status: Alert and oriented to person, place, and time.      GCS: GCS eye subscore is 4. GCS verbal subscore is 5. GCS motor subscore is " 6.   Psychiatric:         Speech: Speech normal.         Behavior: Behavior normal.         Thought Content: Thought content normal.         Judgment: Judgment normal.           REVIEWED DATA     Procedures    Cardiac Procedures:  Myocardial perfusion PET stress test 9/11/2017.  Normal myocardial perfusion study without evidence of ischemia.  Impressions consistent with low risk study.  Echocardiogram 3/12/2019.  LVEF 61%.  Grade 1 diastolic dysfunction.  Calculated RVSP 36 mmHg.  Echocardiogram 10/23/2024.  LVEF 54%.  Moderate aortic valve stenosis with mean pressure gradient 22 mmHg.  Estimated RVSP less than 35 mmHg.  Moderate to severe biatrial enlargement.    Lipid Panel          2/27/2024    00:00 7/1/2024    13:57   Lipid Panel   Total Cholesterol 125  126    Triglycerides 119  126    HDL Cholesterol 37  39    VLDL Cholesterol 22  23    LDL Cholesterol  66  64    LDL/HDL Ratio 1.8  1.6      BUN   Date Value Ref Range Status   11/06/2024 33 (H) 8 - 27 mg/dL Final   10/30/2024 46 (H) 8 - 23 mg/dL Final     Creatinine   Date Value Ref Range Status   11/06/2024 1.48 (H) 0.76 - 1.27 mg/dL Final   10/30/2024 1.25 0.76 - 1.27 mg/dL Final     Potassium   Date Value Ref Range Status   11/06/2024 4.3 3.5 - 5.2 mmol/L Final   10/30/2024 4.3 3.5 - 5.2 mmol/L Final     Comment:     Slight hemolysis detected by analyzer. Result may be falsely elevated.     ALT (SGPT)   Date Value Ref Range Status   11/06/2024 22 0 - 44 IU/L Final   10/25/2024 22 1 - 41 U/L Final     AST (SGOT)   Date Value Ref Range Status   11/06/2024 25 0 - 40 IU/L Final   10/25/2024 23 1 - 40 U/L Final           ASSESSMENT & PLAN     Diagnoses and all orders for this visit:    1. Congestive heart failure, unspecified HF chronicity, unspecified heart failure type (Primary)  Assessment & Plan:  Patient with recent hospitalization with concern for diastolic heart failure exacerbation, ultimately ended up being a COPD exacerbation.  Patient only taking  torsemide as needed as it causes a dry mouth when he takes this daily.  Patient has followed with renal and renal function has been stable  He will follow-up with pulmonology next week and PCP tomorrow.          2. Essential hypertension  Assessment & Plan:  BP has been controlled at home although slightly elevated in clinic today  Antihypertensive regimen as follows:  Atenolol 25 mg every other day, patient was taking 12.5 mg/day but has difficulty with splitting the medication.  Torsemide 20 mg as needed      3. Hyperlipidemia, unspecified hyperlipidemia type  Assessment & Plan:  Continue pravastatin      Other orders  -     torsemide (DEMADEX) 20 MG tablet; Take 1 tablet by mouth Daily As Needed (weight gain, dyspnea, edema) for up to 150 days.          No follow-ups on file.    Future Appointments         Provider Department Center    12/4/2024 1:45 PM Marco Carrillo MD [facility] PRIMARY [facility]    8/11/2025 1:40 PM Ty Shelton MD [facility] CARDIOLOGY [facility]                MEDICATIONS         Discharge Medications            Accurate as of December 3, 2024  2:08 PM. If you have any questions, ask your nurse or doctor.                Changes to Medications        Instructions Start Date   DULoxetine 60 MG capsule  Commonly known as: CYMBALTA  What changed: additional instructions   60 mg, Oral, Every Morning      Jardiance 10 MG tablet tablet  Generic drug: empagliflozin  What changed: how much to take   10 mg, Oral, Daily      torsemide 20 MG tablet  Commonly known as: DEMADEX  What changed:   when to take this  reasons to take this  Changed by: EVON Raphael   20 mg, Oral, Daily PRN             Continue These Medications        Instructions Start Date   albuterol sulfate  (90 Base) MCG/ACT inhaler  Commonly known as: PROVENTIL HFA;VENTOLIN HFA;PROAIR HFA   2 puffs, Inhalation, Every 4 Hours PRN      atenolol 25 MG tablet  Commonly known as: TENORMIN    12.5 mg, Oral, Daily      cholecalciferol 25 MCG (1000 UT) tablet  Commonly known as: VITAMIN D3   1,000 Units, Every Morning      glucose blood test strip  Commonly known as: Heidi Contour Test   Use as instructed to test glucose      metFORMIN 850 MG tablet  Commonly known as: GLUCOPHAGE   850 mg, Oral, 2 Times Daily With Meals      Microlet Lancets misc   Use daily as directed to test glucose      multivitamin tablet  Generic drug: multivitamin   1 tablet, Every Morning      oxybutynin XL 15 MG 24 hr tablet  Commonly known as: DITROPAN XL   15 mg, Oral, Nightly      pravastatin 40 MG tablet  Commonly known as: PRAVACHOL   40 mg, Oral, Nightly      pregabalin 150 MG capsule  Commonly known as: LYRICA   150 mg, 2 Times Daily      Stiolto Respimat 2.5-2.5 MCG/ACT aerosol solution inhaler  Generic drug: tiotropium bromide-olodaterol   2 puffs, Inhalation, Daily      Synthroid 125 MCG tablet  Generic drug: levothyroxine   125 mcg, Oral, Daily      vitamin B-12 1000 MCG tablet  Commonly known as: CYANOCOBALAMIN   500 mcg, Every Morning      VITAMIN C PO   1 tablet, Daily      warfarin 5 MG tablet  Commonly known as: COUMADIN   Take one tablet (5 mg) by mouth on Monday and Saturdays, and take two tablets (10 mg) by mouth all other days or as directed.                   **Dragon Disclaimer:   Much of this encounter note is an electronic transcription/translation of spoken language to printed text. The electronic translation of spoken language may permit erroneous, or at times, nonsensical words or phrases to be inadvertently transcribed. Although I have reviewed the note for such errors, some may still exist.

## 2024-12-04 ENCOUNTER — OFFICE VISIT (OUTPATIENT)
Dept: INTERNAL MEDICINE | Facility: CLINIC | Age: 84
End: 2024-12-04
Payer: MEDICARE

## 2024-12-04 ENCOUNTER — ANTICOAGULATION VISIT (OUTPATIENT)
Dept: PHARMACY | Facility: HOSPITAL | Age: 84
End: 2024-12-04
Payer: MEDICARE

## 2024-12-04 VITALS
HEIGHT: 72 IN | OXYGEN SATURATION: 94 % | WEIGHT: 307 LBS | TEMPERATURE: 97.6 F | DIASTOLIC BLOOD PRESSURE: 68 MMHG | BODY MASS INDEX: 41.58 KG/M2 | SYSTOLIC BLOOD PRESSURE: 146 MMHG | HEART RATE: 56 BPM | RESPIRATION RATE: 16 BRPM

## 2024-12-04 DIAGNOSIS — E78.5 HYPERLIPIDEMIA, UNSPECIFIED HYPERLIPIDEMIA TYPE: ICD-10-CM

## 2024-12-04 DIAGNOSIS — Z00.00 HEALTHCARE MAINTENANCE: ICD-10-CM

## 2024-12-04 DIAGNOSIS — Z00.00 MEDICARE ANNUAL WELLNESS VISIT, SUBSEQUENT: Primary | ICD-10-CM

## 2024-12-04 DIAGNOSIS — E11.9 TYPE 2 DIABETES MELLITUS WITHOUT COMPLICATION, WITHOUT LONG-TERM CURRENT USE OF INSULIN: ICD-10-CM

## 2024-12-04 DIAGNOSIS — I48.92 ATRIAL FLUTTER WITH CONTROLLED RESPONSE: Primary | ICD-10-CM

## 2024-12-04 DIAGNOSIS — I10 ESSENTIAL HYPERTENSION: ICD-10-CM

## 2024-12-04 DIAGNOSIS — J44.9 CHRONIC OBSTRUCTIVE PULMONARY DISEASE, UNSPECIFIED COPD TYPE: ICD-10-CM

## 2024-12-04 LAB — INR PPP: 3.1

## 2024-12-04 PROCEDURE — G0249 PROVIDE INR TEST MATER/EQUIP: HCPCS

## 2024-12-04 RX ORDER — LANCETS
EACH MISCELLANEOUS
Qty: 100 EACH | Refills: 1 | Status: SHIPPED | OUTPATIENT
Start: 2024-12-04

## 2024-12-04 RX ORDER — ATENOLOL 25 MG/1
12.5 TABLET ORAL DAILY
Qty: 45 TABLET | Refills: 3 | Status: SHIPPED | OUTPATIENT
Start: 2024-12-04

## 2024-12-04 RX ORDER — PRAVASTATIN SODIUM 40 MG
40 TABLET ORAL NIGHTLY
Qty: 90 TABLET | Refills: 3 | Status: SHIPPED | OUTPATIENT
Start: 2024-12-04

## 2024-12-04 NOTE — ASSESSMENT & PLAN NOTE
Orders:    atenolol (TENORMIN) 25 MG tablet; Take 0.5 tablets by mouth Daily.    CBC & Differential    Comprehensive Metabolic Panel

## 2024-12-04 NOTE — ASSESSMENT & PLAN NOTE
Orders:    pravastatin (PRAVACHOL) 40 MG tablet; Take 1 tablet by mouth Every Night.    CBC & Differential    Comprehensive Metabolic Panel    Lipid Panel With LDL / HDL Ratio

## 2024-12-04 NOTE — ASSESSMENT & PLAN NOTE
Orders:    empagliflozin (Jardiance) 10 MG tablet tablet; Take 1 tablet by mouth Daily.    Hemoglobin A1c    Thyroid Panel With TSH    Microalbumin / Creatinine Urine Ratio - Urine, Clean Catch

## 2024-12-04 NOTE — PROGRESS NOTES
Subjective   The ABCs of the Annual Wellness Visit  Medicare Wellness Visit      Riky Moon is a 84 y.o. patient who presents for a Medicare Wellness Visit.    The following portions of the patient's history were reviewed and   updated as appropriate: allergies, current medications, past family history, past medical history, past social history, past surgical history, and problem list.    Compared to one year ago, the patient's physical   health is worse.  Compared to one year ago, the patient's mental   health is the same.    Recent Hospitalizations:  This patient has had a [facility] admission record on file within the last 365 days.  Current Medical Providers:  Patient Care Team:  Marco Carrillo MD as PCP - General (Family Medicine)  Cosmo French MD as Consulting Physician (Sleep Medicine)  Ty Shelton MD as Consulting Physician (Cardiology)  Marco Carrillo MD as Referring Physician (Family Medicine)  Chio Gillima MD as Consulting Physician (Hematology and Oncology)    Outpatient Medications Prior to Visit   Medication Sig Dispense Refill    albuterol sulfate  (90 Base) MCG/ACT inhaler Inhale 2 puffs Every 4 (Four) Hours As Needed for Wheezing. 18 g 6    Ascorbic Acid (VITAMIN C PO) Take 1 tablet by mouth Daily.      Cholecalciferol (VITAMIN D) 1000 units tablet Take 1 tablet by mouth Every Morning.      DULoxetine (CYMBALTA) 60 MG capsule Take 1 capsule by mouth Every Morning. (Patient taking differently: Take 1 capsule by mouth Every Morning. 90 mg takes 3 30mg tabs) 90 capsule 3    glucose blood (LAINEY CONTOUR TEST) test strip Use as instructed to test glucose 100 each 1    metFORMIN (GLUCOPHAGE) 850 MG tablet Take 1 tablet by mouth 2 (Two) Times a Day With Meals. 30 tablet 6    Multiple Vitamin (MULTIVITAMIN) tablet Take 1 tablet by mouth Every Morning.      oxybutynin XL (DITROPAN XL) 15 MG 24 hr tablet Take 1 tablet by mouth Every Night. 30 tablet 6    pregabalin  (LYRICA) 150 MG capsule Take 1 capsule by mouth 2 (Two) Times a Day.      Synthroid 125 MCG tablet TAKE 1 TABLET BY MOUTH DAILY 30 tablet 11    tiotropium bromide-olodaterol (Stiolto Respimat) 2.5-2.5 MCG/ACT aerosol solution inhaler Inhale 2 puffs Daily. 4 g 3    torsemide (DEMADEX) 20 MG tablet Take 1 tablet by mouth Daily As Needed (weight gain, dyspnea, edema) for up to 150 days.      vitamin B-12 (CYANOCOBALAMIN) 1000 MCG tablet Take 0.5 tablets by mouth Every Morning.      warfarin (COUMADIN) 5 MG tablet Take one tablet (5 mg) by mouth on Monday and Saturdays, and take two tablets (10 mg) by mouth all other days or as directed.      atenolol (TENORMIN) 25 MG tablet Take 0.5 tablets by mouth Daily. 45 tablet 3    Empagliflozin (Jardiance) 10 MG tablet Take 10 mg by mouth Daily. (Patient taking differently: Take 2.5 tablets by mouth Daily.) 30 tablet 11    MICROLET LANCETS misc Use daily as directed to test glucose 100 each 1    pravastatin (PRAVACHOL) 40 MG tablet Take 1 tablet by mouth Every Night. 90 tablet 3     No facility-administered medications prior to visit.     No opioid medication identified on active medication list. I have reviewed chart for other potential  high risk medication/s and harmful drug interactions in the elderly.      Aspirin is not on active medication list.  Aspirin use is not indicated based on review of current medical condition/s. Risk of harm outweighs potential benefits.  .    Patient Active Problem List   Diagnosis    OA (osteoarthritis) of hip    KINDRA treated with auto BiPAP    Chest pain    Diabetes mellitus    Essential hypertension    Hyperlipidemia    Hypothyroidism    Asthma    Overactive bladder    CAP (community acquired pneumonia)    Cellulitis of left lower extremity    COPD (chronic obstructive pulmonary disease)    Dyslipidemia    Gastroesophageal reflux disease    Leukocytosis    Peripheral nerve disease    Oropharyngeal dysphagia    Bronchitis    Class 3 severe  "obesity due to excess calories with serious comorbidity and body mass index (BMI) of 40.0 to 44.9 in adult    Left leg swelling    Anemia    Leukocytosis    Circadian rhythm sleep disorder, delayed sleep phase type    Atrial flutter with controlled response    UTI (urinary tract infection)    CHF (congestive heart failure)     Advance Care Planning Advance Directive is not on file.  ACP discussion was held with the patient during this visit. Patient has an advance directive (not in EMR), copy requested.            Objective   Vitals:    24 1350   BP: 146/68   Pulse: 56   Resp: 16   Temp: 97.6 °F (36.4 °C)   TempSrc: Oral   SpO2: 94%   Weight: (!) 139 kg (307 lb)   Height: 182.9 cm (72.01\")   PainSc:   6       Estimated body mass index is 41.63 kg/m² as calculated from the following:    Height as of this encounter: 182.9 cm (72.01\").    Weight as of this encounter: 139 kg (307 lb).            Does the patient have evidence of cognitive impairment? No  Lab Results   Component Value Date    HGBA1C 6.70 (H) 10/23/2024                                                                                               Health  Risk Assessment    Smoking Status:  Social History     Tobacco Use   Smoking Status Former    Current packs/day: 0.00    Average packs/day: 1.5 packs/day for 40.0 years (60.0 ttl pk-yrs)    Types: Cigarettes    Start date:     Quit date:     Years since quittin.9   Smokeless Tobacco Never   Tobacco Comments    from age [age]     Alcohol Consumption:  Social History     Substance and Sexual Activity   Alcohol Use Yes    Comment: HOLIDAYS  caffeine -2 cups coffee daily       Fall Risk Screen  STEADI Fall Risk Assessment was completed, and patient is at MODERATE risk for falls. Assessment completed on:2024    Depression Screening   Little interest or pleasure in doing things? Not at all   Feeling down, depressed, or hopeless? Not at all   PHQ-2 Total Score 0      Health Habits and " Functional and Cognitive Screenin/4/2024     1:53 PM   Functional & Cognitive Status   Do you have difficulty preparing food and eating? No   Do you have difficulty bathing yourself, getting dressed or grooming yourself? No   Do you have difficulty using the toilet? No   Do you have difficulty moving around from place to place? Yes   Do you have trouble with steps or getting out of a bed or a chair? Yes   Current Diet Well Balanced Diet   Dental Exam Not up to date   Eye Exam Up to date   Exercise (times per week) 0 times per week   Current Exercises Include No Regular Exercise   Do you need help using the phone?  No   Are you deaf or do you have serious difficulty hearing?  No   Do you need help to go to places out of walking distance? Yes   Do you need help shopping? No   Do you need help preparing meals?  No   Do you need help with housework?  No   Do you need help with laundry? No   Do you need help taking your medications? No   Do you need help managing money? No   Do you ever drive or ride in a car without wearing a seat belt? No   Have you felt unusual stress, anger or loneliness in the last month? No   Who do you live with? Spouse   If you need help, do you have trouble finding someone available to you? No   Have you been bothered in the last four weeks by sexual problems? No   Do you have difficulty concentrating, remembering or making decisions? No           Age-appropriate Screening Schedule:  Refer to the list below for future screening recommendations based on patient's age, sex and/or medical conditions. Orders for these recommended tests are listed in the plan section. The patient has been provided with a written plan.    Health Maintenance List  Health Maintenance   Topic Date Due    DIABETIC EYE EXAM  2022    ANNUAL WELLNESS VISIT  2024    COVID-19 Vaccine ( season) 2025 (Originally 2024)    RSV Vaccine - Adults (1 - 1-dose 75+ series) 2025  (Originally 10/6/2014)    HEMOGLOBIN A1C  04/23/2025    LIPID PANEL  07/01/2025    URINE MICROALBUMIN  10/24/2025    BMI FOLLOWUP  11/06/2025    TDAP/TD VACCINES (4 - Td or Tdap) 11/23/2031    INFLUENZA VACCINE  Completed    Pneumococcal Vaccine 65+  Completed    ZOSTER VACCINE  Completed                                                                                                                                                CMS Preventative Services Quick Reference  Risk Factors Identified During Encounter  None Identified  Fall Risk-High or Moderate: Discussed Fall Prevention in the home    The above risks/problems have been discussed with the patient.  Pertinent information has been shared with the patient in the After Visit Summary.  An After Visit Summary and PPPS were made available to the patient.    Follow Up:   Next Medicare Wellness visit to be scheduled in 1 year.     Assessment & Plan  Medicare annual wellness visit, subsequent         Healthcare maintenance    Orders:    CBC & Differential    Comprehensive Metabolic Panel    Hemoglobin A1c    Thyroid Panel With TSH    Lipid Panel With LDL / HDL Ratio    Microalbumin / Creatinine Urine Ratio - Urine, Clean Catch    Type 2 diabetes mellitus without complication, without long-term current use of insulin      Orders:    empagliflozin (Jardiance) 10 MG tablet tablet; Take 1 tablet by mouth Daily.    Hemoglobin A1c    Thyroid Panel With TSH    Microalbumin / Creatinine Urine Ratio - Urine, Clean Catch    Hyperlipidemia, unspecified hyperlipidemia type       Orders:    pravastatin (PRAVACHOL) 40 MG tablet; Take 1 tablet by mouth Every Night.    CBC & Differential    Comprehensive Metabolic Panel    Lipid Panel With LDL / HDL Ratio    Essential hypertension      Orders:    atenolol (TENORMIN) 25 MG tablet; Take 0.5 tablets by mouth Daily.    CBC & Differential    Comprehensive Metabolic Panel    Chronic obstructive pulmonary disease, unspecified COPD  type                Follow Up:   No follow-ups on file.

## 2024-12-04 NOTE — PROGRESS NOTES
Anticoagulation Clinic Progress Note    Anticoagulation Summary  As of 12/4/2024      INR goal:  2.0-3.0   TTR:  50.6% (2.4 y)   INR used for dosing:  3.10 (12/4/2024)   Warfarin maintenance plan:  5 mg every Mon, Wed, Sat; 10 mg all other days   Weekly warfarin total:  55 mg   No change documented:  Yu Rader, Masoud   Plan last modified:  Olga Granados RPH (11/25/2024)   Next INR check:  12/11/2024   Priority:  Maintenance   Target end date:  --    Indications    Atrial flutter with controlled response [I48.92]                 Anticoagulation Episode Summary       INR check location:  Home Draw    Preferred lab:  --    Send INR reminders to:   [facility] [place]    Comments:  [facility] home stephania          Anticoagulation Care Providers       Provider Role Specialty Phone number    Ty Shelton MD Referring Cardiology [phone]            Clinic Interview:      INR History:      11/4/2024     1:14 PM 11/21/2024    12:00 AM 11/21/2024     2:43 PM 11/25/2024    12:00 AM 11/25/2024     1:57 PM 12/4/2024    12:00 AM 12/4/2024     1:15 PM   Anticoagulation Monitoring   INR 3.10  5.70  1.70  3.10   INR Date 11/4/2024 11/21/2024 11/25/2024 12/4/2024   INR Goal 2.0-3.0  2.0-3.0  2.0-3.0  2.0-3.0   Trend Same  Same  Down  Same   Last Week Total 73 mg  60 mg  40 mg  55 mg   Next Week Total 60 mg  40 mg  55 mg  55 mg   Sun 10 mg  10 mg  10 mg  10 mg   Mon 5 mg  -  5 mg  5 mg   Tue 10 mg  -  10 mg  10 mg   Wed 10 mg  -  5 mg  5 mg   Thu 10 mg  Hold (11/21)  10 mg  10 mg   Fri 10 mg  Hold (11/22)  10 mg  10 mg   Sat 5 mg  5 mg  5 mg  5 mg   Historical INR  5.70      1.70      3.10            This result is from an external source.       Plan:  1. INR is Supratherapeutic today- see above in Anticoagulation Summary.   Will instruct Riky Moon to Continue their warfarin regimen- see above in Anticoagulation Summary.  2. Follow up in 1 weeks  3. They have been instructed to call if any changes in  medications, doses, concerns, etc. Patient expresses understanding and has no further questions at this time.    Yu Rader, PharmD

## 2024-12-09 ENCOUNTER — TELEPHONE (OUTPATIENT)
Dept: CARDIOLOGY | Facility: CLINIC | Age: 84
End: 2024-12-09
Payer: MEDICARE

## 2024-12-09 NOTE — TELEPHONE ENCOUNTER
CARDIOLOGY    Patient Name: Riky Moon  :1940  Age: 84 y.o.    24    To whom it may concern:    Riky Moon was referred to my office for cardiovascular risk assessment exam for upcoming epidural injection.    According to current ACC/AHA perioperative risk management guidelines, endoscopies and procedures performed without general anesthesia are considered to be low risk procedures that do not require further cardiovascular risk stratification.       Anticoagulant Status:  [] No anticoagulants    [x] The patient is on  [] ASA; hold for ___ days.  [x] Coumadin; hold for 5 days.  [] Plavix; hold for ___ days.  [] Eliquis; hold for ___ days.  [] Brilinta; hold for ___ days.  [] Xarelto; hold for ___ days.  [] Effient; hold for ___ days.    [] The patient requires Lovenox bridging, which has been prescribed.     Sincerely,         EVON Oneill  [facility]   [facility]  [address] [place] [address]  Office: [phone]

## 2024-12-09 NOTE — TELEPHONE ENCOUNTER
Dr. Augie Lee's office at [facility] is requesting a clearance for Pt to hold Warfarin for 5 days to have an Epidural Steroid injection.        Pt saw you last on 12/3/24    Please fax [phone]

## 2024-12-13 ENCOUNTER — ANTICOAGULATION VISIT (OUTPATIENT)
Dept: PHARMACY | Facility: HOSPITAL | Age: 84
End: 2024-12-13
Payer: MEDICARE

## 2024-12-13 DIAGNOSIS — I48.92 ATRIAL FLUTTER WITH CONTROLLED RESPONSE: Primary | ICD-10-CM

## 2024-12-13 LAB — INR PPP: 2.5

## 2024-12-13 NOTE — PROGRESS NOTES
Anticoagulation Clinic Progress Note    Anticoagulation Summary  As of 2024      INR goal:  2.0-3.0   TTR:  51.0% (2.4 y)   INR used for dosin.50 (2024)   Warfarin maintenance plan:  5 mg every Mon, Wed, Sat; 10 mg all other days   Weekly warfarin total:  55 mg   No change documented:  Olga Granados RPH   Plan last modified:  Olga Granados RPH (2024)   Next INR check:  2024   Priority:  Maintenance   Target end date:  --    Indications    Atrial flutter with controlled response [I48.92]                 Anticoagulation Episode Summary       INR check location:  Home Draw    Preferred lab:  --    Send INR reminders to:   [facility] [place]    Comments:  [facility]          Anticoagulation Care Providers       Provider Role Specialty Phone number    Ty Shelton MD Referring Cardiology [phone]            Clinic Interview:  No pertinent clinical findings have been reported.    INR History:      2024     2:43 PM 2024    12:00 AM 2024     1:57 PM 2024    12:00 AM 2024     1:15 PM 2024    12:00 AM 2024     1:50 PM   Anticoagulation Monitoring   INR 5.70  1.70  3.10  2.50   INR Date 2024   INR Goal 2.0-3.0  2.0-3.0  2.0-3.0  2.0-3.0   Trend Same  Down  Same  Same   Last Week Total 60 mg  40 mg  55 mg  55 mg   Next Week Total 40 mg  55 mg  55 mg  55 mg   Sun 10 mg  10 mg  10 mg  10 mg   Mon -  5 mg  5 mg  5 mg   Tue -  10 mg  10 mg  10 mg   Wed -  5 mg  5 mg  5 mg   Thu Hold ()  10 mg  10 mg  10 mg   Fri Hold ()  10 mg  10 mg  10 mg   Sat 5 mg  5 mg  5 mg  5 mg   Historical INR  1.70      3.10      2.50        Visit Report    Report Report         This result is from an external source.       Plan:  1. INR is therapeutic today- see above in Anticoagulation Summary.    Riky Moon to continue their warfarin regimen- see above in Anticoagulation Summary.  2. Follow up in 1  week  3. Pt has agreed to only be called if INR out of range. They have been instructed to call if any changes in medications, doses, concerns, etc. Patient expresses understanding and has no further questions at this time.    Olga Granados [facility]

## 2024-12-14 NOTE — PROGRESS NOTES
"Chief Complaint  Medicare Wellness-subsequent    Subjective          Riky Moon presents to [facility]  History of Present Illness  The patient is an 84-year-old male who presents for evaluation of diabetes, cholesterol management, blood pressure management, COPD, and anemia.    He reports a persistent lack of strength in his legs, which he attributes to a recent hospitalization that occurred the day before Halloween. He has been under the care of a cardiologist, who has not identified any cardiac-related issues. He also has upcoming appointments with a pulmonologist and a nephrologist.    His current medication regimen includes metformin 850 mg twice daily and Jardiance 12.5 mg daily for diabetes management.    For cholesterol management, he is on pravastatin 40 mg daily.    He is taking atenolol 12.5 mg daily for blood pressure control.    He was previously prescribed torsemide 20 mg as needed for fluid retention but discontinued its use due to severe dry mouth, which impeded his ability to speak. He discussed this issue with his cardiologist's physician assistant, who agreed that the medication was not necessary unless there was a significant increase in swelling. They decided to trial an every-other-day dosing schedule for torsemide, with the exception of days when he needs to leave the house, as the medication increases his urinary frequency.    During his recent hospitalization, there was a concern for potential heart failure, but this was ruled out and his symptoms were attributed to COPD. He is currently using Stiolto inhaler, which he reports as being effective. He has a scheduled appointment with a pulmonologist on Monday.    MEDICATIONS  metformin, Jardiance, pravastatin, atenolol, torsemide, Stiolto    Objective   Vital Signs:   /68   Pulse 56   Temp 97.6 °F (36.4 °C) (Oral)   Resp 16   Ht 182.9 cm (72.01\")   Wt (!) 139 kg (307 lb)   SpO2 94%   BMI 41.63 " kg/m²     Physical Exam  Vitals and nursing note reviewed.   Constitutional:       Appearance: He is well-developed.   HENT:      Head: Normocephalic and atraumatic.   Musculoskeletal:      Cervical back: Normal range of motion and neck supple.   Neurological:      Mental Status: He is alert and oriented to person, place, and time.   Psychiatric:         Behavior: Behavior normal.         Physical Exam       Result Review :                 Assessment and Plan    Diagnoses and all orders for this visit:    1. Medicare annual wellness visit, subsequent (Primary)    2. Healthcare maintenance  -     CBC & Differential  -     Comprehensive Metabolic Panel  -     Hemoglobin A1c  -     Thyroid Panel With TSH  -     Lipid Panel With LDL / HDL Ratio  -     Microalbumin / Creatinine Urine Ratio - Urine, Clean Catch    3. Type 2 diabetes mellitus without complication, without long-term current use of insulin  Assessment & Plan:      Orders:    empagliflozin (Jardiance) 10 MG tablet tablet; Take 1 tablet by mouth Daily.    Hemoglobin A1c    Thyroid Panel With TSH    Microalbumin / Creatinine Urine Ratio - Urine, Clean Catch      Orders:  -     empagliflozin (Jardiance) 10 MG tablet tablet; Take 1 tablet by mouth Daily.  Dispense: 30 tablet; Refill: 11  -     Hemoglobin A1c  -     Thyroid Panel With TSH  -     Microalbumin / Creatinine Urine Ratio - Urine, Clean Catch    4. Hyperlipidemia, unspecified hyperlipidemia type  Assessment & Plan:       Orders:    pravastatin (PRAVACHOL) 40 MG tablet; Take 1 tablet by mouth Every Night.    CBC & Differential    Comprehensive Metabolic Panel    Lipid Panel With LDL / HDL Ratio      Orders:  -     pravastatin (PRAVACHOL) 40 MG tablet; Take 1 tablet by mouth Every Night.  Dispense: 90 tablet; Refill: 3  -     CBC & Differential  -     Comprehensive Metabolic Panel  -     Lipid Panel With LDL / HDL Ratio    5. Essential hypertension  Assessment & Plan:      Orders:    atenolol (TENORMIN)  25 MG tablet; Take 0.5 tablets by mouth Daily.    CBC & Differential    Comprehensive Metabolic Panel      Orders:  -     atenolol (TENORMIN) 25 MG tablet; Take 0.5 tablets by mouth Daily.  Dispense: 45 tablet; Refill: 3  -     CBC & Differential  -     Comprehensive Metabolic Panel    6. Chronic obstructive pulmonary disease, unspecified COPD type  Assessment & Plan:               Other orders  -     Microlet Lancets misc; Use daily as directed to test glucose  Dispense: 100 each; Refill: 1      Assessment & Plan  1. Diabetes mellitus.  He is currently taking metformin 850 mg twice a day and Jardiance 12.5 mg daily. He will continue with his current medication regimen.    2. Cholesterol management.  He is on pravastatin 40 mg daily. He will continue with his current medication regimen.    3. Blood pressure management.  He is taking atenolol 12.5 mg daily. He will continue with his current medication regimen.    4. Fluid retention.  He was previously on torsemide 20 mg as needed but has stopped due to severe dry mouth. The cardiologist's PA suggested taking it every other day unless swelling worsens. He will continue with this adjusted regimen.    5. Chronic obstructive pulmonary disease (COPD).  He is currently using the Stiolto inhaler. He will discuss the possibility of switching to a steroid inhaler with his pulmonologist on Monday.    6. Anemia.  Recent blood work showed he is slightly anemic but improved.    Follow Up   No follow-ups on file.  Patient was given instructions and counseling regarding his condition or for health maintenance advice. Please see specific information pulled into the AVS if appropriate.           Patient or patient representative verbalized consent for the use of Ambient Listening during the visit with  Marco Carrillo MD for chart documentation. 12/14/2024  12:17 EST

## 2024-12-23 ENCOUNTER — ANTICOAGULATION VISIT (OUTPATIENT)
Dept: PHARMACY | Facility: HOSPITAL | Age: 84
End: 2024-12-23
Payer: MEDICARE

## 2024-12-23 DIAGNOSIS — I48.92 ATRIAL FLUTTER WITH CONTROLLED RESPONSE: Primary | ICD-10-CM

## 2024-12-23 LAB — INR PPP: 3.2

## 2024-12-23 NOTE — PROGRESS NOTES
Anticoagulation Clinic Progress Note    Anticoagulation Summary  As of 12/23/2024      INR goal:  2.0-3.0   TTR:  51.2% (2.4 y)   INR used for dosing:  3.20 (12/23/2024)   Warfarin maintenance plan:  5 mg every Mon, Wed, Sat; 10 mg all other days   Weekly warfarin total:  55 mg   Plan last modified:  Olga Granados RPH (11/25/2024)   Next INR check:  12/30/2024   Priority:  Maintenance   Target end date:  --    Indications    Atrial flutter with controlled response [I48.92]                 Anticoagulation Episode Summary       INR check location:  Home Draw    Preferred lab:  --    Send INR reminders to:   [facility]    Comments:  [facility] home [place]          Anticoagulation Care Providers       Provider Role Specialty Phone number    Ty Shelton MD Referring Cardiology [phone]            Clinic Interview:  Patient Findings     Negatives:  Signs/symptoms of thrombosis, Signs/symptoms of bleeding,   Laboratory test error suspected, Change in health, Change in alcohol use,   Change in activity, Upcoming invasive procedure, Emergency department   visit, Upcoming dental procedure, Missed doses, Extra doses, Change in   medications, Change in diet/appetite, Hospital admission, Bruising, Other   complaints      Clinical Outcomes     Negatives:  Major bleeding event, Thromboembolic event,   Anticoagulation-related hospital admission, Anticoagulation-related ED   visit, Anticoagulation-related fatality        INR History:      11/25/2024     1:57 PM 12/4/2024    12:00 AM 12/4/2024     1:15 PM 12/13/2024    12:00 AM 12/13/2024     1:50 PM 12/23/2024    12:00 AM 12/23/2024     3:05 PM   Anticoagulation Monitoring   INR 1.70  3.10  2.50  3.20   INR Date 11/25/2024 12/4/2024 12/13/2024 12/23/2024   INR Goal 2.0-3.0  2.0-3.0  2.0-3.0  2.0-3.0   Trend Down  Same  Same  Same   Last Week Total 40 mg  55 mg  55 mg  55 mg   Next Week Total 55 mg  55 mg  55 mg  52.5 mg   Sun 10 mg  10 mg  10 mg  10 mg    Mon 5 mg  5 mg  5 mg  2.5 mg (12/23)   Tue 10 mg  10 mg  10 mg  10 mg   Wed 5 mg  5 mg  5 mg  5 mg   Thu 10 mg  10 mg  10 mg  10 mg   Fri 10 mg  10 mg  10 mg  10 mg   Sat 5 mg  5 mg  5 mg  5 mg   Historical INR  3.10      2.50      3.20        Visit Report  Report Report           This result is from an external source.       Plan:  1. INR is Supratherapeutic today- see above in Anticoagulation Summary.   Will instruct Riky Moon to Change their warfarin regimen- see above in Anticoagulation Summary.  No changes other than an increase in chocolate. Partial to 2.5 mg today then resume, rck 1 week   2. Follow up in 1 weeks  3. They have been instructed to call if any changes in medications, doses, concerns, etc. Patient expresses understanding and has no further questions at this time.    Olga Granados [facility]

## 2025-01-02 LAB — INR PPP: 2.7

## 2025-01-03 ENCOUNTER — ANTICOAGULATION VISIT (OUTPATIENT)
Dept: PHARMACY | Facility: HOSPITAL | Age: 85
End: 2025-01-03
Payer: MEDICARE

## 2025-01-03 DIAGNOSIS — I48.92 ATRIAL FLUTTER WITH CONTROLLED RESPONSE: Primary | ICD-10-CM

## 2025-01-03 NOTE — PROGRESS NOTES
Anticoagulation Clinic Progress Note    Anticoagulation Summary  As of 1/3/2025      INR goal:  2.0-3.0   TTR:  51.3% (2.5 y)   INR used for dosin.70 (2025)   Warfarin maintenance plan:  5 mg every Mon, Wed, Sat; 10 mg all other days   Weekly warfarin total:  55 mg   No change documented:  Olga Granados RPH   Plan last modified:  Olga Granados RPH (2024)   Next INR check:  2025   Priority:  Maintenance   Target end date:  --    Indications    Atrial flutter with controlled response [I48.92]                 Anticoagulation Episode Summary       INR check location:  Home Draw    Preferred lab:  --    Send INR reminders to:   [facility] [place]    Comments:  [facility]          Anticoagulation Care Providers       Provider Role Specialty Phone number    Ty Shelton MD Referring Cardiology [phone]            Clinic Interview:  No pertinent clinical findings have been reported.    INR History:      2024     1:15 PM 2024    12:00 AM 2024     1:50 PM 2024    12:00 AM 2024     3:05 PM 2025    12:00 AM 1/3/2025     9:15 AM   Anticoagulation Monitoring   INR 3.10  2.50  3.20  2.70   INR Date 2024   INR Goal 2.0-3.0  2.0-3.0  2.0-3.0  2.0-3.0   Trend Same  Same  Same  Same   Last Week Total 55 mg  55 mg  55 mg  55 mg   Next Week Total 55 mg  55 mg  52.5 mg  55 mg   Sun 10 mg  10 mg  10 mg  10 mg   Mon 5 mg  5 mg  2.5 mg ()  5 mg   Tue 10 mg  10 mg  10 mg  10 mg   Wed 5 mg  5 mg  5 mg  5 mg   Thu 10 mg  10 mg  10 mg  -   Fri 10 mg  10 mg  10 mg  10 mg   Sat 5 mg  5 mg  5 mg  5 mg   Historical INR  2.50      3.20      2.70        Visit Report Report             This result is from an external source.       Plan:  1. INR is therapeutic today- see above in Anticoagulation Summary.    Riky Moon to continue their warfarin regimen- see above in Anticoagulation Summary.  2. Follow up in 1 week  3. Pt  has agreed to only be called if INR out of range. They have been instructed to call if any changes in medications, doses, concerns, etc. Patient expresses understanding and has no further questions at this time.    Olga Granados [facility]

## 2025-01-10 ENCOUNTER — ANTICOAGULATION VISIT (OUTPATIENT)
Dept: PHARMACY | Facility: HOSPITAL | Age: 85
End: 2025-01-10
Payer: MEDICARE

## 2025-01-10 DIAGNOSIS — I48.92 ATRIAL FLUTTER WITH CONTROLLED RESPONSE: Primary | ICD-10-CM

## 2025-01-10 LAB — INR PPP: 1.8

## 2025-01-10 PROCEDURE — G0249 PROVIDE INR TEST MATER/EQUIP: HCPCS

## 2025-01-10 NOTE — PROGRESS NOTES
Anticoagulation Clinic Progress Note    Anticoagulation Summary  As of 1/10/2025      INR goal:  2.0-3.0   TTR:  51.5% (2.5 y)   INR used for dosin.80 (1/10/2025)   Warfarin maintenance plan:  5 mg every Mon, Wed, Sat; 10 mg all other days   Weekly warfarin total:  55 mg   Plan last modified:  Olga Granados RPH (2024)   Next INR check:  2025   Priority:  Maintenance   Target end date:  --    Indications    Atrial flutter with controlled response [I48.92]                 Anticoagulation Episode Summary       INR check location:  Home Draw    Preferred lab:  --    Send INR reminders to:   [facility]    Comments:  [facility]          Anticoagulation Care Providers       Provider Role Specialty Phone number    Ty Shelton MD Referring Cardiology [phone]            Clinic Interview:  Patient Findings     Positives:  Upcoming invasive procedure    Negatives:  Signs/symptoms of thrombosis, Signs/symptoms of bleeding,   Laboratory test error suspected, Change in health, Change in alcohol use,   Change in activity, Emergency department visit, Upcoming dental procedure,   Missed doses, Extra doses, Change in medications, Change in diet/appetite,   Hospital admission, Bruising, Other complaints    Comments:  Reports epidural is scheduled for 25, for which he began   holding warfarin on 10/8/25.      Clinical Outcomes     Negatives:  Major bleeding event, Thromboembolic event,   Anticoagulation-related hospital admission, Anticoagulation-related ED   visit, Anticoagulation-related fatality    Comments:  Reports epidural is scheduled for 25, for which he began   holding warfarin on 10/8/25.        INR History:      2024     1:50 PM 2024    12:00 AM 2024     3:05 PM 2025    12:00 AM 1/3/2025     9:15 AM 1/10/2025    12:00 AM 1/10/2025     1:45 PM   Anticoagulation Monitoring   INR 2.50  3.20  2.70  1.80   INR Date 2024   1/2/2025  1/10/2025   INR Goal 2.0-3.0  2.0-3.0  2.0-3.0  2.0-3.0   Trend Same  Same  Same  Same   Last Week Total 55 mg  55 mg  55 mg  40 mg   Next Week Total 55 mg  52.5 mg  55 mg  40 mg   Sun 10 mg  10 mg  10 mg  Hold (1/12)   Mon 5 mg  2.5 mg (12/23)  5 mg  10 mg (1/13)   Tue 10 mg  10 mg  10 mg  10 mg   Wed 5 mg  5 mg  5 mg  10 mg (1/15)   Thu 10 mg  10 mg  -  10 mg   Fri 10 mg  10 mg  10 mg  Hold (1/10)   Sat 5 mg  5 mg  5 mg  Hold (1/11)   Historical INR  3.20      2.70      1.80            This result is from an external source.       Plan:  1. INR is Subtherapeutic today- see above in Anticoagulation Summary.   Will instruct Riky Moon to Change their warfarin regimen - see above in Anticoagulation Summary. HOLD warfarin until epidural on 1/13/25. Resume warfarin at dose of 10 mg daily until INR check on 1/17/25, at which time will plan to resume 5 mg Mon/Wed/Sat, 10 mg all other days.   2. Follow up in 1 week.  3. They have been instructed to call if any changes in medications, doses, concerns, etc. Patient expresses understanding and has no further questions at this time.    Heron Delaney, PharmD

## 2025-01-14 ENCOUNTER — ANTICOAGULATION VISIT (OUTPATIENT)
Dept: PHARMACY | Facility: HOSPITAL | Age: 85
End: 2025-01-14
Payer: MEDICARE

## 2025-01-14 DIAGNOSIS — I48.92 ATRIAL FLUTTER WITH CONTROLLED RESPONSE: Primary | ICD-10-CM

## 2025-01-14 LAB — INR PPP: 1.1

## 2025-01-14 NOTE — PROGRESS NOTES
A user error has taken place: duplicate encounter opened in error, closed for administrative reasons.

## 2025-01-14 NOTE — PROGRESS NOTES
Anticoagulation Clinic Progress Note    Anticoagulation Summary  As of 2025      INR goal:  2.0-3.0   TTR:  51.3% (2.5 y)   INR used for dosin.10 (2025)   Warfarin maintenance plan:  5 mg every Mon, Wed, Sat; 10 mg all other days   Weekly warfarin total:  55 mg   No change documented:  Heron Delaney, PharmD   Plan last modified:  Olga Granados RPH (2024)   Next INR check:  2025   Priority:  Maintenance   Target end date:  --    Indications    Atrial flutter with controlled response [I48.92]                 Anticoagulation Episode Summary       INR check location:  Home Draw    Preferred lab:  --    Send INR reminders to:   [facility] [place]    Comments:  [facility]          Anticoagulation Care Providers       Provider Role Specialty Phone number    Ty Shelton MD Referring Cardiology [phone]            Clinic Interview:  Patient Findings     Negatives:  Signs/symptoms of thrombosis, Signs/symptoms of bleeding,   Laboratory test error suspected, Change in health, Change in alcohol use,   Change in activity, Upcoming invasive procedure, Emergency department   visit, Upcoming dental procedure, Missed doses, Extra doses, Change in   medications, Change in diet/appetite, Hospital admission, Bruising, Other   complaints    Comments:  S/p epidural 25. He resumed warfarin yesterday at dose of   10 mg.       Clinical Outcomes     Negatives:  Major bleeding event, Thromboembolic event,   Anticoagulation-related hospital admission, Anticoagulation-related ED   visit, Anticoagulation-related fatality    Comments:  S/p epidural 25. He resumed warfarin yesterday at dose of   10 mg.         INR History:      2025    12:00 AM 1/3/2025     9:15 AM 1/10/2025    12:00 AM 1/10/2025     1:45 PM 2025    12:00 AM 2025     1:11 PM 2025     1:36 PM   Anticoagulation Monitoring   INR  2.70  1.80  -- 1.10   INR Date  2025  1/10/2025   2025   INR  Goal  2.0-3.0  2.0-3.0  2.0-3.0 2.0-3.0   Trend  Same  Same   Same   Last Week Total  55 mg  40 mg   20 mg   Next Week Total  55 mg  40 mg   60 mg   Sun  10 mg  Hold (1/12)   10 mg   Mon  5 mg  10 mg (1/13)   -   Tue  10 mg  10 mg   10 mg   Wed  5 mg  10 mg (1/15)   10 mg (1/15)   Thu  -  10 mg   10 mg   Fri  10 mg  Hold (1/10)   10 mg   Sat  5 mg  Hold (1/11)   5 mg   Historical INR 2.70      1.80      1.10             This result is from an external source.       Plan:  1. INR is Subtherapeutic today- see above in Anticoagulation Summary.   Will instruct Riky Moon to Change their warfarin regimen (10 mg tomorrow; otherwise continue 5 mg Mon/Wed/Sat, 10 mg all other days) - see above in Anticoagulation Summary.  2. Follow up in 6 days.   3. They have been instructed to call if any changes in medications, doses, concerns, etc. Patient expresses understanding and has no further questions at this time.    Heron Delaney, PharmD

## 2025-01-19 ENCOUNTER — HOSPITAL ENCOUNTER (EMERGENCY)
Facility: HOSPITAL | Age: 85
Discharge: HOME | End: 2025-01-19
Attending: EMERGENCY MEDICINE | Admitting: EMERGENCY MEDICINE
Payer: MEDICARE

## 2025-01-19 VITALS
SYSTOLIC BLOOD PRESSURE: 108 MMHG | RESPIRATION RATE: 16 BRPM | WEIGHT: 300 LBS | DIASTOLIC BLOOD PRESSURE: 66 MMHG | OXYGEN SATURATION: 96 % | HEIGHT: 72 IN | TEMPERATURE: 97.6 F | BODY MASS INDEX: 40.63 KG/M2 | HEART RATE: 65 BPM

## 2025-01-19 DIAGNOSIS — S81.811A LACERATION OF RIGHT LOWER EXTREMITY, INITIAL ENCOUNTER: Primary | ICD-10-CM

## 2025-01-19 LAB
INR PPP: 1.9 (ref 0.9–1.1)
PROTHROMBIN TIME: 22 SECONDS (ref 11.7–14.2)

## 2025-01-19 PROCEDURE — 85610 PROTHROMBIN TIME: CPT | Performed by: PHYSICIAN ASSISTANT

## 2025-01-19 PROCEDURE — 36415 COLL VENOUS BLD VENIPUNCTURE: CPT

## 2025-01-19 PROCEDURE — 99283 EMERGENCY DEPT VISIT LOW MDM: CPT

## 2025-01-19 RX ORDER — LIDOCAINE HYDROCHLORIDE AND EPINEPHRINE 10; 10 MG/ML; UG/ML
10 INJECTION, SOLUTION INFILTRATION; PERINEURAL ONCE
Status: DISCONTINUED | OUTPATIENT
Start: 2025-01-19 | End: 2025-01-19

## 2025-01-19 NOTE — ED PROVIDER NOTES
EMERGENCY DEPARTMENT ENCOUNTER    Room Number:  05/05  Date of encounter:  1/19/2025  PCP: Marco Carrillo MD  Historian: Patient  Chronic or social conditions impacting care (social determinants of health): Full code from assisted living facility    HPI:  Chief Complaint: Fall  A complete HPI/ROS/PMH/PSH/SH/FH are unobtainable due to: Nothing    Context: Riky Moon is a 84 y.o. male with a history of obesity, A-fib, COPD, hypertension, who presents to the ED c/o acute injuries sustained in a mechanical fall prior to arrival.  Patient states that he slipped while walking in his house.  His right leg went underneath the table and caused a superficial laceration over the right anterior lower leg.  He denies any significant head, neck, trunk injuries.  He has been ambulatory.  He denies any loss of consciousness.  He is on warfarin for history of A-fib.  Last tetanus shot 11/23/2021.    Review of prior external notes (non-ED):   I have reviewed a primary care office visit from 12/4/2024.  Patient being followed for diabetes, hypertension    Review of prior external test results outside of this encounter:  Reviewed lab work from 1/14/2025.  INR 1.10    PAST MEDICAL HISTORY  Active Ambulatory Problems     Diagnosis Date Noted    OA (osteoarthritis) of hip 02/23/2017    KINDRA treated with auto BiPAP 06/11/2017    Chest pain 09/02/2017    Diabetes mellitus 05/29/2018    Essential hypertension 05/29/2018    Hyperlipidemia 05/29/2018    Hypothyroidism 05/29/2018    Asthma 06/24/2015    Overactive bladder 06/29/2018    CAP (community acquired pneumonia) 06/24/2015    Cellulitis of left lower extremity 03/19/2017    COPD (chronic obstructive pulmonary disease) 03/19/2017    Dyslipidemia 06/29/2018    Gastroesophageal reflux disease 06/29/2018    Leukocytosis 06/24/2015    Peripheral nerve disease 06/29/2018    Oropharyngeal dysphagia 02/12/2019    Bronchitis 04/09/2019    Class 3 severe obesity due to excess calories with  serious comorbidity and body mass index (BMI) of 40.0 to 44.9 in adult 10/26/2019    Left leg swelling 11/06/2019    Anemia 01/30/2020    Leukocytosis 01/30/2020    Circadian rhythm sleep disorder, 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    Atrial flutter with controlled response 06/21/2022    UTI (urinary tract infection) 10/22/2024    CHF (congestive heart failure) 10/22/2024     Resolved Ambulatory Problems     Diagnosis Date Noted    Constipation 06/29/2018    Shortness of breath 10/23/2024     Past Medical History:   Diagnosis Date    Acid reflux     Arthritis     Chest discomfort     Colon polyp     Depression     Disease of thyroid gland     Emphysema lung     High cholesterol     Hypertension     Morbid obesity     Neuropathy     Obesity, Class III, BMI 40-49.9 (morbid obesity)     PAD (peripheral artery disease)     Poor circulation of extremity     Sleep apnea     Vitamin D deficiency          PAST SURGICAL HISTORY  Past Surgical History:   Procedure Laterality Date    APPENDECTOMY      CATARACT EXTRACTION W/ INTRAOCULAR LENS IMPLANT Bilateral     CHOLECYSTECTOMY      COLONOSCOPY  01/01/2014    COLONOSCOPY W/ POLYPECTOMY      BENIGN    ENDOSCOPY N/A 10/28/2024    Procedure: ESOPHAGOGASTRODUODENOSCOPY;  Surgeon: Naveen Fishman MD;  Location: [facility] ENDOSCOPY;  Service: Gastroenterology;  Laterality: N/A;  PRE: DYSPHAGIA  POST: NORMAL    HERNIA REPAIR      INTERSTIM PLACEMENT Left 08/30/2016    BLADDER CONTROL    KNEE ARTHROPLASTY Right     MOUTH SURGERY  06/26/2018    NOSE SURGERY      REMOVED BLOCKAGE     ROTATOR CUFF REPAIR Right     TOTAL HIP ARTHROPLASTY Right 11/9/2017    Procedure: RIGHT TOTAL HIP ARTHROPLASTY;  Surgeon: Riky Fernando MD;  Location: [facility] MAIN OR;  Service:          FAMILY HISTORY  Family History   Problem Relation Age of Onset    Stroke Mother     Hypertension Mother     Heart attack Father     Alcohol abuse Father     Heart disease Father     Diabetes Sister     Heart disease Brother      Stomach cancer Brother     Stroke Brother     Alcohol abuse Brother     Hypertension Daughter     Diabetes Daughter     Malig Hyperthermia Neg Hx          SOCIAL HISTORY  Social History     Socioeconomic History    Marital status:      Spouse name: Chitra    Years of education: high school   Tobacco Use    Smoking status: Former     Current packs/day: 0.00     Average packs/day: 1.5 packs/day for 40.0 years (60.0 ttl pk-yrs)     Types: Cigarettes     Start date:      Quit date:      Years since quittin.0    Smokeless tobacco: Never    Tobacco comments:     from age [age]   Vaping Use    Vaping status: Never Used   Substance and Sexual Activity    Alcohol use: Yes     Comment: HOLIDAYS  caffeine -2 cups coffee daily    Drug use: No     Comment: PRN use for pain    Sexual activity: Defer         ALLERGIES  Patient has no known allergies.        REVIEW OF SYSTEMS  All systems reviewed and negative except for those discussed in HPI.       PHYSICAL EXAM    I have reviewed the triage vital signs and nursing notes.    ED Triage Vitals [25 1232]   Temp Heart Rate Resp BP SpO2   97.6 °F (36.4 °C) 69 16 130/96 96 %      Temp src Heart Rate Source Patient Position BP Location FiO2 (%)   -- -- -- -- --       Physical Exam  GENERAL: Alert, oriented, not distressed  HENT: head atraumatic, no nuchal rigidity  EYES: no scleral icterus, EOMI  CV: regular rhythm, regular rate, no murmur  RESPIRATORY: normal effort, CTA  ABDOMEN: soft, nontender  MUSCULOSKELETAL: Skin tear to the mid anterior right lower leg.  No bony tenderness.  Neurovascularly intact distally.  NEURO: alert, moves all extremities, follows commands  SKIN: warm, dry        LAB RESULTS  Recent Results (from the past 24 hours)   Protime-INR    Collection Time: 25 12:49 PM    Specimen: Blood   Result Value Ref Range    Protime 22.0 (H) 11.7 - 14.2 Seconds    INR 1.90 (H) 0.90 - 1.10       Ordered the above labs and independently  reviewed the results.    Procedure: Wound cleaned and debrided with scissors.  Mepitel dressing used to close.  Length 2.6 cm.      ADDITIONAL ORDERS CONSIDERED BUT NOT ORDERED:  Admission was considered but after careful review of the patient's presentation, physical examination, diagnostic results, and response to treatment the patient may be safely discharged with outpatient follow-up.       PROGRESS, DATA ANALYSIS, CONSULTS, AND MEDICAL DECISION MAKING    All labs have been independently interpreted by myself.  All radiology studies have been independently interpreted by myself and discussed with radiologist dictating the report.   EKGs independently interpreted by myself.  Discussion below represents my analysis of pertinent findings related to patient's condition, differential diagnosis, treatment plan and final disposition.    I have discussed case with Dr. Sharp, emergency room physician.  He has performed his own bedside examination and agrees with treatment plan.    ED Course as of 01/19/25 1448   Sun Jan 19, 2025   1248 Patient presents with injuries to the right lower leg after mechanical fall prior to arrival.  On exam patient does have a superficial laceration over the mid right anterior lower leg.  No significant bony tenderness.  Neurovascularly intact distally.  Skin is very thin and will likely not hold sutures.  Plan for hemostatic dressing and discharge.  We will check an INR given that he is on Coumadin. [EE]   1314 INR(!): 1.90 [EE]   1411 Wound has been closed with a Mepitel.  We will have him follow-up with wound care. [EE]      ED Course User Index  [EE] Ignacio Curry PA       AS OF 14:48 EST VITALS:    BP - 108/66  HR - 65  TEMP - 97.6 °F (36.4 °C)  O2 SATS - 96%        DIAGNOSIS  Final diagnoses:   Laceration of right lower extremity, initial encounter         DISPOSITION  Discharged    Admission was considered but after careful review of the patient's presentation, physical examination,  diagnostic results, and response to treatment the patient may be safely discharged with outpatient follow-up.         Dictated utilizing LoveThis dictation     Ignacio Curry PA  01/19/25 1341

## 2025-01-19 NOTE — DISCHARGE INSTRUCTIONS
Keep dressing on until seen by wound care.  Keep leg elevated and try not to walk too much.    Return to ER for any uncontrolled bleeding

## 2025-01-19 NOTE — ED TRIAGE NOTES
[facility] [place] [facility]. Pt had a mechanical fall, striking his RLE on a glass ashtray. EMS reports approx 4-5 inch laceration to RLE. Pt is on warfarin. Pt denies LOC.

## 2025-01-19 NOTE — ED PROVIDER NOTES
Pt presents to the ED c/o mechanical fall with pain in his right shin.  Does take Coumadin.  No other injuries or concerns at this time.     On exam,   General: No acute distress, nontoxic  HEENT: EOMI  Pulm: Symmetric chest rise, nonlabored breathing  CV: Regular rate and rhythm  GI: Nondistended  MSK: No deformity  Skin: Warm, dry, superficial laceration to the distal anterior right shin with mild oozing  Neuro: Awake, alert, oriented x 4, moving all extremities, no focal deficits  Psych: Calm, cooperative    Vital signs and nursing notes reviewed.           Plan: Plan for INR check, and wound care.  Haritha ambulatory and therefore think fracture less likely.  Will check INR and provide wound care likely discharge.    ED Course as of 01/19/25 1832   Sun Jan 19, 2025   1248 Patient presents with injuries to the right lower leg after mechanical fall prior to arrival.  On exam patient does have a superficial laceration over the mid right anterior lower leg.  No significant bony tenderness.  Neurovascularly intact distally.  Skin is very thin and will likely not hold sutures.  Plan for hemostatic dressing and discharge.  We will check an INR given that he is on Coumadin. [EE]   1314 INR(!): 1.90 [EE]   1411 Wound has been closed with a Mepitel.  We will have him follow-up with wound care. [EE]      ED Course User Index  [EE] Ignacio Curry PA       INR 1.9, wound appropriately managed, safe for discharge with outpatient follow-up.  ED return for worsening symptoms as needed.     MD Attestation Note    SHARED VISIT: This visit was performed by BOTH a physician and an APC. The substantive portion of the medical decision making was performed by this attesting physician who made or approved the management plan and takes responsibility for patient management. All studies in the APC note (if performed) were independently interpreted by me.                   Ezio Sharp MD  01/19/25 7283

## 2025-01-22 ENCOUNTER — ANTICOAGULATION VISIT (OUTPATIENT)
Dept: PHARMACY | Facility: HOSPITAL | Age: 85
End: 2025-01-22
Payer: MEDICARE

## 2025-01-22 DIAGNOSIS — I48.92 ATRIAL FLUTTER WITH CONTROLLED RESPONSE: Primary | ICD-10-CM

## 2025-01-22 LAB — INR PPP: 1.8

## 2025-01-22 NOTE — PROGRESS NOTES
Anticoagulation Clinic Progress Note    Anticoagulation Summary  As of 2025      INR goal:  2.0-3.0   TTR:  50.9% (2.5 y)   INR used for dosin.80 (2025)   Warfarin maintenance plan:  5 mg every Mon, Wed, Sat; 10 mg all other days   Weekly warfarin total:  55 mg   Plan last modified:  Olga Granados RPH (2024)   Next INR check:  2025   Priority:  Maintenance   Target end date:  --    Indications    Atrial flutter with controlled response [I48.92]                 Anticoagulation Episode Summary       INR check location:  Home Draw    Preferred lab:  --    Send INR reminders to:   [facility]    Comments:  [facility]          Anticoagulation Care Providers       Provider Role Specialty Phone number    Ty Shelton MD Referring Cardiology [phone]            Clinic Interview:  Patient Findings     Positives:  Emergency department visit    Negatives:  Signs/symptoms of thrombosis, Signs/symptoms of bleeding,   Laboratory test error suspected, Change in health, Change in alcohol use,   Change in activity, Upcoming invasive procedure, Upcoming dental   procedure, Missed doses, Extra doses, Change in medications, Change in   diet/appetite, Hospital admission, Bruising, Other complaints      Clinical Outcomes     Negatives:  Major bleeding event, Thromboembolic event,   Anticoagulation-related hospital admission, Anticoagulation-related ED   visit, Anticoagulation-related fatality        INR History:      1/10/2025     1:45 PM 2025    12:00 AM 2025     1:11 PM 2025     1:36 PM 2025    12:49 PM 2025    12:00 AM 2025     1:09 PM   Anticoagulation Monitoring   INR 1.80  -- 1.10   1.80   INR Date 1/10/2025   2025   2025   INR Goal 2.0-3.0  2.0-3.0 2.0-3.0   2.0-3.0   Trend Same   Same   Same   Last Week Total 40 mg   20 mg   60 mg   Next Week Total 40 mg   60 mg   60 mg   Sun Hold ()   10 mg   10 mg   Mon 10 mg ()   -   5  mg   Tue 10 mg   10 mg   10 mg   Wed 10 mg (1/15)   10 mg (1/15)   10 mg (1/22)   Thu 10 mg   10 mg   10 mg   Fri Hold (1/10)   10 mg   10 mg   Sat Hold (1/11)   5 mg   5 mg   Historical INR  1.10       1.90  1.80            This result is from an external source.       Plan:  1. INR is Subtherapeutic today- see above in Anticoagulation Summary.   Will instruct Riky Moon to Increase their warfarin regimen- see above in Anticoagulation Summary.  ED on 1/19 for a mechanical fall with laceration. Boost to 10 mg then resume, rck 1 week   2. Follow up in 1 weeks  3. They have been instructed to call if any changes in medications, doses, concerns, etc. Patient expresses understanding and has no further questions at this time.    Olga Granados [facility]

## 2025-01-23 ENCOUNTER — OFFICE VISIT (OUTPATIENT)
Dept: WOUND CARE | Facility: HOSPITAL | Age: 85
End: 2025-01-23
Payer: MEDICARE

## 2025-01-27 ENCOUNTER — OFFICE VISIT (OUTPATIENT)
Dept: WOUND CARE | Facility: HOSPITAL | Age: 85
End: 2025-01-27
Payer: MEDICARE

## 2025-01-28 ENCOUNTER — ANTICOAGULATION VISIT (OUTPATIENT)
Dept: PHARMACY | Facility: HOSPITAL | Age: 85
End: 2025-01-28
Payer: MEDICARE

## 2025-01-28 DIAGNOSIS — I48.92 ATRIAL FLUTTER WITH CONTROLLED RESPONSE: Primary | ICD-10-CM

## 2025-01-28 LAB — INR PPP: 1.9

## 2025-01-28 NOTE — PROGRESS NOTES
Nursing Transfer Note      1/27/2025   6:56 PM    Nurse giving handoff: Jamar CASTILLO PACU  Nurse receiving handoff:Idalia BAEZ    Reason patient is being transferred: post surgery/anesthesia    Transfer To: 1028    Transfer via bed    Transfer with IV pump/fluid LR at 40cc/hr    Transported by PCT x2    Transfer Vital Signs:  Please see flow sheet    Telemetry: Box Number N/A  Order for Tele Monitor? Yes    Additional Lines: 2L NC Oxygen and Luna Catheter    Medicines sent: none    Any special needs or follow-up needed: routine    Patient belongings transferred with patient: Yes (black purse, slippers, clothings inside white/green plastic bags x3, pink trench coat)    Chart send with patient: Yes    Notified: family via surgical texting system     Patient reassessed at: 1/27/2025 at 2000  1  Upon arrival to floor: cardiac monitor applied, patient oriented to room, and bed in lowest position   "Chief Complaint  Follow-up (Three months )    Subjective          Riky Moon presents to [facility]  History of Present Illness    Patient presents at today's office visit with a history of having type 2 diabetes.  He recently had his hemoglobin A1c done couple weeks ago which showed to be 6.8.  He is currently taking Trulicity 0.75 mg weekly.  He is also on Metformin 850 mg twice a day.  Is also taking Jardiance 10 mg daily.  He tells me today that he has not been taking the Trulicity as the cost of this medicine has been so high so as of couple months ago he is not taking the medicine.    Patient has hyperlipidemia.  Is currently on pravastatin 40 mg daily.  His LDL was 76, total cholesterol is 136, and triglycerides of 107 with an HDL of 38.  He denies any side effects of his pravastatin.    Patient also has essential hypertension.  His blood pressure today is 145/59.  He is currently on losartan 100 mg daily.  He is also taking atenolol 50 mg daily.  He was also on amlodipine 5 mg daily.  He denies any side effects of these medicines.    He does have hypothyroidism.  Currently on levothyroxine 100 mcg daily.  His TSH did come back at 4.8.  He does deny any side effects of the medicine.    Objective   Vital Signs:   /59 (BP Location: Left arm, Patient Position: Sitting, Cuff Size: Large Adult)   Pulse 72   Temp 98 °F (36.7 °C)   Resp 16   Ht 180.3 cm (71\")   Wt (!) 140 kg (309 lb 6.4 oz)   SpO2 94%   BMI 43.15 kg/m²     Physical Exam  Vitals and nursing note reviewed.   Constitutional:       Appearance: He is well-developed.   HENT:      Head: Normocephalic and atraumatic.   Musculoskeletal:      Cervical back: Normal range of motion and neck supple.   Neurological:      Mental Status: He is alert and oriented to person, place, and time.   Psychiatric:         Behavior: Behavior normal.        Result Review :                 Assessment and Plan    Diagnoses and all " orders for this visit:    1. Type 2 diabetes mellitus without complication, without long-term current use of insulin (HCC) (Primary)  -     Hemoglobin A1c  -     Currently stable, will continue current DM2 meds.     2. Hypothyroidism, unspecified type  -     Thyroid Panel With TSH  -     Continue levothyroxine 100mcg daily.     3. Essential hypertension  -     CBC & Differential  -     Comprehensive Metabolic Panel  -     Currently stable, will continue current bp meds.     4. Hyperlipidemia, unspecified hyperlipidemia type  -     Lipid Panel With LDL / HDL Ratio  -     Stable, continue pravastatin.         Follow Up   No follow-ups on file.  Patient was given instructions and counseling regarding his condition or for health maintenance advice. Please see specific information pulled into the AVS if appropriate.

## 2025-01-30 ENCOUNTER — OFFICE VISIT (OUTPATIENT)
Dept: WOUND CARE | Facility: HOSPITAL | Age: 85
End: 2025-01-30
Payer: MEDICARE

## 2025-02-03 ENCOUNTER — ANTICOAGULATION VISIT (OUTPATIENT)
Dept: PHARMACY | Facility: HOSPITAL | Age: 85
End: 2025-02-03
Payer: MEDICARE

## 2025-02-03 DIAGNOSIS — I48.92 ATRIAL FLUTTER WITH CONTROLLED RESPONSE: Primary | ICD-10-CM

## 2025-02-03 LAB — INR PPP: 2.7

## 2025-02-03 NOTE — PROGRESS NOTES
Anticoagulation Clinic Progress Note    Anticoagulation Summary  As of 2/3/2025      INR goal:  2.0-3.0   TTR:  50.8% (2.6 y)   INR used for dosin.70 (2/3/2025)   Warfarin maintenance plan:  5 mg every Mon, Sat; 10 mg all other days   Weekly warfarin total:  60 mg   Plan last modified:  Olga Granados RPH (2025)   Next INR check:  2/10/2025   Priority:  Maintenance   Target end date:  --    Indications    Atrial flutter with controlled response [I48.92]                 Anticoagulation Episode Summary       INR check location:  Home Draw    Preferred lab:  --    Send INR reminders to:   [facility]    Comments:  [facility] home stephania          Anticoagulation Care Providers       Provider Role Specialty Phone number    Ty Shelton MD Referring Cardiology [phone]            Clinic Interview:  No pertinent clinical findings have been reported.    INR History:      2025    12:49 PM 2025    12:00 AM 2025     1:09 PM 2025    12:00 AM 2025     2:14 PM 2/3/2025    12:00 AM 2/3/2025     2:40 PM   Anticoagulation Monitoring   INR   1.80  1.90  2.70   INR Date   2025  2025  2/3/2025   INR Goal   2.0-3.0  2.0-3.0  2.0-3.0   Trend   Same  Up  Same   Last Week Total   60 mg  60 mg  60 mg   Next Week Total   60 mg  60 mg  60 mg   Sun   10 mg  10 mg  10 mg   Mon   5 mg  5 mg  5 mg   Tue   10 mg  10 mg  10 mg   Wed   10 mg ()  10 mg  10 mg   Thu   10 mg  10 mg  10 mg   Fri   10 mg  10 mg  10 mg   Sat   5 mg  5 mg  5 mg   Historical INR 1.90  1.80      1.90      2.70            This result is from an external source.       Plan:  1. INR is therapeutic today- see above in Anticoagulation Summary.    Riky BARBER Moon to continue their warfarin regimen- see above in Anticoagulation Summary.  2. Follow up in 1 week  3. They have been instructed to call if any changes in medications, doses, concerns, etc. Patient expresses understanding and has no further questions at  this time.    Olga Granados [facility]

## 2025-02-06 ENCOUNTER — OFFICE VISIT (OUTPATIENT)
Dept: WOUND CARE | Facility: HOSPITAL | Age: 85
End: 2025-02-06
Payer: MEDICARE

## 2025-02-06 PROCEDURE — 97602 WOUND(S) CARE NON-SELECTIVE: CPT

## 2025-02-13 ENCOUNTER — OFFICE VISIT (OUTPATIENT)
Dept: WOUND CARE | Facility: HOSPITAL | Age: 85
End: 2025-02-13
Payer: MEDICARE

## 2025-02-14 ENCOUNTER — ANTICOAGULATION VISIT (OUTPATIENT)
Dept: PHARMACY | Facility: HOSPITAL | Age: 85
End: 2025-02-14
Payer: MEDICARE

## 2025-02-14 DIAGNOSIS — I48.92 ATRIAL FLUTTER WITH CONTROLLED RESPONSE: Primary | ICD-10-CM

## 2025-02-14 LAB — INR PPP: 3.8

## 2025-02-14 PROCEDURE — G0249 PROVIDE INR TEST MATER/EQUIP: HCPCS

## 2025-02-14 NOTE — PROGRESS NOTES
Anticoagulation Clinic Progress Note    Anticoagulation Summary  As of 2/14/2025      INR goal:  2.0-3.0   TTR:  50.5% (2.6 y)   INR used for dosing:  3.80 (2/14/2025)   Warfarin maintenance plan:  5 mg every Mon, Sat; 10 mg all other days   Weekly warfarin total:  60 mg   Plan last modified:  Olga Granados RPH (1/28/2025)   Next INR check:  2/21/2025   Priority:  Maintenance   Target end date:  --    Indications    Atrial flutter with controlled response [I48.92]                 Anticoagulation Episode Summary       INR check location:  Home Draw    Preferred lab:  --    Send INR reminders to:   [facility]    Comments:  [facility] home stephania          Anticoagulation Care Providers       Provider Role Specialty Phone number    Ty Shelton MD Referring Cardiology [phone]            Clinic Interview:  Patient Findings     Negatives:  Signs/symptoms of thrombosis, Signs/symptoms of bleeding,   Laboratory test error suspected, Change in health, Change in alcohol use,   Change in activity, Upcoming invasive procedure, Emergency department   visit, Upcoming dental procedure, Missed doses, Extra doses, Change in   medications, Change in diet/appetite, Hospital admission, Bruising, Other   complaints      Clinical Outcomes     Negatives:  Major bleeding event, Thromboembolic event,   Anticoagulation-related hospital admission, Anticoagulation-related ED   visit, Anticoagulation-related fatality        INR History:      1/22/2025     1:09 PM 1/28/2025    12:00 AM 1/28/2025     2:14 PM 2/3/2025    12:00 AM 2/3/2025     2:40 PM 2/14/2025    12:00 AM 2/14/2025     3:00 PM   Anticoagulation Monitoring   INR 1.80  1.90  2.70  3.80   INR Date 1/22/2025  1/28/2025  2/3/2025  2/14/2025   INR Goal 2.0-3.0  2.0-3.0  2.0-3.0  2.0-3.0   Trend Same  Up  Same  Same   Last Week Total 60 mg  60 mg  60 mg  60 mg   Next Week Total 60 mg  60 mg  60 mg  55 mg   Sun 10 mg  10 mg  10 mg  10 mg   Mon 5 mg  5 mg  5 mg  5  mg   Tue 10 mg  10 mg  10 mg  10 mg   Wed 10 mg (1/22)  10 mg  10 mg  10 mg   Thu 10 mg  10 mg  10 mg  10 mg   Fri 10 mg  10 mg  10 mg  5 mg (2/14)   Sat 5 mg  5 mg  5 mg  5 mg   Historical INR  1.90      2.70      3.80            This result is from an external source.       Plan:  1. INR is Supratherapeutic today- see above in Anticoagulation Summary.   Will instruct Riky Moon to Change their warfarin regimen- see above in Anticoagulation Summary.   Denies any changes. Partial to 5 mg then resume 5 mg mon, sat and 10 mg AOD, recheck in 1 week   2. Follow up in 1 weeks  3. They have been instructed to call if any changes in medications, doses, concerns, etc. Patient expresses understanding and has no further questions at this time.    Olga Granados [facility]

## 2025-02-20 ENCOUNTER — ANTICOAGULATION VISIT (OUTPATIENT)
Dept: PHARMACY | Facility: HOSPITAL | Age: 85
End: 2025-02-20
Payer: MEDICARE

## 2025-02-20 DIAGNOSIS — I48.92 ATRIAL FLUTTER WITH CONTROLLED RESPONSE: Primary | ICD-10-CM

## 2025-02-20 LAB — INR PPP: 2.7

## 2025-02-20 NOTE — PROGRESS NOTES
Anticoagulation Clinic Progress Note    Anticoagulation Summary  As of 2025      INR goal:  2.0-3.0   TTR:  50.3% (2.6 y)   INR used for dosin.70 (2025)   Warfarin maintenance plan:  5 mg every Mon, Sat; 10 mg all other days   Weekly warfarin total:  60 mg   No change documented:  Heron Delaney, PharmD   Plan last modified:  Olga Granados RPH (2025)   Next INR check:  2025   Priority:  Maintenance   Target end date:  --    Indications    Atrial flutter with controlled response [I48.92]                 Anticoagulation Episode Summary       INR check location:  Home Draw    Preferred lab:  --    Send INR reminders to:   [facility] [place]    Comments:  [facility] home stephan[facility]          Anticoagulation Care Providers       Provider Role Specialty Phone number    Ty Shelton MD Referring Cardiology [phone]            Clinic Interview:  Patient Findings     Negatives:  Signs/symptoms of thrombosis, Signs/symptoms of bleeding,   Laboratory test error suspected, Change in health, Change in alcohol use,   Change in activity, Upcoming invasive procedure, Emergency department   visit, Upcoming dental procedure, Missed doses, Extra doses, Change in   medications, Change in diet/appetite, Hospital admission, Bruising, Other   complaints      Clinical Outcomes     Negatives:  Major bleeding event, Thromboembolic event,   Anticoagulation-related hospital admission, Anticoagulation-related ED   visit, Anticoagulation-related fatality        INR History:      2025     2:14 PM 2/3/2025    12:00 AM 2/3/2025     2:40 PM 2025    12:00 AM 2025     3:00 PM 2025    12:00 AM 2025     1:38 PM   Anticoagulation Monitoring   INR 1.90  2.70  3.80  2.70   INR Date 2025  2/3/2025  2025  2025   INR Goal 2.0-3.0  2.0-3.0  2.0-3.0  2.0-3.0   Trend Up  Same  Same  Same   Last Week Total 60 mg  60 mg  60 mg  55 mg   Next Week Total 60 mg  60 mg  55 mg  60 mg   Sun 10 mg   10 mg  10 mg  10 mg   Mon 5 mg  5 mg  5 mg  5 mg   Tue 10 mg  10 mg  10 mg  10 mg   Wed 10 mg  10 mg  10 mg  10 mg   Thu 10 mg  10 mg  10 mg  10 mg   Fri 10 mg  10 mg  5 mg (2/14)  10 mg   Sat 5 mg  5 mg  5 mg  5 mg   Historical INR  2.70      3.80      2.70  C          C Corrected result    This result is from an external source.       Plan:  1. INR is Therapeutic today- see above in Anticoagulation Summary.   Will instruct Riky Moon to Continue their warfarin regimen at dose of 5 mg Mon/Sat, 10 mg all other days for now - see above in Anticoagulation Summary.  2. Follow up in 1 week.  3. They have been instructed to call if any changes in medications, doses, concerns, etc. Patient expresses understanding and has no further questions at this time.    Heron Delaney, PharmD

## 2025-02-27 ENCOUNTER — OFFICE VISIT (OUTPATIENT)
Dept: WOUND CARE | Facility: HOSPITAL | Age: 85
End: 2025-02-27
Payer: MEDICARE

## 2025-02-28 ENCOUNTER — ANTICOAGULATION VISIT (OUTPATIENT)
Dept: PHARMACY | Facility: HOSPITAL | Age: 85
End: 2025-02-28
Payer: MEDICARE

## 2025-02-28 DIAGNOSIS — I48.92 ATRIAL FLUTTER WITH CONTROLLED RESPONSE: Primary | ICD-10-CM

## 2025-02-28 LAB — INR PPP: 3.3

## 2025-02-28 NOTE — PROGRESS NOTES
Anticoagulation Clinic Progress Note    Anticoagulation Summary  As of 2/28/2025      INR goal:  2.0-3.0   TTR:  50.3% (2.6 y)   INR used for dosing:  3.30 (2/28/2025)   Warfarin maintenance plan:  5 mg every Mon, Sat; 10 mg all other days   Weekly warfarin total:  60 mg   Plan last modified:  Olga Granados RPH (2/28/2025)   Next INR check:  3/7/2025   Priority:  Maintenance   Target end date:  --    Indications    Atrial flutter with controlled response [I48.92]                 Anticoagulation Episode Summary       INR check location:  Home Draw    Preferred lab:  --    Send INR reminders to:   [facility]    Comments:  [facility] home stephania          Anticoagulation Care Providers       Provider Role Specialty Phone number    Ty Shelton MD Referring Cardiology [phone]            Clinic Interview:  Patient Findings     Positives:  Change in health    Negatives:  Signs/symptoms of thrombosis, Signs/symptoms of bleeding,   Laboratory test error suspected, Change in alcohol use, Change in   activity, Upcoming invasive procedure, Emergency department visit,   Upcoming dental procedure, Missed doses, Extra doses, Change in   medications, Change in diet/appetite, Hospital admission, Bruising, Other   complaints      Clinical Outcomes     Negatives:  Major bleeding event, Thromboembolic event,   Anticoagulation-related hospital admission, Anticoagulation-related ED   visit, Anticoagulation-related fatality        INR History:      2/3/2025     2:40 PM 2/14/2025    12:00 AM 2/14/2025     3:00 PM 2/20/2025    12:00 AM 2/20/2025     1:38 PM 2/28/2025    12:00 AM 2/28/2025     2:44 PM   Anticoagulation Monitoring   INR 2.70  3.80  2.70  3.30   INR Date 2/3/2025  2/14/2025  2/20/2025  2/28/2025   INR Goal 2.0-3.0  2.0-3.0  2.0-3.0  2.0-3.0   Trend Same  Same  Same  Same   Last Week Total 60 mg  60 mg  55 mg  60 mg   Next Week Total 60 mg  55 mg  60 mg  55 mg   Sun 10 mg  10 mg  10 mg  10 mg   Mon 5  mg  5 mg  5 mg  5 mg   Tue 10 mg  10 mg  10 mg  10 mg   Wed 10 mg  10 mg  10 mg  10 mg   Thu 10 mg  10 mg  10 mg  10 mg   Fri 10 mg  5 mg (2/14)  10 mg  5 mg (2/28)   Sat 5 mg  5 mg  5 mg  5 mg   Historical INR  3.80      2.70  C     3.30           C Corrected result    This result is from an external source.       Plan:  1. INR is Supratherapeutic today- see above in Anticoagulation Summary.   Will instruct Riky Moon to Change their warfarin regimen- see above in Anticoagulation Summary.  Patient had the flu over the past week, taking OTC cough medicine. Partial to 5 mg then resume on 5 mg MF, 10 mg AOD, rck 1 week   2. Follow up in 2 weeks  3. They have been instructed to call if any changes in medications, doses, concerns, etc. Patient expresses understanding and has no further questions at this time.    Olga Granados [facility]

## 2025-03-06 ENCOUNTER — ANTICOAGULATION VISIT (OUTPATIENT)
Dept: PHARMACY | Facility: HOSPITAL | Age: 85
End: 2025-03-06
Payer: MEDICARE

## 2025-03-06 ENCOUNTER — OFFICE VISIT (OUTPATIENT)
Dept: WOUND CARE | Facility: HOSPITAL | Age: 85
End: 2025-03-06
Payer: MEDICARE

## 2025-03-06 DIAGNOSIS — I48.92 ATRIAL FLUTTER WITH CONTROLLED RESPONSE: Primary | ICD-10-CM

## 2025-03-06 LAB — INR PPP: 1.8

## 2025-03-06 NOTE — PROGRESS NOTES
Anticoagulation Clinic Progress Note    Anticoagulation Summary  As of 3/6/2025      INR goal:  2.0-3.0   TTR:  50.4% (2.6 y)   INR used for dosin.80 (3/6/2025)   Warfarin maintenance plan:  5 mg every Mon, Sat; 10 mg all other days   Weekly warfarin total:  60 mg   No change documented:  Olga Granados RPH   Plan last modified:  Olga Granados RPH (2025)   Next INR check:  3/13/2025   Priority:  Maintenance   Target end date:  --    Indications    Atrial flutter with controlled response [I48.92]                 Anticoagulation Episode Summary       INR check location:  Home Draw    Preferred lab:  --    Send INR reminders to:   [facility] [place]    Comments:  [facility] home stephania          Anticoagulation Care Providers       Provider Role Specialty Phone number    Ty Shelton MD Referring Cardiology [phone]            Clinic Interview:  Patient Findings     Negatives:  Signs/symptoms of thrombosis, Signs/symptoms of bleeding,   Laboratory test error suspected, Change in health, Change in alcohol use,   Change in activity, Upcoming invasive procedure, Emergency department   visit, Upcoming dental procedure, Missed doses, Extra doses, Change in   medications, Change in diet/appetite, Hospital admission, Bruising, Other   complaints      Clinical Outcomes     Negatives:  Major bleeding event, Thromboembolic event,   Anticoagulation-related hospital admission, Anticoagulation-related ED   visit, Anticoagulation-related fatality        INR History:      2025     3:00 PM 2025    12:00 AM 2025     1:38 PM 2025    12:00 AM 2025     2:44 PM 3/6/2025    12:00 AM 3/6/2025     2:22 PM   Anticoagulation Monitoring   INR 3.80  2.70  3.30  1.80   INR Date 2025  2025  2025  3/6/2025   INR Goal 2.0-3.0  2.0-3.0  2.0-3.0  2.0-3.0   Trend Same  Same  Same  Same   Last Week Total 60 mg  55 mg  60 mg  50 mg   Next Week Total 55 mg  60 mg  55 mg  60 mg   Sun 10 mg   10 mg  10 mg  10 mg   Mon 5 mg  5 mg  5 mg  5 mg   Tue 10 mg  10 mg  10 mg  10 mg   Wed 10 mg  10 mg  10 mg  10 mg   Thu 10 mg  10 mg  10 mg  10 mg   Fri 5 mg (2/14)  10 mg  5 mg (2/28)  10 mg   Sat 5 mg  5 mg  5 mg  5 mg   Historical INR  2.70  C     3.30      1.80           C Corrected result    This result is from an external source.       Plan:  1. INR is Subtherapeutic today- see above in Anticoagulation Summary.   Will instruct Riky Moon to resume their warfarin regimen- see above in Anticoagulation Summary.  Only was able to take 5 mg on Sunday due to a refill issue. Resume 5 mg Mon, sat and 10 mg AOD. Likely going to require 5 mg three times weekly based on the last few INR results.   2. Follow up in 1 weeks  3. They have been instructed to call if any changes in medications, doses, concerns, etc. Patient expresses understanding and has no further questions at this time.    Olga Granados [facility]

## 2025-03-12 ENCOUNTER — TRANSCRIBE ORDERS (OUTPATIENT)
Dept: ADMINISTRATIVE | Facility: HOSPITAL | Age: 85
End: 2025-03-12
Payer: MEDICARE

## 2025-03-12 DIAGNOSIS — N18.31 CHRONIC KIDNEY DISEASE, STAGE 3A: Primary | ICD-10-CM

## 2025-03-13 ENCOUNTER — OFFICE VISIT (OUTPATIENT)
Dept: WOUND CARE | Facility: HOSPITAL | Age: 85
End: 2025-03-13
Payer: MEDICARE

## 2025-03-14 LAB
ALBUMIN SERPL-MCNC: 3.9 G/DL (ref 3.5–5.2)
ALBUMIN/CREAT UR: 43 MG/G CREAT (ref 0–29)
ALBUMIN/GLOB SERPL: 1.6 G/DL
ALP SERPL-CCNC: 55 U/L (ref 39–117)
ALT SERPL-CCNC: 21 U/L (ref 1–41)
AST SERPL-CCNC: 23 U/L (ref 1–40)
BASOPHILS # BLD AUTO: 0.08 10*3/MM3 (ref 0–0.2)
BASOPHILS NFR BLD AUTO: 0.8 % (ref 0–1.5)
BILIRUB SERPL-MCNC: 0.6 MG/DL (ref 0–1.2)
BUN SERPL-MCNC: 21 MG/DL (ref 8–23)
BUN/CREAT SERPL: 16.9 (ref 7–25)
CALCIUM SERPL-MCNC: 9.3 MG/DL (ref 8.6–10.5)
CHLORIDE SERPL-SCNC: 104 MMOL/L (ref 98–107)
CHOLEST SERPL-MCNC: 116 MG/DL (ref 0–200)
CO2 SERPL-SCNC: 24.7 MMOL/L (ref 22–29)
CREAT SERPL-MCNC: 1.24 MG/DL (ref 0.76–1.27)
CREAT UR-MCNC: 80.5 MG/DL
EGFRCR SERPLBLD CKD-EPI 2021: 57 ML/MIN/1.73
EOSINOPHIL # BLD AUTO: 0.12 10*3/MM3 (ref 0–0.4)
EOSINOPHIL NFR BLD AUTO: 1.2 % (ref 0.3–6.2)
ERYTHROCYTE [DISTWIDTH] IN BLOOD BY AUTOMATED COUNT: 16.6 % (ref 12.3–15.4)
FT4I SERPL CALC-MCNC: 2.5 (ref 1.2–4.9)
GLOBULIN SER CALC-MCNC: 2.4 GM/DL
GLUCOSE SERPL-MCNC: 117 MG/DL (ref 65–99)
HBA1C MFR BLD: 7 % (ref 4.8–5.6)
HCT VFR BLD AUTO: 37.2 % (ref 37.5–51)
HDLC SERPL-MCNC: 41 MG/DL (ref 40–60)
HGB BLD-MCNC: 12.3 G/DL (ref 13–17.7)
IMM GRANULOCYTES # BLD AUTO: 0.05 10*3/MM3 (ref 0–0.05)
IMM GRANULOCYTES NFR BLD AUTO: 0.5 % (ref 0–0.5)
LDLC SERPL CALC-MCNC: 58 MG/DL (ref 0–100)
LDLC/HDLC SERPL: 1.39 {RATIO}
LYMPHOCYTES # BLD AUTO: 1.94 10*3/MM3 (ref 0.7–3.1)
LYMPHOCYTES NFR BLD AUTO: 19.8 % (ref 19.6–45.3)
MCH RBC QN AUTO: 36.7 PG (ref 26.6–33)
MCHC RBC AUTO-ENTMCNC: 33.1 G/DL (ref 31.5–35.7)
MCV RBC AUTO: 111 FL (ref 79–97)
MICROALBUMIN UR-MCNC: 34.4 UG/ML
MONOCYTES # BLD AUTO: 0.91 10*3/MM3 (ref 0.1–0.9)
MONOCYTES NFR BLD AUTO: 9.3 % (ref 5–12)
NEUTROPHILS # BLD AUTO: 6.69 10*3/MM3 (ref 1.7–7)
NEUTROPHILS NFR BLD AUTO: 68.4 % (ref 42.7–76)
PLATELET # BLD AUTO: 211 10*3/MM3 (ref 140–450)
POTASSIUM SERPL-SCNC: 5.1 MMOL/L (ref 3.5–5.2)
PROT SERPL-MCNC: 6.3 G/DL (ref 6–8.5)
RBC # BLD AUTO: 3.35 10*6/MM3 (ref 4.14–5.8)
SODIUM SERPL-SCNC: 141 MMOL/L (ref 136–145)
T3RU NFR SERPL: 29 % (ref 24–39)
T4 SERPL-MCNC: 8.6 UG/DL (ref 4.5–12)
TRIGL SERPL-MCNC: 90 MG/DL (ref 0–150)
TSH SERPL DL<=0.005 MIU/L-ACNC: 1.28 UIU/ML (ref 0.45–4.5)
VLDLC SERPL CALC-MCNC: 17 MG/DL (ref 5–40)
WBC # BLD AUTO: 9.79 10*3/MM3 (ref 3.4–10.8)

## 2025-03-18 ENCOUNTER — OFFICE VISIT (OUTPATIENT)
Dept: INTERNAL MEDICINE | Facility: CLINIC | Age: 85
End: 2025-03-18
Payer: MEDICARE

## 2025-03-18 ENCOUNTER — ANTICOAGULATION VISIT (OUTPATIENT)
Dept: PHARMACY | Facility: HOSPITAL | Age: 85
End: 2025-03-18
Payer: MEDICARE

## 2025-03-18 VITALS
SYSTOLIC BLOOD PRESSURE: 114 MMHG | HEIGHT: 72 IN | WEIGHT: 291 LBS | TEMPERATURE: 98 F | HEART RATE: 54 BPM | RESPIRATION RATE: 18 BRPM | DIASTOLIC BLOOD PRESSURE: 78 MMHG | BODY MASS INDEX: 39.42 KG/M2 | OXYGEN SATURATION: 95 %

## 2025-03-18 DIAGNOSIS — E11.9 TYPE 2 DIABETES MELLITUS WITHOUT COMPLICATION, WITHOUT LONG-TERM CURRENT USE OF INSULIN: ICD-10-CM

## 2025-03-18 DIAGNOSIS — E11.8 TYPE 2 DIABETES MELLITUS WITH COMPLICATION, WITHOUT LONG-TERM CURRENT USE OF INSULIN: ICD-10-CM

## 2025-03-18 DIAGNOSIS — I48.92 ATRIAL FLUTTER WITH CONTROLLED RESPONSE: Primary | ICD-10-CM

## 2025-03-18 DIAGNOSIS — E03.9 HYPOTHYROIDISM, UNSPECIFIED TYPE: ICD-10-CM

## 2025-03-18 DIAGNOSIS — I10 ESSENTIAL HYPERTENSION: ICD-10-CM

## 2025-03-18 DIAGNOSIS — E78.5 HYPERLIPIDEMIA, UNSPECIFIED HYPERLIPIDEMIA TYPE: Primary | ICD-10-CM

## 2025-03-18 LAB — INR PPP: 3.1

## 2025-03-18 PROCEDURE — G0249 PROVIDE INR TEST MATER/EQUIP: HCPCS

## 2025-03-18 RX ORDER — LEVOTHYROXINE SODIUM 125 MCG
125 TABLET ORAL DAILY
Qty: 30 TABLET | Refills: 11 | Status: SHIPPED | OUTPATIENT
Start: 2025-03-18

## 2025-03-18 RX ORDER — PRAVASTATIN SODIUM 40 MG
40 TABLET ORAL NIGHTLY
Qty: 90 TABLET | Refills: 3 | Status: SHIPPED | OUTPATIENT
Start: 2025-03-18

## 2025-03-18 RX ORDER — ATENOLOL 25 MG/1
12.5 TABLET ORAL DAILY
Qty: 45 TABLET | Refills: 3 | Status: SHIPPED | OUTPATIENT
Start: 2025-03-18

## 2025-03-18 NOTE — PROGRESS NOTES
Anticoagulation Clinic Progress Note    Anticoagulation Summary  As of 3/18/2025      INR goal:  2.0-3.0   TTR:  50.8% (2.7 y)   INR used for dosing:  3.10 (3/18/2025)   Warfarin maintenance plan:  5 mg every Mon, Sat; 10 mg all other days   Weekly warfarin total:  60 mg   No change documented:  Olga Granados RPH   Plan last modified:  Olga Granados RPH (2/28/2025)   Next INR check:  3/25/2025   Priority:  Maintenance   Target end date:  --    Indications    Atrial flutter with controlled response [I48.92]                 Anticoagulation Episode Summary       INR check location:  Home Draw    Preferred lab:  --    Send INR reminders to:   [facility] [place]    Comments:  [facility] home stephania          Anticoagulation Care Providers       Provider Role Specialty Phone number    Ty Shetlon MD Referring Cardiology [phone]            Clinic Interview:  Patient Findings     Negatives:  Signs/symptoms of thrombosis, Signs/symptoms of bleeding,   Laboratory test error suspected, Change in health, Change in alcohol use,   Change in activity, Upcoming invasive procedure, Emergency department   visit, Upcoming dental procedure, Missed doses, Extra doses, Change in   medications, Change in diet/appetite, Hospital admission, Bruising, Other   complaints      Clinical Outcomes     Negatives:  Major bleeding event, Thromboembolic event,   Anticoagulation-related hospital admission, Anticoagulation-related ED   visit, Anticoagulation-related fatality        INR History:      2/20/2025     1:38 PM 2/28/2025    12:00 AM 2/28/2025     2:44 PM 3/6/2025    12:00 AM 3/6/2025     2:22 PM 3/18/2025    12:00 AM 3/18/2025     3:15 PM   Anticoagulation Monitoring   INR 2.70  3.30  1.80  3.10   INR Date 2/20/2025  2/28/2025  3/6/2025  3/18/2025   INR Goal 2.0-3.0  2.0-3.0  2.0-3.0  2.0-3.0   Trend Same  Same  Same  Same   Last Week Total 55 mg  60 mg  50 mg  60 mg   Next Week Total 60 mg  55 mg  60 mg  60 mg   Sun 10  mg  10 mg  10 mg  10 mg   Mon 5 mg  5 mg  5 mg  5 mg   Tue 10 mg  10 mg  10 mg  10 mg   Wed 10 mg  10 mg  10 mg  10 mg   Thu 10 mg  10 mg  10 mg  10 mg   Fri 10 mg  5 mg (2/28)  10 mg  10 mg   Sat 5 mg  5 mg  5 mg  5 mg   Historical INR  3.30      1.80      3.10        Visit Report    Report Report Report Report       This result is from an external source.       Plan:  1. INR is Supratherapeutic today- see above in Anticoagulation Summary.   Will instruct Riky Moon to Continue their warfarin regimen as INR is very close to goal range- see above in Anticoagulation Summary.  2. Follow up in 1 weeks  3. They have been instructed to call if any changes in medications, doses, concerns, etc. Patient expresses understanding and has no further questions at this time.    Olga Granados [facility] located [facility]

## 2025-03-19 NOTE — PROGRESS NOTES
"Chief Complaint  Diabetes    Subjective          Riky Moon presents to [facility]  History of Present Illness  The patient is an 84-year-old male who came in for a check-up on his hypothyroidism, high cholesterol, diabetes, high blood pressure, COPD, and anemia.    He says he feels generally alright but has noticed it's getting a bit harder to move around. He's been using a walker to help with this. He recently saw his kidney doctor, who went over his lab results and found a small amount of protein in his urine. A CT scan has been scheduled by his kidney doctor.    He also saw his lung doctor yesterday and is continuing to use the inhaler that was prescribed. He was told to use up the remaining capsules from an older prescription before going back to the inhaler. The lung doctor said he didn't need to start a steroid because he's doing well.    [facility]    MEDICATIONS  Synthroid, pravastatin, metformin, Jardiance, atenolol, Stiolto inhaler, albuterol inhaler    Objective   Vital Signs:   /78 (BP Location: Left arm, Patient Position: Sitting)   Pulse 54   Temp 98 °F (36.7 °C) (Oral)   Resp 18   Ht 182.9 cm (72.01\")   Wt 132 kg (291 lb)   SpO2 95%   BMI 39.46 kg/m²     Physical Exam  Vitals and nursing note reviewed.   Constitutional:       Appearance: He is well-developed.   HENT:      Head: Normocephalic and atraumatic.   Musculoskeletal:      Cervical back: Normal range of motion and neck supple.   Neurological:      Mental Status: He is alert and oriented to person, place, and time.   Psychiatric:         Behavior: Behavior normal.         Physical Exam       Result Review :                 Assessment and Plan    Diagnoses and all orders for this visit:    1. Hyperlipidemia, unspecified hyperlipidemia type (Primary)  -     pravastatin (PRAVACHOL) 40 MG tablet; Take 1 tablet by mouth Every Night.  Dispense: 90 tablet; Refill: " 3  -     Comprehensive Metabolic Panel  -     Lipid Panel With LDL / HDL Ratio    2. Type 2 diabetes mellitus with complication, without long-term current use of insulin  -     metFORMIN (GLUCOPHAGE) 850 MG tablet; Take 1 tablet by mouth 2 (Two) Times a Day With Meals.  Dispense: 30 tablet; Refill: 6  -     CBC & Differential  -     Comprehensive Metabolic Panel  -     Hemoglobin A1c  -     Microalbumin / Creatinine Urine Ratio - Urine, Clean Catch    3. Type 2 diabetes mellitus without complication, without long-term current use of insulin  -     empagliflozin (Jardiance) 10 MG tablet tablet; Take 1 tablet by mouth Daily.  Dispense: 30 tablet; Refill: 11    4. Essential hypertension  -     atenolol (TENORMIN) 25 MG tablet; Take 0.5 tablets by mouth Daily.  Dispense: 45 tablet; Refill: 3  -     CBC & Differential  -     Comprehensive Metabolic Panel    5. Hypothyroidism, unspecified type  -     Synthroid 125 MCG tablet; Take 1 tablet by mouth Daily.  Dispense: 30 tablet; Refill: 11  -     Thyroid Panel With TSH      Assessment & Plan  1. Hypothyroidism.  He is currently taking Synthroid 125 mcg daily. His thyroid panel looks fantastic.    2. Hyperlipidemia.  He is on pravastatin 40 mg daily. His lipid panel looks great.    3. Diabetes Mellitus.  He is on metformin 850 mg twice a day and Jardiance 10 mg daily. His A1c is 7, which is steady. He is advised to have his eyes checked once a year due to his diabetes. A1c will be checked twice a year.    4. Hypertension.  He is taking atenolol 25 mg daily.    5. Chronic Obstructive Pulmonary Disease (COPD).  He is using the Stiolto inhaler and an albuterol inhaler. He visited his pulmonary doctor yesterday, who advised him to continue using the current inhalers and did not see a need for a steroid inhaler.    6. Anemia.  He is still slightly anemic based on his recent CBC.    7. Renal Insufficiency.  His recent labs show a slightly elevated microalbumin creatinine ratio at  43 and a decreased GFR. He saw his nephrologist last week and has a CT scan scheduled. The addition of lisinopril was considered but deferred due to slightly high potassium levels.    Follow-up  The patient will follow up in 3 months.    Follow Up   No follow-ups on file.  Patient was given instructions and counseling regarding his condition or for health maintenance advice. Please see specific information pulled into the AVS if appropriate.           Patient or patient representative verbalized consent for the use of Ambient Listening during the visit with  Marco Carrillo MD for chart documentation. 3/19/2025  10:12 EDT

## 2025-03-20 ENCOUNTER — OFFICE VISIT (OUTPATIENT)
Dept: WOUND CARE | Facility: HOSPITAL | Age: 85
End: 2025-03-20
Payer: MEDICARE

## 2025-03-25 ENCOUNTER — ANTICOAGULATION VISIT (OUTPATIENT)
Dept: PHARMACY | Facility: HOSPITAL | Age: 85
End: 2025-03-25
Payer: MEDICARE

## 2025-03-25 DIAGNOSIS — I48.92 ATRIAL FLUTTER WITH CONTROLLED RESPONSE: Primary | ICD-10-CM

## 2025-03-25 LAB — INR PPP: 2.9

## 2025-03-25 NOTE — PROGRESS NOTES
Anticoagulation Clinic Progress Note    Anticoagulation Summary  As of 3/25/2025      INR goal:  2.0-3.0   TTR:  50.8% (2.7 y)   INR used for dosin.90 (3/25/2025)   Warfarin maintenance plan:  5 mg every Mon, Sat; 10 mg all other days   Weekly warfarin total:  60 mg   No change documented:  Olga Granados RPH   Plan last modified:  Olga Granados RPH (2025)   Next INR check:  2025   Priority:  Maintenance   Target end date:  --    Indications    Atrial flutter with controlled response [I48.92]                 Anticoagulation Episode Summary       INR check location:  Home Draw    Preferred lab:  --    Send INR reminders to:   [facility] [place]    Comments:  [facility] home stephania          Anticoagulation Care Providers       Provider Role Specialty Phone number    Ty Shelton MD Referring Cardiology [phone]            Clinic Interview:  No pertinent clinical findings have been reported.    INR History:      2025     2:44 PM 3/6/2025    12:00 AM 3/6/2025     2:22 PM 3/18/2025    12:00 AM 3/18/2025     3:15 PM 3/25/2025    12:00 AM 3/25/2025     2:22 PM   Anticoagulation Monitoring   INR 3.30  1.80  3.10  2.90   INR Date 2025  3/6/2025  3/18/2025  3/25/2025   INR Goal 2.0-3.0  2.0-3.0  2.0-3.0  2.0-3.0   Trend Same  Same  Same  Same   Last Week Total 60 mg  50 mg  60 mg  60 mg   Next Week Total 55 mg  60 mg  60 mg  60 mg   Sun 10 mg  10 mg  10 mg  10 mg   Mon 5 mg  5 mg  5 mg  5 mg   Tue 10 mg  10 mg  10 mg  10 mg   Wed 10 mg  10 mg  10 mg  10 mg   Thu 10 mg  10 mg  10 mg  10 mg   Fri 5 mg ()  10 mg  10 mg  10 mg   Sat 5 mg  5 mg  5 mg  5 mg   Historical INR  1.80      3.10      2.90        Visit Report  Report Report Report Report         This result is from an external source.       Plan:  1. INR is therapeutic today- see above in Anticoagulation Summary.    Riky Moon to continue their warfarin regimen- see above in Anticoagulation Summary.  2. Follow up in 1  week  3. Pt has agreed to only be called if INR out of range. They have been instructed to call if any changes in medications, doses, concerns, etc. Patient expresses understanding and has no further questions at this time.    Olga Granados [facility]

## 2025-03-28 ENCOUNTER — OFFICE VISIT (OUTPATIENT)
Dept: WOUND CARE | Facility: HOSPITAL | Age: 85
End: 2025-03-28
Payer: MEDICARE

## 2025-04-01 ENCOUNTER — HOSPITAL ENCOUNTER (OUTPATIENT)
Dept: ULTRASOUND IMAGING | Facility: HOSPITAL | Age: 85
Discharge: HOME | End: 2025-04-01
Admitting: HOSPITALIST
Payer: MEDICARE

## 2025-04-01 DIAGNOSIS — N18.31 CHRONIC KIDNEY DISEASE, STAGE 3A: ICD-10-CM

## 2025-04-01 PROCEDURE — 76775 US EXAM ABDO BACK WALL LIM: CPT

## 2025-04-02 ENCOUNTER — ANTICOAGULATION VISIT (OUTPATIENT)
Dept: PHARMACY | Facility: HOSPITAL | Age: 85
End: 2025-04-02
Payer: MEDICARE

## 2025-04-02 DIAGNOSIS — I48.92 ATRIAL FLUTTER WITH CONTROLLED RESPONSE: Primary | ICD-10-CM

## 2025-04-02 LAB — INR PPP: 3.4

## 2025-04-02 NOTE — PROGRESS NOTES
Anticoagulation Clinic Progress Note    Anticoagulation Summary  As of 4/2/2025      INR goal:  2.0-3.0   TTR:  50.5% (2.7 y)   INR used for dosing:  3.40 (4/2/2025)   Warfarin maintenance plan:  5 mg every Mon, Wed, Sat; 10 mg all other days   Weekly warfarin total:  55 mg   Plan last modified:  Olga Granados RPH (4/2/2025)   Next INR check:  4/9/2025   Priority:  Maintenance   Target end date:  --    Indications    Atrial flutter with controlled response [I48.92]                 Anticoagulation Episode Summary       INR check location:  Home Draw    Preferred lab:  --    Send INR reminders to:   [facility]    Comments:  [facility] home stephania          Anticoagulation Care Providers       Provider Role Specialty Phone number    Ty Shelton MD Referring Cardiology [phone]            Clinic Interview:  Patient Findings     Negatives:  Signs/symptoms of thrombosis, Signs/symptoms of bleeding,   Laboratory test error suspected, Change in health, Change in alcohol use,   Change in activity, Upcoming invasive procedure, Emergency department   visit, Upcoming dental procedure, Missed doses, Extra doses, Change in   medications, Change in diet/appetite, Hospital admission, Bruising, Other   complaints      Clinical Outcomes     Negatives:  Major bleeding event, Thromboembolic event,   Anticoagulation-related hospital admission, Anticoagulation-related ED   visit, Anticoagulation-related fatality        INR History:      3/6/2025     2:22 PM 3/18/2025    12:00 AM 3/18/2025     3:15 PM 3/25/2025    12:00 AM 3/25/2025     2:22 PM 4/2/2025    12:00 AM 4/2/2025     1:50 PM   Anticoagulation Monitoring   INR 1.80  3.10  2.90  3.40   INR Date 3/6/2025  3/18/2025  3/25/2025  4/2/2025   INR Goal 2.0-3.0  2.0-3.0  2.0-3.0  2.0-3.0   Trend Same  Same  Same  Down   Last Week Total 50 mg  60 mg  60 mg  60 mg   Next Week Total 60 mg  60 mg  60 mg  55 mg   Sun 10 mg  10 mg  10 mg  10 mg   Mon 5 mg  5 mg  5 mg   5 mg   Tue 10 mg  10 mg  10 mg  10 mg   Wed 10 mg  10 mg  10 mg  5 mg   Thu 10 mg  10 mg  10 mg  10 mg   Fri 10 mg  10 mg  10 mg  10 mg   Sat 5 mg  5 mg  5 mg  5 mg   Historical INR  3.10      2.90      3.40        Visit Report Report Report Report           This result is from an external source.       Plan:  1. INR is Supratherapeutic today- see above in Anticoagulation Summary.   Will instruct Riky Moon to Change their warfarin regimen- see above in Anticoagulation Summary.      Trial on 5 mg MWSa, 10 mg BRODERICK, al 1 week   2. Follow up in 1 weeks  3. They have been instructed to call if any changes in medications, doses, concerns, etc. Patient expresses understanding and has no further questions at this time.    Olga Granados [facility]

## 2025-04-03 ENCOUNTER — OFFICE VISIT (OUTPATIENT)
Dept: WOUND CARE | Facility: HOSPITAL | Age: 85
End: 2025-04-03
Payer: MEDICARE

## 2025-04-09 ENCOUNTER — ANTICOAGULATION VISIT (OUTPATIENT)
Dept: PHARMACY | Facility: HOSPITAL | Age: 85
End: 2025-04-09
Payer: MEDICARE

## 2025-04-09 DIAGNOSIS — I48.92 ATRIAL FLUTTER WITH CONTROLLED RESPONSE: Primary | ICD-10-CM

## 2025-04-09 LAB — INR PPP: 3.4

## 2025-04-09 NOTE — PROGRESS NOTES
Anticoagulation Clinic Progress Note    Anticoagulation Summary  As of 4/9/2025      INR goal:  2.0-3.0   TTR:  50.2% (2.7 y)   INR used for dosing:  3.40 (4/9/2025)   Warfarin maintenance plan:  10 mg every Sun, Tue, Thu; 5 mg all other days   Weekly warfarin total:  50 mg   Plan last modified:  Olga Granados RPH (4/9/2025)   Next INR check:  4/16/2025   Priority:  Maintenance   Target end date:  --    Indications    Atrial flutter with controlled response [I48.92]                 Anticoagulation Episode Summary       INR check location:  Home Draw    Preferred lab:  --    Send INR reminders to:   [facility]    Comments:  [facility] home stephania          Anticoagulation Care Providers       Provider Role Specialty Phone number    Ty hSelton MD Referring Cardiology [phone]            Clinic Interview:  Patient Findings     Negatives:  Signs/symptoms of thrombosis, Signs/symptoms of bleeding,   Laboratory test error suspected, Change in health, Change in alcohol use,   Change in activity, Upcoming invasive procedure, Emergency department   visit, Upcoming dental procedure, Missed doses, Extra doses, Change in   medications, Change in diet/appetite, Hospital admission, Bruising, Other   complaints      Clinical Outcomes     Negatives:  Major bleeding event, Thromboembolic event,   Anticoagulation-related hospital admission, Anticoagulation-related ED   visit, Anticoagulation-related fatality        INR History:      3/18/2025     3:15 PM 3/25/2025    12:00 AM 3/25/2025     2:22 PM 4/2/2025    12:00 AM 4/2/2025     1:50 PM 4/9/2025    12:00 AM 4/9/2025     3:07 PM   Anticoagulation Monitoring   INR 3.10  2.90  3.40  3.40   INR Date 3/18/2025  3/25/2025  4/2/2025  4/9/2025   INR Goal 2.0-3.0  2.0-3.0  2.0-3.0  2.0-3.0   Trend Same  Same  Down  Down   Last Week Total 60 mg  60 mg  60 mg  55 mg   Next Week Total 60 mg  60 mg  55 mg  50 mg   Sun 10 mg  10 mg  10 mg  10 mg   Mon 5 mg  5 mg  5 mg   5 mg   Tue 10 mg  10 mg  10 mg  10 mg   Wed 10 mg  10 mg  5 mg  5 mg   Thu 10 mg  10 mg  10 mg  10 mg   Fri 10 mg  10 mg  10 mg  5 mg   Sat 5 mg  5 mg  5 mg  5 mg   Historical INR  2.90      3.40      3.40        Visit Report Report             This result is from an external source.       Plan:  1. INR is Supratherapeutic today- see above in Anticoagulation Summary.   Will instruct Riky Moon to Change their warfarin regimen- see above in Anticoagulation Summary.  Decrease to 10 mg on sun, tues, thurs and 5 mg al VILLAFANA 1 week   2. Follow up in 1 weeks  3. They have been instructed to call if any changes in medications, doses, concerns, etc. Patient expresses understanding and has no further questions at this time.    Olga Granados RP

## 2025-04-10 ENCOUNTER — OFFICE VISIT (OUTPATIENT)
Dept: WOUND CARE | Facility: HOSPITAL | Age: 85
End: 2025-04-10
Payer: MEDICARE

## 2025-04-22 ENCOUNTER — ANTICOAGULATION VISIT (OUTPATIENT)
Dept: PHARMACY | Facility: HOSPITAL | Age: 85
End: 2025-04-22
Payer: MEDICARE

## 2025-04-22 DIAGNOSIS — I48.92 ATRIAL FLUTTER WITH CONTROLLED RESPONSE: Primary | ICD-10-CM

## 2025-04-22 LAB — INR PPP: 2.3

## 2025-04-22 PROCEDURE — G0249 PROVIDE INR TEST MATER/EQUIP: HCPCS

## 2025-04-22 NOTE — PROGRESS NOTES
Anticoagulation Clinic Progress Note    Anticoagulation Summary  As of 2025      INR goal:  2.0-3.0   TTR:  50.3% (2.8 y)   INR used for dosin.30 (2025)   Warfarin maintenance plan:  10 mg every Sun, Tue, Thu; 5 mg all other days   Weekly warfarin total:  50 mg   No change documented:  Olga Granados RPH   Plan last modified:  Olga Granados RPH (2025)   Next INR check:  2025   Priority:  Maintenance   Target end date:  --    Indications    Atrial flutter with controlled response [I48.92]                 Anticoagulation Episode Summary       INR check location:  Home Draw    Preferred lab:  --    Send INR reminders to:  [facility]    Comments:  [facility]          Anticoagulation Care Providers       Provider Role Specialty Phone number    Ty Shelton MD Referring Cardiology [phone]            Clinic Interview:  No pertinent clinical findings have been reported.    INR History:      3/25/2025     2:22 PM 2025    12:00 AM 2025     1:50 PM 2025    12:00 AM 2025     3:07 PM 2025    12:00 AM 2025     1:30 PM   Anticoagulation Monitoring   INR 2.90  3.40  3.40  2.30   INR Date 3/25/2025  2025  2025  2025   INR Goal 2.0-3.0  2.0-3.0  2.0-3.0  2.0-3.0   Trend Same  Down  Down  Same   Last Week Total 60 mg  60 mg  55 mg  50 mg   Next Week Total 60 mg  55 mg  50 mg  50 mg   Sun 10 mg  10 mg  10 mg  10 mg   Mon 5 mg  5 mg  5 mg  5 mg   Tue 10 mg  10 mg  10 mg  10 mg   Wed 10 mg  5 mg  5 mg  5 mg   Thu 10 mg  10 mg  10 mg  10 mg   Fri 10 mg  10 mg  5 mg  5 mg   Sat 5 mg  5 mg  5 mg  5 mg   Historical INR  3.40      3.40      2.30            This result is from an external source.       Plan:  1. INR is therapeutic today- see above in Anticoagulation Summary.    Riky BARBER Moon to continue their warfarin regimen- see above in Anticoagulation Summary.  2. Follow up in 1 week  3. Pt has agreed to only be called if INR out of  range. They have been instructed to call if any changes in medications, doses, concerns, etc. Patient expresses understanding and has no further questions at this time.    Olga Granados, [facility]

## 2025-04-24 ENCOUNTER — OFFICE VISIT (OUTPATIENT)
Dept: WOUND CARE | Facility: HOSPITAL | Age: 85
End: 2025-04-24
Payer: MEDICARE

## 2025-05-01 ENCOUNTER — OFFICE VISIT (OUTPATIENT)
Dept: WOUND CARE | Facility: HOSPITAL | Age: 85
End: 2025-05-01
Payer: MEDICARE

## 2025-05-02 ENCOUNTER — ANTICOAGULATION VISIT (OUTPATIENT)
Dept: PHARMACY | Facility: HOSPITAL | Age: 85
End: 2025-05-02
Payer: MEDICARE

## 2025-05-02 DIAGNOSIS — I48.92 ATRIAL FLUTTER WITH CONTROLLED RESPONSE: Primary | ICD-10-CM

## 2025-05-02 LAB — INR PPP: 1.7

## 2025-05-02 NOTE — PROGRESS NOTES
Anticoagulation Clinic Progress Note    Anticoagulation Summary  As of 2025      INR goal:  2.0-3.0   TTR:  50.3% (2.8 y)   INR used for dosin.70 (2025)   Warfarin maintenance plan:  10 mg every Sun, Tue, Thu; 5 mg all other days   Weekly warfarin total:  50 mg   Plan last modified:  Olga Granados RPH (2025)   Next INR check:  2025   Priority:  Maintenance   Target end date:  --    Indications    Atrial flutter with controlled response [I48.92]                 Anticoagulation Episode Summary       INR check location:  Home Draw    Preferred lab:  --    Send INR reminders to:   [facility]    Comments:  [facility] ste[facility]          Anticoagulation Care Providers       Provider Role Specialty Phone number    Ty Shelton MD Referring Cardiology [phone]            Clinic Interview:  Patient Findings     Positives:  Missed doses    Negatives:  Signs/symptoms of thrombosis, Signs/symptoms of bleeding,   Laboratory test error suspected, Change in health, Change in alcohol use,   Change in activity, Upcoming invasive procedure, Emergency department   visit, Upcoming dental procedure, Extra doses, Change in medications,   Change in diet/appetite, Hospital admission, Bruising, Other complaints      Clinical Outcomes     Negatives:  Major bleeding event, Thromboembolic event,   Anticoagulation-related hospital admission, Anticoagulation-related ED   visit, Anticoagulation-related fatality        INR History:      2025     1:50 PM 2025    12:00 AM 2025     3:07 PM 2025    12:00 AM 2025     1:30 PM 2025    12:00 AM 2025    11:45 AM   Anticoagulation Monitoring   INR 3.40  3.40  2.30  1.70   INR Date 2025   INR Goal 2.0-3.0  2.0-3.0  2.0-3.0  2.0-3.0   Trend Down  Down  Same  Same   Last Week Total 60 mg  55 mg  50 mg  45 mg   Next Week Total 55 mg  50 mg  50 mg  52.5 mg   Sun 10 mg  10 mg  10 mg  10 mg   Mon 5 mg  5  mg  5 mg  5 mg   Tue 10 mg  10 mg  10 mg  10 mg   Wed 5 mg  5 mg  5 mg  5 mg   Thu 10 mg  10 mg  10 mg  10 mg   Fri 10 mg  5 mg  5 mg  7.5 mg (5/2)   Sat 5 mg  5 mg  5 mg  5 mg   Historical INR  3.40      2.30      1.70            This result is from an external source.       Plan:  1. INR is Subtherapeutic today- see above in Anticoagulation Summary.   Will instruct Riky Moon to Increase their warfarin regimen- see above in Anticoagulation Summary.      missed 1 dose, boost to 7.5 mg then resume, rck 1 week    2. Follow up in 1 weeks  3. They have been instructed to call if any changes in medications, doses, concerns, etc. Patient expresses understanding and has no further questions at this time.    Olga Granados [facility]

## 2025-05-08 ENCOUNTER — OFFICE VISIT (OUTPATIENT)
Dept: WOUND CARE | Facility: HOSPITAL | Age: 85
End: 2025-05-08
Payer: MEDICARE

## 2025-05-09 ENCOUNTER — ANTICOAGULATION VISIT (OUTPATIENT)
Dept: PHARMACY | Facility: HOSPITAL | Age: 85
End: 2025-05-09
Payer: MEDICARE

## 2025-05-09 DIAGNOSIS — I48.92 ATRIAL FLUTTER WITH CONTROLLED RESPONSE: Primary | ICD-10-CM

## 2025-05-09 LAB — INR PPP: 2

## 2025-05-09 NOTE — PROGRESS NOTES
Anticoagulation Clinic Progress Note    Anticoagulation Summary  As of 2025      INR goal:  2.0-3.0   TTR:  50.0% (2.8 y)   INR used for dosin.00 (2025)   Warfarin maintenance plan:  10 mg every Sun, Tue, Thu; 5 mg all other days   Weekly warfarin total:  50 mg   No change documented:  Olga Granados RPH   Plan last modified:  lOga Granados RPH (2025)   Next INR check:  2025   Priority:  Maintenance   Target end date:  --    Indications    Atrial flutter with controlled response [I48.92]                 Anticoagulation Episode Summary       INR check location:  Home Draw    Preferred lab:  --    Send INR reminders to:  [facility]    Comments:  [facility]          Anticoagulation Care Providers       Provider Role Specialty Phone number    Ty Shelton MD Referring Cardiology [phone]            Clinic Interview:  No pertinent clinical findings have been reported.    INR History:      2025     3:07 PM 2025    12:00 AM 2025     1:30 PM 2025    12:00 AM 2025    11:45 AM 2025    12:00 AM 2025     1:21 PM   Anticoagulation Monitoring   INR 3.40  2.30  1.70  2.00   INR Date 2025   INR Goal 2.0-3.0  2.0-3.0  2.0-3.0  2.0-3.0   Trend Down  Same  Same  Same   Last Week Total 55 mg  50 mg  45 mg  52.5 mg   Next Week Total 50 mg  50 mg  52.5 mg  50 mg   Sun 10 mg  10 mg  10 mg  10 mg   Mon 5 mg  5 mg  5 mg  5 mg   Tue 10 mg  10 mg  10 mg  10 mg   Wed 5 mg  5 mg  5 mg  5 mg   Thu 10 mg  10 mg  10 mg  10 mg   Fri 5 mg  5 mg  7.5 mg ()  5 mg   Sat 5 mg  5 mg  5 mg  5 mg   Historical INR  2.30      1.70      2.00            This result is from an external source.       Plan:  1. INR is therapeutic today- see above in Anticoagulation Summary.    Riky Moon to continue their warfarin regimen- see above in Anticoagulation Summary.  2. Follow up in 1 week  3. Pt has agreed to only be called if INR out of  range. They have been instructed to call if any changes in medications, doses, concerns, etc. Patient expresses understanding and has no further questions at this time.    Olga Granados, [facility]

## 2025-05-15 ENCOUNTER — OFFICE VISIT (OUTPATIENT)
Dept: WOUND CARE | Facility: HOSPITAL | Age: 85
End: 2025-05-15
Payer: MEDICARE

## 2025-05-15 PROCEDURE — G0463 HOSPITAL OUTPT CLINIC VISIT: HCPCS

## 2025-05-19 ENCOUNTER — ANTICOAGULATION VISIT (OUTPATIENT)
Dept: PHARMACY | Facility: HOSPITAL | Age: 85
End: 2025-05-19
Payer: MEDICARE

## 2025-05-19 DIAGNOSIS — I48.92 ATRIAL FLUTTER WITH CONTROLLED RESPONSE: Primary | ICD-10-CM

## 2025-05-19 LAB — INR PPP: 2.7

## 2025-05-19 NOTE — PROGRESS NOTES
Anticoagulation Clinic Progress Note    Anticoagulation Summary  As of 2025      INR goal:  2.0-3.0   TTR:  50.5% (2.9 y)   INR used for dosin.70 (2025)   Warfarin maintenance plan:  10 mg every Sun, Tue, Thu; 5 mg all other days   Weekly warfarin total:  50 mg   No change documented:  Olga Granados RPH   Plan last modified:  Olga Granados RPH (2025)   Next INR check:  2025   Priority:  Maintenance   Target end date:  --    Indications    Atrial flutter with controlled response [I48.92]                 Anticoagulation Episode Summary       INR check location:  Home Draw    Preferred lab:  --    Send INR reminders to:  [facility]    Comments:  [facility]          Anticoagulation Care Providers       Provider Role Specialty Phone number    Ty Shelton MD Referring Cardiology [phone]            Clinic Interview:  No pertinent clinical findings have been reported.    INR History:      2025     1:30 PM 2025    12:00 AM 2025    11:45 AM 2025    12:00 AM 2025     1:21 PM 2025    12:00 AM 2025     2:14 PM   Anticoagulation Monitoring   INR 2.30  1.70  2.00  2.70   INR Date 2025   INR Goal 2.0-3.0  2.0-3.0  2.0-3.0  2.0-3.0   Trend Same  Same  Same  Same   Last Week Total 50 mg  45 mg  52.5 mg  50 mg   Next Week Total 50 mg  52.5 mg  50 mg  50 mg   Sun 10 mg  10 mg  10 mg  10 mg   Mon 5 mg  5 mg  5 mg  5 mg   Tue 10 mg  10 mg  10 mg  10 mg   Wed 5 mg  5 mg  5 mg  5 mg   Thu 10 mg  10 mg  10 mg  10 mg   Fri 5 mg  7.5 mg ()  5 mg  5 mg   Sat 5 mg  5 mg  5 mg  5 mg   Historical INR  1.70      2.00      2.70            This result is from an external source.       Plan:  1. INR is therapeutic today- see above in Anticoagulation Summary.    Riky Moon to continue their warfarin regimen- see above in Anticoagulation Summary.  2. Follow up in 1 week  3. Pt has agreed to only be called if INR  out of range. They have been instructed to call if any changes in medications, doses, concerns, etc. Patient expresses understanding and has no further questions at this time.    Olga Granados [facility]

## 2025-05-27 ENCOUNTER — TELEPHONE (OUTPATIENT)
Dept: INTERNAL MEDICINE | Facility: CLINIC | Age: 85
End: 2025-05-27
Payer: MEDICARE

## 2025-06-03 ENCOUNTER — TELEPHONE (OUTPATIENT)
Dept: INTERNAL MEDICINE | Facility: CLINIC | Age: 85
End: 2025-06-03

## 2025-06-03 NOTE — TELEPHONE ENCOUNTER
Caller: Riky Moon    Relationship: Self    Best call back number: [phone] - PLEASE LET PATIENT KNOW IF THIS HAS BEEN RECEIVED OR NOT     What was the call regarding: PATIENT STATES THAT ALEXANDER PEREIRA SENT OVER PAPERWORK (SEFERINO LUNA WOULD HAVE SENT THIS) AND NEEDED DR. APPLE'S RESPONSE. PLEASE ADVISE ASAP.

## 2025-06-03 NOTE — TELEPHONE ENCOUNTER
Hub staff attempted to follow warm transfer process and was unsuccessful     Caller: Riky Moon    Relationship to patient: Self    Best call back number:  [phone]    Patient is needing: PATIENT IS RETURNING A CALL FROM [facility]. PLEASE CALL AGAIN

## 2025-06-07 DIAGNOSIS — R32 URINARY INCONTINENCE, UNSPECIFIED TYPE: ICD-10-CM

## 2025-06-09 ENCOUNTER — ANTICOAGULATION VISIT (OUTPATIENT)
Dept: PHARMACY | Facility: HOSPITAL | Age: 85
End: 2025-06-09
Payer: MEDICARE

## 2025-06-09 DIAGNOSIS — I48.92 ATRIAL FLUTTER WITH CONTROLLED RESPONSE: Primary | ICD-10-CM

## 2025-06-09 LAB — INR PPP: 3.5

## 2025-06-09 PROCEDURE — G0249 PROVIDE INR TEST MATER/EQUIP: HCPCS

## 2025-06-09 RX ORDER — OXYBUTYNIN CHLORIDE 15 MG/1
15 TABLET, EXTENDED RELEASE ORAL NIGHTLY
Qty: 30 TABLET | Refills: 6 | Status: SHIPPED | OUTPATIENT
Start: 2025-06-09

## 2025-06-09 NOTE — TELEPHONE ENCOUNTER
Rx Refill Note  Requested Prescriptions     Pending Prescriptions Disp Refills    oxybutynin XL (DITROPAN XL) 15 MG 24 hr tablet [Pharmacy Med Name: OXYBUTYNIN ER 15MG TABLETS] 30 tablet 6     Sig: TAKE 1 TABLET BY MOUTH EVERY NIGHT      Last office visit with prescribing clinician: 3/18/2025   Last telemedicine visit with prescribing clinician: Visit date not found   Next office visit with prescribing clinician: 8/19/2025       Camille White  06/09/25, 10:20 EDT

## 2025-06-09 NOTE — PROGRESS NOTES
Anticoagulation Clinic Progress Note    Anticoagulation Summary  As of 6/9/2025      INR goal:  2.0-3.0   TTR:  50.2% (2.9 y)   INR used for dosing:  3.50 (6/9/2025)   Warfarin maintenance plan:  10 mg every Sun, Tue, Thu; 5 mg all other days   Weekly warfarin total:  50 mg   Plan last modified:  Olga Granados RPH (4/9/2025)   Next INR check:  6/16/2025   Priority:  Maintenance   Target end date:  --    Indications    Atrial flutter with controlled response [I48.92]                 Anticoagulation Episode Summary       INR check location:  Home Draw    Preferred lab:  --    Send INR reminders to:   [facility]    Comments:  [facility] ste[facility]          Anticoagulation Care Providers       Provider Role Specialty Phone number    Ty Shelton MD Referring Cardiology [phone]            Clinic Interview:  Patient Findings     Negatives:  Signs/symptoms of thrombosis, Signs/symptoms of bleeding,   Laboratory test error suspected, Change in health, Change in alcohol use,   Change in activity, Upcoming invasive procedure, Emergency department   visit, Upcoming dental procedure, Missed doses, Extra doses, Change in   medications, Change in diet/appetite, Hospital admission, Bruising, Other   complaints      Clinical Outcomes     Negatives:  Major bleeding event, Thromboembolic event,   Anticoagulation-related hospital admission, Anticoagulation-related ED   visit, Anticoagulation-related fatality        INR History:      5/2/2025    11:45 AM 5/9/2025    12:00 AM 5/9/2025     1:21 PM 5/19/2025    12:00 AM 5/19/2025     2:14 PM 6/9/2025    12:00 AM 6/9/2025     2:00 PM   Anticoagulation Monitoring   INR 1.70  2.00  2.70  3.50   INR Date 5/2/2025 5/9/2025 5/19/2025 6/9/2025   INR Goal 2.0-3.0  2.0-3.0  2.0-3.0  2.0-3.0   Trend Same  Same  Same  Same   Last Week Total 45 mg  52.5 mg  50 mg  55 mg   Next Week Total 52.5 mg  50 mg  50 mg  47.5 mg   Sun 10 mg  10 mg  10 mg  10 mg   Mon 5 mg  5 mg  5  mg  2.5 mg (6/9)   Tue 10 mg  10 mg  10 mg  10 mg   Wed 5 mg  5 mg  5 mg  5 mg   Thu 10 mg  10 mg  10 mg  10 mg   Fri 7.5 mg (5/2)  5 mg  5 mg  5 mg   Sat 5 mg  5 mg  5 mg  5 mg   Historical INR  2.00      2.70      3.50            This result is from an external source.       Plan:  1. INR is Supratherapeutic today- see above in Anticoagulation Summary.   Will instruct Riky Moon to Change their warfarin regimen- see above in Anticoagulation Summary.  Pt has been taking 5 mg on mon, wed, sat and 10 mg AOD. Partial to 2.5 mg then resume planned dose of 5 mg on mon, wed, fri and sat and 10 mg AOD, rck 1 week (Pt is moving and going to call us back with a new address)  2. Follow up in 1 weeks  3. They have been instructed to call if any changes in medications, doses, concerns, etc. Patient expresses understanding and has no further questions at this time.    Olga Granados [facility]

## 2025-06-12 RX ORDER — WARFARIN SODIUM 5 MG/1
TABLET ORAL
Start: 2025-06-12 | End: 2025-06-12

## 2025-06-12 RX ORDER — WARFARIN SODIUM 5 MG/1
TABLET ORAL
Qty: 130 TABLET | Refills: 1 | Status: SHIPPED | OUTPATIENT
Start: 2025-06-12 | End: 2025-06-16 | Stop reason: SDUPTHER

## 2025-06-16 RX ORDER — WARFARIN SODIUM 5 MG/1
TABLET ORAL
Qty: 130 TABLET | Refills: 0 | Status: SHIPPED | OUTPATIENT
Start: 2025-06-16

## 2025-06-16 NOTE — TELEPHONE ENCOUNTER
Refill requset received from [facility] [address] For an advance Refill on patients wafarin sod 5 MG tablets (peach)

## 2025-06-17 ENCOUNTER — ANTICOAGULATION VISIT (OUTPATIENT)
Dept: PHARMACY | Facility: HOSPITAL | Age: 85
End: 2025-06-17
Payer: MEDICARE

## 2025-06-17 DIAGNOSIS — I48.92 ATRIAL FLUTTER WITH CONTROLLED RESPONSE: Primary | ICD-10-CM

## 2025-06-17 LAB — INR PPP: 1.8

## 2025-06-17 NOTE — PROGRESS NOTES
Anticoagulation Clinic Progress Note    Anticoagulation Summary  As of 2025      INR goal:  2.0-3.0   TTR:  50.3% (2.9 y)   INR used for dosin.80 (2025)   Warfarin maintenance plan:  10 mg every Sun, Tue, Thu; 5 mg all other days   Weekly warfarin total:  50 mg   No change documented:  Heron Delaney, PharmD   Plan last modified:  Olga Granados RPH (2025)   Next INR check:  2025   Priority:  Maintenance   Target end date:  --    Indications    Atrial flutter with controlled response [I48.92]                 Anticoagulation Episode Summary       INR check location:  Home Draw    Preferred lab:  --    Send INR reminders to:  [facility] [place]    Comments:  [facility] home stephania          Anticoagulation Care Providers       Provider Role Specialty Phone number    Ty Shelton MD Referring Cardiology [phone]            Clinic Interview:  Patient Findings     Positives:  Missed doses    Negatives:  Signs/symptoms of thrombosis, Signs/symptoms of bleeding,   Laboratory test error suspected, Change in health, Change in alcohol use,   Change in activity, Upcoming invasive procedure, Emergency department   visit, Upcoming dental procedure, Extra doses, Change in medications,   Change in diet/appetite, Hospital admission, Bruising, Other complaints    Comments:  Reports he missed a dose one day last week.       Clinical Outcomes     Negatives:  Major bleeding event, Thromboembolic event,   Anticoagulation-related hospital admission, Anticoagulation-related ED   visit, Anticoagulation-related fatality    Comments:  Reports he missed a dose one day last week.         INR History:      2025     1:21 PM 2025    12:00 AM 2025     2:14 PM 2025    12:00 AM 2025     2:00 PM 2025    12:00 AM 2025     2:08 PM   Anticoagulation Monitoring   INR 2.00  2.70  3.50  1.80   INR Date 2025   INR Goal 2.0-3.0  2.0-3.0  2.0-3.0  2.0-3.0    Trend Same  Same  Same  Same   Last Week Total 52.5 mg  50 mg  55 mg  45 mg   Next Week Total 50 mg  50 mg  47.5 mg  50 mg   Sun 10 mg  10 mg  10 mg  10 mg   Mon 5 mg  5 mg  2.5 mg (6/9)  5 mg   Tue 10 mg  10 mg  10 mg  10 mg   Wed 5 mg  5 mg  5 mg  5 mg   Thu 10 mg  10 mg  10 mg  10 mg   Fri 5 mg  5 mg  5 mg  5 mg   Sat 5 mg  5 mg  5 mg  5 mg   Historical INR  2.70      3.50      1.80            This result is from an external source.       Plan:  1. INR is Subtherapeutic today- see above in Anticoagulation Summary.   Will instruct Riky BARBER Red to Continue their warfarin regimen at dose of 10 mg Sun/Tues/Thurs, 5 mg all other days - see above in Anticoagulation Summary.  2. Follow up in 1 week.  3. They have been instructed to call if any changes in medications, doses, concerns, etc. Patient expresses understanding and has no further questions at this time.    Heron Delaney, PharmD

## 2025-06-24 ENCOUNTER — ANTICOAGULATION VISIT (OUTPATIENT)
Dept: PHARMACY | Facility: HOSPITAL | Age: 85
End: 2025-06-24
Payer: MEDICARE

## 2025-06-24 DIAGNOSIS — I48.92 ATRIAL FLUTTER WITH CONTROLLED RESPONSE: Primary | ICD-10-CM

## 2025-06-24 LAB — INR PPP: 2.4

## 2025-06-24 NOTE — PROGRESS NOTES
Anticoagulation Clinic Progress Note    Anticoagulation Summary  As of 2025      INR goal:  2.0-3.0   TTR:  50.4% (3 y)   INR used for dosin.40 (2025)   Warfarin maintenance plan:  10 mg every Sun, Tue, Thu; 5 mg all other days   Weekly warfarin total:  50 mg   No change documented:  Olga Granados RPH   Plan last modified:  Olga Granados RPH (2025)   Next INR check:  2025   Priority:  Maintenance   Target end date:  --    Indications    Atrial flutter with controlled response [I48.92]                 Anticoagulation Episode Summary       INR check location:  Home Draw    Preferred lab:  --    Send INR reminders to:   [facility]    Comments:  [facility] home stephania          Anticoagulation Care Providers       Provider Role Specialty Phone number    Ty Shelton MD Referring Cardiology [phone]            Clinic Interview:  No pertinent clinical findings have been reported.    INR History:      2025     2:14 PM 2025    12:00 AM 2025     2:00 PM 2025    12:00 AM 2025     2:08 PM 2025    12:00 AM 2025     1:06 PM   Anticoagulation Monitoring   INR 2.70  3.50  1.80  2.40   INR Date 2025   INR Goal 2.0-3.0  2.0-3.0  2.0-3.0  2.0-3.0   Trend Same  Same  Same  Same   Last Week Total 50 mg  55 mg  45 mg  50 mg   Next Week Total 50 mg  47.5 mg  50 mg  50 mg   Sun 10 mg  10 mg  10 mg  10 mg   Mon 5 mg  2.5 mg ()  5 mg  5 mg   Tue 10 mg  10 mg  10 mg  10 mg   Wed 5 mg  5 mg  5 mg  5 mg   Thu 10 mg  10 mg  10 mg  10 mg   Fri 5 mg  5 mg  5 mg  5 mg   Sat 5 mg  5 mg  5 mg  5 mg   Historical INR  3.50      1.80      2.40            This result is from an external source.       Plan:  1. INR is therapeutic today- see above in Anticoagulation Summary.    Riky Moon to continue their warfarin regimen- see above in Anticoagulation Summary.  2. Follow up in 1 week  3. Pt has agreed to only be called if INR out  of range. They have been instructed to call if any changes in medications, doses, concerns, etc. Patient expresses understanding and has no further questions at this time.    Olga Granados, [facility]

## 2025-06-30 ENCOUNTER — ANTICOAGULATION VISIT (OUTPATIENT)
Dept: PHARMACY | Facility: HOSPITAL | Age: 85
End: 2025-06-30
Payer: MEDICARE

## 2025-06-30 DIAGNOSIS — I48.92 ATRIAL FLUTTER WITH CONTROLLED RESPONSE: Primary | ICD-10-CM

## 2025-06-30 LAB — INR PPP: 2

## 2025-06-30 NOTE — PROGRESS NOTES
Anticoagulation Clinic Progress Note    Anticoagulation Summary  As of 2025      INR goal:  2.0-3.0   TTR:  50.7% (3 y)   INR used for dosin.00 (2025)   Warfarin maintenance plan:  10 mg every Sun, Tue, Thu; 5 mg all other days   Weekly warfarin total:  50 mg   No change documented:  Olga Granados RPH   Plan last modified:  Olga Granados RPH (2025)   Next INR check:  2025   Priority:  Maintenance   Target end date:  --    Indications    Atrial flutter with controlled response [I48.92]                 Anticoagulation Episode Summary       INR check location:  Home Draw    Preferred lab:  --    Send INR reminders to:   [facility]    Comments:  [facility] home stephania          Anticoagulation Care Providers       Provider Role Specialty Phone number    Ty Shelton MD Referring Cardiology [phone]            Clinic Interview:  No pertinent clinical findings have been reported.    INR History:      2025     2:00 PM 2025    12:00 AM 2025     2:08 PM 2025    12:00 AM 2025     1:06 PM 2025    12:00 AM 2025     3:52 PM   Anticoagulation Monitoring   INR 3.50  1.80  2.40  2.00   INR Date 2025   INR Goal 2.0-3.0  2.0-3.0  2.0-3.0  2.0-3.0   Trend Same  Same  Same  Same   Last Week Total 55 mg  45 mg  50 mg  50 mg   Next Week Total 47.5 mg  50 mg  50 mg  50 mg   Sun 10 mg  10 mg  10 mg  10 mg   Mon 2.5 mg ()  5 mg  5 mg  5 mg   Tue 10 mg  10 mg  10 mg  10 mg   Wed 5 mg  5 mg  5 mg  5 mg   Thu 10 mg  10 mg  10 mg  10 mg   Fri 5 mg  5 mg  5 mg  5 mg   Sat 5 mg  5 mg  5 mg  5 mg   Historical INR  1.80      2.40      2.00            This result is from an external source.       Plan:  1. INR is therapeutic today- see above in Anticoagulation Summary.    Riky Moon to continue their warfarin regimen- see above in Anticoagulation Summary.  2. Follow up in 1 week  3. Pt has agreed to only be called if INR out  of range. They have been instructed to call if any changes in medications, doses, concerns, etc. Patient expresses understanding and has no further questions at this time.    Olga Granados, [facility]

## 2025-07-08 ENCOUNTER — ANTICOAGULATION VISIT (OUTPATIENT)
Dept: PHARMACY | Facility: HOSPITAL | Age: 85
End: 2025-07-08
Payer: MEDICARE

## 2025-07-08 DIAGNOSIS — I48.92 ATRIAL FLUTTER WITH CONTROLLED RESPONSE: Primary | ICD-10-CM

## 2025-07-08 LAB — INR PPP: 1.9

## 2025-07-08 PROCEDURE — G0249 PROVIDE INR TEST MATER/EQUIP: HCPCS

## 2025-07-08 NOTE — PROGRESS NOTES
Anticoagulation Clinic Progress Note    Anticoagulation Summary  As of 2025      INR goal:  2.0-3.0   TTR:  50.3% (3 y)   INR used for dosin.90 (2025)   Warfarin maintenance plan:  10 mg every Sun, Tue, Thu; 5 mg all other days   Weekly warfarin total:  50 mg   No change documented:  Olga Granados RPH   Plan last modified:  Olga Granados RPH (2025)   Next INR check:  7/15/2025   Priority:  Maintenance   Target end date:  --    Indications    Atrial flutter with controlled response [I48.92]                 Anticoagulation Episode Summary       INR check location:  Home Draw    Preferred lab:  --    Send INR reminders to:   [facility]    Comments:  [facility] home stephania          Anticoagulation Care Providers       Provider Role Specialty Phone number    Ty Shelton MD Referring Cardiology [phone]            Clinic Interview:  Patient Findings     Negatives:  Signs/symptoms of thrombosis, Signs/symptoms of bleeding,   Laboratory test error suspected, Change in health, Change in alcohol use,   Change in activity, Upcoming invasive procedure, Emergency department   visit, Upcoming dental procedure, Missed doses, Extra doses, Change in   medications, Change in diet/appetite, Hospital admission, Bruising, Other   complaints      Clinical Outcomes     Negatives:  Major bleeding event, Thromboembolic event,   Anticoagulation-related hospital admission, Anticoagulation-related ED   visit, Anticoagulation-related fatality        INR History:      2025     2:08 PM 2025    12:00 AM 2025     1:06 PM 2025    12:00 AM 2025     3:52 PM 2025    12:00 AM 2025     3:00 PM   Anticoagulation Monitoring   INR 1.80  2.40  2.00  1.90   INR Date 2025   INR Goal 2.0-3.0  2.0-3.0  2.0-3.0  2.0-3.0   Trend Same  Same  Same  Same   Last Week Total 45 mg  50 mg  50 mg  50 mg   Next Week Total 50 mg  50 mg  50 mg  50 mg   Sun 10  mg  10 mg  10 mg  10 mg   Mon 5 mg  5 mg  5 mg  5 mg   Tue 10 mg  10 mg  10 mg  10 mg   Wed 5 mg  5 mg  5 mg  5 mg   Thu 10 mg  10 mg  10 mg  10 mg   Fri 5 mg  5 mg  5 mg  5 mg   Sat 5 mg  5 mg  5 mg  5 mg   Historical INR  2.40      2.00      1.90            This result is from an external source.       Plan:  1. INR is Subtherapeutic today- see above in Anticoagulation Summary.   Will instruct Riky Moon to continue their warfarin regimen as the INR is very close to goal range- see above in Anticoagulation Summary.    2. Follow up in 1 weeks  3. They have been instructed to call if any changes in medications, doses, concerns, etc. Patient expresses understanding and has no further questions at this time.    Olga Granados [facility]

## 2025-07-14 ENCOUNTER — TELEPHONE (OUTPATIENT)
Dept: INTERNAL MEDICINE | Facility: CLINIC | Age: 85
End: 2025-07-14
Payer: MEDICARE

## 2025-07-14 NOTE — TELEPHONE ENCOUNTER
Caller: KEM PEREIRA    Best call back number:  - ASK FOR THE NURSE     Patient is needing: PATIENT'S LIVING FACILITY IS CALLING TO REPORT TO DR. APPLE THAT THE PATIENT HAS COME IN CONTACT WITH SOMEONE WITH [facility] AND HAS NOW DEVELOPED SNIFFLES AND COUGHING.   SHE STATES HE HAS NYQUIL DAY AND NIGHT THAT HE CAN TAKE INDEPENDENTLY.       FACILITY NEEDS A CALLBACK TO ADVISE WHAT DR. APPLE WANTS THEM TO DO - A SWAB, ISOLATION AND OFFICE APPT.   PLEASE CALL FACILITY TO ADVISE.

## 2025-07-28 ENCOUNTER — TELEPHONE (OUTPATIENT)
Dept: PHARMACY | Facility: HOSPITAL | Age: 85
End: 2025-07-28
Payer: MEDICARE

## 2025-07-28 NOTE — TELEPHONE ENCOUNTER
INR Reminder: Left message for patient regarding overdue INR. Last INR on 7/8/25. He reports he will test today

## 2025-07-31 ENCOUNTER — ANTICOAGULATION VISIT (OUTPATIENT)
Dept: PHARMACY | Facility: HOSPITAL | Age: 85
End: 2025-07-31
Payer: MEDICARE

## 2025-07-31 DIAGNOSIS — I48.92 ATRIAL FLUTTER WITH CONTROLLED RESPONSE: Primary | ICD-10-CM

## 2025-07-31 LAB — INR PPP: 2.2

## 2025-07-31 NOTE — PROGRESS NOTES
Anticoagulation Clinic Progress Note    Anticoagulation Summary  As of 2025      INR goal:  2.0-3.0   TTR:  50.6% (3.1 y)   INR used for dosin.20 (2025)   Warfarin maintenance plan:  10 mg every Sun, Tue, Thu; 5 mg all other days   Weekly warfarin total:  50 mg   No change documented:  Olga Granados RPH   Plan last modified:  Olga Granados RPH (2025)   Next INR check:  2025   Priority:  Maintenance   Target end date:  --    Indications    Atrial flutter with controlled response [I48.92]                 Anticoagulation Episode Summary       INR check location:  Home Draw    Preferred lab:  --    Send INR reminders to:  [facility]    Comments:  [facility] home stephania          Anticoagulation Care Providers       Provider Role Specialty Phone number    Ty Shelton MD Referring Cardiology [phone]            Clinic Interview:  No pertinent clinical findings have been reported.    INR History:      2025     1:06 PM 2025    12:00 AM 2025     3:52 PM 2025    12:00 AM 2025     3:00 PM 2025    12:00 AM 2025     2:06 PM   Anticoagulation Monitoring   INR 2.40  2.00  1.90  2.20   INR Date 2025   INR Goal 2.0-3.0  2.0-3.0  2.0-3.0  2.0-3.0   Trend Same  Same  Same  Same   Last Week Total 50 mg  50 mg  50 mg  50 mg   Next Week Total 50 mg  50 mg  50 mg  50 mg   Sun 10 mg  10 mg  10 mg  10 mg   Mon 5 mg  5 mg  5 mg  5 mg   Tue 10 mg  10 mg  10 mg  10 mg   Wed 5 mg  5 mg  5 mg  5 mg   Thu 10 mg  10 mg  10 mg  10 mg   Fri 5 mg  5 mg  5 mg  5 mg   Sat 5 mg  5 mg  5 mg  5 mg   Historical INR  2.00      1.90      2.20            This result is from an external source.       Plan:  1. INR is therapeutic today- see above in Anticoagulation Summary.    Riky BARBER Moon to continue their warfarin regimen- see above in Anticoagulation Summary.  2. Follow up in 1 week  3. Pt has agreed to only be called if INR out of  range. They have been instructed to call if any changes in medications, doses, concerns, etc. Patient expresses understanding and has no further questions at this time.    Olga Granados, [facility]

## 2025-08-14 ENCOUNTER — TELEPHONE (OUTPATIENT)
Dept: PHARMACY | Facility: HOSPITAL | Age: 85
End: 2025-08-14
Payer: MEDICARE

## 2025-08-15 ENCOUNTER — TELEPHONE (OUTPATIENT)
Dept: INTERNAL MEDICINE | Facility: CLINIC | Age: 85
End: 2025-08-15
Payer: MEDICARE

## 2025-08-18 ENCOUNTER — ANTICOAGULATION VISIT (OUTPATIENT)
Dept: PHARMACY | Facility: HOSPITAL | Age: 85
End: 2025-08-18
Payer: MEDICARE

## 2025-08-18 DIAGNOSIS — I48.92 ATRIAL FLUTTER WITH CONTROLLED RESPONSE: Primary | ICD-10-CM

## 2025-08-18 LAB — INR PPP: 1.5

## 2025-08-19 ENCOUNTER — OFFICE VISIT (OUTPATIENT)
Dept: INTERNAL MEDICINE | Facility: CLINIC | Age: 85
End: 2025-08-19
Payer: MEDICARE

## 2025-08-19 VITALS
WEIGHT: 271.6 LBS | DIASTOLIC BLOOD PRESSURE: 66 MMHG | BODY MASS INDEX: 36.79 KG/M2 | HEIGHT: 72 IN | SYSTOLIC BLOOD PRESSURE: 114 MMHG | OXYGEN SATURATION: 95 % | RESPIRATION RATE: 20 BRPM | HEART RATE: 74 BPM | TEMPERATURE: 98.1 F

## 2025-08-19 DIAGNOSIS — I10 ESSENTIAL HYPERTENSION: ICD-10-CM

## 2025-08-19 DIAGNOSIS — E03.9 HYPOTHYROIDISM, UNSPECIFIED TYPE: ICD-10-CM

## 2025-08-19 DIAGNOSIS — E78.5 HYPERLIPIDEMIA, UNSPECIFIED HYPERLIPIDEMIA TYPE: ICD-10-CM

## 2025-08-19 DIAGNOSIS — E11.9 TYPE 2 DIABETES MELLITUS WITHOUT COMPLICATION, WITHOUT LONG-TERM CURRENT USE OF INSULIN: ICD-10-CM

## 2025-08-19 RX ORDER — ATENOLOL 25 MG/1
12.5 TABLET ORAL DAILY
Qty: 45 TABLET | Refills: 3 | Status: SHIPPED | OUTPATIENT
Start: 2025-08-19

## 2025-08-19 RX ORDER — LEVOTHYROXINE SODIUM 125 MCG
125 TABLET ORAL DAILY
Qty: 30 TABLET | Refills: 11 | Status: SHIPPED | OUTPATIENT
Start: 2025-08-19

## 2025-08-19 RX ORDER — PRAVASTATIN SODIUM 40 MG
40 TABLET ORAL NIGHTLY
Qty: 90 TABLET | Refills: 3 | Status: SHIPPED | OUTPATIENT
Start: 2025-08-19

## 2025-08-20 LAB
ALBUMIN SERPL-MCNC: 4.1 G/DL (ref 3.7–4.7)
ALBUMIN/CREAT UR: 27 MG/G CREAT (ref 0–29)
ALP SERPL-CCNC: 61 IU/L (ref 44–121)
ALT SERPL-CCNC: 12 IU/L (ref 0–44)
AST SERPL-CCNC: 16 IU/L (ref 0–40)
BASOPHILS # BLD AUTO: 0.1 X10E3/UL (ref 0–0.2)
BASOPHILS NFR BLD AUTO: 1 %
BILIRUB SERPL-MCNC: 0.7 MG/DL (ref 0–1.2)
BUN SERPL-MCNC: 24 MG/DL (ref 8–27)
BUN/CREAT SERPL: 21 (ref 10–24)
CALCIUM SERPL-MCNC: 9.2 MG/DL (ref 8.6–10.2)
CHLORIDE SERPL-SCNC: 106 MMOL/L (ref 96–106)
CHOLEST SERPL-MCNC: 102 MG/DL (ref 100–199)
CO2 SERPL-SCNC: 21 MMOL/L (ref 20–29)
CREAT SERPL-MCNC: 1.16 MG/DL (ref 0.76–1.27)
CREAT UR-MCNC: 70.7 MG/DL
EGFRCR SERPLBLD CKD-EPI 2021: 62 ML/MIN/1.73
EOSINOPHIL # BLD AUTO: 0.1 X10E3/UL (ref 0–0.4)
EOSINOPHIL NFR BLD AUTO: 2 %
ERYTHROCYTE [DISTWIDTH] IN BLOOD BY AUTOMATED COUNT: 17 % (ref 11.6–15.4)
FT4I SERPL CALC-MCNC: 2 (ref 1.2–4.9)
GLOBULIN SER CALC-MCNC: 2.2 G/DL (ref 1.5–4.5)
GLUCOSE SERPL-MCNC: 146 MG/DL (ref 70–99)
HBA1C MFR BLD: 6.3 % (ref 4.8–5.6)
HCT VFR BLD AUTO: 34 % (ref 37.5–51)
HDLC SERPL-MCNC: 40 MG/DL
HGB BLD-MCNC: 11.3 G/DL (ref 13–17.7)
IMM GRANULOCYTES # BLD AUTO: 0 X10E3/UL (ref 0–0.1)
IMM GRANULOCYTES NFR BLD AUTO: 0 %
LDLC SERPL CALC-MCNC: 47 MG/DL (ref 0–99)
LDLC/HDLC SERPL: 1.2 RATIO (ref 0–3.6)
LYMPHOCYTES # BLD AUTO: 1.2 X10E3/UL (ref 0.7–3.1)
LYMPHOCYTES NFR BLD AUTO: 16 %
MCH RBC QN AUTO: 39 PG (ref 26.6–33)
MCHC RBC AUTO-ENTMCNC: 33.2 G/DL (ref 31.5–35.7)
MCV RBC AUTO: 117 FL (ref 79–97)
MICROALBUMIN UR-MCNC: 19.4 UG/ML
MONOCYTES # BLD AUTO: 0.6 X10E3/UL (ref 0.1–0.9)
MONOCYTES NFR BLD AUTO: 8 %
NEUTROPHILS # BLD AUTO: 5.4 X10E3/UL (ref 1.4–7)
NEUTROPHILS NFR BLD AUTO: 73 %
NRBC BLD AUTO-RTO: 1 % (ref 0–0)
PLATELET # BLD AUTO: 158 X10E3/UL (ref 150–450)
POTASSIUM SERPL-SCNC: 5.1 MMOL/L (ref 3.5–5.2)
PROT SERPL-MCNC: 6.3 G/DL (ref 6–8.5)
RBC # BLD AUTO: 2.9 X10E6/UL (ref 4.14–5.8)
SODIUM SERPL-SCNC: 142 MMOL/L (ref 134–144)
T3RU NFR SERPL: 28 % (ref 24–39)
T4 SERPL-MCNC: 7 UG/DL (ref 4.5–12)
TRIGL SERPL-MCNC: 69 MG/DL (ref 0–149)
TSH SERPL DL<=0.005 MIU/L-ACNC: 3.47 UIU/ML (ref 0.45–4.5)
VLDLC SERPL CALC-MCNC: 15 MG/DL (ref 5–40)
WBC # BLD AUTO: 7.4 X10E3/UL (ref 3.4–10.8)

## 2025-08-29 ENCOUNTER — ANTICOAGULATION VISIT (OUTPATIENT)
Dept: PHARMACY | Facility: HOSPITAL | Age: 85
End: 2025-08-29
Payer: MEDICARE

## 2025-08-29 DIAGNOSIS — I48.92 ATRIAL FLUTTER WITH CONTROLLED RESPONSE: Primary | ICD-10-CM

## 2025-08-29 LAB — INR PPP: 2

## 2025-08-29 PROCEDURE — G0249 PROVIDE INR TEST MATER/EQUIP: HCPCS

## (undated) DEVICE — ANTIBACTERIAL UNDYED BRAIDED (POLYGLACTIN 910), SYNTHETIC ABSORBABLE SUTURE: Brand: COATED VICRYL

## (undated) DEVICE — ENCORE® LATEX ORTHO SIZE 7.5, STERILE LATEX POWDER-FREE SURGICAL GLOVE: Brand: ENCORE

## (undated) DEVICE — GLV SURG BIOGEL LTX PF 7 1/2

## (undated) DEVICE — DRAPE,REIN 53X77,STERILE: Brand: MEDLINE

## (undated) DEVICE — MSK PROC CURAPLEX O2 2/ADAPT 7FT

## (undated) DEVICE — DRSNG WND GZ PAD BORDERED 4X8IN STRL

## (undated) DEVICE — 3M™ IOBAN™ 2 ANTIMICROBIAL INCISE DRAPE 6648EZ: Brand: IOBAN™ 2

## (undated) DEVICE — KT ORCA ORCAPOD DISP STRL

## (undated) DEVICE — OCCLUSIVE GAUZE STRIP,3% BISMUTH TRIBROMOPHENATE IN PETROLATUM BLEND: Brand: XEROFORM

## (undated) DEVICE — HEWSON SUTURE RETRIEVER: Brand: HEWSON SUTURE RETRIEVER

## (undated) DEVICE — SENSR O2 OXIMAX FNGR A/ 18IN NONSTR

## (undated) DEVICE — MAT FLR ABSORBENT LG 4FT 10 2.5FT

## (undated) DEVICE — BNDG ELAS CO-FLEX SLF ADHR 6IN 5YD LF STRL

## (undated) DEVICE — SUT TEVDEK 2 K61 30IN 79746

## (undated) DEVICE — SYR LUERLOK 30CC

## (undated) DEVICE — PENCL E/S HNDSWCH ROCKR CB

## (undated) DEVICE — APPL CHLORAPREP W/TINT 26ML ORNG

## (undated) DEVICE — LN SMPL CO2 SHTRM SD STREAM W/M LUER

## (undated) DEVICE — ADHS SKIN DERMABOND TOP ADVANCED

## (undated) DEVICE — DECANT BG O JET

## (undated) DEVICE — NDL SPINE 20G 3 1/2 YEL STRL 1P/U

## (undated) DEVICE — SUT MNCRYL 3/0 PS2 18IN MCP497G

## (undated) DEVICE — SUT TEVDEX 5 K61 30IN 79745

## (undated) DEVICE — HANDPIECE SET WITH COAXIAL HIGH FLOW TIP AND SUCTION TUBE: Brand: INTERPULSE

## (undated) DEVICE — TUBING, SUCTION, 1/4" X 10', STRAIGHT: Brand: MEDLINE

## (undated) DEVICE — ADAPT CLN BIOGUARD AIR/H2O DISP

## (undated) DEVICE — BLCK/BITE BLOX W/DENTL/RIM W/STRAP 54F

## (undated) DEVICE — RECIPROCATING BLADE HEAVY DUTY LONG, OFFSET  (77.6 X 0.77 X 11.2MM)

## (undated) DEVICE — PK HIP TOTL 40